# Patient Record
Sex: FEMALE | Race: WHITE | Employment: OTHER | URBAN - METROPOLITAN AREA
[De-identification: names, ages, dates, MRNs, and addresses within clinical notes are randomized per-mention and may not be internally consistent; named-entity substitution may affect disease eponyms.]

---

## 2017-02-01 PROBLEM — R91.1 PULMONARY NODULE: Status: ACTIVE | Noted: 2017-02-01

## 2017-02-01 PROBLEM — Z72.0 TOBACCO ABUSE: Chronic | Status: ACTIVE | Noted: 2017-02-01

## 2017-02-01 PROBLEM — F10.10 ALCOHOL ABUSE: Chronic | Status: ACTIVE | Noted: 2017-02-01

## 2017-02-01 PROBLEM — J45.909 ASTHMA: Chronic | Status: ACTIVE | Noted: 2017-02-01

## 2017-02-01 PROBLEM — E78.5 HYPERLIPIDEMIA: Chronic | Status: ACTIVE | Noted: 2017-02-01

## 2017-10-16 PROBLEM — F10.939 ALCOHOL WITHDRAWAL (HCC): Status: ACTIVE | Noted: 2017-10-16

## 2017-10-16 PROBLEM — J44.9 COPD (CHRONIC OBSTRUCTIVE PULMONARY DISEASE) (HCC): Chronic | Status: ACTIVE | Noted: 2017-10-16

## 2017-10-16 PROBLEM — I50.32 CHRONIC DIASTOLIC CHF (CONGESTIVE HEART FAILURE) (HCC): Chronic | Status: ACTIVE | Noted: 2017-10-16

## 2017-10-16 PROBLEM — R91.1 PULMONARY NODULE: Status: RESOLVED | Noted: 2017-02-01 | Resolved: 2017-10-16

## 2017-10-16 PROBLEM — D75.839 THROMBOCYTOSIS: Status: ACTIVE | Noted: 2017-10-16

## 2017-10-16 PROBLEM — I16.0 HYPERTENSIVE URGENCY: Status: ACTIVE | Noted: 2017-10-16

## 2017-10-16 PROBLEM — F10.20 ALCOHOLISM (HCC): Chronic | Status: ACTIVE | Noted: 2017-02-01

## 2017-10-16 PROBLEM — R29.6 FREQUENT FALLS: Status: ACTIVE | Noted: 2017-10-16

## 2017-10-16 PROBLEM — R65.10 SIRS (SYSTEMIC INFLAMMATORY RESPONSE SYNDROME) (HCC): Status: ACTIVE | Noted: 2017-10-16

## 2018-05-02 ENCOUNTER — HOSPITAL ENCOUNTER (OUTPATIENT)
Dept: SURGERY | Age: 73
Discharge: HOME | End: 2018-05-02
Attending: OPHTHALMOLOGY | Admitting: OPHTHALMOLOGY

## 2018-05-02 VITALS — DIASTOLIC BLOOD PRESSURE: 59 MMHG | HEART RATE: 81 BPM | SYSTOLIC BLOOD PRESSURE: 125 MMHG

## 2018-05-02 RX ORDER — TROPICAMIDE 10 MG/ML
1 SOLUTION/ DROPS OPHTHALMIC
Status: COMPLETED | OUTPATIENT
Start: 2018-05-02 | End: 2018-05-02

## 2018-05-02 RX ORDER — PHENYLEPHRINE HCL 2.5 %
1 DROPS OPHTHALMIC (EYE)
Status: COMPLETED | OUTPATIENT
Start: 2018-05-02 | End: 2018-05-02

## 2018-05-02 RX ORDER — PROPARACAINE HYDROCHLORIDE 5 MG/ML
1 SOLUTION/ DROPS OPHTHALMIC
Status: COMPLETED | OUTPATIENT
Start: 2018-05-02 | End: 2018-05-02

## 2018-05-02 RX ORDER — APRACLONIDINE HYDROCHLORIDE 5 MG/ML
1 SOLUTION/ DROPS OPHTHALMIC 2 TIMES DAILY PRN
Status: DISCONTINUED | OUTPATIENT
Start: 2018-05-02 | End: 2018-05-03 | Stop reason: HOSPADM

## 2018-05-02 RX ORDER — SOFT LENS RINSE,STORE SOLUTION
1 SOLUTION, NON-ORAL MISCELLANEOUS
Status: COMPLETED | OUTPATIENT
Start: 2018-05-02 | End: 2018-05-02

## 2018-05-02 RX ADMIN — Medication 1 DROP: at 12:49

## 2018-05-02 RX ADMIN — PROPARACAINE HYDROCHLORIDE 1 DROP: 5 SOLUTION/ DROPS OPHTHALMIC at 12:45

## 2018-05-02 RX ADMIN — TROPICAMIDE 1 DROP: 10 SOLUTION/ DROPS OPHTHALMIC at 12:01

## 2018-05-02 RX ADMIN — APRACLONIDINE HYDROCHLORIDE 1 DROP: 5 SOLUTION/ DROPS OPHTHALMIC at 12:01

## 2018-05-02 RX ADMIN — Medication 1 DROP: at 12:01

## 2018-05-02 RX ADMIN — APRACLONIDINE HYDROCHLORIDE 1 DROP: 5 SOLUTION/ DROPS OPHTHALMIC at 12:49

## 2018-12-28 ENCOUNTER — APPOINTMENT (OUTPATIENT)
Dept: GENERAL RADIOLOGY | Age: 73
End: 2018-12-28
Payer: MEDICARE

## 2018-12-28 ENCOUNTER — HOSPITAL ENCOUNTER (EMERGENCY)
Age: 73
Discharge: HOME | End: 2018-12-29
Attending: EMERGENCY MEDICINE
Payer: MEDICARE

## 2018-12-28 ENCOUNTER — APPOINTMENT (OUTPATIENT)
Dept: CT IMAGING | Age: 73
End: 2018-12-28
Payer: MEDICARE

## 2018-12-28 DIAGNOSIS — M25.561 ACUTE PAIN OF RIGHT KNEE: ICD-10-CM
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
## 2018-12-28 DIAGNOSIS — I67.82 ISCHEMIC CHANGES ON HEAD CT: ICD-10-CM

## 2018-12-28 DIAGNOSIS — R06.09 DYSPNEA ON EXERTION: Primary | ICD-10-CM

## 2018-12-28 LAB
A/G RATIO: 1.2 (ref 1.1–2.2)
ALBUMIN SERPL-MCNC: 3.7 G/DL (ref 3.4–5)
ALP BLD-CCNC: 63 U/L (ref 40–129)
ALT SERPL-CCNC: 15 U/L (ref 10–40)
ANION GAP SERPL CALCULATED.3IONS-SCNC: 12 MMOL/L (ref 3–16)
AST SERPL-CCNC: 26 U/L (ref 15–37)
BASOPHILS ABSOLUTE: 0.1 K/UL (ref 0–0.2)
BASOPHILS RELATIVE PERCENT: 1.1 %
BILIRUB SERPL-MCNC: 0.5 MG/DL (ref 0–1)
BUN BLDV-MCNC: 14 MG/DL (ref 7–20)
CALCIUM SERPL-MCNC: 9.5 MG/DL (ref 8.3–10.6)
CHLORIDE BLD-SCNC: 97 MMOL/L (ref 99–110)
CO2: 25 MMOL/L (ref 21–32)
CREAT SERPL-MCNC: 0.8 MG/DL (ref 0.6–1.2)
EOSINOPHILS ABSOLUTE: 0.2 K/UL (ref 0–0.6)
EOSINOPHILS RELATIVE PERCENT: 1.6 %
GFR AFRICAN AMERICAN: >60
GFR NON-AFRICAN AMERICAN: >60
GLOBULIN: 3.1 G/DL
GLUCOSE BLD-MCNC: 111 MG/DL (ref 70–99)
HCT VFR BLD CALC: 38.4 % (ref 36–48)
HEMOGLOBIN: 12.8 G/DL (ref 12–16)
LYMPHOCYTES ABSOLUTE: 2.7 K/UL (ref 1–5.1)
LYMPHOCYTES RELATIVE PERCENT: 28 %
MCH RBC QN AUTO: 31.3 PG (ref 26–34)
MCHC RBC AUTO-ENTMCNC: 33.2 G/DL (ref 31–36)
MCV RBC AUTO: 94.3 FL (ref 80–100)
MONOCYTES ABSOLUTE: 0.9 K/UL (ref 0–1.3)
MONOCYTES RELATIVE PERCENT: 8.8 %
NEUTROPHILS ABSOLUTE: 5.9 K/UL (ref 1.7–7.7)
NEUTROPHILS RELATIVE PERCENT: 60.5 %
PDW BLD-RTO: 16.8 % (ref 12.4–15.4)
PLATELET # BLD: 373 K/UL (ref 135–450)
PMV BLD AUTO: 7.8 FL (ref 5–10.5)
POTASSIUM SERPL-SCNC: 4.6 MMOL/L (ref 3.5–5.1)
PRO-BNP: 347 PG/ML (ref 0–124)
RBC # BLD: 4.08 M/UL (ref 4–5.2)
SODIUM BLD-SCNC: 134 MMOL/L (ref 136–145)
SPECIMEN STATUS: NORMAL
TOTAL PROTEIN: 6.8 G/DL (ref 6.4–8.2)
TROPONIN: <0.01 NG/ML
WBC # BLD: 9.8 K/UL (ref 4–11)

## 2018-12-28 PROCEDURE — 73560 X-RAY EXAM OF KNEE 1 OR 2: CPT

## 2018-12-28 PROCEDURE — 80053 COMPREHEN METABOLIC PANEL: CPT

## 2018-12-28 PROCEDURE — 71045 X-RAY EXAM CHEST 1 VIEW: CPT

## 2018-12-28 PROCEDURE — 70450 CT HEAD/BRAIN W/O DYE: CPT

## 2018-12-28 PROCEDURE — 93005 ELECTROCARDIOGRAM TRACING: CPT | Performed by: EMERGENCY MEDICINE

## 2018-12-28 PROCEDURE — 84484 ASSAY OF TROPONIN QUANT: CPT

## 2018-12-28 PROCEDURE — 73552 X-RAY EXAM OF FEMUR 2/>: CPT

## 2018-12-28 PROCEDURE — 73590 X-RAY EXAM OF LOWER LEG: CPT

## 2018-12-28 PROCEDURE — 73562 X-RAY EXAM OF KNEE 3: CPT

## 2018-12-28 PROCEDURE — 85025 COMPLETE CBC W/AUTO DIFF WBC: CPT

## 2018-12-28 PROCEDURE — 6370000000 HC RX 637 (ALT 250 FOR IP): Performed by: EMERGENCY MEDICINE

## 2018-12-28 PROCEDURE — 6370000000 HC RX 637 (ALT 250 FOR IP): Performed by: NURSE PRACTITIONER

## 2018-12-28 PROCEDURE — 99283 EMERGENCY DEPT VISIT LOW MDM: CPT

## 2018-12-28 PROCEDURE — 83880 ASSAY OF NATRIURETIC PEPTIDE: CPT

## 2018-12-28 PROCEDURE — 94664 DEMO&/EVAL PT USE INHALER: CPT

## 2018-12-28 PROCEDURE — 94640 AIRWAY INHALATION TREATMENT: CPT

## 2018-12-28 RX ORDER — IBUPROFEN 800 MG/1
800 TABLET ORAL ONCE
Status: COMPLETED | OUTPATIENT
Start: 2018-12-28 | End: 2018-12-28

## 2018-12-28 RX ORDER — POLYETHYLENE GLYCOL 3350, SODIUM CHLORIDE, SODIUM BICARBONATE, POTASSIUM CHLORIDE 420; 11.2; 5.72; 1.48 G/4L; G/4L; G/4L; G/4L
POWDER, FOR SOLUTION ORAL
Status: ON HOLD | COMMUNITY
Start: 2018-12-05 | End: 2021-05-23

## 2018-12-28 RX ORDER — ALBUTEROL SULFATE 90 UG/1
2 AEROSOL, METERED RESPIRATORY (INHALATION) EVERY 6 HOURS PRN
Qty: 1 INHALER | Refills: 0 | Status: ON HOLD | OUTPATIENT
Start: 2018-12-28 | End: 2021-05-23

## 2018-12-28 RX ORDER — PREDNISONE 20 MG/1
40 TABLET ORAL DAILY
Qty: 6 TABLET | Refills: 0 | Status: SHIPPED | OUTPATIENT
Start: 2018-12-28 | End: 2018-12-31

## 2018-12-28 RX ORDER — IPRATROPIUM BROMIDE AND ALBUTEROL SULFATE 2.5; .5 MG/3ML; MG/3ML
1 SOLUTION RESPIRATORY (INHALATION) ONCE
Status: COMPLETED | OUTPATIENT
Start: 2018-12-28 | End: 2018-12-28

## 2018-12-28 RX ADMIN — IBUPROFEN 800 MG: 800 TABLET ORAL at 20:03

## 2018-12-28 RX ADMIN — IPRATROPIUM BROMIDE AND ALBUTEROL SULFATE 1 AMPULE: .5; 3 SOLUTION RESPIRATORY (INHALATION) at 21:49

## 2018-12-28 ASSESSMENT — PAIN DESCRIPTION - LOCATION: LOCATION: LEG;KNEE

## 2018-12-28 ASSESSMENT — PAIN SCALES - GENERAL
PAINLEVEL_OUTOF10: 9

## 2018-12-28 ASSESSMENT — PAIN DESCRIPTION - ORIENTATION: ORIENTATION: RIGHT

## 2018-12-28 ASSESSMENT — PAIN DESCRIPTION - PAIN TYPE: TYPE: ACUTE PAIN

## 2018-12-29 VITALS
HEART RATE: 102 BPM | OXYGEN SATURATION: 92 % | BODY MASS INDEX: 28.29 KG/M2 | WEIGHT: 170 LBS | TEMPERATURE: 97.7 F | SYSTOLIC BLOOD PRESSURE: 141 MMHG | RESPIRATION RATE: 16 BRPM | DIASTOLIC BLOOD PRESSURE: 83 MMHG

## 2018-12-29 LAB
EKG ATRIAL RATE: 88 BPM
EKG DIAGNOSIS: NORMAL
EKG P AXIS: 77 DEGREES
EKG P-R INTERVAL: 176 MS
EKG Q-T INTERVAL: 390 MS
EKG QRS DURATION: 88 MS
EKG QTC CALCULATION (BAZETT): 471 MS
EKG R AXIS: 58 DEGREES
EKG T AXIS: 48 DEGREES
EKG VENTRICULAR RATE: 88 BPM

## 2018-12-29 PROCEDURE — 93010 ELECTROCARDIOGRAM REPORT: CPT | Performed by: INTERNAL MEDICINE

## 2020-02-28 ENCOUNTER — HOSPITAL ENCOUNTER (INPATIENT)
Age: 75
LOS: 3 days | Discharge: ANOTHER HOSPITAL | DRG: 280 | End: 2020-03-03
Attending: EMERGENCY MEDICINE | Admitting: INTERNAL MEDICINE
Payer: MEDICARE

## 2020-02-28 ENCOUNTER — APPOINTMENT (OUTPATIENT)
Dept: GENERAL RADIOLOGY | Age: 75
DRG: 280 | End: 2020-02-28
Payer: MEDICARE

## 2020-02-28 LAB
A/G RATIO: 1.1 (ref 1.1–2.2)
ALBUMIN SERPL-MCNC: 3.7 G/DL (ref 3.4–5)
ALP BLD-CCNC: 71 U/L (ref 40–129)
ALT SERPL-CCNC: 15 U/L (ref 10–40)
ANION GAP SERPL CALCULATED.3IONS-SCNC: 14 MMOL/L (ref 3–16)
APTT: 29.7 SEC (ref 24.2–36.2)
AST SERPL-CCNC: 19 U/L (ref 15–37)
BASE EXCESS VENOUS: 5.3 MMOL/L (ref -3–3)
BASOPHILS ABSOLUTE: 0.1 K/UL (ref 0–0.2)
BASOPHILS RELATIVE PERCENT: 0.9 %
BILIRUB SERPL-MCNC: 0.5 MG/DL (ref 0–1)
BUN BLDV-MCNC: 11 MG/DL (ref 7–20)
CALCIUM SERPL-MCNC: 9 MG/DL (ref 8.3–10.6)
CARBOXYHEMOGLOBIN: 2.3 % (ref 0–1.5)
CHLORIDE BLD-SCNC: 90 MMOL/L (ref 99–110)
CO2: 31 MMOL/L (ref 21–32)
CREAT SERPL-MCNC: 0.8 MG/DL (ref 0.6–1.2)
EOSINOPHILS ABSOLUTE: 0.1 K/UL (ref 0–0.6)
EOSINOPHILS RELATIVE PERCENT: 1.7 %
GFR AFRICAN AMERICAN: >60
GFR NON-AFRICAN AMERICAN: >60
GLOBULIN: 3.3 G/DL
GLUCOSE BLD-MCNC: 168 MG/DL (ref 70–99)
HCO3 VENOUS: 30.8 MMOL/L (ref 23–29)
HCT VFR BLD CALC: 31.4 % (ref 36–48)
HEMOGLOBIN: 10 G/DL (ref 12–16)
INR BLD: 1.08 (ref 0.86–1.14)
LYMPHOCYTES ABSOLUTE: 1.8 K/UL (ref 1–5.1)
LYMPHOCYTES RELATIVE PERCENT: 21.6 %
MAGNESIUM: 1.2 MG/DL (ref 1.8–2.4)
MCH RBC QN AUTO: 25.8 PG (ref 26–34)
MCHC RBC AUTO-ENTMCNC: 31.7 G/DL (ref 31–36)
MCV RBC AUTO: 81.2 FL (ref 80–100)
METHEMOGLOBIN VENOUS: 0.3 %
MONOCYTES ABSOLUTE: 0.9 K/UL (ref 0–1.3)
MONOCYTES RELATIVE PERCENT: 10.5 %
NEUTROPHILS ABSOLUTE: 5.5 K/UL (ref 1.7–7.7)
NEUTROPHILS RELATIVE PERCENT: 65.3 %
O2 CONTENT, VEN: 8 VOL %
O2 SAT, VEN: 57 %
O2 THERAPY: ABNORMAL
PCO2, VEN: 49.6 MMHG (ref 40–50)
PDW BLD-RTO: 17.6 % (ref 12.4–15.4)
PH VENOUS: 7.41 (ref 7.35–7.45)
PLATELET # BLD: 449 K/UL (ref 135–450)
PMV BLD AUTO: 7.3 FL (ref 5–10.5)
PO2, VEN: 30.1 MMHG (ref 25–40)
POTASSIUM REFLEX MAGNESIUM: 3.1 MMOL/L (ref 3.5–5.1)
PRO-BNP: 4283 PG/ML (ref 0–124)
PROTHROMBIN TIME: 12.5 SEC (ref 10–13.2)
RBC # BLD: 3.87 M/UL (ref 4–5.2)
SODIUM BLD-SCNC: 135 MMOL/L (ref 136–145)
TCO2 CALC VENOUS: 32 MMOL/L
TOTAL PROTEIN: 7 G/DL (ref 6.4–8.2)
TROPONIN: <0.01 NG/ML
WBC # BLD: 8.4 K/UL (ref 4–11)

## 2020-02-28 PROCEDURE — 96375 TX/PRO/DX INJ NEW DRUG ADDON: CPT

## 2020-02-28 PROCEDURE — 93005 ELECTROCARDIOGRAM TRACING: CPT | Performed by: EMERGENCY MEDICINE

## 2020-02-28 PROCEDURE — 2700000000 HC OXYGEN THERAPY PER DAY

## 2020-02-28 PROCEDURE — 84484 ASSAY OF TROPONIN QUANT: CPT

## 2020-02-28 PROCEDURE — 94761 N-INVAS EAR/PLS OXIMETRY MLT: CPT

## 2020-02-28 PROCEDURE — 85025 COMPLETE CBC W/AUTO DIFF WBC: CPT

## 2020-02-28 PROCEDURE — 71045 X-RAY EXAM CHEST 1 VIEW: CPT

## 2020-02-28 PROCEDURE — 6370000000 HC RX 637 (ALT 250 FOR IP): Performed by: EMERGENCY MEDICINE

## 2020-02-28 PROCEDURE — 85730 THROMBOPLASTIN TIME PARTIAL: CPT

## 2020-02-28 PROCEDURE — 83735 ASSAY OF MAGNESIUM: CPT

## 2020-02-28 PROCEDURE — 83880 ASSAY OF NATRIURETIC PEPTIDE: CPT

## 2020-02-28 PROCEDURE — 85610 PROTHROMBIN TIME: CPT

## 2020-02-28 PROCEDURE — 82803 BLOOD GASES ANY COMBINATION: CPT

## 2020-02-28 PROCEDURE — 80053 COMPREHEN METABOLIC PANEL: CPT

## 2020-02-28 PROCEDURE — 94640 AIRWAY INHALATION TREATMENT: CPT

## 2020-02-28 PROCEDURE — 99285 EMERGENCY DEPT VISIT HI MDM: CPT

## 2020-02-28 PROCEDURE — 6360000002 HC RX W HCPCS: Performed by: EMERGENCY MEDICINE

## 2020-02-28 PROCEDURE — 96365 THER/PROPH/DIAG IV INF INIT: CPT

## 2020-02-28 RX ORDER — IPRATROPIUM BROMIDE AND ALBUTEROL SULFATE 2.5; .5 MG/3ML; MG/3ML
1 SOLUTION RESPIRATORY (INHALATION) ONCE
Status: COMPLETED | OUTPATIENT
Start: 2020-02-28 | End: 2020-02-28

## 2020-02-28 RX ORDER — FUROSEMIDE 10 MG/ML
40 INJECTION INTRAMUSCULAR; INTRAVENOUS ONCE
Status: COMPLETED | OUTPATIENT
Start: 2020-02-28 | End: 2020-02-28

## 2020-02-28 RX ORDER — MAGNESIUM SULFATE IN WATER 40 MG/ML
2 INJECTION, SOLUTION INTRAVENOUS ONCE
Status: COMPLETED | OUTPATIENT
Start: 2020-02-28 | End: 2020-02-29

## 2020-02-28 RX ADMIN — FUROSEMIDE 40 MG: 10 INJECTION, SOLUTION INTRAMUSCULAR; INTRAVENOUS at 23:34

## 2020-02-28 RX ADMIN — MAGNESIUM SULFATE HEPTAHYDRATE 2 G: 40 INJECTION, SOLUTION INTRAVENOUS at 23:34

## 2020-02-28 RX ADMIN — IPRATROPIUM BROMIDE AND ALBUTEROL SULFATE 1 AMPULE: .5; 3 SOLUTION RESPIRATORY (INHALATION) at 22:30

## 2020-02-29 ENCOUNTER — APPOINTMENT (OUTPATIENT)
Dept: GENERAL RADIOLOGY | Age: 75
DRG: 280 | End: 2020-02-29
Payer: MEDICARE

## 2020-02-29 PROBLEM — R06.02 SHORTNESS OF BREATH: Status: ACTIVE | Noted: 2020-02-29

## 2020-02-29 LAB
ANION GAP SERPL CALCULATED.3IONS-SCNC: 11 MMOL/L (ref 3–16)
BASE EXCESS ARTERIAL: 3.1 MMOL/L (ref -3–3)
BASOPHILS ABSOLUTE: 0 K/UL (ref 0–0.2)
BASOPHILS RELATIVE PERCENT: 0.5 %
BUN BLDV-MCNC: 12 MG/DL (ref 7–20)
CALCIUM SERPL-MCNC: 8.5 MG/DL (ref 8.3–10.6)
CARBOXYHEMOGLOBIN ARTERIAL: 0.2 % (ref 0–1.5)
CHLORIDE BLD-SCNC: 88 MMOL/L (ref 99–110)
CHOLESTEROL, TOTAL: 102 MG/DL (ref 0–199)
CO2: 32 MMOL/L (ref 21–32)
CREAT SERPL-MCNC: 1 MG/DL (ref 0.6–1.2)
EKG ATRIAL RATE: 88 BPM
EKG DIAGNOSIS: NORMAL
EKG P AXIS: 75 DEGREES
EKG P-R INTERVAL: 184 MS
EKG Q-T INTERVAL: 400 MS
EKG QRS DURATION: 82 MS
EKG QTC CALCULATION (BAZETT): 484 MS
EKG R AXIS: 81 DEGREES
EKG T AXIS: 81 DEGREES
EKG VENTRICULAR RATE: 88 BPM
EOSINOPHILS ABSOLUTE: 0 K/UL (ref 0–0.6)
EOSINOPHILS RELATIVE PERCENT: 0.1 %
GFR AFRICAN AMERICAN: >60
GFR NON-AFRICAN AMERICAN: 54
GLUCOSE BLD-MCNC: 139 MG/DL (ref 70–99)
GLUCOSE BLD-MCNC: 161 MG/DL (ref 70–99)
GLUCOSE BLD-MCNC: 170 MG/DL (ref 70–99)
GLUCOSE BLD-MCNC: 179 MG/DL (ref 70–99)
GLUCOSE BLD-MCNC: 193 MG/DL (ref 70–99)
GLUCOSE BLD-MCNC: 261 MG/DL (ref 70–99)
HCO3 ARTERIAL: 27.3 MMOL/L (ref 21–29)
HCT VFR BLD CALC: 27.9 % (ref 36–48)
HDLC SERPL-MCNC: 36 MG/DL (ref 40–60)
HEMOGLOBIN, ART, EXTENDED: 10.6 G/DL (ref 12–16)
HEMOGLOBIN: 8.9 G/DL (ref 12–16)
IRON SATURATION: 5 % (ref 15–50)
IRON: 21 UG/DL (ref 37–145)
LDL CHOLESTEROL CALCULATED: 50 MG/DL
LV EF: 33 %
LVEF MODALITY: NORMAL
LYMPHOCYTES ABSOLUTE: 1.7 K/UL (ref 1–5.1)
LYMPHOCYTES RELATIVE PERCENT: 18.9 %
MAGNESIUM: 1.5 MG/DL (ref 1.8–2.4)
MCH RBC QN AUTO: 27.3 PG (ref 26–34)
MCHC RBC AUTO-ENTMCNC: 31.8 G/DL (ref 31–36)
MCV RBC AUTO: 85.9 FL (ref 80–100)
METHEMOGLOBIN ARTERIAL: 0.3 %
MONOCYTES ABSOLUTE: 0.8 K/UL (ref 0–1.3)
MONOCYTES RELATIVE PERCENT: 8.6 %
NEUTROPHILS ABSOLUTE: 6.6 K/UL (ref 1.7–7.7)
NEUTROPHILS RELATIVE PERCENT: 71.9 %
O2 CONTENT ARTERIAL: 14 ML/DL
O2 SAT, ARTERIAL: 96.3 %
O2 THERAPY: ABNORMAL
PCO2 ARTERIAL: 40.1 MMHG (ref 35–45)
PDW BLD-RTO: 18.2 % (ref 12.4–15.4)
PERFORMED ON: ABNORMAL
PH ARTERIAL: 7.45 (ref 7.35–7.45)
PLATELET # BLD: 417 K/UL (ref 135–450)
PMV BLD AUTO: 7.7 FL (ref 5–10.5)
PO2 ARTERIAL: 80 MMHG (ref 75–108)
POTASSIUM SERPL-SCNC: 3.5 MMOL/L (ref 3.5–5.1)
RBC # BLD: 3.25 M/UL (ref 4–5.2)
SODIUM BLD-SCNC: 131 MMOL/L (ref 136–145)
TCO2 ARTERIAL: 28.5 MMOL/L
TOTAL IRON BINDING CAPACITY: 423 UG/DL (ref 260–445)
TRIGL SERPL-MCNC: 78 MG/DL (ref 0–150)
TROPONIN: 0.05 NG/ML
TROPONIN: 0.07 NG/ML
VLDLC SERPL CALC-MCNC: 16 MG/DL
WBC # BLD: 9.1 K/UL (ref 4–11)

## 2020-02-29 PROCEDURE — 6360000002 HC RX W HCPCS: Performed by: INTERNAL MEDICINE

## 2020-02-29 PROCEDURE — 80061 LIPID PANEL: CPT

## 2020-02-29 PROCEDURE — 2580000003 HC RX 258: Performed by: INTERNAL MEDICINE

## 2020-02-29 PROCEDURE — 93306 TTE W/DOPPLER COMPLETE: CPT

## 2020-02-29 PROCEDURE — 85025 COMPLETE CBC W/AUTO DIFF WBC: CPT

## 2020-02-29 PROCEDURE — 6370000000 HC RX 637 (ALT 250 FOR IP): Performed by: INTERNAL MEDICINE

## 2020-02-29 PROCEDURE — 6370000000 HC RX 637 (ALT 250 FOR IP): Performed by: EMERGENCY MEDICINE

## 2020-02-29 PROCEDURE — 97162 PT EVAL MOD COMPLEX 30 MIN: CPT

## 2020-02-29 PROCEDURE — 83550 IRON BINDING TEST: CPT

## 2020-02-29 PROCEDURE — 80048 BASIC METABOLIC PNL TOTAL CA: CPT

## 2020-02-29 PROCEDURE — 2700000000 HC OXYGEN THERAPY PER DAY

## 2020-02-29 PROCEDURE — 82803 BLOOD GASES ANY COMBINATION: CPT

## 2020-02-29 PROCEDURE — 71045 X-RAY EXAM CHEST 1 VIEW: CPT

## 2020-02-29 PROCEDURE — 36415 COLL VENOUS BLD VENIPUNCTURE: CPT

## 2020-02-29 PROCEDURE — 36600 WITHDRAWAL OF ARTERIAL BLOOD: CPT

## 2020-02-29 PROCEDURE — 97116 GAIT TRAINING THERAPY: CPT

## 2020-02-29 PROCEDURE — 94640 AIRWAY INHALATION TREATMENT: CPT

## 2020-02-29 PROCEDURE — 94761 N-INVAS EAR/PLS OXIMETRY MLT: CPT

## 2020-02-29 PROCEDURE — 84484 ASSAY OF TROPONIN QUANT: CPT

## 2020-02-29 PROCEDURE — 2060000000 HC ICU INTERMEDIATE R&B

## 2020-02-29 PROCEDURE — 97530 THERAPEUTIC ACTIVITIES: CPT

## 2020-02-29 PROCEDURE — 97165 OT EVAL LOW COMPLEX 30 MIN: CPT

## 2020-02-29 PROCEDURE — 93010 ELECTROCARDIOGRAM REPORT: CPT | Performed by: INTERNAL MEDICINE

## 2020-02-29 PROCEDURE — 99223 1ST HOSP IP/OBS HIGH 75: CPT | Performed by: INTERNAL MEDICINE

## 2020-02-29 PROCEDURE — 83540 ASSAY OF IRON: CPT

## 2020-02-29 PROCEDURE — 94150 VITAL CAPACITY TEST: CPT

## 2020-02-29 PROCEDURE — 83735 ASSAY OF MAGNESIUM: CPT

## 2020-02-29 RX ORDER — IPRATROPIUM BROMIDE AND ALBUTEROL SULFATE 2.5; .5 MG/3ML; MG/3ML
1 SOLUTION RESPIRATORY (INHALATION) 3 TIMES DAILY
Status: DISCONTINUED | OUTPATIENT
Start: 2020-02-29 | End: 2020-02-29

## 2020-02-29 RX ORDER — NICOTINE POLACRILEX 4 MG
15 LOZENGE BUCCAL PRN
Status: DISCONTINUED | OUTPATIENT
Start: 2020-02-29 | End: 2020-03-03 | Stop reason: HOSPADM

## 2020-02-29 RX ORDER — BUDESONIDE AND FORMOTEROL FUMARATE DIHYDRATE 160; 4.5 UG/1; UG/1
2 AEROSOL RESPIRATORY (INHALATION) 2 TIMES DAILY
Status: DISCONTINUED | OUTPATIENT
Start: 2020-02-29 | End: 2020-03-03 | Stop reason: HOSPADM

## 2020-02-29 RX ORDER — HEPARIN SODIUM 5000 [USP'U]/ML
5000 INJECTION, SOLUTION INTRAVENOUS; SUBCUTANEOUS EVERY 8 HOURS SCHEDULED
Status: DISCONTINUED | OUTPATIENT
Start: 2020-02-29 | End: 2020-02-29

## 2020-02-29 RX ORDER — PAROXETINE HYDROCHLORIDE 20 MG/1
40 TABLET, FILM COATED ORAL EVERY MORNING
Status: DISCONTINUED | OUTPATIENT
Start: 2020-02-29 | End: 2020-03-03 | Stop reason: HOSPADM

## 2020-02-29 RX ORDER — POLYETHYLENE GLYCOL 3350 17 G/17G
17 POWDER, FOR SOLUTION ORAL DAILY PRN
Status: DISCONTINUED | OUTPATIENT
Start: 2020-02-29 | End: 2020-03-03 | Stop reason: HOSPADM

## 2020-02-29 RX ORDER — SODIUM CHLORIDE 0.9 % (FLUSH) 0.9 %
10 SYRINGE (ML) INJECTION EVERY 12 HOURS SCHEDULED
Status: DISCONTINUED | OUTPATIENT
Start: 2020-02-29 | End: 2020-03-03 | Stop reason: HOSPADM

## 2020-02-29 RX ORDER — PROMETHAZINE HYDROCHLORIDE 25 MG/1
12.5 TABLET ORAL EVERY 6 HOURS PRN
Status: DISCONTINUED | OUTPATIENT
Start: 2020-02-29 | End: 2020-03-03 | Stop reason: HOSPADM

## 2020-02-29 RX ORDER — ATORVASTATIN CALCIUM 40 MG/1
40 TABLET, FILM COATED ORAL NIGHTLY
Status: DISCONTINUED | OUTPATIENT
Start: 2020-02-29 | End: 2020-02-29

## 2020-02-29 RX ORDER — ACETAMINOPHEN 325 MG/1
650 TABLET ORAL EVERY 6 HOURS PRN
Status: DISCONTINUED | OUTPATIENT
Start: 2020-02-29 | End: 2020-03-03 | Stop reason: HOSPADM

## 2020-02-29 RX ORDER — SODIUM CHLORIDE 0.9 % (FLUSH) 0.9 %
10 SYRINGE (ML) INJECTION PRN
Status: DISCONTINUED | OUTPATIENT
Start: 2020-02-29 | End: 2020-03-03 | Stop reason: HOSPADM

## 2020-02-29 RX ORDER — FOLIC ACID 1 MG/1
1 TABLET ORAL DAILY
Status: DISCONTINUED | OUTPATIENT
Start: 2020-02-29 | End: 2020-03-03 | Stop reason: HOSPADM

## 2020-02-29 RX ORDER — NICOTINE 21 MG/24HR
1 PATCH, TRANSDERMAL 24 HOURS TRANSDERMAL DAILY
Status: DISCONTINUED | OUTPATIENT
Start: 2020-02-29 | End: 2020-03-03 | Stop reason: HOSPADM

## 2020-02-29 RX ORDER — POTASSIUM CHLORIDE 20 MEQ/1
40 TABLET, EXTENDED RELEASE ORAL PRN
Status: DISCONTINUED | OUTPATIENT
Start: 2020-02-29 | End: 2020-03-03 | Stop reason: HOSPADM

## 2020-02-29 RX ORDER — DEXTROSE MONOHYDRATE 25 G/50ML
12.5 INJECTION, SOLUTION INTRAVENOUS PRN
Status: DISCONTINUED | OUTPATIENT
Start: 2020-02-29 | End: 2020-03-03 | Stop reason: HOSPADM

## 2020-02-29 RX ORDER — MAGNESIUM SULFATE IN WATER 40 MG/ML
2 INJECTION, SOLUTION INTRAVENOUS PRN
Status: DISCONTINUED | OUTPATIENT
Start: 2020-02-29 | End: 2020-03-03 | Stop reason: HOSPADM

## 2020-02-29 RX ORDER — IPRATROPIUM BROMIDE AND ALBUTEROL SULFATE 2.5; .5 MG/3ML; MG/3ML
1 SOLUTION RESPIRATORY (INHALATION)
Status: DISCONTINUED | OUTPATIENT
Start: 2020-02-29 | End: 2020-02-29

## 2020-02-29 RX ORDER — PANTOPRAZOLE SODIUM 40 MG/1
40 TABLET, DELAYED RELEASE ORAL
Status: DISCONTINUED | OUTPATIENT
Start: 2020-02-29 | End: 2020-03-03 | Stop reason: HOSPADM

## 2020-02-29 RX ORDER — THIAMINE MONONITRATE (VIT B1) 100 MG
100 TABLET ORAL DAILY
Status: DISCONTINUED | OUTPATIENT
Start: 2020-02-29 | End: 2020-03-03 | Stop reason: HOSPADM

## 2020-02-29 RX ORDER — DEXTROSE MONOHYDRATE 50 MG/ML
100 INJECTION, SOLUTION INTRAVENOUS PRN
Status: DISCONTINUED | OUTPATIENT
Start: 2020-02-29 | End: 2020-03-03 | Stop reason: HOSPADM

## 2020-02-29 RX ORDER — IPRATROPIUM BROMIDE AND ALBUTEROL SULFATE 2.5; .5 MG/3ML; MG/3ML
1 SOLUTION RESPIRATORY (INHALATION) 2 TIMES DAILY
Status: DISCONTINUED | OUTPATIENT
Start: 2020-02-29 | End: 2020-02-29

## 2020-02-29 RX ORDER — ACETAMINOPHEN 650 MG/1
650 SUPPOSITORY RECTAL EVERY 6 HOURS PRN
Status: DISCONTINUED | OUTPATIENT
Start: 2020-02-29 | End: 2020-03-03 | Stop reason: HOSPADM

## 2020-02-29 RX ORDER — M-VIT,TX,IRON,MINS/CALC/FOLIC 27MG-0.4MG
1 TABLET ORAL DAILY
Status: DISCONTINUED | OUTPATIENT
Start: 2020-02-29 | End: 2020-03-03 | Stop reason: HOSPADM

## 2020-02-29 RX ORDER — POTASSIUM CHLORIDE 7.45 MG/ML
10 INJECTION INTRAVENOUS PRN
Status: DISCONTINUED | OUTPATIENT
Start: 2020-02-29 | End: 2020-03-03 | Stop reason: HOSPADM

## 2020-02-29 RX ORDER — ASPIRIN 81 MG/1
81 TABLET ORAL DAILY
Status: DISCONTINUED | OUTPATIENT
Start: 2020-02-29 | End: 2020-03-03 | Stop reason: HOSPADM

## 2020-02-29 RX ORDER — CLONIDINE HYDROCHLORIDE 0.2 MG/1
0.2 TABLET ORAL 2 TIMES DAILY
Status: DISCONTINUED | OUTPATIENT
Start: 2020-02-29 | End: 2020-03-03 | Stop reason: HOSPADM

## 2020-02-29 RX ORDER — IPRATROPIUM BROMIDE AND ALBUTEROL SULFATE 2.5; .5 MG/3ML; MG/3ML
1 SOLUTION RESPIRATORY (INHALATION) 4 TIMES DAILY
Status: DISCONTINUED | OUTPATIENT
Start: 2020-02-29 | End: 2020-03-03 | Stop reason: HOSPADM

## 2020-02-29 RX ORDER — ALBUTEROL SULFATE 2.5 MG/3ML
2.5 SOLUTION RESPIRATORY (INHALATION) EVERY 6 HOURS PRN
Status: DISCONTINUED | OUTPATIENT
Start: 2020-02-29 | End: 2020-02-29

## 2020-02-29 RX ORDER — ONDANSETRON 2 MG/ML
4 INJECTION INTRAMUSCULAR; INTRAVENOUS EVERY 6 HOURS PRN
Status: DISCONTINUED | OUTPATIENT
Start: 2020-02-29 | End: 2020-03-03 | Stop reason: HOSPADM

## 2020-02-29 RX ORDER — ATORVASTATIN CALCIUM 40 MG/1
80 TABLET, FILM COATED ORAL NIGHTLY
Status: DISCONTINUED | OUTPATIENT
Start: 2020-02-29 | End: 2020-03-03 | Stop reason: HOSPADM

## 2020-02-29 RX ORDER — LEVOTHYROXINE SODIUM 0.03 MG/1
25 TABLET ORAL DAILY
Status: DISCONTINUED | OUTPATIENT
Start: 2020-02-29 | End: 2020-03-03 | Stop reason: HOSPADM

## 2020-02-29 RX ORDER — CHLORDIAZEPOXIDE HYDROCHLORIDE 5 MG/1
5 CAPSULE, GELATIN COATED ORAL EVERY 6 HOURS PRN
Status: DISCONTINUED | OUTPATIENT
Start: 2020-02-29 | End: 2020-03-03 | Stop reason: HOSPADM

## 2020-02-29 RX ORDER — LISINOPRIL 20 MG/1
40 TABLET ORAL DAILY
Status: DISCONTINUED | OUTPATIENT
Start: 2020-02-29 | End: 2020-03-03 | Stop reason: HOSPADM

## 2020-02-29 RX ORDER — ALBUTEROL SULFATE 2.5 MG/3ML
2.5 SOLUTION RESPIRATORY (INHALATION) EVERY 4 HOURS PRN
Status: DISCONTINUED | OUTPATIENT
Start: 2020-02-29 | End: 2020-03-03 | Stop reason: HOSPADM

## 2020-02-29 RX ORDER — FUROSEMIDE 10 MG/ML
40 INJECTION INTRAMUSCULAR; INTRAVENOUS 2 TIMES DAILY
Status: DISCONTINUED | OUTPATIENT
Start: 2020-02-29 | End: 2020-03-03 | Stop reason: HOSPADM

## 2020-02-29 RX ADMIN — METOPROLOL TARTRATE 25 MG: 25 TABLET ORAL at 20:02

## 2020-02-29 RX ADMIN — PAROXETINE HYDROCHLORIDE 40 MG: 20 TABLET, FILM COATED ORAL at 09:36

## 2020-02-29 RX ADMIN — ATORVASTATIN CALCIUM 80 MG: 40 TABLET, FILM COATED ORAL at 20:01

## 2020-02-29 RX ADMIN — CLONIDINE HYDROCHLORIDE 0.2 MG: 0.2 TABLET ORAL at 09:36

## 2020-02-29 RX ADMIN — Medication 2 PUFF: at 20:04

## 2020-02-29 RX ADMIN — CHLORDIAZEPOXIDE HYDROCHLORIDE 5 MG: 5 CAPSULE ORAL at 01:50

## 2020-02-29 RX ADMIN — ASPIRIN 81 MG: 81 TABLET, COATED ORAL at 09:36

## 2020-02-29 RX ADMIN — CLONIDINE HYDROCHLORIDE 0.2 MG: 0.2 TABLET ORAL at 01:49

## 2020-02-29 RX ADMIN — MAGNESIUM SULFATE HEPTAHYDRATE 2 G: 40 INJECTION, SOLUTION INTRAVENOUS at 09:49

## 2020-02-29 RX ADMIN — HEPARIN SODIUM 5000 UNITS: 5000 INJECTION INTRAVENOUS; SUBCUTANEOUS at 05:00

## 2020-02-29 RX ADMIN — IPRATROPIUM BROMIDE AND ALBUTEROL SULFATE 1 AMPULE: .5; 3 SOLUTION RESPIRATORY (INHALATION) at 11:34

## 2020-02-29 RX ADMIN — ACETAMINOPHEN 650 MG: 325 TABLET ORAL at 09:48

## 2020-02-29 RX ADMIN — Medication 10 ML: at 20:03

## 2020-02-29 RX ADMIN — IPRATROPIUM BROMIDE AND ALBUTEROL SULFATE 1 AMPULE: .5; 3 SOLUTION RESPIRATORY (INHALATION) at 20:04

## 2020-02-29 RX ADMIN — IPRATROPIUM BROMIDE AND ALBUTEROL SULFATE 1 AMPULE: .5; 3 SOLUTION RESPIRATORY (INHALATION) at 15:14

## 2020-02-29 RX ADMIN — ALBUTEROL SULFATE 2.5 MG: 2.5 SOLUTION RESPIRATORY (INHALATION) at 04:00

## 2020-02-29 RX ADMIN — INSULIN LISPRO 1 UNITS: 100 INJECTION, SOLUTION INTRAVENOUS; SUBCUTANEOUS at 01:53

## 2020-02-29 RX ADMIN — INSULIN LISPRO 2 UNITS: 100 INJECTION, SOLUTION INTRAVENOUS; SUBCUTANEOUS at 12:18

## 2020-02-29 RX ADMIN — INSULIN LISPRO 2 UNITS: 100 INJECTION, SOLUTION INTRAVENOUS; SUBCUTANEOUS at 17:24

## 2020-02-29 RX ADMIN — LEVOTHYROXINE SODIUM 25 MCG: 25 TABLET ORAL at 05:00

## 2020-02-29 RX ADMIN — Medication 10 ML: at 09:38

## 2020-02-29 RX ADMIN — PANTOPRAZOLE SODIUM 40 MG: 40 TABLET, DELAYED RELEASE ORAL at 05:01

## 2020-02-29 RX ADMIN — POTASSIUM BICARBONATE 40 MEQ: 782 TABLET, EFFERVESCENT ORAL at 00:04

## 2020-02-29 RX ADMIN — FUROSEMIDE 40 MG: 10 INJECTION, SOLUTION INTRAMUSCULAR; INTRAVENOUS at 10:58

## 2020-02-29 RX ADMIN — Medication 100 MG: at 09:36

## 2020-02-29 RX ADMIN — INSULIN LISPRO 2 UNITS: 100 INJECTION, SOLUTION INTRAVENOUS; SUBCUTANEOUS at 09:49

## 2020-02-29 RX ADMIN — METOPROLOL TARTRATE 25 MG: 25 TABLET ORAL at 09:36

## 2020-02-29 RX ADMIN — FOLIC ACID 1 MG: 1 TABLET ORAL at 09:36

## 2020-02-29 RX ADMIN — Medication 10 ML: at 20:02

## 2020-02-29 RX ADMIN — Medication 10 ML: at 09:37

## 2020-02-29 RX ADMIN — MULTIPLE VITAMINS W/ MINERALS TAB 1 TABLET: TAB at 09:36

## 2020-02-29 RX ADMIN — IPRATROPIUM BROMIDE AND ALBUTEROL SULFATE 1 AMPULE: .5; 3 SOLUTION RESPIRATORY (INHALATION) at 07:33

## 2020-02-29 RX ADMIN — ENOXAPARIN SODIUM 80 MG: 80 INJECTION SUBCUTANEOUS at 09:36

## 2020-02-29 RX ADMIN — LISINOPRIL 40 MG: 20 TABLET ORAL at 09:35

## 2020-02-29 RX ADMIN — METOPROLOL TARTRATE 25 MG: 25 TABLET ORAL at 01:49

## 2020-02-29 RX ADMIN — ATORVASTATIN CALCIUM 40 MG: 40 TABLET, FILM COATED ORAL at 01:50

## 2020-02-29 RX ADMIN — ENOXAPARIN SODIUM 80 MG: 80 INJECTION SUBCUTANEOUS at 20:02

## 2020-02-29 RX ADMIN — FUROSEMIDE 40 MG: 10 INJECTION, SOLUTION INTRAMUSCULAR; INTRAVENOUS at 17:23

## 2020-02-29 RX ADMIN — CLONIDINE HYDROCHLORIDE 0.2 MG: 0.2 TABLET ORAL at 20:01

## 2020-02-29 ASSESSMENT — PAIN - FUNCTIONAL ASSESSMENT: PAIN_FUNCTIONAL_ASSESSMENT: ACTIVITIES ARE NOT PREVENTED

## 2020-02-29 ASSESSMENT — PAIN SCALES - GENERAL
PAINLEVEL_OUTOF10: 0
PAINLEVEL_OUTOF10: 0
PAINLEVEL_OUTOF10: 6
PAINLEVEL_OUTOF10: 2
PAINLEVEL_OUTOF10: 0

## 2020-02-29 ASSESSMENT — PAIN DESCRIPTION - LOCATION: LOCATION: HEAD;BACK

## 2020-02-29 ASSESSMENT — PAIN DESCRIPTION - PROGRESSION: CLINICAL_PROGRESSION: GRADUALLY WORSENING

## 2020-02-29 ASSESSMENT — PAIN DESCRIPTION - DESCRIPTORS: DESCRIPTORS: ACHING

## 2020-02-29 ASSESSMENT — PAIN DESCRIPTION - FREQUENCY: FREQUENCY: CONTINUOUS

## 2020-02-29 ASSESSMENT — PAIN DESCRIPTION - PAIN TYPE: TYPE: ACUTE PAIN;CHRONIC PAIN

## 2020-02-29 ASSESSMENT — PAIN DESCRIPTION - ORIENTATION: ORIENTATION: MID

## 2020-02-29 ASSESSMENT — PAIN DESCRIPTION - ONSET: ONSET: ON-GOING

## 2020-02-29 NOTE — PROGRESS NOTES
Pt. Resting sitting on side of bed. C/o headahce and back pain. PRN Tylenol given per [facility] order. Pt. Assessment completed- see flow sheet. Magnesium 1.5. PRN 2 g magnesium given per [facility] order. Transport here to take pt. Down to ECHO. Pt. Stable when leaving the floor.  Chucky Rodriguez RN

## 2020-02-29 NOTE — PLAN OF CARE
Problem: SAFETY  Goal: Free from accidental physical injury  2/29/2020 0336 by Conrad Otoole RN  Outcome: Ongoing  2/29/2020 0335 by Conrad Otoole RN  Outcome: Ongoing     Problem: DAILY CARE  Goal: Daily care needs are met  2/29/2020 0336 by Conrad Otoole RN  Outcome: Ongoing  2/29/2020 0335 by Conrad Otoole RN  Outcome: Ongoing     Problem: PAIN  Goal: Patient's pain/discomfort is manageable  2/29/2020 0336 by Conrad Otoole RN  Outcome: Ongoing  2/29/2020 0335 by Conrad Otoole RN  Outcome: Ongoing     Problem: SKIN INTEGRITY  Goal: Skin integrity is maintained or improved  2/29/2020 0336 by Conrad Otoole RN  Outcome: Ongoing  2/29/2020 0335 by Conrad Otoole RN  Outcome: Ongoing

## 2020-02-29 NOTE — H&P
Hospital Medicine History & Physical      PCP: Monique Potter MD    Date of Admission: 2020    Date of Service: Pt seen/examined on 2020    Chief Complaint: Shortness of breath      History Of Present Illness:    69 y.o. female who presented to the hospital with a chief complaint of shortness of breath and generalized weakness. The patient states her symptoms have been ongoing for the past week. Of note the patient drinks about a bottle of bourbon every 2 days and smokes a pack and a half of cigarettes daily. She was accompanied by her  who helped with some of her history. The patient uses inhalers at home but has been out for a while. She denies any recent illness or sick contacts. When she presented to the emergency department she was working hard to breathe and was hypoxic on room air. Work-up in emergency department was concerning for pulmonary edema. She was given a dose of IV Lasix and breathing treatment. She will be admitted for further medical evaluation. Past Medical History:        Diagnosis Date    Asthma     Back ache     Cataract     Cerebral artery occlusion with cerebral infarction ([address] 2016    Diabetes mellitus ([address]     type !! - diet controlled    Hyperlipidemia     Hypertension     Insomnia     TIA (transient ischemic attack) 2016       Past Surgical History:        Procedure Laterality Date    ABDOMEN SURGERY      c section    CATARACT REMOVAL WITH IMPLANT Left [id number]    LEFT EYE CATARACT PHACOEMULSIFICATION INTRAOCULAR LENS     SECTION      DENTAL SURGERY      EYE SURGERY  10/29/13     RIGHT EYE CATARACT PHACOEMULSIFICATION INTRAOCULAR LENS       Medications Prior to Admission:      Prior to Admission medications    Medication Sig Start Date End Date Taking?  Authorizing Provider   metFORMIN (GLUCOPHAGE) 500 MG tablet Take 500 mg by mouth 18   Historical Provider, MD   polyethylene glycol-electrolytes (TRILYTE) 420 g solution Follow Package Instructions unless otherwise directed by physician.  12/5/18   Historical Provider, MD   albuterol sulfate  (90 Base) MCG/ACT inhaler Inhale 2 puffs into the lungs every 6 hours as needed for Wheezing 12/28/18   LEONA Le - CNP   ibuprofen (ADVIL;MOTRIN) 200 MG tablet Take 1 tablet by mouth every 6 hours as needed for Pain 10/19/17   Ling Chun MD   PARoxetine (PAXIL) 40 MG tablet Take 1 tablet by mouth every morning 10/19/17   Ling Chun MD   levothyroxine (SYNTHROID) 25 MCG tablet Take 1 tablet by mouth Daily 10/20/17   Ling Chun MD   vitamin B-1 (THIAMINE) 100 MG tablet Take 1 tablet by mouth daily 10/19/17   Ling Chun MD   traZODone (DESYREL) 50 MG tablet Take 100 mg by mouth nightly    Historical Provider, MD   lisinopril (PRINIVIL;ZESTRIL) 40 MG tablet Take 1 tablet by mouth daily 3/21/17   Ling Chun MD   metoprolol tartrate (LOPRESSOR) 25 MG tablet Take 1 tablet by mouth 2 times daily 3/21/17   Ling Chun MD   cloNIDine (CATAPRES) 0.2 MG tablet Take 1 tablet by mouth 2 times daily 3/21/17   Ling Chun MD   furosemide (LASIX) 20 MG tablet Take 0.5 tablets by mouth daily 2/10/17   Niru Valdes MD   fluticasone-salmeterol (ADVAIR DISKUS) 500-50 MCG/DOSE diskus inhaler Inhale 1 puff into the lungs every 12 hours 12/25/16   Rayna Bolanos MD   atorvastatin (LIPITOR) 40 MG tablet Take 1 tablet by mouth nightly 12/25/16   Rayna Bolanos MD   fluticasone [facility]) 50 MCG/ACT nasal spray 1 spray by Nasal route daily 12/25/16   Rayna Bolanos MD   tiotropium [facility] HANDIHALER) 18 MCG inhalation capsule Inhale 1 capsule into the lungs daily 12/25/16   Rayna Bolanos MD   Multiple Vitamin (THERAPEUTIC) TABS tablet Take 1 tablet by mouth daily 11/15/15   Kaity Vasquez MD   omeprazole (PRILOSEC) 20 MG capsule Take 1 capsule by mouth daily 6/29/15   Carley Brownlee MD   FISH OIL Trachea midline. Respiratory: Clear to auscultation with dullness at the bases  Cardiovascular:  Regular rate and rhythm with normal S1/S2 without murmurs, rubs or gallops. Abdomen: Soft, non-tender, non-distended with normal bowel sounds. Musculoskeletal:  No clubbing, cyanosis or edema bilaterally. Full range of motion without deformity. Skin: Skin color, texture, turgor normal.  No rashes or lesions. Neurologic:  Neurovascularly intact without any focal sensory/motor deficits. Cranial nerves: II-XII intact, grossly non-focal.  Psychiatric:  Alert and oriented, thought content appropriate, normal insight  Capillary Refill: Brisk,< 3 seconds   Peripheral Pulses: +2 palpable, equal bilaterally       Labs:   Recent Labs     02/28/20  2220   WBC 8.4   HGB 10.0*   HCT 31.4*        Recent Labs     02/28/20  2220   *   K 3.1*   CL 90*   CO2 31   BUN 11   CREATININE 0.8   CALCIUM 9.0     Recent Labs     02/28/20  2220   AST 19   ALT 15   BILITOT 0.5   ALKPHOS 71     Recent Labs     02/28/20  2220   INR 1.08     Recent Labs     02/28/20  2220   TROPONINI <0.01         Radiology:   EKG:  I have reviewed the EKG with the following interpretation: Normal sinus rhythm with no acute ST or T wave changes. XR CHEST PORTABLE   Final Result   Mild-to-moderate interstitial pulmonary edema without sizable pleural   effusion. ASSESSMENT:  Acute respiratory failure with hypoxia  Diastolic heart failure  Diabetes mellitus  COPD/emphysema  Alcohol dependence  Hypomagnesemia  Hypokalemia  Tobacco abuse    PLAN:  Continue oxygen therapy, and med nebulizers ordered  Very difficult or sustain volume status in this patient, does not look grossly volume overloaded but BNP significantly elevated  Given 1 dose of IV Lasix in the emergency room, will continue Lasix twice daily.   Repeat a 2D echocardiogram  Replace magnesium aggressively  Nicotine patch, Librium for signs of withdrawal  PT OT      DVT Prophylaxis: Heparin  Diet: Diabetic diet  Code Status: Prior    Dispo -admit to PCU on telemetry      Thank you for the opportunity to be involved in this patient's care.       (Please note that portions of this note were completed with a voice recognition program. Efforts were made to edit the dictations but occasionally words are mis-transcribed.)

## 2020-02-29 NOTE — PROGRESS NOTES
[id number]    LEFT EYE CATARACT PHACOEMULSIFICATION INTRAOCULAR LENS     SECTION      DENTAL SURGERY      EYE SURGERY  10/29/13     RIGHT EYE CATARACT PHACOEMULSIFICATION INTRAOCULAR LENS       Level of Consciousness: Alert, Oriented, and Cooperative = 0    Level of Activity: Mostly sedentary, minimal walking = 2    Respiratory Pattern: Regular Pattern; RR 8-20 = 0    Breath Sounds: Diminshed bilaterally and/or crackles = 2    Sputum   ,  , Sputum How Obtained: Spontaneous cough  Cough: Strong, spontaneous, non-productive = 0    Vital Signs   /76   Pulse 90   Temp 96.9 °F (36.1 °C) (Oral)   Resp 19   Ht 5' 5\" (1.651 m)   Wt 170 lb (77.1 kg)   SpO2 94%   BMI 28.29 kg/m²   SPO2 (COPD values may differ): 88-89% on room air or greater than 92% on FiO2 28- 35% = 2    Peak Flow (asthma only): not applicable = 0    RSI: 7-8 = BID and Q4HPRN (every four hours as needed) for dyspnea        Plan       Goals:  Medication delivery     Patient/caregiver was educated on the proper method of use for Respiratory Care Devices:  Yes      Level of patient/caregiver understanding able to:   ? Verbalize understanding   ? Demonstrate understanding       ? Teach back        ? Needs reinforcement       ? No available caregiver               ? Other:     Response to education:  Very Good     Is patient being placed on Home Treatment Regimen? No     Does the patient have everything they need prior to discharge? NA     Comments:  Chart reviewed, patient assessed    Plan of Care: Duoneb BID, Albuterol prn, Symbicort BID    Electronically signed by Luba Casillas RCP on 2020 at 2:07 AM    Respiratory Protocol Guidelines     1. Assessment and treatment by Respiratory Therapy will be initiated for medication and therapeutic interventions upon initiation of aerosolized medication. 2. Physician will be contacted for respiratory rate (RR) greater than 35 breaths per minute.  Therapy will be held for heart rate (HR) cough.    Anti-inflammatory and Combination Medications:    1. If the patient lacks prior history of lung disease, is not using inhaled anti-inflammatory medication at home, and lacks wheezing by examination or by history for at least 24 hours, contact physician for possible discontinuation.

## 2020-02-29 NOTE — PROGRESS NOTES
Cardiology consult has been called to Dr. Rosa Croft on 2/29/20 through answering service @ 0976.  Kd Amaro [place]

## 2020-02-29 NOTE — CARE COORDINATION
Case Management Assessment  Initial Evaluation  COPD    Date/Time of Evaluation: 2/29/2020 1:14 PM  Assessment Completed by: Carisa Lambert    Patient Name: Chan Higginbotham  YOB: 1945  Diagnosis: Shortness of breath [R06.02]  Date / Time: 2/28/2020 10:10 PM  Admission status/Date: IP 02/29/2020  Chart Reviewed: Yes      Patient Interviewed: Yes   Family Interviewed:  No      Hospitalization in the last 30 days:  No    Contacts  :     Relationship to Patient:   Phone Number:    Alternate Contact:     Relationship to Patient:     Phone Number:      Current PCP: Simone  Pulmonologist/Name:     Financial Ferreira Medicare    ADLS  Support Systems: Spouse/Significant Other, Family Members  Transportation: family    Meal Preparation: self  Smoke: Heavy Smoker amount:     Housing  Home Environment: 2 story w/sp and adult son  Steps: 2 steps in  Plans to Return to Present Housing: Yes  Other Identified Issues: [address]  Currently active with [address] : No  Type of Home Care Services: None         Passport/Waiver : No  Passport/Waiver Services: NA    Durable Medical Equipment   DME Provider: NA  Equipment:     Has Home O2 in place on admit:  No  Informed of need to bring portable home O2 tank on day of discharge for nursing to connect prior to leaving:   No  Verbalized agreement/Understanding:   No    Community Service Affiliation  Dialysis:  No  Outpatient PT/OT: No    Cancer Center: No     CHF Clinic: No     Pulmonary Rehab: No    Pain Clinic: No  Community Mental Health: No    Wound Clinic: No     Other:     DISCHARGE PLAN: Chart reviewed. Met with pt at bedside and explained the role of the CM. Plans to return home. Denies any new HC or DME needs. +CM will follow for home O2. Currently on 3 liters.

## 2020-02-29 NOTE — ED PROVIDER NOTES
(90 Base) MCG/ACT inhaler Inhale 2 puffs into the lungs every 6 hours as needed for Wheezing 1 Inhaler 0    ibuprofen (ADVIL;MOTRIN) 200 MG tablet Take 1 tablet by mouth every 6 hours as needed for Pain 30 tablet 0    PARoxetine (PAXIL) 40 MG tablet Take 1 tablet by mouth every morning 30 tablet 3    levothyroxine (SYNTHROID) 25 MCG tablet Take 1 tablet by mouth Daily 30 tablet 0    vitamin B-1 (THIAMINE) 100 MG tablet Take 1 tablet by mouth daily 30 tablet 0    traZODone (DESYREL) 50 MG tablet Take 100 mg by mouth nightly      lisinopril (PRINIVIL;ZESTRIL) 40 MG tablet Take 1 tablet by mouth daily 30 tablet 0    metoprolol tartrate (LOPRESSOR) 25 MG tablet Take 1 tablet by mouth 2 times daily 60 tablet 0    cloNIDine (CATAPRES) 0.2 MG tablet Take 1 tablet by mouth 2 times daily 60 tablet 0    furosemide (LASIX) 20 MG tablet Take 0.5 tablets by mouth daily 60 tablet 0    fluticasone-salmeterol (ADVAIR DISKUS) 500-50 MCG/DOSE diskus inhaler Inhale 1 puff into the lungs every 12 hours 60 each 0    atorvastatin (LIPITOR) 40 MG tablet Take 1 tablet by mouth nightly 30 tablet 3    fluticasone (FLONASE) 50 MCG/ACT nasal spray 1 spray by Nasal route daily 1 Bottle 3    tiotropium (SPIRIVA HANDIHALER) 18 MCG inhalation capsule Inhale 1 capsule into the lungs daily 30 capsule 0    Multiple Vitamin (THERAPEUTIC) TABS tablet Take 1 tablet by mouth daily 30 tablet 3    omeprazole (PRILOSEC) 20 MG capsule Take 1 capsule by mouth daily 30 capsule 0    FISH OIL by Does not apply route.  vitamin B-12 (CYANOCOBALAMIN) 1000 MCG tablet Take 1,000 mcg by mouth daily.  Cholecalciferol (VITAMIN D-3) 5000 UNITS TABS Take  by mouth. Allergies   Allergen Reactions    Mold Extract [Trichophyton Mentagrophytes]        REVIEW OF SYSTEMS  10 systems reviewed, pertinent positives per HPI otherwise noted to be negative.     PHYSICAL EXAM  BP (!) 141/94   Pulse 88   Temp 98.1 °F (36.7 °C) (Oral)   Resp 20 Ht 5' 5\" (1.651 m)   Wt 170 lb (77.1 kg)   SpO2 94%   BMI 28.29 kg/m²   GENERAL APPEARANCE: Awake and alert. Cooperative. No acute distress. HEAD: Normocephalic. Atraumatic. EYES: PERRL. EOM's grossly intact. ENT: Mucous membranes are moist.   NECK: Supple. HEART: RRR. CHEST/LUNGS: Mild tachypnea, no conversational dyspnea, breath sounds diminished bilaterally. BACK: No midline spinal tenderness or step-off. ABDOMEN: Soft. Non-distended. Non-tender. No guarding or rebound. Normal bowel sounds. EXTREMITIES: No peripheral edema. Moves all extremities equally. All extremities neurovascularly intact. RECTAL/: Deferred  SKIN: Warm and dry. No acute rashes. NEUROLOGICAL: Alert and oriented. CN 2-12 intact, No gross facial drooping. Strength 5/5, sensation intact. Normal coordination. Gait normal.   PSYCHIATRIC: Normal mood and affect. LABS  I have reviewed all labs for this visit.    Results for orders placed or performed during the hospital encounter of 02/28/20   CBC Auto Differential   Result Value Ref Range    WBC 8.4 4.0 - 11.0 K/uL    RBC 3.87 (L) 4.00 - 5.20 M/uL    Hemoglobin 10.0 (L) 12.0 - 16.0 g/dL    Hematocrit 31.4 (L) 36.0 - 48.0 %    MCV 81.2 80.0 - 100.0 fL    MCH 25.8 (L) 26.0 - 34.0 pg    MCHC 31.7 31.0 - 36.0 g/dL    RDW 17.6 (H) 12.4 - 15.4 %    Platelets 694 082 - 019 K/uL    MPV 7.3 5.0 - 10.5 fL    Neutrophils % 65.3 %    Lymphocytes % 21.6 %    Monocytes % 10.5 %    Eosinophils % 1.7 %    Basophils % 0.9 %    Neutrophils Absolute 5.5 1.7 - 7.7 K/uL    Lymphocytes Absolute 1.8 1.0 - 5.1 K/uL    Monocytes Absolute 0.9 0.0 - 1.3 K/uL    Eosinophils Absolute 0.1 0.0 - 0.6 K/uL    Basophils Absolute 0.1 0.0 - 0.2 K/uL   Comprehensive Metabolic Panel w/ Reflex to MG   Result Value Ref Range    Sodium 135 (L) 136 - 145 mmol/L    Potassium reflex Magnesium 3.1 (L) 3.5 - 5.1 mmol/L    Chloride 90 (L) 99 - 110 mmol/L    CO2 31 21 - 32 mmol/L    Anion Gap 14 3 - 16    Glucose 168 (H) 70 - 99 mg/dL    BUN 11 7 - 20 mg/dL    CREATININE 0.8 0.6 - 1.2 mg/dL    GFR Non-African American >60 >60    GFR African American >60 >60    Calcium 9.0 8.3 - 10.6 mg/dL    Total Protein 7.0 6.4 - 8.2 g/dL    Alb 3.7 3.4 - 5.0 g/dL    Albumin/Globulin Ratio 1.1 1.1 - 2.2    Total Bilirubin 0.5 0.0 - 1.0 mg/dL    Alkaline Phosphatase 71 40 - 129 U/L    ALT 15 10 - 40 U/L    AST 19 15 - 37 U/L    Globulin 3.3 g/dL   Troponin   Result Value Ref Range    Troponin <0.01 <0.01 ng/mL   Brain Natriuretic Peptide   Result Value Ref Range    Pro-BNP 4,283 (H) 0 - 124 pg/mL   Protime-INR   Result Value Ref Range    Protime 12.5 10.0 - 13.2 sec    INR 1.08 0.86 - 1.14   APTT   Result Value Ref Range    aPTT 29.7 24.2 - 36.2 sec   Blood Gas, Venous   Result Value Ref Range    pH, Adam 7.411 7.350 - 7.450    pCO2, Adam 49.6 40.0 - 50.0 mmHg    pO2, Adam 30.1 25.0 - 40.0 mmHg    HCO3, Venous 30.8 (H) 23.0 - 29.0 mmol/L    Base Excess, Adam 5.3 (H) -3.0 - 3.0 mmol/L    O2 Sat, Adam 57 Not Established %    Carboxyhemoglobin 2.3 (H) 0.0 - 1.5 %    MetHgb, Adam 0.3 <1.5 %    TC02 (Calc), Adam 32 Not Established mmol/L    O2 Content, Adam 8 Not Established VOL %    O2 Therapy Unknown    Magnesium   Result Value Ref Range    Magnesium 1.20 (L) 1.80 - 2.40 mg/dL   EKG 12 Lead   Result Value Ref Range    Ventricular Rate 88 BPM    Atrial Rate 88 BPM    P-R Interval 184 ms    QRS Duration 82 ms    Q-T Interval 400 ms    QTc Calculation (Bazett) 484 ms    P Axis 75 degrees    R Axis 81 degrees    T Axis 81 degrees    Diagnosis       Normal sinus rhythmLow voltage QRSSeptal infarct , age undeterminedAbnormal ECGNo previous ECGs available       EKG  EKG interpreted by me. Normal sinus rhythm with low voltage QRS, QTc 484, No significant ST elevation or depression. RADIOLOGY  X-RAYS:  I have reviewed radiologic plain film image(s). ALL OTHER NON-PLAIN FILM IMAGES SUCH AS CT, ULTRASOUND AND MRI HAVE BEEN READ BY THE RADIOLOGIST.   XR CHEST

## 2020-02-29 NOTE — PROGRESS NOTES
Inpatient Occupational Therapy  Evaluation and Treatment    Unit: PCU  Date:  2/29/2020  Patient Name:    Jaclyn Aiken  Admitting diagnosis:  Shortness of breath [R06.02]  Admit Date:  2/28/2020  Precautions/Restrictions/WB Status/ Lines/ Wounds/ Oxygen: fall risk, IV, bed/chair alarm, supplemental O2 (4L), telemetry, continuous pulse ox and purewick catheter    Treatment Time:  176-419  Treatment Number: 1   Billable Treatment Time: 25 minutes   Total Treatment Time:   35   minutes    Patient Goals for Therapy:  \" to go home \"      Discharge Recommendations: Home with 24/7 supervision and home therapy  DME needs for discharge: RW       Therapy recommendations for staff:   Assist of 1 (minimal assist) with use of rolling walker (RW) and gait belt for all ambulation to/from bathroom    History of Present Illness:  69 y.o. female who presents to the ED with increasing shortness of breath over the past week. She has been out of her albuterol inhalers for a couple days she does not have a home nebulizer machine and she is not on home oxygen. She denies any chest pain she is reporting a headache and denies any dizziness or numbness or tingling or any urinary or bowel issues. PMHx: asthma, back ache, cataract, cerebral artery occlusion with cerebral infarction, DM, hyperlipidemia, HTN, insomnia, TIA     Home Health S4 Level Recommendation:  Level 2 Social  AM-PAC Score: 18    Preadmission Environment    Pt. Lives with spouse (son)   Home environment:  two story home  (Pt reports she does not access the second level)   Steps to enter first floor:   3 steps to enter and one hand rail    Steps to second floor: N/A  Bathroom:  Tub/Shower unit, Grab bars, Shower Chair  and Standard height toilet  Equipment owned:  Standard Walker and Shower Chair     Preadmission Status / PLOF:  History of falls   Yes  (Pt reports she has dizziness that causes her to fall. Pt unable to report how often this is occurring.)   Pt.  Able to

## 2020-02-29 NOTE — PROGRESS NOTES
RESPIRATORY THERAPY ASSESSMENT    Name:  [address] Record Number:  [id number]  Age: 69 y.o. Gender: female  : 1945  Today's Date:  2020  Room:  /0327-01    Assessment     Is the patient being admitted for a COPD or Asthma exacerbation? No   (If yes the patient will be seen every 4 hours for the first 24 hours and then reassessed)    Patient Admission Diagnosis      Allergies  Allergies   Allergen Reactions    Mold Extract [Trichophyton Mentagrophytes]        Minimum Predicted Vital Capacity:               Actual Vital Capacity:                    Pulmonary History:Asthma  Home Oxygen Therapy:  none  Home Respiratory Therapy:Albuterol prn, spiriva daily, advair daily  Current Respiratory Therapy:  duoneb BID and prn symbicort BID  Treatment Type: HHN  Medications: Albuterol/Ipratropium    Respiratory Severity Index(RSI)   Patients with orders for inhalation medications, oxygen, or any therapeutic treatment modality will be placed on Respiratory Protocol. They will be assessed with the first treatment and at least every 72 hours thereafter. The following severity scale will be used to determine frequency of treatment intervention. Smoking History: Pulmonary Disease or Smoking History, Greater than 15 pack year = 2    Social History  Social History     Tobacco Use    Smoking status: Current Every Day Smoker     Packs/day: 1.50     Years: 58.00     Pack years: 87.00     Types: Cigarettes    Smokeless tobacco: Never Used    Tobacco comment: not totally ready to quit today   Substance Use Topics    Alcohol use:  Yes     Alcohol/week: 0.0 standard drinks     Types: 3 - 4 Standard drinks or equivalent per week     Comment: 1-2 hot toddy every night     Drug use: No       Recent Surgical History: None = 0  Past Surgical History  Past Surgical History:   Procedure Laterality Date    ABDOMEN SURGERY      c section    CATARACT REMOVAL WITH IMPLANT Left [id number]    LEFT EYE CATARACT PHACOEMULSIFICATION INTRAOCULAR LENS     SECTION      DENTAL SURGERY      EYE SURGERY  10/29/13     RIGHT EYE CATARACT PHACOEMULSIFICATION INTRAOCULAR LENS       Level of Consciousness: Alert, Oriented, and Cooperative = 0    Level of Activity: Mostly sedentary, minimal walking = 2    Respiratory Pattern: Dyspnea with exertion;Irregular pattern;or RR less than 6 = 2    Breath Sounds: Absent bilaterally and/or with wheezes = 3    Sputum   ,  , Sputum How Obtained: Spontaneous cough  Cough: Strong, spontaneous, non-productive = 0    Vital Signs   BP (!) 149/85   Pulse 98   Temp 97.9 °F (36.6 °C) (Oral)   Resp 20   Ht 5' 5\" (1.651 m)   Wt 182 lb 3.2 oz (82.6 kg)   SpO2 98%   BMI 30.32 kg/m²   SPO2 (COPD values may differ): 88-89% on room air or greater than 92% on FiO2 28- 35% = 2    Peak Flow (asthma only): not applicable = 0    RSI: 82-64 = Q6H or QID and Q4HPRN for dyspnea        Plan       Goals: medication delivery, mobilize retained secretions, volume expansion and improve oxygenation    Patient/caregiver was educated on the proper method of use for Respiratory Care Devices:  Yes      Level of patient/caregiver understanding able to:   ? Verbalize understanding   ? Demonstrate understanding       ? Teach back        ? Needs reinforcement       ? No available caregiver               ? Other:     Response to education:  Good     Is patient being placed on Home Treatment Regimen? No     Does the patient have everything they need prior to discharge? NA     Comments: pt assessed and chart reviewed    Plan of Care: change duoneb to QID and prn    Electronically signed by Anjali Alvarado Back on 2020 at 7:37 AM    Respiratory Protocol Guidelines     1. Assessment and treatment by Respiratory Therapy will be initiated for medication and therapeutic interventions upon initiation of aerosolized medication. 2. Physician will be contacted for respiratory rate (RR) greater than 35 breaths per minute.  Therapy will be held for heart rate (HR) greater than 140 beats per minute, pending direction from physician. 3. Bronchodilators will be administered via Metered Dose Inhaler (MDI) with spacer when the following criteria are met:  a. Alert and cooperative     b. HR < 140 bpm  c. RR < 30 bpm                d. Can demonstrate a 2-3 second inspiratory hold  4. Bronchodilators will be administered via Hand Held Nebulizer [facility]) to patients when ANY of the following criteria are met  a. Incognizant or uncooperative          b. Patients treated with HHN at Home        c. Unable to demonstrate proper use of MDI with spacer     d. RR > 30 bpm   5. Bronchodilators will be delivered via Metered Dose Inhaler (MDI), HHN, Aerogen to intubated patients on mechanical ventilation. 6. Inhalation medication orders will be delivered and/or substituted as outlined below. Aerosolized Medications Ordering and Administration Guidelines:    1. All Medications will be ordered by a physician, and their frequency and/or modality will be adjusted as defined by the patients Respiratory Severity Index (RSI) score. 2. If the patient does not have documented COPD, consider discontinuing anticholinergics when RSI is less than 9.  3. If the bronchospasm worsens (increased RSI), then the bronchodilator frequency can be increased to a maximum of every 4 hours. If greater than every 4 hours is required, the physician will be contacted. 4. If the bronchospasm improves, the frequency of the bronchodilator can be decreased, based on the patient's RSI, but not less than home treatment regimen frequency. 5. Bronchodilator(s) will be discontinued if patient has a RSI less than 9 and has received no scheduled or as needed treatment for 72  Hrs. Patients Ordered on a Mucolytic Agent:    1. Must always be administered with a bronchodilator.     2. Discontinue if patient experiences worsened bronchospasm, or secretions have lessened to the point that the

## 2020-02-29 NOTE — SIGNIFICANT EVENT
Rapid Response Team  Progress Note  /0327-01      Event Date: 2/29/2020   Time Called: 5 [facility] Time: 1494 Event End Time: 0053    Room#: 327-1  Nurse Responder: Cindy James Therapist Responder: Thiago Vega  Patient RN: Magdalena Hernandez    Attending MD: Freddie Collazo, * Notified: Yes Time Notified: 4183  Asked to come in?: Nocturnist in house      Situation: (Brief reason RRT requested) called for shortness of breath, sats were 98-99%   Paged Overhead: No   Previous MD Orders: yes        Background: Admit Date: 2/28/2020    Code Status: FULL    Pertinent Medical/Surgical Hx: asthma      Assessment: BP: (!) 149/85 Pulse: 98 Resp: (!) 33 Temp: 97.9 °F (36.6 °C) SpO2: 99 %    MEWS Score: 1     Mental Status: a & o   Neuro Check: fsc   CV: regular rate   Respiratory: wheezes, tachypneic   Abdomen: no change      Does Pt meet Early Sepsis warning signs? no    Temp < 95F or > 100.4F     WBC > 12,000 or < 4000  Or > 10% bands     HR < 50 or > 110    RR < 8 or > 20    SBP < 100 or >200     Decreased Urine Output? Recommendation/Interventions:   RRT Orders: (Breathing, CP, Circulation, Neuro, Mews >4)  Breathing treatment in progress, CXR and ABG      Results:   Results for Zaida Salazar (MRN [id number]) as of 2/29/2020 07:13   Ref.  Range 2/29/2020 04:52   Hemoglobin, Art, Extended Latest Ref Range: 12.0 - 16.0 g/dL 10.6 (L)   pH, Arterial Latest Ref Range: 7.350 - 7.450  7.451 (H)   pCO2, Arterial Latest Ref Range: 35.0 - 45.0 mmHg 40.1   pO2, Arterial Latest Ref Range: 75.0 - 108.0 mmHg 80.0   HCO3, Arterial Latest Ref Range: 21.0 - 29.0 mmol/L 27.3   TCO2 (calc), Art Latest Ref Range: Not Established mmol/L 28.5   Base Excess, Arterial Latest Ref Range: -3.0 - 3.0 mmol/L 3.1 (H)   O2 Sat, Arterial Latest Ref Range: >92 % 96.3   O2 Content, Arterial Latest Ref Range: Not Established mL/dL 14   Methemoglobin, Arterial Latest Ref Range: <1.5 % 0.3   Carboxyhgb, Arterial Latest Ref Range: 0.0 - 1.5 % 0.2       Patient

## 2020-02-29 NOTE — PROGRESS NOTES
Data:  CBC:   Recent Labs     02/28/20 2220 02/29/20 0409   WBC 8.4 9.1   RBC 3.87* 3.25*   HGB 10.0* 8.9*   HCT 31.4* 27.9*   MCV 81.2 85.9   RDW 17.6* 18.2*    417     BMP:   Recent Labs     02/28/20 2220 02/29/20  0409   * 131*   K 3.1* 3.5   CL 90* 88*   CO2 31 32   BUN 11 12   CREATININE 0.8 1.0     BNP: No results for input(s): BNP in the last 72 hours. PT/INR:   Recent Labs     02/28/20 2220   PROTIME 12.5   INR 1.08     APTT:   Recent Labs     02/28/20 2220   APTT 29.7     CARDIAC ENZYMES:   Recent Labs     02/28/20 2220 02/29/20  0123 02/29/20 0409   TROPONINI <0.01 0.05* 0.07*     FASTING LIPID PANEL:  Lab Results   Component Value Date    CHOL 245 (H) 12/24/2016    HDL 92 (H) 12/24/2016    TRIG 60 12/24/2016     LIVER PROFILE:   Recent Labs     02/28/20 2220   AST 19   ALT 15   BILITOT 0.5   ALKPHOS 71         Radiology  XR CHEST PORTABLE   Final Result   No acute interval change, with persistent moderate interstitial pulmonary   edema. XR CHEST PORTABLE   Final Result   Mild-to-moderate interstitial pulmonary edema without sizable pleural   effusion. Assessment:  Active Problems:    Shortness of breath  Resolved Problems:    * No resolved hospital problems. *      Plan:  Acute respiratory failure with hypoxia  -Not on home oxygen  - likely from acute decompensated CHF. - continue supplemental oxygen wean O2 as tolerated. Currently on O2 5 L -- > 4 L  -Continue diuresis  - Continue nebulizer treatments  Very difficult to ascertain volume status in this patient, does not look grossly volume overloaded but BNP significantly elevated  Given 1 dose of IV Lasix in the emergency room, will continue Lasix twice daily.   Repeat a 2D echocardiogram    Acute diastolic heart failure  -Continue diuresis with IV Lasix, check ECHO    COPD/emphysema  -Continue nebulizer treatments    Diabetes mellitus  - SSI    Alcohol dependence  - PRN Librium, watch  for signs of withdrawal    Hypomagnesemia  Hypokalemia  -Replete    Tobacco abuse  - on Nicotine patch     Debility  - consult PT OT        DVT Prophylaxis: Heparin  Diet: Diabetic diet   Full Code           Karen Khoury MD 2/29/2020 10:01 AM

## 2020-02-29 NOTE — ED NOTES
Assumed pt care. Placed on CM, NIBP and pulse ox. Saturations reading 85-88% with good pleth/waveform. Placed on Oxygen at per NC at 5lpm.  Saturation immediately increased to 99%. Oxygen rate decreased to 3lpm.  Saturations running 96-97%.       Helena Correa RN  02/29/20 5438

## 2020-02-29 NOTE — PROGRESS NOTES
Pt. Assisted up to recliner. Patient is able to demonstrated the ability to move from a reclining position to an upright position within the recliner. Pt. denies further needs at this time. Will continue to monitor. Call light is within reach.   Gema Collins RN

## 2020-02-29 NOTE — ED NOTES
Pt and family updated on plan for admission and pending orders and room assignment at this time. All questions answered.         Aminah Mcpherson RN  02/29/20 2276

## 2020-02-29 NOTE — CONSULTS
[facility]   Cardiology Consultation   Date: 2020  Reason for Consultation: Shortness of breath  Consult Requesting Physician: Dave Teran, *     Chief Complaint   Patient presents with    Shortness of Breath     Increased shortness of breath over the last week     HPI: Harvey Sykes is a 69 y.o. female who was presented with increasing shortness of breath for the past few days. Shortness of breath is worse with activity. She denies having any chest pain. Denies having any syncope. Patient is heavy smoker and smokes 1/2 pack/day for more than 50 years. She has COPD and states that she ran out of her inhaler at home. Cardiology has been consulted for further evaluation of shortness of breath. She also found to have mild elevation of troponin. Past Medical History:   Diagnosis Date    Asthma     Back ache     Cataract     Cerebral artery occlusion with cerebral infarction ([address] 2016    Diabetes mellitus [facility]     type !! - diet controlled    Hyperlipidemia     Hypertension     Insomnia     TIA (transient ischemic attack) 2016        Past Surgical History:   Procedure Laterality Date    ABDOMEN SURGERY      c section    CATARACT REMOVAL WITH IMPLANT Left [id number]    LEFT EYE CATARACT PHACOEMULSIFICATION INTRAOCULAR LENS     SECTION      DENTAL SURGERY      EYE SURGERY  10/29/13     RIGHT EYE CATARACT PHACOEMULSIFICATION INTRAOCULAR LENS       Allergies   Allergen Reactions    Mold Extract [Trichophyton Mentagrophytes]        Social History:  Reviewed. reports that she has been smoking cigarettes. She has a 87.00 pack-year smoking history. She has never used smokeless tobacco. She reports current alcohol use. She reports that she does not use drugs. Family History:  Reviewed.  family history includes Asthma in her sister; Cancer in her father and sister; Depression in her brother; Diabetes in her father and paternal grandmother; Heart Disease in her Decreased bilateral respiratory sounds, bilateral wheezes and rhonchi  · Abdominal: Soft. Bowel sounds present. No distension, No tenderness. · Musculoskeletal: No tenderness. Trace edema    · Lymphadenopathy: Has no cervical adenopathy. · Neurological: Alert and oriented. Cranial nerve appears intact, No Gross deficit   · Skin: Skin is warm and dry. No rash noted. · Psychiatric: Has a normal behavior     Labs, diagnostic and imaging results reviewed. Reviewed. Recent Labs     02/28/20 2220 02/29/20  0409   * 131*   K 3.1* 3.5   CL 90* 88*   CO2 31 32   BUN 11 12   CREATININE 0.8 1.0     Recent Labs     02/28/20 2220 02/29/20  0409   WBC 8.4 9.1   HGB 10.0* 8.9*   HCT 31.4* 27.9*   MCV 81.2 85.9    417     Lab Results   Component Value Date    CKTOTAL 114 10/16/2017    TROPONINI 0.07 02/29/2020     No results found for: BNP  Lab Results   Component Value Date    PROTIME 12.5 02/28/2020    PROTIME 10.5 03/20/2017    PROTIME 10.6 12/23/2016    INR 1.08 02/28/2020    INR 0.93 03/20/2017    INR 0.93 12/23/2016     Lab Results   Component Value Date    CHOL 245 12/24/2016    HDL 92 12/24/2016    TRIG 60 12/24/2016       ECG: Sinus rhythm nonspecific T wave inversion  Echo: 2016   Normal left ventricle systolic function with an estimated ejection fraction   of 55%. No regional wall motion abnormalities are seen. Grade I diastolic dysfunction with normal filing pressure. Carotid artery ultrasound 12/2016  1. There is moderate plaque seen in the right internal carotid artery with    an estimated diameter reduction of <50%.    2. There is moderate plaque seen in the left internal carotid artery with an    estimated diameter reduction of <50%. This could be underestimated due to    calcific shadowing.    3. The vertebral arteries demonstrate antegrade flow bilaterally.  The left    vertebral artery waveform demonstrated an abnormal waveform suggesting the    early stages of a subclavian steal however no focal lesions or brachial    pressure gradient at this time. Chest x-ray 2/29/2020  No acute interval change, with persistent moderate interstitial pulmonary   edema.          Scheduled Meds:   atorvastatin  40 mg Oral Nightly    cloNIDine  0.2 mg Oral BID    budesonide-formoterol  2 puff Inhalation BID    levothyroxine  25 mcg Oral Daily    lisinopril  40 mg Oral Daily    metoprolol tartrate  25 mg Oral BID    pantoprazole  40 mg Oral QAM AC    PARoxetine  40 mg Oral QAM    sodium chloride flush  10 mL Intravenous 2 times per day    sodium chloride flush  10 mL Intravenous 2 times per day    thiamine  100 mg Oral Daily    folic acid  1 mg Oral Daily    therapeutic multivitamin-minerals  1 tablet Oral Daily    nicotine  1 patch Transdermal Daily    heparin (porcine)  5,000 Units Subcutaneous 3 times per day    insulin lispro  0-12 Units Subcutaneous TID WC    insulin lispro  0-6 Units Subcutaneous Nightly    furosemide  40 mg Intravenous BID    ipratropium-albuterol  1 ampule Inhalation 4x daily     Continuous Infusions:   dextrose       PRN Meds:.sodium chloride flush, sodium chloride flush, acetaminophen **OR** acetaminophen, polyethylene glycol, promethazine **OR** ondansetron, glucose, dextrose, glucagon (rDNA), dextrose, magnesium sulfate, potassium chloride **OR** potassium alternative oral replacement **OR** potassium chloride, chlordiazePOXIDE, perflutren lipid microspheres, albuterol     Patient Active Problem List    Diagnosis Date Noted    Shortness of breath 02/29/2020    S[facility]ic inflammatory response syndrome) [facility] 10/16/2017    Thrombocytosis ([facility] 10/16/2017    Chronic dCHF (grade 1 LVDD) 10/16/2017    Alcohol withdrawal ([facility] 10/16/2017    Frequent falls 10/16/2017    Hypertensive urgency 10/16/2017    COPD (chronic obstructive pulmonary disease) ([facility] 10/16/2017    Alcoholism ([facility] 02/01/2017    Tobacco abuse 02/01/2017    Asthma 02/01/2017    Hyperlipidemia 02/01/2017    Hx of CVA involving R-ICA territory     CAD (coronary artery disease)     DM2 (diabetes mellitus, type 2) ([facility] [facility]     Hyponatremia 12/23/2016    Hypertension 12/23/2016      Active Hospital Problems    Diagnosis Date Noted    Shortness of breath [R06.02] 02/29/2020       Assessment:     -Acute hypoxemic respiratory failure, shortness of breath and dyspnea on exertion   Likely related to COPD. Patient is heavy smoker and ran out of her inhaler at home presenting with shortness of breath. Treatment per primary team.      - Type II MI    Cardiac injury with mild elevation of troponin in the setting of acute hypoxemic respiratory failure   Troponin is mildly elevated. Patient denies having chest pain and is hemodynamically stable   Needs ischemic work-up when her condition is more stabilized. Likely need cardiac catheterization. Aspirin   High-dose statin therapy   Metoprolol   Echo pending    -Acute on chronic diastolic heart failure   proBNP 4283   Chest x-ray congestion   Continue with IV Lasix   Strict intake and output   Daily weight   Echo    - COPD exacerbation    - Hypokalemia: need K replacement.     - Replace magnesium     Thank you for allowing me to participate in the care of Raul Johnston       All questions and concerns were addressed to the patient/family. Alternatives to my treatment were discussed. I have discussed the above stated plan and the patient verbalized understanding and agreed with the plan. NOTE: This report was transcribed using voice recognition software. Every effort was made to ensure accuracy, however, inadvertent computerized transcription errors may be present.      Stepan Peña MD, MPH  [place] [address]   Office: [phone]

## 2020-03-01 LAB
ANION GAP SERPL CALCULATED.3IONS-SCNC: 11 MMOL/L (ref 3–16)
BUN BLDV-MCNC: 12 MG/DL (ref 7–20)
CALCIUM SERPL-MCNC: 8.6 MG/DL (ref 8.3–10.6)
CHLORIDE BLD-SCNC: 89 MMOL/L (ref 99–110)
CO2: 32 MMOL/L (ref 21–32)
CREAT SERPL-MCNC: 0.9 MG/DL (ref 0.6–1.2)
GFR AFRICAN AMERICAN: >60
GFR NON-AFRICAN AMERICAN: >60
GLUCOSE BLD-MCNC: 133 MG/DL (ref 70–99)
GLUCOSE BLD-MCNC: 139 MG/DL (ref 70–99)
GLUCOSE BLD-MCNC: 157 MG/DL (ref 70–99)
GLUCOSE BLD-MCNC: 177 MG/DL (ref 70–99)
GLUCOSE BLD-MCNC: 254 MG/DL (ref 70–99)
GLUCOSE BLD-MCNC: 78 MG/DL (ref 70–99)
GLUCOSE BLD-MCNC: 91 MG/DL (ref 70–99)
MAGNESIUM: 1.8 MG/DL (ref 1.8–2.4)
PERFORMED ON: ABNORMAL
PERFORMED ON: NORMAL
PERFORMED ON: NORMAL
POTASSIUM SERPL-SCNC: 3.4 MMOL/L (ref 3.5–5.1)
SODIUM BLD-SCNC: 132 MMOL/L (ref 136–145)

## 2020-03-01 PROCEDURE — 6360000002 HC RX W HCPCS: Performed by: INTERNAL MEDICINE

## 2020-03-01 PROCEDURE — 6370000000 HC RX 637 (ALT 250 FOR IP): Performed by: INTERNAL MEDICINE

## 2020-03-01 PROCEDURE — 97110 THERAPEUTIC EXERCISES: CPT

## 2020-03-01 PROCEDURE — 99233 SBSQ HOSP IP/OBS HIGH 50: CPT | Performed by: INTERNAL MEDICINE

## 2020-03-01 PROCEDURE — 36415 COLL VENOUS BLD VENIPUNCTURE: CPT

## 2020-03-01 PROCEDURE — 80048 BASIC METABOLIC PNL TOTAL CA: CPT

## 2020-03-01 PROCEDURE — 94761 N-INVAS EAR/PLS OXIMETRY MLT: CPT

## 2020-03-01 PROCEDURE — 2580000003 HC RX 258: Performed by: INTERNAL MEDICINE

## 2020-03-01 PROCEDURE — 97530 THERAPEUTIC ACTIVITIES: CPT

## 2020-03-01 PROCEDURE — 2700000000 HC OXYGEN THERAPY PER DAY

## 2020-03-01 PROCEDURE — 94640 AIRWAY INHALATION TREATMENT: CPT

## 2020-03-01 PROCEDURE — 83735 ASSAY OF MAGNESIUM: CPT

## 2020-03-01 PROCEDURE — 2060000000 HC ICU INTERMEDIATE R&B

## 2020-03-01 RX ORDER — POTASSIUM CHLORIDE 750 MG/1
20 TABLET, EXTENDED RELEASE ORAL DAILY
Status: DISCONTINUED | OUTPATIENT
Start: 2020-03-01 | End: 2020-03-03 | Stop reason: HOSPADM

## 2020-03-01 RX ORDER — POTASSIUM CHLORIDE 750 MG/1
40 TABLET, FILM COATED, EXTENDED RELEASE ORAL DAILY
Status: DISCONTINUED | OUTPATIENT
Start: 2020-03-01 | End: 2020-03-01

## 2020-03-01 RX ADMIN — ASPIRIN 81 MG: 81 TABLET, COATED ORAL at 08:22

## 2020-03-01 RX ADMIN — Medication 10 ML: at 08:23

## 2020-03-01 RX ADMIN — ENOXAPARIN SODIUM 80 MG: 80 INJECTION SUBCUTANEOUS at 08:21

## 2020-03-01 RX ADMIN — IPRATROPIUM BROMIDE AND ALBUTEROL SULFATE 1 AMPULE: .5; 3 SOLUTION RESPIRATORY (INHALATION) at 11:24

## 2020-03-01 RX ADMIN — FUROSEMIDE 40 MG: 10 INJECTION, SOLUTION INTRAMUSCULAR; INTRAVENOUS at 08:22

## 2020-03-01 RX ADMIN — Medication 10 ML: at 22:13

## 2020-03-01 RX ADMIN — Medication 2 PUFF: at 19:50

## 2020-03-01 RX ADMIN — INSULIN LISPRO 2 UNITS: 100 INJECTION, SOLUTION INTRAVENOUS; SUBCUTANEOUS at 08:24

## 2020-03-01 RX ADMIN — ATORVASTATIN CALCIUM 80 MG: 40 TABLET, FILM COATED ORAL at 22:13

## 2020-03-01 RX ADMIN — CLONIDINE HYDROCHLORIDE 0.2 MG: 0.2 TABLET ORAL at 08:22

## 2020-03-01 RX ADMIN — LISINOPRIL 40 MG: 20 TABLET ORAL at 08:22

## 2020-03-01 RX ADMIN — IPRATROPIUM BROMIDE AND ALBUTEROL SULFATE 1 AMPULE: .5; 3 SOLUTION RESPIRATORY (INHALATION) at 15:59

## 2020-03-01 RX ADMIN — Medication 10 ML: at 17:47

## 2020-03-01 RX ADMIN — FUROSEMIDE 40 MG: 10 INJECTION, SOLUTION INTRAMUSCULAR; INTRAVENOUS at 17:47

## 2020-03-01 RX ADMIN — LEVOTHYROXINE SODIUM 25 MCG: 25 TABLET ORAL at 05:51

## 2020-03-01 RX ADMIN — ENOXAPARIN SODIUM 80 MG: 80 INJECTION SUBCUTANEOUS at 22:14

## 2020-03-01 RX ADMIN — INSULIN LISPRO 1 UNITS: 100 INJECTION, SOLUTION INTRAVENOUS; SUBCUTANEOUS at 22:16

## 2020-03-01 RX ADMIN — POTASSIUM CHLORIDE 20 MEQ: 750 TABLET, EXTENDED RELEASE ORAL at 14:30

## 2020-03-01 RX ADMIN — MULTIPLE VITAMINS W/ MINERALS TAB 1 TABLET: TAB at 08:22

## 2020-03-01 RX ADMIN — PANTOPRAZOLE SODIUM 40 MG: 40 TABLET, DELAYED RELEASE ORAL at 05:51

## 2020-03-01 RX ADMIN — FOLIC ACID 1 MG: 1 TABLET ORAL at 08:22

## 2020-03-01 RX ADMIN — PAROXETINE HYDROCHLORIDE 40 MG: 20 TABLET, FILM COATED ORAL at 08:23

## 2020-03-01 RX ADMIN — INSULIN LISPRO 6 UNITS: 100 INJECTION, SOLUTION INTRAVENOUS; SUBCUTANEOUS at 13:29

## 2020-03-01 RX ADMIN — METOPROLOL TARTRATE 25 MG: 25 TABLET ORAL at 22:13

## 2020-03-01 RX ADMIN — METOPROLOL TARTRATE 25 MG: 25 TABLET ORAL at 08:22

## 2020-03-01 RX ADMIN — IPRATROPIUM BROMIDE AND ALBUTEROL SULFATE 1 AMPULE: .5; 3 SOLUTION RESPIRATORY (INHALATION) at 19:49

## 2020-03-01 RX ADMIN — Medication 10 ML: at 22:14

## 2020-03-01 RX ADMIN — CLONIDINE HYDROCHLORIDE 0.2 MG: 0.2 TABLET ORAL at 22:13

## 2020-03-01 RX ADMIN — Medication 10 ML: at 08:24

## 2020-03-01 RX ADMIN — Medication 100 MG: at 08:23

## 2020-03-01 NOTE — FLOWSHEET NOTE
02/29/20 1958   Vitals   Temp 98 °F (36.7 °C)   Temp Source Oral   Pulse 68   Heart Rate Source Monitor   Resp 18   /79   MAP (mmHg) 98   BP Location Left Arm   BP Upper/Lower Upper   BP Method Automatic   Patient Position High fowlers   Level of Consciousness 0   MEWS Score 1   Patient Currently in Pain No   Cardiac Rhythm NSR   Oxygen Therapy   SpO2 98 %   O2 Device Nasal cannula   O2 Flow Rate (L/min) 3 L/min   Shift assessment completed. Patient in bed awake,alert and oriented x4. Vitals WNL. 98% on 3 L. HR 68 normal sinus on monitor. Evening medications given per order. Patient transferred back to bed from chair. Denies any further needs at this time. Will continue to monitor,call light within reach. Bed alarm on.

## 2020-03-01 NOTE — PROGRESS NOTES
EF.     -Acute on chronic diastolic and systolic heart failure   New diagnosis of severe LV dysfunction and regional wall motion abnormality by echo. Continue with IV Lasix     Weight down 182 > 180    Negative 300 ml only         proBNP 4283   Chest x-ray congestion     Strict intake and output   Daily weight    Plan for cardiac cath. -Acute hypoxemic respiratory failure, shortness of breath and dyspnea on exertion   Likely related to COPD. Patient is heavy smoker and ran out of her inhaler at home presenting with shortness of breath. Treatment per primary team.      - COPD exacerbation    - Hypokalemia: Replace potassium     - Replace magnesium     Thank you for allowing me to participate in the care of Aftab Garvey       All questions and concerns were addressed to the patient/family. Alternatives to my treatment were discussed. I have discussed the above stated plan and the patient verbalized understanding and agreed with the plan. NOTE: This report was transcribed using voice recognition software. Every effort was made to ensure accuracy, however, inadvertent computerized transcription errors may be present.      Vidhi Posey MD, MPH  [place] [address]   Office: [phone]

## 2020-03-01 NOTE — PLAN OF CARE
Problem: DAILY CARE  Goal: Daily care needs are met  Outcome: Ongoing     Problem: SKIN INTEGRITY  Goal: Skin integrity is maintained or improved  Outcome: Ongoing     Problem: OXYGENATION/RESPIRATORY FUNCTION  Goal: Patient will maintain patent airway  2/29/2020 1932 by Pete Barrios RN  Outcome: Ongoing     Problem: FLUID AND ELECTROLYTE IMBALANCE  Goal: Fluid and electrolyte balance are achieved/maintained  Outcome: Ongoing     Problem: ACTIVITY INTOLERANCE/IMPAIRED MOBILITY  Goal: Mobility/activity is maintained at optimum level for patient  Outcome: Ongoing

## 2020-03-01 NOTE — PROGRESS NOTES
with PCA. Pt limited by decreased activity tolerance. Pt may benefit from continued skilled occupational therapy while in the hospital and upon d/c within her own home in order to progress to safe and more indpt functioning. GOALS  To be met in 3 Visits:  1). Bed to toilet: Modified Independent     To be met in 5 Visits:  1). Supine to Sit: Independent with HOB flat and no use of rails   2). Upper Body Bathing:  Supervision  3). Lower Body Bathing:  Supervision  4). Upper Body Dressing: Supervision  5). Lower Body Dressing: Supervision  6).  Pt to apoorva UE exs x 15 reps      Plan: cont with POC      Hortensia Lewis, HARLAN, OTR/L   [id number]       If patient discharges from this facility prior to next visit, this note will serve as the Discharge Summary

## 2020-03-01 NOTE — FLOWSHEET NOTE
03/01/20 0200   Vitals   Temp 97.7 °F (36.5 °C)   Temp Source Oral   Pulse 71   Heart Rate Source Monitor   Resp 18   /75   BP Location Left Arm   BP Upper/Lower Upper   BP Method Automatic   Patient Position Lying right side   Level of Consciousness 0   MEWS Score 1   Patient Currently in Pain No   Cardiac Rhythm NSR   Oxygen Therapy   SpO2 96 %   Pulse Oximeter Device Mode Intermittent   O2 Device Nasal cannula   O2 Flow Rate (L/min) 3 L/min   Patient resting in bed eyes closed. No distress noted. Pt. Lying right side. Purewick in place. Vitals WNL. Will continue to monitor,call light within reach.

## 2020-03-01 NOTE — PLAN OF CARE
Patient's EF (Ejection Fraction) is less than 40%    Patient's weights and intake/output reviewed:    Patient's Last Weight: Admission/ current weight 182. Intake/Output Summary (Last 24 hours) at 2/29/2020 1932  Last data filed at 2/29/2020 1821  Gross per 24 hour   Intake 778 ml   Output 700 ml   Net 78 ml         Pt is currently on 4 L O2. Pt with complaints of shortness of breath. Pt without lower extremity edema. Patient and/or Family's stated Goal of Care this Admission: reduce shortness of breath, increase activity tolerance, better understand heart failure and disease management, be more comfortable, and reduce lower extremity edema prior to discharge      Comorbidities Reviewed Yes  Patient has a past medical history of Asthma, Back ache, Cataract, Cerebral artery occlusion with cerebral infarction ([address] Diabetes mellitus ([address] Hyperlipidemia, Hypertension, Insomnia, and TIA (transient ischemic attack).          >>For CHF and Comorbidity documentation on Education Time and Topics, please see Education Tab

## 2020-03-01 NOTE — PROGRESS NOTES
Progress Note    Admit Date:  2/28/2020    71-year-old female presented with shortness of breath   Admitted for acute diastolic CHF     Echo shows new CMP, EF down to 30 to 35 % . very good diuresis on IV lasix     Subjective:  Ms. Oj Painter feels a little better today, she is less dyspneic. She is normally not on home oxygen,  required O2 up to 5 L on admit .remains on O2 4 L now  . Objective:   /72   Pulse 72   Temp 97.3 °F (36.3 °C) (Oral)   Resp 20   Ht 5' 5\" (1.651 m)   Wt 180 lb (81.6 kg)   SpO2 100%   BMI 29.95 kg/m²       Intake/Output Summary (Last 24 hours) at 3/1/2020 1523  Last data filed at 3/1/2020 1437  Gross per 24 hour   Intake 596 ml   Output 1500 ml   Net -904 ml         Physical Exam:  General:  Awake, alert, NAD  Skin:  Warm and dry  Neck:  JVD absent. Neck supple  Chest:  Diminished breath sounds,   Bilateral mild wheezing  Cardiovascular:  RRR ,S1S2 normal  Abdomen:  Soft, non tender, non distended, BS +  Extremities:  No edema. Intact peripheral pulses.  Brisk cap refill, < 2 secs  Neuro: non focal      Medications:   Scheduled Meds:   potassium chloride  20 mEq Oral Daily    cloNIDine  0.2 mg Oral BID    budesonide-formoterol  2 puff Inhalation BID    levothyroxine  25 mcg Oral Daily    lisinopril  40 mg Oral Daily    metoprolol tartrate  25 mg Oral BID    pantoprazole  40 mg Oral QAM AC    PARoxetine  40 mg Oral QAM    sodium chloride flush  10 mL Intravenous 2 times per day    sodium chloride flush  10 mL Intravenous 2 times per day    thiamine  100 mg Oral Daily    folic acid  1 mg Oral Daily    therapeutic multivitamin-minerals  1 tablet Oral Daily    nicotine  1 patch Transdermal Daily    insulin lispro  0-12 Units Subcutaneous TID WC    insulin lispro  0-6 Units Subcutaneous Nightly    furosemide  40 mg Intravenous BID    ipratropium-albuterol  1 ampule Inhalation 4x daily    aspirin  81 mg Oral Daily    enoxaparin  1 mg/kg Subcutaneous BID   

## 2020-03-01 NOTE — PROGRESS NOTES
Bedside report given to Gil Wiggins. Pt. denies further needs at this time. Call light is within reach.   Annabella Huertas RN

## 2020-03-02 LAB
ANION GAP SERPL CALCULATED.3IONS-SCNC: 12 MMOL/L (ref 3–16)
BUN BLDV-MCNC: 12 MG/DL (ref 7–20)
CALCIUM SERPL-MCNC: 8.5 MG/DL (ref 8.3–10.6)
CHLORIDE BLD-SCNC: 92 MMOL/L (ref 99–110)
CO2: 28 MMOL/L (ref 21–32)
CREAT SERPL-MCNC: 0.8 MG/DL (ref 0.6–1.2)
GFR AFRICAN AMERICAN: >60
GFR NON-AFRICAN AMERICAN: >60
GLUCOSE BLD-MCNC: 127 MG/DL (ref 70–99)
GLUCOSE BLD-MCNC: 127 MG/DL (ref 70–99)
GLUCOSE BLD-MCNC: 173 MG/DL (ref 70–99)
GLUCOSE BLD-MCNC: 214 MG/DL (ref 70–99)
GLUCOSE BLD-MCNC: 224 MG/DL (ref 70–99)
HCT VFR BLD CALC: 30 % (ref 36–48)
HEMOGLOBIN: 9.3 G/DL (ref 12–16)
MAGNESIUM: 1.8 MG/DL (ref 1.8–2.4)
MCH RBC QN AUTO: 25.2 PG (ref 26–34)
MCHC RBC AUTO-ENTMCNC: 30.9 G/DL (ref 31–36)
MCV RBC AUTO: 81.4 FL (ref 80–100)
PDW BLD-RTO: 17.8 % (ref 12.4–15.4)
PERFORMED ON: ABNORMAL
PLATELET # BLD: 439 K/UL (ref 135–450)
PMV BLD AUTO: 7.4 FL (ref 5–10.5)
POTASSIUM SERPL-SCNC: 3.8 MMOL/L (ref 3.5–5.1)
PRO-BNP: 4650 PG/ML (ref 0–124)
RBC # BLD: 3.69 M/UL (ref 4–5.2)
SODIUM BLD-SCNC: 132 MMOL/L (ref 136–145)
WBC # BLD: 8.5 K/UL (ref 4–11)

## 2020-03-02 PROCEDURE — 94761 N-INVAS EAR/PLS OXIMETRY MLT: CPT

## 2020-03-02 PROCEDURE — 6360000002 HC RX W HCPCS: Performed by: INTERNAL MEDICINE

## 2020-03-02 PROCEDURE — 80048 BASIC METABOLIC PNL TOTAL CA: CPT

## 2020-03-02 PROCEDURE — 99232 SBSQ HOSP IP/OBS MODERATE 35: CPT | Performed by: INTERNAL MEDICINE

## 2020-03-02 PROCEDURE — 6370000000 HC RX 637 (ALT 250 FOR IP): Performed by: INTERNAL MEDICINE

## 2020-03-02 PROCEDURE — 2060000000 HC ICU INTERMEDIATE R&B

## 2020-03-02 PROCEDURE — 85027 COMPLETE CBC AUTOMATED: CPT

## 2020-03-02 PROCEDURE — 83880 ASSAY OF NATRIURETIC PEPTIDE: CPT

## 2020-03-02 PROCEDURE — 36415 COLL VENOUS BLD VENIPUNCTURE: CPT

## 2020-03-02 PROCEDURE — 2580000003 HC RX 258: Performed by: INTERNAL MEDICINE

## 2020-03-02 PROCEDURE — 83735 ASSAY OF MAGNESIUM: CPT

## 2020-03-02 PROCEDURE — 94640 AIRWAY INHALATION TREATMENT: CPT

## 2020-03-02 PROCEDURE — 99233 SBSQ HOSP IP/OBS HIGH 50: CPT | Performed by: INTERNAL MEDICINE

## 2020-03-02 PROCEDURE — 2700000000 HC OXYGEN THERAPY PER DAY

## 2020-03-02 RX ADMIN — LEVOTHYROXINE SODIUM 25 MCG: 25 TABLET ORAL at 07:50

## 2020-03-02 RX ADMIN — IPRATROPIUM BROMIDE AND ALBUTEROL SULFATE 1 AMPULE: .5; 3 SOLUTION RESPIRATORY (INHALATION) at 11:10

## 2020-03-02 RX ADMIN — Medication 100 MG: at 09:29

## 2020-03-02 RX ADMIN — POTASSIUM CHLORIDE 20 MEQ: 750 TABLET, EXTENDED RELEASE ORAL at 09:29

## 2020-03-02 RX ADMIN — Medication 10 ML: at 09:30

## 2020-03-02 RX ADMIN — Medication 2 PUFF: at 07:31

## 2020-03-02 RX ADMIN — INSULIN LISPRO 2 UNITS: 100 INJECTION, SOLUTION INTRAVENOUS; SUBCUTANEOUS at 08:57

## 2020-03-02 RX ADMIN — Medication 10 ML: at 22:31

## 2020-03-02 RX ADMIN — Medication 10 ML: at 22:01

## 2020-03-02 RX ADMIN — PAROXETINE HYDROCHLORIDE 40 MG: 20 TABLET, FILM COATED ORAL at 09:29

## 2020-03-02 RX ADMIN — CLONIDINE HYDROCHLORIDE 0.2 MG: 0.2 TABLET ORAL at 09:30

## 2020-03-02 RX ADMIN — INSULIN LISPRO 4 UNITS: 100 INJECTION, SOLUTION INTRAVENOUS; SUBCUTANEOUS at 12:51

## 2020-03-02 RX ADMIN — CLONIDINE HYDROCHLORIDE 0.2 MG: 0.2 TABLET ORAL at 22:01

## 2020-03-02 RX ADMIN — FUROSEMIDE 40 MG: 10 INJECTION, SOLUTION INTRAMUSCULAR; INTRAVENOUS at 19:11

## 2020-03-02 RX ADMIN — FOLIC ACID 1 MG: 1 TABLET ORAL at 09:29

## 2020-03-02 RX ADMIN — LISINOPRIL 40 MG: 20 TABLET ORAL at 09:30

## 2020-03-02 RX ADMIN — IPRATROPIUM BROMIDE AND ALBUTEROL SULFATE 1 AMPULE: .5; 3 SOLUTION RESPIRATORY (INHALATION) at 16:25

## 2020-03-02 RX ADMIN — ATORVASTATIN CALCIUM 80 MG: 40 TABLET, FILM COATED ORAL at 22:02

## 2020-03-02 RX ADMIN — MULTIPLE VITAMINS W/ MINERALS TAB 1 TABLET: TAB at 09:29

## 2020-03-02 RX ADMIN — FUROSEMIDE 40 MG: 10 INJECTION, SOLUTION INTRAMUSCULAR; INTRAVENOUS at 09:30

## 2020-03-02 RX ADMIN — METOPROLOL TARTRATE 25 MG: 25 TABLET ORAL at 22:02

## 2020-03-02 RX ADMIN — PANTOPRAZOLE SODIUM 40 MG: 40 TABLET, DELAYED RELEASE ORAL at 07:51

## 2020-03-02 RX ADMIN — IPRATROPIUM BROMIDE AND ALBUTEROL SULFATE 1 AMPULE: .5; 3 SOLUTION RESPIRATORY (INHALATION) at 19:41

## 2020-03-02 RX ADMIN — IPRATROPIUM BROMIDE AND ALBUTEROL SULFATE 1 AMPULE: .5; 3 SOLUTION RESPIRATORY (INHALATION) at 07:31

## 2020-03-02 RX ADMIN — Medication 2 PUFF: at 19:41

## 2020-03-02 RX ADMIN — ASPIRIN 81 MG: 81 TABLET, COATED ORAL at 09:30

## 2020-03-02 RX ADMIN — INSULIN LISPRO 2 UNITS: 100 INJECTION, SOLUTION INTRAVENOUS; SUBCUTANEOUS at 22:05

## 2020-03-02 RX ADMIN — ENOXAPARIN SODIUM 80 MG: 80 INJECTION SUBCUTANEOUS at 22:01

## 2020-03-02 RX ADMIN — METOPROLOL TARTRATE 25 MG: 25 TABLET ORAL at 09:29

## 2020-03-02 RX ADMIN — ENOXAPARIN SODIUM 80 MG: 80 INJECTION SUBCUTANEOUS at 09:30

## 2020-03-02 NOTE — FLOWSHEET NOTE
03/01/20 2205   Vitals   Temp 98.5 °F (36.9 °C)   Temp Source Oral   Pulse 75   Heart Rate Source Monitor   Resp 18   /77   BP Location Left upper arm   BP Upper/Lower Upper   BP Method Automatic   Patient Position Semi fowlers   Level of Consciousness 0   MEWS Score 1   Patient Currently in Pain No   Cardiac Rhythm NSR   Oxygen Therapy   SpO2 97 %   O2 Device Nasal cannula   O2 Flow Rate (L/min) 3 L/min   Shift assessment completed. Patient in bed awake,alert and oriented. Vitals WNL. 97% on 3 L/min. HR 75 normal sinus on monitor. Evening medications given per order. Patient denies any needs at this time,will continue to monitor.

## 2020-03-02 NOTE — PROGRESS NOTES
oral replacement **OR** potassium chloride, chlordiazePOXIDE, perflutren lipid microspheres, albuterol    Lab Data:  CBC:   Recent Labs     02/28/20 2220 02/29/20  0409   WBC 8.4 9.1   HGB 10.0* 8.9*   HCT 31.4* 27.9*   MCV 81.2 85.9    417     BMP:   Recent Labs     02/29/20  0409 03/01/20  0513 03/02/20  0455   * 132* 132*   K 3.5 3.4* 3.8   CL 88* 89* 92*   CO2 32 32 28   BUN 12 12 12   CREATININE 1.0 0.9 0.8     LIVER PROFILE:   Recent Labs     02/28/20  2220   AST 19   ALT 15   BILITOT 0.5   ALKPHOS 71     PT/INR:   Recent Labs     02/28/20  2220   PROTIME 12.5   INR 1.08     APTT:   Recent Labs     02/28/20  2220   APTT 29.7     12/24/16 Summary   Normal left ventricle systolic function with an estimated EF of 55%. No regional wall motion abnormalities are seen. Grade I diastolic dysfunction with normal filing pressure. CXR 02/29/20  No acute interval change, with persistent moderate interstitial pulmonary edema. FINDINGS: Interstitial and basilar opacities similar prior exam.  Fluctuating areas of discoid atelectasis. Costophrenic sulci are obscured. No pneumothorax. Stable cardiomediastinal contours. EKG 02/28/20  Normal sinus rhythmLow voltage QRSSeptal infarct , age undeterminedAbnormal ECGConfirmed by Raleigh Boyer MD, Vivi Vazquez ([id number]) on 2/29/2020 4:08:36 PM    ECHO 02/29/20 Summary   Ejection fraction is visually estimated to be 30-35 %. There is severe hypokinesis of the anterior and apical walls consistent with   infarction within the territory of the left anterior descending artery   versus apical ballooning syndrome. There is mild-moderate left ventricular hypertrophy. Diastolic dysfunction grade and filing pressure are indeterminate. Mitral annular calcification is present. Moderate mitral regurgitation. Mild tricuspid regurgitation. IVC size is dilated (>2.1 cm) but collapses > 50% with respiration   consistent with elevated RA pressure (8 mmHg).    Previous echo done 12/2016 - EF 55%    Assessment:      1. NSTEMI:   -in setting of hypoxic respiratory failure with COPD exacerbation    -EKG shows NSR with septal infarct (similar to 12/18 study)   -William <0.01,0.05, 0.07   -new cardiomyopathy with wall motion abnormality    -Dr. Hitesh Sanchez planned for cath after medically stable and I agree   -continue lisinopril 40mg qd, metoprolol 25mg BID, baby asa 81mg qd, lipitor 80mg qd, clonidine 0.2mg BID   -D/C lovenox given 48 hours of AC completed and no c/o CP now. Note H/H decreased on 2/29    2. Cardiomyopathy:   -possibly ischemic vs. Takotsubo; EF decreased from normal EF=55% in 12/16   -ECHO 2/29/20 with EF=30-35%; anterior/apical HK; LVH; moderate MR; mild MR   -on ACE-I and BB now     3. Acute systolic CHF:   -See ECHO above   -continue IV lasix 40mg BID; UOP 1.6L overnight    -VDZ=3013 and CXR with interstitial edema   -stable renal fcn BUN/Cr=12/0.8    4. COPD exacerbation:   -per IM doc   -still wheezing and on 3L NC   -ween oxygen as tolerated    I do not believe she is ready for LHC today. Still wheezing and concern for H/H on 2/29 being down. Will recheck labs and follow 1 more day. Possible LHC next day or two depending on how she is doing clinically. Based on NSTEMI, new cardiomyopathy with wall motin abnls she will need left heart cath for definitive evaluation of coronary arteries when stable clinically. Risks, benefits, expectations, and alternative treatments were discussed. Questions appropriately answered. Cheryl Batista agrees to proceed when clinically ready and verbalized understanding.           Patient Active Problem List    Diagnosis Date Noted    Shortness of breath 02/29/2020    SIRS (systemic inflammatory response syndrome) (HCC) 10/16/2017    Thrombocytosis (HCC) 10/16/2017    Chronic dCHF (grade 1 LVDD) 10/16/2017    Alcohol withdrawal ([facility] 10/16/2017    Frequent falls 10/16/2017    Hypertensive urgency 10/16/2017    COPD (chronic obstructive pulmonary disease) ([facility] 10/16/2017    Alcoholism ([facility] 02/01/2017    Tobacco abuse 02/01/2017    Asthma 02/01/2017    Hyperlipidemia 02/01/2017    Hx of CVA involving R-ICA territory     CAD (coronary artery disease)     DM2 (diabetes mellitus, type 2) ([facility]     Hyponatremia 12/23/2016    Hypertension 12/23/2016       Daniela Silvestre MD 3/2/2020 7:18 AM

## 2020-03-02 NOTE — PLAN OF CARE
Problem: SKIN INTEGRITY  Goal: Skin integrity is maintained or improved  Outcome: Ongoing     Problem: OXYGENATION/RESPIRATORY FUNCTION  Goal: Patient will maintain patent airway  Outcome: Ongoing     Problem: HEMODYNAMIC STATUS  Goal: Patient has stable vital signs and fluid balance  Outcome: Ongoing     Problem: ACTIVITY INTOLERANCE/IMPAIRED MOBILITY  Goal: Mobility/activity is maintained at optimum level for patient  Outcome: Ongoing

## 2020-03-02 NOTE — CARE COORDINATION
INTERDISCIPLINARY PLAN OF CARE CONFERENCE    Date/Time: 3/2/2020 2:54 PM  Completed by: Nhung Mao, Case Management      Patient Name:  Hortensia Bailon  YOB: 1945  Admitting Diagnosis: Shortness of breath [R06.02]     Admit Date/Time:  2/28/2020 10:10 PM    Chart reviewed. Interdisciplinary team met to discuss patient progress and discharge plans. Disciplines included Case Management, Nursing, and Dietitian. Current Status:stable    PT/OT recommendation:home with 24/7 assist,HHPT/OT    Anticipated Discharge Date: tbd  Expected D/C Disposition:  Home  Confirmed plan with patient and/or family Yes  Discharge Plan Comments: Reviewed chart. Pt from home with spouse and son and plan return. Following for possible home O2. Pt denies hhc needs. Follow.           Home O2 in place on admit: No  Pt informed of need to bring portable home O2 tank on day of discharge for nursing to connect prior to leaving:  Not Indicated  Verbalized agreement/Understanding:  Not Indicated

## 2020-03-02 NOTE — PROGRESS NOTES
Oral Daily    enoxaparin  1 mg/kg Subcutaneous BID    atorvastatin  80 mg Oral Nightly       Continuous Infusions:   dextrose         Data:  CBC:   Recent Labs     02/28/20  2220 02/29/20  0409 03/02/20  0814   WBC 8.4 9.1 8.5   RBC 3.87* 3.25* 3.69*   HGB 10.0* 8.9* 9.3*   HCT 31.4* 27.9* 30.0*   MCV 81.2 85.9 81.4   RDW 17.6* 18.2* 17.8*    417 439     BMP:   Recent Labs     02/29/20  0409 03/01/20  0513 03/02/20  0455   * 132* 132*   K 3.5 3.4* 3.8   CL 88* 89* 92*   CO2 32 32 28   BUN 12 12 12   CREATININE 1.0 0.9 0.8     BNP: No results for input(s): BNP in the last 72 hours. PT/INR:   Recent Labs     02/28/20  2220   PROTIME 12.5   INR 1.08     APTT:   Recent Labs     02/28/20  2220   APTT 29.7     CARDIAC ENZYMES:   Recent Labs     02/28/20  2220 02/29/20  0123 02/29/20  0409   TROPONINI <0.01 0.05* 0.07*     FASTING LIPID PANEL:  Lab Results   Component Value Date    CHOL 102 02/29/2020    HDL 36 (L) 02/29/2020    TRIG 78 02/29/2020     LIVER PROFILE:   Recent Labs     02/28/20 2220   AST 19   ALT 15   BILITOT 0.5   ALKPHOS 71         Radiology  XR CHEST PORTABLE   Final Result   No acute interval change, with persistent moderate interstitial pulmonary   edema. XR CHEST PORTABLE   Final Result   Mild-to-moderate interstitial pulmonary edema without sizable pleural   effusion. Results for Julia Jones (MRN [id number])   Ref. Range 2/28/2020 22:20 2/29/2020 01:23 2/29/2020 04:09   Troponin Latest Ref Range: <0.01 ng/mL <0.01 0.05 (H) 0.07 (H)        ECHO   Summary   Ejection fraction is visually estimated to be 30-35 %. There is severe hypokinesis of the anterior and apical walls consistent with   infarction within the territory of the left anterior descending artery   versus apical ballooning syndrome. There is mild-moderate left ventricular hypertrophy. Diastolic dysfunction grade and filing pressure are indeterminate. Mitral annular calcification is present. Moderate mitral regurgitation. Mild tricuspid regurgitation. IVC size is dilated (>2.1 cm) but collapses > 50% with respiration   consistent with elevated RA pressure (8 mmHg). Previous echo done 12/2016 - EF 55%          Assessment:  Active Problems:    Shortness of breath    NSTEMI (non-ST elevated myocardial infarction) (HCC)    Cardiomyopathy (HCC)    Acute systolic CHF (congestive heart failure) ([address]  Resolved Problems:    * No resolved hospital problems. *      Plan:  Acute respiratory failure with hypoxia  - Not on home oxygen  - due to acute decompensated CHF. - continue supplemental oxygen,  wean O2 as tolerated. Currently on O2 5 L -- > 4 L-- > 3 L  - Continue diuresis  - Continue nebulizer treatments    Acute on chronic systolic and  diastolic heart failure  New cardiomyopathy   type 2 myocardial infarction   - ECHO as above . EF low with hypokinesis. - troponin elevated  . - seen by cardiology , needs cath   - Continue diuresis with IV Lasix .  Cont lovenox    COPD/emphysema  -Continue nebulizer treatments    Diabetes mellitus  - SSI    Alcohol dependence  - PRN Librium, watch  for signs of withdrawal    Hypomagnesemia  Hypokalemia  -Replete    Tobacco abuse  - on Nicotine patch     Debility  - consult PT OT        DVT Prophylaxis: LOVENOX  DIET LOW SODIUM 2 GM; Carb Control: 4 carb choices (60 gms)/meal; 2000 ml  Diet NPO, After Midnight   Full Code           Cynthia Batres MD 3/2/2020 3:13 PM

## 2020-03-03 ENCOUNTER — HOSPITAL ENCOUNTER (INPATIENT)
Dept: CARDIAC CATH/INVASIVE PROCEDURES | Age: 75
LOS: 7 days | Discharge: HOME | DRG: 286 | End: 2020-03-10
Attending: INTERNAL MEDICINE | Admitting: INTERNAL MEDICINE
Payer: MEDICARE

## 2020-03-03 ENCOUNTER — APPOINTMENT (OUTPATIENT)
Dept: MRI IMAGING | Age: 75
DRG: 286 | End: 2020-03-03
Attending: INTERNAL MEDICINE
Payer: MEDICARE

## 2020-03-03 VITALS
RESPIRATION RATE: 20 BRPM | TEMPERATURE: 98.7 F | DIASTOLIC BLOOD PRESSURE: 66 MMHG | BODY MASS INDEX: 29.06 KG/M2 | HEIGHT: 65 IN | HEART RATE: 77 BPM | SYSTOLIC BLOOD PRESSURE: 118 MMHG | OXYGEN SATURATION: 96 % | WEIGHT: 174.4 LBS

## 2020-03-03 PROBLEM — I50.21 CHF (CONGESTIVE HEART FAILURE), NYHA CLASS III, ACUTE, SYSTOLIC (HCC): Status: ACTIVE | Noted: 2020-03-03

## 2020-03-03 LAB
ANION GAP SERPL CALCULATED.3IONS-SCNC: 13 MMOL/L (ref 3–16)
BUN BLDV-MCNC: 13 MG/DL (ref 7–20)
CALCIUM SERPL-MCNC: 8.7 MG/DL (ref 8.3–10.6)
CHLORIDE BLD-SCNC: 91 MMOL/L (ref 99–110)
CO2: 29 MMOL/L (ref 21–32)
CREAT SERPL-MCNC: 1 MG/DL (ref 0.6–1.2)
GFR AFRICAN AMERICAN: >60
GFR NON-AFRICAN AMERICAN: 54
GLUCOSE BLD-MCNC: 146 MG/DL (ref 70–99)
GLUCOSE BLD-MCNC: 149 MG/DL (ref 70–99)
GLUCOSE BLD-MCNC: 151 MG/DL (ref 70–99)
GLUCOSE BLD-MCNC: 156 MG/DL (ref 70–99)
GLUCOSE BLD-MCNC: 178 MG/DL (ref 70–99)
HCT VFR BLD CALC: 29.5 % (ref 36–48)
HEMOGLOBIN: 9.3 G/DL (ref 12–16)
LEFT VENTRICULAR EJECTION FRACTION MODE: NORMAL
LV EF: 35 %
LV EF: 35 %
LVEF MODALITY: NORMAL
MAGNESIUM: 1.7 MG/DL (ref 1.8–2.4)
MCH RBC QN AUTO: 26.5 PG (ref 26–34)
MCHC RBC AUTO-ENTMCNC: 31.6 G/DL (ref 31–36)
MCV RBC AUTO: 83.6 FL (ref 80–100)
PDW BLD-RTO: 17.4 % (ref 12.4–15.4)
PERFORMED ON: ABNORMAL
PLATELET # BLD: 481 K/UL (ref 135–450)
PMV BLD AUTO: 7.8 FL (ref 5–10.5)
POTASSIUM SERPL-SCNC: 4.1 MMOL/L (ref 3.5–5.1)
RBC # BLD: 3.53 M/UL (ref 4–5.2)
SODIUM BLD-SCNC: 133 MMOL/L (ref 136–145)
WBC # BLD: 8.6 K/UL (ref 4–11)

## 2020-03-03 PROCEDURE — 2700000000 HC OXYGEN THERAPY PER DAY

## 2020-03-03 PROCEDURE — 6370000000 HC RX 637 (ALT 250 FOR IP): Performed by: INTERNAL MEDICINE

## 2020-03-03 PROCEDURE — 99153 MOD SED SAME PHYS/QHP EA: CPT

## 2020-03-03 PROCEDURE — 93458 L HRT ARTERY/VENTRICLE ANGIO: CPT | Performed by: INTERNAL MEDICINE

## 2020-03-03 PROCEDURE — 6360000002 HC RX W HCPCS: Performed by: INTERNAL MEDICINE

## 2020-03-03 PROCEDURE — 4A023N7 MEASUREMENT OF CARDIAC SAMPLING AND PRESSURE, LEFT HEART, PERCUTANEOUS APPROACH: ICD-10-PCS | Performed by: INTERNAL MEDICINE

## 2020-03-03 PROCEDURE — 99152 MOD SED SAME PHYS/QHP 5/>YRS: CPT | Performed by: INTERNAL MEDICINE

## 2020-03-03 PROCEDURE — 93458 L HRT ARTERY/VENTRICLE ANGIO: CPT

## 2020-03-03 PROCEDURE — C1769 GUIDE WIRE: HCPCS

## 2020-03-03 PROCEDURE — B2151ZZ FLUOROSCOPY OF LEFT HEART USING LOW OSMOLAR CONTRAST: ICD-10-PCS | Performed by: INTERNAL MEDICINE

## 2020-03-03 PROCEDURE — 94640 AIRWAY INHALATION TREATMENT: CPT

## 2020-03-03 PROCEDURE — 94761 N-INVAS EAR/PLS OXIMETRY MLT: CPT

## 2020-03-03 PROCEDURE — C1894 INTRO/SHEATH, NON-LASER: HCPCS

## 2020-03-03 PROCEDURE — 1200000000 HC SEMI PRIVATE

## 2020-03-03 PROCEDURE — 99233 SBSQ HOSP IP/OBS HIGH 50: CPT | Performed by: INTERNAL MEDICINE

## 2020-03-03 PROCEDURE — 2580000003 HC RX 258: Performed by: INTERNAL MEDICINE

## 2020-03-03 PROCEDURE — 36415 COLL VENOUS BLD VENIPUNCTURE: CPT

## 2020-03-03 PROCEDURE — 6360000004 HC RX CONTRAST MEDICATION

## 2020-03-03 PROCEDURE — 80048 BASIC METABOLIC PNL TOTAL CA: CPT

## 2020-03-03 PROCEDURE — C1887 CATHETER, GUIDING: HCPCS

## 2020-03-03 PROCEDURE — 99238 HOSP IP/OBS DSCHRG MGMT 30/<: CPT | Performed by: PHYSICIAN ASSISTANT

## 2020-03-03 PROCEDURE — 2709999900 HC NON-CHARGEABLE SUPPLY

## 2020-03-03 PROCEDURE — B2111ZZ FLUOROSCOPY OF MULTIPLE CORONARY ARTERIES USING LOW OSMOLAR CONTRAST: ICD-10-PCS | Performed by: INTERNAL MEDICINE

## 2020-03-03 PROCEDURE — 85027 COMPLETE CBC AUTOMATED: CPT

## 2020-03-03 PROCEDURE — 2500000003 HC RX 250 WO HCPCS

## 2020-03-03 PROCEDURE — 6360000002 HC RX W HCPCS

## 2020-03-03 PROCEDURE — 99152 MOD SED SAME PHYS/QHP 5/>YRS: CPT

## 2020-03-03 PROCEDURE — 83735 ASSAY OF MAGNESIUM: CPT

## 2020-03-03 RX ORDER — ONDANSETRON 2 MG/ML
4 INJECTION INTRAMUSCULAR; INTRAVENOUS EVERY 6 HOURS PRN
Status: CANCELLED | OUTPATIENT
Start: 2020-03-03

## 2020-03-03 RX ORDER — SODIUM CHLORIDE 9 MG/ML
INJECTION, SOLUTION INTRAVENOUS CONTINUOUS
Status: DISCONTINUED | OUTPATIENT
Start: 2020-03-03 | End: 2020-03-03

## 2020-03-03 RX ORDER — THIAMINE MONONITRATE (VIT B1) 100 MG
100 TABLET ORAL DAILY
Status: DISCONTINUED | OUTPATIENT
Start: 2020-03-03 | End: 2020-03-10 | Stop reason: HOSPADM

## 2020-03-03 RX ORDER — POTASSIUM CHLORIDE 20 MEQ/1
20 TABLET, EXTENDED RELEASE ORAL DAILY
Status: CANCELLED | OUTPATIENT
Start: 2020-03-04

## 2020-03-03 RX ORDER — MIDAZOLAM HYDROCHLORIDE 5 MG/ML
INJECTION INTRAMUSCULAR; INTRAVENOUS
Status: COMPLETED | OUTPATIENT
Start: 2020-03-03 | End: 2020-03-03

## 2020-03-03 RX ORDER — ACETAMINOPHEN 650 MG/1
650 SUPPOSITORY RECTAL EVERY 6 HOURS PRN
Status: CANCELLED | OUTPATIENT
Start: 2020-03-03

## 2020-03-03 RX ORDER — ACETAMINOPHEN 325 MG/1
650 TABLET ORAL EVERY 6 HOURS PRN
Status: CANCELLED | OUTPATIENT
Start: 2020-03-03

## 2020-03-03 RX ORDER — PREDNISONE 20 MG/1
40 TABLET ORAL DAILY
Status: DISCONTINUED | OUTPATIENT
Start: 2020-03-03 | End: 2020-03-05

## 2020-03-03 RX ORDER — PAROXETINE HYDROCHLORIDE 20 MG/1
40 TABLET, FILM COATED ORAL EVERY MORNING
Status: DISCONTINUED | OUTPATIENT
Start: 2020-03-04 | End: 2020-03-03

## 2020-03-03 RX ORDER — CLONIDINE HYDROCHLORIDE 0.1 MG/1
0.2 TABLET ORAL 2 TIMES DAILY
Status: DISCONTINUED | OUTPATIENT
Start: 2020-03-03 | End: 2020-03-03 | Stop reason: SDUPTHER

## 2020-03-03 RX ORDER — SODIUM CHLORIDE 0.9 % (FLUSH) 0.9 %
10 SYRINGE (ML) INJECTION EVERY 12 HOURS SCHEDULED
Status: CANCELLED | OUTPATIENT
Start: 2020-03-03

## 2020-03-03 RX ORDER — SODIUM CHLORIDE 0.9 % (FLUSH) 0.9 %
10 SYRINGE (ML) INJECTION PRN
Status: DISCONTINUED | OUTPATIENT
Start: 2020-03-03 | End: 2020-03-10 | Stop reason: HOSPADM

## 2020-03-03 RX ORDER — SODIUM CHLORIDE 0.9 % (FLUSH) 0.9 %
10 SYRINGE (ML) INJECTION EVERY 12 HOURS SCHEDULED
Status: DISCONTINUED | OUTPATIENT
Start: 2020-03-03 | End: 2020-03-03 | Stop reason: SDUPTHER

## 2020-03-03 RX ORDER — CLONIDINE HYDROCHLORIDE 0.1 MG/1
0.2 TABLET ORAL 2 TIMES DAILY
Status: DISCONTINUED | OUTPATIENT
Start: 2020-03-03 | End: 2020-03-10 | Stop reason: HOSPADM

## 2020-03-03 RX ORDER — FOLIC ACID 1 MG/1
1 TABLET ORAL DAILY
Status: CANCELLED | OUTPATIENT
Start: 2020-03-04

## 2020-03-03 RX ORDER — SODIUM CHLORIDE 0.9 % (FLUSH) 0.9 %
10 SYRINGE (ML) INJECTION PRN
Status: CANCELLED | OUTPATIENT
Start: 2020-03-03

## 2020-03-03 RX ORDER — ATORVASTATIN CALCIUM 80 MG/1
80 TABLET, FILM COATED ORAL NIGHTLY
Status: DISCONTINUED | OUTPATIENT
Start: 2020-03-03 | End: 2020-03-03 | Stop reason: SDUPTHER

## 2020-03-03 RX ORDER — LISINOPRIL 20 MG/1
40 TABLET ORAL DAILY
Status: CANCELLED | OUTPATIENT
Start: 2020-03-04

## 2020-03-03 RX ORDER — IPRATROPIUM BROMIDE AND ALBUTEROL SULFATE 2.5; .5 MG/3ML; MG/3ML
1 SOLUTION RESPIRATORY (INHALATION) 4 TIMES DAILY
Status: CANCELLED | OUTPATIENT
Start: 2020-03-03

## 2020-03-03 RX ORDER — SODIUM CHLORIDE 9 MG/ML
INJECTION, SOLUTION INTRAVENOUS CONTINUOUS
Status: CANCELLED | OUTPATIENT
Start: 2020-03-03

## 2020-03-03 RX ORDER — ATORVASTATIN CALCIUM 40 MG/1
40 TABLET, FILM COATED ORAL NIGHTLY
Status: DISCONTINUED | OUTPATIENT
Start: 2020-03-03 | End: 2020-03-10 | Stop reason: HOSPADM

## 2020-03-03 RX ORDER — BUDESONIDE AND FORMOTEROL FUMARATE DIHYDRATE 160; 4.5 UG/1; UG/1
2 AEROSOL RESPIRATORY (INHALATION) 2 TIMES DAILY
Status: CANCELLED | OUTPATIENT
Start: 2020-03-03

## 2020-03-03 RX ORDER — PAROXETINE HYDROCHLORIDE 20 MG/1
40 TABLET, FILM COATED ORAL EVERY MORNING
Status: DISCONTINUED | OUTPATIENT
Start: 2020-03-04 | End: 2020-03-10 | Stop reason: HOSPADM

## 2020-03-03 RX ORDER — FLUTICASONE PROPIONATE 50 MCG
1 SPRAY, SUSPENSION (ML) NASAL DAILY
Status: DISCONTINUED | OUTPATIENT
Start: 2020-03-03 | End: 2020-03-10 | Stop reason: HOSPADM

## 2020-03-03 RX ORDER — LEVOTHYROXINE SODIUM 0.03 MG/1
25 TABLET ORAL DAILY
Status: DISCONTINUED | OUTPATIENT
Start: 2020-03-03 | End: 2020-03-10 | Stop reason: HOSPADM

## 2020-03-03 RX ORDER — POTASSIUM CHLORIDE 20 MEQ/1
20 TABLET, EXTENDED RELEASE ORAL DAILY
Status: DISCONTINUED | OUTPATIENT
Start: 2020-03-04 | End: 2020-03-10 | Stop reason: HOSPADM

## 2020-03-03 RX ORDER — ACETAMINOPHEN 325 MG/1
650 TABLET ORAL EVERY 6 HOURS PRN
Status: DISCONTINUED | OUTPATIENT
Start: 2020-03-03 | End: 2020-03-10 | Stop reason: HOSPADM

## 2020-03-03 RX ORDER — NICOTINE POLACRILEX 4 MG
15 LOZENGE BUCCAL PRN
Status: CANCELLED | OUTPATIENT
Start: 2020-03-03

## 2020-03-03 RX ORDER — CHOLECALCIFEROL (VITAMIN D3) 125 MCG
1000 CAPSULE ORAL DAILY
Status: DISCONTINUED | OUTPATIENT
Start: 2020-03-03 | End: 2020-03-10 | Stop reason: HOSPADM

## 2020-03-03 RX ORDER — TRAZODONE HYDROCHLORIDE 50 MG/1
100 TABLET ORAL NIGHTLY
Status: DISCONTINUED | OUTPATIENT
Start: 2020-03-03 | End: 2020-03-03

## 2020-03-03 RX ORDER — PANTOPRAZOLE SODIUM 40 MG/1
40 TABLET, DELAYED RELEASE ORAL
Status: DISCONTINUED | OUTPATIENT
Start: 2020-03-04 | End: 2020-03-10 | Stop reason: HOSPADM

## 2020-03-03 RX ORDER — PAROXETINE HYDROCHLORIDE 20 MG/1
40 TABLET, FILM COATED ORAL EVERY MORNING
Status: CANCELLED | OUTPATIENT
Start: 2020-03-04

## 2020-03-03 RX ORDER — THIAMINE MONONITRATE (VIT B1) 100 MG
100 TABLET ORAL DAILY
Status: CANCELLED | OUTPATIENT
Start: 2020-03-04

## 2020-03-03 RX ORDER — DEXTROSE MONOHYDRATE 50 MG/ML
100 INJECTION, SOLUTION INTRAVENOUS PRN
Status: CANCELLED | OUTPATIENT
Start: 2020-03-03

## 2020-03-03 RX ORDER — MAGNESIUM SULFATE IN WATER 40 MG/ML
2 INJECTION, SOLUTION INTRAVENOUS PRN
Status: DISCONTINUED | OUTPATIENT
Start: 2020-03-03 | End: 2020-03-10 | Stop reason: HOSPADM

## 2020-03-03 RX ORDER — ALBUTEROL SULFATE 2.5 MG/3ML
2.5 SOLUTION RESPIRATORY (INHALATION) EVERY 4 HOURS PRN
Status: DISCONTINUED | OUTPATIENT
Start: 2020-03-03 | End: 2020-03-10 | Stop reason: HOSPADM

## 2020-03-03 RX ORDER — MAGNESIUM SULFATE IN WATER 40 MG/ML
2 INJECTION, SOLUTION INTRAVENOUS PRN
Status: CANCELLED | OUTPATIENT
Start: 2020-03-03

## 2020-03-03 RX ORDER — PROMETHAZINE HYDROCHLORIDE 25 MG/1
12.5 TABLET ORAL EVERY 6 HOURS PRN
Status: CANCELLED | OUTPATIENT
Start: 2020-03-03

## 2020-03-03 RX ORDER — NICOTINE POLACRILEX 4 MG
15 LOZENGE BUCCAL PRN
Status: DISCONTINUED | OUTPATIENT
Start: 2020-03-03 | End: 2020-03-10 | Stop reason: HOSPADM

## 2020-03-03 RX ORDER — TRAZODONE HYDROCHLORIDE 50 MG/1
50 TABLET ORAL NIGHTLY
Status: DISCONTINUED | OUTPATIENT
Start: 2020-03-03 | End: 2020-03-04

## 2020-03-03 RX ORDER — PANTOPRAZOLE SODIUM 40 MG/1
40 TABLET, DELAYED RELEASE ORAL
Status: CANCELLED | OUTPATIENT
Start: 2020-03-04

## 2020-03-03 RX ORDER — HYDRALAZINE HYDROCHLORIDE 20 MG/ML
10 INJECTION INTRAMUSCULAR; INTRAVENOUS EVERY 10 MIN PRN
Status: DISCONTINUED | OUTPATIENT
Start: 2020-03-03 | End: 2020-03-10 | Stop reason: HOSPADM

## 2020-03-03 RX ORDER — LEVOTHYROXINE SODIUM 0.03 MG/1
25 TABLET ORAL DAILY
Status: CANCELLED | OUTPATIENT
Start: 2020-03-04

## 2020-03-03 RX ORDER — LISINOPRIL 10 MG/1
40 TABLET ORAL DAILY
Status: DISCONTINUED | OUTPATIENT
Start: 2020-03-04 | End: 2020-03-03 | Stop reason: SDUPTHER

## 2020-03-03 RX ORDER — M-VIT,TX,IRON,MINS/CALC/FOLIC 27MG-0.4MG
1 TABLET ORAL DAILY
Status: DISCONTINUED | OUTPATIENT
Start: 2020-03-03 | End: 2020-03-10 | Stop reason: HOSPADM

## 2020-03-03 RX ORDER — ATORVASTATIN CALCIUM 40 MG/1
80 TABLET, FILM COATED ORAL NIGHTLY
Status: CANCELLED | OUTPATIENT
Start: 2020-03-03

## 2020-03-03 RX ORDER — DEXTROSE MONOHYDRATE 25 G/50ML
12.5 INJECTION, SOLUTION INTRAVENOUS PRN
Status: DISCONTINUED | OUTPATIENT
Start: 2020-03-03 | End: 2020-03-10 | Stop reason: HOSPADM

## 2020-03-03 RX ORDER — DEXTROSE MONOHYDRATE 50 MG/ML
100 INJECTION, SOLUTION INTRAVENOUS PRN
Status: DISCONTINUED | OUTPATIENT
Start: 2020-03-03 | End: 2020-03-10 | Stop reason: HOSPADM

## 2020-03-03 RX ORDER — LISINOPRIL 10 MG/1
40 TABLET ORAL DAILY
Status: DISCONTINUED | OUTPATIENT
Start: 2020-03-03 | End: 2020-03-05

## 2020-03-03 RX ORDER — LEVOTHYROXINE SODIUM 0.03 MG/1
25 TABLET ORAL DAILY
Status: DISCONTINUED | OUTPATIENT
Start: 2020-03-04 | End: 2020-03-03 | Stop reason: SDUPTHER

## 2020-03-03 RX ORDER — METOPROLOL SUCCINATE 50 MG/1
50 TABLET, EXTENDED RELEASE ORAL DAILY
Status: DISCONTINUED | OUTPATIENT
Start: 2020-03-03 | End: 2020-03-10 | Stop reason: HOSPADM

## 2020-03-03 RX ORDER — THIAMINE MONONITRATE (VIT B1) 100 MG
100 TABLET ORAL DAILY
Status: DISCONTINUED | OUTPATIENT
Start: 2020-03-04 | End: 2020-03-03

## 2020-03-03 RX ORDER — IPRATROPIUM BROMIDE AND ALBUTEROL SULFATE 2.5; .5 MG/3ML; MG/3ML
1 SOLUTION RESPIRATORY (INHALATION) 4 TIMES DAILY
Status: DISCONTINUED | OUTPATIENT
Start: 2020-03-03 | End: 2020-03-03

## 2020-03-03 RX ORDER — CHLORDIAZEPOXIDE HYDROCHLORIDE 5 MG/1
5 CAPSULE, GELATIN COATED ORAL EVERY 6 HOURS PRN
Status: CANCELLED | OUTPATIENT
Start: 2020-03-03

## 2020-03-03 RX ORDER — POTASSIUM CHLORIDE 7.45 MG/ML
10 INJECTION INTRAVENOUS PRN
Status: DISCONTINUED | OUTPATIENT
Start: 2020-03-03 | End: 2020-03-10 | Stop reason: HOSPADM

## 2020-03-03 RX ORDER — SODIUM CHLORIDE 0.9 % (FLUSH) 0.9 %
10 SYRINGE (ML) INJECTION EVERY 12 HOURS SCHEDULED
Status: DISCONTINUED | OUTPATIENT
Start: 2020-03-03 | End: 2020-03-10 | Stop reason: HOSPADM

## 2020-03-03 RX ORDER — ASPIRIN 81 MG/1
81 TABLET, CHEWABLE ORAL DAILY
Status: DISCONTINUED | OUTPATIENT
Start: 2020-03-03 | End: 2020-03-05 | Stop reason: SDUPTHER

## 2020-03-03 RX ORDER — DEXTROSE MONOHYDRATE 25 G/50ML
12.5 INJECTION, SOLUTION INTRAVENOUS PRN
Status: CANCELLED | OUTPATIENT
Start: 2020-03-03

## 2020-03-03 RX ORDER — M-VIT,TX,IRON,MINS/CALC/FOLIC 27MG-0.4MG
1 TABLET ORAL DAILY
Status: CANCELLED | OUTPATIENT
Start: 2020-03-04

## 2020-03-03 RX ORDER — FUROSEMIDE 10 MG/ML
40 INJECTION INTRAMUSCULAR; INTRAVENOUS 2 TIMES DAILY
Status: DISCONTINUED | OUTPATIENT
Start: 2020-03-03 | End: 2020-03-05

## 2020-03-03 RX ORDER — POTASSIUM CHLORIDE 7.45 MG/ML
10 INJECTION INTRAVENOUS PRN
Status: CANCELLED | OUTPATIENT
Start: 2020-03-03

## 2020-03-03 RX ORDER — ONDANSETRON 2 MG/ML
4 INJECTION INTRAMUSCULAR; INTRAVENOUS EVERY 6 HOURS PRN
Status: DISCONTINUED | OUTPATIENT
Start: 2020-03-03 | End: 2020-03-10 | Stop reason: HOSPADM

## 2020-03-03 RX ORDER — SODIUM CHLORIDE 0.9 % (FLUSH) 0.9 %
10 SYRINGE (ML) INJECTION PRN
Status: DISCONTINUED | OUTPATIENT
Start: 2020-03-03 | End: 2020-03-03 | Stop reason: SDUPTHER

## 2020-03-03 RX ORDER — CHLORDIAZEPOXIDE HYDROCHLORIDE 5 MG/1
5 CAPSULE, GELATIN COATED ORAL EVERY 6 HOURS PRN
Status: DISCONTINUED | OUTPATIENT
Start: 2020-03-03 | End: 2020-03-03

## 2020-03-03 RX ORDER — M-VIT,TX,IRON,MINS/CALC/FOLIC 27MG-0.4MG
1 TABLET ORAL DAILY
Status: DISCONTINUED | OUTPATIENT
Start: 2020-03-04 | End: 2020-03-03 | Stop reason: SDUPTHER

## 2020-03-03 RX ORDER — LORAZEPAM 2 MG/ML
1 INJECTION INTRAMUSCULAR
Status: COMPLETED | OUTPATIENT
Start: 2020-03-03 | End: 2020-03-03

## 2020-03-03 RX ORDER — ASPIRIN 81 MG/1
81 TABLET ORAL DAILY
Status: CANCELLED | OUTPATIENT
Start: 2020-03-04

## 2020-03-03 RX ORDER — POTASSIUM CHLORIDE 20 MEQ/1
40 TABLET, EXTENDED RELEASE ORAL PRN
Status: DISCONTINUED | OUTPATIENT
Start: 2020-03-03 | End: 2020-03-10 | Stop reason: HOSPADM

## 2020-03-03 RX ORDER — POLYETHYLENE GLYCOL 3350 17 G/17G
17 POWDER, FOR SOLUTION ORAL DAILY PRN
Status: CANCELLED | OUTPATIENT
Start: 2020-03-03

## 2020-03-03 RX ORDER — IPRATROPIUM BROMIDE AND ALBUTEROL SULFATE 2.5; .5 MG/3ML; MG/3ML
1 SOLUTION RESPIRATORY (INHALATION)
Status: DISCONTINUED | OUTPATIENT
Start: 2020-03-03 | End: 2020-03-10

## 2020-03-03 RX ORDER — ALBUTEROL SULFATE 90 UG/1
2 AEROSOL, METERED RESPIRATORY (INHALATION) EVERY 6 HOURS PRN
Status: DISCONTINUED | OUTPATIENT
Start: 2020-03-03 | End: 2020-03-10 | Stop reason: HOSPADM

## 2020-03-03 RX ORDER — PROMETHAZINE HYDROCHLORIDE 25 MG/1
12.5 TABLET ORAL EVERY 6 HOURS PRN
Status: DISCONTINUED | OUTPATIENT
Start: 2020-03-03 | End: 2020-03-10 | Stop reason: HOSPADM

## 2020-03-03 RX ORDER — POTASSIUM CHLORIDE 20 MEQ/1
40 TABLET, EXTENDED RELEASE ORAL PRN
Status: CANCELLED | OUTPATIENT
Start: 2020-03-03

## 2020-03-03 RX ORDER — NICOTINE 21 MG/24HR
1 PATCH, TRANSDERMAL 24 HOURS TRANSDERMAL DAILY
Status: CANCELLED | OUTPATIENT
Start: 2020-03-04

## 2020-03-03 RX ORDER — BUDESONIDE AND FORMOTEROL FUMARATE DIHYDRATE 160; 4.5 UG/1; UG/1
2 AEROSOL RESPIRATORY (INHALATION) 2 TIMES DAILY
Status: DISCONTINUED | OUTPATIENT
Start: 2020-03-03 | End: 2020-03-10 | Stop reason: HOSPADM

## 2020-03-03 RX ORDER — NICOTINE 21 MG/24HR
1 PATCH, TRANSDERMAL 24 HOURS TRANSDERMAL DAILY
Status: DISCONTINUED | OUTPATIENT
Start: 2020-03-04 | End: 2020-03-03

## 2020-03-03 RX ORDER — BUDESONIDE AND FORMOTEROL FUMARATE DIHYDRATE 160; 4.5 UG/1; UG/1
2 AEROSOL RESPIRATORY (INHALATION) 2 TIMES DAILY
Status: DISCONTINUED | OUTPATIENT
Start: 2020-03-03 | End: 2020-03-03

## 2020-03-03 RX ORDER — ACETAMINOPHEN 650 MG/1
650 SUPPOSITORY RECTAL EVERY 6 HOURS PRN
Status: DISCONTINUED | OUTPATIENT
Start: 2020-03-03 | End: 2020-03-10 | Stop reason: HOSPADM

## 2020-03-03 RX ORDER — ALBUTEROL SULFATE 2.5 MG/3ML
2.5 SOLUTION RESPIRATORY (INHALATION) EVERY 4 HOURS PRN
Status: CANCELLED | OUTPATIENT
Start: 2020-03-03

## 2020-03-03 RX ORDER — FUROSEMIDE 10 MG/ML
40 INJECTION INTRAMUSCULAR; INTRAVENOUS 2 TIMES DAILY
Status: CANCELLED | OUTPATIENT
Start: 2020-03-03

## 2020-03-03 RX ORDER — FOLIC ACID 1 MG/1
1 TABLET ORAL DAILY
Status: DISCONTINUED | OUTPATIENT
Start: 2020-03-04 | End: 2020-03-10 | Stop reason: HOSPADM

## 2020-03-03 RX ORDER — ASPIRIN 81 MG/1
81 TABLET ORAL DAILY
Status: DISCONTINUED | OUTPATIENT
Start: 2020-03-04 | End: 2020-03-10 | Stop reason: HOSPADM

## 2020-03-03 RX ORDER — CHOLECALCIFEROL (VITAMIN D3) 125 MCG
5 CAPSULE ORAL NIGHTLY PRN
Status: DISCONTINUED | OUTPATIENT
Start: 2020-03-03 | End: 2020-03-10 | Stop reason: HOSPADM

## 2020-03-03 RX ORDER — POLYETHYLENE GLYCOL 3350 17 G/17G
17 POWDER, FOR SOLUTION ORAL DAILY PRN
Status: DISCONTINUED | OUTPATIENT
Start: 2020-03-03 | End: 2020-03-10

## 2020-03-03 RX ORDER — FUROSEMIDE 20 MG/1
10 TABLET ORAL DAILY
Status: DISCONTINUED | OUTPATIENT
Start: 2020-03-03 | End: 2020-03-03

## 2020-03-03 RX ORDER — CLONIDINE HYDROCHLORIDE 0.2 MG/1
0.2 TABLET ORAL 2 TIMES DAILY
Status: CANCELLED | OUTPATIENT
Start: 2020-03-03

## 2020-03-03 RX ORDER — NICOTINE 21 MG/24HR
1 PATCH, TRANSDERMAL 24 HOURS TRANSDERMAL DAILY
Status: DISCONTINUED | OUTPATIENT
Start: 2020-03-03 | End: 2020-03-10 | Stop reason: HOSPADM

## 2020-03-03 RX ADMIN — FUROSEMIDE 40 MG: 10 INJECTION, SOLUTION INTRAMUSCULAR; INTRAVENOUS at 20:13

## 2020-03-03 RX ADMIN — ATORVASTATIN CALCIUM 40 MG: 40 TABLET, FILM COATED ORAL at 20:13

## 2020-03-03 RX ADMIN — Medication 2 PUFF: at 20:13

## 2020-03-03 RX ADMIN — TRAZODONE HYDROCHLORIDE 50 MG: 50 TABLET ORAL at 20:13

## 2020-03-03 RX ADMIN — ALBUTEROL SULFATE 2.5 MG: 2.5 SOLUTION RESPIRATORY (INHALATION) at 16:41

## 2020-03-03 RX ADMIN — PREDNISONE 40 MG: 20 TABLET ORAL at 17:39

## 2020-03-03 RX ADMIN — PAROXETINE HYDROCHLORIDE 40 MG: 20 TABLET, FILM COATED ORAL at 09:26

## 2020-03-03 RX ADMIN — Medication 10 ML: at 20:29

## 2020-03-03 RX ADMIN — MIDAZOLAM HYDROCHLORIDE 0.5 MG: 5 INJECTION INTRAMUSCULAR; INTRAVENOUS at 14:14

## 2020-03-03 RX ADMIN — IPRATROPIUM BROMIDE AND ALBUTEROL SULFATE 1 AMPULE: .5; 3 SOLUTION RESPIRATORY (INHALATION) at 20:12

## 2020-03-03 RX ADMIN — Medication 100 MG: at 17:40

## 2020-03-03 RX ADMIN — Medication 10 ML: at 09:29

## 2020-03-03 RX ADMIN — LORAZEPAM 1 MG: 2 INJECTION, SOLUTION INTRAMUSCULAR; INTRAVENOUS at 17:38

## 2020-03-03 RX ADMIN — Medication 1 TABLET: at 17:40

## 2020-03-03 RX ADMIN — ASPIRIN 81 MG: 81 TABLET, COATED ORAL at 09:25

## 2020-03-03 RX ADMIN — POTASSIUM CHLORIDE 20 MEQ: 750 TABLET, EXTENDED RELEASE ORAL at 09:25

## 2020-03-03 RX ADMIN — METOPROLOL TARTRATE 25 MG: 25 TABLET ORAL at 09:26

## 2020-03-03 RX ADMIN — LISINOPRIL 40 MG: 10 TABLET ORAL at 17:40

## 2020-03-03 RX ADMIN — IPRATROPIUM BROMIDE AND ALBUTEROL SULFATE 1 AMPULE: .5; 3 SOLUTION RESPIRATORY (INHALATION) at 07:20

## 2020-03-03 RX ADMIN — CYANOCOBALAMIN TAB 500 MCG 1000 MCG: 500 TAB at 17:50

## 2020-03-03 RX ADMIN — METOPROLOL SUCCINATE 50 MG: 50 TABLET, EXTENDED RELEASE ORAL at 17:40

## 2020-03-03 RX ADMIN — LEVOTHYROXINE SODIUM 25 MCG: 25 TABLET ORAL at 17:39

## 2020-03-03 RX ADMIN — MAGNESIUM SULFATE HEPTAHYDRATE 2 G: 40 INJECTION, SOLUTION INTRAVENOUS at 09:29

## 2020-03-03 RX ADMIN — FLUTICASONE PROPIONATE 1 SPRAY: 50 SPRAY, METERED NASAL at 17:47

## 2020-03-03 RX ADMIN — CLONIDINE HYDROCHLORIDE 0.2 MG: 0.1 TABLET ORAL at 20:13

## 2020-03-03 RX ADMIN — ASPIRIN 81 MG 81 MG: 81 TABLET ORAL at 17:40

## 2020-03-03 RX ADMIN — Medication 2 PUFF: at 07:20

## 2020-03-03 ASSESSMENT — PAIN SCALES - GENERAL
PAINLEVEL_OUTOF10: 2
PAINLEVEL_OUTOF10: 0
PAINLEVEL_OUTOF10: 2

## 2020-03-03 ASSESSMENT — PAIN DESCRIPTION - LOCATION
LOCATION: SHOULDER
LOCATION: SHOULDER

## 2020-03-03 ASSESSMENT — PAIN DESCRIPTION - PAIN TYPE
TYPE: CHRONIC PAIN
TYPE: CHRONIC PAIN

## 2020-03-03 ASSESSMENT — PAIN DESCRIPTION - ORIENTATION
ORIENTATION: RIGHT
ORIENTATION: RIGHT

## 2020-03-03 ASSESSMENT — PAIN DESCRIPTION - DESCRIPTORS
DESCRIPTORS: ACHING
DESCRIPTORS: ACHING

## 2020-03-03 ASSESSMENT — PAIN DESCRIPTION - FREQUENCY
FREQUENCY: INTERMITTENT
FREQUENCY: INTERMITTENT

## 2020-03-03 NOTE — H&P
Hospital Medicine History & Physical      PCP: Manuel Adams MD    Date of Admission: 3/3/2020    Date of Service: Pt seen/examined on 20 and Admitted to Inpatient with expected LOS greater than two midnights due to medical therapy. Chief Complaint:  Dyspnea, weakness    History Of Present Illness: The patient is a 69 Y F with a h/o smoking, alcoholism, HTN, HLD, DM2, and CVA. She presented to [facility] on  with about a week of dyspnea and weakness. She was admitted for treatment of CHF, and was found to have an EF of 30-35% on TTE (was 55% in 2016). She was transferred to [facility] for [address] [place] [address] on 3/3. LHC showed only mild, non-obstructive CAD, and it was determined that she had a nonischemic cardiomyopathy with apical ballooning. Cardiology asked hospital medicine to admit the patient for further diuresis since she was still on oxygen. The patient also had been confused and disoriented ever since the first night of admission to [facility]. She feels well. Denies dyspnea or chest pain. No complaints. She is pretty good at carrying on a normal conversation, but when you ask her specific orientation questions you realize that she has very little idea of what is going on.         Past Medical History:          Diagnosis Date    Asthma     Back ache     Cataract     Cerebral artery occlusion with cerebral infarction ([address] 2016    Diabetes mellitus ([address]     type !! - diet controlled    Hyperlipidemia     Hypertension     Insomnia     TIA (transient ischemic attack) 2016       Past Surgical History:          Procedure Laterality Date    ABDOMEN SURGERY      c section    CATARACT REMOVAL WITH IMPLANT Left [id number]    LEFT EYE CATARACT PHACOEMULSIFICATION INTRAOCULAR LENS     SECTION      DENTAL SURGERY      EYE SURGERY  10/29/13     RIGHT EYE CATARACT PHACOEMULSIFICATION INTRAOCULAR LENS       Medications Prior to Admission:      Prior to Admission medications    Medication Sig Start Date End Date Taking? Authorizing Provider   metFORMIN (GLUCOPHAGE) 500 MG tablet Take 500 mg by mouth 12/19/18   Historical Provider, MD   polyethylene glycol-electrolytes (TRILYTE) 420 g solution Follow Package Instructions unless otherwise directed by physician.  12/5/18   Historical Provider, MD   albuterol sulfate  (90 Base) MCG/ACT inhaler Inhale 2 puffs into the lungs every 6 hours as needed for Wheezing 12/28/18   Ginny Mcbride APRN - CNP   ibuprofen (ADVIL;MOTRIN) 200 MG tablet Take 1 tablet by mouth every 6 hours as needed for Pain 10/19/17   Luna Nicolas MD   PARoxetine (PAXIL) 40 MG tablet Take 1 tablet by mouth every morning 10/19/17   Luna Nicolas MD   levothyroxine (SYNTHROID) 25 MCG tablet Take 1 tablet by mouth Daily 10/20/17   Luna Nicolas MD   vitamin B-1 (THIAMINE) 100 MG tablet Take 1 tablet by mouth daily 10/19/17   Luna Nicolas MD   traZODone (DESYREL) 50 MG tablet Take 100 mg by mouth nightly    Historical Provider, MD   lisinopril (PRINIVIL;ZESTRIL) 40 MG tablet Take 1 tablet by mouth daily 3/21/17   Luna Nicolas MD   metoprolol tartrate (LOPRESSOR) 25 MG tablet Take 1 tablet by mouth 2 times daily 3/21/17   Luna Nicolas MD   cloNIDine (CATAPRES) 0.2 MG tablet Take 1 tablet by mouth 2 times daily 3/21/17   Luna Nicolas MD   furosemide (LASIX) 20 MG tablet Take 0.5 tablets by mouth daily 2/10/17   Tiffanie Seo MD   fluticasone-salmeterol (ADVAIR DISKUS) 500-50 MCG/DOSE diskus inhaler Inhale 1 puff into the lungs every 12 hours 12/25/16   Nic Wilhelm MD   atorvastatin (LIPITOR) 40 MG tablet Take 1 tablet by mouth nightly 12/25/16   Nic Wilhelm MD   fluticasone Pollock Ba) 50 MCG/ACT nasal spray 1 spray by Nasal route daily 12/25/16   Nic Wilhelm MD   tiotropium (Marilynne Going) 18 MCG inhalation capsule Inhale 1 capsule into the lungs daily 12/25/16   Nic Wilhelm MD   Multiple Vitamin effort. Bilaterally without Rhonchi. Bibasilar rales and bilateral expiratory wheezing. Cardiovascular:  Regular rate and rhythm with normal S1/S2 without murmurs, rubs or gallops. Abdomen: Soft, non-tender, non-distended with normal bowel sounds. Musculoskeletal:  No clubbing, cyanosis or edema bilaterally. Full range of motion without deformity. Skin: Skin color, texture, turgor normal.  No rashes or lesions. Neurologic:  Neurovascularly intact without any focal sensory/motor deficits. Cranial nerves: II-XII intact, grossly non-focal.  Psychiatric:  Alert, oriented to self only, minimal insight, calm  Capillary Refill: Brisk,< 3 seconds   Peripheral Pulses: +2 palpable, equal bilaterally       Labs:     Recent Labs     03/02/20  0814 03/03/20  0500   WBC 8.5 8.6   HGB 9.3* 9.3*   HCT 30.0* 29.5*    481*     Recent Labs     03/01/20  0513 03/02/20  0455 03/03/20  0500   * 132* 133*   K 3.4* 3.8 4.1   CL 89* 92* 91*   CO2 32 28 29   BUN 12 12 13   CREATININE 0.9 0.8 1.0   CALCIUM 8.6 8.5 8.7     No results for input(s): AST, ALT, BILIDIR, BILITOT, ALKPHOS in the last 72 hours. No results for input(s): INR in the last 72 hours. No results for input(s): Rishabh Grippe in the last 72 hours.     Urinalysis:      Lab Results   Component Value Date    NITRU Negative 03/20/2017    WBCUA 6-10 02/12/2017    BACTERIA Rare 02/12/2017    RBCUA 3-5 02/12/2017    BLOODU Negative 03/20/2017    SPECGRAV 1.020 03/20/2017    GLUCOSEU Negative 03/20/2017       Radiology:     CXR: I have reviewed the CXR with the following interpretation: none   EKG:  I have reviewed the EKG with the following interpretation: none    No orders to display       ASSESSMENT:    Active Hospital Problems    Diagnosis Date Noted    CHF (congestive heart failure), NYHA class III, acute, systolic (HCC) [O20.65] 38/39/5128    NSTEMI (non-ST elevated myocardial infarction) ([facility] [I21.4]     Cardiomyopathy ([facility] [I42.9]     Acute systolic CHF (congestive heart failure) (HCC) [I50.21]          PLAN:    The patient is a 69 Y F with a h/o smoking, alcoholism, HTN, HLD, DM2, and CVA. She presented to [facility] on 2/28 with about a week of dyspnea and weakness. She was admitted for treatment of CHF, and was found to have an EF of 30-35% on TTE (was 55% in 2016). She was transferred to [facility] for [facility] on 3/3. LHC showed only mild, non-obstructive CAD, and it was determined that she had a nonischemic cardiomyopathy with apical ballooning. Cardiology asked hospital medicine to admit the patient for further diuresis since she was still on oxygen. The patient also had been confused and disoriented ever since the first night of admission to [facility]. Acute on chronic systolic CHF due to nonischemic CMP  - furosemide IV. She weighed 178 lbs six months ago at a clinic visit. Diuresed from 182 to 174 lbs at [facility]. - lisinopril. Changed to metoprolol succinate. She is also on clonidine.  - she will need reassessment in 90 days for consideration of AICD  - encouraged her to stop drinking and smoking. AECOPD  - steroids, inhaled bronchodilators    Obesity  - encouraged weight loss through diet and exercise    Acute hypoxic respiratory failure  - likely due to the above issues. Treated accordingly. Doubt PE. - wean to RA as tolerated. The patient has no supplemental O2 requirement at baseline. She was 91% on RA six months ago at a clinic visit. Alcohol dependence complicated by withdrawal  - treated with chlordiazepoxide at [facility]. Will try to avoid further benzodiazepines at this point. Vitamins. Encouraged cessation. Acute metabolic encephalopathy  - likely due to the above issues. Supportive care. Nonspecific delirium related to hospitalization is probable as well. - trial of reduced trazodone dose  - liver was unremarkable on 2018 CT. F/u ammonia level. - f/u MRI brain. She has a h/o previous stroke.       HLD  - statin    Hypothyroidism  - Clinically euthyroid. Continue home dose of levothyroxine. DVT Prophylaxis: enoxaparin  Diet: DIET CARDIAC; Diet NPO Time Specified  Code Status: Full Code    PT/OT Eval Status: eval ordered    Dispo - when she is adequately diuresed and her mental state is optimized. Perhaps 3/5, ideally when she is weaned off O2. She lives at home with her . Steve Pang MD    Thank you Jatinder Elliott MD for the opportunity to be involved in this patient's care. If you have any questions or concerns please feel free to contact me at [phone].

## 2020-03-03 NOTE — PRE SEDATION
Brief Pre-Op Note/Sedation Assessment      Arias Noble  1945  Cath Pool Rm/NONE      [id number]  2:26 PM    Planned Procedure: Cardiac Catheterization Procedure    Post Procedure Plan: Return to same level of care    Consent: I have discussed with the patient and/or the patient representative the indication, alternatives, and the possible risks and/or complications of the planned procedure and the anesthesia methods. The patient and/or patient representative appear to understand and agree to proceed. Chief Complaint: Dyspnea      Indications for Cath Procedure:  Suspected CAD and Cardiomyopathy  Anginal Classification within 2 weeks:  No symptoms  NYHA Heart Failure Class within 2 weeks: Class IV - Symptoms of HF at rest, Newly Diagnosed? Yes, Heart Failure Type: Systolic  Is Cath Lab Visit Valve-related?: No  Surgical Risk: N/A  Functional Type: N/A    Anti- Anginal Meds within 2 weeks:   Yes: Aspirin    Stress or Imaging Studies Performed:  None     Vital Signs: There were no vitals taken for this visit. Allergies: Allergies   Allergen Reactions    Mold Extract [Trichophyton Mentagrophytes]        Past Medical History:  Past Medical History:   Diagnosis Date    Asthma     Back ache     Cataract     Cerebral artery occlusion with cerebral infarction ([address] 2016    Diabetes mellitus ([address]     type !! - diet controlled    Hyperlipidemia     Hypertension     Insomnia     TIA (transient ischemic attack) 2016         Surgical History:  Past Surgical History:   Procedure Laterality Date    ABDOMEN SURGERY      c section    CATARACT REMOVAL WITH IMPLANT Left [id number]    LEFT EYE CATARACT PHACOEMULSIFICATION INTRAOCULAR LENS     SECTION      DENTAL SURGERY      EYE SURGERY  10/29/13     RIGHT EYE CATARACT PHACOEMULSIFICATION INTRAOCULAR LENS         Medications:  No current facility-administered medications for this encounter.             Pre-Sedation:    Pre-Sedation

## 2020-03-03 NOTE — PROGRESS NOTES
Report given at this time, patient stable, care transferred to Yvan Sandoval, [address] [place] [address].

## 2020-03-03 NOTE — PROGRESS NOTES
Instructions on which medications to provide to patient prior to Cardiac Cath given by Taqueria Zapata RN

## 2020-03-03 NOTE — PROGRESS NOTES
replacement **OR** potassium chloride, chlordiazePOXIDE, perflutren lipid microspheres, albuterol    Lab Data:  CBC:   Recent Labs     03/02/20  0814 03/03/20  0500   WBC 8.5 8.6   HGB 9.3* 9.3*   HCT 30.0* 29.5*   MCV 81.4 83.6    481*     BMP:   Recent Labs     03/01/20  0513 03/02/20  0455 03/03/20  0500   * 132* 133*   K 3.4* 3.8 4.1   CL 89* 92* 91*   CO2 32 28 29   BUN 12 12 13   CREATININE 0.9 0.8 1.0     LIVER PROFILE:   No results for input(s): AST, ALT, LIPASE, BILIDIR, BILITOT, ALKPHOS in the last 72 hours. Invalid input(s): AMYLASE,  ALB  PT/INR:   No results for input(s): PROTIME, INR in the last 72 hours. APTT:   No results for input(s): APTT in the last 72 hours. 12/24/16 Summary   Normal left ventricle systolic function with an estimated EF of 55%. No regional wall motion abnormalities are seen. Grade I diastolic dysfunction with normal filing pressure. CXR 02/29/20  No acute interval change, with persistent moderate interstitial pulmonary edema. FINDINGS: Interstitial and basilar opacities similar prior exam.  Fluctuating areas of discoid atelectasis. Costophrenic sulci are obscured. No pneumothorax. Stable cardiomediastinal contours. EKG 02/28/20  Normal sinus rhythmLow voltage QRSSeptal infarct , age undeterminedAbnormal ECGConfirmed by Angie Mathis MD, [facility] ([id number]) on 2/29/2020 4:08:36 PM    ECHO 02/29/20 Summary   Ejection fraction is visually estimated to be 30-35 %. There is severe hypokinesis of the anterior and apical walls consistent with   infarction within the territory of the left anterior descending artery   versus apical ballooning syndrome. There is mild-moderate left ventricular hypertrophy. Diastolic dysfunction grade and filing pressure are indeterminate. Mitral annular calcification is present. Moderate mitral regurgitation. Mild tricuspid regurgitation.    IVC size is dilated (>2.1 cm) but collapses > 50% with respiration   consistent with elevated RA pressure (8 mmHg). Previous echo done 12/2016 - EF 55%    Assessment:      1. NSTEMI:   -in setting of hypoxic respiratory failure with COPD exacerbation    -EKG shows NSR with septal infarct (similar to 12/18 study)   -William <0.01,0.05, 0.07   -new cardiomyopathy with wall motion abnormality    -Dr. Kai Hernandez planned for cath after medically stable and I agree   -continue lisinopril 40mg qd, metoprolol 25mg BID, baby asa 81mg qd, lipitor 80mg qd, clonidine 0.2mg BID   -D/C'd lovenox given 48 hours of AC completed and no c/o CP now. 2. Cardiomyopathy:   -possibly ischemic vs. Takotsubo; EF decreased from normal EF=55% in 12/16   -ECHO 2/29/20 with EF=30-35%; anterior/apical HK; LVH; moderate MR; mild MR   -on ACE-I and BB now     3. Acute systolic CHF:   -See ECHO above   -continue IV lasix 40mg BID   -SIT=1430 and CXR with interstitial edema   -stable renal fcn BUN/Cr=13/1.0    4. COPD exacerbation:   -per IM doc   -still wheezing and on 3L NC   -ween oxygen as tolerated    Clinically, I believe she is ready for [facility] today. Wheezing is improved and only on 3L NC oxygen. Her H/H stable 9.3/29.5. Based on NSTEMI, new cardiomyopathy with wall motin abnls she will need left heart cath for definitive evaluation of coronary arteries when stable clinically. Risks, benefits, expectations, and alternative treatments were discussed. Questions appropriately answered. Tahira Ballard agrees to proceed when clinically ready and verbalized understanding.   Holding IV lasix, lisinopril pending [facility] today    Patient Active Problem List    Diagnosis Date Noted    NSTEMI (non-ST elevated myocardial infarction) ([address]     Cardiomyopathy ([address]     Acute systolic CHF (congestive heart failure) ([address]     Acute hypoxemic respiratory failure (HCC)     Hypomagnesemia     Hypokalemia     Shortness of breath 02/29/2020    SIRS (systemic inflammatory response syndrome) ([address] 10/16/2017    Thrombocytosis ([address] 10/16/2017   

## 2020-03-03 NOTE — PLAN OF CARE
Problem: SAFETY  Goal: Free from accidental physical injury  Outcome: Ongoing  Goal: Free from intentional harm  Outcome: Ongoing     Problem: DAILY CARE  Goal: Daily care needs are met  Outcome: Ongoing     Problem: PAIN  Goal: Patient's pain/discomfort is manageable  Outcome: Ongoing     Problem: SKIN INTEGRITY  Goal: Skin integrity is maintained or improved  Outcome: Ongoing     Problem: KNOWLEDGE DEFICIT  Goal: Patient/S.O. demonstrates understanding of disease process, treatment plan, medications, and discharge instructions.   Outcome: Ongoing     Problem: DISCHARGE BARRIERS  Goal: Patient's continuum of care needs are met  Outcome: Ongoing     Problem: OXYGENATION/RESPIRATORY FUNCTION  Goal: Patient will maintain patent airway  Outcome: Ongoing  Goal: Patient will achieve/maintain normal respiratory rate/effort  Description  Respiratory rate and effort will be within normal limits for the patient  Outcome: Ongoing     Problem: HEMODYNAMIC STATUS  Goal: Patient has stable vital signs and fluid balance  Outcome: Ongoing     Problem: FLUID AND ELECTROLYTE IMBALANCE  Goal: Fluid and electrolyte balance are achieved/maintained  Outcome: Ongoing     Problem: ACTIVITY INTOLERANCE/IMPAIRED MOBILITY  Goal: Mobility/activity is maintained at optimum level for patient  Outcome: Ongoing     Problem: Falls - Risk of:  Goal: Will remain free from falls  Description  Will remain free from falls  Outcome: Ongoing  Goal: Absence of physical injury  Description  Absence of physical injury  Outcome: Ongoing     Problem: Cardiac:  Goal: Ability to maintain an adequate cardiac output will improve  Description  Ability to maintain an adequate cardiac output will improve  Outcome: Ongoing  Goal: Hemodynamic stability will improve  Description  Hemodynamic stability will improve  Outcome: Ongoing     Problem: Fluid Volume:  Goal: Ability to achieve and maintain adequate urine output will improve  Description  Ability to achieve and

## 2020-03-03 NOTE — PROGRESS NOTES
Patient admitted to room 2150 from cath lab. Patient oriented to room, call light, bed rails, phone, lights and bathroom. Patient instructed about the schedule of the day including: vital sign frequency, lab draws, possible tests, frequency of MD and staff rounds, including RN/MD rounding together at bedside, daily weights, and I &O's. Patient instructed about prescribed diet, how to use 8MENU, and television. bed alarm in place, patient aware of placement and reason. Telemetry box  in place, patient aware of placement and reason. Bed locked, in lowest position, side rails up 2/4, call light within reach. Will continue to monitor.

## 2020-03-03 NOTE — PROCEDURES
[address]       Cardiac Catheterization Lab Report    PATIENT: Jaclyn Aiken  DATE: 3/3/2020  MRN: [id number]  CSN: [id number]  : 1945      Performing Physician: Tiffanie Aguilera MD, [place], [place], [place]  Primary Care Physician: Scarlet Reynolds MD    Procedures Performed:   1. Left heart catheterization  2. Selective left and right coronary angiogram  3. Left ventriculography     Procedure Findings:  1. Mild nonobstructive coronary artery disease. 2.  Reduced left ventricular function EF of 35% consistent with apical ballooning  3. Normal left heart hemodynamics    Indications:   Patient Active Problem List   Diagnosis    Hyponatremia    Hypertension    Hx of CVA involving R-ICA territory    CAD (coronary artery disease)    [facility]2 [facility]di[facility] mellitus, type 2) [facility]    Alcoholism ([facility]    Tobacco abuse    Asthma    Hyperlipidemia    SIRS (systemic inflammatory response syndrome) (HCC)    Thrombocytosis (HCC)    Chronic dCHF (grade 1 LVDD)    Alcohol withdrawal (HCC)    Frequent falls    Hypertensive urgency    COPD (chronic obstructive pulmonary disease) (HCC)    Shortness of breath    NSTEMI (non-ST elevated myocardial infarction) (HCC)    Cardiomyopathy (HCC)    Acute systolic CHF (congestive heart failure) (HCC)    Acute hypoxemic respiratory failure (HCC)    Hypomagnesemia    Hypokalemia    Acute pulmonary edema (HCC)    CHF (congestive heart failure), NYHA class III, acute, systolic ([facility]       Details:   Jaclyn Aiken was brought to the cardiac catheterization lab in a fasting state after informed consent was obtained. If the patient was able to provide written consent, it was obtained. The patient's vitals were monitored through out the procedure. The patient was sedated using the appropriate levels of sedation and ASA guidelines. The appropriate access site area was prepped and drapped in a sterile fashion. The area was anesthetized with 2% lidocaine.

## 2020-03-03 NOTE — POST SEDATION
Patient:  Raul Johnston   :   1945    A pre-sedation re-evaluation was performed immediately at the end of the procedure. Procedure:  Cardiac cath  Medications: Procedural sedation with minimal conscious sedation  Complications: None  Estimated Blood Loss: none  Specimens: Were not obtained        Tiffanie Medication and Procedural Reconciliation:  I agree that the documented medications and procedures performed are true. The medications were given under my order. The procedures were performed under my direct supervision.

## 2020-03-03 NOTE — DISCHARGE SUMMARY
Name:  Shekhar Peace  Room:  /9601-47  MRN:    [id number]    Discharge Summary      This discharge summary is in conjunction with a complete physical exam done on the day of discharge. Discharging Provider: Anju Cameron PA-C      Admit: 2/28/2020  Discharge:   3/3/2020     HPI taken from admission H&P:    69 y.o. female who presented to the hospital with a chief complaint of shortness of breath and generalized weakness. The patient states her symptoms have been ongoing for the past week. Of note the patient drinks about a bottle of bourbon every 2 days and smokes a pack and a half of cigarettes daily. She was accompanied by her  who helped with some of her history. The patient uses inhalers at home but has been out for a while. She denies any recent illness or sick contacts. When she presented to the emergency department she was working hard to breathe and was hypoxic on room air. Work-up in emergency department was concerning for pulmonary edema. She was given a dose of IV Lasix and breathing treatment. She will be admitted for further medical evaluation. Diagnoses this Admission and Hospital Course     Acute respiratory failure with hypoxia  - due to acute decompensated CHF, she has not on home oxygen  - continue supplemental oxygen,  wean O2 as tolerated. Currently on O2 5 L -- > 4 L-- > 3 L at time of transfer  -Treated with diuresis and nebulizer treatments diuresis    Acute on chronic systolic and diastolic heart failure  New cardiomyopathy  NSTEMI  - ECHO as above . EF low with hypokinesis.   troponin elevated   -She received full dose anticoagulation with Lovenox 1 mg/kg twice daily  - [facility]  -She was diuresed with IV Lasix 40 mg twice daily throughout admission  -Continue lisinopril, metoprolol, aspirin, Lipitor, clonidine     COPD/emphysema  -With mild exacerbation much improved at time of

## 2020-03-03 NOTE — PROGRESS NOTES
RESPIRATORY THERAPY ASSESSMENT    Name:  [address] Record Number:  [id number]  Age: 69 y.o. Gender: female  : 1945  Today's Date:  3/3/2020  Room:  /0327-01    Assessment     Is the patient being admitted for a COPD or Asthma exacerbation? No   (If yes the patient will be seen every 4 hours for the first 24 hours and then reassessed)    Patient Admission Diagnosis      Allergies  Allergies   Allergen Reactions    Mold Extract [Trichophyton Mentagrophytes]        Minimum Predicted Vital Capacity:     n/a          Actual Vital Capacity:      n/a              Pulmonary History:Asthma  Home Oxygen Therapy:  room air  Home Respiratory Therapy:Albuterol, Salmeterol/Fluticasone Propionate and Tiotropium Bromide   Current Respiratory Therapy:  Duoneb QID  Treatment Type: MDI  Medications: Albuterol/Ipratropium, Budesonide/Formoterol    Respiratory Severity Index(RSI)   Patients with orders for inhalation medications, oxygen, or any therapeutic treatment modality will be placed on Respiratory Protocol. They will be assessed with the first treatment and at least every 72 hours thereafter. The following severity scale will be used to determine frequency of treatment intervention. Smoking History: Pulmonary Disease or Smoking History, Greater than 15 pack year = 2    Social History  Social History     Tobacco Use    Smoking status: Current Every Day Smoker     Packs/day: 1.50     Years: 58.00     Pack years: 87.00     Types: Cigarettes    Smokeless tobacco: Never Used    Tobacco comment: not totally ready to quit today   Substance Use Topics    Alcohol use:  Yes     Alcohol/week: 0.0 standard drinks     Types: 3 - 4 Standard drinks or equivalent per week     Comment: 1-2 hot toddy every night     Drug use: No       Recent Surgical History: None = 0  Past Surgical History  Past Surgical History:   Procedure Laterality Date    ABDOMEN SURGERY      c section    CATARACT REMOVAL WITH IMPLANT Therapy will be held for heart rate (HR) greater than 140 beats per minute, pending direction from physician. 3. Bronchodilators will be administered via Metered Dose Inhaler (MDI) with spacer when the following criteria are met:  a. Alert and cooperative     b. HR < 140 bpm  c. RR < 30 bpm                d. Can demonstrate a 2-3 second inspiratory hold  4. Bronchodilators will be administered via Hand Held Nebulizer [facility]) to patients when ANY of the following criteria are met  a. Incognizant or uncooperative          b. Patients treated with HHN at Home        c. Unable to demonstrate proper use of MDI with spacer     d. RR > 30 bpm   5. Bronchodilators will be delivered via Metered Dose Inhaler (MDI), HHN, Aerogen to intubated patients on mechanical ventilation. 6. Inhalation medication orders will be delivered and/or substituted as outlined below. Aerosolized Medications Ordering and Administration Guidelines:    1. All Medications will be ordered by a physician, and their frequency and/or modality will be adjusted as defined by the patients Respiratory Severity Index (RSI) score. 2. If the patient does not have documented COPD, consider discontinuing anticholinergics when RSI is less than 9.  3. If the bronchospasm worsens (increased RSI), then the bronchodilator frequency can be increased to a maximum of every 4 hours. If greater than every 4 hours is required, the physician will be contacted. 4. If the bronchospasm improves, the frequency of the bronchodilator can be decreased, based on the patient's RSI, but not less than home treatment regimen frequency. 5. Bronchodilator(s) will be discontinued if patient has a RSI less than 9 and has received no scheduled or as needed treatment for 72  Hrs. Patients Ordered on a Mucolytic Agent:    1. Must always be administered with a bronchodilator.     2. Discontinue if patient experiences worsened bronchospasm, or secretions have lessened to the point that

## 2020-03-03 NOTE — H&P
H&P Update    PATIENT: Cheryl Batista  DATE: 3/3/2020  MRN: [id number]  CSN: [id number]  : 1945    I have reviewed the history and physical and examined the patient and find no relevant changes. I have reviewed with the patient and/or family the risks, benefits, and alternatives to the procedure. History of present illness:    69 y.o. patient who presents to the hospital for invasive cardiac testing. The patient underwent clinical evaluation, and it was determined that the patient needed to undergo cardiac catheterization for further diagnostic evaluation. Pre-sedation Assessment    Patient:  Cheryl Batista   :   1945  Intended Procedures may include:   1. Left heart Catheterization with possible angioplasty/stent with associated supportive testing. 2. Right heart catheterization   3. Peripheral angiogram    [facility] nurses notes reviewed and agreed. Medications reviewed  Allergies: Allergies   Allergen Reactions    Mold Extract [Trichophyton Mentagrophytes]        Primary Care Doctor: Deedee Rudolph MD    Patient Active Problem List   Diagnosis    Hyponatremia    Hypertension    Hx of CVA involving R-ICA territory    CAD (coronary artery disease)    DM2 (diabetes mellitus, type 2) [facility]    Alcoholism ([facility]    Tobacco abuse    Asthma    Hyperlipidemia    SIRS (systemic inflammatory response syndrome) ([facility]    Thrombocytosis (HCC)    Chronic dCHF (grade 1 LVDD)    Alcohol withdrawal ([facility])    Frequent falls    Hypertensive urgency    COPD (chronic obstructive pulmonary disease) (HCC)    Shortness of breath    NSTEMI (non-ST elevated myocardial infarction) (HCC)    Cardiomyopathy (HCC)    Acute systolic CHF (congestive heart failure) (HCC)    Acute hypoxemic respiratory failure (HCC)    Hypomagnesemia    Hypokalemia    Acute pulmonary edema (HCC)         Cardiac Testing: I have reviewed the findings below.   EKG:  ECHO:   STRESS TEST:  CATH:  BYPASS:  VASCULAR:    Past Medical History:   has a past medical history of Asthma, Back ache, Cataract, Cerebral artery occlusion with cerebral infarction ([address] Diabetes mellitus ([address] Hyperlipidemia, Hypertension, Insomnia, and TIA (transient ischemic attack). Surgical History:   has a past surgical history that includes  section; Dental surgery; Abdomen surgery; Cataract removal with implant (Left, [id number]); and eye surgery (10/29/13). Social History:   reports that she has been smoking cigarettes. She has a 87.00 pack-year smoking history. She has never used smokeless tobacco. She reports current alcohol use. She reports that she does not use drugs. Family History:  No evidence for sudden cardiac death or premature CAD    Home Medications:  Reviewed and are listed in nursing record. and/or listed below  No current facility-administered medications for this encounter. Allergies:  Mold extract [trichophyton mentagrophytes]     Review of Systems:   All 14 point review of symptoms completed. Pertinent positives identified in the HPI, all other review of symptoms negative as below. Physical Examination:    There were no vitals filed for this visit. Wt Readings from Last 3 Encounters:   20 174 lb 6.4 oz (79.1 kg)   18 170 lb (77.1 kg)   10/19/17 168 lb 4.8 oz (76.3 kg)     No intake/output data recorded. No intake/output data recorded.     General Appearance:  Alert x 2 confused, cooperative, no distress, appears stated age   Head:  Normocephalic, without obvious abnormality, atraumatic   Eyes:  PERRL, conjunctiva/corneas clear       Nose: Nares normal, no drainage or sinus tenderness   Throat: Lips, mucosa, and tongue normal   Neck: Supple, symmetrical, trachea midline, no adenopathy, thyroid: not enlarged, symmetric, no tenderness/mass/nodules, no carotid bruit or JVD       Lungs:   Reduced to auscultation bilaterally, respirations unlabored   Chest Wall:  No tenderness or deformity   Heart:  Regular rhythm and normal rate; S1, S2 are normal; no murmur noted; no rub or gallop   Abdomen:   Soft, non-tender, bowel sounds active all four quadrants,  no masses, no organomegaly           Extremities: Extremities normal, atraumatic, no cyanosis or edema   Pulses: 2+ and symmetric   Skin: Skin color, texture, turgor normal, no rashes or lesions   Pysch: Confused mood and affect   Neurologic: Normal gross motor and sensory exam. Some delerium. Labs  CBC:   Lab Results   Component Value Date    WBC 8.6 03/03/2020    RBC 3.53 03/03/2020    HGB 9.3 03/03/2020    HCT 29.5 03/03/2020    MCV 83.6 03/03/2020    RDW 17.4 03/03/2020     03/03/2020     CMP:    Lab Results   Component Value Date     03/03/2020    K 4.1 03/03/2020    K 3.1 02/28/2020    CL 91 03/03/2020    CO2 29 03/03/2020    BUN 13 03/03/2020    CREATININE 1.0 03/03/2020    GFRAA >60 03/03/2020    GFRAA >60 12/14/2012    AGRATIO 1.1 02/28/2020    LABGLOM 54 03/03/2020    GLUCOSE 146 03/03/2020    PROT 7.0 02/28/2020    PROT 6.4 12/14/2012    CALCIUM 8.7 03/03/2020    BILITOT 0.5 02/28/2020    ALKPHOS 71 02/28/2020    AST 19 02/28/2020    ALT 15 02/28/2020     PT/INR:  No results found for: PTINR  Lab Results   Component Value Date    CKTOTAL 114 10/16/2017    TROPONINI 0.07 (H) 02/29/2020     No components found for: CHLPL  Lab Results   Component Value Date    TRIG 78 02/29/2020    TRIG 60 12/24/2016     Lab Results   Component Value Date    HDL 36 (L) 02/29/2020    HDL 92 (H) 12/24/2016     Lab Results   Component Value Date    LDLCALC 50 02/29/2020    LDLCALC 141 (H) 12/24/2016     Lab Results   Component Value Date    LABVLDL 16 02/29/2020    LABVLDL 12 12/24/2016     Lab Results   Component Value Date    ALT 15 02/28/2020    AST 19 02/28/2020    ALKPHOS 71 02/28/2020    BILITOT 0.5 02/28/2020       Assessment:  69 y.o. patient with:  1. Pre-procedure assessment for cardiac procedure. Principal Problem:    Cardiomyopathy ([facility]  Active Problems:    NSTEMI (non-ST elevated myocardial infarction) ([facility]    Acute systolic CHF (congestive heart failure) ([facility]  Resolved Problems:    * No resolved hospital problems. *      Plan:  1. Proceed with planned procedure for further assessment. ~I had the opportunity to review the Leopoldo Julian clinical presentation and the available pertinent data. With the patient's clinical risk factors and symptoms, there is a high pretest likelihood for CAD. I have asked Leopoldo Julian to undergo cardiac angiography for further diagnostic testing. The procedure was explained in depth. All questions and alternate treatment strategies were discussed. The patient agrees to proceed. They understand all the risks associated with the procedure, including myocardial infarction, stroke, death, vascular complications, and the possible need for emergent surgery. All questions and concerns were addressed to the patient/family. Alternatives to my treatment were discussed. The note was completed using EMR. Every effort was made to ensure accuracy; however, inadvertent computerized transcription errors may be present.     Ricardo Guardado MD, [id number], [facility]  [phone] [facility]  [phone] Main central  3/3/2020  12:49 PM

## 2020-03-03 NOTE — PROGRESS NOTES
Call to patient's  Jake to updated on  time and that patient is transferring to [facility].

## 2020-03-04 ENCOUNTER — APPOINTMENT (OUTPATIENT)
Dept: CT IMAGING | Age: 75
DRG: 286 | End: 2020-03-04
Attending: INTERNAL MEDICINE
Payer: MEDICARE

## 2020-03-04 LAB
AMMONIA: 23 UMOL/L (ref 11–51)
ANION GAP SERPL CALCULATED.3IONS-SCNC: 9 MMOL/L (ref 3–16)
BUN BLDV-MCNC: 17 MG/DL (ref 7–20)
CALCIUM SERPL-MCNC: 9.4 MG/DL (ref 8.3–10.6)
CHLORIDE BLD-SCNC: 99 MMOL/L (ref 99–110)
CO2: 31 MMOL/L (ref 21–32)
CREAT SERPL-MCNC: 0.8 MG/DL (ref 0.6–1.2)
GFR AFRICAN AMERICAN: >60
GFR NON-AFRICAN AMERICAN: >60
GLUCOSE BLD-MCNC: 129 MG/DL (ref 70–99)
GLUCOSE BLD-MCNC: 129 MG/DL (ref 70–99)
GLUCOSE BLD-MCNC: 150 MG/DL (ref 70–99)
GLUCOSE BLD-MCNC: 270 MG/DL (ref 70–99)
GLUCOSE BLD-MCNC: 285 MG/DL (ref 70–99)
HCT VFR BLD CALC: 31.5 % (ref 36–48)
HEMOGLOBIN: 9.8 G/DL (ref 12–16)
MAGNESIUM: 2.2 MG/DL (ref 1.8–2.4)
MCH RBC QN AUTO: 25.2 PG (ref 26–34)
MCHC RBC AUTO-ENTMCNC: 31.2 G/DL (ref 31–36)
MCV RBC AUTO: 80.9 FL (ref 80–100)
PDW BLD-RTO: 17.6 % (ref 12.4–15.4)
PERFORMED ON: ABNORMAL
PLATELET # BLD: 571 K/UL (ref 135–450)
PMV BLD AUTO: 7.4 FL (ref 5–10.5)
POTASSIUM SERPL-SCNC: 4.3 MMOL/L (ref 3.5–5.1)
RBC # BLD: 3.9 M/UL (ref 4–5.2)
SODIUM BLD-SCNC: 139 MMOL/L (ref 136–145)
WBC # BLD: 10.2 K/UL (ref 4–11)

## 2020-03-04 PROCEDURE — 94640 AIRWAY INHALATION TREATMENT: CPT

## 2020-03-04 PROCEDURE — 97166 OT EVAL MOD COMPLEX 45 MIN: CPT

## 2020-03-04 PROCEDURE — 1200000000 HC SEMI PRIVATE

## 2020-03-04 PROCEDURE — 70450 CT HEAD/BRAIN W/O DYE: CPT

## 2020-03-04 PROCEDURE — 6360000002 HC RX W HCPCS: Performed by: INTERNAL MEDICINE

## 2020-03-04 PROCEDURE — 85027 COMPLETE CBC AUTOMATED: CPT

## 2020-03-04 PROCEDURE — 97535 SELF CARE MNGMENT TRAINING: CPT

## 2020-03-04 PROCEDURE — 6370000000 HC RX 637 (ALT 250 FOR IP): Performed by: INTERNAL MEDICINE

## 2020-03-04 PROCEDURE — 94761 N-INVAS EAR/PLS OXIMETRY MLT: CPT

## 2020-03-04 PROCEDURE — 36415 COLL VENOUS BLD VENIPUNCTURE: CPT

## 2020-03-04 PROCEDURE — 99233 SBSQ HOSP IP/OBS HIGH 50: CPT | Performed by: INTERNAL MEDICINE

## 2020-03-04 PROCEDURE — 97530 THERAPEUTIC ACTIVITIES: CPT

## 2020-03-04 PROCEDURE — 2580000003 HC RX 258: Performed by: INTERNAL MEDICINE

## 2020-03-04 PROCEDURE — 2700000000 HC OXYGEN THERAPY PER DAY

## 2020-03-04 PROCEDURE — 80048 BASIC METABOLIC PNL TOTAL CA: CPT

## 2020-03-04 PROCEDURE — 82140 ASSAY OF AMMONIA: CPT

## 2020-03-04 PROCEDURE — 83735 ASSAY OF MAGNESIUM: CPT

## 2020-03-04 RX ADMIN — POTASSIUM CHLORIDE 20 MEQ: 20 TABLET, EXTENDED RELEASE ORAL at 10:34

## 2020-03-04 RX ADMIN — ENOXAPARIN SODIUM 40 MG: 40 INJECTION SUBCUTANEOUS at 10:43

## 2020-03-04 RX ADMIN — Medication 1 TABLET: at 10:34

## 2020-03-04 RX ADMIN — IPRATROPIUM BROMIDE AND ALBUTEROL SULFATE 1 AMPULE: .5; 3 SOLUTION RESPIRATORY (INHALATION) at 12:37

## 2020-03-04 RX ADMIN — PAROXETINE HYDROCHLORIDE 40 MG: 20 TABLET, FILM COATED ORAL at 10:33

## 2020-03-04 RX ADMIN — Medication 100 MG: at 10:35

## 2020-03-04 RX ADMIN — LEVOTHYROXINE SODIUM 25 MCG: 25 TABLET ORAL at 10:43

## 2020-03-04 RX ADMIN — FUROSEMIDE 40 MG: 10 INJECTION, SOLUTION INTRAMUSCULAR; INTRAVENOUS at 18:02

## 2020-03-04 RX ADMIN — IPRATROPIUM BROMIDE AND ALBUTEROL SULFATE 1 AMPULE: .5; 3 SOLUTION RESPIRATORY (INHALATION) at 08:16

## 2020-03-04 RX ADMIN — Medication 2 PUFF: at 08:16

## 2020-03-04 RX ADMIN — CLONIDINE HYDROCHLORIDE 0.2 MG: 0.1 TABLET ORAL at 10:35

## 2020-03-04 RX ADMIN — PREDNISONE 40 MG: 20 TABLET ORAL at 10:34

## 2020-03-04 RX ADMIN — Medication 2 PUFF: at 20:43

## 2020-03-04 RX ADMIN — PANTOPRAZOLE SODIUM 40 MG: 40 TABLET, DELAYED RELEASE ORAL at 10:42

## 2020-03-04 RX ADMIN — Medication 10 ML: at 10:38

## 2020-03-04 RX ADMIN — ACETAMINOPHEN 650 MG: 325 TABLET ORAL at 23:45

## 2020-03-04 RX ADMIN — METOPROLOL SUCCINATE 50 MG: 50 TABLET, EXTENDED RELEASE ORAL at 10:34

## 2020-03-04 RX ADMIN — FOLIC ACID 1 MG: 1 TABLET ORAL at 10:34

## 2020-03-04 RX ADMIN — CLONIDINE HYDROCHLORIDE 0.2 MG: 0.1 TABLET ORAL at 21:16

## 2020-03-04 RX ADMIN — FUROSEMIDE 40 MG: 10 INJECTION, SOLUTION INTRAMUSCULAR; INTRAVENOUS at 10:33

## 2020-03-04 RX ADMIN — ASPIRIN 81 MG: 81 TABLET, COATED ORAL at 10:34

## 2020-03-04 RX ADMIN — ATORVASTATIN CALCIUM 40 MG: 40 TABLET, FILM COATED ORAL at 21:16

## 2020-03-04 RX ADMIN — CYANOCOBALAMIN TAB 500 MCG 1000 MCG: 500 TAB at 10:34

## 2020-03-04 RX ADMIN — IPRATROPIUM BROMIDE AND ALBUTEROL SULFATE 1 AMPULE: .5; 3 SOLUTION RESPIRATORY (INHALATION) at 20:43

## 2020-03-04 RX ADMIN — MELATONIN TAB 5 MG 5 MG: 5 TAB at 23:45

## 2020-03-04 RX ADMIN — IPRATROPIUM BROMIDE AND ALBUTEROL SULFATE 1 AMPULE: .5; 3 SOLUTION RESPIRATORY (INHALATION) at 16:32

## 2020-03-04 RX ADMIN — Medication 10 ML: at 21:17

## 2020-03-04 RX ADMIN — ASPIRIN 81 MG 81 MG: 81 TABLET ORAL at 10:33

## 2020-03-04 RX ADMIN — LISINOPRIL 40 MG: 10 TABLET ORAL at 10:34

## 2020-03-04 RX ADMIN — FLUTICASONE PROPIONATE 1 SPRAY: 50 SPRAY, METERED NASAL at 18:12

## 2020-03-04 ASSESSMENT — PAIN SCALES - GENERAL
PAINLEVEL_OUTOF10: 0
PAINLEVEL_OUTOF10: 3
PAINLEVEL_OUTOF10: 0
PAINLEVEL_OUTOF10: 0

## 2020-03-04 ASSESSMENT — PAIN DESCRIPTION - DESCRIPTORS: DESCRIPTORS: ACHING;DISCOMFORT

## 2020-03-04 ASSESSMENT — PAIN DESCRIPTION - PAIN TYPE: TYPE: ACUTE PAIN

## 2020-03-04 ASSESSMENT — PAIN DESCRIPTION - LOCATION: LOCATION: GENERALIZED

## 2020-03-04 NOTE — CARE COORDINATION
Assessment attempted. Pt ARIANNA for CT scan. Will re attempt. Consult received for poss. New home O2 need, and also that Pt's  prefers [facility] if SNF needed at d/c. PT/OT recs pending.

## 2020-03-04 NOTE — PROGRESS NOTES
4 Eyes Skin Assessment     The patient is being assess for  Transfer to New Unit    I agree that 2 RN's have performed a thorough Head to Toe Skin Assessment on the patient. ALL assessment sites listed below have been assessed. Areas assessed by both nurses: Susan/Heather  [x]   Head, Face, and Ears   [x]   Shoulders, Back, and Chest  [x]   Arms, Elbows, and Hands   [x]   Coccyx, Sacrum, and Ischum  [x]   Legs, Feet, and Heels        Redness - Bilateral heels and under both breasts. Does the Patient have Skin Breakdown?   No         Johnson Prevention initiated:  No   Wound Care Orders initiated:  No      [facility] nurse consulted for Pressure Injury (Stage 3,4, Unstageable, DTI, NWPT, and Complex wounds):  No      Nurse 1 eSignature: Electronically signed by Jayson Ramirez RN on 3/3/20 at 10:51 PM    **SHARE this note so that the co-signing nurse is able to place an eSignature**    Nurse 2 eSignature: Electronically signed by Adelina Doan RN on 3/3/20 at 10:56 PM

## 2020-03-04 NOTE — PLAN OF CARE
better understand heart failure and disease management, be more comfortable, and reduce lower extremity edema prior to discharge        :

## 2020-03-04 NOTE — PLAN OF CARE
Problem: Falls - Risk of:  Goal: Will remain free from falls  Description  Will remain free from falls  Outcome: Ongoing     Problem: OXYGENATION/RESPIRATORY FUNCTION  Goal: Patient will maintain patent airway  3/3/2020 2226 by Bard Татьяна RN  Outcome: Ongoing    Patient's EF (Ejection Fraction) is greater than 40% last Echo 2016. Heart Failure Medications:  Diuretics[de-identified] Furosemide    (One of the following REQUIRED for EF <40%/SYSTOLIC FAILURE but MAY be used in EF% >40%/DIASTOLIC FAILURE)        ACE[de-identified] Lisinopril        ARB[de-identified] None         ARNI[de-identified] None    (Beta Blockers)  NON- Evidenced Based Beta Blocker (for EF% >40%/DIASTOLIC FAILURE): None    Evidenced Based Beta Blocker::(REQUIRED for EF% <40%/SYSTOLIC FAILURE) Metoprolol SUCCinate- Toprol XL  . .................................................................................................................................................. Patient's weights and intake/output reviewed: Yes    Patient's Last Weight: 174   lbs obtained by standing scale. Difference of 0 lbs 0 than last documented weight. Intake/Output Summary (Last 24 hours) at 3/3/2020 2229  Last data filed at 3/3/2020 2225  Gross per 24 hour   Intake 60 ml   Output 200 ml   Net -140 ml       Comorbidities Reviewed Yes    Patient has a past medical history of Asthma, Back ache, Cataract, Cerebral artery occlusion with cerebral infarction ([address] Diabetes mellitus ([address] Hyperlipidemia, Hypertension, Insomnia, and TIA (transient ischemic attack). >>For CHF and Comorbidity documentation on Education Time and Topics, please see Education Tab    Progressive Mobility Assessment:  What is this patient's Current Level of Mobility?: Requires Bed Rest  How was this patient Mobilized today?: Unable to Mobilize                 With Whom? 0                  Level of Difficulty/Assistance: 2x Assist     Pt resting in bed at this time on 2 L O2. Pt denies shortness of breath.  Pt without

## 2020-03-04 NOTE — PROGRESS NOTES
Daily    budesonide-formoterol  2 puff Inhalation BID    nicotine  1 patch Transdermal Daily     PRN Meds: albuterol sulfate HFA, hydrALAZINE, sodium chloride flush, perflutren lipid microspheres, acetaminophen **OR** acetaminophen, magnesium sulfate, polyethylene glycol, potassium chloride **OR** potassium alternative oral replacement **OR** potassium chloride, promethazine **OR** ondansetron, dextrose, dextrose, glucagon (rDNA), glucose, albuterol, melatonin      Intake/Output Summary (Last 24 hours) at 3/4/2020 1144  Last data filed at 3/4/2020 0649  Gross per 24 hour   Intake 60 ml   Output 400 ml   Net -340 ml       Physical Exam Performed:    /61   Pulse 66   Temp 97.2 °F (36.2 °C) (Oral)   Resp 20   Wt 174 lb 6 oz (79.1 kg)   SpO2 94%   BMI 29.02 kg/m²     General appearance:  No apparent distress, appears stated age and cooperative. HEENT:  Normal cephalic, atraumatic without obvious deformity. Pupils equal, round, and reactive to light. Extra ocular muscles intact. Conjunctivae/corneas clear. Neck: Supple, with full range of motion. No jugular venous distention. Trachea midline. Respiratory:  Normal respiratory effort. Bilaterally without Rhonchi. Bibasilar rales and bilateral expiratory wheezing. Cardiovascular:  Regular rate and rhythm with normal S1/S2 without murmurs, rubs or gallops. Abdomen: Soft, non-tender, non-distended with normal bowel sounds. Musculoskeletal:  No clubbing, cyanosis or edema bilaterally. Full range of motion without deformity. Skin: Skin color, texture, turgor normal.  No rashes or lesions. Neurologic:  Neurovascularly intact without any focal sensory/motor deficits.  Cranial nerves: II-XII intact, grossly non-focal.  Psychiatric:  Alert, oriented to self only, minimal insight, calm  Capillary Refill: Brisk,< 3 seconds   Peripheral Pulses: +2 palpable, equal bilaterally       Labs:   Recent Labs     03/02/20  0814 03/03/20  0500 03/04/20  1049   WBC 8.5

## 2020-03-04 NOTE — PROGRESS NOTES
Occupational Therapy   Occupational Therapy Initial Assessment/ Treatment  Date: 3/4/2020   Patient Name: Raul Johnston  MRN: [id number]     : 1945    Date of Service: 3/4/2020    Discharge Recommendations:  (SNF vs [facility])  OT Equipment Recommendations  Other: continue to assess pending pt progress  Raul Johnston scored a 17/24 on the AM-PAC ADL Inpatient form. Current research shows that an AM-PAC score of 17 or less is typically not associated with a discharge to the patient's home setting. Based on the patients AM-PAC score and their current ADL deficits, it is recommended that the patient have 3-5 sessions per week of Occupational Therapy at d/c to increase the patients independence. Assessment   Performance deficits / Impairments: Decreased functional mobility ; Decreased endurance;Decreased ADL status; Decreased cognition;Decreased coordination;Decreased balance;Decreased strength  Assessment: Prior to admission pt was living at home with her  and son, was reportedly independent for most ADLs and some IADLs. Pt now presents s/p NSTEMI, now slightly below baseline. Pt would benefit from continued skilled OT while in acute care, AMPA score indicates non homebound discharge however pt is hopeful to dc home. Continue OT POC. Treatment Diagnosis: decreased ADLs secondary to CHF  Prognosis: Fair  Decision Making: Medium Complexity  OT Education: OT Role  Patient Education: verb understanding  REQUIRES OT FOLLOW UP: Yes  Activity Tolerance  Activity Tolerance: Patient Tolerated treatment well;Patient limited by fatigue  Activity Tolerance: Pt reported min fatigue after transfer to bedside chair  Safety Devices  Safety Devices in place: Yes  Type of devices: Left in chair;Call light within reach;Nurse notified; Chair alarm in place           Patient Diagnosis(es): There were no encounter diagnoses.      has a past medical history of Asthma, Back ache, Cataract, Cerebral artery occlusion with cerebral infarction ([address] Diabetes mellitus ([address] Hyperlipidemia, Hypertension, Insomnia, and TIA (transient ischemic attack). has a past surgical history that includes  section; Dental surgery; Abdomen surgery; Cataract removal with implant (Left, [id number]); and eye surgery (10/29/13). Treatment Diagnosis: decreased ADLs secondary to CHF      Restrictions  Position Activity Restriction  Other position/activity restrictions: up as tolerated    Subjective   General  Chart Reviewed: Yes  Patient assessed for rehabilitation services?: Yes  Additional Pertinent Hx: Per MD on 3/3: \"The patient is a 69 Y F with a h/o smoking, alcoholism, HTN, HLD, DM2, and CVA. She presented to [facility] on  with about a week of dyspnea and weakness. She was admitted for treatment of CHF, and was found to have an EF of 30-35% on TTE (was 55% in 2016). She was transferred to [facility] for [address] [place] [address] on 3/3. LHC showed only mild, non-obstructive CAD, and it was determined that she had a nonischemic cardiomyopathy with apical ballooning. Cardiology asked hospital medicine to admit the patient for further diuresis since she was still on oxygen. The patient also had been confused and disoriented ever since the first night of admission to [facility]. \" PMHx includes: Asthma, Back ache, Cataract, Cerebral artery occlusion with cerebral infarction ([facility] (2016), Diabetes mellitus ([facility] Hyperlipidemia, Hypertension, Insomnia, and TIA (transient ischemic attack) (2016). Family / Caregiver Present: No  Referring Practitioner: MD Gus  Diagnosis: CHF  Subjective  Subjective: Pt semi supine in bed upon arrival, agreeable to OT eval and treat.   Patient Currently in Pain: Denies  Vital Signs  Patient Currently in Pain: Denies  Social/Functional History  Social/Functional History  Lives With: Spouse, Son  Type of Home: House  Home Layout: Two level, Performs ADL's on one level, Able to Live on Main level with bedroom/bathroom  Home Access: Stairs to enter with rails  Entrance Stairs - Number of Steps: 3  Bathroom Shower/Tub: Tub/Shower unit  Bathroom Toilet: Standard  Bathroom Equipment: Grab bars in shower, Shower chair  Home Equipment: Standard walker  ADL Assistance: Independent  Homemaking Assistance: Independent  Ambulation Assistance: Independent(pt reporting furniture walking)  Transfer Assistance: Independent  Active : No  Additional Comments: pt reporting her  and son work during the day and she is home by herself. Pt is a questionable historian, however information is corrororated by previous admission information       Objective   Vision: Within Functional Limits  Hearing: Within functional limits    Orientation  Overall Orientation Status: Impaired  Orientation Level: Oriented to place; Disoriented to time;Disoriented to situation;Oriented to person     Balance  Sitting Balance: Supervision  Standing Balance: Minimal assistance(with HHA)  Standing Balance  Time: 1 min  Activity: stand pivot from bed to chair  Comment: with HHA  Functional Mobility  Functional - Mobility Device: No device(HHA)  Activity: Other(bed to chair)  Assist Level: Minimal assistance  Functional Mobility Comments: noted posterior lean, decreased LE coordination while stepping to chair  ADL  Feeding: Beverage management;Bringing food to mouth assist;Minimal assistance  Grooming: Setup(washing face)  UE Bathing: Verbal cueing;Minimal assistance(for completeness of task)  LE Bathing: Verbal cueing;Setup  UE Dressing: Contact guard assistance(to don gown)  LE Dressing: Minimal assistance(to pull pants up)        Bed mobility  Supine to Sit: Stand by assistance  Transfers  Sit to stand: Contact guard assistance  Stand to sit: Contact guard assistance  Transfer Comments: cueing for safe hand placement     Cognition  Overall Cognitive Status: Exceptions  Arousal/Alertness: Appropriate responses to stimuli  Following Commands:  Follows one step commands

## 2020-03-04 NOTE — PROGRESS NOTES
Pt was transferred to procedural area for MRI. [address] [place] [address] notified. Pt was given 1mg of Lorazepam prior to departure. Update: Pt was unable to complete MRI as she was not able to lay still.

## 2020-03-04 NOTE — PROGRESS NOTES
Occupational Therapy/ Physical Therapy  Pt off floor at time of OT/PT eval attempt. Will follow up later this date as schedule allows. Ya Carey.  Ra Zuniga, OTR/L #[id number]  Fredy Mccartney, PT, DPT

## 2020-03-04 NOTE — PROGRESS NOTES
[address]   Daily Cardiovascular Progress Note    Admit Date: 3/3/2020    CC: Abnormal echocardiogram    HPI: Briana St is confused this morning. She is sleeping in bed and minimally arousable.       Medications/Labs all Reviewed:  Patient Active Problem List   Diagnosis    Hyponatremia    Hypertension    Hx of CVA involving R-ICA territory    CAD (coronary artery disease)    DM2 (di[facility] mellitus, type 2) [facility]    Alcoholism ([facility]    Tobacco abuse    Asthma    Hyperlipidemia    SIRS (systemic inflammatory response syndrome) ([facility])    Thrombocytosis ([facility])    Chronic dCHF (grade 1 LVDD)    Alcohol withdrawal ([facility])    Frequent falls    Hypertensive urgency    COPD (chronic obstructive pulmonary disease) ([facility])    Shortness of breath    NSTEMI (non-ST elevated myocardial infarction) ([facility])    Cardiomyopathy ([facility])    Acute systolic CHF (congestive heart failure) ([facility])    Acute hypoxemic respiratory failure ([facility])    Hypomagnesemia    Hypokalemia    Acute pulmonary edema ([facility])    CHF (congestive heart failure), NYHA class III, acute, systolic ([facility])       Medications:   atorvastatin  40 mg Oral Nightly    lisinopril  40 mg Oral Daily    vitamin B-12  1,000 mcg Oral Daily    therapeutic multivitamin-minerals  1 tablet Oral Daily    fluticasone  1 spray Nasal Daily    cloNIDine  0.2 mg Oral BID    PARoxetine  40 mg Oral QAM    levothyroxine  25 mcg Oral Daily    vitamin B-1  100 mg Oral Daily    aspirin  81 mg Oral Daily    sodium chloride flush  10 mL Intravenous 2 times per day    folic acid  1 mg Oral Daily    furosemide  40 mg Intravenous BID    insulin lispro  0-12 Units Subcutaneous TID WC    insulin lispro  0-6 Units Subcutaneous Nightly    aspirin  81 mg Oral Daily    pantoprazole  40 mg Oral QAM AC    potassium chloride  20 mEq Oral Daily    metoprolol succinate  50 mg Oral Daily    ipratropium-albuterol  1 ampule Inhalation Q4H WA    predniSONE  40 mg Oral Daily    traZODone  50 mg Oral Nightly    enoxaparin  40 mg Subcutaneous Daily    budesonide-formoterol  2 puff Inhalation BID    nicotine  1 patch Transdermal Daily       Infusion Medications:   dextrose         Labs:  Lab Results   Component Value Date    WBC 10.2 03/04/2020    HGB 9.8 (L) 03/04/2020    HCT 31.5 (L) 03/04/2020    MCV 80.9 03/04/2020     (H) 03/04/2020     Lab Results   Component Value Date    CREATININE 1.0 03/03/2020    BUN 13 03/03/2020     (L) 03/03/2020    K 4.1 03/03/2020    CL 91 (L) 03/03/2020    CO2 29 03/03/2020     Lab Results   Component Value Date    INR 1.08 02/28/2020    PROTIME 12.5 02/28/2020        Physical Examination:    /61   Pulse 66   Temp 97.2 °F (36.2 °C) (Oral)   Resp 20   Wt 174 lb 6 oz (79.1 kg)   SpO2 94%   BMI 29.02 kg/m²    Wt Readings from Last 3 Encounters:   03/03/20 174 lb 6 oz (79.1 kg)   03/03/20 174 lb 6.4 oz (79.1 kg)   12/28/18 170 lb (77.1 kg)       Intake/Output Summary (Last 24 hours) at 3/4/2020 1108  Last data filed at 3/4/2020 9526  Gross per 24 hour   Intake 60 ml   Output 400 ml   Net -340 ml       Respiratory:  · Resp Assessment: Normal respiratory effort  · Resp Auscultation: Clear to auscultation bilaterally   Cardiovascular:  · Auscultation: regular rhythm and normal rate, normal S1S2, no murmur, rub or gallop  · Palpation:  Nl PMI  · JVP:  normal  · Extremities: No Edema  Abdomen:  · Soft, non-tender  · Normal bowel sounds  Extremities:  ·  No Cyanosis or Clubbing  Neurological/Psychiatric:  · Confused. Skin Warm and dry    Cath site is stable. There is no hematoma. Pulses normal and hand is perfusing normally.     Assessment:    Principal Problem:    Cardiomyopathy ([facility]  Active Problems:    NSTEMI (non-ST elevated myocardial infarction) ([facility]    Acute systolic CHF (congestive heart failure) ([facility])    CHF (congestive heart failure), NYHA class III, acute, systolic ([facility])  Resolved Problems:    * No resolved hospital problems. *      Plan:  1. The patient's post procedure cath site appears to be stable. 2.  Her cardiomyopathy appears to be nonischemic and likely related to apical ballooning and stress cardiomyopathy. ~We will hope to maximize her medical regimen with ACE inhibitor and cardioselective beta-blockers. 3.  Her confusion and mental health appear to be major barriers to short and long-term cardiac management. Will defer to the medical team.    All questions and concerns were addressed to the patient/family. Alternatives to my treatment were discussed. The note was completed using EMR. Every effort was made to ensure accuracy; however, inadvertent computerized transcription errors may be present.     Da Devi MD, SANAM, [facility], -[address] [place] [address]  3/4/2020 11:08 AM

## 2020-03-05 LAB
ANION GAP SERPL CALCULATED.3IONS-SCNC: 16 MMOL/L (ref 3–16)
BUN BLDV-MCNC: 28 MG/DL (ref 7–20)
CALCIUM SERPL-MCNC: 9.3 MG/DL (ref 8.3–10.6)
CHLORIDE BLD-SCNC: 89 MMOL/L (ref 99–110)
CO2: 26 MMOL/L (ref 21–32)
CREAT SERPL-MCNC: 1.4 MG/DL (ref 0.6–1.2)
GFR AFRICAN AMERICAN: 44
GFR NON-AFRICAN AMERICAN: 37
GLUCOSE BLD-MCNC: 147 MG/DL (ref 70–99)
GLUCOSE BLD-MCNC: 239 MG/DL (ref 70–99)
GLUCOSE BLD-MCNC: 275 MG/DL (ref 70–99)
GLUCOSE BLD-MCNC: 282 MG/DL (ref 70–99)
GLUCOSE BLD-MCNC: 283 MG/DL (ref 70–99)
MAGNESIUM: 1.8 MG/DL (ref 1.8–2.4)
PERFORMED ON: ABNORMAL
POTASSIUM SERPL-SCNC: 4 MMOL/L (ref 3.5–5.1)
SODIUM BLD-SCNC: 131 MMOL/L (ref 136–145)

## 2020-03-05 PROCEDURE — 94640 AIRWAY INHALATION TREATMENT: CPT

## 2020-03-05 PROCEDURE — 6360000002 HC RX W HCPCS: Performed by: INTERNAL MEDICINE

## 2020-03-05 PROCEDURE — 99232 SBSQ HOSP IP/OBS MODERATE 35: CPT | Performed by: INTERNAL MEDICINE

## 2020-03-05 PROCEDURE — 2580000003 HC RX 258: Performed by: INTERNAL MEDICINE

## 2020-03-05 PROCEDURE — 36415 COLL VENOUS BLD VENIPUNCTURE: CPT

## 2020-03-05 PROCEDURE — 97116 GAIT TRAINING THERAPY: CPT

## 2020-03-05 PROCEDURE — 83036 HEMOGLOBIN GLYCOSYLATED A1C: CPT

## 2020-03-05 PROCEDURE — 6370000000 HC RX 637 (ALT 250 FOR IP): Performed by: INTERNAL MEDICINE

## 2020-03-05 PROCEDURE — 80048 BASIC METABOLIC PNL TOTAL CA: CPT

## 2020-03-05 PROCEDURE — 1200000000 HC SEMI PRIVATE

## 2020-03-05 PROCEDURE — 97530 THERAPEUTIC ACTIVITIES: CPT

## 2020-03-05 PROCEDURE — 83735 ASSAY OF MAGNESIUM: CPT

## 2020-03-05 PROCEDURE — 97162 PT EVAL MOD COMPLEX 30 MIN: CPT

## 2020-03-05 RX ORDER — NICOTINE POLACRILEX 4 MG
15 LOZENGE BUCCAL PRN
Status: DISCONTINUED | OUTPATIENT
Start: 2020-03-05 | End: 2020-03-05 | Stop reason: SDUPTHER

## 2020-03-05 RX ORDER — INSULIN GLARGINE 100 [IU]/ML
20 INJECTION, SOLUTION SUBCUTANEOUS ONCE
Status: COMPLETED | OUTPATIENT
Start: 2020-03-05 | End: 2020-03-05

## 2020-03-05 RX ORDER — DEXTROSE MONOHYDRATE 50 MG/ML
100 INJECTION, SOLUTION INTRAVENOUS PRN
Status: DISCONTINUED | OUTPATIENT
Start: 2020-03-05 | End: 2020-03-05 | Stop reason: SDUPTHER

## 2020-03-05 RX ORDER — DEXTROSE MONOHYDRATE 25 G/50ML
12.5 INJECTION, SOLUTION INTRAVENOUS PRN
Status: DISCONTINUED | OUTPATIENT
Start: 2020-03-05 | End: 2020-03-05 | Stop reason: SDUPTHER

## 2020-03-05 RX ORDER — PREDNISONE 20 MG/1
20 TABLET ORAL DAILY
Status: DISCONTINUED | OUTPATIENT
Start: 2020-03-06 | End: 2020-03-08

## 2020-03-05 RX ORDER — 0.9 % SODIUM CHLORIDE 0.9 %
500 INTRAVENOUS SOLUTION INTRAVENOUS ONCE
Status: COMPLETED | OUTPATIENT
Start: 2020-03-05 | End: 2020-03-06

## 2020-03-05 RX ORDER — INSULIN GLARGINE 100 [IU]/ML
15 INJECTION, SOLUTION SUBCUTANEOUS NIGHTLY
Status: DISCONTINUED | OUTPATIENT
Start: 2020-03-06 | End: 2020-03-08

## 2020-03-05 RX ADMIN — Medication 10 ML: at 20:04

## 2020-03-05 RX ADMIN — IPRATROPIUM BROMIDE AND ALBUTEROL SULFATE 1 AMPULE: .5; 3 SOLUTION RESPIRATORY (INHALATION) at 08:25

## 2020-03-05 RX ADMIN — FOLIC ACID 1 MG: 1 TABLET ORAL at 09:53

## 2020-03-05 RX ADMIN — ASPIRIN 81 MG: 81 TABLET, COATED ORAL at 09:55

## 2020-03-05 RX ADMIN — POTASSIUM CHLORIDE 20 MEQ: 20 TABLET, EXTENDED RELEASE ORAL at 09:54

## 2020-03-05 RX ADMIN — PREDNISONE 40 MG: 20 TABLET ORAL at 09:53

## 2020-03-05 RX ADMIN — ENOXAPARIN SODIUM 40 MG: 40 INJECTION SUBCUTANEOUS at 09:52

## 2020-03-05 RX ADMIN — ATORVASTATIN CALCIUM 40 MG: 40 TABLET, FILM COATED ORAL at 20:03

## 2020-03-05 RX ADMIN — LISINOPRIL 40 MG: 10 TABLET ORAL at 09:53

## 2020-03-05 RX ADMIN — SODIUM CHLORIDE 500 ML: 9 INJECTION, SOLUTION INTRAVENOUS at 17:31

## 2020-03-05 RX ADMIN — CLONIDINE HYDROCHLORIDE 0.2 MG: 0.1 TABLET ORAL at 20:03

## 2020-03-05 RX ADMIN — ASPIRIN 81 MG 81 MG: 81 TABLET ORAL at 09:53

## 2020-03-05 RX ADMIN — METOPROLOL SUCCINATE 50 MG: 50 TABLET, EXTENDED RELEASE ORAL at 09:53

## 2020-03-05 RX ADMIN — INSULIN GLARGINE 20 UNITS: 100 INJECTION, SOLUTION SUBCUTANEOUS at 17:23

## 2020-03-05 RX ADMIN — IPRATROPIUM BROMIDE AND ALBUTEROL SULFATE 1 AMPULE: .5; 3 SOLUTION RESPIRATORY (INHALATION) at 15:37

## 2020-03-05 RX ADMIN — FUROSEMIDE 40 MG: 10 INJECTION, SOLUTION INTRAMUSCULAR; INTRAVENOUS at 09:52

## 2020-03-05 RX ADMIN — IPRATROPIUM BROMIDE AND ALBUTEROL SULFATE 1 AMPULE: .5; 3 SOLUTION RESPIRATORY (INHALATION) at 12:09

## 2020-03-05 RX ADMIN — Medication 2 PUFF: at 08:25

## 2020-03-05 RX ADMIN — Medication 1 TABLET: at 09:53

## 2020-03-05 RX ADMIN — Medication 100 MG: at 09:53

## 2020-03-05 RX ADMIN — LEVOTHYROXINE SODIUM 25 MCG: 25 TABLET ORAL at 09:53

## 2020-03-05 RX ADMIN — Medication 10 ML: at 09:55

## 2020-03-05 RX ADMIN — FLUTICASONE PROPIONATE 1 SPRAY: 50 SPRAY, METERED NASAL at 09:55

## 2020-03-05 RX ADMIN — PANTOPRAZOLE SODIUM 40 MG: 40 TABLET, DELAYED RELEASE ORAL at 09:53

## 2020-03-05 RX ADMIN — CLONIDINE HYDROCHLORIDE 0.2 MG: 0.1 TABLET ORAL at 10:00

## 2020-03-05 RX ADMIN — PAROXETINE HYDROCHLORIDE 40 MG: 20 TABLET, FILM COATED ORAL at 09:53

## 2020-03-05 RX ADMIN — CYANOCOBALAMIN TAB 500 MCG 1000 MCG: 500 TAB at 09:53

## 2020-03-05 RX ADMIN — IPRATROPIUM BROMIDE AND ALBUTEROL SULFATE 1 AMPULE: .5; 3 SOLUTION RESPIRATORY (INHALATION) at 22:05

## 2020-03-05 RX ADMIN — Medication 2 PUFF: at 22:05

## 2020-03-05 ASSESSMENT — PAIN SCALES - GENERAL: PAINLEVEL_OUTOF10: 0

## 2020-03-05 NOTE — PROGRESS NOTES
[facility]   Daily Cardiovascular Progress Note    Admit Date: 3/3/2020    CC: Abnormal echocardiogram    HPI: Priscilla Fleischer is wake this morning. She has no chief complaints. .      Medications/Labs all Reviewed:  Patient Active Problem List   Diagnosis    Hyponatremia    Hypertension    Hx of CVA involving R-ICA territory    CAD (coronary artery disease)    DM2 (diabetes mellitus, type 2) ([facility]    Alcoholism ([facility]    Tobacco abuse    Asthma    Hyperlipidemia    SIRS (systemic inflammatory response syndrome) ([facility])    Thrombocytosis ([facility])    Chronic dCHF (grade 1 LVDD)    Alcohol withdrawal ([facility])    Frequent falls    Hypertensive urgency    COPD (chronic obstructive pulmonary disease) ([facility])    Shortness of breath    NSTEMI (non-ST elevated myocardial infarction) ([facility])    Cardiomyopathy ([facility])    Acute systolic CHF (congestive heart failure) ([facility])    Acute hypoxemic respiratory failure ([facility])    Hypomagnesemia    Hypokalemia    Acute pulmonary edema ([facility])    CHF (congestive heart failure), NYHA class III, acute, systolic ([facility])       Medications:   atorvastatin  40 mg Oral Nightly    lisinopril  40 mg Oral Daily    vitamin B-12  1,000 mcg Oral Daily    therapeutic multivitamin-minerals  1 tablet Oral Daily    fluticasone  1 spray Nasal Daily    cloNIDine  0.2 mg Oral BID    PARoxetine  40 mg Oral QAM    levothyroxine  25 mcg Oral Daily    vitamin B-1  100 mg Oral Daily    aspirin  81 mg Oral Daily    sodium chloride flush  10 mL Intravenous 2 times per day    folic acid  1 mg Oral Daily    furosemide  40 mg Intravenous BID    aspirin  81 mg Oral Daily    pantoprazole  40 mg Oral QAM AC    potassium chloride  20 mEq Oral Daily    metoprolol succinate  50 mg Oral Daily    ipratropium-albuterol  1 ampule Inhalation Q4H WA    predniSONE  40 mg Oral Daily    enoxaparin  40 mg Subcutaneous Daily    budesonide-formoterol  2 puff Inhalation BID    nicotine  1 patch stable. 2.  Her cardiomyopathy appears to be nonischemic and likely related to apical ballooning and stress cardiomyopathy. ~We will hope to maximize her medical regimen with ACE inhibitor and cardioselective beta-blockers. 3.  Her confusion and mental health appear better and near baseline. 4.  She is clear for discharge from a cardiovascular standpoint. 5.  We will follow peripherally. All questions and concerns were addressed to the patient/family. Alternatives to my treatment were discussed. The note was completed using EMR. Every effort was made to ensure accuracy; however, inadvertent computerized transcription errors may be present.     Chaparro Montalvo MD, SANAM, [facility], -[address] [place] [address]  3/5/2020 1:16 PM

## 2020-03-05 NOTE — PLAN OF CARE
Problem: OXYGENATION/RESPIRATORY FUNCTION  Goal: Patient will maintain patent airway  Note:     Patient's EF (Ejection Fraction) is less than 40%    Heart Failure Medications:  Diuretics[de-identified] Furosemide and Spironolactone    (One of the following REQUIRED for EF <40%/SYSTOLIC FAILURE but MAY be used in EF% >40%/DIASTOLIC FAILURE)        ACE[de-identified] Lisinopril        ARB[de-identified] None         ARNI[de-identified] None    (Beta Blockers)  NON- Evidenced Based Beta Blocker (for EF% >40%/DIASTOLIC FAILURE): none  Evidenced Based Beta Blocker::(REQUIRED for EF% <40%/SYSTOLIC FAILURE) Metoprolol SUCCinate- Toprol XL  . .................................................................................................................................................. Patient's weights and intake/output reviewed: Yes    Patient's Last Weight: 171 lbs obtained by standing scale. Difference of 3 lbs less than last documented weight. Intake/Output Summary (Last 24 hours) at 3/4/2020 1958  Last data filed at 3/4/2020 1632  Gross per 24 hour   Intake 540 ml   Output 600 ml   Net -60 ml       Comorbidities Reviewed Yes    Patient has a past medical history of Asthma, Back ache, Cataract, Cerebral artery occlusion with cerebral infarction ([address] Diabetes mellitus ([address] Hyperlipidemia, Hypertension, Insomnia, and TIA (transient ischemic attack). >>For CHF and Comorbidity documentation on Education Time and Topics, please see Education Tab    Progressive Mobility Assessment:  What is this patient's Current Level of Mobility?: Ambulatory- with Assistance  How was this patient Mobilized today?: Up to Chair                 With Whom? Nurse and PT                 Level of Difficulty/Assistance: 1x Assist     Pt up in chair at this time on 2 L O2. Pt denies shortness of breath. Pt with pitting lower extremity edema.      Patient and/or Family's stated Goal of Care this Admission: reduce shortness of breath, increase activity tolerance, better understand heart

## 2020-03-05 NOTE — PROGRESS NOTES
Hospitalist Progress Note      PCP: Kimberly Mejia MD    Date of Admission: 3/3/2020    Chief Complaint:  Dyspnea, weakness     History Of Present Illness: The patient is a 69 Y F with a h/o smoking, alcoholism, HTN, HLD, DM2, and CVA. She presented to [facility] on 2/28 with about a week of dyspnea and weakness. She was admitted for treatment of CHF, and was found to have an EF of 30-35% on TTE (was 55% in 2016). She was transferred to [facility] for [address] [place] [address] on 3/3. LHC showed only mild, non-obstructive CAD, and it was determined that she had a nonischemic cardiomyopathy with apical ballooning. Cardiology asked hospital medicine to admit the patient for further diuresis since she was still on oxygen. The patient also had been confused and disoriented ever since the first night of admission to [facility]. Subjective: On RA, fully diuresed, slight TRINH. She looks and feels well. Now fully oriented, even a little offended when I ask her obvious orientation questions.         Medications:  Reviewed    Infusion Medications    dextrose       Scheduled Medications    insulin glargine  20 Units Subcutaneous Once    [START ON 3/6/2020] insulin glargine  15 Units Subcutaneous Nightly    [START ON 3/6/2020] insulin lispro  0-6 Units Subcutaneous TID WC    insulin lispro  0-3 Units Subcutaneous Nightly    sodium chloride  500 mL Intravenous Once    atorvastatin  40 mg Oral Nightly    vitamin B-12  1,000 mcg Oral Daily    therapeutic multivitamin-minerals  1 tablet Oral Daily    fluticasone  1 spray Nasal Daily    cloNIDine  0.2 mg Oral BID    PARoxetine  40 mg Oral QAM    levothyroxine  25 mcg Oral Daily    vitamin B-1  100 mg Oral Daily    sodium chloride flush  10 mL Intravenous 2 times per day    folic acid  1 mg Oral Daily    aspirin  81 mg Oral Daily    pantoprazole  40 mg Oral QAM AC    potassium chloride  20 mEq Oral Daily    metoprolol succinate  50 mg Oral Daily    ipratropium-albuterol  1 ampule

## 2020-03-05 NOTE — PROGRESS NOTES
restrictions: up as tolerated  Vision/Hearing  Vision: Within Functional Limits  Hearing: Within functional limits     Subjective  General  Chart Reviewed: Yes  Patient assessed for rehabilitation services?: Yes  Response To Previous Treatment: Not applicable  Family / Caregiver Present: No  Referring Practitioner: Carson Conde MD  Referral Date : 03/03/20  Subjective  Subjective: Pt agreeable to therapy. Pain Screening  Patient Currently in Pain: Denies  Vital Signs  Patient Currently in Pain: Denies       Orientation  Orientation  Overall Orientation Status: Within Normal Limits  Social/Functional History  Social/Functional History  Lives With: Spouse, Son  Type of Home: House  Home Layout: Two level, Performs ADL's on one level, Able to Live on Main level with bedroom/bathroom  Home Access: Stairs to enter with rails  Entrance Stairs - Number of Steps: 3; can't remember which side  Bathroom Shower/Tub: Tub/Shower unit  Bathroom Toilet: Standard  Bathroom Equipment: Grab bars in shower, Shower chair  Home Equipment: Standard walker  ADL Assistance: Independent  Homemaking Assistance: Independent  Ambulation Assistance: Independent(without AD.)  Transfer Assistance: Independent  Active : No  IADL Comments: Pt reports 1 other fall to the ground where she needed assist to get up. Pt states she loses her balance often \"but it's not that bad\"  Additional Comments: pt reporting her  and son work during the day and she is home by herself. Pt is a questionable historian, however information is corrororated by previous admission information  Cognition   Cognition  Overall Cognitive Status: Exceptions  Arousal/Alertness: Appropriate responses to stimuli  Following Commands:  Follows one step commands consistently  Attention Span: Attends with cues to redirect  Memory: Decreased recall of recent events;Decreased short term memory  Safety Judgement: Decreased awareness of need for assistance  Problem Solving: Assistance required to generate solutions  Insights: Decreased awareness of deficits  Initiation: Requires cues for some  Sequencing: Requires cues for some    Objective          AROM RLE (degrees)  RLE AROM: WFL  AROM LLE (degrees)  LLE AROM : WFL  Strength RLE  Strength RLE: WFL  Comment: Grossly 4/5  Strength LLE  Strength LLE: WFL  Comment: Grossly 4/5  Tone RLE  RLE Tone: Normotonic  Tone LLE  LLE Tone: Normotonic  Sensation  Overall Sensation Status: WNL  Bed mobility  Supine to Sit: Stand by assistance  Sit to Supine: Unable to assess(Pt up in chair at end of session.)  Scooting: Supervision(to EOB)  Transfers  Sit to Stand: Contact guard assistance  Stand to sit: Minimal Assistance(For eccentric control.)  Bed to Chair: Contact guard assistance  Comment: without AD. Ambulation  Ambulation?: Yes  More Ambulation?: No  Ambulation 1  Surface: level tile  Device: No Device  Assistance: Contact guard assistance; Moderate assistance  Quality of Gait: CGA<>Mod A with LOB. Pt demos decreased sree, narrow SWAPNIL, decreased step length, and frequent LOB. Pt requires cues for increasing step length, increasing SWAPNIL, and safety awareness. Gait Deviations: Slow Sree;Decreased step length;Decreased step height;Shuffles  Distance: 10 feet eval, 75 feet treatment  Stairs/Curb  Stairs?: No     Balance  Posture: Fair  Sitting - Static: Good  Sitting - Dynamic: Good  Standing - Static: Fair  Standing - Dynamic: Fair;-  Comments: without AD. Pt is supervision unsupported at the EOB for 15 minutes. Plan   Plan  Times per week: 3-5x  Times per day: Daily  Plan weeks: 1 week, until 3/12/20  Current Treatment Recommendations: Strengthening, Balance Training, Functional Mobility Training, Transfer Training, Endurance Training, Stair training, Gait Training, Home Exercise Program, Safety Education & Training  Safety Devices  Type of devices:  All fall risk precautions in place, Call light within reach, Chair alarm in place, Left in chair, Patient at risk for falls, Gait belt, Nurse notified    AM-PAC Score  AM-PAC Inpatient Mobility Raw Score : 17 (03/05/20 1139)  AM-PAC Inpatient T-Scale Score : 42.13 (03/05/20 1139)  Mobility Inpatient CMS 0-100% Score: 50.57 (03/05/20 1139)  Mobility Inpatient CMS G-Code Modifier : CK (03/05/20 1139)        Goals  Short term goals  Time Frame for Short term goals: 5-7 days (unless otherwise stated) by 3/12/20  Short term goal 1: Pt will perform bed mobility with independence. Short term goal 2: Pt will perform sit<>stand with supervision and eccentric control when sitting. Short term goal 3: Pt will ambulate 100 feet with SBA and no AD. Short term goal 4: Pt will perform 12-15 reps of BLE ther ex to increase strength and balance with supervision by 3/8/20. Short term goal 5: Pt will ascend/descend 3 steps with one handrail and SBA. Patient Goals   Patient goals : To go home.      Therapy Time   Individual Concurrent Group Co-treatment   Time In 1018         Time Out 1100         Minutes 42         Timed Code Treatment Minutes: 28 Minutes     Helio Rodriguez

## 2020-03-06 LAB
ANION GAP SERPL CALCULATED.3IONS-SCNC: 10 MMOL/L (ref 3–16)
BUN BLDV-MCNC: 19 MG/DL (ref 7–20)
CALCIUM SERPL-MCNC: 9.2 MG/DL (ref 8.3–10.6)
CHLORIDE BLD-SCNC: 101 MMOL/L (ref 99–110)
CO2: 27 MMOL/L (ref 21–32)
CREAT SERPL-MCNC: 0.9 MG/DL (ref 0.6–1.2)
ESTIMATED AVERAGE GLUCOSE: 159.9 MG/DL
GFR AFRICAN AMERICAN: >60
GFR NON-AFRICAN AMERICAN: >60
GLUCOSE BLD-MCNC: 114 MG/DL (ref 70–99)
GLUCOSE BLD-MCNC: 129 MG/DL (ref 70–99)
GLUCOSE BLD-MCNC: 179 MG/DL (ref 70–99)
GLUCOSE BLD-MCNC: 201 MG/DL (ref 70–99)
GLUCOSE BLD-MCNC: 292 MG/DL (ref 70–99)
HBA1C MFR BLD: 7.2 %
HCT VFR BLD CALC: 30.1 % (ref 36–48)
HEMOGLOBIN: 9.3 G/DL (ref 12–16)
MCH RBC QN AUTO: 24.7 PG (ref 26–34)
MCHC RBC AUTO-ENTMCNC: 30.9 G/DL (ref 31–36)
MCV RBC AUTO: 80 FL (ref 80–100)
PDW BLD-RTO: 17.4 % (ref 12.4–15.4)
PERFORMED ON: ABNORMAL
PLATELET # BLD: 591 K/UL (ref 135–450)
PMV BLD AUTO: 7.2 FL (ref 5–10.5)
POTASSIUM REFLEX MAGNESIUM: 4.1 MMOL/L (ref 3.5–5.1)
RBC # BLD: 3.77 M/UL (ref 4–5.2)
SODIUM BLD-SCNC: 138 MMOL/L (ref 136–145)
WBC # BLD: 12.9 K/UL (ref 4–11)

## 2020-03-06 PROCEDURE — 2580000003 HC RX 258: Performed by: INTERNAL MEDICINE

## 2020-03-06 PROCEDURE — 6370000000 HC RX 637 (ALT 250 FOR IP): Performed by: INTERNAL MEDICINE

## 2020-03-06 PROCEDURE — 94640 AIRWAY INHALATION TREATMENT: CPT

## 2020-03-06 PROCEDURE — 1200000000 HC SEMI PRIVATE

## 2020-03-06 PROCEDURE — 36415 COLL VENOUS BLD VENIPUNCTURE: CPT

## 2020-03-06 PROCEDURE — 6360000002 HC RX W HCPCS: Performed by: INTERNAL MEDICINE

## 2020-03-06 PROCEDURE — 85027 COMPLETE CBC AUTOMATED: CPT

## 2020-03-06 PROCEDURE — 80048 BASIC METABOLIC PNL TOTAL CA: CPT

## 2020-03-06 RX ORDER — ASPIRIN 81 MG/1
81 TABLET ORAL DAILY
Qty: 30 TABLET | Refills: 3 | Status: ON HOLD
Start: 2020-03-07 | End: 2021-05-23

## 2020-03-06 RX ORDER — FUROSEMIDE 20 MG/1
20 TABLET ORAL DAILY
Status: DISCONTINUED | OUTPATIENT
Start: 2020-03-06 | End: 2020-03-10 | Stop reason: HOSPADM

## 2020-03-06 RX ORDER — FUROSEMIDE 20 MG/1
20 TABLET ORAL DAILY
Qty: 60 TABLET | Refills: 3 | Status: ON HOLD
Start: 2020-03-07 | End: 2020-03-22 | Stop reason: HOSPADM

## 2020-03-06 RX ORDER — FOLIC ACID 1 MG/1
1 TABLET ORAL DAILY
Qty: 30 TABLET | Refills: 3 | Status: ON HOLD | OUTPATIENT
Start: 2020-03-07 | End: 2021-05-23

## 2020-03-06 RX ORDER — NICOTINE 21 MG/24HR
1 PATCH, TRANSDERMAL 24 HOURS TRANSDERMAL DAILY
Qty: 30 PATCH | Refills: 0 | Status: ON HOLD
Start: 2020-03-07 | End: 2021-05-23

## 2020-03-06 RX ORDER — METOPROLOL SUCCINATE 50 MG/1
50 TABLET, EXTENDED RELEASE ORAL DAILY
Qty: 30 TABLET | Refills: 3 | Status: ON HOLD | OUTPATIENT
Start: 2020-03-07 | End: 2021-05-23

## 2020-03-06 RX ORDER — INSULIN GLARGINE 100 [IU]/ML
15 INJECTION, SOLUTION SUBCUTANEOUS NIGHTLY
Qty: 30 UNITS | Refills: 0
Start: 2020-03-06 | End: 2020-03-10 | Stop reason: HOSPADM

## 2020-03-06 RX ORDER — PREDNISONE 20 MG/1
20 TABLET ORAL DAILY
Qty: 2 TABLET | Refills: 0
Start: 2020-03-07 | End: 2020-03-09

## 2020-03-06 RX ADMIN — INSULIN LISPRO 2 UNITS: 100 INJECTION, SOLUTION INTRAVENOUS; SUBCUTANEOUS at 18:47

## 2020-03-06 RX ADMIN — Medication 10 ML: at 22:18

## 2020-03-06 RX ADMIN — Medication 2 PUFF: at 08:19

## 2020-03-06 RX ADMIN — FUROSEMIDE 20 MG: 20 TABLET ORAL at 10:34

## 2020-03-06 RX ADMIN — ENOXAPARIN SODIUM 40 MG: 40 INJECTION SUBCUTANEOUS at 10:14

## 2020-03-06 RX ADMIN — CLONIDINE HYDROCHLORIDE 0.2 MG: 0.1 TABLET ORAL at 10:14

## 2020-03-06 RX ADMIN — PREDNISONE 20 MG: 20 TABLET ORAL at 10:14

## 2020-03-06 RX ADMIN — IPRATROPIUM BROMIDE AND ALBUTEROL SULFATE 1 AMPULE: .5; 3 SOLUTION RESPIRATORY (INHALATION) at 08:19

## 2020-03-06 RX ADMIN — METOPROLOL SUCCINATE 50 MG: 50 TABLET, EXTENDED RELEASE ORAL at 10:14

## 2020-03-06 RX ADMIN — Medication 2 PUFF: at 20:19

## 2020-03-06 RX ADMIN — INSULIN GLARGINE 15 UNITS: 100 INJECTION, SOLUTION SUBCUTANEOUS at 22:19

## 2020-03-06 RX ADMIN — IPRATROPIUM BROMIDE AND ALBUTEROL SULFATE 1 AMPULE: .5; 3 SOLUTION RESPIRATORY (INHALATION) at 20:11

## 2020-03-06 RX ADMIN — FOLIC ACID 1 MG: 1 TABLET ORAL at 10:14

## 2020-03-06 RX ADMIN — MELATONIN TAB 5 MG 5 MG: 5 TAB at 22:19

## 2020-03-06 RX ADMIN — FLUTICASONE PROPIONATE 1 SPRAY: 50 SPRAY, METERED NASAL at 10:16

## 2020-03-06 RX ADMIN — ATORVASTATIN CALCIUM 40 MG: 40 TABLET, FILM COATED ORAL at 22:19

## 2020-03-06 RX ADMIN — PAROXETINE HYDROCHLORIDE 40 MG: 20 TABLET, FILM COATED ORAL at 10:14

## 2020-03-06 RX ADMIN — INSULIN LISPRO 2 UNITS: 100 INJECTION, SOLUTION INTRAVENOUS; SUBCUTANEOUS at 22:19

## 2020-03-06 RX ADMIN — Medication 10 ML: at 10:15

## 2020-03-06 RX ADMIN — ASPIRIN 81 MG: 81 TABLET, COATED ORAL at 10:14

## 2020-03-06 RX ADMIN — Medication 100 MG: at 10:14

## 2020-03-06 RX ADMIN — LEVOTHYROXINE SODIUM 25 MCG: 25 TABLET ORAL at 06:06

## 2020-03-06 RX ADMIN — CLONIDINE HYDROCHLORIDE 0.2 MG: 0.1 TABLET ORAL at 22:18

## 2020-03-06 RX ADMIN — CYANOCOBALAMIN TAB 500 MCG 1000 MCG: 500 TAB at 10:14

## 2020-03-06 RX ADMIN — POTASSIUM CHLORIDE 20 MEQ: 20 TABLET, EXTENDED RELEASE ORAL at 10:14

## 2020-03-06 RX ADMIN — IPRATROPIUM BROMIDE AND ALBUTEROL SULFATE 1 AMPULE: .5; 3 SOLUTION RESPIRATORY (INHALATION) at 12:02

## 2020-03-06 RX ADMIN — Medication 1 TABLET: at 10:14

## 2020-03-06 RX ADMIN — PANTOPRAZOLE SODIUM 40 MG: 40 TABLET, DELAYED RELEASE ORAL at 06:06

## 2020-03-06 ASSESSMENT — PAIN DESCRIPTION - PROGRESSION
CLINICAL_PROGRESSION: GRADUALLY WORSENING

## 2020-03-06 ASSESSMENT — PAIN SCALES - GENERAL: PAINLEVEL_OUTOF10: 0

## 2020-03-06 NOTE — DISCHARGE SUMMARY
Hospital Medicine Discharge Summary    Patient ID: Ashley Novak      Patient's PCP: Gill Chahal MD    Admit Date: 3/3/2020     Discharge Date:   03/06/20     Admitting Physician: No admitting provider for patient encounter. Discharge Physician: Lindsay Henry MD     Discharge Diagnoses: Active Hospital Problems    Diagnosis    CHF (congestive heart failure), NYHA class III, acute, systolic (HCC) [J87.45]    NSTEMI (non-ST elevated myocardial infarction) ([facility] [I21.4]    Cardiomyopathy ([facility] [Q12.5]    Acute systolic CHF (congestive heart failure) ([facility] [I50.21]       The patient was seen and examined on day of discharge and this discharge summary is in conjunction with any daily progress note from day of discharge. Hospital Course: The patient is a 69 Y F with a h/o smoking, alcoholism, HTN, HLD, DM2, and CVA.  She presented to [facility] on 2/28 with about a week of dyspnea and weakness.  She was admitted for treatment of CHF, and was found to have an EF of 30-35% on TTE (was 55% in 2016).  She was transferred to [facility] for [facility] on 3/3. [place] [facility] showed only mild, non-obstructive CAD, and it was determined that she had a nonischemic cardiomyopathy with apical ballooning.  Cardiology asked hospital medicine to admit the patient for further diuresis since she was still on oxygen.  The patient also had been confused and disoriented ever since the first night of admission to [facility].          Acute on chronic systolic CHF due to nonischemic CMP  - furosemide IV.  She weighed 178 lbs six months ago at a clinic visit.  Diuresed from 182 to 174 lbs at [facility]. Here she developed TRINH at 170 lbs, so we skipped a day of furosemide and discharged with 20 po qd when TRINH resolved. Reassess soon as outpatient.    - lisinopril.  Changed to metoprolol succinate.  She is also on clonidine.  - she will need reassessment in 90 days for consideration of AICD  - encouraged her to stop drinking and smoking.       TRINH  - this cardiac diet      Discharge Medications:     Current Discharge Medication List           Details   aspirin 81 MG EC tablet Take 1 tablet by mouth daily  Qty: 30 tablet, Refills: 3      insulin glargine (LANTUS) 100 UNIT/ML injection vial Inject 15 Units into the skin nightly for 2 days  Qty: 30 Units, Refills: 0      metoprolol succinate (TOPROL XL) 50 MG extended release tablet Take 1 tablet by mouth daily  Qty: 30 tablet, Refills: 3      predniSONE (DELTASONE) 20 MG tablet Take 1 tablet by mouth daily for 2 days  Qty: 2 tablet, Refills: 0      folic acid (FOLVITE) 1 MG tablet Take 1 tablet by mouth daily  Qty: 30 tablet, Refills: 3      nicotine (NICODERM CQ) 21 MG/24HR Place 1 patch onto the skin daily  Qty: 30 patch, Refills: 0      nicotine polacrilex (NICORETTE) 4 MG gum Take 1 each by mouth as needed for Smoking cessation  Qty: 110 each, Refills: 0              Details   furosemide (LASIX) 20 MG tablet Take 1 tablet by mouth daily  Qty: 60 tablet, Refills: 3              Details   metFORMIN (GLUCOPHAGE) 500 MG tablet Take 500 mg by mouth      polyethylene glycol-electrolytes (TRILYTE) 420 g solution Follow Package Instructions unless otherwise directed by physician.       albuterol sulfate  (90 Base) MCG/ACT inhaler Inhale 2 puffs into the lungs every 6 hours as needed for Wheezing  Qty: 1 Inhaler, Refills: 0      ibuprofen (ADVIL;MOTRIN) 200 MG tablet Take 1 tablet by mouth every 6 hours as needed for Pain  Qty: 30 tablet, Refills: 0      PARoxetine (PAXIL) 40 MG tablet Take 1 tablet by mouth every morning  Qty: 30 tablet, Refills: 3      levothyroxine (SYNTHROID) 25 MCG tablet Take 1 tablet by mouth Daily  Qty: 30 tablet, Refills: 0      vitamin B-1 (THIAMINE) 100 MG tablet Take 1 tablet by mouth daily  Qty: 30 tablet, Refills: 0      lisinopril (PRINIVIL;ZESTRIL) 40 MG tablet Take 1 tablet by mouth daily  Qty: 30 tablet, Refills: 0      cloNIDine (CATAPRES) 0.2 MG tablet Take 1 tablet by mouth 2 times daily  Qty: 60 tablet, Refills: 0      fluticasone-salmeterol (ADVAIR DISKUS) 500-50 MCG/DOSE diskus inhaler Inhale 1 puff into the lungs every 12 hours  Qty: 60 each, Refills: 0      atorvastatin (LIPITOR) 40 MG tablet Take 1 tablet by mouth nightly  Qty: 30 tablet, Refills: 3      fluticasone (FLONASE) 50 MCG/ACT nasal spray 1 spray by Nasal route daily  Qty: 1 Bottle, Refills: 3      tiotropium (SPIRIVA HANDIHALER) 18 MCG inhalation capsule Inhale 1 capsule into the lungs daily  Qty: 30 capsule, Refills: 0      Multiple Vitamin (THERAPEUTIC) TABS tablet Take 1 tablet by mouth daily  Qty: 30 tablet, Refills: 3      omeprazole (PRILOSEC) 20 MG capsule Take 1 capsule by mouth daily  Qty: 30 capsule, Refills: 0      FISH OIL by Does not apply route. vitamin B-12 (CYANOCOBALAMIN) 1000 MCG tablet Take 1,000 mcg by mouth daily. Cholecalciferol (VITAMIN D-3) 5000 UNITS TABS Take  by mouth. Time Spent on discharge is more than 30 minutes in the examination, evaluation, counseling and review of medications and discharge plan. Signed:    Bam Nicholson MD   3/6/2020      Thank you Casimiro Rodriguez MD for the opportunity to be involved in this patient's care. If you have any questions or concerns please feel free to contact me at [phone].

## 2020-03-06 NOTE — PROGRESS NOTES
Physical Therapy    Attempted PT treatment session this PM. Pt declined, requesting to nap. Will re-attempt at later time/date as schedule allows.     Leena Villarreal, PT

## 2020-03-06 NOTE — CONSULTS
rate regular   Resp: Unlabored respiratory pattern. Lungs CTA on supplemental O2  GI: Soft, nontender, nondistended. +BS  Neuro: Alert, orientedx3, appropriate. No cranial nerve deficits appreciated. Sensation intact to light touch. Motor examination reveals generalized weakness w/o focal deficits. Skin: No lesions or rashes noted. MSK: No joint abnormalities noted. Lab Results   Component Value Date    WBC 12.9 (H) 03/06/2020    HGB 9.3 (L) 03/06/2020    HCT 30.1 (L) 03/06/2020    MCV 80.0 03/06/2020     (H) 03/06/2020     Lab Results   Component Value Date    INR 1.08 02/28/2020    INR 0.93 03/20/2017    INR 0.93 12/23/2016    PROTIME 12.5 02/28/2020    PROTIME 10.5 03/20/2017    PROTIME 10.6 12/23/2016     Lab Results   Component Value Date     03/06/2020    K 4.1 03/06/2020     03/06/2020    CO2 27 03/06/2020    BUN 19 03/06/2020    CREATININE 0.9 03/06/2020     Lab Results   Component Value Date    ALT 15 02/28/2020    AST 19 02/28/2020    ALKPHOS 71 02/28/2020    BILITOT 0.5 02/28/2020       Narrative   EXAMINATION:   CT OF THE HEAD WITHOUT CONTRAST  3/4/2020 2:15 pm       TECHNIQUE:   CT of the head was performed without the administration of intravenous   contrast. Dose modulation, iterative reconstruction, and/or weight based   adjustment of the mA/kV was utilized to reduce the radiation dose to as low   as reasonably achievable.       COMPARISON:   December 2018       HISTORY:   ORDERING SYSTEM PROVIDED HISTORY: confusion.  h/o CVA.  cannot tolerate MRI. TECHNOLOGIST PROVIDED HISTORY:   Reason for exam:->confusion.  h/o CVA.  cannot tolerate MRI. Has a \"code stroke\" or \"stroke alert\" been called? ->No   Reason for Exam: confusion.  h/o CVA.  cannot tolerate MRI   Acuity: Unknown   Type of Exam: Initial       FINDINGS:   BRAIN/VENTRICLES: Patient is asymmetrically positioned within the scanner. There is intracranial atherosclerosis. .       Prominence of the sulci is seen, compatible with atrophy       Moderate periventricular hypodensity is seen.  Scattered additional areas of   hypodensity are seen throughout the frontal and parietal white matter. Remote appearing infarcts are seen in right and left thalamus. Luwanna Sicilian remote   infarct is seen in the left wilber zaria       ORBITS: The visualized portion of the orbits demonstrate no acute abnormality.       SINUSES: Trace fluid is seen in the mastoids on the right.  Mild mucosal   thickening seen in the ethmoid air cells.  No air-fluid levels in the sinuses       SOFT TISSUES/SKULL:  No acute abnormality of the visualized skull or soft   tissues.           Impression   No hemorrhage or mass identified.       Pontine, periventricular and scattered frontal parietal white matter disease,   likely due to small-vessel ischemic change, similar to prior       Most recent echocardiogram on 2/29/2020  Summary   Ejection fraction is visually estimated to be 30-35 %. There is severe hypokinesis of the anterior and apical walls consistent with   infarction within the territory of the left anterior descending artery   versus apical ballooning syndrome. There is mild-moderate left ventricular hypertrophy. Diastolic dysfunction grade and filing pressure are indeterminate. Mitral annular calcification is present. Moderate mitral regurgitation. Mild tricuspid regurgitation. IVC size is dilated (>2.1 cm) but collapses > 50% with respiration   consistent with elevated RA pressure (8 mmHg). Previous echo done 12/2016 - EF 55%    Assessment:  1. Acute on chronic systolic CHF  2. TRINH  3. COPD exacerbation   4. DM2  5. Obesity  6. Debility 2/2 cardiac dysfunction     Recommendations:  1. Patient is a good candidate for rehab  2. Will start precert  3.  Will follow     LEONA Bullock CNP  3/6/2020, 11:48 AM    The patient was seen in conjunction with the nurse practitioner with independent history, evaluation and examination performed on the same day as her encounter. I agree with the note which has been adjusted to reflect my findings. I have had face to face contact with the patient and performed a substantive portion of the E/M visit. In summary, precert started; will follow along. Andres Smallwood MD 3/6/2020, 6:09 PM

## 2020-03-06 NOTE — DISCHARGE INSTR - COC
Continuity of Care Form    Patient Name: Briana St   :  [birth year]  MRN:  [id number]    Admit date:  3/3/2020  Discharge date:  3/6/2020    Code Status Order: Full Code   Advance Directives:     Admitting Physician:  No admitting provider for patient encounter.   PCP: Kika Brothers MD    Discharging Nurse: [facility] Unit/Room#: 0215/0215-01  Discharging Unit Phone Number: [phone]    Emergency Contact:   Extended Emergency Contact Information  Primary Emergency Contact: Marcelino Felty  Address: [address] [place] [address], [address] [place] [address] Phone: [phone]  Mobile Phone: [phone]  Relation: Spouse  Secondary Emergency Contact: Keena Hartman [address] Phone: [phone]  Relation: Unknown    Past Surgical History:  Past Surgical History:   Procedure Laterality Date    ABDOMEN SURGERY      c section    CATARACT REMOVAL WITH IMPLANT Left [id number]    LEFT EYE CATARACT PHACOEMULSIFICATION INTRAOCULAR LENS     SECTION      DENTAL SURGERY      EYE SURGERY  10/29/13     RIGHT EYE CATARACT PHACOEMULSIFICATION INTRAOCULAR LENS       Immunization History:   Immunization History   Administered Date(s) Administered    DTaP 2012    Pneumococcal Conjugate 13-valent ([id number]) 10/17/2017    Pneumococcal Conjugate 7-valent (Prevnar7) 2012    Pneumococcal Polysaccharide ([id number]) 2012    Td, unspecified formulation 2012       Active Problems:  Patient Active Problem List   Diagnosis Code    Hyponatremia E87.1    Hypertension I10    Hx of CVA involving R-ICA territory I62.36    CAD (coronary artery disease) I25.10    DM2 (diabetes mellitus, type 2) ([facility] E11.9    Alcoholism ([facility] F10.20    Tobacco abuse Z72.0    Asthma J45.909    Hyperlipidemia E78.5    SIRS (systemic inflammatory response syndrome) (HCC) R65.10    Thrombocytosis (HCC) D47.3    Chronic dCHF (grade 1 LVDD) I50.32    Alcohol withdrawal ([facility]) F10.239    Frequent falls R29.6    Hypertensive urgency I16.0    COPD (chronic obstructive pulmonary disease) ([facility]) J44.9    Shortness of breath R06.02    NSTEMI (non-ST elevated myocardial infarction) ([facility]) I21.4    Cardiomyopathy ([facility]) G93.0    Acute systolic CHF (congestive heart failure) ([facility]) I50.21    Acute hypoxemic respiratory failure ([facility]) J96.01    Hypomagnesemia E83.42    Hypokalemia E87.6    Acute pulmonary edema ([facility]) J81.0    CHF (congestive heart failure), NYHA class III, acute, systolic ([facility]) W58.41       Isolation/Infection:   Isolation          No Isolation        Patient Infection Status     None to display          Nurse Assessment:  Last Vital Signs: BP (!) 154/83   Pulse 80   Temp 98.3 °F (36.8 °C)   Resp 18   Wt 175 lb 1.6 oz (79.4 kg)   SpO2 91%   BMI 29.14 kg/m²     Last documented pain score (0-10 scale): Pain Level: 0  Last Weight:   Wt Readings from Last 1 Encounters:   03/06/20 175 lb 1.6 oz (79.4 kg)     Mental Status:  oriented and alert    IV Access:  - None    Nursing Mobility/ADLs:  Walking   Assisted  Transfer  Assisted  Bathing  Assisted  Dressing  Assisted  Toileting  Assisted  Feeding  Assisted  Med Admin  Assisted  Med Delivery   whole    Wound Care Documentation and Therapy:        Elimination:  Continence:   · Bowel: No  · Bladder: No  Urinary Catheter: None   Colostomy/Ileostomy/Ileal Conduit: No       Date of Last BM: 3/10/2020    Intake/Output Summary (Last 24 hours) at 3/6/2020 1105  Last data filed at 3/6/2020 1056  Gross per 24 hour   Intake 360 ml   Output 2050 ml   Net -1690 ml     I/O last 3 completed shifts: In: 240 [P.O.:240]  Out: [place] [Urine:1550]    Safety Concerns: At Risk for Falls    Impairments/Disabilities:      None    Nutrition Therapy:  Current Nutrition Therapy:   - Oral Diet:  Carb Control 5 carbs/meal (2000kcals/day)    Routes of Feeding: Oral  Liquids:  Thin Liquids  Daily Fluid Restriction: no  Last Modified

## 2020-03-06 NOTE — PROGRESS NOTES
Nutrition Education    Type and Reason for Visit: Consult(CHF Education)    Nutrition Assessment:  Discussed Nutrition Therapy for Heart Failure, including sodium restriction, fluid restriction, and weight monitoring. Pt was interested to learn and reported not using salt at the table. Discussed foods to avoid, including pizza and highly processed foods, nutrition label reading, and the importance of fluid balance. Pt has a scale at home; reviewed weight monitoring guidelines. Pt intakes have been %. Pt indicated it might be hard for her to comply all the time as her  and son influence her eating habits. · Verbally reviewed information with patient. · Written educational materials provided. · Contact name and number provided. · Refer to Patient Education activity for more details.     Electronically signed by Darrell Rodney RD Student on 3/6/20 at 2:29 PM    Contact Number: [address] [place] [address] Office [phone]

## 2020-03-06 NOTE — PLAN OF CARE
understand heart failure and disease management, be more comfortable, and reduce lower extremity edema prior to discharge        :

## 2020-03-06 NOTE — PROGRESS NOTES
Occupational Therapy  OT follow up attempted this date. Pt was in room with PCA upon arrival with breakfast in front of her. Pt declined follow up. Pt was encouraged by writer and PCAs in room to work with therapy. Pt declined. OT will continue to follow up as schedule allows and pt is willing.      Suzanne Iqbal, [address] [facility]

## 2020-03-07 LAB
GLUCOSE BLD-MCNC: 215 MG/DL (ref 70–99)
GLUCOSE BLD-MCNC: 218 MG/DL (ref 70–99)
GLUCOSE BLD-MCNC: 258 MG/DL (ref 70–99)
PERFORMED ON: ABNORMAL

## 2020-03-07 PROCEDURE — 6370000000 HC RX 637 (ALT 250 FOR IP): Performed by: INTERNAL MEDICINE

## 2020-03-07 PROCEDURE — 6360000002 HC RX W HCPCS: Performed by: INTERNAL MEDICINE

## 2020-03-07 PROCEDURE — 94761 N-INVAS EAR/PLS OXIMETRY MLT: CPT

## 2020-03-07 PROCEDURE — 2580000003 HC RX 258: Performed by: INTERNAL MEDICINE

## 2020-03-07 PROCEDURE — 1200000000 HC SEMI PRIVATE

## 2020-03-07 PROCEDURE — 94640 AIRWAY INHALATION TREATMENT: CPT

## 2020-03-07 RX ADMIN — CLONIDINE HYDROCHLORIDE 0.2 MG: 0.1 TABLET ORAL at 21:30

## 2020-03-07 RX ADMIN — Medication 10 ML: at 08:44

## 2020-03-07 RX ADMIN — INSULIN GLARGINE 15 UNITS: 100 INJECTION, SOLUTION SUBCUTANEOUS at 21:30

## 2020-03-07 RX ADMIN — ENOXAPARIN SODIUM 40 MG: 40 INJECTION SUBCUTANEOUS at 08:42

## 2020-03-07 RX ADMIN — FLUTICASONE PROPIONATE 1 SPRAY: 50 SPRAY, METERED NASAL at 08:44

## 2020-03-07 RX ADMIN — IPRATROPIUM BROMIDE AND ALBUTEROL SULFATE 1 AMPULE: .5; 3 SOLUTION RESPIRATORY (INHALATION) at 12:23

## 2020-03-07 RX ADMIN — CLONIDINE HYDROCHLORIDE 0.2 MG: 0.1 TABLET ORAL at 08:42

## 2020-03-07 RX ADMIN — PAROXETINE HYDROCHLORIDE 40 MG: 20 TABLET, FILM COATED ORAL at 08:42

## 2020-03-07 RX ADMIN — Medication 2 PUFF: at 08:22

## 2020-03-07 RX ADMIN — INSULIN LISPRO 3 UNITS: 100 INJECTION, SOLUTION INTRAVENOUS; SUBCUTANEOUS at 12:13

## 2020-03-07 RX ADMIN — LEVOTHYROXINE SODIUM 25 MCG: 25 TABLET ORAL at 06:59

## 2020-03-07 RX ADMIN — Medication 1 TABLET: at 08:42

## 2020-03-07 RX ADMIN — CYANOCOBALAMIN TAB 500 MCG 1000 MCG: 500 TAB at 08:42

## 2020-03-07 RX ADMIN — Medication 2 PUFF: at 19:44

## 2020-03-07 RX ADMIN — INSULIN LISPRO 2 UNITS: 100 INJECTION, SOLUTION INTRAVENOUS; SUBCUTANEOUS at 17:52

## 2020-03-07 RX ADMIN — INSULIN LISPRO 1 UNITS: 100 INJECTION, SOLUTION INTRAVENOUS; SUBCUTANEOUS at 21:31

## 2020-03-07 RX ADMIN — POTASSIUM CHLORIDE 20 MEQ: 20 TABLET, EXTENDED RELEASE ORAL at 08:42

## 2020-03-07 RX ADMIN — FOLIC ACID 1 MG: 1 TABLET ORAL at 08:42

## 2020-03-07 RX ADMIN — Medication 100 MG: at 08:42

## 2020-03-07 RX ADMIN — FUROSEMIDE 20 MG: 20 TABLET ORAL at 08:42

## 2020-03-07 RX ADMIN — ATORVASTATIN CALCIUM 40 MG: 40 TABLET, FILM COATED ORAL at 21:30

## 2020-03-07 RX ADMIN — INSULIN LISPRO 3 UNITS: 100 INJECTION, SOLUTION INTRAVENOUS; SUBCUTANEOUS at 08:44

## 2020-03-07 RX ADMIN — PANTOPRAZOLE SODIUM 40 MG: 40 TABLET, DELAYED RELEASE ORAL at 06:59

## 2020-03-07 RX ADMIN — METOPROLOL SUCCINATE 50 MG: 50 TABLET, EXTENDED RELEASE ORAL at 08:42

## 2020-03-07 RX ADMIN — Medication 10 ML: at 21:30

## 2020-03-07 RX ADMIN — PREDNISONE 20 MG: 20 TABLET ORAL at 08:42

## 2020-03-07 RX ADMIN — MELATONIN TAB 5 MG 5 MG: 5 TAB at 21:30

## 2020-03-07 RX ADMIN — IPRATROPIUM BROMIDE AND ALBUTEROL SULFATE 1 AMPULE: .5; 3 SOLUTION RESPIRATORY (INHALATION) at 19:38

## 2020-03-07 RX ADMIN — IPRATROPIUM BROMIDE AND ALBUTEROL SULFATE 1 AMPULE: .5; 3 SOLUTION RESPIRATORY (INHALATION) at 16:40

## 2020-03-07 RX ADMIN — IPRATROPIUM BROMIDE AND ALBUTEROL SULFATE 1 AMPULE: .5; 3 SOLUTION RESPIRATORY (INHALATION) at 08:24

## 2020-03-07 RX ADMIN — ASPIRIN 81 MG: 81 TABLET, COATED ORAL at 08:42

## 2020-03-07 ASSESSMENT — PAIN DESCRIPTION - PROGRESSION
CLINICAL_PROGRESSION: GRADUALLY WORSENING

## 2020-03-07 ASSESSMENT — PAIN SCALES - GENERAL
PAINLEVEL_OUTOF10: 0

## 2020-03-07 NOTE — PLAN OF CARE
Problem: OXYGENATION/RESPIRATORY FUNCTION  Goal: Patient will maintain patent airway  Outcome: Ongoing     Problem: Falls - Risk of:  Goal: Will remain free from falls  Description: Will remain free from falls  Outcome: Ongoing     Problem: Serum Glucose Level - Abnormal:  Goal: Ability to maintain appropriate glucose levels will improve  Description: Ability to maintain appropriate glucose levels will improve  Outcome: Ongoing

## 2020-03-07 NOTE — PLAN OF CARE
Patient's EF (Ejection Fraction) is less than 40%    Heart Failure Medications:   Diuretics[de-identified] Furosemide     (One of the following REQUIRED for EF <40%/SYSTOLIC FAILURE but MAY be used in EF% >40%/DIASTOLIC FAILURE)        ACE[de-identified] None        ARB[de-identified] None         ARNI[de-identified] None    (Beta Blockers)   NON- Evidenced Based Beta Blocker (for EF% >40%/DIASTOLIC FAILURE): None     Evidenced Based Beta Blocker::(REQUIRED for EF% <40%/SYSTOLIC FAILURE) metoprolol succinate  . .................................................................................................................................................. Patient's weights and intake/output reviewed: Yes    Patient's Last Weight: 173 lbs obtained by standing scale. Difference of 2 lbs less than last documented weight. Intake/Output Summary (Last 24 hours) at 3/7/2020 1515  Last data filed at 3/7/2020 1454  Gross per 24 hour   Intake 1360 ml   Output 950 ml   Net 410 ml       Comorbidities Reviewed Yes    Patient has a past medical history of Asthma, Back ache, Cataract, Cerebral artery occlusion with cerebral infarction ([address] Diabetes mellitus ([address] Hyperlipidemia, Hypertension, Insomnia, and TIA (transient ischemic attack). >>For CHF and Comorbidity documentation on Education Time and Topics, please see Education Tab    Progressive Mobility Assessment:  What is this patient's Current Level of Mobility?: Ambulatory- with Assistance  How was this patient Mobilized today?: Up to Chair and  Up to Toilet/Shower                 With Whom? Nurse                 Level of Difficulty/Assistance: 1x Assist     Pt up in chair at this time on room air. Pt with complaints of shortness of breath. Pt without lower extremity edema.      Patient and/or Family's stated Goal of Care this Admission: better understand heart failure and disease management prior to discharge        :

## 2020-03-07 NOTE — PROGRESS NOTES
budesonide-formoterol  2 puff Inhalation BID    nicotine  1 patch Transdermal Daily     PRN Meds: nicotine polacrilex, albuterol sulfate HFA, hydrALAZINE, sodium chloride flush, perflutren lipid microspheres, acetaminophen **OR** acetaminophen, magnesium sulfate, polyethylene glycol, potassium chloride **OR** potassium alternative oral replacement **OR** potassium chloride, promethazine **OR** ondansetron, dextrose, dextrose, glucagon (rDNA), glucose, albuterol, melatonin      Intake/Output Summary (Last 24 hours) at 3/7/2020 0905  Last data filed at 3/7/2020 0503  Gross per 24 hour   Intake 1120 ml   Output 1250 ml   Net -130 ml       Physical Exam Performed:    BP (!) 155/54   Pulse 66   Temp 98.6 °F (37 °C) (Oral)   Resp 16   Ht 5' 3\" (1.6 m)   Wt 173 lb 3.2 oz (78.6 kg)   SpO2 95%   BMI 30.68 kg/m²     General appearance:  No apparent distress, appears stated age and cooperative. HEENT:  Normal cephalic, atraumatic without obvious deformity. Pupils equal, round, and reactive to light. Extra ocular muscles intact. Conjunctivae/corneas clear. Neck: Supple, with full range of motion. No jugular venous distention. Trachea midline. Respiratory:  Normal respiratory effort. Bilaterally without Rhonchi. Bibasilar rales and bilateral expiratory wheezing have resolved. Cardiovascular:  Regular rate and rhythm with normal S1/S2 without murmurs, rubs or gallops. Abdomen: Soft, non-tender, non-distended with normal bowel sounds. Musculoskeletal:  No clubbing, cyanosis or edema bilaterally. Full range of motion without deformity. Skin: Skin color, texture, turgor normal.  No rashes or lesions. Neurologic:  Neurovascularly intact without any focal sensory/motor deficits.  Cranial nerves: II-XII intact, grossly non-focal.  Psychiatric:  Alert, fully oriented, has insight, calm  Capillary Refill: Brisk,< 3 seconds   Peripheral Pulses: +2 palpable, equal bilaterally       Labs:   Recent Labs     03/04/20  1049 03/06/20  0900   WBC 10.2 12.9*   HGB 9.8* 9.3*   HCT 31.5* 30.1*   * 591*     Recent Labs     03/04/20  1049 03/05/20  1051 03/06/20  0900    131* 138   K 4.3 4.0 4.1   CL 99 89* 101   CO2 31 26 27   BUN 17 28* 19   CREATININE 0.8 1.4* 0.9   CALCIUM 9.4 9.3 9.2     No results for input(s): AST, ALT, BILIDIR, BILITOT, ALKPHOS in the last 72 hours. No results for input(s): INR in the last 72 hours. No results for input(s): [place]ber[place] Opal in the last 72 hours. Urinalysis:      Lab Results   Component Value Date    NITRU Negative 03/20/2017    WBCUA 6-10 02/12/2017    BACTERIA Rare 02/12/2017    RBCUA 3-5 02/12/2017    BLOODU Negative 03/20/2017    SPECGRAV 1.020 03/20/2017    GLUCOSEU Negative 03/20/2017       Radiology:  CT HEAD WO CONTRAST   Final Result   No hemorrhage or mass identified. Pontine, periventricular and scattered frontal parietal white matter disease,   likely due to small-vessel ischemic change, similar to prior                 Assessment/Plan:    Active Hospital Problems    Diagnosis    CHF (congestive heart failure), NYHA class III, acute, systolic ([facility]) [G06.84]    NSTEMI (non-ST elevated myocardial infarction) [facility] [I21.4]    Cardi[facility] [X15.7]    Acute systolic CHF (congestive heart failure) ([facility]) [I50.21]       The patient is a 69 Y F with a h/o smoking, alcoholism, HTN, HLD, DM2, and CVA.  She presented to [facility] on 2/28 with about a week of dyspnea and weakness.  She was admitted for treatment of CHF, and was found to have an EF of 30-35% on TTE (was 55% in 2016).  She was transferred to [facility] for [facility] on 3/3. [place] showed only mild, non-obstructive CAD, and it was determined that she had a nonischemic cardiomyopathy with apical ballooning.  Cardiology asked hospital medicine to admit the patient for further diuresis since she was still on oxygen.  The patient also had been confused and disoriented ever since the first night of admission to [facility].          Acute on chronic systolic CHF due to nonischemic CMP  - furosemide IV.  She weighed 178 lbs six months ago at a clinic visit.  Diuresed from 182 to 174 lbs at [facility]. Here she developed TRINH at 170 lbs, so we skipped a day of furosemide and discharged with 20 po qd when TRINH resolved. Reassess soon as outpatient. - lisinopril.  Changed to metoprolol succinate.  She is also on clonidine.  - she will need reassessment in 90 days for consideration of AICD  - encouraged her to stop drinking and smoking.       TRINH  - this resulted at the end of diuresis.  Baseline Cr is normal.  Resolved.      AECOPD  - steroids, inhaled bronchodilators     Obesity  - encouraged weight loss through diet and exercise     Acute hypoxic respiratory failure  - likely due to the above issues.  Treated accordingly.  Doubt PE.    - weaned to RA.  She was 91% on RA six months ago at a clinic visit.     Alcohol dependence complicated by withdrawal  - treated with chlordiazepoxide at [facility].  Will try to avoid further benzodiazepines at this point.  Vitamins.  Encouraged cessation.     Acute metabolic encephalopathy  - likely due to the above issues.  Supportive care.  Nonspecific delirium related to hospitalization is probable as well. Resolved, she is back to baseline. - stopped trazodone, she always seemed to be sleeping.   - liver was unremarkable on 2018 CT.  Normal ammonia level. - She was unable to tolerate MRI brain, even with lorazepam IV which made her sleep for a long time into the following day.  She has a h/o previous stroke.  Negative head CT at least ruled out large bleed. - no CO2 retention per [facility] blood gas.    - no apparent infections, no dysuria, improved back to normal, no need for UA     DM2  - A1c 7.2.  Insulin regimen.      HLD  - statin     Hypothyroidism  - Clinically euthyroid.  Continue home dose of levothyroxine. DVT Prophylaxis: enoxaparin  Diet: DIET CARDIAC;  Code Status: Full Code.       PT/OT Eval

## 2020-03-08 LAB
ANION GAP SERPL CALCULATED.3IONS-SCNC: 12 MMOL/L (ref 3–16)
BUN BLDV-MCNC: 16 MG/DL (ref 7–20)
CALCIUM SERPL-MCNC: 9.5 MG/DL (ref 8.3–10.6)
CHLORIDE BLD-SCNC: 97 MMOL/L (ref 99–110)
CO2: 28 MMOL/L (ref 21–32)
CREAT SERPL-MCNC: 0.9 MG/DL (ref 0.6–1.2)
GFR AFRICAN AMERICAN: >60
GFR NON-AFRICAN AMERICAN: >60
GLUCOSE BLD-MCNC: 115 MG/DL (ref 70–99)
GLUCOSE BLD-MCNC: 129 MG/DL (ref 70–99)
GLUCOSE BLD-MCNC: 192 MG/DL (ref 70–99)
GLUCOSE BLD-MCNC: 213 MG/DL (ref 70–99)
GLUCOSE BLD-MCNC: 249 MG/DL (ref 70–99)
PERFORMED ON: ABNORMAL
POTASSIUM REFLEX MAGNESIUM: 4.2 MMOL/L (ref 3.5–5.1)
SODIUM BLD-SCNC: 137 MMOL/L (ref 136–145)

## 2020-03-08 PROCEDURE — 6370000000 HC RX 637 (ALT 250 FOR IP): Performed by: INTERNAL MEDICINE

## 2020-03-08 PROCEDURE — 2580000003 HC RX 258: Performed by: INTERNAL MEDICINE

## 2020-03-08 PROCEDURE — 36415 COLL VENOUS BLD VENIPUNCTURE: CPT

## 2020-03-08 PROCEDURE — 80048 BASIC METABOLIC PNL TOTAL CA: CPT

## 2020-03-08 PROCEDURE — 1200000000 HC SEMI PRIVATE

## 2020-03-08 PROCEDURE — 6360000002 HC RX W HCPCS: Performed by: INTERNAL MEDICINE

## 2020-03-08 PROCEDURE — 94640 AIRWAY INHALATION TREATMENT: CPT

## 2020-03-08 RX ORDER — INSULIN GLARGINE 100 [IU]/ML
10 INJECTION, SOLUTION SUBCUTANEOUS NIGHTLY
Status: DISCONTINUED | OUTPATIENT
Start: 2020-03-08 | End: 2020-03-10 | Stop reason: HOSPADM

## 2020-03-08 RX ADMIN — CLONIDINE HYDROCHLORIDE 0.2 MG: 0.1 TABLET ORAL at 21:52

## 2020-03-08 RX ADMIN — INSULIN LISPRO 2 UNITS: 100 INJECTION, SOLUTION INTRAVENOUS; SUBCUTANEOUS at 17:22

## 2020-03-08 RX ADMIN — PANTOPRAZOLE SODIUM 40 MG: 40 TABLET, DELAYED RELEASE ORAL at 07:03

## 2020-03-08 RX ADMIN — ASPIRIN 81 MG: 81 TABLET, COATED ORAL at 09:20

## 2020-03-08 RX ADMIN — INSULIN LISPRO 1 UNITS: 100 INJECTION, SOLUTION INTRAVENOUS; SUBCUTANEOUS at 12:57

## 2020-03-08 RX ADMIN — IPRATROPIUM BROMIDE AND ALBUTEROL SULFATE 1 AMPULE: .5; 3 SOLUTION RESPIRATORY (INHALATION) at 11:52

## 2020-03-08 RX ADMIN — INSULIN GLARGINE 10 UNITS: 100 INJECTION, SOLUTION SUBCUTANEOUS at 21:52

## 2020-03-08 RX ADMIN — Medication 10 ML: at 09:21

## 2020-03-08 RX ADMIN — Medication 1 TABLET: at 09:21

## 2020-03-08 RX ADMIN — METOPROLOL SUCCINATE 50 MG: 50 TABLET, EXTENDED RELEASE ORAL at 09:21

## 2020-03-08 RX ADMIN — Medication 2 PUFF: at 08:22

## 2020-03-08 RX ADMIN — POTASSIUM CHLORIDE 20 MEQ: 20 TABLET, EXTENDED RELEASE ORAL at 09:21

## 2020-03-08 RX ADMIN — Medication 100 MG: at 09:20

## 2020-03-08 RX ADMIN — INSULIN LISPRO 1 UNITS: 100 INJECTION, SOLUTION INTRAVENOUS; SUBCUTANEOUS at 21:53

## 2020-03-08 RX ADMIN — CLONIDINE HYDROCHLORIDE 0.2 MG: 0.1 TABLET ORAL at 09:21

## 2020-03-08 RX ADMIN — IPRATROPIUM BROMIDE AND ALBUTEROL SULFATE 1 AMPULE: .5; 3 SOLUTION RESPIRATORY (INHALATION) at 19:59

## 2020-03-08 RX ADMIN — FOLIC ACID 1 MG: 1 TABLET ORAL at 09:21

## 2020-03-08 RX ADMIN — CYANOCOBALAMIN TAB 500 MCG 1000 MCG: 500 TAB at 09:21

## 2020-03-08 RX ADMIN — PAROXETINE HYDROCHLORIDE 40 MG: 20 TABLET, FILM COATED ORAL at 09:21

## 2020-03-08 RX ADMIN — Medication 2 PUFF: at 19:59

## 2020-03-08 RX ADMIN — ATORVASTATIN CALCIUM 40 MG: 40 TABLET, FILM COATED ORAL at 21:52

## 2020-03-08 RX ADMIN — ENOXAPARIN SODIUM 40 MG: 40 INJECTION SUBCUTANEOUS at 09:20

## 2020-03-08 RX ADMIN — LEVOTHYROXINE SODIUM 25 MCG: 25 TABLET ORAL at 07:03

## 2020-03-08 RX ADMIN — IPRATROPIUM BROMIDE AND ALBUTEROL SULFATE 1 AMPULE: .5; 3 SOLUTION RESPIRATORY (INHALATION) at 08:22

## 2020-03-08 RX ADMIN — FUROSEMIDE 20 MG: 20 TABLET ORAL at 09:20

## 2020-03-08 RX ADMIN — PREDNISONE 20 MG: 20 TABLET ORAL at 09:21

## 2020-03-08 RX ADMIN — Medication 10 ML: at 21:53

## 2020-03-08 RX ADMIN — MELATONIN TAB 5 MG 5 MG: 5 TAB at 21:52

## 2020-03-08 RX ADMIN — IPRATROPIUM BROMIDE AND ALBUTEROL SULFATE 1 AMPULE: .5; 3 SOLUTION RESPIRATORY (INHALATION) at 16:37

## 2020-03-08 RX ADMIN — FLUTICASONE PROPIONATE 1 SPRAY: 50 SPRAY, METERED NASAL at 09:21

## 2020-03-08 ASSESSMENT — PAIN SCALES - GENERAL
PAINLEVEL_OUTOF10: 0

## 2020-03-08 ASSESSMENT — PAIN DESCRIPTION - PROGRESSION
CLINICAL_PROGRESSION: GRADUALLY WORSENING

## 2020-03-08 NOTE — PLAN OF CARE
Up to Toilet/Shower, and Up in Room                 With Whom? Nurse and PCA                 Level of Difficulty/Assistance: 1x Assist     Pt resting in bed at this time on room air. Pt with complaints of shortness of breath. Pt without lower extremity edema.      Patient and/or Family's stated Goal of Care this Admission: reduce shortness of breath, better understand heart failure and disease management, and be more comfortable prior to discharge        :

## 2020-03-08 NOTE — PLAN OF CARE
Problem: OXYGENATION/RESPIRATORY FUNCTION  Goal: Patient will maintain patent airway  Outcome: Ongoing     Problem: Falls - Risk of:  Goal: Will remain free from falls  Description: Will remain free from falls  Outcome: Ongoing     Problem: Serum Glucose Level - Abnormal:  Goal: Ability to maintain appropriate glucose levels will improve  Description: Ability to maintain appropriate glucose levels will improve  3/8/2020 1100 by Natalya Palacio RN  Outcome: Ongoing     Patient's EF (Ejection Fraction) is less than 40%    Heart Failure Medications:  Diuretics[de-identified] Furosemide    (One of the following REQUIRED for EF <40%/SYSTOLIC FAILURE but MAY be used in EF% >40%/DIASTOLIC FAILURE)        ACE[de-identified] None        ARB[de-identified] None         ARNI[de-identified] None    (Beta Blockers)  NON- Evidenced Based Beta Blocker (for EF% >40%/DIASTOLIC FAILURE): None    Evidenced Based Beta Blocker::(REQUIRED for EF% <40%/SYSTOLIC FAILURE) Metoprolol SUCCinate- Toprol XL  . .................................................................................................................................................. Patient's weights and intake/output reviewed: Yes    Patient's Last Weight: 173 lbs obtained by standing scale. Difference of 3.2 ounces more than last documented weight. Intake/Output Summary (Last 24 hours) at 3/8/2020 1101  Last data filed at 3/8/2020 5098  Gross per 24 hour   Intake 1680 ml   Output 1450 ml   Net 230 ml       Comorbidities Reviewed Yes    Patient has a past medical history of Asthma, Back ache, Cataract, Cerebral artery occlusion with cerebral infarction ([address] Diabetes mellitus ([facility] Hyperlipidemia, Hypertension, Insomnia, and TIA (transient ischemic attack).      >>For CHF and Comorbidity documentation on Education Time and Topics, please see Education Tab    Progressive Mobility Assessment:  What is this patient's Current Level of Mobility?: Ambulatory- with Assistance  How was this patient Mobilized today?: Edge of

## 2020-03-08 NOTE — PROGRESS NOTES
Patient up to chair with assist of 1 person. Removed purewick at this time while patient is up in chair. Has attends on at this time. Will continue to monitor.  KYLAHYIVRXMX CLARA

## 2020-03-09 LAB
ANION GAP SERPL CALCULATED.3IONS-SCNC: 11 MMOL/L (ref 3–16)
BUN BLDV-MCNC: 15 MG/DL (ref 7–20)
CALCIUM SERPL-MCNC: 9.6 MG/DL (ref 8.3–10.6)
CHLORIDE BLD-SCNC: 97 MMOL/L (ref 99–110)
CO2: 27 MMOL/L (ref 21–32)
CREAT SERPL-MCNC: 0.8 MG/DL (ref 0.6–1.2)
GFR AFRICAN AMERICAN: >60
GFR NON-AFRICAN AMERICAN: >60
GLUCOSE BLD-MCNC: 109 MG/DL (ref 70–99)
GLUCOSE BLD-MCNC: 111 MG/DL (ref 70–99)
GLUCOSE BLD-MCNC: 113 MG/DL (ref 70–99)
GLUCOSE BLD-MCNC: 200 MG/DL (ref 70–99)
GLUCOSE BLD-MCNC: 208 MG/DL (ref 70–99)
PERFORMED ON: ABNORMAL
POTASSIUM REFLEX MAGNESIUM: 4.4 MMOL/L (ref 3.5–5.1)
SODIUM BLD-SCNC: 135 MMOL/L (ref 136–145)

## 2020-03-09 PROCEDURE — 6370000000 HC RX 637 (ALT 250 FOR IP): Performed by: INTERNAL MEDICINE

## 2020-03-09 PROCEDURE — 6360000002 HC RX W HCPCS: Performed by: INTERNAL MEDICINE

## 2020-03-09 PROCEDURE — 36415 COLL VENOUS BLD VENIPUNCTURE: CPT

## 2020-03-09 PROCEDURE — 97535 SELF CARE MNGMENT TRAINING: CPT

## 2020-03-09 PROCEDURE — 94640 AIRWAY INHALATION TREATMENT: CPT

## 2020-03-09 PROCEDURE — 97530 THERAPEUTIC ACTIVITIES: CPT

## 2020-03-09 PROCEDURE — 1200000000 HC SEMI PRIVATE

## 2020-03-09 PROCEDURE — 80048 BASIC METABOLIC PNL TOTAL CA: CPT

## 2020-03-09 PROCEDURE — 2580000003 HC RX 258: Performed by: INTERNAL MEDICINE

## 2020-03-09 PROCEDURE — 97116 GAIT TRAINING THERAPY: CPT

## 2020-03-09 RX ADMIN — Medication 2 PUFF: at 08:31

## 2020-03-09 RX ADMIN — INSULIN GLARGINE 10 UNITS: 100 INJECTION, SOLUTION SUBCUTANEOUS at 21:07

## 2020-03-09 RX ADMIN — Medication 10 ML: at 08:20

## 2020-03-09 RX ADMIN — ATORVASTATIN CALCIUM 40 MG: 40 TABLET, FILM COATED ORAL at 21:02

## 2020-03-09 RX ADMIN — FOLIC ACID 1 MG: 1 TABLET ORAL at 08:17

## 2020-03-09 RX ADMIN — ASPIRIN 81 MG: 81 TABLET, COATED ORAL at 08:18

## 2020-03-09 RX ADMIN — IPRATROPIUM BROMIDE AND ALBUTEROL SULFATE 1 AMPULE: .5; 3 SOLUTION RESPIRATORY (INHALATION) at 12:10

## 2020-03-09 RX ADMIN — IPRATROPIUM BROMIDE AND ALBUTEROL SULFATE 1 AMPULE: .5; 3 SOLUTION RESPIRATORY (INHALATION) at 21:19

## 2020-03-09 RX ADMIN — ENOXAPARIN SODIUM 40 MG: 40 INJECTION SUBCUTANEOUS at 08:18

## 2020-03-09 RX ADMIN — CLONIDINE HYDROCHLORIDE 0.2 MG: 0.1 TABLET ORAL at 08:17

## 2020-03-09 RX ADMIN — CLONIDINE HYDROCHLORIDE 0.2 MG: 0.1 TABLET ORAL at 21:02

## 2020-03-09 RX ADMIN — PAROXETINE HYDROCHLORIDE 40 MG: 20 TABLET, FILM COATED ORAL at 08:17

## 2020-03-09 RX ADMIN — Medication 2 PUFF: at 21:19

## 2020-03-09 RX ADMIN — Medication 10 ML: at 21:08

## 2020-03-09 RX ADMIN — POTASSIUM CHLORIDE 20 MEQ: 20 TABLET, EXTENDED RELEASE ORAL at 08:18

## 2020-03-09 RX ADMIN — INSULIN LISPRO 1 UNITS: 100 INJECTION, SOLUTION INTRAVENOUS; SUBCUTANEOUS at 21:03

## 2020-03-09 RX ADMIN — Medication 100 MG: at 08:17

## 2020-03-09 RX ADMIN — Medication 1 TABLET: at 08:17

## 2020-03-09 RX ADMIN — CYANOCOBALAMIN TAB 500 MCG 1000 MCG: 500 TAB at 08:18

## 2020-03-09 RX ADMIN — PANTOPRAZOLE SODIUM 40 MG: 40 TABLET, DELAYED RELEASE ORAL at 06:41

## 2020-03-09 RX ADMIN — IPRATROPIUM BROMIDE AND ALBUTEROL SULFATE 1 AMPULE: .5; 3 SOLUTION RESPIRATORY (INHALATION) at 08:32

## 2020-03-09 RX ADMIN — ACETAMINOPHEN 650 MG: 325 TABLET ORAL at 21:02

## 2020-03-09 RX ADMIN — FLUTICASONE PROPIONATE 1 SPRAY: 50 SPRAY, METERED NASAL at 08:20

## 2020-03-09 RX ADMIN — INSULIN LISPRO 2 UNITS: 100 INJECTION, SOLUTION INTRAVENOUS; SUBCUTANEOUS at 12:17

## 2020-03-09 RX ADMIN — FUROSEMIDE 20 MG: 20 TABLET ORAL at 08:17

## 2020-03-09 RX ADMIN — LEVOTHYROXINE SODIUM 25 MCG: 25 TABLET ORAL at 06:41

## 2020-03-09 RX ADMIN — METOPROLOL SUCCINATE 50 MG: 50 TABLET, EXTENDED RELEASE ORAL at 08:17

## 2020-03-09 ASSESSMENT — PAIN DESCRIPTION - FREQUENCY: FREQUENCY: CONTINUOUS

## 2020-03-09 ASSESSMENT — PAIN SCALES - GENERAL
PAINLEVEL_OUTOF10: 0
PAINLEVEL_OUTOF10: 3
PAINLEVEL_OUTOF10: 0

## 2020-03-09 ASSESSMENT — PAIN SCALES - WONG BAKER
WONGBAKER_NUMERICALRESPONSE: 2
WONGBAKER_NUMERICALRESPONSE: 2

## 2020-03-09 ASSESSMENT — PAIN DESCRIPTION - LOCATION: LOCATION: ARM;NECK

## 2020-03-09 ASSESSMENT — PAIN DESCRIPTION - PROGRESSION
CLINICAL_PROGRESSION: GRADUALLY WORSENING

## 2020-03-09 ASSESSMENT — PAIN DESCRIPTION - DESCRIPTORS
DESCRIPTORS: ACHING
DESCRIPTORS: HEADACHE

## 2020-03-09 ASSESSMENT — PAIN DESCRIPTION - PAIN TYPE
TYPE: ACUTE PAIN
TYPE: ACUTE PAIN

## 2020-03-09 ASSESSMENT — PAIN DESCRIPTION - ORIENTATION: ORIENTATION: RIGHT

## 2020-03-09 NOTE — PLAN OF CARE
Patient's EF (Ejection Fraction) is less than 40%    Heart Failure Medications:  Diuretics[de-identified] Furosemide    (One of the following REQUIRED for EF <40%/SYSTOLIC FAILURE but MAY be used in EF% >40%/DIASTOLIC FAILURE)        ACE[de-identified] None        ARB[de-identified] None         ARNI[de-identified] None    (Beta Blockers)  NON- Evidenced Based Beta Blocker (for EF% >40%/DIASTOLIC FAILURE): None    Evidenced Based Beta Blocker::(REQUIRED for EF% <40%/SYSTOLIC FAILURE) Metoprolol SUCCinate- Toprol XL  . .................................................................................................................................................. Patient's weights and intake/output reviewed: Yes    Patient's Last Weight: 172 lbs obtained by standing scale. Difference of 1 lbs less than last documented weight. Intake/Output Summary (Last 24 hours) at 3/9/2020 1024  Last data filed at 3/9/2020 1023  Gross per 24 hour   Intake 1580 ml   Output 1500 ml   Net 80 ml       Comorbidities Reviewed Yes    Patient has a past medical history of Asthma, Back ache, Cataract, Cerebral artery occlusion with cerebral infarction ([address] Diabetes mellitus ([address] Hyperlipidemia, Hypertension, Insomnia, and TIA (transient ischemic attack). >>For CHF and Comorbidity documentation on Education Time and Topics, please see Education Tab    Progressive Mobility Assessment:  What is this patient's Current Level of Mobility?: Ambulatory- with Assistance  How was this patient Mobilized today?: Edge of Bed, Up to Chair,  Up to Toilet/Shower, and Up in Room                 With Whom? Nurse, PCA, PT, and OT                 Level of Difficulty/Assistance: 1x Assist     Pt up in chair at this time on room air. Pt denies shortness of breath. Pt without lower extremity edema.      Patient and/or Family's stated Goal of Care this Admission: increase activity tolerance, better understand heart failure and disease management, and be more comfortable prior to discharge        :

## 2020-03-09 NOTE — PLAN OF CARE
Problem: Serum Glucose Level - Abnormal:  Goal: Ability to maintain appropriate glucose levels will improve  Description: Ability to maintain appropriate glucose levels will improve  3/9/2020 1026 by Selam Wallace RN  Outcome: Ongoing   Pt educated on the importance of a carb control diet. Monitoring pt's blood glucose AC/HS. Sliding scale insulin given as ordered according to pt's blood glucose. Will continue to monitor.

## 2020-03-09 NOTE — CARE COORDINATION
Sw spoke with spouse who would like a referral to [facility] for a SNF option. Sw will attempt to discuss with MD. Referral being made to [facility] now. [facility] will attempt to get precert initiated and possible transferred to SNF level of care.

## 2020-03-09 NOTE — PROGRESS NOTES
Bedside report given to 9 [facility]. Call light and bedside table within reach. No needs voiced at this time. Bed in lowest position, brakes locked. Bed alarm on and in place.

## 2020-03-09 NOTE — PROGRESS NOTES
Recent Labs     03/06/20  0900 03/08/20  0859    137   K 4.1 4.2    97*   CO2 27 28   BUN 19 16   CREATININE 0.9 0.9   CALCIUM 9.2 9.5     No results for input(s): AST, ALT, BILIDIR, BILITOT, ALKPHOS in the last 72 hours. No results for input(s): INR in the last 72 hours. No results for input(s): Monda Saupe in the last 72 hours. Urinalysis:      Lab Results   Component Value Date    NITRU Negative 03/20/2017    WBCUA 6-10 02/12/2017    BACTERIA Rare 02/12/2017    RBCUA 3-5 02/12/2017    BLOODU Negative 03/20/2017    SPECGRAV 1.020 03/20/2017    GLUCOSEU Negative 03/20/2017       Radiology:  CT HEAD WO CONTRAST   Final Result   No hemorrhage or mass identified. Pontine, periventricular and scattered frontal parietal white matter disease,   likely due to small-vessel ischemic change, similar to prior                 Assessment/Plan:    Active Hospital Problems    Diagnosis    CHF (congestive heart failure), NYHA class III, acute, systolic ([facility]) [E68.13]    NSTEMI (non-ST elevated myocardial infarction) ([facility] [address] [I21.4]    Cardiomyopathy ([facility] [S29.2]    Acute systolic CHF (congestive heart failure) ([facility]) [I50.21]       The patient is a 69 Y F with a h/o smoking, alcoholism, HTN, HLD, DM2, and CVA.  She presented to [facility] on 2/28 with about a week of dyspnea and weakness.  She was admitted for treatment of CHF, and was found to have an EF of 30-35% on TTE (was 55% in 2016).  She was transferred to [facility] for [facility] on 3/3. [place] showed only mild, non-obstructive CAD, and it was determined that she had a nonischemic cardiomyopathy with apical ballooning.  Cardiology asked hospital medicine to admit the patient for further diuresis since she was still on oxygen.  The patient also had been confused and disoriented ever since the first night of admission to [facility].          Acute on chronic systolic CHF due to nonischemic CMP  - furosemide IV.  She weighed 178 lbs six months ago at a clinic visit.  Diuresed from 182 to 174 lbs at [facility]. Here she developed TRINH at 170 lbs, so we skipped a day of furosemide then continued with 20 po qd when TRINH resolved. Reassess soon as outpatient. - lisinopril.  Changed to metoprolol succinate.  She is also on clonidine.  - she will need reassessment in 90 days for consideration of AICD  - encouraged her to stop drinking and smoking.  This seems unlikely.     TRINH  - this resulted at the end of diuresis.  Baseline Cr is normal.  Resolved.      AECOPD  - steroids, inhaled bronchodilators     Obesity  - encouraged weight loss through diet and exercise     Acute hypoxic respiratory failure  - likely due to the above issues.  Treated accordingly.  Doubt PE.    - weaned to RA.  She was 91% on RA six months ago at a clinic visit.     Alcohol dependence complicated by withdrawal  - treated with chlordiazepoxide at [facility].  Doing well without further benzodiazepines at this point.  Vitamins.  Encouraged cessation.     Acute metabolic encephalopathy  - likely due to the above issues.  Supportive care.  Nonspecific delirium related to hospitalization is probable as well. Resolved, she is back to baseline. - stopped trazodone, she always seemed to be sleeping.   - liver was unremarkable on 2018 CT.  Normal ammonia level. - She was unable to tolerate MRI brain, even with lorazepam IV which made her sleep for a long time into the following day.  She has a h/o previous stroke.  Negative head CT at least ruled out large bleed. - no CO2 retention per [facility] blood gas.    - no apparent infections, no dysuria, improved back to normal, no need for UA     DM2  - A1c 7.2.  Insulin regimen.      HLD  - statin     Hypothyroidism  - Clinically euthyroid.  Continue home dose of levothyroxine. DVT Prophylaxis: enoxaparin  Diet: DIET CARDIAC;  Code Status: Full Code. PT/OT Eval Status: rec'd IP rehab    Dispo - medically ready as of 3/6.   She lives at home with her  and wants to return straight home, but would be willing to go to ARU. Patient sometimes refuses therapy, unsure if she would be candidate. Currently precerting ARU. She is not a hospice candidate, and as far as I can tell the patient and family still want aggressive care. I hear that a family friend works for  and the family requested that nursing put in a consult so that the family could get more help in the home - I am fairly indifferent toward who gives her help at home, but again, she is not appropriate for hospice. Wll look to  to help sort this out.     Agreed with above statement from 1 of my partners      Kimberly Álvarez MD

## 2020-03-09 NOTE — PROGRESS NOTES
Occupational Therapy  Facility/Department: [facility] A2 CARD TELEMETRY  Daily Treatment Note  NAME: Priscilla Fleischer  : 1945  MRN: [id number]    Date of Service: 3/9/2020    Discharge Recommendations:  Subacute/Skilled Nursing Facility       Assessment   Performance deficits / Impairments: Decreased functional mobility ; Decreased endurance;Decreased ADL status; Decreased cognition;Decreased coordination;Decreased balance;Decreased strength  Assessment: Pt required encouragement this date to participate. Pt required increased time this date. Pt SBA for supine to sit with HOB flat this date. Pt required HHA for functional mobility. Pt min A for functional mobility and had mod A for two LOB. Pt stated that at home she is unsteady as well. Pt stood at sink to perform grooming task. Pt stated that \"I don't brush teeth because they just fall out. \". Pt was seated in chair upon exit and educated on call light. Pt would benefit from further OT services upon d/c. Prognosis: Fair  Decision Making: Medium Complexity    REQUIRES OT FOLLOW UP: Yes  Activity Tolerance  Activity Tolerance: Patient Tolerated treatment well  Safety Devices  Safety Devices in place: Yes  Type of devices: Call light within reach; Left in chair;Chair alarm in place;Nurse notified;Gait belt         Patient Diagnosis(es): There were no encounter diagnoses. has a past medical history of Asthma, Back ache, Cataract, Cerebral artery occlusion with cerebral infarction ([address] Diabetes mellitus ([address] Hyperlipidemia, Hypertension, Insomnia, and TIA (transient ischemic attack). has a past surgical history that includes  section; Dental surgery; Abdomen surgery; Cataract removal with implant (Left, [id number]); and eye surgery (10/29/13).     Restrictions  Restrictions/Precautions  Restrictions/Precautions: Fall Risk  Position Activity Restriction  Other position/activity restrictions: up with assistance, telemetry     Subjective   General  Chart Akosua Leroyence, CHANEL

## 2020-03-09 NOTE — PLAN OF CARE
Problem: OXYGENATION/RESPIRATORY FUNCTION  Goal: Patient will achieve/maintain normal respiratory rate/effort  Description: Respiratory rate and effort will be within normal limits for the patient  Outcome: Ongoing     Problem: Serum Glucose Level - Abnormal:  Goal: Ability to maintain appropriate glucose levels will improve  Description: Ability to maintain appropriate glucose levels will improve  3/8/2020 2333 by Valentino Rua, RN  Outcome: Ongoing    Patient's EF (Ejection Fraction) is less than 40%    Heart Failure Medications:  Diuretics[de-identified] Furosemide    (One of the following REQUIRED for EF <40%/SYSTOLIC FAILURE but MAY be used in EF% >40%/DIASTOLIC FAILURE)        ACE[de-identified] None        ARB[de-identified] None         ARNI[de-identified] None    (Beta Blockers)  NON- Evidenced Based Beta Blocker (for EF% >40%/DIASTOLIC FAILURE): None    Evidenced Based Beta Blocker::(REQUIRED for EF% <40%/SYSTOLIC FAILURE) Metoprolol SUCCinate- Toprol XL  . .................................................................................................................................................. Patient's weights and intake/output reviewed: Yes    Patient's Last Weight: 173 lbs obtained by standing scale. Difference of 3.2oz more than last documented weight. Intake/Output Summary (Last 24 hours) at 3/8/2020 2333  Last data filed at 3/8/2020 2044  Gross per 24 hour   Intake 2000 ml   Output 1350 ml   Net 650 ml       Comorbidities Reviewed Yes    Patient has a past medical history of Asthma, Back ache, Cataract, Cerebral artery occlusion with cerebral infarction ([address] Diabetes mellitus ([facility] Hyperlipidemia, Hypertension, Insomnia, and TIA (transient ischemic attack).      >>For CHF and Comorbidity documentation on Education Time and Topics, please see Education Tab    Progressive Mobility Assessment:  What is this patient's Current Level of Mobility?: Ambulatory- with Assistance  How was this patient Mobilized today?: Up to Chair,  Up to Toilet/Shower, and Up in Room                 With Whom? Nurse and PCA                 Level of Difficulty/Assistance: 1x Assist     Pt resting in bed at this time on room air. Pt denies shortness of breath. Pt without lower extremity edema.      Patient and/or Family's stated Goal of Care this Admission: increase activity tolerance, better understand heart failure and disease management, and be more comfortable prior to discharge        :

## 2020-03-09 NOTE — PROGRESS NOTES
Vitals:    03/09/20 0730   BP: (!) 151/98   Pulse: 71   Resp: 16   Temp: 98.3 °F (36.8 °C)   SpO2: 93%     Pt given scheduled morning BP medications. Will recheck in one hour.

## 2020-03-10 VITALS
BODY MASS INDEX: 30.44 KG/M2 | HEIGHT: 63 IN | WEIGHT: 171.8 LBS | TEMPERATURE: 98.2 F | HEART RATE: 61 BPM | DIASTOLIC BLOOD PRESSURE: 72 MMHG | OXYGEN SATURATION: 98 % | RESPIRATION RATE: 18 BRPM | SYSTOLIC BLOOD PRESSURE: 108 MMHG

## 2020-03-10 LAB
GLUCOSE BLD-MCNC: 110 MG/DL (ref 70–99)
GLUCOSE BLD-MCNC: 122 MG/DL (ref 70–99)
GLUCOSE BLD-MCNC: 175 MG/DL (ref 70–99)
PERFORMED ON: ABNORMAL

## 2020-03-10 PROCEDURE — 6370000000 HC RX 637 (ALT 250 FOR IP): Performed by: INTERNAL MEDICINE

## 2020-03-10 PROCEDURE — 94640 AIRWAY INHALATION TREATMENT: CPT

## 2020-03-10 PROCEDURE — 6360000002 HC RX W HCPCS: Performed by: INTERNAL MEDICINE

## 2020-03-10 PROCEDURE — 2580000003 HC RX 258: Performed by: INTERNAL MEDICINE

## 2020-03-10 RX ORDER — INSULIN GLARGINE 100 [IU]/ML
10 INJECTION, SOLUTION SUBCUTANEOUS NIGHTLY
Qty: 1 VIAL | Refills: 3 | Status: ON HOLD | DISCHARGE
Start: 2020-03-10 | End: 2021-05-23

## 2020-03-10 RX ORDER — IPRATROPIUM BROMIDE AND ALBUTEROL SULFATE 2.5; .5 MG/3ML; MG/3ML
1 SOLUTION RESPIRATORY (INHALATION) EVERY 4 HOURS PRN
Status: DISCONTINUED | OUTPATIENT
Start: 2020-03-10 | End: 2020-03-10 | Stop reason: HOSPADM

## 2020-03-10 RX ADMIN — Medication 2 PUFF: at 08:35

## 2020-03-10 RX ADMIN — IPRATROPIUM BROMIDE AND ALBUTEROL SULFATE 1 AMPULE: .5; 3 SOLUTION RESPIRATORY (INHALATION) at 08:35

## 2020-03-10 RX ADMIN — Medication 100 MG: at 09:35

## 2020-03-10 RX ADMIN — CLONIDINE HYDROCHLORIDE 0.2 MG: 0.1 TABLET ORAL at 09:35

## 2020-03-10 RX ADMIN — POTASSIUM CHLORIDE 20 MEQ: 20 TABLET, EXTENDED RELEASE ORAL at 09:35

## 2020-03-10 RX ADMIN — ENOXAPARIN SODIUM 40 MG: 40 INJECTION SUBCUTANEOUS at 09:34

## 2020-03-10 RX ADMIN — FUROSEMIDE 20 MG: 20 TABLET ORAL at 09:35

## 2020-03-10 RX ADMIN — LEVOTHYROXINE SODIUM 25 MCG: 25 TABLET ORAL at 05:25

## 2020-03-10 RX ADMIN — Medication 10 ML: at 09:35

## 2020-03-10 RX ADMIN — PANTOPRAZOLE SODIUM 40 MG: 40 TABLET, DELAYED RELEASE ORAL at 05:25

## 2020-03-10 RX ADMIN — Medication 1 TABLET: at 09:35

## 2020-03-10 RX ADMIN — FLUTICASONE PROPIONATE 1 SPRAY: 50 SPRAY, METERED NASAL at 09:34

## 2020-03-10 RX ADMIN — ASPIRIN 81 MG: 81 TABLET, COATED ORAL at 09:35

## 2020-03-10 RX ADMIN — FOLIC ACID 1 MG: 1 TABLET ORAL at 09:35

## 2020-03-10 RX ADMIN — PAROXETINE HYDROCHLORIDE 40 MG: 20 TABLET, FILM COATED ORAL at 09:35

## 2020-03-10 RX ADMIN — MELATONIN TAB 5 MG 5 MG: 5 TAB at 03:47

## 2020-03-10 RX ADMIN — METOPROLOL SUCCINATE 50 MG: 50 TABLET, EXTENDED RELEASE ORAL at 09:35

## 2020-03-10 RX ADMIN — CYANOCOBALAMIN TAB 500 MCG 1000 MCG: 500 TAB at 09:36

## 2020-03-10 ASSESSMENT — PAIN SCALES - GENERAL
PAINLEVEL_OUTOF10: 0
PAINLEVEL_OUTOF10: 0

## 2020-03-10 NOTE — PLAN OF CARE
Problem: Pain:  Goal: Pain level will decrease  Description: Pain level will decrease  Outcome: Ongoing     Problem: Falls - Risk of:  Goal: Will remain free from falls  Description: Will remain free from falls  Outcome: Ongoing     Problem: Serum Glucose Level - Abnormal:  Goal: Ability to maintain appropriate glucose levels will improve  Description: Ability to maintain appropriate glucose levels will improve  Outcome: Ongoing     Pt educated on the importance of a carb control diet. Monitoring pt's blood glucose AC/HS. Sliding scale insulin given as ordered according to pt's blood glucose. Will continue to monitor. Problem: OXYGENATION/RESPIRATORY FUNCTION  Goal: Patient will maintain patent airway  Outcome: Ongoing       Patient's EF (Ejection Fraction) is less than 40%    Heart Failure Medications:  Diuretics[de-identified] Furosemide    (One of the following REQUIRED for EF <40%/SYSTOLIC FAILURE but MAY be used in EF% >40%/DIASTOLIC FAILURE)        ACE[de-identified] None        ARB[de-identified] None         ARNI[de-identified] None    (Beta Blockers)  NON- Evidenced Based Beta Blocker (for EF% >40%/DIASTOLIC FAILURE): None    Evidenced Based Beta Blocker::(REQUIRED for EF% <40%/SYSTOLIC FAILURE) Metoprolol SUCCinate- Toprol XL  . .................................................................................................................................................. Patient's weights and intake/output reviewed: Yes    Patient's Last Weight: 172 lbs obtained by standing scale. Difference of 1 lbs less than last documented weight. Intake/Output Summary (Last 24 hours) at 3/10/2020 0112  Last data filed at 3/9/2020 2344  Gross per 24 hour   Intake 1440 ml   Output 1050 ml   Net 390 ml       Comorbidities Reviewed Yes    Patient has a past medical history of Asthma, Back ache, Cataract, Cerebral artery occlusion with cerebral infarction ([facility] [address] Diabetes mellitus ([facility] [address] Hyperlipidemia, Hypertension, Insomnia, and TIA (transient ischemic attack). >>For CHF and Comorbidity documentation on Education Time and Topics, please see Education Tab    Progressive Mobility Assessment:  What is this patient's Current Level of Mobility?: Ambulatory- with Assistance  How was this patient Mobilized today?: Edge of Bed and  Up to Toilet/Shower                 With Whom? Nurse, PCA, and Self                 Level of Difficulty/Assistance: 1x Assist     Pt resting in bed at this time on room air. Pt denies shortness of breath. Pt without lower extremity edema.      Patient and/or Family's stated Goal of Care this Admission: increase activity tolerance, better understand heart failure and disease management, and be more comfortable prior to discharge        :

## 2020-03-10 NOTE — PROGRESS NOTES
Nutrition Assessment (Low Risk)    Type and Reason for Visit: Initial(Seeing for LOS day 7)    Nutrition Recommendations:   1. Continue cardiac diet   2. Encourage diet compliance and healthy lifestyle   3. Monitor nutrition adequacy, pertinent labs, bowel habits, wt changes, and clinical progress    Nutrition Assessment:  Patient assessed for nutritional risk. Deemed to be at low risk at this time. Will continue to monitor for changes in status. Pt nutritionally stable AEB good appetite and PO intakes. Pt report good appetite and PO intakes, % per EMR. Pt c/o small diarrhea today, first time since admission per pt. Pt reports may have difficult time following low sodium diet at home d/t family eating habits. Pt reports she does not cook d/t small rental home kitchen, eats delivered pizza frequently. Encouraged pt to look at nutrition facts prior to going out to eat for sodium content. Pt had no further nutrition related questions at this time. Will continue to monitor.      Malnutrition Assessment:  · Malnutrition Status: No malnutrition    Nutrition Risk Level   Risk Level: Low    Nutrition Diagnosis:   · Problem: No nutrition diagnosis at this time    Nutrition Intervention:  Food and/or Delivery: Continue current diet  Nutrition Education/Counseling/Coordination of Care:  Education Initiated, Continued Inpatient Monitoring      Electronically signed by Kamron Wolff MS, RD, LD on 3/10/20 at 11:23 AM EDT    Contact Number: Office: [phone]; [address]: [phone]

## 2020-03-10 NOTE — PROGRESS NOTES
Secure message sent to Elio Jewell MD, \"Pt lost IV access overnight. No IV meds scheduled. Medically ready for discharge just waiting on placement. Did you want another IV placed or is it ok without one? \"    No response from MD    RN responded, \"Day shift RN's number is [phone] if you would like a new line placed. Day shift RN is aware\"        Per MD patient stable and may go without line at this time.

## 2020-03-10 NOTE — PROGRESS NOTES
Alana Orellana MD paged \"Patient requesting an antidiarrheal. Patient has had two occurrences today. Please advise. Thanks! \"  Awaiting response. Will continue to monitor. Per MD check for CDIFF. Patient stool does not meet criteria for CDIFF sample. Discontinued PRN miralax. No Antidiarrheal at this time.

## 2020-03-10 NOTE — CARE COORDINATION
CASE MANAGEMENT DISCHARGE SUMMARY      Discharge to: Home    New Durable Medical Equipment ordered/agency: [facility]     Transportation: Private Via Family       Confirmed discharge plan with: Pt, , and son at bedside      Facility/Agency, name:  [facility] fax[facility]        RN, name: María Blanton RN

## 2020-03-10 NOTE — PROGRESS NOTES
Hospitalist Progress Note      PCP: Halle Velazquez MD    Date of Admission: 3/3/2020    Chief Complaint:  Dyspnea, weakness     History Of Present Illness: The patient is a 69 Y F with a h/o smoking, alcoholism, HTN, HLD, DM2, and CVA. She presented to [facility] on 2/28 with about a week of dyspnea and weakness. She was admitted for treatment of CHF, and was found to have an EF of 30-35% on TTE (was 55% in 2016). She was transferred to [facility] on 3/3. LHC showed only mild, non-obstructive CAD, and it was determined that she had a nonischemic cardiomyopathy with apical ballooning. Cardiology asked hospital medicine to admit the patient for further diuresis since she was still on oxygen. The patient also had been confused and disoriented ever since the first night of admission to [facility]. Subjective:        No complaints like chest pain, shortness of breath, cough, fever or confusion.        Medications:  Reviewed    Infusion Medications    dextrose       Scheduled Medications    insulin glargine  10 Units Subcutaneous Nightly    furosemide  20 mg Oral Daily    insulin lispro  0-6 Units Subcutaneous TID WC    insulin lispro  0-3 Units Subcutaneous Nightly    atorvastatin  40 mg Oral Nightly    vitamin B-12  1,000 mcg Oral Daily    therapeutic multivitamin-minerals  1 tablet Oral Daily    fluticasone  1 spray Nasal Daily    cloNIDine  0.2 mg Oral BID    PARoxetine  40 mg Oral QAM    levothyroxine  25 mcg Oral Daily    vitamin B-1  100 mg Oral Daily    sodium chloride flush  10 mL Intravenous 2 times per day    folic acid  1 mg Oral Daily    aspirin  81 mg Oral Daily    pantoprazole  40 mg Oral QAM AC    potassium chloride  20 mEq Oral Daily    metoprolol succinate  50 mg Oral Daily    ipratropium-albuterol  1 ampule Inhalation Q4H WA    enoxaparin  40 mg Subcutaneous Daily    budesonide-formoterol  2 puff Inhalation BID    nicotine  1 patch Transdermal Daily     PRN Meds: TRINH at 170 lbs, so we skipped a day of furosemide then continued with 20 po qd when TRINH resolved. Reassess soon as outpatient. - lisinopril.  Changed to metoprolol succinate.  She is also on clonidine.  - she will need reassessment in 90 days for consideration of AICD  - encouraged her to stop drinking and smoking.  This seems unlikely.     TRINH  - this resulted at the end of diuresis.  Baseline Cr is normal.  Resolved.      AECOPD  - steroids, inhaled bronchodilators     Obesity  - encouraged weight loss through diet and exercise     Acute hypoxic respiratory failure  - likely due to the above issues.  Treated accordingly.  Doubt PE.    - weaned to RA.  She was 91% on RA six months ago at a clinic visit.     Alcohol dependence complicated by withdrawal  - treated with chlordiazepoxide at [facility].  Doing well without further benzodiazepines at this point.  Vitamins.  Encouraged cessation.     Acute metabolic encephalopathy  - likely due to the above issues.  Supportive care.  Nonspecific delirium related to hospitalization is probable as well. Resolved, she is back to baseline. - stopped trazodone, she always seemed to be sleeping.   - liver was unremarkable on 2018 CT.  Normal ammonia level. - She was unable to tolerate MRI brain, even with lorazepam IV which made her sleep for a long time into the following day.  She has a h/o previous stroke.  Negative head CT at least ruled out large bleed. - no CO2 retention per [facility] blood gas.    - no apparent infections, no dysuria, improved back to normal, no need for UA     DM2  - A1c 7.2.  Insulin regimen.      HLD  - statin     Hypothyroidism  - Clinically euthyroid.  Continue home dose of levothyroxine. DVT Prophylaxis: enoxaparin  Diet: DIET CARDIAC;  Code Status: Full Code. PT/OT Eval Status: rec'd IP rehab    Dispo - medically ready as of 3/6. She lives at home with her  and wants to return straight home, but would be willing to go to [facility].   [facility] declined   Waiting for skilled nursing placement    Debi Maya MD

## 2020-03-10 NOTE — PROGRESS NOTES
Luis Eduardo Antunez MD paged \"Patient's cert was denied for [employer]GS. Patient and family would like to take her home versus waiting for appeal. Can you update med rec and send to preferred pharmacy since patient will be discharging home. Please advise. Thanks! \"  Awaiting response. Will continue to monitor. Prescriptions for Lantus, aspirin, folic acid, Metoprolol Succinate, nicotine patch, and nicorette gum were called into [facility] pharmacy @ [phone]. Patient and family aware. Patient and family notified to hold on picking up Lantus as she does not have proper education on this medication. Instructed to resume metformin as prescribed and taking prior and to follow up ASAP with PCP. Pt d/c'd home. Removed  IV and stopped bleeding. Catheter intact. Pt tolerated well. No redness noted at site. Notified [facility] and removed tele box. Reviewed d/c instructions, home meds, and  f/u information utilizing teach-back method. Patient verbalized understanding.

## 2020-03-10 NOTE — CARE COORDINATION
CM received phone call from [facility] at [facility]. She states that facility received notice that insurance is intending to deny pt for SNF level of care. P2P info listed below:    Phone [phone]    Discussed with Dr. Rambo Clark. She states that she is willing to do P2P. CM set up for today before 1600 between Dr. Rambo Clark and Dr. Marija Conner with [facility]. Will await determination and set pt up for transport in the meantime. Keyshawn Delgado RN    Addendum [phone] Per Dr. Rambo Clark, [facility] [facility] denied. Pt presented with option to appeal decision with [facility] or go home with [facility]. Pt and family opted to go home with [facility], no agency preference. Referral placed with [facility] who are able to accept and start care tomorrow 3/11/20. Primary RN updated.     Keyshawn Delgado RN

## 2020-03-20 ENCOUNTER — TELEPHONE (OUTPATIENT)
Dept: OTHER | Facility: CLINIC | Age: 75
End: 2020-03-20

## 2020-03-20 ENCOUNTER — OFFICE VISIT (OUTPATIENT)
Dept: CARDIOLOGY CLINIC | Age: 75
End: 2020-03-20
Payer: MEDICARE

## 2020-03-20 ENCOUNTER — HOSPITAL ENCOUNTER (INPATIENT)
Age: 75
LOS: 2 days | Discharge: HOME HEALTH | DRG: 683 | End: 2020-03-22
Attending: EMERGENCY MEDICINE | Admitting: INTERNAL MEDICINE
Payer: MEDICARE

## 2020-03-20 VITALS
HEART RATE: 90 BPM | WEIGHT: 174 LBS | DIASTOLIC BLOOD PRESSURE: 56 MMHG | SYSTOLIC BLOOD PRESSURE: 72 MMHG | BODY MASS INDEX: 28.99 KG/M2 | OXYGEN SATURATION: 97 % | HEIGHT: 65 IN

## 2020-03-20 PROBLEM — N17.9 AKI (ACUTE KIDNEY INJURY) (HCC): Status: ACTIVE | Noted: 2020-03-20

## 2020-03-20 PROBLEM — I50.22 CHRONIC SYSTOLIC HEART FAILURE (HCC): Status: ACTIVE | Noted: 2020-03-20

## 2020-03-20 LAB
A/G RATIO: 1.1 (ref 1.1–2.2)
ALBUMIN SERPL-MCNC: 3.6 G/DL (ref 3.4–5)
ALP BLD-CCNC: 55 U/L (ref 40–129)
ALT SERPL-CCNC: 14 U/L (ref 10–40)
ANION GAP SERPL CALCULATED.3IONS-SCNC: 13 MMOL/L (ref 3–16)
ANION GAP SERPL CALCULATED.3IONS-SCNC: 14 MMOL/L (ref 3–16)
AST SERPL-CCNC: 21 U/L (ref 15–37)
BACTERIA: ABNORMAL /HPF
BASOPHILS ABSOLUTE: 0.1 K/UL (ref 0–0.2)
BASOPHILS RELATIVE PERCENT: 1.5 %
BILIRUB SERPL-MCNC: <0.2 MG/DL (ref 0–1)
BILIRUBIN URINE: NEGATIVE
BLOOD, URINE: ABNORMAL
BUN BLDV-MCNC: 21 MG/DL (ref 7–20)
BUN BLDV-MCNC: 24 MG/DL (ref 7–20)
CALCIUM SERPL-MCNC: 10.1 MG/DL (ref 8.3–10.6)
CALCIUM SERPL-MCNC: 9.6 MG/DL (ref 8.3–10.6)
CHLORIDE BLD-SCNC: 94 MMOL/L (ref 99–110)
CHLORIDE BLD-SCNC: 99 MMOL/L (ref 99–110)
CLARITY: ABNORMAL
CO2: 23 MMOL/L (ref 21–32)
CO2: 26 MMOL/L (ref 21–32)
COLOR: YELLOW
CREAT SERPL-MCNC: 1.3 MG/DL (ref 0.6–1.2)
CREAT SERPL-MCNC: 1.6 MG/DL (ref 0.6–1.2)
EKG ATRIAL RATE: 75 BPM
EKG DIAGNOSIS: NORMAL
EKG P AXIS: 70 DEGREES
EKG P-R INTERVAL: 188 MS
EKG Q-T INTERVAL: 402 MS
EKG QRS DURATION: 88 MS
EKG QTC CALCULATION (BAZETT): 448 MS
EKG R AXIS: 37 DEGREES
EKG T AXIS: 102 DEGREES
EKG VENTRICULAR RATE: 75 BPM
EOSINOPHILS ABSOLUTE: 0.3 K/UL (ref 0–0.6)
EOSINOPHILS RELATIVE PERCENT: 3.2 %
EPITHELIAL CELLS, UA: ABNORMAL /HPF (ref 0–5)
ETHANOL: NORMAL MG/DL (ref 0–0.08)
GFR AFRICAN AMERICAN: 38
GFR AFRICAN AMERICAN: 48
GFR NON-AFRICAN AMERICAN: 32
GFR NON-AFRICAN AMERICAN: 40
GLOBULIN: 3.3 G/DL
GLUCOSE BLD-MCNC: 123 MG/DL (ref 70–99)
GLUCOSE BLD-MCNC: 136 MG/DL (ref 70–99)
GLUCOSE BLD-MCNC: 159 MG/DL (ref 70–99)
GLUCOSE URINE: NEGATIVE MG/DL
HCT VFR BLD CALC: 34.8 % (ref 36–48)
HEMOGLOBIN: 10.9 G/DL (ref 12–16)
KETONES, URINE: NEGATIVE MG/DL
LEUKOCYTE ESTERASE, URINE: ABNORMAL
LYMPHOCYTES ABSOLUTE: 2.9 K/UL (ref 1–5.1)
LYMPHOCYTES RELATIVE PERCENT: 31.3 %
MCH RBC QN AUTO: 25.3 PG (ref 26–34)
MCHC RBC AUTO-ENTMCNC: 31.2 G/DL (ref 31–36)
MCV RBC AUTO: 80.9 FL (ref 80–100)
MICROSCOPIC EXAMINATION: YES
MONOCYTES ABSOLUTE: 0.7 K/UL (ref 0–1.3)
MONOCYTES RELATIVE PERCENT: 7.9 %
NEUTROPHILS ABSOLUTE: 5.2 K/UL (ref 1.7–7.7)
NEUTROPHILS RELATIVE PERCENT: 56.1 %
NITRITE, URINE: NEGATIVE
PDW BLD-RTO: 17.6 % (ref 12.4–15.4)
PERFORMED ON: ABNORMAL
PH UA: 6 (ref 5–8)
PLATELET # BLD: 389 K/UL (ref 135–450)
PMV BLD AUTO: 8.2 FL (ref 5–10.5)
POTASSIUM REFLEX MAGNESIUM: 4.1 MMOL/L (ref 3.5–5.1)
POTASSIUM SERPL-SCNC: 4.3 MMOL/L (ref 3.5–5.1)
PROTEIN UA: NEGATIVE MG/DL
RBC # BLD: 4.31 M/UL (ref 4–5.2)
RBC UA: ABNORMAL /HPF (ref 0–4)
SODIUM BLD-SCNC: 134 MMOL/L (ref 136–145)
SODIUM BLD-SCNC: 135 MMOL/L (ref 136–145)
SPECIFIC GRAVITY UA: 1.01 (ref 1–1.03)
TOTAL PROTEIN: 6.9 G/DL (ref 6.4–8.2)
TROPONIN: <0.01 NG/ML
URINE REFLEX TO CULTURE: YES
URINE TYPE: ABNORMAL
UROBILINOGEN, URINE: 0.2 E.U./DL
WBC # BLD: 9.2 K/UL (ref 4–11)
WBC UA: >100 /HPF (ref 0–5)

## 2020-03-20 PROCEDURE — 87186 SC STD MICRODIL/AGAR DIL: CPT

## 2020-03-20 PROCEDURE — 99223 1ST HOSP IP/OBS HIGH 75: CPT | Performed by: PHYSICIAN ASSISTANT

## 2020-03-20 PROCEDURE — G0480 DRUG TEST DEF 1-7 CLASSES: HCPCS

## 2020-03-20 PROCEDURE — 87077 CULTURE AEROBIC IDENTIFY: CPT

## 2020-03-20 PROCEDURE — 36415 COLL VENOUS BLD VENIPUNCTURE: CPT

## 2020-03-20 PROCEDURE — 84484 ASSAY OF TROPONIN QUANT: CPT

## 2020-03-20 PROCEDURE — 81001 URINALYSIS AUTO W/SCOPE: CPT

## 2020-03-20 PROCEDURE — 99285 EMERGENCY DEPT VISIT HI MDM: CPT

## 2020-03-20 PROCEDURE — 80053 COMPREHEN METABOLIC PANEL: CPT

## 2020-03-20 PROCEDURE — 87086 URINE CULTURE/COLONY COUNT: CPT

## 2020-03-20 PROCEDURE — 99214 OFFICE O/P EST MOD 30 MIN: CPT | Performed by: NURSE PRACTITIONER

## 2020-03-20 PROCEDURE — 6370000000 HC RX 637 (ALT 250 FOR IP): Performed by: PHYSICIAN ASSISTANT

## 2020-03-20 PROCEDURE — 93010 ELECTROCARDIOGRAM REPORT: CPT | Performed by: INTERNAL MEDICINE

## 2020-03-20 PROCEDURE — 2060000000 HC ICU INTERMEDIATE R&B

## 2020-03-20 PROCEDURE — G0378 HOSPITAL OBSERVATION PER HR: HCPCS

## 2020-03-20 PROCEDURE — 2580000003 HC RX 258: Performed by: PHYSICIAN ASSISTANT

## 2020-03-20 PROCEDURE — 85025 COMPLETE CBC W/AUTO DIFF WBC: CPT

## 2020-03-20 PROCEDURE — 2580000003 HC RX 258: Performed by: EMERGENCY MEDICINE

## 2020-03-20 PROCEDURE — 94761 N-INVAS EAR/PLS OXIMETRY MLT: CPT

## 2020-03-20 PROCEDURE — 83036 HEMOGLOBIN GLYCOSYLATED A1C: CPT

## 2020-03-20 PROCEDURE — 93005 ELECTROCARDIOGRAM TRACING: CPT | Performed by: EMERGENCY MEDICINE

## 2020-03-20 RX ORDER — LORAZEPAM 2 MG/1
2 TABLET ORAL
Status: DISCONTINUED | OUTPATIENT
Start: 2020-03-20 | End: 2020-03-20

## 2020-03-20 RX ORDER — SODIUM CHLORIDE 9 MG/ML
INJECTION, SOLUTION INTRAVENOUS CONTINUOUS
Status: DISCONTINUED | OUTPATIENT
Start: 2020-03-20 | End: 2020-03-21

## 2020-03-20 RX ORDER — SODIUM CHLORIDE 0.9 % (FLUSH) 0.9 %
10 SYRINGE (ML) INJECTION EVERY 12 HOURS SCHEDULED
Status: DISCONTINUED | OUTPATIENT
Start: 2020-03-20 | End: 2020-03-22 | Stop reason: HOSPADM

## 2020-03-20 RX ORDER — CHOLECALCIFEROL (VITAMIN D3) 125 MCG
1000 CAPSULE ORAL DAILY
Status: DISCONTINUED | OUTPATIENT
Start: 2020-03-20 | End: 2020-03-22 | Stop reason: HOSPADM

## 2020-03-20 RX ORDER — PAROXETINE HYDROCHLORIDE 20 MG/1
40 TABLET, FILM COATED ORAL EVERY MORNING
Status: DISCONTINUED | OUTPATIENT
Start: 2020-03-21 | End: 2020-03-22 | Stop reason: HOSPADM

## 2020-03-20 RX ORDER — SODIUM CHLORIDE 0.9 % (FLUSH) 0.9 %
10 SYRINGE (ML) INJECTION PRN
Status: DISCONTINUED | OUTPATIENT
Start: 2020-03-20 | End: 2020-03-22 | Stop reason: HOSPADM

## 2020-03-20 RX ORDER — DEXTROSE MONOHYDRATE 25 G/50ML
12.5 INJECTION, SOLUTION INTRAVENOUS PRN
Status: DISCONTINUED | OUTPATIENT
Start: 2020-03-20 | End: 2020-03-22 | Stop reason: HOSPADM

## 2020-03-20 RX ORDER — LORAZEPAM 2 MG/ML
2 INJECTION INTRAMUSCULAR
Status: DISCONTINUED | OUTPATIENT
Start: 2020-03-20 | End: 2020-03-20

## 2020-03-20 RX ORDER — PANTOPRAZOLE SODIUM 40 MG/1
40 TABLET, DELAYED RELEASE ORAL
Status: DISCONTINUED | OUTPATIENT
Start: 2020-03-21 | End: 2020-03-22 | Stop reason: HOSPADM

## 2020-03-20 RX ORDER — LEVOTHYROXINE SODIUM 0.03 MG/1
25 TABLET ORAL DAILY
Status: DISCONTINUED | OUTPATIENT
Start: 2020-03-20 | End: 2020-03-22 | Stop reason: HOSPADM

## 2020-03-20 RX ORDER — ACETAMINOPHEN 325 MG/1
650 TABLET ORAL EVERY 6 HOURS PRN
Status: DISCONTINUED | OUTPATIENT
Start: 2020-03-20 | End: 2020-03-22 | Stop reason: HOSPADM

## 2020-03-20 RX ORDER — ATORVASTATIN CALCIUM 40 MG/1
40 TABLET, FILM COATED ORAL NIGHTLY
Status: DISCONTINUED | OUTPATIENT
Start: 2020-03-20 | End: 2020-03-22 | Stop reason: HOSPADM

## 2020-03-20 RX ORDER — DEXTROSE MONOHYDRATE 50 MG/ML
100 INJECTION, SOLUTION INTRAVENOUS PRN
Status: DISCONTINUED | OUTPATIENT
Start: 2020-03-20 | End: 2020-03-22 | Stop reason: HOSPADM

## 2020-03-20 RX ORDER — ACETAMINOPHEN 650 MG/1
650 SUPPOSITORY RECTAL EVERY 6 HOURS PRN
Status: DISCONTINUED | OUTPATIENT
Start: 2020-03-20 | End: 2020-03-22 | Stop reason: HOSPADM

## 2020-03-20 RX ORDER — PROMETHAZINE HYDROCHLORIDE 25 MG/1
12.5 TABLET ORAL EVERY 6 HOURS PRN
Status: DISCONTINUED | OUTPATIENT
Start: 2020-03-20 | End: 2020-03-22 | Stop reason: HOSPADM

## 2020-03-20 RX ORDER — ASPIRIN 81 MG/1
81 TABLET ORAL DAILY
Status: DISCONTINUED | OUTPATIENT
Start: 2020-03-20 | End: 2020-03-22 | Stop reason: HOSPADM

## 2020-03-20 RX ORDER — FOLIC ACID 1 MG/1
1 TABLET ORAL DAILY
Status: DISCONTINUED | OUTPATIENT
Start: 2020-03-20 | End: 2020-03-22 | Stop reason: HOSPADM

## 2020-03-20 RX ORDER — ONDANSETRON 2 MG/ML
4 INJECTION INTRAMUSCULAR; INTRAVENOUS EVERY 6 HOURS PRN
Status: DISCONTINUED | OUTPATIENT
Start: 2020-03-20 | End: 2020-03-22 | Stop reason: HOSPADM

## 2020-03-20 RX ORDER — LORAZEPAM 2 MG/ML
3 INJECTION INTRAMUSCULAR
Status: DISCONTINUED | OUTPATIENT
Start: 2020-03-20 | End: 2020-03-20

## 2020-03-20 RX ORDER — LORAZEPAM 1 MG/1
1 TABLET ORAL
Status: DISCONTINUED | OUTPATIENT
Start: 2020-03-20 | End: 2020-03-20

## 2020-03-20 RX ORDER — NICOTINE POLACRILEX 4 MG
15 LOZENGE BUCCAL PRN
Status: DISCONTINUED | OUTPATIENT
Start: 2020-03-20 | End: 2020-03-22 | Stop reason: HOSPADM

## 2020-03-20 RX ORDER — THIAMINE MONONITRATE (VIT B1) 100 MG
100 TABLET ORAL DAILY
Status: DISCONTINUED | OUTPATIENT
Start: 2020-03-20 | End: 2020-03-22 | Stop reason: HOSPADM

## 2020-03-20 RX ORDER — 0.9 % SODIUM CHLORIDE 0.9 %
1000 INTRAVENOUS SOLUTION INTRAVENOUS ONCE
Status: COMPLETED | OUTPATIENT
Start: 2020-03-20 | End: 2020-03-20

## 2020-03-20 RX ORDER — LORAZEPAM 2 MG/ML
4 INJECTION INTRAMUSCULAR
Status: DISCONTINUED | OUTPATIENT
Start: 2020-03-20 | End: 2020-03-20

## 2020-03-20 RX ORDER — LORAZEPAM 2 MG/ML
1 INJECTION INTRAMUSCULAR
Status: DISCONTINUED | OUTPATIENT
Start: 2020-03-20 | End: 2020-03-20

## 2020-03-20 RX ORDER — LORAZEPAM 2 MG/1
4 TABLET ORAL
Status: DISCONTINUED | OUTPATIENT
Start: 2020-03-20 | End: 2020-03-20

## 2020-03-20 RX ORDER — INSULIN GLARGINE 100 [IU]/ML
10 INJECTION, SOLUTION SUBCUTANEOUS NIGHTLY
Status: DISCONTINUED | OUTPATIENT
Start: 2020-03-20 | End: 2020-03-22 | Stop reason: HOSPADM

## 2020-03-20 RX ORDER — FLUTICASONE PROPIONATE 50 MCG
1 SPRAY, SUSPENSION (ML) NASAL DAILY
Status: DISCONTINUED | OUTPATIENT
Start: 2020-03-20 | End: 2020-03-22 | Stop reason: HOSPADM

## 2020-03-20 RX ORDER — ALBUTEROL SULFATE 90 UG/1
2 AEROSOL, METERED RESPIRATORY (INHALATION) EVERY 6 HOURS PRN
Status: DISCONTINUED | OUTPATIENT
Start: 2020-03-20 | End: 2020-03-22 | Stop reason: HOSPADM

## 2020-03-20 RX ADMIN — ATORVASTATIN CALCIUM 40 MG: 40 TABLET, FILM COATED ORAL at 20:26

## 2020-03-20 RX ADMIN — SODIUM CHLORIDE 1000 ML: 9 INJECTION, SOLUTION INTRAVENOUS at 15:05

## 2020-03-20 RX ADMIN — SODIUM CHLORIDE: 9 INJECTION, SOLUTION INTRAVENOUS at 19:26

## 2020-03-20 RX ADMIN — INSULIN GLARGINE 10 UNITS: 100 INJECTION, SOLUTION SUBCUTANEOUS at 20:27

## 2020-03-20 ASSESSMENT — PAIN SCALES - GENERAL: PAINLEVEL_OUTOF10: 0

## 2020-03-20 NOTE — PROGRESS NOTES
Patient is not able to demonstrate the ability to move from a reclining position to an upright position within the recliner due to weakness.

## 2020-03-20 NOTE — PROGRESS NOTES
[facility]   Cardiology Note              Date:  March 20, 2020  Patientname: Jaclyn Aiken  YOB: 1945    Chief Complaint   Patient presents with    Follow-Up from DAVIDA NG     03/03/2020 [facility]      Primary Care physician: Scarlet Reynolds MD    HISTORY OF PRESENT ILLNESS: Jaclyn Aiken is a 69 y.o. female who presented to the hospital with complaints of shortness of breath and generalized weakness. She was diagnosed with NSTEMI in the setting of hypoxic respiratory failure, new cardiomyopathy with wall motion abnormality possible ischemic vs Takotsubo, acute systolic heart failure. COPD exacerbation was treated and on 3/3 she was medically stable to undergo coronary angiogram.   Angiogram showed mild non-obstructive CAD, refused LVEF of 35% consistent with apical ballooning. Hospital admission was complicated with patients history of alcohol abuse, confusion, unable to do MRI of head due to patiebnt not able to tolerate it. CT was negative. She developed TRINH while in hospital and Lasix was held and resumed at discharge. Today she presents with  for hospital follow up as mentioned above. This is the first time seeing this patient. Blood pressure noted to be low in office. I personally took blood pressure 72/56 HR 90 pulse ox 97%. Pt stated that she feels a little dry and some dizziness, otherwise asymptomatic.  at side and stated she has been doing well and working with PT at home. They have not been monitoring blood pressure at home- he stated he thinks home care has been monitoring. Pt overall appears stable even though hypotensive. Will send to ER for evaluation.        Past Medical History:   has a past medical history of Acute systolic CHF (congestive heart failure) ([address] Asthma, Back ache, Cataract, Cerebral artery occlusion with cerebral infarction ([address] Diabetes mellitus ([address] Hyperlipidemia, Hypertension, Insomnia, and TIA (transient ischemic Andrey Flores was brought to the cardiac catheterization lab in a fasting state after informed consent was obtained. If the patient was able to provide written consent, it was obtained. The patient's vitals were monitored through out the procedure. The patient was sedated using the appropriate levels of sedation and ASA guidelines. The appropriate access site area was prepped and drapped in a sterile fashion. The area was anesthetized with 2% lidocaine. Using the modified Seldinger technique, an arterial sheath was introduced into the arterial access site using a single anterior wall puncture. The sheath was flushed and prepped in usual fashion. Catheters used during the procedure included 5 [place] TIG 4.0 catheter. The catheters were advanced and removed over a .035\" wire, into the appropriate positions. Multiple angiographic views were obtained. An LV gram was obtained. Findings:     1. Left main coronary artery was normal. It gave off the left anterior descending artery and left circumflex. 2. Left anterior descending artery has mild atherosclerotic disease. It was moderate in size. It gave off septal perforators and a moderate sized diagonal branch. The LAD covered the entire apex of the left ventricle. 3. Left circumflex has mild atherosclerotic disease. It was moderate in size. There was a moderate sized obtuse marginal branch. 4. Right coronary artery has mild atherosclerotic disease. . It was moderate in size and was the dominant artery. 5. Left ventriculogram showed reduced left ventricular function with ejection fraction of 35%. There is apical and distal anterior wall hypokinesis. CONCLUSIONS:     1.  Non-ischemic cardiomyopathy with findings consistent with apical ballooning syndrome. ASSESSMENT/RECOMMENDATIONS:    1.   Medical management      Cardiology Labs Reviewed:     Lab Data:  Most recent lab results below reviewed in office    CBC: Lab Results   Component Value Date    WBC 12.9 03/06/2020    WBC 10.2 03/04/2020    WBC 8.6 03/03/2020    RBC 3.77 03/06/2020    RBC 3.90 03/04/2020    RBC 3.53 03/03/2020    HGB 9.3 03/06/2020    HGB 9.8 03/04/2020    HGB 9.3 03/03/2020    HCT 30.1 03/06/2020    HCT 31.5 03/04/2020    HCT 29.5 03/03/2020    MCV 80.0 03/06/2020    MCV 80.9 03/04/2020    MCV 83.6 03/03/2020    RDW 17.4 03/06/2020    RDW 17.6 03/04/2020    RDW 17.4 03/03/2020     03/06/2020     03/04/2020     03/03/2020     BMP:  Lab Results   Component Value Date     03/09/2020     03/08/2020     03/06/2020    K 4.4 03/09/2020    K 4.2 03/08/2020    K 4.1 03/06/2020    CL 97 03/09/2020    CL 97 03/08/2020     03/06/2020    CO2 27 03/09/2020    CO2 28 03/08/2020    CO2 27 03/06/2020    PHOS 3.1 10/18/2017    PHOS 3.4 10/17/2017    PHOS 3.1 02/09/2017    BUN 15 03/09/2020    BUN 16 03/08/2020    BUN 19 03/06/2020    CREATININE 0.8 03/09/2020    CREATININE 0.9 03/08/2020    CREATININE 0.9 03/06/2020     BNP:   Lab Results   Component Value Date    PROBNP 4,650 03/02/2020    PROBNP 4,283 02/28/2020    PROBNP 347 12/28/2018     FASTING LIPID PANEL:  Lab Results   Component Value Date    HDL 36 02/29/2020    LDLCALC 50 02/29/2020    TRIG 78 02/29/2020     LIVER PROFILE:No results for input(s): AST, ALT, ALB in the last 72 hours. Reviewed all labs and imaging today    Assessment:   1. NSTEMI- in setting of hypoxic respiratory failure with COPD exacerbation  2. Cardiomyopathy: LVEF 35% consistent with apical ballooning   3. SCHF: appears compensated  4. HTN: hypotensive today in office  5. HLD: on statin   6. Hypothyroidism   7. ETOH abuse  8. Tobacco abuse: ongoing- pt not ready to quit. Plan:   1. Discussed with Dr. Nancy Morse and send to ER for evaluation do to patient being hypotensive. Pt left in stable condition with  at side via wheelchair to transfer her to ER.  Phoned ER and informed them that I was sending patient for evaluation due to hypotension. Will follow up with patient after ER evaluation- informed the  I will call him for follow up. Given medical decision making and at risk for acute decompensation 30 minutes spent with patient.     LEONA Rosas-CNP  [facility]  [phone]

## 2020-03-20 NOTE — ED PROVIDER NOTES
([facility]) 18 MCG inhalation capsule Inhale 1 capsule into the lungs daily (Patient not taking: Reported on 3/20/2020) 30 capsule 0     Allergies   Allergen Reactions    Mold Extract [Trichophyton Mentagrophytes]        REVIEW OF SYSTEMS  10 systems reviewed, pertinent positives and negatives per HPI, otherwise noted to be negative. PHYSICAL EXAM  ED Triage Vitals [03/20/20 1124]   Enc Vitals Group      /71      Pulse 80      Resp 14      Temp 97.7 °F (36.5 °C)      Temp Source Oral      SpO2 96 %      Weight 171 lb (77.6 kg)      Height 5' 5\" (1.651 m)      Head Circumference       Peak Flow       Pain Score       Pain Loc       Pain Edu? Excl. in [address]? General appearance: Awake and alert. Cooperative. No acute distress. HENT: Normocephalic. Atraumatic. Mucous membranes are tacky. Neck: Supple. Eyes: PERRL. EOMI. Heart/Chest: RRR. No murmurs. Lungs: Respirations unlabored. CTAB. Good air exchange. Speaking comfortably in full sentences. Abdomen: Soft. Non-tender. Non-distended. No rebound or guarding. Musculoskeletal: No extremity edema. No deformity. No tenderness in the extremities. All extremities neurovascularly intact. Skin: Warm and dry. No acute rashes. Neurological: Alert and oriented. CN II-XII intact. Strength 5/5 bilateral upper and lower extremities. Sensation intact to light touch. Gait normal.  Psychiatric: Mood/affect: normal      LABS  I have reviewed all labs for this visit.    Results for orders placed or performed during the hospital encounter of 03/20/20   CBC Auto Differential   Result Value Ref Range    WBC 9.2 4.0 - 11.0 K/uL    RBC 4.31 4.00 - 5.20 M/uL    Hemoglobin 10.9 (L) 12.0 - 16.0 g/dL    Hematocrit 34.8 (L) 36.0 - 48.0 %    MCV 80.9 80.0 - 100.0 fL    MCH 25.3 (L) 26.0 - 34.0 pg    MCHC 31.2 31.0 - 36.0 g/dL    RDW 17.6 (H) 12.4 - 15.4 %    Platelets 467 989 - 205 K/uL    MPV 8.2 5.0 - 10.5 fL    Neutrophils % 56.1 %    Lymphocytes % 31.3 are diffuse T wave inversions of precordial leads  Impression: NSR with anteroseptal infarct seen on or before 2/28/2020, diffuse T wave inversions precordial leads       ED COURSE/MDM  Patient seen and evaluated. Old records reviewed. Labs and imaging reviewed and results discussed with patient/family to extent possible. This is a 69 F who presented from Cardiology clinic with concern for hypotension. On arrival, patient normotensive, mild tachypnea, otherwise reassuring vitals. Appears mildly hypovolemic. EKG notable for diffuse T wave inversions precordial leads, new versus prior. No chest pain. Trop WNL. Renal panel reveals TRINH Cr 1.6 (0.8) . Mild hyponatremia and hypochloremia. Favor pre-renal. Mild anemia Hgb 10.9 (9.3), clinically insignificant. Given HFrEF and new T wave inversions, will admit to hospital medicine for further evaluation and treatment. 1L IV NS administered. During the patient's ED course, the patient was given:  Medications   0.9 % sodium chloride bolus (1,000 mLs Intravenous New Bag 3/20/20 1505)        All questions were answered and the patient/family expressed understanding and agreement with the plan. PROCEDURES  None    CRITICAL CARE  N/A    CLINICAL IMPRESSION  1. TRINH (acute kidney injury) ([facility] [address]        DISPOSITION   admit    Condition: stable    Thanh Mandel MD    Note: This chart was created using voice recognition dictation software. Efforts were made by me to ensure accuracy, however some errors may be present due to limitations of this technology and occasionally words are not transcribed correctly.         Thanh Mandel MD  03/20/20 9260

## 2020-03-20 NOTE — LETTER
asymptomatic.  at side and stated she has been doing well and working with PT at home. They have not been monitoring blood pressure at home- he stated he thinks home care has been monitoring. Pt overall appears stable even though hypotensive. Will send to ER for evaluation. Past Medical History:   has a past medical history of Acute systolic CHF (congestive heart failure) ([address] Asthma, Back ache, Cataract, Cerebral artery occlusion with cerebral infarction ([facility] [address] Diabetes mellitus ([facility] [address] Hyperlipidemia, Hypertension, Insomnia, and TIA (transient ischemic attack). Past Surgical History:   has a past surgical history that includes  section; Dental surgery; Abdomen surgery; Cataract removal with implant (Left, [id number]); and eye surgery (10/29/13). Home Medications:    Prior to Admission medications    Medication Sig Start Date End Date Taking?  Authorizing Provider   insulin glargine (LANTUS) 100 UNIT/ML injection vial Inject 10 Units into the skin nightly 3/10/20  Yes Brian Scott MD   aspirin 81 MG EC tablet Take 1 tablet by mouth daily 3/7/20  Yes Jett Louise MD   metoprolol succinate (TOPROL XL) 50 MG extended release tablet Take 1 tablet by mouth daily 3/7/20  Yes Jett Louise MD   furosemide (LASIX) 20 MG tablet Take 1 tablet by mouth daily 3/7/20  Yes Jett Louise MD   folic acid (FOLVITE) 1 MG tablet Take 1 tablet by mouth daily 3/7/20  Yes Jett Louise MD   nicotine (NICODERM CQ) 21 MG/24HR Place 1 patch onto the skin daily 3/7/20  Yes Jett Louise MD   metFORMIN (GLUCOPHAGE) 500 MG tablet Take 500 mg by mouth 18  Yes Historical Provider, MD   albuterol sulfate  (90 Base) MCG/ACT inhaler Inhale 2 puffs into the lungs every 6 hours as needed for Wheezing 18  Yes LEONA Dominguez - CNP   PARoxetine (PAXIL) 40 MG tablet Take 1 tablet by mouth every morning 10/19/17  Yes Edelmira Somers MD levothyroxine (SYNTHROID) 25 MCG tablet Take 1 tablet by mouth Daily 10/20/17  Yes Buckley Bence, MD   vitamin B-1 (THIAMINE) 100 MG tablet Take 1 tablet by mouth daily 10/19/17  Yes Buckley Bence, MD   lisinopril (PRINIVIL;ZESTRIL) 40 MG tablet Take 1 tablet by mouth daily 3/21/17  Yes Buckley Bence, MD   cloNIDine (CATAPRES) 0.2 MG tablet Take 1 tablet by mouth 2 times daily 3/21/17  Yes Buckley Bence, MD   atorvastatin (LIPITOR) 40 MG tablet Take 1 tablet by mouth nightly 12/25/16  Yes Courtney Peterson MD   fluticasone [facility]) 50 MCG/ACT nasal spray 1 spray by Nasal route daily 12/25/16  Yes Courtney Peterson MD   Multiple Vitamin (THERAPEUTIC) TABS tablet Take 1 tablet by mouth daily 11/15/15  Yes Artur Jung MD   omeprazole (PRILOSEC) 20 MG capsule Take 1 capsule by mouth daily 6/29/15  Yes Libby Whalen MD   FISH OIL by Does not apply route. Yes Historical Provider, MD   vitamin B-12 (CYANOCOBALAMIN) 1000 MCG tablet Take 1,000 mcg by mouth daily. Yes Historical Provider, MD   Cholecalciferol (VITAMIN D-3) 5000 UNITS TABS Take  by mouth. Yes Historical Provider, MD   polyethylene glycol-electrolytes (TRILYTE) 420 g solution Follow Package Instructions unless otherwise directed by physician.  12/5/18   Historical Provider, MD   ibuprofen (ADVIL;MOTRIN) 200 MG tablet Take 1 tablet by mouth every 6 hours as needed for Pain  Patient not taking: Reported on 3/20/2020 10/19/17   Buckley Bence, MD   fluticasone-salmeterol (ADVAIR DISKUS) 500-50 MCG/DOSE diskus inhaler Inhale 1 puff into the lungs every 12 hours  Patient not taking: Reported on 3/20/2020 12/25/16   Courtney Peterson MD   tiotropium (Angela Lise) 18 MCG inhalation capsule Inhale 1 capsule into the lungs daily  Patient not taking: Reported on 3/20/2020 12/25/16   Courtney Peterson MD       Allergies:  Mold extract [trichophyton mentagrophytes] 3. Left circumflex has mild atherosclerotic disease. It was moderate in size. There was a moderate sized obtuse marginal branch. 4. Right coronary artery has mild atherosclerotic disease. . It was moderate in size and was the dominant artery. 5. Left ventriculogram showed reduced left ventricular function with ejection fraction of 35%. There is apical and distal anterior wall hypokinesis. CONCLUSIONS:     1.  Non-ischemic cardiomyopathy with findings consistent with apical ballooning syndrome. ASSESSMENT/RECOMMENDATIONS:    1.   Medical management      Cardiology Labs Reviewed:     Lab Data:  Most recent lab results below reviewed in office    CBC:   Lab Results   Component Value Date    WBC 12.9 03/06/2020    WBC 10.2 03/04/2020    WBC 8.6 03/03/2020    RBC 3.77 03/06/2020    RBC 3.90 03/04/2020    RBC 3.53 03/03/2020    HGB 9.3 03/06/2020    HGB 9.8 03/04/2020    HGB 9.3 03/03/2020    HCT 30.1 03/06/2020    HCT 31.5 03/04/2020    HCT 29.5 03/03/2020    MCV 80.0 03/06/2020    MCV 80.9 03/04/2020    MCV 83.6 03/03/2020    RDW 17.4 03/06/2020    RDW 17.6 03/04/2020    RDW 17.4 03/03/2020     03/06/2020     03/04/2020     03/03/2020     BMP:  Lab Results   Component Value Date     03/09/2020     03/08/2020     03/06/2020    K 4.4 03/09/2020    K 4.2 03/08/2020    K 4.1 03/06/2020    CL 97 03/09/2020    CL 97 03/08/2020     03/06/2020    CO2 27 03/09/2020    CO2 28 03/08/2020    CO2 27 03/06/2020    PHOS 3.1 10/18/2017    PHOS 3.4 10/17/2017    PHOS 3.1 02/09/2017    BUN 15 03/09/2020    BUN 16 03/08/2020    BUN 19 03/06/2020    CREATININE 0.8 03/09/2020    CREATININE 0.9 03/08/2020    CREATININE 0.9 03/06/2020     BNP:   Lab Results   Component Value Date    PROBNP 4,650 03/02/2020    PROBNP 4,283 02/28/2020    PROBNP 347 12/28/2018     FASTING LIPID PANEL:  Lab Results   Component Value Date    HDL 36 02/29/2020    LDLCALC 50 02/29/2020 TRIG 78 02/29/2020     LIVER PROFILE:No results for input(s): AST, ALT, ALB in the last 72 hours. Reviewed all labs and imaging today    Assessment:   1. NSTEMI- in setting of hypoxic respiratory failure with COPD exacerbation  2. Cardiomyopathy: LVEF 35% consistent with apical ballooning   3. SCHF: appears compensated  4. HTN: hypotensive today in office  5. HLD: on statin   6. Hypothyroidism   7. ETOH abuse  8. Tobacco abuse: ongoing- pt not ready to quit. Plan:   1. Discussed with Dr. Yanelis Shrestha and send to ER for evaluation do to patient being hypotensive. Pt left in stable condition with  at side via wheelchair to transfer her to ER. Phoned ER and informed them that I was sending patient for evaluation due to hypotension. Will follow up with patient after ER evaluation- informed the  I will call him for follow up. Given medical decision making and at risk for acute decompensation 30 minutes spent with patient.     LEONA Crowe-CNP  [facility]  [phone]       Sincerely,      LEONA Crowe - CNP

## 2020-03-20 NOTE — TELEPHONE ENCOUNTER
Writer contacted  ED provider Jessica Kinney   to inform of 30 day readmission risk. Writer's attempt to contact ED provider was unsuccessful. No Decision on disposition at this time. Will continue to monitor the chart.

## 2020-03-20 NOTE — PROGRESS NOTES
Patient admitted from ER for hypotension/acute kidney injury. Patient was seen in cardiologist office with a low systolic in the 31'W when she was recommended to be seen in the ER. Patients BP has since increased to 117/80, vitals in flowsheet. Patient denies SOB, s/s. Tele monitor 21. NSR. Bilateral diminished lung sounds. 4 eyes completed. #20 in R AC. Started on fluids. A&0X4. Patients' bilateral LE's cool to touch. Generalized non pitting edema. Cardiac diet. CIWA protocol, score 0.

## 2020-03-20 NOTE — H&P
Hospital Medicine History & Physical      PCP: Casimiro Rodriguez MD    Date of Admission: 3/20/2020    Date of Service: Pt seen/examined on 3/20/2020     Chief Complaint:    Chief Complaint   Patient presents with    Hypotension     BP 70/52 at PCP office. Pt recently admitted with TRINH     History Of Present Illness: The patient is a 69 y.o. female with nonischemic CMP, mild CAD, former alcohol abuse, COPD, continued tobacco abuse, DM2 who was sent to ER from cardio office for hypotension. BPs in the cardio clinic were in the 01U systolic. Patient denied symptoms in clinic but did tell cardio NP she was feeling \"dry. \"  In the ER pressures were better in the 135 systolic range. She was found to have TRINH and admitted for further care    She was admitted at [facility] -3/6/2020 for new diagnosis of CHF. She has an EF of 30%. She had a cath that showed mild non obstructive CAD. She was diuresed during admit at [facility] after cath, she had an TRINH during admission and her diuresis was held for one day, she was discharged on lasix 20 mg daily. She has been compliant with home meds, denies fever, chills, n/v/d, urinary changes    Today her Crt is 1.6 (it was 0.8 on 3/20/2020).   She was admitted for further care    Past Medical History:        Diagnosis Date    Acute systolic CHF (congestive heart failure) (HCC)     Asthma     Back ache     Cataract     Cerebral artery occlusion with cerebral infarction ([address] 2016    Diabetes mellitus ([address]     type !! - diet controlled    Hyperlipidemia     Hypertension     Insomnia     TIA (transient ischemic attack) 2016       Past Surgical History:        Procedure Laterality Date    ABDOMEN SURGERY      c section    CATARACT REMOVAL WITH IMPLANT Left [id number]    LEFT EYE CATARACT PHACOEMULSIFICATION INTRAOCULAR LENS     SECTION      DENTAL SURGERY      EYE SURGERY  10/29/13     RIGHT EYE CATARACT PHACOEMULSIFICATION INTRAOCULAR LENS Medications Prior to Admission:    Prior to Admission medications    Medication Sig Start Date End Date Taking? Authorizing Provider   insulin glargine (LANTUS) 100 UNIT/ML injection vial Inject 10 Units into the skin nightly 3/10/20  Yes Sabrina Ballard MD   aspirin 81 MG EC tablet Take 1 tablet by mouth daily 3/7/20  Yes Nikhil Tiwari MD   metoprolol succinate (TOPROL XL) 50 MG extended release tablet Take 1 tablet by mouth daily 3/7/20  Yes Nikhil Tiwari MD   furosemide (LASIX) 20 MG tablet Take 1 tablet by mouth daily 3/7/20  Yes Nikhil Tiwari MD   folic acid (FOLVITE) 1 MG tablet Take 1 tablet by mouth daily 3/7/20  Yes Nikhil Tiwari MD   metFORMIN (GLUCOPHAGE) 500 MG tablet Take 500 mg by mouth 12/19/18  Yes Historical Provider, MD   polyethylene glycol-electrolytes (TRILYTE) 420 g solution Follow Package Instructions unless otherwise directed by physician.  12/5/18  Yes Historical Provider, MD   albuterol sulfate  (90 Base) MCG/ACT inhaler Inhale 2 puffs into the lungs every 6 hours as needed for Wheezing 12/28/18  Yes Austin Turner, APRN - CNP   PARoxetine (PAXIL) 40 MG tablet Take 1 tablet by mouth every morning 10/19/17  Yes Mela Fuchs MD   levothyroxine (SYNTHROID) 25 MCG tablet Take 1 tablet by mouth Daily 10/20/17  Yes Mela Fuchs MD   vitamin B-1 (THIAMINE) 100 MG tablet Take 1 tablet by mouth daily 10/19/17  Yes Mela Fuchs MD   lisinopril (PRINIVIL;ZESTRIL) 40 MG tablet Take 1 tablet by mouth daily 3/21/17  Yes Mela Fuchs MD   cloNIDine (CATAPRES) 0.2 MG tablet Take 1 tablet by mouth 2 times daily 3/21/17  Yes Mela Fuchs MD   atorvastatin (LIPITOR) 40 MG tablet Take 1 tablet by mouth nightly 12/25/16  Yes Speedy Borrero MD   fluticasone [facility]) 50 MCG/ACT nasal spray 1 spray by Nasal route daily 12/25/16  Yes Speedy Borrero MD   Multiple Vitamin (THERAPEUTIC) TABS tablet Take 1 tablet by mouth daily 11/15/15  Yes Cardiovascular: Negative for chest pain   Gastrointestinal: Negative for vomiting, diarrhea   Genitourinary: Negative for hematuria   Musculoskeletal: Negative for arthralgias   Skin: Negative for rash   Neurological: Negative for syncope   Hematological: Negative for adenopathy   Psychiatric/Behavorial: Negative for anxiety    PHYSICAL EXAM:    /75   Pulse 80   Temp 97.7 °F (36.5 °C) (Oral)   Resp 21   Ht 5' 5\" (1.651 m)   Wt 171 lb (77.6 kg)   SpO2 98%   BMI 28.46 kg/m²     Gen: Elderly female, pale skin. No distress. Alert. Eyes: PERRL. No sclera icterus. No conjunctival injection. ENT: No discharge. Pharynx clear. Neck: No JVD. Trachea midline. Resp: No accessory muscle use. No crackles. No wheezes. No rhonchi. CV: Regular rate. Regular rhythm. No murmur. No rub. No edema. Capillary Refill: Brisk,< 3 seconds   Peripheral Pulses: +2 palpable, equal bilaterally   GI: Non-tender. Non-distended. No masses. No organomegaly. Normal bowel sounds. No hernia. Skin: Warm and dry. No nodule on exposed extremities. No rash on exposed extremities. M/S: No cyanosis. No joint deformity. No clubbing. Neuro: Awake. Grossly nonfocal    Psych: She is a vague historian, but oriented to person, day of the week, month, year, president, and place. No anxiety or agitation.      CBC:   Recent Labs     03/20/20  1145   WBC 9.2   HGB 10.9*   HCT 34.8*   MCV 80.9        BMP:   Recent Labs     03/20/20  1145   *   K 4.1   CL 94*   CO2 26   BUN 24*   CREATININE 1.6*     LIVER PROFILE:   Recent Labs     03/20/20  1145   AST 21   ALT 14   BILITOT <0.2   ALKPHOS 55         CARDIAC ENZYMES  Recent Labs     03/20/20  1145   TROPONINI <0.01       U/A:    Lab Results   Component Value Date    NITRITE neg 12/13/2012    COLORU Yellow 03/20/2017    WBCUA 6-10 02/12/2017    RBCUA 3-5 02/12/2017    MUCUS 3+ 02/12/2017    BACTERIA Rare 02/12/2017    CLARITYU Clear 03/20/2017    SPECGRAV 1.020 03/20/2017 LEUKOCYTESUR Negative 03/20/2017    BLOODU Negative 03/20/2017    GLUCOSEU Negative 03/20/2017       ABG    Lab Results   Component Value Date    EYD1OOS 27.3 02/29/2020    BEART 3.1 02/29/2020    E7EEUZUG 96.3 02/29/2020    PHART 7.451 02/29/2020    VNL4IRM 40.1 02/29/2020    PO2ART 80.0 02/29/2020    TBP8ZQU 28.5 02/29/2020       CULTURES  None     EKG:  I have reviewed the EKG with the following interpretation:   NSR with anterolateral T wave changes consistent with ischemia     RADIOLOGY  No orders to display       Pertinent previous results reviewed   Angiogram 3/3/2020  Procedures Performed:   1. Left heart catheterization  2. Selective left and right coronary angiogram  3. Left ventriculography   Procedure Findings:  1. Mild nonobstructive coronary artery disease. 2.  Reduced left ventricular function EF of 35% consistent with apical ballooning  3. Normal left heart hemodynamics    Echo 2.29.2020  Ejection fraction is visually estimated to be 30-35 %. There is severe hypokinesis of the anterior and apical walls consistent with   infarction within the territory of the left anterior descending artery   versus apical ballooning syndrome. There is mild-moderate left ventricular hypertrophy. Diastolic dysfunction grade and filing pressure are indeterminate. Mitral annular calcification is present. Moderate mitral regurgitation. Mild tricuspid regurgitation. IVC size is dilated (>2.1 cm) but collapses > 50% with respiration   consistent with elevated RA pressure (8 mmHg).    Previous echo done 12/2016 - EF 55%      ASSESSMENT/PLAN:    #TRINH  - Crt 1.6 on admission-> 1.3 (It was 0.8 on 3/9/2020)  - suspect pre renal related to hypotension, ACEi use, diuresis  - cont gentle IV NS and monitor BMPa  - check UA - pending     #Chronic systolic CHF  #Nonischemic CMP  - hold lasix, lisinopril, and toprol XL  - monitor fluid status with IV NS    #Abnormal EKG  - with anterolateral T wave changes, new from

## 2020-03-21 LAB
ANION GAP SERPL CALCULATED.3IONS-SCNC: 16 MMOL/L (ref 3–16)
BASOPHILS ABSOLUTE: 0.1 K/UL (ref 0–0.2)
BASOPHILS RELATIVE PERCENT: 0.6 %
BUN BLDV-MCNC: 17 MG/DL (ref 7–20)
CALCIUM SERPL-MCNC: 9.2 MG/DL (ref 8.3–10.6)
CHLORIDE BLD-SCNC: 101 MMOL/L (ref 99–110)
CO2: 17 MMOL/L (ref 21–32)
CREAT SERPL-MCNC: 1.1 MG/DL (ref 0.6–1.2)
EKG ATRIAL RATE: 98 BPM
EKG DIAGNOSIS: NORMAL
EKG P AXIS: 80 DEGREES
EKG P-R INTERVAL: 180 MS
EKG Q-T INTERVAL: 342 MS
EKG QRS DURATION: 86 MS
EKG QTC CALCULATION (BAZETT): 436 MS
EKG R AXIS: 54 DEGREES
EKG T AXIS: 83 DEGREES
EKG VENTRICULAR RATE: 98 BPM
EOSINOPHILS ABSOLUTE: 0.2 K/UL (ref 0–0.6)
EOSINOPHILS RELATIVE PERCENT: 2.4 %
ESTIMATED AVERAGE GLUCOSE: 162.8 MG/DL
GFR AFRICAN AMERICAN: 59
GFR NON-AFRICAN AMERICAN: 49
GLUCOSE BLD-MCNC: 113 MG/DL (ref 70–99)
GLUCOSE BLD-MCNC: 115 MG/DL (ref 70–99)
GLUCOSE BLD-MCNC: 117 MG/DL (ref 70–99)
GLUCOSE BLD-MCNC: 119 MG/DL (ref 70–99)
GLUCOSE BLD-MCNC: 154 MG/DL (ref 70–99)
GLUCOSE BLD-MCNC: 181 MG/DL (ref 70–99)
HBA1C MFR BLD: 7.3 %
HCT VFR BLD CALC: 31.5 % (ref 36–48)
HEMOGLOBIN: 10.2 G/DL (ref 12–16)
LYMPHOCYTES ABSOLUTE: 2.1 K/UL (ref 1–5.1)
LYMPHOCYTES RELATIVE PERCENT: 22.8 %
MCH RBC QN AUTO: 29.3 PG (ref 26–34)
MCHC RBC AUTO-ENTMCNC: 32.3 G/DL (ref 31–36)
MCV RBC AUTO: 90.9 FL (ref 80–100)
MONOCYTES ABSOLUTE: 0.6 K/UL (ref 0–1.3)
MONOCYTES RELATIVE PERCENT: 6.9 %
NEUTROPHILS ABSOLUTE: 6.2 K/UL (ref 1.7–7.7)
NEUTROPHILS RELATIVE PERCENT: 67.3 %
PDW BLD-RTO: 18.4 % (ref 12.4–15.4)
PERFORMED ON: ABNORMAL
PLATELET # BLD: 294 K/UL (ref 135–450)
PMV BLD AUTO: 8.6 FL (ref 5–10.5)
POTASSIUM REFLEX MAGNESIUM: 4.8 MMOL/L (ref 3.5–5.1)
RBC # BLD: 3.47 M/UL (ref 4–5.2)
SODIUM BLD-SCNC: 134 MMOL/L (ref 136–145)
WBC # BLD: 9.3 K/UL (ref 4–11)

## 2020-03-21 PROCEDURE — 93010 ELECTROCARDIOGRAM REPORT: CPT | Performed by: INTERNAL MEDICINE

## 2020-03-21 PROCEDURE — 94640 AIRWAY INHALATION TREATMENT: CPT

## 2020-03-21 PROCEDURE — 93308 TTE F-UP OR LMTD: CPT

## 2020-03-21 PROCEDURE — 6370000000 HC RX 637 (ALT 250 FOR IP): Performed by: PHYSICIAN ASSISTANT

## 2020-03-21 PROCEDURE — 6370000000 HC RX 637 (ALT 250 FOR IP): Performed by: INTERNAL MEDICINE

## 2020-03-21 PROCEDURE — 99223 1ST HOSP IP/OBS HIGH 75: CPT | Performed by: INTERNAL MEDICINE

## 2020-03-21 PROCEDURE — G0378 HOSPITAL OBSERVATION PER HR: HCPCS

## 2020-03-21 PROCEDURE — 93005 ELECTROCARDIOGRAM TRACING: CPT | Performed by: PHYSICIAN ASSISTANT

## 2020-03-21 PROCEDURE — 80048 BASIC METABOLIC PNL TOTAL CA: CPT

## 2020-03-21 PROCEDURE — 85025 COMPLETE CBC W/AUTO DIFF WBC: CPT

## 2020-03-21 PROCEDURE — 36415 COLL VENOUS BLD VENIPUNCTURE: CPT

## 2020-03-21 PROCEDURE — 96372 THER/PROPH/DIAG INJ SC/IM: CPT

## 2020-03-21 PROCEDURE — 6360000002 HC RX W HCPCS: Performed by: PHYSICIAN ASSISTANT

## 2020-03-21 PROCEDURE — 2580000003 HC RX 258: Performed by: PHYSICIAN ASSISTANT

## 2020-03-21 PROCEDURE — 2060000000 HC ICU INTERMEDIATE R&B

## 2020-03-21 PROCEDURE — 6360000004 HC RX CONTRAST MEDICATION: Performed by: INTERNAL MEDICINE

## 2020-03-21 RX ORDER — LISINOPRIL 5 MG/1
5 TABLET ORAL DAILY
Status: DISCONTINUED | OUTPATIENT
Start: 2020-03-21 | End: 2020-03-22

## 2020-03-21 RX ORDER — METOPROLOL SUCCINATE 25 MG/1
25 TABLET, EXTENDED RELEASE ORAL DAILY
Status: DISCONTINUED | OUTPATIENT
Start: 2020-03-21 | End: 2020-03-22

## 2020-03-21 RX ORDER — CIPROFLOXACIN 500 MG/1
500 TABLET, FILM COATED ORAL EVERY 12 HOURS SCHEDULED
Status: DISCONTINUED | OUTPATIENT
Start: 2020-03-21 | End: 2020-03-22 | Stop reason: HOSPADM

## 2020-03-21 RX ADMIN — ASPIRIN 81 MG: 81 TABLET, COATED ORAL at 08:27

## 2020-03-21 RX ADMIN — ONDANSETRON HYDROCHLORIDE 4 MG: 2 INJECTION, SOLUTION INTRAMUSCULAR; INTRAVENOUS at 21:54

## 2020-03-21 RX ADMIN — ENOXAPARIN SODIUM 40 MG: 40 INJECTION SUBCUTANEOUS at 08:27

## 2020-03-21 RX ADMIN — CIPROFLOXACIN 500 MG: 500 TABLET, FILM COATED ORAL at 10:57

## 2020-03-21 RX ADMIN — ACETAMINOPHEN 650 MG: 325 TABLET ORAL at 21:54

## 2020-03-21 RX ADMIN — INSULIN GLARGINE 10 UNITS: 100 INJECTION, SOLUTION SUBCUTANEOUS at 21:08

## 2020-03-21 RX ADMIN — Medication 10 ML: at 21:07

## 2020-03-21 RX ADMIN — CIPROFLOXACIN 500 MG: 500 TABLET, FILM COATED ORAL at 21:07

## 2020-03-21 RX ADMIN — ATORVASTATIN CALCIUM 40 MG: 40 TABLET, FILM COATED ORAL at 21:07

## 2020-03-21 RX ADMIN — ACETAMINOPHEN 650 MG: 325 TABLET ORAL at 08:27

## 2020-03-21 RX ADMIN — LISINOPRIL 5 MG: 5 TABLET ORAL at 10:57

## 2020-03-21 RX ADMIN — PANTOPRAZOLE SODIUM 40 MG: 40 TABLET, DELAYED RELEASE ORAL at 08:35

## 2020-03-21 RX ADMIN — METOPROLOL SUCCINATE 25 MG: 25 TABLET, EXTENDED RELEASE ORAL at 10:59

## 2020-03-21 RX ADMIN — PAROXETINE HYDROCHLORIDE 40 MG: 20 TABLET, FILM COATED ORAL at 08:27

## 2020-03-21 RX ADMIN — PERFLUTREN 1.65 MG: 6.52 INJECTION, SUSPENSION INTRAVENOUS at 11:28

## 2020-03-21 RX ADMIN — Medication 100 MG: at 08:27

## 2020-03-21 RX ADMIN — INSULIN LISPRO 1 UNITS: 100 INJECTION, SOLUTION INTRAVENOUS; SUBCUTANEOUS at 13:06

## 2020-03-21 RX ADMIN — INSULIN LISPRO 1 UNITS: 100 INJECTION, SOLUTION INTRAVENOUS; SUBCUTANEOUS at 21:08

## 2020-03-21 RX ADMIN — CYANOCOBALAMIN TAB 500 MCG 1000 MCG: 500 TAB at 08:26

## 2020-03-21 RX ADMIN — LEVOTHYROXINE SODIUM 25 MCG: 25 TABLET ORAL at 08:35

## 2020-03-21 RX ADMIN — TIOTROPIUM BROMIDE INHALATION SPRAY 2 PUFF: 3.12 SPRAY, METERED RESPIRATORY (INHALATION) at 08:04

## 2020-03-21 RX ADMIN — FOLIC ACID 1 MG: 1 TABLET ORAL at 08:27

## 2020-03-21 RX ADMIN — Medication 10 ML: at 08:27

## 2020-03-21 ASSESSMENT — PAIN DESCRIPTION - PAIN TYPE: TYPE: ACUTE PAIN

## 2020-03-21 ASSESSMENT — PAIN DESCRIPTION - FREQUENCY: FREQUENCY: INTERMITTENT

## 2020-03-21 ASSESSMENT — PAIN - FUNCTIONAL ASSESSMENT: PAIN_FUNCTIONAL_ASSESSMENT: ACTIVITIES ARE NOT PREVENTED

## 2020-03-21 ASSESSMENT — PAIN DESCRIPTION - ONSET: ONSET: ON-GOING

## 2020-03-21 ASSESSMENT — PAIN DESCRIPTION - PROGRESSION: CLINICAL_PROGRESSION: GRADUALLY WORSENING

## 2020-03-21 ASSESSMENT — PAIN DESCRIPTION - ORIENTATION: ORIENTATION: MID

## 2020-03-21 ASSESSMENT — PAIN SCALES - GENERAL
PAINLEVEL_OUTOF10: 7
PAINLEVEL_OUTOF10: 1
PAINLEVEL_OUTOF10: 6

## 2020-03-21 ASSESSMENT — PAIN DESCRIPTION - DESCRIPTORS: DESCRIPTORS: HEADACHE

## 2020-03-21 ASSESSMENT — PAIN DESCRIPTION - LOCATION: LOCATION: HEAD

## 2020-03-21 NOTE — PROGRESS NOTES
RESPIRATORY THERAPY ASSESSMENT    Name:  [address] Record Number:  [id number]  Age: 69 y.o. Gender: female  : 1945  Today's Date:  3/20/2020  Room:  /0317-01    Assessment     Is the patient being admitted for a COPD or Asthma exacerbation? No   (If yes the patient will be seen every 4 hours for the first 24 hours and then reassessed)    Patient Admission Diagnosis      Allergies  Allergies   Allergen Reactions    Mold Extract [Trichophyton Mentagrophytes]        Minimum Predicted Vital Capacity:               Actual Vital Capacity:                    Pulmonary History:COPD, Asthma and CHF/Pulmonary Edema  Home Oxygen Therapy:  room air  Home Respiratory Therapy:Albuterol, Salmeterol/Fluticasone Propionate and Tiotropium Bromide   Current Respiratory Therapy:  Albuterol 2puffs Q6PRN,  Spiriva Daily          Respiratory Severity Index(RSI)   Patients with orders for inhalation medications, oxygen, or any therapeutic treatment modality will be placed on Respiratory Protocol. They will be assessed with the first treatment and at least every 72 hours thereafter. The following severity scale will be used to determine frequency of treatment intervention. Smoking History: Pulmonary Disease or Smoking History, Greater than 15 pack year = 2    Social History  Social History     Tobacco Use    Smoking status: Current Every Day Smoker     Packs/day: 2.00     Years: 58.00     Pack years: 116.00     Types: Cigarettes    Smokeless tobacco: Never Used    Tobacco comment: not totally ready to quit today   Substance Use Topics    Alcohol use:  Yes     Alcohol/week: 0.0 standard drinks     Types: 3 - 4 Standard drinks or equivalent per week     Comment: 1-2 hot toddy every night     Drug use: No       Recent Surgical History: None = 0  Past Surgical History  Past Surgical History:   Procedure Laterality Date    ABDOMEN SURGERY      c section    CATARACT REMOVAL WITH IMPLANT Left [id number]    LEFT EYE CATARACT PHACOEMULSIFICATION INTRAOCULAR LENS     SECTION      DENTAL SURGERY      EYE SURGERY  10/29/13     RIGHT EYE CATARACT PHACOEMULSIFICATION INTRAOCULAR LENS       Level of Consciousness: Alert, Oriented, and Cooperative = 0    Level of Activity: Walking unassisted = 0    Respiratory Pattern: Regular Pattern; RR 8-20 = 0    Breath Sounds: Diminshed bilaterally and/or crackles = 2    Sputum   ,  , Sputum How Obtained: Spontaneous cough  Cough: Strong, spontaneous, non-productive = 0    Vital Signs   /83   Pulse 83   Temp 97 °F (36.1 °C) (Oral)   Resp 16   Ht 5' 5\" (1.651 m)   Wt 174 lb (78.9 kg)   SpO2 97%   BMI 28.96 kg/m²   SPO2 (COPD values may differ): Greater than or equal to 92% on room air = 0    Peak Flow (asthma only): not applicable = 0    RSI: 0-4 = See once and convert to home regimen or discontinue        Plan       Goals: medication delivery    Patient/caregiver was educated on the proper method of use for Respiratory Care Devices:        Level of patient/caregiver understanding able to:   ? Verbalize understanding   ? Demonstrate understanding       ? Teach back        ? Needs reinforcement       ? No available caregiver               ? Other:     Response to education:       Is patient being placed on Home Treatment Regimen? Yes     Does the patient have everything they need prior to discharge? NA     Comments: Chart reviewed and patient assessed. Plan of Care: Spiriva Daily,  Albuterol 2PUFFS Q6PRN. Electronically signed by Silvino Aleman RCP on 3/20/2020 at 8:22 PM    Respiratory Protocol Guidelines     1. Assessment and treatment by Respiratory Therapy will be initiated for medication and therapeutic interventions upon initiation of aerosolized medication. 2. Physician will be contacted for respiratory rate (RR) greater than 35 breaths per minute.  Therapy will be held for heart rate (HR) greater than 140 beats per minute, pending direction from physician. 3. Bronchodilators will be administered via Metered Dose Inhaler (MDI) with spacer when the following criteria are met:  a. Alert and cooperative     b. HR < 140 bpm  c. RR < 30 bpm                d. Can demonstrate a 2-3 second inspiratory hold  4. Bronchodilators will be administered via Hand Held Nebulizer MARLENI [facility]) to patients when ANY of the following criteria are met  a. Incognizant or uncooperative          b. Patients treated with HHN at Home        c. Unable to demonstrate proper use of MDI with spacer     d. RR > 30 bpm   5. Bronchodilators will be delivered via Metered Dose Inhaler (MDI), HHN, Aerogen to intubated patients on mechanical ventilation. 6. Inhalation medication orders will be delivered and/or substituted as outlined below. Aerosolized Medications Ordering and Administration Guidelines:    1. All Medications will be ordered by a physician, and their frequency and/or modality will be adjusted as defined by the patients Respiratory Severity Index (RSI) score. 2. If the patient does not have documented COPD, consider discontinuing anticholinergics when RSI is less than 9.  3. If the bronchospasm worsens (increased RSI), then the bronchodilator frequency can be increased to a maximum of every 4 hours. If greater than every 4 hours is required, the physician will be contacted. 4. If the bronchospasm improves, the frequency of the bronchodilator can be decreased, based on the patient's RSI, but not less than home treatment regimen frequency. 5. Bronchodilator(s) will be discontinued if patient has a RSI less than 9 and has received no scheduled or as needed treatment for 72  Hrs. Patients Ordered on a Mucolytic Agent:    1. Must always be administered with a bronchodilator. 2. Discontinue if patient experiences worsened bronchospasm, or secretions have lessened to the point that the patient is able to clear them with a cough.     Anti-inflammatory and Combination Medications:    1. If the patient lacks prior history of lung disease, is not using inhaled anti-inflammatory medication at home, and lacks wheezing by examination or by history for at least 24 hours, contact physician for possible discontinuation.

## 2020-03-21 NOTE — PROGRESS NOTES
Hospitalist Progress Note      PCP: Annabella Mejia MD    Date of Admission: 3/20/2020    Chief Complaint: sent from cardiology office with low BP. Subjective:     BP much better today. Stop IVF. Denies CP or dyspnea. Medications:  Reviewed    Infusion Medications    dextrose       Scheduled Medications    metoprolol succinate  25 mg Oral Daily    lisinopril  5 mg Oral Daily    ciprofloxacin  500 mg Oral 2 times per day    aspirin  81 mg Oral Daily    atorvastatin  40 mg Oral Nightly    fluticasone  1 spray Nasal Daily    folic acid  1 mg Oral Daily    insulin glargine  10 Units Subcutaneous Nightly    levothyroxine  25 mcg Oral Daily    pantoprazole  40 mg Oral QAM AC    PARoxetine  40 mg Oral QAM    tiotropium  2 puff Inhalation Daily    vitamin B-1  100 mg Oral Daily    vitamin B-12  1,000 mcg Oral Daily    sodium chloride flush  10 mL Intravenous 2 times per day    enoxaparin  40 mg Subcutaneous Daily    insulin lispro  0-6 Units Subcutaneous TID WC    insulin lispro  0-3 Units Subcutaneous Nightly    sodium chloride flush  10 mL Intravenous 2 times per day     PRN Meds: albuterol sulfate HFA, sodium chloride flush, acetaminophen **OR** acetaminophen, promethazine **OR** ondansetron, glucose, dextrose, glucagon (rDNA), dextrose, sodium chloride flush      Intake/Output Summary (Last 24 hours) at 3/21/2020 1529  Last data filed at 3/21/2020 1320  Gross per 24 hour   Intake 2316.75 ml   Output 50 ml   Net 2266.75 ml       Physical Exam Performed:    BP (!) 151/84   Pulse 75   Temp 97 °F (36.1 °C) (Oral)   Resp 18   Ht 5' 5\" (1.651 m)   Wt 173 lb 3.2 oz (78.6 kg)   SpO2 98%   BMI 28.82 kg/m²     General appearance: No apparent distress, appears stated age and cooperative. HEENT: Pupils equal, round, and reactive to light. Conjunctivae/corneas clear. Neck: Supple, with full range of motion. No jugular venous distention. Trachea midline.   Respiratory:  Normal respiratory effort. Clear to auscultation, bilaterally without Rales/Wheezes/Rhonchi. Cardiovascular: Regular rate and rhythm with normal S1/S2 without murmurs, rubs or gallops. Abdomen: Soft, non-tender, non-distended with normal bowel sounds. Musculoskeletal: No clubbing, cyanosis or edema bilaterally. Full range of motion without deformity. Skin: Skin color, texture, turgor normal.  No rashes or lesions. Neurologic:  Neurovascularly intact without any focal sensory/motor deficits. Cranial nerves: II-XII intact, grossly non-focal.  Psychiatric: Alert and oriented, thought content appropriate, normal insight  Capillary Refill: Brisk,< 3 seconds   Peripheral Pulses: +2 palpable, equal bilaterally       Labs:   Recent Labs     03/20/20  1145 03/21/20  0531   WBC 9.2 9.3   HGB 10.9* 10.2*   HCT 34.8* 31.5*    294     Recent Labs     03/20/20  1145 03/20/20  1728 03/21/20  0531   * 135* 134*   K 4.1 4.3 4.8   CL 94* 99 101   CO2 26 23 17*   BUN 24* 21* 17   CREATININE 1.6* 1.3* 1.1   CALCIUM 10.1 9.6 9.2     Recent Labs     03/20/20  1145   AST 21   ALT 14   BILITOT <0.2   ALKPHOS 55     No results for input(s): INR in the last 72 hours.   Recent Labs     03/20/20  1145 03/20/20  1728 03/20/20  2219   TROPONINI <0.01 <0.01 <0.01       Urinalysis:      Lab Results   Component Value Date    NITRU Negative 03/20/2020    WBCUA >100 03/20/2020    BACTERIA 2+ 03/20/2020    RBCUA 3-4 03/20/2020    BLOODU MODERATE 03/20/2020    SPECGRAV 1.010 03/20/2020    GLUCOSEU Negative 03/20/2020       Radiology:  No orders to display           Assessment/Plan:    Active Hospital Problems    Diagnosis    Abnormal EKG [R94.31]    Hypotension [I95.9]    TRINH (acute kidney injury) ([facility] [N17.9]    Chronic systolic heart failure (HCC) [I50.22]    Nonischemic cardiomyopathy (HCC) [I42.8]    Alcoholism ([facility] [F10.20]    DM2 (diabetes mellitus, type 2) ([facility] [E11.9]    CAD (coronary artery disease) [I25.10] #TRINH  - Crt 1.6 on admission-  - baseline 0.8  - suspect pre renal related to hypotension, ACEi use, diuresis  - down to 1.1   - hold diuretics  - stop IVF        Chronic systolic CHF  Nonischemic CMP  Hx of Takotsubo  - hold lasix,   - resumed toprol and lisinopril  - repeat ECHO improved EF.        Abnormal EKG  - with anterolateral T wave changes, new from prior  - she denies any CP, trop neg x 2  - recent cath with only mild nonobstructive CAD  - cardio consult - discussed with Dr Heriberto Kunz       Mild nonobstructive CAD  - cont ASA and statin       HTN  - lisinopril and toprol resumed.        History of Alcohol abuse   - states she is now only drinking 1 beer per day  - ethanol level neg on admit  - no evidence of w/d  - cont oral vitamins        DM2  - cont home regimen     Hypothyroidism   - cont home synthroid     COPD   - no AE, cont home inhalers     Tobacco dependence  - nicotine patch ordered          DVT Prophylaxis: lovenox  Diet: DIET LOW SODIUM 2 GM; Carb Control: 4 carb choices (60 gms)/meal  Code Status: Full Code      Dispo - home tomorrow am if remains stable.      Lam Liu MD

## 2020-03-21 NOTE — CONSULTS
[address] [place] [address]  [phone]        Reason for Consultation/Chief Complaint: No complaints now; consulted for abnormal EKG    History of Present Illness:  Jaclyn Aiken is a 69 y.o. patient who presented to [facility] 3/20/20 after concern for low BP at cardiac NP OV with Valentino Colemans. She has PMH of non-ischemic CM likely Takotsubo +/- ETOH etiology dx late 9/76, systolic CHF, COPD, HTN, HLD, DM, hx ARF, metabolic encephalopathy, TIA, and ETOH abuse. Most recent ECHO 2/29/20 with EF=30-35%; anterior/apical HK; LVH; moderate MR; mild MR (EF=55% in 12/16). Most recent [facility] 3/3/20 showed mild non-obstructive CAD; EF=35% with apical ballooning; normal left heart hemodynamics. Note recent admit late 2/20 for COPD exacerbation. Found elevated William enzymes and initially thought to have had NSTEMI. Found to have abnormal ECHO with new depressed LVEF. After medical stabilization of COPD and mild RADHA (holding lasix) she was transferred to [facility] for [facility] finding only mild CAD. Sent home on CHF med regimen plus low dose lasix. Now presented to cardiac NP for OV 3/20/20 and found to have hypotension BP 72/56mmHg. Sent to ER and admitted. Note toprol XL 50mg, lasix 20mg, lisinopril 40mg held and IVF started. Admit EKG showed NSR; low voltage; T inversion anterolateral leads c/w possible ischemia; septal infarct. Second EKG with less prominent T inversion and new PVC's (T wave change new compared to 2/28/20 EKG). She is vague historian and c/o occasional SOB. Admits to drinking heavily prior but only 1 beer daily now. No idea about meds and gives tangential and confusing answers to questions at times. Note 3 negative William enzymes; BUN/Cr=24/1.6; K+ 4.3. Patient with no complaints of chest pain, palpitations, dizziness, edema, or orthopnea/PND. I have been asked to provide consultation regarding further management and testing.       Past Medical History:   has a past medical history of Acute systolic CHF (congestive physician. 12/5/18  Yes Historical Provider, MD   albuterol sulfate  (90 Base) MCG/ACT inhaler Inhale 2 puffs into the lungs every 6 hours as needed for Wheezing 12/28/18  Yes LEONA Vega CNP   PARoxetine (PAXIL) 40 MG tablet Take 1 tablet by mouth every morning 10/19/17  Yes Kayla Jurado MD   levothyroxine (SYNTHROID) 25 MCG tablet Take 1 tablet by mouth Daily 10/20/17  Yes Kayla Jurado MD   vitamin B-1 (THIAMINE) 100 MG tablet Take 1 tablet by mouth daily 10/19/17  Yes Kayla Jurado MD   lisinopril (PRINIVIL;ZESTRIL) 40 MG tablet Take 1 tablet by mouth daily 3/21/17  Yes Kayla Jurado MD   cloNIDine (CATAPRES) 0.2 MG tablet Take 1 tablet by mouth 2 times daily 3/21/17  Yes Kayla Jurado MD   atorvastatin (LIPITOR) 40 MG tablet Take 1 tablet by mouth nightly 12/25/16  Yes Litzy Templeton MD   fluticasone [facility] [place]) 50 MCG/ACT nasal spray 1 spray by Nasal route daily 12/25/16  Yes Litzy Templeton MD   Multiple Vitamin (THERAPEUTIC) TABS tablet Take 1 tablet by mouth daily 11/15/15  Yes Hilda Hankins MD   omeprazole (PRILOSEC) 20 MG capsule Take 1 capsule by mouth daily 6/29/15  Yes Nick Serrano MD   FISH OIL by Does not apply route. Yes Historical Provider, MD   vitamin B-12 (CYANOCOBALAMIN) 1000 MCG tablet Take 1,000 mcg by mouth daily. Yes Historical Provider, MD   Cholecalciferol (VITAMIN D-3) 5000 UNITS TABS Take  by mouth.      Yes Historical Provider, MD   nicotine (NICODERM CQ) 21 MG/24HR Place 1 patch onto the skin daily 3/7/20   Elena Gay MD   ibuprofen (ADVIL;MOTRIN) 200 MG tablet Take 1 tablet by mouth every 6 hours as needed for Pain  Patient not taking: Reported on 3/20/2020 10/19/17   Kayla Jurado MD   fluticasone-salmeterol (ADVAIR DISKUS) 500-50 MCG/DOSE diskus inhaler Inhale 1 puff into the lungs every 12 hours  Patient not taking: Reported on 3/20/2020 12/25/16   Litzy Templeton MD   tiotropium [facility]

## 2020-03-21 NOTE — PROGRESS NOTES
Pt lying in bed quietly. Respirations easy and unlabored. Denies needs at this time. Call light within reach.

## 2020-03-21 NOTE — CARE COORDINATION
Case Management Assessment  Initial Evaluation    Date/Time of Evaluation: 3/21/2020 2:27 PM  Assessment Completed by: Tanya Aldana    Patient Name: Chan Higginbotham  YOB: 1945  Diagnosis: TRINH (acute kidney injury) [facility]) [N17.9]  Date / Time: 3/20/2020 11:15 AM  Admission status/Date:inpt  Chart Reviewed: Yes      Patient Interviewed: Yes   Family Interviewed:  No      Hospitalization in the last 30 days:  Yes    Contacts  :     Relationship to Patient:   Phone Number:    Alternate Contact:     Relationship to Patient:     Phone Number:    Met with:    Current PCP  Eric Kawasaki, MD      Financial  Commercial  Precert required for SNF : Y, N        3 night stay required - Y, N    ADLS  Support Systems:    Transportation: family    Meal Preparation: self    Housing  Home Environment: 2 story home with spouse and son  Steps: two  Plans to Return to Present Housing: Yes  Other Identified Issues: no    Home Care Information  Currently active with [address] : [facility] [place] [facility] [place]      Passport/Waiver : No  :                      Phone Number:    Passport/Waiver Services: no          Durable Medical Equipment   DME Provider: na  Equipment: Walker___Cane___RTS___ BSC___Shower Chair___  02__ at ____Liter(s)---Uses________HHN___ CPAP___ BiPap___ Hospital Bed___W/C____Other________      Has Home O2 in place on admit:  No  Informed of need to bring portable home O2 tank on day of discharge for nursing to connect prior to leaving:   Not Indicated  Verbalized agreement/Understanding:   Not Indicated    Community Service Affiliation  Dialysis:  No    · Name:  · Location  · Dialysis Schedule:  · Phone:   · Fax:     Outpatient PT/OT: No    Cancer Center: No     CHF Clinic: No     Pulmonary Rehab: No  Pain Clinic: No  Community Mental Health: No    Wound Clinic: No     Other: no    The Plan for Transition of Care is related to the following treatment goals: home with [facility]        DISCHARGE PLAN: Reviewed Chart. Met with the pt. Role of dcp explained. CM consulted r/t pt has hx of alcoholism and pt states spouse has a bad temper. Pt states she feels safe at home. Pt from home with spouse and son and plan return with [address] for SN,PT/OT. Following. Explained Case Management role/services.

## 2020-03-22 VITALS
HEART RATE: 73 BPM | DIASTOLIC BLOOD PRESSURE: 79 MMHG | TEMPERATURE: 97 F | HEIGHT: 65 IN | WEIGHT: 172.4 LBS | RESPIRATION RATE: 16 BRPM | BODY MASS INDEX: 28.72 KG/M2 | OXYGEN SATURATION: 92 % | SYSTOLIC BLOOD PRESSURE: 126 MMHG

## 2020-03-22 LAB
ANION GAP SERPL CALCULATED.3IONS-SCNC: 13 MMOL/L (ref 3–16)
BUN BLDV-MCNC: 9 MG/DL (ref 7–20)
CALCIUM SERPL-MCNC: 9 MG/DL (ref 8.3–10.6)
CHLORIDE BLD-SCNC: 104 MMOL/L (ref 99–110)
CO2: 22 MMOL/L (ref 21–32)
CREAT SERPL-MCNC: 1.1 MG/DL (ref 0.6–1.2)
GFR AFRICAN AMERICAN: 59
GFR NON-AFRICAN AMERICAN: 49
GLUCOSE BLD-MCNC: 119 MG/DL (ref 70–99)
GLUCOSE BLD-MCNC: 127 MG/DL (ref 70–99)
GLUCOSE BLD-MCNC: 157 MG/DL (ref 70–99)
GLUCOSE BLD-MCNC: 158 MG/DL (ref 70–99)
PERFORMED ON: ABNORMAL
POTASSIUM REFLEX MAGNESIUM: 4.1 MMOL/L (ref 3.5–5.1)
SODIUM BLD-SCNC: 139 MMOL/L (ref 136–145)

## 2020-03-22 PROCEDURE — 2500000003 HC RX 250 WO HCPCS: Performed by: INTERNAL MEDICINE

## 2020-03-22 PROCEDURE — 94761 N-INVAS EAR/PLS OXIMETRY MLT: CPT

## 2020-03-22 PROCEDURE — G0378 HOSPITAL OBSERVATION PER HR: HCPCS

## 2020-03-22 PROCEDURE — 94640 AIRWAY INHALATION TREATMENT: CPT

## 2020-03-22 PROCEDURE — 2580000003 HC RX 258: Performed by: PHYSICIAN ASSISTANT

## 2020-03-22 PROCEDURE — 80048 BASIC METABOLIC PNL TOTAL CA: CPT

## 2020-03-22 PROCEDURE — 36415 COLL VENOUS BLD VENIPUNCTURE: CPT

## 2020-03-22 PROCEDURE — 6370000000 HC RX 637 (ALT 250 FOR IP): Performed by: PHYSICIAN ASSISTANT

## 2020-03-22 PROCEDURE — 96374 THER/PROPH/DIAG INJ IV PUSH: CPT

## 2020-03-22 PROCEDURE — 6370000000 HC RX 637 (ALT 250 FOR IP): Performed by: INTERNAL MEDICINE

## 2020-03-22 PROCEDURE — 99232 SBSQ HOSP IP/OBS MODERATE 35: CPT | Performed by: INTERNAL MEDICINE

## 2020-03-22 RX ORDER — LISINOPRIL 20 MG/1
20 TABLET ORAL ONCE
Status: COMPLETED | OUTPATIENT
Start: 2020-03-22 | End: 2020-03-22

## 2020-03-22 RX ORDER — LISINOPRIL 20 MG/1
20 TABLET ORAL DAILY
Status: DISCONTINUED | OUTPATIENT
Start: 2020-03-23 | End: 2020-03-22 | Stop reason: HOSPADM

## 2020-03-22 RX ORDER — LABETALOL HYDROCHLORIDE 5 MG/ML
10 INJECTION, SOLUTION INTRAVENOUS EVERY 6 HOURS PRN
Status: DISCONTINUED | OUTPATIENT
Start: 2020-03-22 | End: 2020-03-22 | Stop reason: HOSPADM

## 2020-03-22 RX ORDER — METOPROLOL SUCCINATE 50 MG/1
50 TABLET, EXTENDED RELEASE ORAL DAILY
Status: DISCONTINUED | OUTPATIENT
Start: 2020-03-23 | End: 2020-03-22 | Stop reason: HOSPADM

## 2020-03-22 RX ORDER — CLONIDINE HYDROCHLORIDE 0.2 MG/1
0.2 TABLET ORAL 3 TIMES DAILY PRN
Qty: 60 TABLET | Refills: 1 | Status: ON HOLD | OUTPATIENT
Start: 2020-03-22 | End: 2021-01-19 | Stop reason: HOSPADM

## 2020-03-22 RX ORDER — CIPROFLOXACIN 500 MG/1
500 TABLET, FILM COATED ORAL EVERY 12 HOURS SCHEDULED
Qty: 10 TABLET | Refills: 0 | Status: SHIPPED | OUTPATIENT
Start: 2020-03-22 | End: 2020-03-27

## 2020-03-22 RX ORDER — BLOOD PRESSURE TEST KIT
1 KIT MISCELLANEOUS DAILY
Qty: 1 KIT | Refills: 0 | Status: ON HOLD | OUTPATIENT
Start: 2020-03-22 | End: 2021-05-23

## 2020-03-22 RX ORDER — LISINOPRIL 20 MG/1
20 TABLET ORAL DAILY
Qty: 30 TABLET | Refills: 1 | Status: ON HOLD | OUTPATIENT
Start: 2020-03-22 | End: 2021-05-23

## 2020-03-22 RX ORDER — METOPROLOL SUCCINATE 25 MG/1
25 TABLET, EXTENDED RELEASE ORAL ONCE
Status: COMPLETED | OUTPATIENT
Start: 2020-03-22 | End: 2020-03-22

## 2020-03-22 RX ADMIN — LABETALOL HYDROCHLORIDE 10 MG: 5 INJECTION INTRAVENOUS at 04:19

## 2020-03-22 RX ADMIN — FLUTICASONE PROPIONATE 1 SPRAY: 50 SPRAY, METERED NASAL at 09:12

## 2020-03-22 RX ADMIN — ASPIRIN 81 MG: 81 TABLET, COATED ORAL at 09:11

## 2020-03-22 RX ADMIN — METOPROLOL SUCCINATE 25 MG: 25 TABLET, EXTENDED RELEASE ORAL at 12:15

## 2020-03-22 RX ADMIN — LEVOTHYROXINE SODIUM 25 MCG: 25 TABLET ORAL at 06:20

## 2020-03-22 RX ADMIN — PAROXETINE HYDROCHLORIDE 40 MG: 20 TABLET, FILM COATED ORAL at 09:11

## 2020-03-22 RX ADMIN — FOLIC ACID 1 MG: 1 TABLET ORAL at 09:11

## 2020-03-22 RX ADMIN — TIOTROPIUM BROMIDE INHALATION SPRAY 2 PUFF: 3.12 SPRAY, METERED RESPIRATORY (INHALATION) at 07:41

## 2020-03-22 RX ADMIN — INSULIN LISPRO 1 UNITS: 100 INJECTION, SOLUTION INTRAVENOUS; SUBCUTANEOUS at 09:13

## 2020-03-22 RX ADMIN — Medication 10 ML: at 09:12

## 2020-03-22 RX ADMIN — METOPROLOL SUCCINATE 25 MG: 25 TABLET, EXTENDED RELEASE ORAL at 09:11

## 2020-03-22 RX ADMIN — CYANOCOBALAMIN TAB 500 MCG 1000 MCG: 500 TAB at 09:11

## 2020-03-22 RX ADMIN — LISINOPRIL 20 MG: 20 TABLET ORAL at 12:15

## 2020-03-22 RX ADMIN — CIPROFLOXACIN 500 MG: 500 TABLET, FILM COATED ORAL at 09:11

## 2020-03-22 RX ADMIN — PANTOPRAZOLE SODIUM 40 MG: 40 TABLET, DELAYED RELEASE ORAL at 06:20

## 2020-03-22 RX ADMIN — Medication 100 MG: at 09:12

## 2020-03-22 RX ADMIN — LISINOPRIL 5 MG: 5 TABLET ORAL at 09:11

## 2020-03-22 NOTE — PROGRESS NOTES
Report given at this time, patient stable, care transferred to [facility].

## 2020-03-22 NOTE — PROGRESS NOTES
and filing pressure are indeterminate.   Mitral annular calcification is present.   Moderate mitral regurgitation.   Mild tricuspid regurgitation.  IVC size is dilated (>2.1 cm) but collapses > 50% with respiration   consistent with elevated RA pressure (8 mmHg).  Previous echo done 12/2016 - EF 55%     3/3/20 [facility] Procedure Findings:  1.  Mild nonobstructive coronary artery disease. 2.  Reduced left ventricular function EF of 35% consistent with apical ballooning  3. Normal left heart hemodynamics    3/21/20 Limited ECHO Summary   Limited study for EF. Technically difficult examination due to body habitus. Definity® used for   myocardial border enhancement. Normal LV systolic function appears normal with a visually estimated EF of 55%. No obvious segmental wall motion abnormalities. There is mild concentric left ventricular hypertrophy. Indeterminate diastolic function. Inadequate tricuspid valve regurgitation to estimate systolic pulmonary artery pressure.     Assessment:  Shelli Olivarez is a 69 y.o. patient who presented to [facility] 3/20/20 after concern for low BP at cardiac NP OV with Dalia Chopra. She has PMH of non-ischemic CM likely Takotsubo +/- ETOH etiology dx late 9/18, systolic CHF, COPD, HTN, HLD, DM, hx ARF, metabolic encephalopathy, TIA, and ETOH abuse. Most recent ECHO 2/29/20 with EF=30-35%; anterior/apical HK; LVH; moderate MR; mild MR (EF=55% in 12/16). Most recent [facility] 3/3/20 showed mild non-obstructive CAD; EF=35% with apical ballooning; normal left heart hemodynamics. Note recent admit late 2/20 for COPD exacerbation. Found elevated William enzymes and initially thought to have had NSTEMI. Found to have abnormal ECHO with new depressed LVEF. After medical stabilization of COPD and mild RADHA (holding lasix) she was transferred to [facility] for [facility] finding only mild CAD. Sent home on CHF med regimen plus low dose lasix.                Now presented to cardiac NP for OV 3/20/20 and found to have

## 2020-03-22 NOTE — DISCHARGE SUMMARY
Hospital Medicine Discharge Summary    Patient ID: Jaclyn Aiken      Patient's PCP: Scarlet Reynolds MD    Admit Date: 3/20/2020     Discharge Date: 3/22/2020      Admitting Physician: Iziaah Ulrich MD     Discharge Physician: Monika Olivas MD     Discharge Diagnoses: Active Hospital Problems    Diagnosis    Abnormal EKG [R94.31]    Hypotension [I95.9]    TRINH (acute kidney injury) ([facility] [N17.9]    Chronic systolic heart failure (HCC) [I50.22]    Nonischemic cardiomyopathy (HCC) [I42.8]    Alcoholism ([facility] [F10.20]    DM2 (diabetes mellitus, type 2) ([facility] [E11.9]    CAD (coronary artery disease) [I25.10]       The patient was seen and examined on day of discharge and this discharge summary is in conjunction with any daily progress note from day of discharge. Hospital Course: 80yo woman with Hx of takotsubo cardiomyopathy, alcohol abuse, presented from cardiology office with hypotension.        TRINH  - Crt 1.6 on admission-  - baseline 0.8  - suspect pre renal related to hypotension, ACEi use, diuresis  - down to 1.1   - hold diuretics  - stopped IVF           Chronic systolic CHF - Nonischemic CMP  Hx of Takotsubo  - resumed toprol 50 and lisinopril dose decreased to 20  - repeat ECHO improved EF - this is reassuring.            Abnormal EKG  - with anterolateral T wave changes, new from prior  - she denies any CP, trop neg x 2  - recent cath with only mild nonobstructive CAD  - cardio consult - discussed with Dr Mery Keita   - ECHO actually shows improved EF       Mild nonobstructive CAD  - cont ASA and statin  - torpol XL (50) and lower dose lisinopril (20) resumed.         HTN  - lisinopril and toprol resumed.         History of Alcohol abuse   - states she is now only drinking 1 beer per day  - ethanol level neg on admit  - no evidence of w/d  - cont oral vitamins   - counseled.        DM2  - cont home regimen       Hypothyroidism   - cont home synthroid       COPD   - no AE, cont home HOSPITALIST  IP CONSULT TO CARDIOLOGY  IP CONSULT TO SOCIAL WORK  IP CONSULT TO SOCIAL WORK  IP CONSULT TO HOME CARE NEEDS    Disposition: home    Condition at Discharge: Stable    Discharge Instructions/Follow-up:  PCP 1 week. Code Status:  Prior     Activity: activity as tolerated    Diet: regular diet      Discharge Medications:     Discharge Medication List as of 3/22/2020 12:05 PM           Details   ciprofloxacin (CIPRO) 500 MG tablet Take 1 tablet by mouth every 12 hours for 5 days, Disp-10 tablet, R-0Print      Blood Pressure KIT DAILY Starting Sun 3/22/2020, Disp-1 kit, R-0, Print              Details   lisinopril (PRINIVIL;ZESTRIL) 20 MG tablet Take 1 tablet by mouth daily, Disp-30 tablet, R-1Print      cloNIDine (CATAPRES) 0.2 MG tablet Take 1 tablet by mouth 3 times daily as needed (Systolic LP>353), [id number] tablet, R-1Print              Details   insulin glargine (LANTUS) 100 UNIT/ML injection vial Inject 10 Units into the skin nightly, Disp-1 vial, R-3DC to SNF      aspirin 81 MG EC tablet Take 1 tablet by mouth daily, Disp-30 tablet, R-3NO PRINT      metoprolol succinate (TOPROL XL) 50 MG extended release tablet Take 1 tablet by mouth daily, Disp-30 tablet, [id number]      folic acid (FOLVITE) 1 MG tablet Take 1 tablet by mouth daily, Disp-30 tablet, R-3Normal      nicotine (NICODERM CQ) 21 MG/24HR Place 1 patch onto the skin daily, Disp-30 patch, R-0NO PRINT      metFORMIN (GLUCOPHAGE) 500 MG tablet Take 500 mg by mouthHistorical Med      polyethylene glycol-electrolytes (TRILYTE) 420 g solution Follow Package Instructions unless otherwise directed by physician. Historical Med      albuterol sulfate  (90 Base) MCG/ACT inhaler Inhale 2 puffs into the lungs every 6 hours as needed for Wheezing, Disp-1 Inhaler, R-0Print      PARoxetine (PAXIL) 40 MG tablet Take 1 tablet by mouth every morning, Disp-30 tablet, R-3NO PRINT      levothyroxine (SYNTHROID) 25 MCG tablet Take 1 tablet by mouth Daily, Disp-30 tablet, R-0NO PRINT      vitamin B-1 (THIAMINE) 100 MG tablet Take 1 tablet by mouth daily, Disp-30 tablet, R-0NO PRINT      fluticasone-salmeterol (ADVAIR DISKUS) 500-50 MCG/DOSE diskus inhaler Inhale 1 puff into the lungs every 12 hours, Disp-60 each, R-0      atorvastatin (LIPITOR) 40 MG tablet Take 1 tablet by mouth nightly, Disp-30 tablet, R-3      fluticasone (FLONASE) 50 MCG/ACT nasal spray 1 spray by Nasal route daily, Disp-1 Bottle, R-3      tiotropium (SPIRIVA HANDIHALER) 18 MCG inhalation capsule Inhale 1 capsule into the lungs daily, Disp-30 capsule, R-0      Multiple Vitamin (THERAPEUTIC) TABS tablet Take 1 tablet by mouth daily, Disp-30 tablet, R-3      omeprazole (PRILOSEC) 20 MG capsule Take 1 capsule by mouth daily, Disp-30 capsule, R-0      FISH OIL by Does not apply route. vitamin B-12 (CYANOCOBALAMIN) 1000 MCG tablet Take 1,000 mcg by mouth daily. Cholecalciferol (VITAMIN D-3) 5000 UNITS TABS Take  by mouth. Time Spent on discharge is more than 30 minutes in the examination, evaluation, counseling and review of medications and discharge plan. Signed:    Hakan Summers MD   3/22/2020      Thank you Casimiro Rodriguez MD for the opportunity to be involved in this patient's care. If you have any questions or concerns please feel free to contact me at [phone].

## 2020-03-22 NOTE — DISCHARGE INSTR - COC
Continuity of Care Form    Patient Name: Tahira Ballard   :  [id number]  MRN:  [id number]    Admit date:  3/20/2020  Discharge date:  3/22    Code Status Order: Full Code   Advance Directives:   Advance Care Flowsheet Documentation     Date/Time Healthcare Directive Type of Healthcare Directive Copy in [address] Agent's Name Healthcare Agent's Phone Number    20 [phone]  No, patient does not have an advance directive for healthcare treatment -- -- -- -- --          Admitting Physician:  Karen Khoury MD  PCP: Kali George MD    Discharging Nurse: [facility] Unit/Room#: /6180-45  Discharging Unit Phone Number: [phone]    Emergency Contact:   Extended Emergency Contact Information  [facility]  Address: [address] [place] [address], [address] [place] [address] Phone: [phone]  Mobile Phone: [phone]  Relation: Spouse  Secondary Emergency Contact: Kit Castro [address] Phone: [phone]  Relation: Unknown    Past Surgical History:  Past Surgical History:   Procedure Laterality Date    ABDOMEN SURGERY      c section    CATARACT REMOVAL WITH IMPLANT Left [id number]    LEFT EYE CATARACT PHACOEMULSIFICATION INTRAOCULAR LENS     SECTION     C/Farida Denis 1106  10/29/13     RIGHT EYE CATARACT PHACOEMULSIFICATION INTRAOCULAR LENS       Immunization History:   Immunization History   Administered Date(s) Administered    DTaP 2012    Pneumococcal Conjugate 13-valent ([id number]) 10/17/2017    Pneumococcal Conjugate 7-valent (Prevnar7) 2012    Pneumococcal Polysaccharide (Gabrombaq11) 2012    Td, unspecified formulation 2012       Active Problems:  Patient Active Problem List   Diagnosis Code    Hyponatremia E87.1    Hypertension I10    Hx of CVA involving R-ICA territory I63.231    CAD (coronary artery disease) I25.10    DM2 No (diabetes mellitus, type 2) ([facility]) E11.9    Alcoholism ([facility] [address] F10.20    Tobacco abuse Z72.0    Asthma J45.909    Hyperlipidemia E78.5    SIRS (systemic inflammatory response syndrome) ([facility]) R65.10    Thrombocytosis ([facility]) D47.3    Chronic dCHF (grade 1 LVDD) I50.32    Alcohol withdrawal ([facility]) F10.239    Frequent falls R29.6    Hypertensive urgency I16.0    COPD (chronic obstructive pulmonary disease) ([facility]) J44.9    Shortness of breath R06.02    NSTEMI (non-ST elevated myocardial infarction) ([facility]) I21.4    Nonischemic cardiomyopathy ([facility]) I42.8    Acute hypoxemic respiratory failure ([facility]) J96.01    Hypomagnesemia E83.42    Hypokalemia E87.6    Acute pulmonary edema ([facility]) J81.0    CHF (congestive heart failure), NYHA class III, acute, systolic ([facility]) L20.05    Chronic systolic heart failure ([facility]) I50.22    TRINH (acute kidney injury) ([facility]) N17.9    Abnormal EKG R94.31    Hypotension I95.9       Isolation/Infection:   Isolation          Contact        Patient Infection Status     None to display          Nurse Assessment:  Last Vital Signs: BP (!) 150/63   Pulse 73   Temp 97 °F (36.1 °C) (Oral)   Resp 16   Ht 5' 5\" (1.651 m)   Wt 172 lb 6.4 oz (78.2 kg)   SpO2 92%   BMI 28.69 kg/m²     Last documented pain score (0-10 scale): Pain Level: 1  Last Weight:   Wt Readings from Last 1 Encounters:   03/22/20 172 lb 6.4 oz (78.2 kg)     Mental Status:  alert    IV Access:  - None    Nursing Mobility/ADLs:  Walking   Assisted  Transfer  Assisted  Bathing  Assisted  Dressing  Assisted  Toileting  Assisted  Feeding  Independent  Med Admin  Independent  Med Delivery   whole    Wound Care Documentation and Therapy:        Elimination:  Continence:   · Bowel:  Yes  · Bladder: No  Urinary Catheter: None   Colostomy/Ileostomy/Ileal Conduit: No       Date of Last BM: 3/21    Intake/Output Summary (Last 24 hours) at 3/22/2020 0926  Last data filed at 3/22/2020 0839  Gross per 24 hour   Intake 892 ml   Output 1400 ml   Net -508 ml     I/O last 3 completed shifts: In: 1088 [P.O.:840; I.V.:248]  Out: 1400 [Urine:1400]    Safety Concerns: At Risk for Falls    Impairments/Disabilities:      None    Nutrition Therapy:  Current Nutrition Therapy:   - Oral Diet:  Carb Control 4 carbs/meal (1800kcals/day)    Routes of Feeding: Oral  Liquids: Thin Liquids  Daily Fluid Restriction: no  Last Modified Barium Swallow with Video (Video Swallowing Test): not done    Treatments at the Time of Hospital Discharge:   Respiratory Treatments:   Oxygen Therapy:  is not on home oxygen therapy. Ventilator:    - No ventilator support    Rehab Therapies: Physical Therapy and Occupational Therapy  Weight Bearing Status/Restrictions: No weight bearing restirctions  Other Medical Equipment (for information only, NOT a DME order):  walker  Other Treatments:     Patient's personal belongings (please select all that are sent with patient):      RN SIGNATURE:  Electronically signed by Jyoti Mcclain RN on 3/22/20 at 12:03 PM EDT    CASE MANAGEMENT/SOCIAL WORK SECTION    Inpatient Status Date: 03/20/20  Readmission Risk Assessment Score:  Readmission Risk              Risk of Unplanned Readmission:        23           Discharging to Facility/ Agency   Name: Isabell Farley [facility] mcmullen SN,PT/OT    / signature: Electronically signed by Vilma Foreman RN on 3/22/20 at 9:26 AM EDT    PHYSICIAN SECTION    Prognosis: {Prognosis:2786408031}    Condition at Discharge: [address] Patient Condition:162574810}    Rehab Potential (if transferring to Rehab): {Prognosis:5072162041}    Recommended Labs or Other Treatments After Discharge: ***    Physician Certification: I certify the above information and transfer of Terrance Santana  is necessary for the continuing treatment of the diagnosis listed and that she requires {Admit to Appropriate Level of Care:78512} for {GREATER/LESS:623678787} 30 days.      Update Admission H&P: {CHP DME Changes in [facility]:133935889}    PHYSICIAN SIGNATURE:  Electronically signed by VO /Mine Sanchez, PAOLO on 3/22/20 at 9:26 AM EDT

## 2020-03-22 NOTE — PROGRESS NOTES
Patient awake and quiet, sitting up in bed. No s/s of distress noted. Patient with c/o nausea, IV nausea medicine will be given once new IV access obtained, current IV access leaking & removed. Call light within reach. Patient denies additional needs at this time. Shift assessment completed. Will continue to monitor.

## 2020-03-23 ENCOUNTER — TELEPHONE (OUTPATIENT)
Dept: CARDIOLOGY | Age: 75
End: 2020-03-23

## 2020-03-23 LAB
ORGANISM: ABNORMAL
URINE CULTURE, ROUTINE: ABNORMAL

## 2020-03-23 NOTE — CARE COORDINATION
Received TC from [facility], she is requesting if pt was inpt or observation during her hospitalization to determine how to proceed with determining if pt would be a new SOC. Pt was inpt, per Mine's note she spoke with Christian Santana and [facility] and AVS was faxed to them at discharge. No further needs.

## 2020-08-23 ENCOUNTER — APPOINTMENT (OUTPATIENT)
Dept: GENERAL RADIOLOGY | Age: 75
End: 2020-08-23
Payer: MEDICARE

## 2020-08-23 ENCOUNTER — APPOINTMENT (OUTPATIENT)
Dept: CT IMAGING | Age: 75
End: 2020-08-23
Payer: MEDICARE

## 2020-08-23 ENCOUNTER — HOSPITAL ENCOUNTER (OUTPATIENT)
Age: 75
Setting detail: OBSERVATION
Discharge: HOME | End: 2020-08-24
Attending: EMERGENCY MEDICINE | Admitting: INTERNAL MEDICINE
Payer: MEDICARE

## 2020-08-23 PROBLEM — R00.0 TACHYCARDIA: Status: ACTIVE | Noted: 2020-08-23

## 2020-08-23 LAB
A/G RATIO: 1.4 (ref 1.1–2.2)
ALBUMIN SERPL-MCNC: 4.1 G/DL (ref 3.4–5)
ALP BLD-CCNC: 60 U/L (ref 40–129)
ALT SERPL-CCNC: 26 U/L (ref 10–40)
AMPHETAMINE SCREEN, URINE: NORMAL
ANION GAP SERPL CALCULATED.3IONS-SCNC: 14 MMOL/L (ref 3–16)
AST SERPL-CCNC: 28 U/L (ref 15–37)
BARBITURATE SCREEN URINE: NORMAL
BASOPHILS ABSOLUTE: 0.1 K/UL (ref 0–0.2)
BASOPHILS RELATIVE PERCENT: 1.1 %
BENZODIAZEPINE SCREEN, URINE: NORMAL
BILIRUB SERPL-MCNC: 0.3 MG/DL (ref 0–1)
BILIRUBIN URINE: NEGATIVE
BLOOD, URINE: ABNORMAL
BUN BLDV-MCNC: 9 MG/DL (ref 7–20)
CALCIUM SERPL-MCNC: 9.7 MG/DL (ref 8.3–10.6)
CANNABINOID SCREEN URINE: NORMAL
CHLORIDE BLD-SCNC: 99 MMOL/L (ref 99–110)
CLARITY: CLEAR
CO2: 25 MMOL/L (ref 21–32)
COCAINE METABOLITE SCREEN URINE: NORMAL
COLOR: ABNORMAL
CREAT SERPL-MCNC: 0.8 MG/DL (ref 0.6–1.2)
EKG ATRIAL RATE: 126 BPM
EKG DIAGNOSIS: NORMAL
EKG P AXIS: 78 DEGREES
EKG P-R INTERVAL: 182 MS
EKG Q-T INTERVAL: 360 MS
EKG QRS DURATION: 66 MS
EKG QTC CALCULATION (BAZETT): 521 MS
EKG R AXIS: 59 DEGREES
EKG T AXIS: 87 DEGREES
EKG VENTRICULAR RATE: 126 BPM
EOSINOPHILS ABSOLUTE: 0.1 K/UL (ref 0–0.6)
EOSINOPHILS RELATIVE PERCENT: 1.4 %
EPITHELIAL CELLS, UA: NORMAL /HPF (ref 0–5)
ETHANOL: 110 MG/DL (ref 0–0.08)
GFR AFRICAN AMERICAN: >60
GFR NON-AFRICAN AMERICAN: >60
GLOBULIN: 2.9 G/DL
GLUCOSE BLD-MCNC: 142 MG/DL (ref 70–99)
GLUCOSE BLD-MCNC: 156 MG/DL (ref 70–99)
GLUCOSE BLD-MCNC: 168 MG/DL (ref 70–99)
GLUCOSE BLD-MCNC: 192 MG/DL (ref 70–99)
GLUCOSE URINE: NEGATIVE MG/DL
HCT VFR BLD CALC: 35.9 % (ref 36–48)
HEMOGLOBIN: 11 G/DL (ref 12–16)
INR BLD: 0.96 (ref 0.86–1.14)
KETONES, URINE: NEGATIVE MG/DL
LEUKOCYTE ESTERASE, URINE: NEGATIVE
LIPASE: 31 U/L (ref 13–60)
LYMPHOCYTES ABSOLUTE: 2.5 K/UL (ref 1–5.1)
LYMPHOCYTES RELATIVE PERCENT: 28.7 %
Lab: NORMAL
MCH RBC QN AUTO: 23.8 PG (ref 26–34)
MCHC RBC AUTO-ENTMCNC: 30.8 G/DL (ref 31–36)
MCV RBC AUTO: 77.4 FL (ref 80–100)
METHADONE SCREEN, URINE: NORMAL
MICROSCOPIC EXAMINATION: YES
MONOCYTES ABSOLUTE: 0.8 K/UL (ref 0–1.3)
MONOCYTES RELATIVE PERCENT: 8.7 %
NEUTROPHILS ABSOLUTE: 5.3 K/UL (ref 1.7–7.7)
NEUTROPHILS RELATIVE PERCENT: 60.1 %
NITRITE, URINE: NEGATIVE
OPIATE SCREEN URINE: NORMAL
OXYCODONE URINE: NORMAL
PDW BLD-RTO: 18.7 % (ref 12.4–15.4)
PERFORMED ON: ABNORMAL
PH UA: 6
PH UA: 6 (ref 5–8)
PHENCYCLIDINE SCREEN URINE: NORMAL
PLATELET # BLD: 447 K/UL (ref 135–450)
PMV BLD AUTO: 7.4 FL (ref 5–10.5)
POTASSIUM REFLEX MAGNESIUM: 3.7 MMOL/L (ref 3.5–5.1)
PROPOXYPHENE SCREEN: NORMAL
PROTEIN UA: NEGATIVE MG/DL
PROTHROMBIN TIME: 11.1 SEC (ref 10–13.2)
RBC # BLD: 4.64 M/UL (ref 4–5.2)
RBC UA: NORMAL /HPF (ref 0–4)
SODIUM BLD-SCNC: 138 MMOL/L (ref 136–145)
SPECIFIC GRAVITY UA: <=1.005 (ref 1–1.03)
TOTAL PROTEIN: 7 G/DL (ref 6.4–8.2)
URINE TYPE: ABNORMAL
UROBILINOGEN, URINE: 0.2 E.U./DL
WBC # BLD: 8.8 K/UL (ref 4–11)
WBC UA: NORMAL /HPF (ref 0–5)

## 2020-08-23 PROCEDURE — 96365 THER/PROPH/DIAG IV INF INIT: CPT

## 2020-08-23 PROCEDURE — 85610 PROTHROMBIN TIME: CPT

## 2020-08-23 PROCEDURE — 83690 ASSAY OF LIPASE: CPT

## 2020-08-23 PROCEDURE — 6370000000 HC RX 637 (ALT 250 FOR IP): Performed by: INTERNAL MEDICINE

## 2020-08-23 PROCEDURE — 71045 X-RAY EXAM CHEST 1 VIEW: CPT

## 2020-08-23 PROCEDURE — 2580000003 HC RX 258: Performed by: INTERNAL MEDICINE

## 2020-08-23 PROCEDURE — 80307 DRUG TEST PRSMV CHEM ANLYZR: CPT

## 2020-08-23 PROCEDURE — G0378 HOSPITAL OBSERVATION PER HR: HCPCS

## 2020-08-23 PROCEDURE — 2580000003 HC RX 258: Performed by: EMERGENCY MEDICINE

## 2020-08-23 PROCEDURE — 2500000003 HC RX 250 WO HCPCS: Performed by: EMERGENCY MEDICINE

## 2020-08-23 PROCEDURE — 12002 RPR S/N/AX/GEN/TRNK2.6-7.5CM: CPT

## 2020-08-23 PROCEDURE — 70450 CT HEAD/BRAIN W/O DYE: CPT

## 2020-08-23 PROCEDURE — 93005 ELECTROCARDIOGRAM TRACING: CPT | Performed by: EMERGENCY MEDICINE

## 2020-08-23 PROCEDURE — 85025 COMPLETE CBC W/AUTO DIFF WBC: CPT

## 2020-08-23 PROCEDURE — 99285 EMERGENCY DEPT VISIT HI MDM: CPT

## 2020-08-23 PROCEDURE — 96361 HYDRATE IV INFUSION ADD-ON: CPT

## 2020-08-23 PROCEDURE — 72125 CT NECK SPINE W/O DYE: CPT

## 2020-08-23 PROCEDURE — 6360000002 HC RX W HCPCS: Performed by: EMERGENCY MEDICINE

## 2020-08-23 PROCEDURE — 93010 ELECTROCARDIOGRAM REPORT: CPT | Performed by: INTERNAL MEDICINE

## 2020-08-23 PROCEDURE — G0480 DRUG TEST DEF 1-7 CLASSES: HCPCS

## 2020-08-23 PROCEDURE — 80053 COMPREHEN METABOLIC PANEL: CPT

## 2020-08-23 PROCEDURE — 81001 URINALYSIS AUTO W/SCOPE: CPT

## 2020-08-23 RX ORDER — LEVOTHYROXINE SODIUM 0.03 MG/1
25 TABLET ORAL DAILY
Status: DISCONTINUED | OUTPATIENT
Start: 2020-08-23 | End: 2020-08-24 | Stop reason: HOSPADM

## 2020-08-23 RX ORDER — ASPIRIN 81 MG/1
81 TABLET ORAL DAILY
Status: DISCONTINUED | OUTPATIENT
Start: 2020-08-23 | End: 2020-08-24 | Stop reason: HOSPADM

## 2020-08-23 RX ORDER — INSULIN GLARGINE 100 [IU]/ML
10 INJECTION, SOLUTION SUBCUTANEOUS NIGHTLY
Status: DISCONTINUED | OUTPATIENT
Start: 2020-08-23 | End: 2020-08-24 | Stop reason: HOSPADM

## 2020-08-23 RX ORDER — FLUTICASONE PROPIONATE 50 MCG
1 SPRAY, SUSPENSION (ML) NASAL DAILY
Status: DISCONTINUED | OUTPATIENT
Start: 2020-08-23 | End: 2020-08-24 | Stop reason: HOSPADM

## 2020-08-23 RX ORDER — SODIUM CHLORIDE 9 MG/ML
INJECTION, SOLUTION INTRAVENOUS CONTINUOUS
Status: DISCONTINUED | OUTPATIENT
Start: 2020-08-23 | End: 2020-08-23

## 2020-08-23 RX ORDER — SODIUM CHLORIDE 0.9 % (FLUSH) 0.9 %
10 SYRINGE (ML) INJECTION PRN
Status: DISCONTINUED | OUTPATIENT
Start: 2020-08-23 | End: 2020-08-24 | Stop reason: HOSPADM

## 2020-08-23 RX ORDER — LISINOPRIL 20 MG/1
20 TABLET ORAL DAILY
Status: DISCONTINUED | OUTPATIENT
Start: 2020-08-23 | End: 2020-08-24 | Stop reason: HOSPADM

## 2020-08-23 RX ORDER — ZOLPIDEM TARTRATE 5 MG/1
5 TABLET ORAL NIGHTLY PRN
Status: DISCONTINUED | OUTPATIENT
Start: 2020-08-24 | End: 2020-08-24

## 2020-08-23 RX ORDER — PANTOPRAZOLE SODIUM 40 MG/1
40 TABLET, DELAYED RELEASE ORAL
Status: DISCONTINUED | OUTPATIENT
Start: 2020-08-24 | End: 2020-08-24 | Stop reason: HOSPADM

## 2020-08-23 RX ORDER — SODIUM CHLORIDE 9 MG/ML
INJECTION, SOLUTION INTRAVENOUS CONTINUOUS
Status: DISCONTINUED | OUTPATIENT
Start: 2020-08-23 | End: 2020-08-24

## 2020-08-23 RX ORDER — ONDANSETRON 2 MG/ML
4 INJECTION INTRAMUSCULAR; INTRAVENOUS EVERY 6 HOURS PRN
Status: DISCONTINUED | OUTPATIENT
Start: 2020-08-23 | End: 2020-08-24 | Stop reason: HOSPADM

## 2020-08-23 RX ORDER — THIAMINE MONONITRATE (VIT B1) 100 MG
100 TABLET ORAL DAILY
Status: DISCONTINUED | OUTPATIENT
Start: 2020-08-23 | End: 2020-08-24 | Stop reason: HOSPADM

## 2020-08-23 RX ORDER — PAROXETINE HYDROCHLORIDE 20 MG/1
40 TABLET, FILM COATED ORAL EVERY MORNING
Status: DISCONTINUED | OUTPATIENT
Start: 2020-08-23 | End: 2020-08-24 | Stop reason: HOSPADM

## 2020-08-23 RX ORDER — FOLIC ACID 1 MG/1
1 TABLET ORAL DAILY
Status: DISCONTINUED | OUTPATIENT
Start: 2020-08-23 | End: 2020-08-24 | Stop reason: HOSPADM

## 2020-08-23 RX ORDER — ACETAMINOPHEN 325 MG/1
650 TABLET ORAL EVERY 6 HOURS PRN
Status: DISCONTINUED | OUTPATIENT
Start: 2020-08-23 | End: 2020-08-24 | Stop reason: HOSPADM

## 2020-08-23 RX ORDER — NICOTINE 21 MG/24HR
1 PATCH, TRANSDERMAL 24 HOURS TRANSDERMAL DAILY
Status: DISCONTINUED | OUTPATIENT
Start: 2020-08-24 | End: 2020-08-24

## 2020-08-23 RX ORDER — PROMETHAZINE HYDROCHLORIDE 25 MG/1
12.5 TABLET ORAL EVERY 6 HOURS PRN
Status: DISCONTINUED | OUTPATIENT
Start: 2020-08-23 | End: 2020-08-24 | Stop reason: HOSPADM

## 2020-08-23 RX ORDER — METOPROLOL SUCCINATE 50 MG/1
50 TABLET, EXTENDED RELEASE ORAL DAILY
Status: DISCONTINUED | OUTPATIENT
Start: 2020-08-23 | End: 2020-08-24 | Stop reason: HOSPADM

## 2020-08-23 RX ORDER — SODIUM CHLORIDE 0.9 % (FLUSH) 0.9 %
10 SYRINGE (ML) INJECTION EVERY 12 HOURS SCHEDULED
Status: DISCONTINUED | OUTPATIENT
Start: 2020-08-23 | End: 2020-08-24 | Stop reason: HOSPADM

## 2020-08-23 RX ORDER — ATORVASTATIN CALCIUM 40 MG/1
40 TABLET, FILM COATED ORAL NIGHTLY
Status: DISCONTINUED | OUTPATIENT
Start: 2020-08-23 | End: 2020-08-24 | Stop reason: HOSPADM

## 2020-08-23 RX ORDER — 0.9 % SODIUM CHLORIDE 0.9 %
1000 INTRAVENOUS SOLUTION INTRAVENOUS ONCE
Status: COMPLETED | OUTPATIENT
Start: 2020-08-23 | End: 2020-08-23

## 2020-08-23 RX ORDER — CLONIDINE HYDROCHLORIDE 0.2 MG/1
0.2 TABLET ORAL 3 TIMES DAILY PRN
Status: DISCONTINUED | OUTPATIENT
Start: 2020-08-23 | End: 2020-08-24 | Stop reason: HOSPADM

## 2020-08-23 RX ORDER — ACETAMINOPHEN 650 MG/1
650 SUPPOSITORY RECTAL EVERY 6 HOURS PRN
Status: DISCONTINUED | OUTPATIENT
Start: 2020-08-23 | End: 2020-08-24 | Stop reason: HOSPADM

## 2020-08-23 RX ORDER — POLYETHYLENE GLYCOL 3350 17 G/17G
17 POWDER, FOR SOLUTION ORAL DAILY PRN
Status: DISCONTINUED | OUTPATIENT
Start: 2020-08-23 | End: 2020-08-24 | Stop reason: HOSPADM

## 2020-08-23 RX ORDER — CHOLECALCIFEROL (VITAMIN D3) 125 MCG
1000 CAPSULE ORAL DAILY
Status: DISCONTINUED | OUTPATIENT
Start: 2020-08-23 | End: 2020-08-24 | Stop reason: HOSPADM

## 2020-08-23 RX ADMIN — PAROXETINE HYDROCHLORIDE 40 MG: 20 TABLET, FILM COATED ORAL at 14:43

## 2020-08-23 RX ADMIN — METOPROLOL SUCCINATE 50 MG: 50 TABLET, EXTENDED RELEASE ORAL at 14:43

## 2020-08-23 RX ADMIN — FOLIC ACID: 5 INJECTION, SOLUTION INTRAMUSCULAR; INTRAVENOUS; SUBCUTANEOUS at 07:45

## 2020-08-23 RX ADMIN — SODIUM CHLORIDE: 9 INJECTION, SOLUTION INTRAVENOUS at 09:46

## 2020-08-23 RX ADMIN — LISINOPRIL 20 MG: 20 TABLET ORAL at 14:43

## 2020-08-23 RX ADMIN — Medication 10 ML: at 20:55

## 2020-08-23 RX ADMIN — ATORVASTATIN CALCIUM 40 MG: 40 TABLET, FILM COATED ORAL at 20:55

## 2020-08-23 RX ADMIN — ACETAMINOPHEN 650 MG: 325 TABLET ORAL at 20:56

## 2020-08-23 RX ADMIN — SODIUM CHLORIDE 1000 ML: 9 INJECTION, SOLUTION INTRAVENOUS at 08:28

## 2020-08-23 RX ADMIN — SODIUM CHLORIDE: 9 INJECTION, SOLUTION INTRAVENOUS at 14:48

## 2020-08-23 ASSESSMENT — ENCOUNTER SYMPTOMS
COUGH: 0
BACK PAIN: 0
VOMITING: 0
ABDOMINAL PAIN: 0
SORE THROAT: 0
DIARRHEA: 0
NAUSEA: 0
EYE DISCHARGE: 0

## 2020-08-23 ASSESSMENT — PAIN DESCRIPTION - DESCRIPTORS: DESCRIPTORS: ACHING

## 2020-08-23 ASSESSMENT — PAIN DESCRIPTION - PAIN TYPE
TYPE: ACUTE PAIN
TYPE: ACUTE PAIN

## 2020-08-23 ASSESSMENT — PAIN DESCRIPTION - ONSET: ONSET: ON-GOING

## 2020-08-23 ASSESSMENT — PAIN DESCRIPTION - LOCATION
LOCATION: HEAD
LOCATION: HEAD

## 2020-08-23 ASSESSMENT — PAIN SCALES - WONG BAKER: WONGBAKER_NUMERICALRESPONSE: 2

## 2020-08-23 ASSESSMENT — PAIN SCALES - GENERAL
PAINLEVEL_OUTOF10: 4
PAINLEVEL_OUTOF10: 0

## 2020-08-23 ASSESSMENT — PAIN DESCRIPTION - FREQUENCY: FREQUENCY: CONTINUOUS

## 2020-08-23 ASSESSMENT — PAIN DESCRIPTION - ORIENTATION: ORIENTATION: POSTERIOR
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
## 2020-08-23 NOTE — PROGRESS NOTES
Patient admitted with infiltrated peripheral IV. 5 total attempts made by PCU RN's. Notified clinical of need for line for continuous IVF.

## 2020-08-23 NOTE — PROGRESS NOTES
Night shift clinical updated on continued need for peripheral IV due to 5 unsuccessful attempts of PCU RN's.

## 2020-08-23 NOTE — PROGRESS NOTES
Patient admitted to room 324 from ER. Patient oriented to room, call light, bed rails, phone, lights and bathroom. Patient instructed about the schedule of the day including: vital sign frequency, lab draws, possible tests, frequency of MD and staff rounds, daily weights, I &O's and prescribed diet. bed alarm in place, patient aware of placement and reason. Telemetry box in place, patient aware of placement and reason. Bed locked, in lowest position, side rails up 2/4, call light within reach. Recliner Assessment  Patient is not able to demonstrated the ability to move from a reclining position to an upright position within the recliner. Patient is confused, demented and /or unable to follow instruction. 4 Eyes Skin Assessment     The patient is being assess for   Admission    I agree that 2 RN's have performed a thorough Head to Toe Skin Assessment on the patient. ALL assessment sites listed below have been assessed. Areas assessed by both nurses:   [x]   Head, Face, and Ears   [x]   Shoulders, Back, and Chest, Abdomen  [x]   Arms, Elbows, and Hands   [x]   Coccyx, Sacrum, and Ischium  [x]   Legs, Feet, and Heels        Laceration to base of skull (7 staples) placed in ED. Scattered bruising. Left eye black/blue and yellow. **SHARE this note so that the co-signing nurse is able to place an eSignature**    Co-signer eSignature: Electronically signed by Jhoana Juarez RN on 8/23/20 at 3:55 PM EDT    Does the Patient have Skin Breakdown?   No          Johnson Prevention initiated:  NA   Wound Care Orders initiated:  NA      [facility] nurse consulted for Pressure Injury (Stage 3,4, Unstageable, DTI, NWPT, Complex wounds)and New or Established Ostomies:  NA      Primary Nurse eSignature: Electronically signed by Raghu Carr RN on 8/23/20 at 1:43 PM EDT

## 2020-08-23 NOTE — ED PROVIDER NOTES
headaches. Hematological: Does not bruise/bleed easily. Psychiatric/Behavioral: Negative for confusion. Positives and Pertinent negatives as per HPI. Except as noted above in the ROS, all other systems were reviewed and negative. PAST MEDICAL HISTORY     Past Medical History:   Diagnosis Date    Acute systolic CHF (congestive heart failure) (HCC)     Asthma     Back ache     Cataract     Cerebral artery occlusion with cerebral infarction ([facility] [address] 2016    Diabetes mellitus ([facility] [address]     type !! - diet controlled    Hyperlipidemia     Hypertension     Insomnia     TIA (transient ischemic attack) 2016         SURGICAL HISTORY     Past Surgical History:   Procedure Laterality Date    ABDOMEN SURGERY      c section    CATARACT REMOVAL WITH IMPLANT Left [id number]    LEFT EYE CATARACT PHACOEMULSIFICATION INTRAOCULAR LENS     SECTION      DENTAL SURGERY      EYE SURGERY  10/29/13     RIGHT EYE CATARACT PHACOEMULSIFICATION INTRAOCULAR LENS         CURRENTMEDICATIONS       Previous Medications    ALBUTEROL SULFATE  (90 BASE) MCG/ACT INHALER    Inhale 2 puffs into the lungs every 6 hours as needed for Wheezing    ASPIRIN 81 MG EC TABLET    Take 1 tablet by mouth daily    ATORVASTATIN (LIPITOR) 40 MG TABLET    Take 1 tablet by mouth nightly    BLOOD PRESSURE KIT    1 each by Does not apply route daily    CHOLECALCIFEROL (VITAMIN D-3) 5000 UNITS TABS    Take  by mouth. CLONIDINE (CATAPRES) 0.2 MG TABLET    Take 1 tablet by mouth 3 times daily as needed (Systolic WR>591)    FISH OIL    by Does not apply route.       FLUTICASONE (FLONASE) 50 MCG/ACT NASAL SPRAY    1 spray by Nasal route daily    FLUTICASONE-SALMETEROL (ADVAIR DISKUS) 500-50 MCG/DOSE DISKUS INHALER    Inhale 1 puff into the lungs every 12 hours    FOLIC ACID (FOLVITE) 1 MG TABLET    Take 1 tablet by mouth daily    INSULIN GLARGINE (LANTUS) 100 UNIT/ML INJECTION VIAL    Inject 10 Units into the skin nightly LEVOTHYROXINE (SYNTHROID) 25 MCG TABLET    Take 1 tablet by mouth Daily    LISINOPRIL (PRINIVIL;ZESTRIL) 20 MG TABLET    Take 1 tablet by mouth daily    METFORMIN (GLUCOPHAGE) 500 MG TABLET    Take 500 mg by mouth    METOPROLOL SUCCINATE (TOPROL XL) 50 MG EXTENDED RELEASE TABLET    Take 1 tablet by mouth daily    MULTIPLE VITAMIN (THERAPEUTIC) TABS TABLET    Take 1 tablet by mouth daily    NICOTINE (NICODERM CQ) 21 MG/24HR    Place 1 patch onto the skin daily    OMEPRAZOLE (PRILOSEC) 20 MG CAPSULE    Take 1 capsule by mouth daily    PAROXETINE (PAXIL) 40 MG TABLET    Take 1 tablet by mouth every morning    POLYETHYLENE GLYCOL-ELECTROLYTES (TRILYTE) 420 G SOLUTION    Follow Package Instructions unless otherwise directed by physician. TIOTROPIUM (SPIRIVA HANDIHALER) 18 MCG INHALATION CAPSULE    Inhale 1 capsule into the lungs daily    VITAMIN B-1 (THIAMINE) 100 MG TABLET    Take 1 tablet by mouth daily    VITAMIN B-12 (CYANOCOBALAMIN) 1000 MCG TABLET    Take 1,000 mcg by mouth daily.            ALLERGIES     Mold extract [trichophyton mentagrophytes]    FAMILYHISTORY       Family History   Problem Relation Age of Onset    High Blood Pressure Mother    [de-identified] / [place] Mother     Cancer Father     Diabetes Father     Heart Disease Father     Kidney Disease Father     Asthma Sister     Cancer Sister         Breast Cancer    Depression Brother     Substance Abuse Paternal Aunt     Stroke Maternal Grandmother     Diabetes Paternal Grandmother           SOCIAL HISTORY       Social History     Socioeconomic History    Marital status:      Spouse name: Edi Nascimento Number of children: Not on file    Years of education: Not on file    Highest education level: Not on file   Occupational History    Not on file   Social Needs    Financial resource strain: Not on file    Food insecurity     Worry: Not on file     Inability: Not on file    Transportation needs     Medical: Not on file DEPARTMENT COURSE and DIFFERENTIAL DIAGNOSIS/MDM:   Vitals: There were no vitals filed for this visit. Patient was given the following medications:  Medications   sodium chloride 0.9 % 0,584 mL with folic acid 1 mg, adult multi-vitamin with vitamin k 10 mL, thiamine 100 mg (has no administration in time range)     07:00a.m. I have signed out [facility] Emergency Department care to Dr. Dixie Connor. We discussed the history, physical exam, completed/pending test results (if obtained) and current treatment plan. Please refer to his/her chart for the patients further details, remaining Emergency Department course, final disposition and diagnosis.       (Please note that portions of this note were completed with a voice recognition program.  Efforts were made to edit the dictations but occasionally words are mis-transcribed.)    Salvador Man MD(electronically signed)              Salvador Man MD  08/24/20 6767

## 2020-08-23 NOTE — ED NOTES
RN ambulated pt in room. Pt's O2 saturation maintained at 97-98% RA. Pt's HR increased from 110s to 137-140. RN notified Dr. Joey Spence. No additional orders at this time.       Steve Zamarripa RN  08/23/20 0973
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
## 2020-08-23 NOTE — ED NOTES
Orders placed at [phone] from Hospitalist, no call back.       Modesto Colmenares [facility]  08/23/20 1122
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
## 2020-08-24 VITALS
DIASTOLIC BLOOD PRESSURE: 93 MMHG | OXYGEN SATURATION: 93 % | HEART RATE: 89 BPM | HEIGHT: 65 IN | WEIGHT: 163.4 LBS | TEMPERATURE: 95.2 F | BODY MASS INDEX: 27.22 KG/M2 | RESPIRATION RATE: 22 BRPM | SYSTOLIC BLOOD PRESSURE: 156 MMHG

## 2020-08-24 PROBLEM — F10.10 ETOH ABUSE: Status: ACTIVE | Noted: 2017-02-01

## 2020-08-24 PROBLEM — J44.1 COPD EXACERBATION (HCC): Status: ACTIVE | Noted: 2017-10-16

## 2020-08-24 PROCEDURE — 94761 N-INVAS EAR/PLS OXIMETRY MLT: CPT

## 2020-08-24 PROCEDURE — 6370000000 HC RX 637 (ALT 250 FOR IP): Performed by: INTERNAL MEDICINE

## 2020-08-24 PROCEDURE — 6360000002 HC RX W HCPCS: Performed by: INTERNAL MEDICINE

## 2020-08-24 PROCEDURE — G0378 HOSPITAL OBSERVATION PER HR: HCPCS

## 2020-08-24 PROCEDURE — 96372 THER/PROPH/DIAG INJ SC/IM: CPT

## 2020-08-24 PROCEDURE — 94640 AIRWAY INHALATION TREATMENT: CPT

## 2020-08-24 PROCEDURE — 2580000003 HC RX 258: Performed by: INTERNAL MEDICINE

## 2020-08-24 PROCEDURE — 99217 PR OBSERVATION CARE DISCHARGE MANAGEMENT: CPT | Performed by: INTERNAL MEDICINE

## 2020-08-24 RX ORDER — NICOTINE 21 MG/24HR
1 PATCH, TRANSDERMAL 24 HOURS TRANSDERMAL DAILY
Status: DISCONTINUED | OUTPATIENT
Start: 2020-08-24 | End: 2020-08-24 | Stop reason: HOSPADM

## 2020-08-24 RX ORDER — ZOLPIDEM TARTRATE 5 MG/1
5 TABLET ORAL NIGHTLY PRN
Status: DISCONTINUED | OUTPATIENT
Start: 2020-08-24 | End: 2020-08-24 | Stop reason: HOSPADM

## 2020-08-24 RX ORDER — PREDNISONE 20 MG/1
20 TABLET ORAL DAILY
Qty: 5 TABLET | Refills: 0 | Status: SHIPPED | OUTPATIENT
Start: 2020-08-24 | End: 2020-08-29

## 2020-08-24 RX ORDER — IPRATROPIUM BROMIDE AND ALBUTEROL SULFATE 2.5; .5 MG/3ML; MG/3ML
1 SOLUTION RESPIRATORY (INHALATION) EVERY 6 HOURS PRN
Qty: 360 ML | Refills: 0 | Status: ON HOLD | OUTPATIENT
Start: 2020-08-24 | End: 2021-05-23

## 2020-08-24 RX ADMIN — LISINOPRIL 20 MG: 20 TABLET ORAL at 08:34

## 2020-08-24 RX ADMIN — ASPIRIN 81 MG: 81 TABLET, COATED ORAL at 08:34

## 2020-08-24 RX ADMIN — FOLIC ACID 1 MG: 1 TABLET ORAL at 08:34

## 2020-08-24 RX ADMIN — ZOLPIDEM TARTRATE 5 MG: 5 TABLET ORAL at 01:29

## 2020-08-24 RX ADMIN — LEVOTHYROXINE SODIUM 25 MCG: 25 TABLET ORAL at 06:27

## 2020-08-24 RX ADMIN — CYANOCOBALAMIN TAB 500 MCG 1000 MCG: 500 TAB at 08:34

## 2020-08-24 RX ADMIN — SODIUM CHLORIDE: 9 INJECTION, SOLUTION INTRAVENOUS at 01:42

## 2020-08-24 RX ADMIN — Medication 10 ML: at 08:34

## 2020-08-24 RX ADMIN — Medication 100 MG: at 08:34

## 2020-08-24 RX ADMIN — CLONIDINE HYDROCHLORIDE 0.2 MG: 0.2 TABLET ORAL at 01:42

## 2020-08-24 RX ADMIN — ENOXAPARIN SODIUM 40 MG: 100 INJECTION SUBCUTANEOUS at 08:34

## 2020-08-24 RX ADMIN — PAROXETINE HYDROCHLORIDE 40 MG: 20 TABLET, FILM COATED ORAL at 08:34

## 2020-08-24 RX ADMIN — PANTOPRAZOLE SODIUM 40 MG: 40 TABLET, DELAYED RELEASE ORAL at 06:27

## 2020-08-24 RX ADMIN — METFORMIN HYDROCHLORIDE 500 MG: 500 TABLET, FILM COATED ORAL at 08:34

## 2020-08-24 RX ADMIN — METOPROLOL SUCCINATE 50 MG: 50 TABLET, EXTENDED RELEASE ORAL at 08:34

## 2020-08-24 RX ADMIN — TIOTROPIUM BROMIDE INHALATION SPRAY 2 PUFF: 3.12 SPRAY, METERED RESPIRATORY (INHALATION) at 07:23

## 2020-08-24 ASSESSMENT — PAIN SCALES - GENERAL: PAINLEVEL_OUTOF10: 0
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
## 2020-08-24 NOTE — FLOWSHEET NOTE
08/23/20 2100   Vital Signs   Temp 97.7 °F (36.5 °C)   Temp Source Oral   Pulse 102   Heart Rate Source Monitor   Resp 20   BP (!) 179/98   BP Location Left upper arm   MAP (mmHg) 125   Level of Consciousness 0   MEWS Score 2   Oxygen Therapy   SpO2 96 %   O2 Device None (Room air)   tylenol given for pain, patient states she would like nicotein patch and ambien, throaty wheeze and cough noted, lung sounds diminished but clear posteriorly. Refuses lantus, metformin to restart in am.  Snack given, IV started, call light in reach. Bed alarm on.

## 2020-08-24 NOTE — PROGRESS NOTES
Patient restless in bed, ambien given prior, patient has rested little off and on. Denies needs, will continue to monitor, tele sitter in place, will re-evaluate BP.

## 2020-08-24 NOTE — PROGRESS NOTES
DC instructions have been reviewed with patient. Patient requesting to go in hospital gown as her clothes are dirty. IV removed with no complications. Patient chose to wear only a gown and socks with no pants. She does have an incontinence brief on. Transport is taking patient down to her ride at this time.
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
## 2020-09-21 ENCOUNTER — HOSPITAL ENCOUNTER (EMERGENCY)
Age: 75
Discharge: HOME | End: 2020-09-21
Attending: STUDENT IN AN ORGANIZED HEALTH CARE EDUCATION/TRAINING PROGRAM
Payer: MEDICARE

## 2020-09-21 VITALS
HEIGHT: 65 IN | SYSTOLIC BLOOD PRESSURE: 210 MMHG | HEART RATE: 95 BPM | OXYGEN SATURATION: 96 % | WEIGHT: 159 LBS | BODY MASS INDEX: 26.49 KG/M2 | DIASTOLIC BLOOD PRESSURE: 100 MMHG | TEMPERATURE: 98.4 F | RESPIRATION RATE: 16 BRPM

## 2020-09-21 PROCEDURE — 6370000000 HC RX 637 (ALT 250 FOR IP): Performed by: STUDENT IN AN ORGANIZED HEALTH CARE EDUCATION/TRAINING PROGRAM

## 2020-09-21 PROCEDURE — 99283 EMERGENCY DEPT VISIT LOW MDM: CPT

## 2020-09-21 RX ORDER — OXYCODONE HYDROCHLORIDE AND ACETAMINOPHEN 5; 325 MG/1; MG/1
1 TABLET ORAL ONCE
Status: COMPLETED | OUTPATIENT
Start: 2020-09-21 | End: 2020-09-21

## 2020-09-21 RX ORDER — OXYCODONE HYDROCHLORIDE 5 MG/1
5 TABLET ORAL EVERY 6 HOURS PRN
Qty: 6 TABLET | Refills: 0 | Status: SHIPPED | OUTPATIENT
Start: 2020-09-21 | End: 2020-09-23

## 2020-09-21 RX ADMIN — OXYCODONE AND ACETAMINOPHEN 1 TABLET: 5; 325 TABLET ORAL at 20:57

## 2020-09-21 ASSESSMENT — PAIN SCALES - GENERAL
PAINLEVEL_OUTOF10: 7
PAINLEVEL_OUTOF10: 7

## 2020-09-21 NOTE — ED PROVIDER NOTES
Primary Care Physician: Harshad Kim MD   Attending Physician: No att. providers found     History   Chief Complaint   Patient presents with    Leg Pain     bilateral leg pain for a few weeks without injury. ANA Sims is a 77 y.o. female history of congestive heart failure, stroke, diabetes, hypertension, hyperlipidemia, presenting this evening accompanied by  with complaint of leg pain mostly on the left thigh which is been going on now for some time. Actually came to the emergency room because she wanted to get pain medicine. She denies any trauma to the leg and no rash or redness. She states that her pain is worse when she walks. She states that this is been going on for some time. She does have some difficulties walking. Denies any fevers, chills, nausea, vomiting, headaches, chest pain or shortness of breath.      Past Medical History:   Diagnosis Date    Acute systolic CHF (congestive heart failure) (HCC)     Asthma     Back ache     Cataract     Cerebral artery occlusion with cerebral infarction ([address] 2016    Diabetes mellitus ([address]     type !! - diet controlled    Hyperlipidemia     Hypertension     Insomnia     TIA (transient ischemic attack) 2016        Past Surgical History:   Procedure Laterality Date    ABDOMEN SURGERY      c section    CATARACT REMOVAL WITH IMPLANT Left [id number]    LEFT EYE CATARACT PHACOEMULSIFICATION INTRAOCULAR LENS     SECTION      DENTAL SURGERY      EYE SURGERY  10/29/13     RIGHT EYE CATARACT PHACOEMULSIFICATION INTRAOCULAR LENS        Family History   Problem Relation Age of Onset    High Blood Pressure Mother     Miscarriages / [place] Mother     Cancer Father     Diabetes Father     Heart Disease Father     Kidney Disease Father     Asthma Sister     Cancer Sister         Breast Cancer    Depression Brother     Substance Abuse Paternal Aunt     Stroke Maternal Grandmother     Diabetes Paternal Grandmother         Social History     Socioeconomic History    Marital status:      Spouse name: carolyne    Number of children: None    Years of education: None    Highest education level: None   Occupational History    None   Social Needs    Financial resource strain: None    Food insecurity     Worry: None     Inability: None    Transportation needs     Medical: None     Non-medical: None   Tobacco Use    Smoking status: Current Every Day Smoker     Packs/day: 2.00     Years: 58.00     Pack years: 116.00     Types: Cigarettes    Smokeless tobacco: Never Used    Tobacco comment: not totally ready to quit today   Substance and Sexual Activity    Alcohol use: Yes     Alcohol/week: 0.0 standard drinks     Types: 3 - 4 Standard drinks or equivalent per week     Comment: 1-2 hot toddy every night     Drug use: No    Sexual activity: Yes     Partners: Male   Lifestyle    Physical activity     Days per week: None     Minutes per session: None    Stress: None   Relationships    Social connections     Talks on phone: None     Gets together: None     Attends Bahai service: None     Active member of club or organization: None     Attends meetings of clubs or organizations: None     Relationship status: None    Intimate partner violence     Fear of current or ex partner: None     Emotionally abused: None     Physically abused: None     Forced sexual activity: None   Other Topics Concern    None   Social History Narrative    None        Review of Systems   10 total systems reviewed and found to be negative unless otherwise noted in HPI     Physical Exam   BP (!) 210/100   Pulse 95   Temp 98.4 °F (36.9 °C) (Oral)   Resp 16   Ht 5' 5\" (1.651 m)   Wt 159 lb (72.1 kg)   SpO2 96%   BMI 26.46 kg/m²      CONSTITUTIONAL: Well appearing, in no acute distress   HEAD: atraumatic, normocephalic   EYES: PERRL, No injection, discharge or scleral icterus. ENT: Moist mucous membranes.     NECK: Normal ROM, NO LAD   CARDIOVASCULAR: Regular rate and rhythm. No murmurs or gallop. PULMONARY/CHEST: Airway patent. No retractions. Breath sounds clear with good air entry bilaterally. ABDOMEN: Soft, Non-distended and non-tender, without guarding or rebound. SKIN: Acyanotic, warm, dry   MUSCULOSKELETAL: No swelling, or deformity but tenderness to palpation on the left thigh  NEUROLOGICAL: Awake and oriented x 3. Pulses intact. Grossly nonfocal   Nursing note and vitals reviewed. ED Course & Medical Decision Making   Medications   oxyCODONE-acetaminophen (PERCOCET) 5-325 MG per tablet 1 tablet (1 tablet Oral Given 9/21/20 2057)      Labs Reviewed - No data to display   VL Extremity Venous Left    (Results Pending)      Ct Head Wo Contrast Result Date: 8/23/2020  EXAMINATION: CT OF THE CERVICAL SPINE WITHOUT CONTRAST; CT OF THE HEAD WITHOUT CONTRAST 8/23/2020 5:51 am TECHNIQUE: CT of the cervical spine was performed without the administration of intravenous contrast. Multiplanar reformatted images are provided for review. Dose modulation, iterative reconstruction, and/or weight based adjustment of the mA/kV was utilized to reduce the radiation dose to as low as reasonably achievable.; CT of the head was performed without the administration of intravenous contrast. Dose modulation, iterative reconstruction, and/or weight based adjustment of the mA/kV was utilized to reduce the radiation dose to as low as reasonably achievable. COMPARISON: CT head 03/04/2020 HISTORY: ORDERING SYSTEM PROVIDED HISTORY: falls, +Etoh TECHNOLOGIST PROVIDED HISTORY: Reason for exam:->falls, +Etoh Reason for Exam: FALL Acuity: Acute Type of Exam: Initial; ORDERING SYSTEM PROVIDED HISTORY: falls, +Etoh TECHNOLOGIST PROVIDED HISTORY: Reason for exam:->falls, +Etoh Has a \"code stroke\" or \"stroke alert\" been called? ->No Reason for Exam: FALLS Acuity: Acute Type of Exam: Initial FINDINGS: CT HEAD: BRAIN/VENTRICLES: There is prominence of the Dose modulation, iterative reconstruction, and/or weight based adjustment of the mA/kV was utilized to reduce the radiation dose to as low as reasonably achievable. COMPARISON: CT head 03/04/2020 HISTORY: ORDERING SYSTEM PROVIDED HISTORY: falls, +Etoh TECHNOLOGIST PROVIDED HISTORY: Reason for exam:->falls, +Etoh Reason for Exam: FALL Acuity: Acute Type of Exam: Initial; ORDERING SYSTEM PROVIDED HISTORY: falls, +Etoh TECHNOLOGIST PROVIDED HISTORY: Reason for exam:->falls, +Etoh Has a \"code stroke\" or \"stroke alert\" been called? ->No Reason for Exam: FALLS Acuity: Acute Type of Exam: Initial FINDINGS: CT HEAD: BRAIN/VENTRICLES: There is prominence of the ventricles and cortical sulci consistent with cortical volume loss. No midline shift. Basal cisterns are normally outlined. There is periventricular and subcortical white matter discrete and confluent low attenuation, nonspecific, likely representing age-related chronic small vessel ischemic disease. There is no evidence for acute infarct. No acute intra or extra-axial hemorrhage. ORBITS: The visualized portion of the orbits demonstrate no acute abnormality. SINUSES: The visualized paranasal sinuses and mastoid air cells demonstrate no acute abnormality. SOFT TISSUES/SKULL:  No acute abnormality of the visualized skull or soft tissues. CT CERVICAL SPINE: BONES/ALIGNMENT: Straightening of the cervical spine likely due to positioning and/or muscle spasm. No acute fracture or subluxation. DEGENERATIVE CHANGES: Severe disc space narrowing of the entire cervical spine sparing C2-C3 level. There is a 4 mm grade grade 1 C7-T1 anterolisthesis. Probably due to chronic degenerative changes. Multilevel facet arthropathy. SOFT TISSUES: There is no prevertebral soft tissue swelling. No acute intracranial abnormality. Cerebral atrophy. No acute fracture of the cervical spine. C7-T1 4 mm anterolisthesis probably chronic.   Flexion/extension views may be obtained if there is concern for instability. Moderate to severe degenerative changes sparing the C2-C3 level. Xr Chest Portable Result Date: 8/23/2020  EXAMINATION: ONE XRAY VIEW OF THE CHEST 8/23/2020 5:51 am COMPARISON: Chest radiograph performed February 29, 2020 HISTORY: ORDERING SYSTEM PROVIDED HISTORY: frequent falls. TECHNOLOGIST PROVIDED HISTORY: Reason for exam:->frequent falls. Reason for Exam: fall, chest pain Acuity: Acute Type of Exam: Initial Mechanism of Injury: fall, unknown LOC FINDINGS: The lungs are clear without focal consolidation. No pleural effusion or pneumothorax. The cardiac silhouette is within normal limits. Atherosclerotic changes of the thoracic aorta. No acute osseous abnormality. No acute cardiopulmonary process. PROCEDURES:   Procedures    ASSESSMENT AND PLAN:  Adam Faith is a 77 y.o. female presenting this evening accompanied by  with complaint of leg pain and here for pain medicine. On exam patient chronically ill, smells of smoke with bedbugs in the room. On exam she is tender to palpation on the left thigh which is musculoskeletal but hemodynamic stable. Initially blood pressures were elevated but while in the room on a cardiac monitor her blood pressures were in the 150s/90s. Patient was able to ambulate but limited which is chronic to her and her . Given no abnormal findings I do not think the patient need further evaluation or admission. Her pain was most likely musculoskeletal.  However, I wanted to rule out DVT and I did order an outpatient Doppler of the left lower extremity. Noting that she needed anticoagulation because I believe that her pain was most likely musculoskeletal however DVT had to be ruled out. Patient was given pain medicine and recommended to follow-up with her primary care doctor. She was discharged home in stable condition. ClINICAL IMPRESSION:  1.  Left leg pain          PATIENT REFERRED TO:  Marlan Heimlich, MD  [address] [place] [address] [address] [place] [id number]  [phone]    Schedule an appointment as soon as possible for a visit in 2 days        DISCHARGE MEDICATIONS:  Discharge Medication List as of 9/21/2020  8:40 PM      START taking these medications    Details   oxyCODONE (ROXICODONE) 5 MG immediate release tablet Take 1 tablet by mouth every 6 hours as needed for Pain for up to 2 days. Intended supply: 3 days. Take lowest dose possible to manage pain, Disp-6 tablet,R-0Print           DISCONTINUED MEDICATIONS:  Discharge Medication List as of 9/21/2020  8:40 PM        DISPOSITION Decision To Discharge 09/21/2020 08:36:33 PM  -We have instructed the patient, (Madeline Hooker) to return to the ED or call her PCP if her pain/symptoms worsen. -Findings and recommendations explained to patient, and . They  expressed understanding and agreed with the plan. Lela Barajas MD (electronically signed)  9/24/2020  _________________________________________________________________________________________  _________________________________________________________________________________________  This record is transcribed utilizing voice recognition technology. There are inherent limitations in this technology. In addition, there may be limitations in editing of this report. If there are any discrepancies, please contact me directly.         Lela Barajas MD  09/24/20 [id number]

## 2020-09-22 ENCOUNTER — TELEPHONE (OUTPATIENT)
Dept: OTHER | Facility: CLINIC | Age: 75
End: 2020-09-22

## 2020-09-25 ENCOUNTER — APPOINTMENT (OUTPATIENT)
Dept: VASCULAR LAB | Age: 75
End: 2020-09-25
Payer: MEDICARE

## 2020-09-25 ENCOUNTER — HOSPITAL ENCOUNTER (EMERGENCY)
Age: 75
Discharge: HOME | End: 2020-09-25
Attending: EMERGENCY MEDICINE
Payer: MEDICARE

## 2020-09-25 ENCOUNTER — APPOINTMENT (OUTPATIENT)
Dept: CT IMAGING | Age: 75
End: 2020-09-25
Payer: MEDICARE

## 2020-09-25 ENCOUNTER — APPOINTMENT (OUTPATIENT)
Dept: GENERAL RADIOLOGY | Age: 75
End: 2020-09-25
Payer: MEDICARE

## 2020-09-25 VITALS
TEMPERATURE: 97.9 F | RESPIRATION RATE: 16 BRPM | SYSTOLIC BLOOD PRESSURE: 161 MMHG | DIASTOLIC BLOOD PRESSURE: 89 MMHG | HEART RATE: 98 BPM | OXYGEN SATURATION: 94 %

## 2020-09-25 LAB
A/G RATIO: 1.3 (ref 1.1–2.2)
ALBUMIN SERPL-MCNC: 3.7 G/DL (ref 3.4–5)
ALP BLD-CCNC: 84 U/L (ref 40–129)
ALT SERPL-CCNC: 16 U/L (ref 10–40)
ANION GAP SERPL CALCULATED.3IONS-SCNC: 10 MMOL/L (ref 3–16)
AST SERPL-CCNC: 23 U/L (ref 15–37)
BACTERIA: ABNORMAL /HPF
BASOPHILS ABSOLUTE: 0.1 K/UL (ref 0–0.2)
BASOPHILS RELATIVE PERCENT: 0.9 %
BILIRUB SERPL-MCNC: 0.6 MG/DL (ref 0–1)
BILIRUBIN URINE: NEGATIVE
BLOOD, URINE: ABNORMAL
BUN BLDV-MCNC: 11 MG/DL (ref 7–20)
CALCIUM SERPL-MCNC: 9.2 MG/DL (ref 8.3–10.6)
CHLORIDE BLD-SCNC: 95 MMOL/L (ref 99–110)
CLARITY: CLEAR
CO2: 27 MMOL/L (ref 21–32)
COLOR: ABNORMAL
CREAT SERPL-MCNC: 0.7 MG/DL (ref 0.6–1.2)
EKG ATRIAL RATE: 93 BPM
EKG DIAGNOSIS: NORMAL
EKG P AXIS: 71 DEGREES
EKG P-R INTERVAL: 172 MS
EKG Q-T INTERVAL: 338 MS
EKG QRS DURATION: 76 MS
EKG QTC CALCULATION (BAZETT): 420 MS
EKG R AXIS: 32 DEGREES
EKG T AXIS: 52 DEGREES
EKG VENTRICULAR RATE: 93 BPM
EOSINOPHILS ABSOLUTE: 0 K/UL (ref 0–0.6)
EOSINOPHILS RELATIVE PERCENT: 0.5 %
EPITHELIAL CELLS, UA: ABNORMAL /HPF (ref 0–5)
ETHANOL: 17 MG/DL (ref 0–0.08)
GFR AFRICAN AMERICAN: >60
GFR NON-AFRICAN AMERICAN: >60
GLOBULIN: 2.9 G/DL
GLUCOSE BLD-MCNC: 172 MG/DL (ref 70–99)
GLUCOSE URINE: NEGATIVE MG/DL
HCT VFR BLD CALC: 30.6 % (ref 36–48)
HEMOGLOBIN: 9.5 G/DL (ref 12–16)
KETONES, URINE: 15 MG/DL
LACTIC ACID: 0.7 MMOL/L (ref 0.4–2)
LEUKOCYTE ESTERASE, URINE: ABNORMAL
LYMPHOCYTES ABSOLUTE: 1.3 K/UL (ref 1–5.1)
LYMPHOCYTES RELATIVE PERCENT: 14.4 %
MCH RBC QN AUTO: 23.6 PG (ref 26–34)
MCHC RBC AUTO-ENTMCNC: 31.1 G/DL (ref 31–36)
MCV RBC AUTO: 76 FL (ref 80–100)
MICROSCOPIC EXAMINATION: YES
MONOCYTES ABSOLUTE: 0.9 K/UL (ref 0–1.3)
MONOCYTES RELATIVE PERCENT: 9.7 %
NEUTROPHILS ABSOLUTE: 6.7 K/UL (ref 1.7–7.7)
NEUTROPHILS RELATIVE PERCENT: 74.5 %
NITRITE, URINE: NEGATIVE
PDW BLD-RTO: 18.7 % (ref 12.4–15.4)
PH UA: 5.5 (ref 5–8)
PLATELET # BLD: 496 K/UL (ref 135–450)
PMV BLD AUTO: 7 FL (ref 5–10.5)
POTASSIUM REFLEX MAGNESIUM: 3.8 MMOL/L (ref 3.5–5.1)
PROTEIN UA: NEGATIVE MG/DL
RBC # BLD: 4.03 M/UL (ref 4–5.2)
RBC UA: ABNORMAL /HPF (ref 0–4)
SODIUM BLD-SCNC: 132 MMOL/L (ref 136–145)
SPECIFIC GRAVITY UA: <=1.005 (ref 1–1.03)
TOTAL PROTEIN: 6.6 G/DL (ref 6.4–8.2)
TROPONIN: <0.01 NG/ML
URINE REFLEX TO CULTURE: YES
URINE TYPE: ABNORMAL
UROBILINOGEN, URINE: 0.2 E.U./DL
WBC # BLD: 9 K/UL (ref 4–11)
WBC UA: >100 /HPF (ref 0–5)

## 2020-09-25 PROCEDURE — 6360000002 HC RX W HCPCS: Performed by: EMERGENCY MEDICINE

## 2020-09-25 PROCEDURE — 80053 COMPREHEN METABOLIC PANEL: CPT

## 2020-09-25 PROCEDURE — 87077 CULTURE AEROBIC IDENTIFY: CPT

## 2020-09-25 PROCEDURE — 71045 X-RAY EXAM CHEST 1 VIEW: CPT

## 2020-09-25 PROCEDURE — 81001 URINALYSIS AUTO W/SCOPE: CPT

## 2020-09-25 PROCEDURE — 87086 URINE CULTURE/COLONY COUNT: CPT

## 2020-09-25 PROCEDURE — 99284 EMERGENCY DEPT VISIT MOD MDM: CPT

## 2020-09-25 PROCEDURE — 84484 ASSAY OF TROPONIN QUANT: CPT

## 2020-09-25 PROCEDURE — 72125 CT NECK SPINE W/O DYE: CPT

## 2020-09-25 PROCEDURE — G0480 DRUG TEST DEF 1-7 CLASSES: HCPCS

## 2020-09-25 PROCEDURE — 70450 CT HEAD/BRAIN W/O DYE: CPT

## 2020-09-25 PROCEDURE — 83605 ASSAY OF LACTIC ACID: CPT

## 2020-09-25 PROCEDURE — 2580000003 HC RX 258: Performed by: EMERGENCY MEDICINE

## 2020-09-25 PROCEDURE — 93005 ELECTROCARDIOGRAM TRACING: CPT | Performed by: EMERGENCY MEDICINE

## 2020-09-25 PROCEDURE — 87040 BLOOD CULTURE FOR BACTERIA: CPT

## 2020-09-25 PROCEDURE — 87186 SC STD MICRODIL/AGAR DIL: CPT

## 2020-09-25 PROCEDURE — 96365 THER/PROPH/DIAG IV INF INIT: CPT

## 2020-09-25 PROCEDURE — 93971 EXTREMITY STUDY: CPT

## 2020-09-25 PROCEDURE — 85025 COMPLETE CBC W/AUTO DIFF WBC: CPT

## 2020-09-25 PROCEDURE — 93010 ELECTROCARDIOGRAM REPORT: CPT | Performed by: INTERNAL MEDICINE

## 2020-09-25 RX ORDER — CEFUROXIME AXETIL 250 MG/1
250 TABLET ORAL 2 TIMES DAILY
Qty: 14 TABLET | Refills: 0 | Status: SHIPPED | OUTPATIENT
Start: 2020-09-25 | End: 2020-10-02

## 2020-09-25 RX ORDER — IBUPROFEN 600 MG/1
600 TABLET ORAL ONCE
Status: DISCONTINUED | OUTPATIENT
Start: 2020-09-25 | End: 2020-09-25 | Stop reason: HOSPADM

## 2020-09-25 RX ADMIN — CEFTRIAXONE SODIUM 1 G: 1 INJECTION, POWDER, FOR SOLUTION INTRAMUSCULAR; INTRAVENOUS at 09:15

## 2020-09-25 ASSESSMENT — PAIN DESCRIPTION - PAIN TYPE: TYPE: ACUTE PAIN

## 2020-09-25 ASSESSMENT — PAIN SCALES - GENERAL: PAINLEVEL_OUTOF10: 10

## 2020-09-25 ASSESSMENT — PAIN DESCRIPTION - ORIENTATION: ORIENTATION: POSTERIOR

## 2020-09-25 ASSESSMENT — PAIN DESCRIPTION - LOCATION: LOCATION: HEAD

## 2020-09-25 NOTE — ED PROVIDER NOTES
Emergency Physician Note  [address] [facility] ED  [address] [facility] [place]. [address]  Dept: [phone]  Loc: [phone]    Chief Complaint  Fall (patient states she was getting off of the toilet and fell backwards. Patient states the sink was loose and caused her to fall. Patient denies any LOC.  at bedside states ETOH on board)       History of Present Illness  Madeline Hooker is a 77 y.o. female  has a past medical history of Acute systolic CHF (congestive heart failure) ([address] Asthma, Back ache, Cataract, Cerebral artery occlusion with cerebral infarction ([address] Diabetes mellitus ([address] Hyperlipidemia, Hypertension, Insomnia, and TIA (transient ischemic attack). who presents to the ED for head trauma. Patient had a mechanical fall at home. Denies any loss of consciousness. Patient does admit to drinking 1 glass of alcohol,  at the bedside indicates that she drinks more than that regularly. Patient is only complaining of pain in the back of her head where her laceration is. Denies fever, chills, malaise, chest pain, shortness of breath, cough, abdominal pain, nausea, vomiting, diarrhea, headache, sore throat, dysuria, back pain, rash. No palliative/provocative factors. Unless otherwise stated in this report or unable to obtain because of the patient's clinical or mental status as evidenced by the medical record, this patient's positive and negative responses for review of systems, constitutional, psych, eyes, ENT, cardiovascular, respiratory, gastrointestinal, neurological, genitourinary, musculoskeletal, integument systems and systems related to the presenting problem are either stated in the preceding paragraph or were not pertinent or were negative for the symptoms and/or complaints related to the medical problem.     I have reviewed the following from the nursing documentation:      Prior to Admission medications    Medication Sig Start Date End Date Taking? Authorizing Provider   fluticasone-salmeterol (ADVAIR DISKUS) 500-50 MCG/DOSE diskus inhaler Inhale 1 puff into the lungs every 12 hours 8/24/20   LEONA Lenz CNP   tiotropium (SPIRIVA RESPIMAT) 2.5 MCG/ACT AERS inhaler Inhale 2 puffs into the lungs daily 8/25/20   LEONA Lenz CNP   ipratropium-albuterol (DUONEB) 0.5-2.5 (3) MG/3ML SOLN nebulizer solution Inhale 3 mLs into the lungs every 6 hours as needed for Shortness of Breath 8/24/20   LEONA Lenz CNP   lisinopril (PRINIVIL;ZESTRIL) 20 MG tablet Take 1 tablet by mouth daily 3/22/20   Brett Tiwari MD   Blood Pressure KIT 1 each by Does not apply route daily 3/22/20   Brett Tiwari MD   cloNIDine (CATAPRES) 0.2 MG tablet Take 1 tablet by mouth 3 times daily as needed (Systolic WF>742) 8/08/95   Brett Tiwari MD   insulin glargine (LANTUS) 100 UNIT/ML injection vial Inject 10 Units into the skin nightly 3/10/20   Carroll aMgallon MD   aspirin 81 MG EC tablet Take 1 tablet by mouth daily 3/7/20   Charles Andrade MD   metoprolol succinate (TOPROL XL) 50 MG extended release tablet Take 1 tablet by mouth daily 3/7/20   Charles Andrade MD   folic acid (FOLVITE) 1 MG tablet Take 1 tablet by mouth daily 3/7/20   Charles Andrade MD   nicotine (NICODERM CQ) 21 MG/24HR Place 1 patch onto the skin daily 3/7/20   Charles Andrade MD   metFORMIN (GLUCOPHAGE) 500 MG tablet Take 500 mg by mouth 12/19/18   Historical Provider, MD   polyethylene glycol-electrolytes (TRILYTE) 420 g solution Follow Package Instructions unless otherwise directed by physician.  12/5/18   Historical Provider, MD   albuterol sulfate  (90 Base) MCG/ACT inhaler Inhale 2 puffs into the lungs every 6 hours as needed for Wheezing 12/28/18   LEONA Abrams CNP   PARoxetine (PAXIL) 40 MG tablet Take 1 tablet by mouth every morning 10/19/17   Roxann Mcknight MD   levothyroxine (SYNTHROID) 25 MCG tablet Take 1 tablet by mouth Daily 10/20/17   Oneyda Faith MD   vitamin B-1 (THIAMINE) 100 MG tablet Take 1 tablet by mouth daily 10/19/17   Oneyda Faith MD   atorvastatin (LIPITOR) 40 MG tablet Take 1 tablet by mouth nightly 16   Matthew Rodriguez MD   fluticasone [facility]) 50 MCG/ACT nasal spray 1 spray by Nasal route daily 16   Matthew Rodriguez MD   Multiple Vitamin (THERAPEUTIC) TABS tablet Take 1 tablet by mouth daily 11/15/15   Shravan Nielson MD   omeprazole (PRILOSEC) 20 MG capsule Take 1 capsule by mouth daily 6/29/15   Shoaib Liu MD   FISH OIL by Does not apply route. Historical Provider, MD   vitamin B-12 (CYANOCOBALAMIN) 1000 MCG tablet Take 1,000 mcg by mouth daily. Historical Provider, MD   Cholecalciferol (VITAMIN D-3) 5000 UNITS TABS Take  by mouth.       Historical Provider, MD       Allergies as of 2020 - Review Complete 2020   Allergen Reaction Noted    Mold extract [trichophyton mentagrophytes]  2011       Past Medical History:   Diagnosis Date    Acute systolic CHF (congestive heart failure) (HCC)     Asthma     Back ache     Cataract     Cerebral artery occlusion with cerebral infarction ([address] 2016    Diabetes mellitus ([facility]     type !! - diet controlled    Hyperlipidemia     Hypertension     Insomnia     TIA (transient ischemic attack) 2016        Surgical History:   Past Surgical History:   Procedure Laterality Date    ABDOMEN SURGERY      c section    CATARACT REMOVAL WITH IMPLANT Left [id number]    LEFT EYE CATARACT PHACOEMULSIFICATION INTRAOCULAR LENS     SECTION      DENTAL SURGERY      EYE SURGERY  10/29/13     RIGHT EYE CATARACT PHACOEMULSIFICATION INTRAOCULAR LENS        Family History:    Family History   Problem Relation Age of Onset    High Blood Pressure Mother     Miscarriages / Stillbirths Mother     Cancer Father     Diabetes Father     Heart Disease Father     Kidney Disease Father  Asthma Sister     Cancer Sister         Breast Cancer    Depression Brother     Substance Abuse Paternal Aunt     Stroke Maternal Grandmother     Diabetes Paternal Grandmother        Social History     Socioeconomic History    Marital status:      Spouse name: Andres Islas Number of children: Not on file    Years of education: Not on file    Highest education level: Not on file   Occupational History    Not on file   Social Needs    Financial resource strain: Not on file    Food insecurity     Worry: Not on file     Inability: Not on file   [employer] needs     Medical: Not on file     Non-medical: Not on file   Tobacco Use    Smoking status: Current Every Day Smoker     Packs/day: 2.00     Years: 58.00     Pack years: 116.00     Types: Cigarettes    Smokeless tobacco: Never Used    Tobacco comment: not totally ready to quit today   Substance and Sexual Activity    Alcohol use: Yes     Alcohol/week: 0.0 standard drinks     Types: 3 - 4 Standard drinks or equivalent per week     Comment: 1-2 hot toddy every night     Drug use: No    Sexual activity: Yes     Partners: Male   Lifestyle    Physical activity     Days per week: Not on file     Minutes per session: Not on file    Stress: Not on file   Relationships    Social connections     Talks on phone: Not on file     Gets together: Not on file     Attends Christianity service: Not on file     Active member of club or organization: Not on file     Attends meetings of clubs or organizations: Not on file     Relationship status: Not on file    Intimate partner violence     Fear of current or ex partner: Not on file     Emotionally abused: Not on file     Physically abused: Not on file     Forced sexual activity: Not on file   Other Topics Concern    Not on file   Social History Narrative    Not on file       Nursing notes reviewed.     ED Triage Vitals [09/25/20 7484]   Enc Vitals Group      BP (!) 159/91      Pulse 88      Resp 18 Temp 100.5 °F (38.1 °C)      Temp Source Oral      SpO2 92 %      Weight       Height       Head Circumference       Peak Flow       Pain Score       Pain Loc       Pain Edu? Excl. in [address]? GENERAL:    Awake, alert. Well developed, well nourished with no apparent distress. Nontoxic-appearing, non-ill-appearing. HENT:    Normocephalic, Atraumatic, no lacerations. No ENT exam due to PPE. Oropharynx clear, no tonsilar inflammation, no tonsilar exudates,   no airway obstruction, moist mucous membranes. Uvula was midline and has symmetric rise. EYES:    Conjunctiva normal,   Pupils equal round and reactive to light,   Extraocular movements normal.  NECK:    Trachea is midline. No stridor. No lymphadenopathy of the anterior cervical chain   no lymphadenopathy of the posterior cervical chain. No meningeal signs, Supple without JVD. No C-spine midline tenderness. CHEST:    Regular rate and regular rhythm, no murmurs/rubs/gallops,   normal S1/S2, chest wall non-tender. LUNGS:    No respiratory distress. No abdominal retractions, no sternal retractions. Clear to auscultation bilaterally, no wheezing, no rhochi, no rales  Speaking comfortably in full sentences  ABDOMEN:    Soft, non-tender, non-distended. No guarding. No rebound. No costovertebral angle tenderness to palpation. Normal BS, no organomegaly, no abdominal masses  EXTREMITIES:   Moves extremities x4 with purpose. Normal range of motion, no edema,   no tenderness, no deformity,   distal pulses present and equal bilaterally. BACK:    No midline tenderness in the cervical, thoracic, and lumbar spine. No deformities, no step-off. Palpation did not elicit any paraspinous tenderness. SKIN:    Warm, dry and intact. NEUROLOGIC:  Normal mental status. Moving all extremities to command. Alert and oriented x4   without focal motor deficit or gross sensory deficit. Normal speech.     Strength 5/5 in bilateral upper and lower extremities. Sensation is intact in the upper and lower extremities. Cranial Nerves 2-12 are intact. PSYCHIATRIC:  Not anxious,   normal mood and affect,   thoughts are linear and organized,   without delusions/hallucinations,   responds appropriately to questions  Denies SI/HI      LABS and DIAGNOSTIC RESULTS    RADIOLOGY  X-RAYS:  I have reviewed radiologic plain film image(s). ALL OTHER NON-PLAIN FILM IMAGES SUCH AS CT, ULTRASOUND AND MRI HAVE BEEN READ BY THE RADIOLOGIST. CT Cervical Spine WO Contrast   Final Result   No acute intracranial abnormality. Atrophy and white matter changes consistent with chronic small vessel   ischemic disease, similar in appearance. Severe multilevel degenerative changes in the cervical spine, similar in   appearance to prior. No acute osseous abnormality. CT Head WO Contrast   Final Result   No acute intracranial abnormality. Atrophy and white matter changes consistent with chronic small vessel   ischemic disease, similar in appearance. Severe multilevel degenerative changes in the cervical spine, similar in   appearance to prior. No acute osseous abnormality. PROCEDURE:  LACERATION REPAIR  Nikolas Quarles or their surrogate had an opportunity to ask questions, and the risks, benefits, and alternatives were discussed. The wound was prepped and draped to maintain a sterile field. A local anesthetic was used to completely anesthetize the wound. It was copiously irrigated. It was explored to its depth in a bloodless field with no sign of tendon, nerve, or vascular injury. No foreign bodies were identified. It was closed with 5 staples. There were no complications during the procedure.         MEDICAL DECISION MAKING    Procedures/interventions/images ordered for this visit  Orders Placed This Encounter   Procedures    CT Head WO Contrast    CT Cervical Spine WO Contrast    Please irrigate wound Medications ordered for this visit  No orders of the defined types were placed in this encounter. ED course notes for this visit       I wore goggles, surgical mask, N95 mask and gloves when I evaluated the patient. I evaluated the patient in room 04/04    This is a pleasant patient who presents status-post a head injury and history/exam consistent with a concussion and a head laceration. Based on history this was not a syncopal episode. Vital signs are stable. CT of the head was offered to rule out an intracranial emergency after the risks and benefits of the study were discussed with the patient or family. From the [facility] CT Head Injury/Trauma Rule:  Major Criteria:  GCS < 15 at 2 hours post-injury  Suspected open or depressed skull fracture  Any sign of basilar skull fracture? (Hemotympanum, Racoon Eyes, Morelands Sign, CSF trinh-/rhinorrhea)  ? 2 episodes of vomiting  Age ? 66    Minor Criteria:  Retrograde Amnesia to the Event ? 30 minutes  \"Dangerous\" Mechanism? (Pedestrian struck by motor vehicle, Occupant ejected from motor vehicle, or Fall from > 3 feet or > 5 stairs. Given present of above criteria, patient does require CT Head. They made the informed decision to have the CT based on my explanation of the above [facility] CT Head Injury/Trauma Rule and has no new focal neurological deficits. A thorough head to toe exam was performed. Vital signs are stable and they are neurovascular status intact. Pain management was addressed. C-spine has been cleared based on Nexus criteria. CT head was negative. On re-evaluation, the patient appears well, and does not exhibit any gross abnormal neurological findings.   They feel improved from the time of their arrival.  Based on serial exams here, there is no significant evidence of intracranial injury such as subdural hematoma, subarachnoid hemorrhage, epidural hematoma, depressed skull fracture, basilar skull fracture, or other Solutions        Colleen Cespedes MD  09/25/20 5945

## 2020-09-25 NOTE — ED NOTES
Pt refused Covid test. Dr Sania Parker notified and advises to cancel order.      Jodi Coe RN  09/25/20 1862

## 2020-09-25 NOTE — ED PROVIDER NOTES
[place] of Care Note:    I assumed care of this patient at 06:00 from Dr. Justine Hagan, please see her note for more detail. Briefly, this pt is a 77 y.o. F who presented to the ER after suffering a fall. The patient does have EtOH on board. The fall was reported to be mechanical in nature with the patient stating that she was trying to support herself with a sink which was loose causing her to slip and fall. She denies any chest pain shortness of breath lightheadedness preceding the fall. Patient did have a scalp laceration to the back of her head which was repaired with 5 staples. Imaging of the head and C-spine does not show any acute bleed or fracture however the patient was noted to be febrile at triage. The patient is therefore signed out for further evaluation of fever. I have spoken to the patient and her  performing an physical exam.  The patient reports mild nonproductive cough for 1 day but denies any dysuria, abdominal pain, nausea vomiting diarrhea or other infectious symptoms. She does report that she has had a left lower extremity pain and had an outpatient Doppler ultrasound ordered however has not yet scheduled this. She is requesting to have this performed while she was in the emergency department. She reports her left leg pain is actually slightly improved from previous visit. The patient is nontoxic-appearing, hemodynamically stable, and in no acute distress. Regular work of breathing. Mild prolonged expiratory phase bilaterally but no wheezing heard on exam.  No crackles. Patient's staples in the occipital scalp are in place and she is hemostatic. There is no raccoon sign, noel sign, or signs of basilar skull fracture. She was all 4 extremity spontaneously. Her abdomen is soft and nontender to palpation. She is good intact distal pulses.   Plan is to order a laboratory evaluation and a chest x-ray given her fever, treat her pain and fever with ibuprofen, get a left lower extremity Doppler ultrasound, and then reevaluate the patient. Ultrasound is negative for DVT. Urinalysis is concerning for acute cystitis without hematuria however patient has normal white blood cell count, normal lactic acid, and is not clinically septic. She is given IV ceftriaxone and ambulated by the nurse with slow shuffling gait. She reports that she will follow-up with her primary care doctor today for previously scheduled appointment. Patient does make reference to possibly canceling his appointment which I have strongly advised her against.  Standard wound care instructions are given for her scalp laceration including having staples removed in 7 to 10 days. Importance of close outpatient follow-up as discussed. Strong ER return precautions are given to the patient, her , and her son for any fever, confusion, abdominal pain, nausea or vomiting. The patient expresses understanding and agreement with this plan and is discharged home      FINAL IMPRESSION      1. Injury of head, initial encounter    2. Laceration of scalp, initial encounter    3.  Alcohol abuse             Rachel Rivera MD  09/25/20 3866

## 2020-09-25 NOTE — ED NOTES
Pt ambulated in hallway with 1 assist. Pt states has a cane to use at home. Pt tolerated fairly well, slightly unsteady. Provider notified.      Elva Skelton RN  09/25/20 0235

## 2020-09-26 ENCOUNTER — CARE COORDINATION (OUTPATIENT)
Dept: CARE COORDINATION | Age: 75
End: 2020-09-26

## 2020-09-27 LAB
ORGANISM: ABNORMAL
URINE CULTURE, ROUTINE: ABNORMAL

## 2020-09-28 ENCOUNTER — CARE COORDINATION (OUTPATIENT)
Dept: CARE COORDINATION | Age: 75
End: 2020-09-28

## 2020-09-29 LAB
BLOOD CULTURE, ROUTINE: NORMAL
CULTURE, BLOOD 2: NORMAL

## 2020-10-22 ENCOUNTER — APPOINTMENT (OUTPATIENT)
Dept: GENERAL RADIOLOGY | Age: 75
End: 2020-10-22
Payer: MEDICARE

## 2020-10-22 ENCOUNTER — HOSPITAL ENCOUNTER (EMERGENCY)
Age: 75
Discharge: HOME | End: 2020-10-22
Payer: MEDICARE

## 2020-10-22 VITALS
WEIGHT: 155 LBS | DIASTOLIC BLOOD PRESSURE: 91 MMHG | OXYGEN SATURATION: 95 % | RESPIRATION RATE: 18 BRPM | HEIGHT: 65 IN | SYSTOLIC BLOOD PRESSURE: 165 MMHG | BODY MASS INDEX: 25.83 KG/M2 | TEMPERATURE: 97.2 F | HEART RATE: 80 BPM

## 2020-10-22 PROCEDURE — 99283 EMERGENCY DEPT VISIT LOW MDM: CPT

## 2020-10-22 PROCEDURE — 73562 X-RAY EXAM OF KNEE 3: CPT

## 2020-10-22 RX ORDER — NAPROXEN 500 MG/1
500 TABLET ORAL 2 TIMES DAILY
Qty: 10 TABLET | Refills: 0 | Status: ON HOLD | OUTPATIENT
Start: 2020-10-22 | End: 2021-01-19 | Stop reason: HOSPADM

## 2020-10-22 ASSESSMENT — ENCOUNTER SYMPTOMS
SORE THROAT: 0
COLOR CHANGE: 0
ABDOMINAL PAIN: 0
SHORTNESS OF BREATH: 0
RHINORRHEA: 0

## 2020-10-22 ASSESSMENT — PAIN SCALES - GENERAL: PAINLEVEL_OUTOF10: 10

## 2020-10-22 ASSESSMENT — PAIN DESCRIPTION - PAIN TYPE: TYPE: ACUTE PAIN

## 2020-10-22 ASSESSMENT — PAIN DESCRIPTION - LOCATION: LOCATION: KNEE

## 2020-10-23 NOTE — ED PROVIDER NOTES
Evaluated by [id number] [facility] Provider          [facility] [address] [facility] ED  EMERGENCY DEPARTMENT ENCOUNTER        Pt Name: Millicent Avendano  MRN: [id number]  Marcello [birth year]  Dateof evaluation: 10/22/2020  Provider: Anthony Sheehan APRN - CNP  PCP: Soheila Hunter MD  ED Attending: No att. providers found    [address] [place] [address]       Chief Complaint   Patient presents with    Leg Pain     c/o bilateral leg/knee pain. Patient states the left is bothering her more than the right. HISTORY OF PRESENTILLNESS   (Location/Symptom, Timing/Onset, Context/Setting, Quality, Duration, Modifying Factors, Severity)  Note limiting factors. Millicent Avendano is a 77 y.o. female for bilat knee pain. Onset was \"awhile\". Context includes pt states she has had bilat knee for awhile. She reports her left knee is worse than her right. Pt states she has been seen for same symptoms and has been given medications but has not had any improvement to her pain. Patient was seen by an orthopedist already as well. Alleviating factors include nothing. Aggravating factors include nothing. Pain is 10/10. Tylenol has been used for pain today. Nursing Notes were all reviewed and agreed with or any disagreements were addressed  in the HPI. REVIEW OF SYSTEMS    (2-9 systems for level 4, 10 or more for level 5)     Review of Systems   Constitutional: Negative for fever. HENT: Negative for congestion, rhinorrhea and sore throat. Respiratory: Negative for shortness of breath. Cardiovascular: Negative for chest pain. Gastrointestinal: Negative for abdominal pain. Genitourinary: Negative for decreased urine volume and difficulty urinating. Musculoskeletal: Negative for arthralgias and myalgias. Bilateral knee pain left greater than right   Skin: Negative for color change and rash. Neurological: Negative for dizziness and light-headedness. Psychiatric/Behavioral: Negative for agitation.    All other systems reviewed and are negative. Positives and Pertinent negatives as per HPI. Except as noted above in the ROS, all other systems were reviewed and negative.        PAST MEDICAL HISTORY     Past Medical History:   Diagnosis Date    Acute systolic CHF (congestive heart failure) (HCC)     Asthma     Back ache     Cataract     Cerebral artery occlusion with cerebral infarction ([facility] [address] 2016    Diabetes mellitus ([facility] [address]     type !! - diet controlled    Hyperlipidemia     Hypertension     Insomnia     TIA (transient ischemic attack) 2016         SURGICAL HISTORY       Past Surgical History:   Procedure Laterality Date    ABDOMEN SURGERY      c section    CATARACT REMOVAL WITH IMPLANT Left 227312    LEFT EYE CATARACT PHACOEMULSIFICATION INTRAOCULAR LENS     SECTION      DENTAL SURGERY      EYE SURGERY  10/29/13     RIGHT EYE CATARACT PHACOEMULSIFICATION INTRAOCULAR LENS         CURRENT MEDICATIONS       Discharge Medication List as of 10/22/2020 10:27 PM      CONTINUE these medications which have NOT CHANGED    Details   fluticasone-salmeterol (ADVAIR DISKUS) 500-50 MCG/DOSE diskus inhaler Inhale 1 puff into the lungs every 12 hours, Disp-60 each,R-0Normal      tiotropium (SPIRIVA RESPIMAT) 2.5 MCG/ACT AERS inhaler Inhale 2 puffs into the lungs daily, Disp-1 Inhaler,R-3Normal      ipratropium-albuterol (DUONEB) 0.5-2.5 (3) MG/3ML SOLN nebulizer solution Inhale 3 mLs into the lungs every 6 hours as needed for Shortness of Breath, Disp-360 mL,R-0Normal      lisinopril (PRINIVIL;ZESTRIL) 20 MG tablet Take 1 tablet by mouth daily, Disp-30 tablet, R-1Print      Blood Pressure KIT DAILY Starting Sun 3/22/2020, Disp-1 kit, R-0, Print      cloNIDine (CATAPRES) 0.2 MG tablet Take 1 tablet by mouth 3 times daily as needed (Systolic FF>989), NZHN-36 tablet, R-1Print      insulin glargine (LANTUS) 100 UNIT/ML injection vial Inject 10 Units into the skin nightly, Disp-1 vial, R-3DC to SNF      aspirin 81 MG EC tablet Take 1 tablet by mouth daily, Disp-30 tablet, R-3NO PRINT      metoprolol succinate (TOPROL XL) 50 MG extended release tablet Take 1 tablet by mouth daily, Disp-30 tablet, P-[id number]      folic acid (FOLVITE) 1 MG tablet Take 1 tablet by mouth daily, Disp-30 tablet, R-3Normal      nicotine (NICODERM CQ) 21 MG/24HR Place 1 patch onto the skin daily, Disp-30 patch, R-0NO PRINT      metFORMIN (GLUCOPHAGE) 500 MG tablet Take 500 mg by mouthHistorical Med      polyethylene glycol-electrolytes (TRILYTE) 420 g solution Follow Package Instructions unless otherwise directed by physician. Historical Med      albuterol sulfate  (90 Base) MCG/ACT inhaler Inhale 2 puffs into the lungs every 6 hours as needed for Wheezing, Disp-1 Inhaler, R-0Print      PARoxetine (PAXIL) 40 MG tablet Take 1 tablet by mouth every morning, Disp-30 tablet, R-3NO PRINT      levothyroxine (SYNTHROID) 25 MCG tablet Take 1 tablet by mouth Daily, Disp-30 tablet, R-0NO PRINT      vitamin B-1 (THIAMINE) 100 MG tablet Take 1 tablet by mouth daily, Disp-30 tablet, R-0NO PRINT      atorvastatin (LIPITOR) 40 MG tablet Take 1 tablet by mouth nightly, Disp-30 tablet, R-3      fluticasone (FLONASE) 50 MCG/ACT nasal spray 1 spray by Nasal route daily, Disp-1 Bottle, R-3      Multiple Vitamin (THERAPEUTIC) TABS tablet Take 1 tablet by mouth daily, Disp-30 tablet, R-3      omeprazole (PRILOSEC) 20 MG capsule Take 1 capsule by mouth daily, Disp-30 capsule, R-0      FISH OIL by Does not apply route. vitamin B-12 (CYANOCOBALAMIN) 1000 MCG tablet Take 1,000 mcg by mouth daily. Cholecalciferol (VITAMIN D-3) 5000 UNITS TABS Take  by mouth.                  ALLERGIES     Mold extract [trichophyton mentagrophytes]    FAMILY HISTORY       Family History   Problem Relation Age of Onset    High Blood Pressure Mother     Anika Friend / [place] Mother     Cancer Father     Diabetes Father     Heart Disease Father     Kidney Disease Father  Asthma Sister     Cancer Sister         Breast Cancer    Depression Brother     Substance Abuse Paternal Aunt     Stroke Maternal Grandmother     Diabetes Paternal Grandmother           SOCIAL HISTORY       Social History     Socioeconomic History    Marital status:      Spouse name: carolyne    Number of children: None    Years of education: None    Highest education level: None   Occupational History    None   Social Needs    Financial resource strain: None    Food insecurity     Worry: None     Inability: None    Transportation needs     Medical: None     Non-medical: None   Tobacco Use    Smoking status: Current Every Day Smoker     Packs/day: 2.00     Years: 58.00     Pack years: 116.00     Types: Cigarettes    Smokeless tobacco: Never Used    Tobacco comment: not totally ready to quit today   Substance and Sexual Activity    Alcohol use:  Yes     Alcohol/week: 0.0 standard drinks     Types: 3 - 4 Standard drinks or equivalent per week     Comment: 1-2 hot toddy every night     Drug use: No    Sexual activity: Yes     Partners: Male   Lifestyle    Physical activity     Days per week: None     Minutes per session: None    Stress: None   Relationships    Social connections     Talks on phone: None     Gets together: None     Attends [employer] service: None     Active member of club or organization: None     Attends meetings of clubs or organizations: None     Relationship status: None    Intimate partner violence     Fear of current or ex partner: None     Emotionally abused: None     Physically abused: None     Forced sexual activity: None   Other Topics Concern    None   Social History Narrative    None       SCREENINGS             PHYSICAL EXAM  (up to 7 for level 4, 8 or more for level 5)     ED Triage Vitals [10/22/20 1918]   BP Temp Temp Source Pulse Resp SpO2 Height Weight   (!) 165/91 97.2 °F (36.2 °C) Temporal 80 18 95 % 5' 5\" (1.651 m) 155 lb (70.3 kg)       Physical Tricompartmental degenerative disease, greatest within the medial   compartment, where it is moderate to severe in degree. XR KNEE RIGHT (3 VIEWS)   Final Result   Degenerative change of the right knee, greatest at the medial compartment. No gross fracture. No results found. PROCEDURES   Unless otherwise noted below, none     Procedures     CRITICAL CARE TIME   N/A    CONSULTS:  None      EMERGENCYDEPARTMENT COURSE and DIFFERENTIAL DIAGNOSIS/MDM:   Vitals:    Vitals:    10/22/20 1918   BP: (!) 165/91   Pulse: 80   Resp: 18   Temp: 97.2 °F (36.2 °C)   TempSrc: Temporal   SpO2: 95%   Weight: 155 lb (70.3 kg)   Height: 5' 5\" (1.651 m)       Patient was given the following medications:  Medications - No data to display    Patient was seen and evaluated by myself. Patient here for complaints of bilateral knee pain. Patient states her left knee is worse than her right. Patient reports that she lives at home with her . Patient states that she has been seen multiple times for her knees however has not had relief of her symptoms. Patient states that she does walk with a cane at home. She reports that she has a walker but it does not work. Patient does admit to drinking alcohol. X-rays have been reviewed and interpreted. Patient does have concerns for degenerative changes to her knees. She was able to ambulate in the ED without difficulty. Patient will be discharged home with a short course of nonsteroidal anti-inflammatories. She was given orthopedic and physical therapies referrals. She was encouraged to follow-up. She was also encouraged to follow-up with her primary care doctor in the next few days. Patient was ultimately discharged home with all questions answered. Family was at the bedside and states that she has been drinking more than she typically does. The patient tolerated their visit well. I have evaluated this patient.  My supervising physician was available for consultation. The patient and / or the family were informed of the results of any tests, a time was given to answer questions, a plan was proposed and they agreed with plan. FINAL IMPRESSION      1. Osteoarthritis of both knees, unspecified osteoarthritis type    2.  History of alcohol abuse          DISPOSITION/PLAN   DISPOSITION Decision To Discharge 10/22/2020 10:20:38 PM      PATIENT [facility]FERR[facility]:  [facility]  [address] [facility]  [phone]    If symptoms worsen    Kavon Iniguez MD  [address] [address]  [phone]    Schedule an appointment as soon as possible for a visit   If symptoms worsen    [address]  [phone]    If symptoms worsen      DISCHARGE MEDICATIONS:  Discharge Medication List as of 10/22/2020 10:27 PM      START taking these medications    Details   naproxen (NAPROSYN) 500 MG tablet Take 1 tablet by mouth 2 times daily for 5 days, Disp-10 tablet,R-0Print             DISCONTINUED MEDICATIONS:  Discharge Medication List as of 10/22/2020 10:27 PM                 (Please note that portions of this note were completed with a voice recognition program.  Efforts were made to edit the dictations but occasionally words are mis-transcribed.)    LEONA Orozco CNP (electronically signed)         LEONA Orozco CNP  10/22/20 8073

## 2020-10-27 ENCOUNTER — OFFICE VISIT (OUTPATIENT)
Dept: ORTHOPEDIC SURGERY | Age: 75
End: 2020-10-27
Payer: MEDICARE

## 2020-10-27 VITALS — WEIGHT: 155 LBS | BODY MASS INDEX: 25.83 KG/M2 | HEIGHT: 65 IN

## 2020-10-27 PROCEDURE — 99203 OFFICE O/P NEW LOW 30 MIN: CPT | Performed by: ORTHOPAEDIC SURGERY

## 2020-10-27 PROCEDURE — 20610 DRAIN/INJ JOINT/BURSA W/O US: CPT | Performed by: ORTHOPAEDIC SURGERY

## 2020-10-27 RX ORDER — TRIAMCINOLONE ACETONIDE 40 MG/ML
40 INJECTION, SUSPENSION INTRA-ARTICULAR; INTRAMUSCULAR ONCE
Status: COMPLETED | OUTPATIENT
Start: 2020-10-27 | End: 2020-10-27

## 2020-10-27 RX ORDER — LIDOCAINE HYDROCHLORIDE 10 MG/ML
5 INJECTION, SOLUTION INFILTRATION; PERINEURAL ONCE
Status: COMPLETED | OUTPATIENT
Start: 2020-10-27 | End: 2020-10-27

## 2020-10-27 RX ADMIN — LIDOCAINE HYDROCHLORIDE 5 ML: 10 INJECTION, SOLUTION INFILTRATION; PERINEURAL at 15:07

## 2020-10-27 RX ADMIN — TRIAMCINOLONE ACETONIDE 40 MG: 40 INJECTION, SUSPENSION INTRA-ARTICULAR; INTRAMUSCULAR at 15:08

## 2020-10-27 NOTE — PROGRESS NOTES
Chief Complaint   Patient presents with    New Patient     Bilateral knees: OA, seen in ER for OA, the left is worst than the right knee, seen Dr. Hillary Garcia at [facility] for OA  10/9/2020 order PT and hasnt gone, he wanted he to follow back up in 1 month, has pain also shooting up into lateral aspect of thigh into hip on the left side        HISTORY OF [address] is a 77 y.o. female. Presents for evaluation of her right and left knees. I am seeing her as orthopedic consultation at the request emergency permit from [facility] APRN-CNP. Her left knee has been pretty significant in terms of pain recently. Right knee has been bothering her as well. She has had a lot of dizziness lately and had a few falls, but is having significant knee pain. She has been in the emergency department several times recently with dizziness and knee pain. She is also had a little bit of lateral sided left hip pain. She has been given medication such as naproxen. She states that the dizziness she cannot really describe very well. She feels like sometimes uses off balance. She does admit to drinking alcohol on a regular basis. She denies any recent medication changes. No recent injections in her knees. They do admit to having active bedbug infestation at home.       PAST MEDICAL/SURGICAL HISTORY     Past Medical History:   Diagnosis Date    Acute systolic CHF (congestive heart failure) (HCC)     Asthma     Back ache     Cataract     Cerebral artery occlusion with cerebral infarction ([address] 2016    Diabetes mellitus ([address]     type !! - diet controlled    Hyperlipidemia     Hypertension     Insomnia     TIA (transient ischemic attack) 2016       Past Surgical History:   Procedure Laterality Date    ABDOMEN SURGERY      c section    CATARACT REMOVAL WITH IMPLANT Left 270963    LEFT EYE CATARACT PHACOEMULSIFICATION INTRAOCULAR LENS     SECTION      DENTAL SURGERY      EYE SURGERY 10/29/13     RIGHT EYE CATARACT PHACOEMULSIFICATION INTRAOCULAR LENS       Social History     Socioeconomic History    Marital status:      Spouse name: Fani Vidal Number of children: Not on file    Years of education: Not on file    Highest education level: Not on file   Occupational History    Not on file   Social Needs    Financial resource strain: Not on file    Food insecurity     Worry: Not on file     Inability: Not on file    Transportation needs     Medical: Not on file     Non-medical: Not on file   Tobacco Use    Smoking status: Current Every Day Smoker     Packs/day: 2.00     Years: 58.00     Pack years: 116.00     Types: Cigarettes    Smokeless tobacco: Never Used    Tobacco comment: not totally ready to quit today   Substance and Sexual Activity    Alcohol use:  Yes     Alcohol/week: 0.0 standard drinks     Types: 3 - 4 Standard drinks or equivalent per week     Comment: 1-2 hot toddy every night     Drug use: No    Sexual activity: Yes     Partners: Male   Lifestyle    Physical activity     Days per week: Not on file     Minutes per session: Not on file    Stress: Not on file   Relationships    Social connections     Talks on phone: Not on file     Gets together: Not on file     Attends Judaism service: Not on file     Active member of club or organization: Not on file     Attends meetings of clubs or organizations: Not on file     Relationship status: Not on file    Intimate partner violence     Fear of current or ex partner: Not on file     Emotionally abused: Not on file     Physically abused: Not on file     Forced sexual activity: Not on file   Other Topics Concern    Not on file   Social History Narrative    Not on file       Family History   Problem Relation Age of Onset    High Blood Pressure Mother    Dillonneftaliranjit Castellon / Jose Jyoti Mother     Cancer Father     Diabetes Father     Heart Disease Father     Kidney Disease Father     Asthma Sister     Cancer Sister         Breast Cancer    Depression Brother     Substance Abuse Paternal Aunt     Stroke Maternal Grandmother     Diabetes Paternal Grandmother           REVIEW OF SYSTEMS  Pertinent items are noted in HPI  Review of systems reviewed from Patient History Form dated on 10/27/2020 and available in the patient's chart under the Media tab. PHYSICAL EXAM    Vitals:    10/27/20 1439   Weight: 155 lb (70.3 kg)   Height: 5' 5\" (1.651 m)       General Exam:   Constitutional: Patient is adequately groomed with no evidence of malnutrition  Mental Status: The patient is oriented to time, place and person. The patient's mood and affect are appropriate. Lymphatic: The lymphatic examination bilaterally reveals all areas to be without enlargement or induration. Neurological: The patient has good coordination. There is no weakness or sensory deficit. Antalgic gait:  Yes, wheelchair    Bilateral lower extremities are neurovascularly intact with symmetric light touch sensation and distal pulses. Right Knee Exam:  No skin lesions, erythema, or warmth. Effusion: 0+/4  Range Of Motion:  0-130    Hyperextension Pain:    positive  Hyperflexion Pain:     positive  Tracy:      positive  Medial Joint Line Tenderness:   positive  Lateral Joint Line Tenderness: negative    Anterior Drawer:   negative  Posterior Drawer:   negative  Lachman:   not done  Pivot Shift:  not done  Valgus Stress:   negative  Varus Stress:   Negative    Left knee Exam:  No skin lesions, erythema, or warmth.   Effusion: 0+/4  Range Of Motion:  0-130    Hyperextension Pain:    positive  Hyperflexion Pain:     positive  Tracy:      positive  Medial Joint Line Tenderness:   positive  Lateral Joint Line Tenderness: negative    Anterior Drawer:   negative  Posterior Drawer:   negative  Lachman:   not done  Pivot Shift:  not done  Valgus Stress:   negative  Varus Stress:   negative      REVIEW OF IMAGING  PA 45 degree weightbearing and sunrise 2 views right knee dated 10/27/2020 demonstrate no acute fracture. No dislocation. Moderate degenerative change with medial joint space narrowing and osteophyte formation. PA 45 degree weightbearing and sunrise 2 views left knee dated 10/27/2020 demonstrate no acute fracture. No dislocation. Moderate degenerative change with medial joint space narrowing and osteophyte formation. ASSESSMENT  71-year-old female with right and left knee osteoarthritis, left worse than right, dizziness        Procedures    MN ARTHROCENTESIS ASPIR&/INJ MAJOR JT/BURSA W/O US         PLAN  -Diagnosis and treatment options discussed in detail today  -We discussed that in regards to her dizziness I would recommend follow-up with her primary care provider Dr. Javy Davila as we discussed this could be related to a number of things including alcohol consumption but also cardiac issues, pulmonary issues, or other medical issues.  -For her knee we will begin with a corticosteroid injection left knee today as is most significant in terms of her pain  -She may continue with ice, active modification, over-the-counter medications, and she has been giving home physical therapy exercises in the past which she may continue  -We will see her back in 6 weeks to 3 months as needed and to advise them to ensure they get the bedbug issue taking care of at home  -If there are issues in interim he will contact the office    Left Knee corticosteroid injection    After informed consent was provided including a discussion of risks such as infection, alternative treatments, and benefits verbal consent was obtained. The patient was seated on the exam table with the Left knee(s) flexed to 90 degrees. The anterolateral aspect of the knee adjacent to the joint line was prepped with Betadine and alcohol. A 22-gauge needle was inserted into the  Left knee and a mixture of 5 mL of 1% lidocaine and 2 ml of Kenalog was injected.  The needle was withdrawn and the puncture site was cleaned with alcohol and sealed with a Band-Aid. The patient tolerated the procedure well. Andres Batista MD  [address] [facility]          Voice Recognition Dictation disclaimer: Please note that portions of this chart were generated using Dragon dictation software. Although every effort was made to ensure the accuracy of this automated transcription, some errors in transcription may have occurred.

## 2020-11-30 ENCOUNTER — APPOINTMENT (OUTPATIENT)
Dept: CT IMAGING | Age: 75
End: 2020-11-30
Payer: MEDICARE

## 2020-11-30 ENCOUNTER — HOSPITAL ENCOUNTER (EMERGENCY)
Age: 75
Discharge: HOME | End: 2020-11-30
Attending: STUDENT IN AN ORGANIZED HEALTH CARE EDUCATION/TRAINING PROGRAM
Payer: MEDICARE

## 2020-11-30 VITALS
SYSTOLIC BLOOD PRESSURE: 137 MMHG | TEMPERATURE: 97.6 F | RESPIRATION RATE: 18 BRPM | OXYGEN SATURATION: 94 % | WEIGHT: 155 LBS | BODY MASS INDEX: 25.83 KG/M2 | DIASTOLIC BLOOD PRESSURE: 100 MMHG | HEART RATE: 97 BPM | HEIGHT: 65 IN

## 2020-11-30 LAB
A/G RATIO: 1.3 (ref 1.1–2.2)
ALBUMIN SERPL-MCNC: 3.9 G/DL (ref 3.4–5)
ALP BLD-CCNC: 70 U/L (ref 40–129)
ALT SERPL-CCNC: 15 U/L (ref 10–40)
ANION GAP SERPL CALCULATED.3IONS-SCNC: 11 MMOL/L (ref 3–16)
AST SERPL-CCNC: 22 U/L (ref 15–37)
BASOPHILS ABSOLUTE: 0.1 K/UL (ref 0–0.2)
BASOPHILS RELATIVE PERCENT: 1 %
BILIRUB SERPL-MCNC: <0.2 MG/DL (ref 0–1)
BUN BLDV-MCNC: 9 MG/DL (ref 7–20)
CALCIUM SERPL-MCNC: 9.7 MG/DL (ref 8.3–10.6)
CHLORIDE BLD-SCNC: 101 MMOL/L (ref 99–110)
CO2: 26 MMOL/L (ref 21–32)
CREAT SERPL-MCNC: 0.8 MG/DL (ref 0.6–1.2)
EKG ATRIAL RATE: 86 BPM
EKG DIAGNOSIS: NORMAL
EKG P AXIS: 79 DEGREES
EKG P-R INTERVAL: 182 MS
EKG Q-T INTERVAL: 384 MS
EKG QRS DURATION: 78 MS
EKG QTC CALCULATION (BAZETT): 459 MS
EKG R AXIS: 33 DEGREES
EKG T AXIS: 65 DEGREES
EKG VENTRICULAR RATE: 86 BPM
EOSINOPHILS ABSOLUTE: 0.2 K/UL (ref 0–0.6)
EOSINOPHILS RELATIVE PERCENT: 2.6 %
ETHANOL: 127 MG/DL (ref 0–0.08)
GFR AFRICAN AMERICAN: >60
GFR NON-AFRICAN AMERICAN: >60
GLOBULIN: 2.9 G/DL
GLUCOSE BLD-MCNC: 152 MG/DL (ref 70–99)
HCT VFR BLD CALC: 30.1 % (ref 36–48)
HEMOGLOBIN: 9.3 G/DL (ref 12–16)
LYMPHOCYTES ABSOLUTE: 2.2 K/UL (ref 1–5.1)
LYMPHOCYTES RELATIVE PERCENT: 35 %
MCH RBC QN AUTO: 23.2 PG (ref 26–34)
MCHC RBC AUTO-ENTMCNC: 31 G/DL (ref 31–36)
MCV RBC AUTO: 74.9 FL (ref 80–100)
MONOCYTES ABSOLUTE: 0.4 K/UL (ref 0–1.3)
MONOCYTES RELATIVE PERCENT: 6.5 %
NEUTROPHILS ABSOLUTE: 3.4 K/UL (ref 1.7–7.7)
NEUTROPHILS RELATIVE PERCENT: 54.9 %
PDW BLD-RTO: 20.6 % (ref 12.4–15.4)
PLATELET # BLD: 509 K/UL (ref 135–450)
PMV BLD AUTO: 6.8 FL (ref 5–10.5)
POTASSIUM REFLEX MAGNESIUM: 4.2 MMOL/L (ref 3.5–5.1)
RBC # BLD: 4.02 M/UL (ref 4–5.2)
SODIUM BLD-SCNC: 138 MMOL/L (ref 136–145)
TOTAL PROTEIN: 6.8 G/DL (ref 6.4–8.2)
TROPONIN: <0.01 NG/ML
WBC # BLD: 6.3 K/UL (ref 4–11)

## 2020-11-30 PROCEDURE — 70486 CT MAXILLOFACIAL W/O DYE: CPT

## 2020-11-30 PROCEDURE — 99283 EMERGENCY DEPT VISIT LOW MDM: CPT

## 2020-11-30 PROCEDURE — G0480 DRUG TEST DEF 1-7 CLASSES: HCPCS

## 2020-11-30 PROCEDURE — 70450 CT HEAD/BRAIN W/O DYE: CPT

## 2020-11-30 PROCEDURE — 72125 CT NECK SPINE W/O DYE: CPT

## 2020-11-30 PROCEDURE — 85025 COMPLETE CBC W/AUTO DIFF WBC: CPT

## 2020-11-30 PROCEDURE — 84484 ASSAY OF TROPONIN QUANT: CPT

## 2020-11-30 PROCEDURE — 93005 ELECTROCARDIOGRAM TRACING: CPT | Performed by: STUDENT IN AN ORGANIZED HEALTH CARE EDUCATION/TRAINING PROGRAM

## 2020-11-30 PROCEDURE — 80053 COMPREHEN METABOLIC PANEL: CPT

## 2020-11-30 PROCEDURE — 93010 ELECTROCARDIOGRAM REPORT: CPT | Performed by: INTERNAL MEDICINE

## 2020-11-30 ASSESSMENT — PAIN SCALES - GENERAL: PAINLEVEL_OUTOF10: 10

## 2020-11-30 NOTE — ED NOTES
Patient not found in room.   Left before discharge order/discharge instruction given     Mere Bautista RN  11/30/20 8123

## 2020-11-30 NOTE — ED PROVIDER NOTES
Primary Care Physician: Rod Pope MD   Attending Physician: No att. providers found     History   Chief Complaint   Patient presents with    Fall     pt c/o fall, pt states she hit face on floor, denies LOC, pt  states pt has been drinking tonight as well, denies antiocagulation        HPI   Brigid Simmons is a 77 y.o. female history of diabetes, hypertension, hyperlipidemia, TIA, congestive heart failure senting this morning accompanied by  complaining of left eye pain after a fall. Was drinking at home when she fell hitting her face. Denies any chest pain, headaches, change in vision blurred vision nausea vomiting. Patient does not take any anticoagulation. Did not pass out and no LOC. She has been drinking with incident happened.     Past Medical History:   Diagnosis Date    Acute systolic CHF (congestive heart failure) (HCC)     Asthma     Back ache     Cataract     Cerebral artery occlusion with cerebral infarction ([address] 2016    Diabetes mellitus ([address]     type !! - diet controlled    Hyperlipidemia     Hypertension     Insomnia     TIA (transient ischemic attack) 2016        Past Surgical History:   Procedure Laterality Date    ABDOMEN SURGERY      c section    CATARACT REMOVAL WITH IMPLANT Left [id number]    LEFT EYE CATARACT PHACOEMULSIFICATION INTRAOCULAR LENS     SECTION      DENTAL SURGERY      EYE SURGERY  10/29/13     RIGHT EYE CATARACT PHACOEMULSIFICATION INTRAOCULAR LENS        Family History   Problem Relation Age of Onset    High Blood Pressure Mother     Miscarriages / [place] Mother     Cancer Father     Diabetes Father     Heart Disease Father     Kidney Disease Father     Asthma Sister     Cancer Sister         Breast Cancer    Depression Brother     Substance Abuse Paternal Aunt     Stroke Maternal Grandmother     Diabetes Paternal Grandmother         Social History     Socioeconomic History    Marital status:  Spouse name: Jori Salazar Number of children: None    Years of education: None    Highest education level: None   Occupational History    None   Social Needs    Financial resource strain: None    Food insecurity     Worry: None     Inability: None    Transportation needs     Medical: None     Non-medical: None   Tobacco Use    Smoking status: Current Every Day Smoker     Packs/day: 2.00     Years: 58.00     Pack years: 116.00     Types: Cigarettes    Smokeless tobacco: Never Used    Tobacco comment: not totally ready to quit today   Substance and Sexual Activity    Alcohol use: Yes     Alcohol/week: 0.0 standard drinks     Types: 3 - 4 Standard drinks or equivalent per week     Comment: 1-2 hot toddy every night     Drug use: No    Sexual activity: Yes     Partners: Male   Lifestyle    Physical activity     Days per week: None     Minutes per session: None    Stress: None   Relationships    Social connections     Talks on phone: None     Gets together: None     Attends Christianity service: None     Active member of club or organization: None     Attends meetings of clubs or organizations: None     Relationship status: None    Intimate partner violence     Fear of current or ex partner: None     Emotionally abused: None     Physically abused: None     Forced sexual activity: None   Other Topics Concern    None   Social History Narrative    None        Review of Systems   10 total systems reviewed and found to be negative unless otherwise noted in HPI     Physical Exam   BP (!) 137/100   Pulse 97   Temp 97.6 °F (36.4 °C) (Oral)   Resp 18   Ht 5' 5\" (1.651 m)   Wt 155 lb (70.3 kg)   SpO2 94%   BMI 25.79 kg/m²      CONSTITUTIONAL: Well appearing, in no acute distress and appears to be intoxicated  HEAD: , normocephalic with hematoma above the right eye  EYES: PERRL, No injection, discharge or scleral icterus. ENT: Moist mucous membranes.     NECK: Normal ROM, NO LAD   CARDIOVASCULAR: Regular rate and rhythm. No murmurs or gallop. PULMONARY/CHEST: Airway patent. No retractions. Breath sounds clear with good air entry bilaterally. ABDOMEN: Soft, Non-distended and non-tender, without guarding or rebound. SKIN: Acyanotic, warm, dry   MUSCULOSKELETAL: No swelling, tenderness or deformity   NEUROLOGICAL: Awake and oriented x 3. Pulses intact. Grossly nonfocal   Nursing note and vitals reviewed. ED Course & Medical Decision Making   Medications - No data to display   Labs Reviewed   CBC WITH AUTO DIFFERENTIAL - Abnormal; Notable for the following components:       Result Value    Hemoglobin 9.3 (*)     Hematocrit 30.1 (*)     MCV 74.9 (*)     MCH 23.2 (*)     RDW 20.6 (*)     Platelets 972 (*)     All other components within normal limits    Narrative:     Performed at:  [facility],  [employer] [facility]   Phone [phone]   COMPREHENSIVE METABOLIC PANEL W/ REFLEX TO MG FOR LOW K - Abnormal; Notable for the following components:    Glucose 152 (*)     All other components within normal limits    Narrative:     Performed at:  [facility],  [employer], [facility]   Phone [phone]   ETHANOL    Narrative:     Performed at:  [place] [address],  [employer], [facility]   Phone [phone]   TROPONIN    Narrative:     Performed at:  [place] [address],  [employer], [facility]   Phone [phone]      CT Head WO Contrast   Final Result   Left frontal scalp contusion extending to the left supraorbital soft tissues. No underlying displaced calvarial fracture or acute intracranial abnormality. Age-related cerebral volume loss and chronic white matter microvascular   ischemic disease. CT Cervical Spine WO Contrast   Final Result   No acute cervical spine abnormality.       Unchanged cervical spondylosis with grade 1 subluxations, canal and foraminal   narrowing. CT FACIAL BONES WO CONTRAST   Final Result   Left frontal scalp contusion extending to the left supraorbital soft tissues. No underlying acute globe or orbital abnormality. Age-indeterminate left orbital floor/blowout fracture with fat herniation and   tenting of the inferior rectus. This is favored to be chronic given lack of   appreciable inflammatory stranding or sinus fluid level. This is new,   however since 05/31/2017. Ct Head Wo Contrast    Result Date: 11/30/2020  EXAMINATION: CT OF THE HEAD WITHOUT CONTRAST  11/30/2020 3:59 am TECHNIQUE: CT of the head was performed without the administration of intravenous contrast. Dose modulation, iterative reconstruction, and/or weight based adjustment of the mA/kV was utilized to reduce the radiation dose to as low as reasonably achievable. COMPARISON: CT head 09/25/2020 HISTORY: ORDERING SYSTEM PROVIDED HISTORY: fall TECHNOLOGIST PROVIDED HISTORY: Reason for exam:->fall Has a \"code stroke\" or \"stroke alert\" been called? ->No Reason for Exam: head inj Acuity: Acute Type of Exam: Initial Mechanism of Injury: fall, pt seems confused, denies LOC FINDINGS: BRAIN/VENTRICLES: There is no acute intracranial hemorrhage, mass effect or midline shift. No abnormal extra-axial fluid collection. The gray-white differentiation is maintained without evidence of an acute infarct. There is no evidence of hydrocephalus. Stable moderate diffuse cerebral volume loss with proportionate prominence of the ventricles and sulci. Mild patchy low attenuation in the subcortical, periventricular and deep white matter likely reflects sequela of chronic microvascular ischemia. ORBITS: The visualized portion of the orbits demonstrate no acute abnormality. Supraorbital soft tissue swelling/contusion relating to the left frontal scalp contusion.  SINUSES: The visualized paranasal sinuses and mastoid air cells demonstrate no acute abnormality. SOFT TISSUES/SKULL:  Left frontal scalp contusion. No underlying displaced calvarial fracture. Left frontal scalp contusion extending to the left supraorbital soft tissues. No underlying displaced calvarial fracture or acute intracranial abnormality. Age-related cerebral volume loss and chronic white matter microvascular ischemic disease. Ct Facial Bones Wo Contrast    Result Date: 11/30/2020  EXAMINATION: CT OF THE FACE WITHOUT CONTRAST  11/30/2020 3:59 am TECHNIQUE: CT of the face was performed without the administration of intravenous contrast. Multiplanar reformatted images are provided for review. Dose modulation, iterative reconstruction, and/or weight based adjustment of the mA/kV was utilized to reduce the radiation dose to as low as reasonably achievable. COMPARISON: CT face 05/31/2017 HISTORY: ORDERING SYSTEM PROVIDED HISTORY: fall TECHNOLOGIST PROVIDED HISTORY: Reason for exam:->fall Reason for Exam: face inj Acuity: Acute Type of Exam: Initial Mechanism of Injury: fall, large bump over left eye, no LOC FINDINGS: FACIAL BONES:  The maxilla, pterygoid plates and zygomatic arches are intact. The mandible is intact. The mandibular condyles are normally situated. The nasal bones and maxillary nasal processes are intact. ORBITS:  Globes are intact. Chronic appearing left orbital floor fracture with fat herniation and mild tenting of the inferior rectus. No postseptal inflammatory changes. Unremarkable right orbit. SINUSES/MASTOIDS:  The paranasal sinuses and mastoid air cells are well aerated. No acute fracture is seen. SOFT TISSUES:  Left frontal scalp contusion extending to the left supraorbital soft tissues. Left frontal scalp contusion extending to the left supraorbital soft tissues. No underlying acute globe or orbital abnormality. Age-indeterminate left orbital floor/blowout fracture with fat herniation and tenting of the inferior rectus.   This is favored to be chronic given lack of appreciable inflammatory stranding or sinus fluid level. This is new, however since 05/31/2017. Ct Cervical Spine Wo Contrast    Result Date: 11/30/2020  EXAMINATION: CT OF THE CERVICAL SPINE WITHOUT CONTRAST 11/30/2020 3:59 am TECHNIQUE: CT of the cervical spine was performed without the administration of intravenous contrast. Multiplanar reformatted images are provided for review. Dose modulation, iterative reconstruction, and/or weight based adjustment of the mA/kV was utilized to reduce the radiation dose to as low as reasonably achievable. COMPARISON: Cervical spine CT 09/25/2020 HISTORY: ORDERING SYSTEM PROVIDED HISTORY: fall TECHNOLOGIST PROVIDED HISTORY: Reason for exam:->fall Reason for Exam: neck pain Acuity: Acute Type of Exam: Initial Mechanism of Injury: fall, neck pain FINDINGS: BONES/ALIGNMENT: Vertebral body heights are maintained. Unchanged grade 1 subluxations along the cervical spine. No new subluxation. Craniocervical junction and atlantodental articulation are intact and in anatomic alignment. DEGENERATIVE CHANGES: Advanced multilevel degenerative disc disease with facet arthrosis. Canal stenosis is moderate to severe at C3-C4 listhesis level, not substantially changed. Multilevel foraminal narrowing. SOFT TISSUES: There is no prevertebral soft tissue swelling. No acute cervical spine abnormality. Unchanged cervical spondylosis with grade 1 subluxations, canal and foraminal narrowing. EKG INTERPRETATION:  EKG by my preliminary interpretation shows sinus rhythm with rate of 86, normal axis, normal intervals, with no ST changes indicative of ischemia at this time. PROCEDURES:   Procedures    ASSESSMENT AND PLAN:  Gaurang Joya is a 77 y.o. female senting after a fall from drinking. On exam she has a hematoma just above the right eyebrow however her ocular movements were all within normal limits with no signs of entrapment.   Imaging studies CT head, neck and face was obtained and showed no bleed but age indeterminant blowout fracture on the left. Given no signs of entrapment I did not think that she needed to be admitted. However I consulted with plastic surgery at [facility] who recommended patient could be discharged home and follow-up with them as outpatient. We will also check some labs to make sure she did not have a syncope event. Results were unremarkable including EKG. patient was discharged home with a referral to follow-up with plastics for further evaluation and treatment. ClINICAL IMPRESSION:  1. Blow-out fracture, sequela ([address]    2. Acute alcoholic intoxication with complication [facility])          PATIENT REFERRED TO:  Guero Pope MD  [address] [facility]  [address] [facility]  [address] [facility] [address]  [phone]    Schedule an appointment as soon as possible for a visit in 2 days        DISCHARGE MEDICATIONS:  Discharge Medication List as of 11/30/2020  8:04 AM        DISCONTINUED MEDICATIONS:  Discharge Medication List as of 11/30/2020  8:04 AM        DISPOSITION Decision To Discharge 11/30/2020 07:01:23 AM  -We have instructed the patient, (Анна Becker) to return to the ED or call her PCP if her pain/symptoms worsen. -Findings and recommendations explained to patient. She expressed understanding and agreed with the plan. Tayler Rivera MD (electronically signed)  12/1/2020  _________________________________________________________________________________________  _________________________________________________________________________________________  This record is transcribed utilizing voice recognition technology. There are inherent limitations in this technology. In addition, there may be limitations in editing of this report. If there are any discrepancies, please contact me directly.         Tayler Rivera MD  12/01/20 0349

## 2021-01-11 ENCOUNTER — HOSPITAL ENCOUNTER (EMERGENCY)
Age: 76
Discharge: HOME | End: 2021-01-11
Attending: EMERGENCY MEDICINE
Payer: MEDICARE

## 2021-01-11 ENCOUNTER — APPOINTMENT (OUTPATIENT)
Dept: GENERAL RADIOLOGY | Age: 76
End: 2021-01-11
Payer: MEDICARE

## 2021-01-11 VITALS
SYSTOLIC BLOOD PRESSURE: 153 MMHG | OXYGEN SATURATION: 99 % | TEMPERATURE: 97.5 F | DIASTOLIC BLOOD PRESSURE: 92 MMHG | HEIGHT: 65 IN | RESPIRATION RATE: 22 BRPM | HEART RATE: 96 BPM | WEIGHT: 150 LBS | BODY MASS INDEX: 24.99 KG/M2

## 2021-01-11 DIAGNOSIS — E83.42 HYPOMAGNESEMIA: ICD-10-CM

## 2021-01-11 DIAGNOSIS — R10.13 EPIGASTRIC PAIN: ICD-10-CM

## 2021-01-11 DIAGNOSIS — R11.0 NAUSEA: Primary | ICD-10-CM

## 2021-01-11 DIAGNOSIS — E87.6 HYPOKALEMIA: ICD-10-CM

## 2021-01-11 LAB
A/G RATIO: 1 (ref 1.1–2.2)
ALBUMIN SERPL-MCNC: 3.6 G/DL (ref 3.4–5)
ALP BLD-CCNC: 86 U/L (ref 40–129)
ALT SERPL-CCNC: 10 U/L (ref 10–40)
ANION GAP SERPL CALCULATED.3IONS-SCNC: 12 MMOL/L (ref 3–16)
AST SERPL-CCNC: 14 U/L (ref 15–37)
BASOPHILS ABSOLUTE: 0.2 K/UL (ref 0–0.2)
BASOPHILS RELATIVE PERCENT: 1.5 %
BILIRUB SERPL-MCNC: 0.3 MG/DL (ref 0–1)
BILIRUBIN URINE: NEGATIVE
BLOOD, URINE: NEGATIVE
BUN BLDV-MCNC: 8 MG/DL (ref 7–20)
CALCIUM SERPL-MCNC: 9.9 MG/DL (ref 8.3–10.6)
CHLORIDE BLD-SCNC: 99 MMOL/L (ref 99–110)
CLARITY: CLEAR
CO2: 27 MMOL/L (ref 21–32)
COLOR: YELLOW
CREAT SERPL-MCNC: 0.7 MG/DL (ref 0.6–1.2)
EKG ATRIAL RATE: 108 BPM
EKG DIAGNOSIS: NORMAL
EKG P AXIS: 75 DEGREES
EKG P-R INTERVAL: 164 MS
EKG Q-T INTERVAL: 360 MS
EKG QRS DURATION: 70 MS
EKG QTC CALCULATION (BAZETT): 482 MS
EKG R AXIS: 3 DEGREES
EKG T AXIS: 80 DEGREES
EKG VENTRICULAR RATE: 108 BPM
EOSINOPHILS ABSOLUTE: 0.1 K/UL (ref 0–0.6)
EOSINOPHILS RELATIVE PERCENT: 0.8 %
GFR AFRICAN AMERICAN: >60
GFR NON-AFRICAN AMERICAN: >60
GLOBULIN: 3.6 G/DL
GLUCOSE BLD-MCNC: 141 MG/DL (ref 70–99)
GLUCOSE URINE: NEGATIVE MG/DL
HCT VFR BLD CALC: 32.7 % (ref 36–48)
HEMOGLOBIN: 10 G/DL (ref 12–16)
KETONES, URINE: NEGATIVE MG/DL
LACTIC ACID, SEPSIS: 1.6 MMOL/L (ref 0.4–1.9)
LEUKOCYTE ESTERASE, URINE: NEGATIVE
LIPASE: 20 U/L (ref 13–60)
LYMPHOCYTES ABSOLUTE: 2.4 K/UL (ref 1–5.1)
LYMPHOCYTES RELATIVE PERCENT: 22 %
MAGNESIUM: 1.6 MG/DL (ref 1.8–2.4)
MCH RBC QN AUTO: 22.7 PG (ref 26–34)
MCHC RBC AUTO-ENTMCNC: 30.5 G/DL (ref 31–36)
MCV RBC AUTO: 74.6 FL (ref 80–100)
MICROSCOPIC EXAMINATION: NORMAL
MONOCYTES ABSOLUTE: 1 K/UL (ref 0–1.3)
MONOCYTES RELATIVE PERCENT: 9.6 %
NEUTROPHILS ABSOLUTE: 7.1 K/UL (ref 1.7–7.7)
NEUTROPHILS RELATIVE PERCENT: 66.1 %
NITRITE, URINE: NEGATIVE
PDW BLD-RTO: 19.7 % (ref 12.4–15.4)
PH UA: 6 (ref 5–8)
PLATELET # BLD: 576 K/UL (ref 135–450)
PMV BLD AUTO: 7.6 FL (ref 5–10.5)
POTASSIUM REFLEX MAGNESIUM: 3.2 MMOL/L (ref 3.5–5.1)
PROTEIN UA: NEGATIVE MG/DL
RBC # BLD: 4.38 M/UL (ref 4–5.2)
SARS-COV-2, NAAT: NOT DETECTED
SODIUM BLD-SCNC: 138 MMOL/L (ref 136–145)
SPECIFIC GRAVITY UA: <=1.005 (ref 1–1.03)
TOTAL PROTEIN: 7.2 G/DL (ref 6.4–8.2)
TROPONIN: <0.01 NG/ML
URINE REFLEX TO CULTURE: NORMAL
URINE TYPE: NORMAL
UROBILINOGEN, URINE: 0.2 E.U./DL
WBC # BLD: 10.7 K/UL (ref 4–11)

## 2021-01-11 PROCEDURE — 96361 HYDRATE IV INFUSION ADD-ON: CPT

## 2021-01-11 PROCEDURE — 2580000003 HC RX 258: Performed by: EMERGENCY MEDICINE

## 2021-01-11 PROCEDURE — 93010 ELECTROCARDIOGRAM REPORT: CPT | Performed by: INTERNAL MEDICINE

## 2021-01-11 PROCEDURE — 83605 ASSAY OF LACTIC ACID: CPT

## 2021-01-11 PROCEDURE — 80053 COMPREHEN METABOLIC PANEL: CPT

## 2021-01-11 PROCEDURE — U0002 COVID-19 LAB TEST NON-CDC: HCPCS

## 2021-01-11 PROCEDURE — 6370000000 HC RX 637 (ALT 250 FOR IP): Performed by: EMERGENCY MEDICINE

## 2021-01-11 PROCEDURE — 83690 ASSAY OF LIPASE: CPT

## 2021-01-11 PROCEDURE — 81003 URINALYSIS AUTO W/O SCOPE: CPT

## 2021-01-11 PROCEDURE — 71045 X-RAY EXAM CHEST 1 VIEW: CPT

## 2021-01-11 PROCEDURE — 85025 COMPLETE CBC W/AUTO DIFF WBC: CPT

## 2021-01-11 PROCEDURE — 83735 ASSAY OF MAGNESIUM: CPT

## 2021-01-11 PROCEDURE — 99284 EMERGENCY DEPT VISIT MOD MDM: CPT

## 2021-01-11 PROCEDURE — 96374 THER/PROPH/DIAG INJ IV PUSH: CPT

## 2021-01-11 PROCEDURE — 93005 ELECTROCARDIOGRAM TRACING: CPT | Performed by: EMERGENCY MEDICINE

## 2021-01-11 PROCEDURE — 84484 ASSAY OF TROPONIN QUANT: CPT

## 2021-01-11 PROCEDURE — 6360000002 HC RX W HCPCS: Performed by: EMERGENCY MEDICINE

## 2021-01-11 PROCEDURE — 96360 HYDRATION IV INFUSION INIT: CPT

## 2021-01-11 RX ORDER — ONDANSETRON 4 MG/1
4 TABLET, ORALLY DISINTEGRATING ORAL 3 TIMES DAILY PRN
Qty: 21 TABLET | Refills: 0 | Status: ON HOLD | OUTPATIENT
Start: 2021-01-11 | End: 2021-05-23

## 2021-01-11 RX ORDER — POTASSIUM CHLORIDE 20 MEQ/1
40 TABLET, EXTENDED RELEASE ORAL ONCE
Status: COMPLETED | OUTPATIENT
Start: 2021-01-11 | End: 2021-01-11

## 2021-01-11 RX ORDER — POTASSIUM CHLORIDE 750 MG/1
10 TABLET, EXTENDED RELEASE ORAL 2 TIMES DAILY
Qty: 14 TABLET | Refills: 0 | Status: ON HOLD | OUTPATIENT
Start: 2021-01-11 | End: 2021-01-19

## 2021-01-11 RX ORDER — ONDANSETRON 2 MG/ML
4 INJECTION INTRAMUSCULAR; INTRAVENOUS ONCE
Status: COMPLETED | OUTPATIENT
Start: 2021-01-11 | End: 2021-01-11

## 2021-01-11 RX ORDER — 0.9 % SODIUM CHLORIDE 0.9 %
1000 INTRAVENOUS SOLUTION INTRAVENOUS ONCE
Status: COMPLETED | OUTPATIENT
Start: 2021-01-11 | End: 2021-01-11

## 2021-01-11 RX ADMIN — POTASSIUM CHLORIDE 40 MEQ: 1500 TABLET, EXTENDED RELEASE ORAL at 10:26

## 2021-01-11 RX ADMIN — ONDANSETRON HYDROCHLORIDE 4 MG: 2 INJECTION, SOLUTION INTRAMUSCULAR; INTRAVENOUS at 07:57

## 2021-01-11 RX ADMIN — MAGNESIUM GLUCONATE 500 MG ORAL TABLET 400 MG: 500 TABLET ORAL at 10:26

## 2021-01-11 RX ADMIN — SODIUM CHLORIDE 1000 ML: 9 INJECTION, SOLUTION INTRAVENOUS at 07:57

## 2021-01-11 NOTE — ED PROVIDER NOTES
[address] ED  EMERGENCY DEPARTMENT ENCOUNTER      Pt Name: Marce Kruger  MRN: [id number]  [place] 1945  Date of evaluation: 1/11/2021  Provider: Stephenie Logan MD    [address] [place] [address]       Chief Complaint   Patient presents with    Nausea     since last night denies vomiting         HISTORY OF PRESENT ILLNESS   (Location/Symptom, Timing/Onset, Context/Setting, Quality, Duration, Modifying Factors, Severity)  Note limiting factors. Marce Kruger is a 77 y.o. female with past medical history of hypertension, hyperlipidemia, diabetes, coronary artery disease, prior stroke and CHF here today with nausea. Patient states that last night she has become nauseous. She has not vomited but feels like she is going to. She states of this time poor she is also developed a mildly nonproductive cough. Denies chest pain. She notes some mild epigastric discomfort that is aching. She is had no diarrhea. Denies dysuria but does note urinary frequency which she states is fairly common for her. She is had no fevers or chills. No known sick contacts. No obvious alleviating or aggravating factors. [facility]    Nursing Notes were reviewed. REVIEW OF SYSTEMS    (2-9 systems for level 4, 10 or more for level 5)     Review of Systems    Please see HPI for pertinent positive and negative review of system findings. A full 10 system ROS was performed and otherwise negative.         PAST MEDICAL HISTORY     Past Medical History:   Diagnosis Date    Acute systolic CHF (congestive heart failure) (HCC)     Asthma     Back ache     Cataract     Cerebral artery occlusion with cerebral infarction ([address] 12/2016    Diabetes mellitus ([address]     type !! - diet controlled    Hyperlipidemia     Hypertension     Insomnia     TIA (transient ischemic attack) 12/23/2016         SURGICAL HISTORY       Past Surgical History:   Procedure Laterality Date    ABDOMEN SURGERY      c section    CATARACT REMOVAL WITH IMPLANT Left [id number]    LEFT EYE CATARACT PHACOEMULSIFICATION INTRAOCULAR LENS     SECTION      DENTAL SURGERY      EYE SURGERY  10/29/13     RIGHT EYE CATARACT PHACOEMULSIFICATION INTRAOCULAR LENS         CURRENT MEDICATIONS       Previous Medications    ALBUTEROL SULFATE  (90 BASE) MCG/ACT INHALER    Inhale 2 puffs into the lungs every 6 hours as needed for Wheezing    ASPIRIN 81 MG EC TABLET    Take 1 tablet by mouth daily    ATORVASTATIN (LIPITOR) 40 MG TABLET    Take 1 tablet by mouth nightly    BLOOD PRESSURE KIT    1 each by Does not apply route daily    CHOLECALCIFEROL (VITAMIN D-3) 5000 UNITS TABS    Take  by mouth. CLONIDINE (CATAPRES) 0.2 MG TABLET    Take 1 tablet by mouth 3 times daily as needed (Systolic EV>047)    FISH OIL    by Does not apply route.       FLUTICASONE (FLONASE) 50 MCG/ACT NASAL SPRAY    1 spray by Nasal route daily    FLUTICASONE-SALMETEROL (ADVAIR DISKUS) 500-50 MCG/DOSE DISKUS INHALER    Inhale 1 puff into the lungs every 12 hours    FOLIC ACID (FOLVITE) 1 MG TABLET    Take 1 tablet by mouth daily    INSULIN GLARGINE (LANTUS) 100 UNIT/ML INJECTION VIAL    Inject 10 Units into the skin nightly    IPRATROPIUM-ALBUTEROL (DUONEB) 0.5-2.5 (3) MG/3ML SOLN NEBULIZER SOLUTION    Inhale 3 mLs into the lungs every 6 hours as needed for Shortness of Breath    LEVOTHYROXINE (SYNTHROID) 25 MCG TABLET    Take 1 tablet by mouth Daily    LISINOPRIL (PRINIVIL;ZESTRIL) 20 MG TABLET    Take 1 tablet by mouth daily    METFORMIN (GLUCOPHAGE) 500 MG TABLET    Take 500 mg by mouth    METOPROLOL SUCCINATE (TOPROL XL) 50 MG EXTENDED RELEASE TABLET    Take 1 tablet by mouth daily    MULTIPLE VITAMIN (THERAPEUTIC) TABS TABLET    Take 1 tablet by mouth daily    NAPROXEN (NAPROSYN) 500 MG TABLET    Take 1 tablet by mouth 2 times daily for 5 days    NICOTINE (NICODERM CQ) 21 MG/24HR    Place 1 patch onto the skin daily    OMEPRAZOLE (PRILOSEC) 20 MG CAPSULE    Take 1 capsule by mouth daily PAROXETINE (PAXIL) 40 MG TABLET    Take 1 tablet by mouth every morning    POLYETHYLENE GLYCOL-ELECTROLYTES (TRILYTE) 420 G SOLUTION    Follow Package Instructions unless otherwise directed by physician. TIOTROPIUM (SPIRIVA RESPIMAT) 2.5 MCG/ACT AERS INHALER    Inhale 2 puffs into the lungs daily    VITAMIN B-1 (THIAMINE) 100 MG TABLET    Take 1 tablet by mouth daily    VITAMIN B-12 (CYANOCOBALAMIN) 1000 MCG TABLET    Take 1,000 mcg by mouth daily. ALLERGIES     Mold extract [trichophyton mentagrophytes]    FAMILY HISTORY       Family History   Problem Relation Age of Onset    High Blood Pressure Mother    [de-identified] / Sameul Nipple Mother     Cancer Father     Diabetes Father     Heart Disease Father     Kidney Disease Father     Asthma Sister     Cancer Sister         Breast Cancer    Depression Brother     Substance Abuse Paternal Aunt     Stroke Maternal Grandmother     Diabetes Paternal Grandmother           SOCIAL HISTORY       Social History     Socioeconomic History    Marital status:      Spouse name: Adam Hall Number of children: None    Years of education: None    Highest education level: None   Occupational History    None   Social Needs    Financial resource strain: None    Food insecurity     Worry: None     Inability: None    Transportation needs     Medical: None     Non-medical: None   Tobacco Use    Smoking status: Current Every Day Smoker     Packs/day: 2.00     Years: 58.00     Pack years: 116.00     Types: Cigarettes    Smokeless tobacco: Never Used    Tobacco comment: not totally ready to quit today   Substance and Sexual Activity    Alcohol use:  Yes     Alcohol/week: 0.0 standard drinks     Types: 3 - 4 Standard drinks or equivalent per week     Comment: 1-2 hot toddy every night     Drug use: No    Sexual activity: Yes     Partners: Male   Lifestyle    Physical activity     Days per week: None     Minutes per session: None    Stress: None Relationships    Social connections     Talks on phone: None     Gets together: None     Attends Worship service: None     Active member of club or organization: None     Attends meetings of clubs or organizations: None     Relationship status: None    Intimate partner violence     Fear of current or ex partner: None     Emotionally abused: None     Physically abused: None     Forced sexual activity: None   Other Topics Concern    None   Social History Narrative    None       SCREENINGS    Brenda Coma Scale  Eye Opening: Spontaneous  Best Verbal Response: Oriented  Best Motor Response: Obeys commands  Brenda Coma Scale Score: 15          PHYSICAL EXAM    (up to 7 for level 4, 8 or more for level 5)     ED Triage Vitals [01/11/21 0741]   BP Temp Temp Source Pulse Resp SpO2 Height Weight   (!) 151/82 97.5 °F (36.4 °C) Oral 113 22 98 % 5' 5\" (1.651 m) 150 lb (68 kg)       Physical Exam    General appearance:  Cooperative. No acute distress. Skin:  Warm. Dry. Eye:  Extraocular movements intact. Ears, nose, mouth and throat:  Oral mucosa moist,  Neck:  Trachea midline. Heart: Slightly tachycardic but regular  Perfusion:  intact  Respiratory:  Lungs clear to auscultation bilaterally. Respirations nonlabored. Abdominal:   Non distended. Mild tenderness to palpation in the epigastrium with an overall soft abdomen and no rebound or guarding  Neurological:  Alert and oriented x 3.   Moves all extremities spontaneously  Musculoskeletal:   Normal ROM, no deformities          Psychiatric:  Normal mood      DIAGNOSTIC RESULTS       Labs Reviewed   CBC WITH AUTO DIFFERENTIAL - Abnormal; Notable for the following components:       Result Value    Hemoglobin 10.0 (*)     Hematocrit 32.7 (*)     MCV 74.6 (*)     MCH 22.7 (*)     MCHC 30.5 (*)     RDW 19.7 (*)     Platelets 995 (*)     All other components within normal limits    Narrative:     Performed at:  [facility]    [place] , [facility]   Phone [phone]   COMPREHENSIVE METABOLIC PANEL W/ REFLEX TO MG FOR LOW K - Abnormal; Notable for the following components:    Potassium reflex Magnesium 3.2 (*)     Glucose 141 (*)     Albumin/Globulin Ratio 1.0 (*)     AST 14 (*)     All other components within normal limits    Narrative:     Performed at:  [facility],  [place], [place] [facility]   Phone [phone]   MAGNESIUM - Abnormal; Notable for the following components:    Magnesium 1.60 (*)     All other components within normal limits    Narrative:     Performed at:  [facility] [address],  [place], [place] [facility]   Phone [phone]   LIPASE    Narrative:     Performed at:  [facility] [address],  [place], [facility]   Phone [phone]   TROPONIN    Narrative:     Performed at:  [place] [address],  [place], [facility]   Phone [phone]   URINE RT REFLEX TO CULTURE    Narrative:     Performed at:  [facility] [address],  [place], [place] [employer] [employer]   Phone [phone]   LACTATE, SEPSIS    Narrative:     Performed at:  [facility] [address],  [place], [place] [facility]   Phone [phone]    Narrative:     Performed at:  [facility] [address],  [place], [place] [employer] [employer]   Phone [phone]   LACTATE, SEPSIS       Interpretation per the Radiologist below, if obtained/available at the time of this note:    XR CHEST PORTABLE   Final Result   No acute abnormality. All other labs/imaging were within normal range or not returned as of this dictation.     EMERGENCY DEPARTMENT COURSE and DIFFERENTIAL DIAGNOSIS/MDM:   Vitals:    Vitals:    01/11/21 0741   BP: (!) 151/82   Pulse: 113   Resp: 22 Temp: 97.5 °F (36.4 °C)   TempSrc: Oral   SpO2: 98%   Weight: 150 lb (68 kg)   Height: 5' 5\" (1.651 m)       EKG: Sinus tachycardia occasional PVCs rate of 108 bpm.  Low voltage QRS. Septal Q waves. No ST elevation. Compared to prior EKG from 11/30/2020 no change. Patient presents emergency department today complaining of some nausea without vomiting. Initially denied abdominal pain was found to have some tenderness on exam.  Laboratory studies performed showed mild hypokalemia and mild hypomagnesemia. She was given oral replacements. She was given IV fluids and nausea medication on reevaluation states her symptoms have completely resolved. Her abdomen is soft. She has no further pain. She has been drinking and is requesting to be discharged home. At this time given her symptomatic resolution her feeling much improved have an unremarkable work-up otherwise I do feel that she can be discharged home however she was given strict return precautions. Will be given Zofran and potassium replacement for discharge    MDM    CONSULTS     None    Critical Care:   None    REASSESSMENT          PROCEDURE     Unless otherwise noted below, none     Procedures      FINAL IMPRESSION      1. Nausea    2. Epigastric pain    3. Hypokalemia    4. Hypomagnesemia            DISPOSITION/PLAN   DISPOSITION Decision To Discharge 01/11/2021 10:32:32 AM        PATIENT REFERRED TO:  Jin Mesa MD  [address] [address]  [phone]    Schedule an appointment as soon as possible for a visit         DISCHARGE MEDICATIONS:  New Prescriptions    ONDANSETRON (ZOFRAN-ODT) 4 MG DISINTEGRATING TABLET    Take 1 tablet by mouth 3 times daily as needed for Nausea or Vomiting    POTASSIUM CHLORIDE (KLOR-CON M) 10 MEQ EXTENDED RELEASE TABLET    Take 1 tablet by mouth 2 times daily for 7 days     Controlled Substances Monitoring:     No flowsheet data found.     (Please note that portions of this note were completed with a voice recognition program.  Efforts were made to edit the dictations but occasionally words are mis-transcribed.)    Byron Schwartz MD (electronically signed)  Attending Emergency Physician           Saunders Dance, MD  01/11/21 0959

## 2021-01-16 ENCOUNTER — APPOINTMENT (OUTPATIENT)
Dept: GENERAL RADIOLOGY | Age: 76
DRG: 292 | End: 2021-01-16
Payer: MEDICARE

## 2021-01-16 ENCOUNTER — APPOINTMENT (OUTPATIENT)
Dept: CT IMAGING | Age: 76
DRG: 292 | End: 2021-01-16
Payer: MEDICARE

## 2021-01-16 ENCOUNTER — HOSPITAL ENCOUNTER (INPATIENT)
Age: 76
LOS: 3 days | Discharge: HOME | DRG: 292 | End: 2021-01-19
Attending: STUDENT IN AN ORGANIZED HEALTH CARE EDUCATION/TRAINING PROGRAM | Admitting: INTERNAL MEDICINE
Payer: MEDICARE

## 2021-01-16 DIAGNOSIS — I26.99 PULMONARY EMBOLISM WITHOUT ACUTE COR PULMONALE, UNSPECIFIED CHRONICITY, UNSPECIFIED PULMONARY EMBOLISM TYPE (HCC): Primary | ICD-10-CM

## 2021-01-16 LAB
A/G RATIO: 1.2 (ref 1.1–2.2)
ALBUMIN SERPL-MCNC: 3.5 G/DL (ref 3.4–5)
ALP BLD-CCNC: 88 U/L (ref 40–129)
ALT SERPL-CCNC: 8 U/L (ref 10–40)
ANION GAP SERPL CALCULATED.3IONS-SCNC: 9 MMOL/L (ref 3–16)
AST SERPL-CCNC: 13 U/L (ref 15–37)
BASOPHILS ABSOLUTE: 0 K/UL (ref 0–0.2)
BASOPHILS RELATIVE PERCENT: 0.6 %
BILIRUB SERPL-MCNC: <0.2 MG/DL (ref 0–1)
BUN BLDV-MCNC: 10 MG/DL (ref 7–20)
CALCIUM SERPL-MCNC: 10.1 MG/DL (ref 8.3–10.6)
CHLORIDE BLD-SCNC: 98 MMOL/L (ref 99–110)
CO2: 31 MMOL/L (ref 21–32)
CREAT SERPL-MCNC: 1 MG/DL (ref 0.6–1.2)
D DIMER: 339 NG/ML DDU (ref 0–229)
EKG ATRIAL RATE: 89 BPM
EKG DIAGNOSIS: NORMAL
EKG P AXIS: 70 DEGREES
EKG P-R INTERVAL: 184 MS
EKG Q-T INTERVAL: 380 MS
EKG QRS DURATION: 82 MS
EKG QTC CALCULATION (BAZETT): 462 MS
EKG R AXIS: 9 DEGREES
EKG T AXIS: 58 DEGREES
EKG VENTRICULAR RATE: 89 BPM
EOSINOPHILS ABSOLUTE: 0 K/UL (ref 0–0.6)
EOSINOPHILS RELATIVE PERCENT: 0.5 %
GFR AFRICAN AMERICAN: >60
GFR NON-AFRICAN AMERICAN: 54
GLOBULIN: 3 G/DL
GLUCOSE BLD-MCNC: 135 MG/DL (ref 70–99)
HCT VFR BLD CALC: 30.5 % (ref 36–48)
HEMOGLOBIN: 9.4 G/DL (ref 12–16)
LYMPHOCYTES ABSOLUTE: 1.7 K/UL (ref 1–5.1)
LYMPHOCYTES RELATIVE PERCENT: 22.7 %
MCH RBC QN AUTO: 22.9 PG (ref 26–34)
MCHC RBC AUTO-ENTMCNC: 30.7 G/DL (ref 31–36)
MCV RBC AUTO: 74.4 FL (ref 80–100)
MONOCYTES ABSOLUTE: 0.6 K/UL (ref 0–1.3)
MONOCYTES RELATIVE PERCENT: 8 %
NEUTROPHILS ABSOLUTE: 5 K/UL (ref 1.7–7.7)
NEUTROPHILS RELATIVE PERCENT: 68.2 %
PDW BLD-RTO: 19.3 % (ref 12.4–15.4)
PLATELET # BLD: 656 K/UL (ref 135–450)
PMV BLD AUTO: 7 FL (ref 5–10.5)
POTASSIUM REFLEX MAGNESIUM: 4.2 MMOL/L (ref 3.5–5.1)
PRO-BNP: 405 PG/ML (ref 0–449)
RBC # BLD: 4.1 M/UL (ref 4–5.2)
SARS-COV-2, NAAT: NOT DETECTED
SODIUM BLD-SCNC: 138 MMOL/L (ref 136–145)
TOTAL PROTEIN: 6.5 G/DL (ref 6.4–8.2)
TROPONIN: <0.01 NG/ML
TSH REFLEX: 1.08 UIU/ML (ref 0.27–4.2)
WBC # BLD: 7.3 K/UL (ref 4–11)

## 2021-01-16 PROCEDURE — 85379 FIBRIN DEGRADATION QUANT: CPT

## 2021-01-16 PROCEDURE — U0002 COVID-19 LAB TEST NON-CDC: HCPCS

## 2021-01-16 PROCEDURE — 70450 CT HEAD/BRAIN W/O DYE: CPT

## 2021-01-16 PROCEDURE — 84443 ASSAY THYROID STIM HORMONE: CPT

## 2021-01-16 PROCEDURE — 2060000000 HC ICU INTERMEDIATE R&B

## 2021-01-16 PROCEDURE — 71045 X-RAY EXAM CHEST 1 VIEW: CPT

## 2021-01-16 PROCEDURE — 6370000000 HC RX 637 (ALT 250 FOR IP): Performed by: STUDENT IN AN ORGANIZED HEALTH CARE EDUCATION/TRAINING PROGRAM

## 2021-01-16 PROCEDURE — 85025 COMPLETE CBC W/AUTO DIFF WBC: CPT

## 2021-01-16 PROCEDURE — 84484 ASSAY OF TROPONIN QUANT: CPT

## 2021-01-16 PROCEDURE — 93010 ELECTROCARDIOGRAM REPORT: CPT | Performed by: INTERNAL MEDICINE

## 2021-01-16 PROCEDURE — 80053 COMPREHEN METABOLIC PANEL: CPT

## 2021-01-16 PROCEDURE — 93005 ELECTROCARDIOGRAM TRACING: CPT | Performed by: STUDENT IN AN ORGANIZED HEALTH CARE EDUCATION/TRAINING PROGRAM

## 2021-01-16 PROCEDURE — 71260 CT THORAX DX C+: CPT

## 2021-01-16 PROCEDURE — 83880 ASSAY OF NATRIURETIC PEPTIDE: CPT

## 2021-01-16 PROCEDURE — 99283 EMERGENCY DEPT VISIT LOW MDM: CPT

## 2021-01-16 PROCEDURE — 6360000004 HC RX CONTRAST MEDICATION: Performed by: STUDENT IN AN ORGANIZED HEALTH CARE EDUCATION/TRAINING PROGRAM

## 2021-01-16 RX ADMIN — IOPAMIDOL 85 ML: 755 INJECTION, SOLUTION INTRAVENOUS at 18:45

## 2021-01-16 RX ADMIN — APIXABAN 10 MG: 5 TABLET, FILM COATED ORAL at 21:08

## 2021-01-16 ASSESSMENT — ENCOUNTER SYMPTOMS
SORE THROAT: 0
COUGH: 0
PHOTOPHOBIA: 0
RHINORRHEA: 0
STRIDOR: 0
VOMITING: 0
BACK PAIN: 0
NAUSEA: 1
SHORTNESS OF BREATH: 0
ABDOMINAL PAIN: 0

## 2021-01-16 ASSESSMENT — PAIN SCALES - GENERAL: PAINLEVEL_OUTOF10: 10

## 2021-01-16 ASSESSMENT — PAIN DESCRIPTION - DESCRIPTORS: DESCRIPTORS: ACHING

## 2021-01-16 NOTE — ED PROVIDER NOTES
[address] ED  EMERGENCY DEPARTMENT ENCOUNTER      Pt Name: Joanie Alba  MRN: [id number]  [place] 1945  Date of evaluation: 1/16/2021  Provider: Angie Mcclure, [address] [place] [address]       Chief Complaint   Patient presents with    Foot Swelling    Nausea         HISTORY OF PRESENT ILLNESS   (Location/Symptom, Timing/Onset, Context/Setting, Quality, Duration, Modifying Factors, Severity)  Note limiting factors. Joanie Alba is a 77 y.o. female with past medical history of CHF hypertension hyperlipidemia diabetes who presents to the emergency department complaining of nausea, foot swelling. Of note patient was seen evaluated on 1/11/2021 for complaint of nausea without emesis. Patient was found to be in sinus tachycardia at a rate of 108 that encounter, lab work from that encounter was reviewed, Covid rapid was negative, mild hypokalemia, negative urinalysis. Patient presents today complaining of lower extremity swelling. Patient states that she has a history of this but is never been \"this bad \". Patient states this started overnight, is bilateral lower extremities. She denies chest pain palpitations shortness of breath new cough fevers chills abdominal pain. Patient complaining of persistent nausea which occurred several days prior to her initial visit on 1/11. Patient also reports difficulty ambulating. Patient is somewhat a poor historian in relation to timing of events and symptoms. She states that she feels off balance at times. Does ambulate with assistance of a walker. Patient's difficulty ambulating may be secondary to her new swelling according to her. Patient denies headache or vision change. Does complain of persistent nausea without emesis. She is taking antiemetics at home which have been helping. Denies weight loss or weight gain. Nursing Notes were reviewed.     PAST MEDICAL HISTORY     Past Medical History:   Diagnosis Date    [facility]     Asthma     Back ache     Cataract     Cerebral artery occlusion with cerebral infarction ([facility] 2016    Diabetes mellitus ([facility]     type !! - diet controlled    Hyperlipidemia     Hypertension     Insomnia     TIA (transient ischemic attack) 2016         SURGICAL HISTORY       Past Surgical History:   Procedure Laterality Date    ABDOMEN SURGERY      c section    CATARACT REMOVAL WITH IMPLANT Left [id number]    LEFT EYE CATARACT PHACOEMULSIFICATION INTRAOCULAR LENS     SECTION      DENTAL SURGERY      EYE SURGERY  10/29/13     RIGHT EYE CATARACT PHACOEMULSIFICATION INTRAOCULAR LENS         CURRENT MEDICATIONS       Previous Medications    ALBUTEROL SULFATE  (90 BASE) MCG/ACT INHALER    Inhale 2 puffs into the lungs every 6 hours as needed for Wheezing    ASPIRIN 81 MG EC TABLET    Take 1 tablet by mouth daily    ATORVASTATIN (LIPITOR) 40 MG TABLET    Take 1 tablet by mouth nightly    BLOOD PRESSURE KIT    1 each by Does not apply route daily    CHOLECALCIFEROL (VITAMIN D-3) 5000 UNITS TABS    Take  by mouth. CLONIDINE (CATAPRES) 0.2 MG TABLET    Take 1 tablet by mouth 3 times daily as needed (Systolic TD>801)    FISH OIL    by Does not apply route.       FLUTICASONE (FLONASE) 50 MCG/ACT NASAL SPRAY    1 spray by Nasal route daily    FLUTICASONE-SALMETEROL (ADVAIR DISKUS) 500-50 MCG/DOSE DISKUS INHALER    Inhale 1 puff into the lungs every 12 hours    FOLIC ACID (FOLVITE) 1 MG TABLET    Take 1 tablet by mouth daily    INSULIN GLARGINE (LANTUS) 100 UNIT/ML INJECTION VIAL    Inject 10 Units into the skin nightly    IPRATROPIUM-ALBUTEROL (DUONEB) 0.5-2.5 (3) MG/3ML SOLN NEBULIZER SOLUTION    Inhale 3 mLs into the lungs every 6 hours as needed for Shortness of Breath    LEVOTHYROXINE (SYNTHROID) 25 MCG TABLET    Take 1 tablet by mouth Daily    LISINOPRIL (PRINIVIL;ZESTRIL) 20 MG TABLET    Take 1 tablet by mouth daily    METFORMIN (GLUCOPHAGE) 500 MG TABLET    Take 500 mg by mouth    METOPROLOL SUCCINATE (TOPROL XL) 50 MG EXTENDED RELEASE TABLET    Take 1 tablet by mouth daily    MULTIPLE VITAMIN (THERAPEUTIC) TABS TABLET    Take 1 tablet by mouth daily    NAPROXEN (NAPROSYN) 500 MG TABLET    Take 1 tablet by mouth 2 times daily for 5 days    NICOTINE (NICODERM CQ) 21 MG/24HR    Place 1 patch onto the skin daily    OMEPRAZOLE (PRILOSEC) 20 MG CAPSULE    Take 1 capsule by mouth daily    ONDANSETRON (ZOFRAN-ODT) 4 MG DISINTEGRATING TABLET    Take 1 tablet by mouth 3 times daily as needed for Nausea or Vomiting    PAROXETINE (PAXIL) 40 MG TABLET    Take 1 tablet by mouth every morning    POLYETHYLENE GLYCOL-ELECTROLYTES (TRILYTE) 420 G SOLUTION    Follow Package Instructions unless otherwise directed by physician. POTASSIUM CHLORIDE (KLOR-CON M) 10 MEQ EXTENDED RELEASE TABLET    Take 1 tablet by mouth 2 times daily for 7 days    TIOTROPIUM (SPIRIVA RESPIMAT) 2.5 MCG/ACT AERS INHALER    Inhale 2 puffs into the lungs daily    VITAMIN B-1 (THIAMINE) 100 MG TABLET    Take 1 tablet by mouth daily    VITAMIN B-12 (CYANOCOBALAMIN) 1000 MCG TABLET    Take 1,000 mcg by mouth daily.          ALLERGIES     Mold extract [trichophyton mentagrophytes]    FAMILY HISTORY       Family History   Problem Relation Age of Onset    High Blood Pressure Mother     Miscarriages / [place] Mother     Cancer Father     Diabetes Father     Heart Disease Father     Kidney Disease Father     Asthma Sister     Cancer Sister         Breast Cancer    Depression Brother     Substance Abuse Paternal Aunt     Stroke Maternal Grandmother     Diabetes Paternal Grandmother           SOCIAL HISTORY       Social History     Socioeconomic History    Marital status:      Spouse name: Marta José Miguel Number of children: None    Years of education: None    Highest education level: None   Occupational History    None   Social Needs    Financial resource strain: None    Food insecurity     Worry: None     Inability: None    Transportation needs     Medical: None     Non-medical: None   Tobacco Use    Smoking status: Current Every Day Smoker     Packs/day: 2.00     Years: 58.00     Pack years: 116.00     Types: Cigarettes    Smokeless tobacco: Never Used    Tobacco comment: not totally ready to quit today   Substance and Sexual Activity    Alcohol use: Yes     Alcohol/week: 0.0 standard drinks     Types: 3 - 4 Standard drinks or equivalent per week     Comment: 1-2 hot toddy every night     Drug use: No    Sexual activity: Yes     Partners: Male   Lifestyle    Physical activity     Days per week: None     Minutes per session: None    Stress: None   Relationships    Social connections     Talks on phone: None     Gets together: None     Attends Bahai service: None     Active member of club or organization: None     Attends meetings of clubs or organizations: None     Relationship status: None    Intimate partner violence     Fear of current or ex partner: None     Emotionally abused: None     Physically abused: None     Forced sexual activity: None   Other Topics Concern    None   Social History Narrative    None       SCREENINGS                            REVIEW OF SYSTEMS    (2-9 systems for level 4, 10 or more for level 5)   Review of Systems   Constitutional: Negative for chills and fever. HENT: Negative for congestion, rhinorrhea and sore throat. Eyes: Negative for photophobia and visual disturbance. Respiratory: Negative for cough, shortness of breath and stridor. Cardiovascular: Positive for leg swelling. Negative for chest pain and palpitations. Gastrointestinal: Positive for nausea. Negative for abdominal pain and vomiting. Genitourinary: Negative for decreased urine volume. Musculoskeletal: Negative for back pain, neck pain and neck stiffness. Skin: Negative for rash. Allergic/Immunologic: Negative for environmental allergies.    Neurological:        Feeling off balance   Hematological: Negative for adenopathy. Psychiatric/Behavioral: Negative for confusion. PHYSICAL EXAM    (up to 7 for level 4, 8 or more for level 5)     ED Triage Vitals [21 1354]   BP Temp Temp Source Pulse Resp SpO2 Height Weight   (!) 145/73 97 °F (36.1 °C) Oral 104 18 96 % -- --       Physical Exam  Constitutional:       General: She is not in acute distress. Appearance: She is not diaphoretic. HENT:      Head: Normocephalic and atraumatic. Eyes:      Pupils: Pupils are equal, round, and reactive to light. Neck:      Musculoskeletal: Normal range of motion and neck supple. Trachea: No tracheal deviation. Cardiovascular:      Rate and Rhythm: Normal rate and regular rhythm. Pulmonary:      Effort: Pulmonary effort is normal. No respiratory distress. Abdominal:      General: There is no distension. Palpations: Abdomen is soft. Musculoskeletal: Normal range of motion. Right lower leg: Edema present. Left lower leg: Edema present. Skin:     General: Skin is warm. Neurological:      General: No focal deficit present. Mental Status: She is oriented to person, place, and time. Comments: INITIAL NIH STROKE SCALE    Time Performed:  0007 PM     1a. Level of consciousness:  0 - alert; keenly responsive  1b. Level of consciousness questions:  0 - answers both questions correctly  1c. Level of consciousness questions:  0 - performs both tasks correctly  2. Best Gaze:  0 - normal  3. Visual:  0 - no visual loss  4. Facial Palsy:  0 - normal symmetric movement  5a. Motor left arm:  0 - no drift, limb holds 90 (or 45) degrees for full 10 seconds  5b. Motor right arm:  0 - no drift, limb holds 90 (or 45) degrees for full 10 seconds  6a. Motor left le - no drift; leg holds 30 degree position for full 5 seconds  6b. Motor right le - no drift; leg holds 30 degree position for full 5 seconds  7. Limb Ataxia:  0 - absent  8. Sensory:  0 - normal; no sensory loss  9. Best Language:  0 - no aphasia, normal  10. Dysarthria:  0 - normal  11. Extinction and Inattention:  0 - no abnormality    TOTAL:  0    Normal finger-nose testing, heel shin testing    There is no nystagmus, no abnormal test of skew         DIAGNOSTIC RESULTS     EKG: All EKG's are interpreted by the Emergency Department Physician who either signs or Co-signs this chart in the absence of a cardiologist.      The Ekg interpreted by me shows  normal sinus rhythm and With occasional PVC with a rate of 89  Axis is   Normal  QTc is  normal  Intervals and Durations are unremarkable. ST Segments: no acute change    No significant change from prior EKG dated September 25, 2020        RADIOLOGY:   Non-plain film images such as CT, Ultrasound and MRI are read by the radiologist. Plain radiographic images are visualized and preliminarily interpreted by the emergency physician. Interpretation per the Radiologist below, if available at the time of this note:    CT CHEST PULMONARY EMBOLISM W CONTRAST   Final Result   1. Artifact versus subacute pulmonary embolism in the segmental to   subsegmental right lower lobe. 2. Other findings as described. CT HEAD WO CONTRAST   Final Result   No acute intracranial abnormality. No significant change from prior study         XR CHEST PORTABLE   Final Result   No acute cardiopulmonary abnormality.                LABS:  Labs Reviewed   CBC WITH AUTO DIFFERENTIAL - Abnormal; Notable for the following components:       Result Value    Hemoglobin 9.4 (*)     Hematocrit 30.5 (*)     MCV 74.4 (*)     MCH 22.9 (*)     MCHC 30.7 (*)     RDW 19.3 (*)     Platelets 331 (*)     All other components within normal limits    Narrative:     Performed at:  [facility],  [place], [facility]   Phone [phone]   COMPREHENSIVE METABOLIC PANEL W/ REFLEX TO MG FOR LOW K - Abnormal; Notable for the following components:    Chloride 98 (*)     Glucose 135 (*)     GFR Non- 54 (*)     ALT 8 (*)     AST 13 (*)     All other components within normal limits    Narrative:     Performed at:  [facility],  [place], [place]   Phone [phone]   D-DIMER, QUANTITATIVE - Abnormal; Notable for the following components:    D-Dimer, Quant 339 (*)     All other components within normal limits    Narrative:     Performed at:  [facility],  [place], [place]   Phone [phone]   TROPONIN    Narrative:     Performed at:  [facility],  [place], [place]   Phone [phone]   BRAIN NATRIURETIC PEPTIDE    Narrative:     Performed at:  [facility],  [place], [place]   Phone [phone]   TSH WITH REFLEX    Narrative:     Performed at:  [facility],  [place], [place]   Phone [phone]       All other labs were within normal range or not returned as of this dictation. EMERGENCY DEPARTMENT COURSE and DIFFERENTIAL DIAGNOSIS/MDM:   Feng Vega is a 77 y.o. female who presents to the emergency department with the complaint of 2 specific complaints. One is nausea this has been ongoing for roughly 1 week. Has been previously evaluated for this abnormal laboratory work-up at that time. On my evaluation patient appears to be somewhat of a poor historian however she does note that she intermittently having episodes where she feels like she has trouble walking where she states that she feels \"off balance \". On neuro exam in room pupils are equal reactive EOMI, nose nystagmus no abnormal test of skew normal and symmetric strength and sensation upper lower extremities normal finger-nose testing NIH is zero.  Lower suspicion for acute stroke, due to length of symptoms patient will be outside of treatment window. Due to persistent nausea and feeling off balance will screen with CT head. Will repeat basic lab work including cardiac studies. On exam patient has slightly increased right over left edema. Will check a D-dimer she was slightly tachycardic on arrival however denying chest pain or shortness of breath complaints. Patient had episodes of desats to the upper 80s, history of COPD and CHF, current smoker, not on oxygen at baseline, patient was placed on 2 L with improvement of sats. There is an area on patient CT PE study concerning for possible subacute PE. At times patient has been tachycardic and hypoxic here will start patient on Eliquis, no signs of RV elation on CT imaging. Due to risk factors and need for oxygen here patient to be admitted to hospital.  Patient does not seem to have a good understanding of current medical condition. Otherwise patient's blood work reviewed no signs of of leukocytosis, troponin less than 0.01 minimally elevated BNP at 405. CRITICAL CARE TIME   Total Critical Care time was 0 minutes, excluding separately reportable procedures. There was a high probability of clinically significant/life threatening deterioration in the patient's condition which required my urgent intervention. Clinical concern   Intervention     CONSULTS:  IP CONSULT TO HOSPITALIST    PROCEDURES:  Unless otherwise noted below, none     Procedures        FINAL IMPRESSION      1. Pulmonary embolism without acute cor pulmonale, unspecified chronicity, unspecified pulmonary embolism type [facility])          DISPOSITION/PLAN   DISPOSITION Decision To Admit 01/16/2021 08:03:26 PM      PATIENT REFERRED TO:  No follow-up provider specified. DISCHARGE MEDICATIONS:  New Prescriptions    No medications on file     Controlled Substances Monitoring:     No flowsheet data found.     (Please note that portions of this note were completed with a voice recognition program.  Efforts were made to edit the dictations but occasionally words are mis-transcribed.)    Edie Parra DO (electronically signed)  Attending Emergency Physician            Edie Parra DO  01/16/21 2006

## 2021-01-17 ENCOUNTER — APPOINTMENT (OUTPATIENT)
Dept: NUCLEAR MEDICINE | Age: 76
DRG: 292 | End: 2021-01-17
Payer: MEDICARE

## 2021-01-17 LAB
ANION GAP SERPL CALCULATED.3IONS-SCNC: 10 MMOL/L (ref 3–16)
BASOPHILS ABSOLUTE: 0.2 K/UL (ref 0–0.2)
BASOPHILS RELATIVE PERCENT: 3.1 %
BUN BLDV-MCNC: 11 MG/DL (ref 7–20)
CALCIUM SERPL-MCNC: 9.6 MG/DL (ref 8.3–10.6)
CHLORIDE BLD-SCNC: 100 MMOL/L (ref 99–110)
CO2: 28 MMOL/L (ref 21–32)
CREAT SERPL-MCNC: 0.8 MG/DL (ref 0.6–1.2)
EOSINOPHILS ABSOLUTE: 0.1 K/UL (ref 0–0.6)
EOSINOPHILS RELATIVE PERCENT: 1.5 %
ETHANOL: NORMAL MG/DL (ref 0–0.08)
FERRITIN: 13.7 NG/ML (ref 15–150)
FOLATE: >20 NG/ML (ref 4.78–24.2)
GFR AFRICAN AMERICAN: >60
GFR NON-AFRICAN AMERICAN: >60
GLUCOSE BLD-MCNC: 120 MG/DL (ref 70–99)
GLUCOSE BLD-MCNC: 135 MG/DL (ref 70–99)
GLUCOSE BLD-MCNC: 143 MG/DL (ref 70–99)
GLUCOSE BLD-MCNC: 153 MG/DL (ref 70–99)
GLUCOSE BLD-MCNC: 165 MG/DL (ref 70–99)
HCT VFR BLD CALC: 29.1 % (ref 36–48)
HEMOGLOBIN: 8.8 G/DL (ref 12–16)
IRON SATURATION: 6 % (ref 15–50)
IRON: 22 UG/DL (ref 37–145)
LYMPHOCYTES ABSOLUTE: 1.7 K/UL (ref 1–5.1)
LYMPHOCYTES RELATIVE PERCENT: 24.2 %
MCH RBC QN AUTO: 22.5 PG (ref 26–34)
MCHC RBC AUTO-ENTMCNC: 30.3 G/DL (ref 31–36)
MCV RBC AUTO: 74.3 FL (ref 80–100)
MONOCYTES ABSOLUTE: 0.8 K/UL (ref 0–1.3)
MONOCYTES RELATIVE PERCENT: 10.9 %
NEUTROPHILS ABSOLUTE: 4.2 K/UL (ref 1.7–7.7)
NEUTROPHILS RELATIVE PERCENT: 60.3 %
PDW BLD-RTO: 19.4 % (ref 12.4–15.4)
PERFORMED ON: ABNORMAL
PLATELET # BLD: 575 K/UL (ref 135–450)
PMV BLD AUTO: 7.2 FL (ref 5–10.5)
POTASSIUM REFLEX MAGNESIUM: 4 MMOL/L (ref 3.5–5.1)
RBC # BLD: 3.92 M/UL (ref 4–5.2)
SODIUM BLD-SCNC: 138 MMOL/L (ref 136–145)
TOTAL IRON BINDING CAPACITY: 355 UG/DL (ref 260–445)
VITAMIN B-12: 960 PG/ML (ref 211–911)
WBC # BLD: 7 K/UL (ref 4–11)

## 2021-01-17 PROCEDURE — 82746 ASSAY OF FOLIC ACID SERUM: CPT

## 2021-01-17 PROCEDURE — 6370000000 HC RX 637 (ALT 250 FOR IP): Performed by: INTERNAL MEDICINE

## 2021-01-17 PROCEDURE — 2580000003 HC RX 258: Performed by: HOSPITALIST

## 2021-01-17 PROCEDURE — 6370000000 HC RX 637 (ALT 250 FOR IP): Performed by: HOSPITALIST

## 2021-01-17 PROCEDURE — 94761 N-INVAS EAR/PLS OXIMETRY MLT: CPT

## 2021-01-17 PROCEDURE — 3430000000 HC RX DIAGNOSTIC RADIOPHARMACEUTICAL: Performed by: INTERNAL MEDICINE

## 2021-01-17 PROCEDURE — 80048 BASIC METABOLIC PNL TOTAL CA: CPT

## 2021-01-17 PROCEDURE — 78580 LUNG PERFUSION IMAGING: CPT

## 2021-01-17 PROCEDURE — A9540 TC99M MAA: HCPCS | Performed by: INTERNAL MEDICINE

## 2021-01-17 PROCEDURE — 6360000002 HC RX W HCPCS: Performed by: HOSPITALIST

## 2021-01-17 PROCEDURE — 83036 HEMOGLOBIN GLYCOSYLATED A1C: CPT

## 2021-01-17 PROCEDURE — G0480 DRUG TEST DEF 1-7 CLASSES: HCPCS

## 2021-01-17 PROCEDURE — 83550 IRON BINDING TEST: CPT

## 2021-01-17 PROCEDURE — 83540 ASSAY OF IRON: CPT

## 2021-01-17 PROCEDURE — 85025 COMPLETE CBC W/AUTO DIFF WBC: CPT

## 2021-01-17 PROCEDURE — 99223 1ST HOSP IP/OBS HIGH 75: CPT | Performed by: INTERNAL MEDICINE

## 2021-01-17 PROCEDURE — 2700000000 HC OXYGEN THERAPY PER DAY

## 2021-01-17 PROCEDURE — 82607 VITAMIN B-12: CPT

## 2021-01-17 PROCEDURE — 6360000002 HC RX W HCPCS: Performed by: INTERNAL MEDICINE

## 2021-01-17 PROCEDURE — 2060000000 HC ICU INTERMEDIATE R&B

## 2021-01-17 PROCEDURE — 94640 AIRWAY INHALATION TREATMENT: CPT

## 2021-01-17 PROCEDURE — 82728 ASSAY OF FERRITIN: CPT

## 2021-01-17 RX ORDER — ATORVASTATIN CALCIUM 40 MG/1
40 TABLET, FILM COATED ORAL NIGHTLY
Status: DISCONTINUED | OUTPATIENT
Start: 2021-01-17 | End: 2021-01-19 | Stop reason: HOSPADM

## 2021-01-17 RX ORDER — SODIUM CHLORIDE 0.9 % (FLUSH) 0.9 %
10 SYRINGE (ML) INJECTION PRN
Status: DISCONTINUED | OUTPATIENT
Start: 2021-01-17 | End: 2021-01-19 | Stop reason: HOSPADM

## 2021-01-17 RX ORDER — ACETAMINOPHEN 325 MG/1
650 TABLET ORAL EVERY 6 HOURS PRN
Status: DISCONTINUED | OUTPATIENT
Start: 2021-01-17 | End: 2021-01-19 | Stop reason: HOSPADM

## 2021-01-17 RX ORDER — DEXTROSE MONOHYDRATE 50 MG/ML
100 INJECTION, SOLUTION INTRAVENOUS PRN
Status: DISCONTINUED | OUTPATIENT
Start: 2021-01-17 | End: 2021-01-19 | Stop reason: HOSPADM

## 2021-01-17 RX ORDER — DEXTROSE MONOHYDRATE 25 G/50ML
12.5 INJECTION, SOLUTION INTRAVENOUS PRN
Status: DISCONTINUED | OUTPATIENT
Start: 2021-01-17 | End: 2021-01-19 | Stop reason: HOSPADM

## 2021-01-17 RX ORDER — INSULIN GLARGINE 100 [IU]/ML
10 INJECTION, SOLUTION SUBCUTANEOUS NIGHTLY
Status: DISCONTINUED | OUTPATIENT
Start: 2021-01-17 | End: 2021-01-19 | Stop reason: HOSPADM

## 2021-01-17 RX ORDER — CLONIDINE HYDROCHLORIDE 0.1 MG/1
0.2 TABLET ORAL 3 TIMES DAILY PRN
Status: DISCONTINUED | OUTPATIENT
Start: 2021-01-17 | End: 2021-01-19 | Stop reason: HOSPADM

## 2021-01-17 RX ORDER — ONDANSETRON 4 MG/1
4 TABLET, ORALLY DISINTEGRATING ORAL 3 TIMES DAILY PRN
Status: DISCONTINUED | OUTPATIENT
Start: 2021-01-17 | End: 2021-01-19 | Stop reason: HOSPADM

## 2021-01-17 RX ORDER — CHOLECALCIFEROL (VITAMIN D3) 125 MCG
1000 CAPSULE ORAL DAILY
Status: DISCONTINUED | OUTPATIENT
Start: 2021-01-17 | End: 2021-01-17

## 2021-01-17 RX ORDER — PROMETHAZINE HYDROCHLORIDE 25 MG/1
12.5 TABLET ORAL EVERY 6 HOURS PRN
Status: DISCONTINUED | OUTPATIENT
Start: 2021-01-17 | End: 2021-01-19 | Stop reason: HOSPADM

## 2021-01-17 RX ORDER — M-VIT,TX,IRON,MINS/CALC/FOLIC 27MG-0.4MG
1 TABLET ORAL DAILY
Status: DISCONTINUED | OUTPATIENT
Start: 2021-01-17 | End: 2021-01-19 | Stop reason: HOSPADM

## 2021-01-17 RX ORDER — ACETAMINOPHEN 650 MG/1
650 SUPPOSITORY RECTAL EVERY 6 HOURS PRN
Status: DISCONTINUED | OUTPATIENT
Start: 2021-01-17 | End: 2021-01-19 | Stop reason: HOSPADM

## 2021-01-17 RX ORDER — POLYETHYLENE GLYCOL 3350 17 G/17G
17 POWDER, FOR SOLUTION ORAL DAILY PRN
Status: DISCONTINUED | OUTPATIENT
Start: 2021-01-17 | End: 2021-01-19 | Stop reason: HOSPADM

## 2021-01-17 RX ORDER — IPRATROPIUM BROMIDE AND ALBUTEROL SULFATE 2.5; .5 MG/3ML; MG/3ML
1 SOLUTION RESPIRATORY (INHALATION) EVERY 6 HOURS PRN
Status: DISCONTINUED | OUTPATIENT
Start: 2021-01-17 | End: 2021-01-19 | Stop reason: HOSPADM

## 2021-01-17 RX ORDER — PAROXETINE HYDROCHLORIDE 20 MG/1
40 TABLET, FILM COATED ORAL EVERY MORNING
Status: DISCONTINUED | OUTPATIENT
Start: 2021-01-17 | End: 2021-01-17

## 2021-01-17 RX ORDER — CHLORDIAZEPOXIDE HYDROCHLORIDE 5 MG/1
5 CAPSULE, GELATIN COATED ORAL 3 TIMES DAILY
Status: DISCONTINUED | OUTPATIENT
Start: 2021-01-17 | End: 2021-01-19 | Stop reason: HOSPADM

## 2021-01-17 RX ORDER — NICOTINE POLACRILEX 4 MG
15 LOZENGE BUCCAL PRN
Status: DISCONTINUED | OUTPATIENT
Start: 2021-01-17 | End: 2021-01-19 | Stop reason: HOSPADM

## 2021-01-17 RX ORDER — FOLIC ACID 1 MG/1
1 TABLET ORAL DAILY
Status: DISCONTINUED | OUTPATIENT
Start: 2021-01-17 | End: 2021-01-17

## 2021-01-17 RX ORDER — PANTOPRAZOLE SODIUM 40 MG/1
40 TABLET, DELAYED RELEASE ORAL
Status: DISCONTINUED | OUTPATIENT
Start: 2021-01-17 | End: 2021-01-19 | Stop reason: HOSPADM

## 2021-01-17 RX ORDER — LISINOPRIL 20 MG/1
20 TABLET ORAL DAILY
Status: DISCONTINUED | OUTPATIENT
Start: 2021-01-17 | End: 2021-01-19 | Stop reason: HOSPADM

## 2021-01-17 RX ORDER — METOPROLOL SUCCINATE 50 MG/1
50 TABLET, EXTENDED RELEASE ORAL DAILY
Status: DISCONTINUED | OUTPATIENT
Start: 2021-01-17 | End: 2021-01-19 | Stop reason: HOSPADM

## 2021-01-17 RX ORDER — ASPIRIN 81 MG/1
81 TABLET ORAL DAILY
Status: DISCONTINUED | OUTPATIENT
Start: 2021-01-17 | End: 2021-01-19 | Stop reason: HOSPADM

## 2021-01-17 RX ORDER — FLUTICASONE PROPIONATE 50 MCG
1 SPRAY, SUSPENSION (ML) NASAL DAILY
Status: DISCONTINUED | OUTPATIENT
Start: 2021-01-17 | End: 2021-01-19 | Stop reason: HOSPADM

## 2021-01-17 RX ORDER — LEVOTHYROXINE SODIUM 0.03 MG/1
25 TABLET ORAL DAILY
Status: DISCONTINUED | OUTPATIENT
Start: 2021-01-17 | End: 2021-01-19 | Stop reason: HOSPADM

## 2021-01-17 RX ORDER — PAROXETINE HYDROCHLORIDE 20 MG/1
20 TABLET, FILM COATED ORAL EVERY MORNING
Status: DISCONTINUED | OUTPATIENT
Start: 2021-01-18 | End: 2021-01-19 | Stop reason: HOSPADM

## 2021-01-17 RX ORDER — SODIUM CHLORIDE 0.9 % (FLUSH) 0.9 %
10 SYRINGE (ML) INJECTION EVERY 12 HOURS SCHEDULED
Status: DISCONTINUED | OUTPATIENT
Start: 2021-01-17 | End: 2021-01-19 | Stop reason: HOSPADM

## 2021-01-17 RX ORDER — VITAMIN B COMPLEX
5000 TABLET ORAL DAILY
Status: DISCONTINUED | OUTPATIENT
Start: 2021-01-17 | End: 2021-01-19 | Stop reason: HOSPADM

## 2021-01-17 RX ORDER — ALBUTEROL SULFATE 90 UG/1
2 AEROSOL, METERED RESPIRATORY (INHALATION) EVERY 6 HOURS PRN
Status: DISCONTINUED | OUTPATIENT
Start: 2021-01-17 | End: 2021-01-19 | Stop reason: HOSPADM

## 2021-01-17 RX ORDER — BUDESONIDE AND FORMOTEROL FUMARATE DIHYDRATE 160; 4.5 UG/1; UG/1
2 AEROSOL RESPIRATORY (INHALATION) 2 TIMES DAILY
Status: DISCONTINUED | OUTPATIENT
Start: 2021-01-17 | End: 2021-01-19 | Stop reason: HOSPADM

## 2021-01-17 RX ORDER — ONDANSETRON 2 MG/ML
4 INJECTION INTRAMUSCULAR; INTRAVENOUS EVERY 6 HOURS PRN
Status: DISCONTINUED | OUTPATIENT
Start: 2021-01-17 | End: 2021-01-19 | Stop reason: HOSPADM

## 2021-01-17 RX ORDER — LANOLIN ALCOHOL/MO/W.PET/CERES
100 CREAM (GRAM) TOPICAL DAILY
Status: DISCONTINUED | OUTPATIENT
Start: 2021-01-17 | End: 2021-01-17

## 2021-01-17 RX ORDER — NICOTINE 21 MG/24HR
1 PATCH, TRANSDERMAL 24 HOURS TRANSDERMAL DAILY
Status: DISCONTINUED | OUTPATIENT
Start: 2021-01-17 | End: 2021-01-19 | Stop reason: HOSPADM

## 2021-01-17 RX ADMIN — Medication 5000 UNITS: at 08:31

## 2021-01-17 RX ADMIN — Medication 2 PUFF: at 11:43

## 2021-01-17 RX ADMIN — METOPROLOL SUCCINATE 50 MG: 50 TABLET, EXTENDED RELEASE ORAL at 08:31

## 2021-01-17 RX ADMIN — Medication 10 ML: at 21:14

## 2021-01-17 RX ADMIN — INSULIN LISPRO 1 UNITS: 100 INJECTION, SOLUTION INTRAVENOUS; SUBCUTANEOUS at 08:38

## 2021-01-17 RX ADMIN — ONDANSETRON HYDROCHLORIDE 4 MG: 2 INJECTION, SOLUTION INTRAMUSCULAR; INTRAVENOUS at 12:25

## 2021-01-17 RX ADMIN — Medication 10 ML: at 12:25

## 2021-01-17 RX ADMIN — INSULIN LISPRO 1 UNITS: 100 INJECTION, SOLUTION INTRAVENOUS; SUBCUTANEOUS at 17:06

## 2021-01-17 RX ADMIN — CLONIDINE HYDROCHLORIDE 0.2 MG: 0.1 TABLET ORAL at 20:22

## 2021-01-17 RX ADMIN — APIXABAN 10 MG: 5 TABLET, FILM COATED ORAL at 08:31

## 2021-01-17 RX ADMIN — FOLIC ACID 1 MG: 1 TABLET ORAL at 08:31

## 2021-01-17 RX ADMIN — PAROXETINE HYDROCHLORIDE 40 MG: 20 TABLET, FILM COATED ORAL at 08:31

## 2021-01-17 RX ADMIN — ASPIRIN 81 MG: 81 TABLET, COATED ORAL at 08:31

## 2021-01-17 RX ADMIN — Medication 100 MG: at 08:31

## 2021-01-17 RX ADMIN — LEVOTHYROXINE SODIUM 25 MCG: 25 TABLET ORAL at 06:15

## 2021-01-17 RX ADMIN — Medication 6 MILLICURIE: at 14:45

## 2021-01-17 RX ADMIN — PANTOPRAZOLE SODIUM 40 MG: 40 TABLET, DELAYED RELEASE ORAL at 06:15

## 2021-01-17 RX ADMIN — ATORVASTATIN CALCIUM 40 MG: 40 TABLET, FILM COATED ORAL at 20:22

## 2021-01-17 RX ADMIN — MULTIPLE VITAMINS W/ MINERALS TAB 1 TABLET: TAB at 08:31

## 2021-01-17 RX ADMIN — TIOTROPIUM BROMIDE INHALATION SPRAY 2 PUFF: 3.12 SPRAY, METERED RESPIRATORY (INHALATION) at 11:43

## 2021-01-17 RX ADMIN — CLONIDINE HYDROCHLORIDE 0.2 MG: 0.1 TABLET ORAL at 13:51

## 2021-01-17 RX ADMIN — Medication 10 ML: at 09:50

## 2021-01-17 RX ADMIN — LISINOPRIL 20 MG: 20 TABLET ORAL at 08:31

## 2021-01-17 RX ADMIN — CYANOCOBALAMIN TAB 500 MCG 1000 MCG: 500 TAB at 09:51

## 2021-01-17 RX ADMIN — CHLORDIAZEPOXIDE HYDROCHLORIDE 5 MG: 5 CAPSULE ORAL at 13:46

## 2021-01-17 RX ADMIN — CHLORDIAZEPOXIDE HYDROCHLORIDE 5 MG: 5 CAPSULE ORAL at 20:22

## 2021-01-17 RX ADMIN — ENOXAPARIN SODIUM 70 MG: 80 INJECTION SUBCUTANEOUS at 17:05

## 2021-01-17 RX ADMIN — Medication 2 PUFF: at 19:18

## 2021-01-17 RX ADMIN — POLYETHYLENE GLYCOL (3350) 17 G: 17 POWDER, FOR SOLUTION ORAL at 17:34

## 2021-01-17 NOTE — PROGRESS NOTES
Updated  on plan of care for patient,  given room telephone number and will call at  to speak to patient.  Cj Soto RN

## 2021-01-17 NOTE — H&P
Admission H & P    Mik Hooker;  MRN: [id number] ;   Admit Date: 2021  2:57 PM   Current Date and Time: 2021 9:43 AM    PCP  --  Isabela Gerber MD         Chief Complaint   Patient presents with    Foot Swelling    Nausea         HPI:  Patient is a 77 y.o.  female  With known h.o  Copd, HTN, chronic systolic CHF, DM, HTN, hyperlipdemia, alcohol abuse  presents with increasing lower ext discomfort and edema     Pt is a very poor historian and appears slightly confused  She appears to be alert , oriented to place and person but does not elaborate her symptoms properly     As per ER she was recently seen  Last week for nausea and was treated symptomatically. Again comes back yesterday for nausea and marino foot swelling. She is unable to tell me how long she has foot swelling but has marino knee arthritis and recently had some steroid injections in knee. Denies any pain and her swelling disappeared today since she is sitting up and elevated her legs. No chest pain or sob or cough. Reports smoking hx and admits to drinking daily . Workup showed possible PE on CTA chest and was admitted for eval  Started on eliquis.  Remains on 2 L oxygen which is now weaned off      Allergies   Allergen Reactions    Mold Extract [Trichophyton Mentagrophytes]        Past Medical History:   Diagnosis Date    Acute systolic CHF (congestive heart failure) (HCC)     Asthma     Back ache     Cataract     Cerebral artery occlusion with cerebral infarction ([address] 2016    Diabetes mellitus ([address]     type !! - diet controlled    Hyperlipidemia     Hypertension     Insomnia     TIA (transient ischemic attack) 2016      Past Surgical History:   Procedure Laterality Date    ABDOMEN SURGERY      c section    CATARACT REMOVAL WITH IMPLANT Left [id number]    LEFT EYE CATARACT PHACOEMULSIFICATION INTRAOCULAR LENS     SECTION      DENTAL SURGERY      EYE SURGERY  10/29/13     RIGHT EYE CATARACT PHACOEMULSIFICATION INTRAOCULAR LENS      Not in a hospital admission. Allergies   Allergen Reactions    Mold Extract [Trichophyton Mentagrophytes]       Social History     Tobacco Use    Smoking status: Current Every Day Smoker     Packs/day: 2.00     Years: 58.00     Pack years: 116.00     Types: Cigarettes    Smokeless tobacco: Never Used    Tobacco comment: not totally ready to quit today   Substance Use Topics    Alcohol use: Yes     Alcohol/week: 0.0 standard drinks     Types: 3 - 4 Standard drinks or equivalent per week     Comment: 1-2 hot toddy every night       Family History   Problem Relation Age of Onset    High Blood Pressure Mother    [de-identified] / [place] Mother     Cancer Father     Diabetes Father     Heart Disease Father     Kidney Disease Father     Asthma Sister     Cancer Sister         Breast Cancer    Depression Brother     Substance Abuse Paternal Aunt     Stroke Maternal Grandmother     Diabetes Paternal Grandmother           Review of systems    Very poor historian and see above    Objective:     VS: Temp: 97 °F (36.1 °C)  Pulse: 104  BP: (!) 145/73  SpO2: 96 %  O2 Flow Rate (L/min): 2 L/min        Physical Exam:  General:  Elderly female, slightly confused, oriented to place and person only   Awake,  Appears to be not in any distress  Mucous Membranes:  Pink , anicteric  Neck: No JVD, no carotid bruit, no thyromegaly  Chest:  Clear to auscultation bilaterally, diminished in bases   Cardiovascular:  RRR S1S2 heard, no murmurs or gallops  Abdomen:  Soft, undistended, non tender, no organomegaly, BS present  Extremities: trace edema to both LE . No calf tenderness .  Distal pulses well felt  Neurological : no focal deficits        LABS:  CBC:  Lab Results   Component Value Date    WBC 7.0 01/17/2021    HGB 8.8 01/17/2021    HCT 29.1 01/17/2021    MCV 74.3 01/17/2021     01/17/2021    NEUTOPHILPCT 60.3 01/17/2021    LYMPHOPCT 24.2 01/17/2021    MONOPCT 10.9 01/17/2021    BASOPCT 3.1 01/17/2021    NEUTROABS 4.2 01/17/2021    LYMPHSABS 1.7 01/17/2021    MONOSABS 0.8 01/17/2021    EOSABS 0.1 01/17/2021    BASOSABS 0.2 01/17/2021     BMP:   Lab Results   Component Value Date     01/17/2021    K 4.0 01/17/2021    CO2 28 01/17/2021    BUN 11 01/17/2021    CREATININE 0.8 01/17/2021    CALCIUM 9.6 01/17/2021     Coagulation:   Lab Results   Component Value Date    INR 0.96 08/23/2020    APTT 29.7 02/28/2020     Cardiac markers:   Lab Results   Component Value Date    CKTOTAL 114 10/16/2017    TROPONINI <0.01 01/16/2021     ABGs: No results found for: POCPH, POCPCO2, POCPO2, POCHCO3, POCTCO2, POCFIO2    Lab Results   Component Value Date    ALT 8 (L) 01/16/2021    AST 13 (L) 01/16/2021    ALKPHOS 88 01/16/2021    BILITOT <0.2 01/16/2021       Lab Results   Component Value Date    INR 0.96 08/23/2020    INR 1.08 02/28/2020    INR 0.93 03/20/2017    PROTIME 11.1 08/23/2020    PROTIME 12.5 02/28/2020    PROTIME 10.5 03/20/2017         EKG: sinus rhytm with PAC     Radiology:  chest X-ray  No acute abn    Ct head - no acute abn    Ct chest    1. Artifact versus subacute pulmonary embolism in the segmental to   subsegmental right lower lobe. 2. Other findings as described. ASSESMENT & PLAN:     1. Possible PE  - imaging not clear if she had PE, however high suspicion for DVT   - check V.q scan . Venous dopplers of LE  - change eliquis 10 mg bid that was started in ER to lovenox  - I have very low suspicion of PE   - hemodynamically stable since arrival. Monitor h.h       2. Chronic iron def anemia - check iron levels. Iron def a year ago noted. Start on venofer if needed    3. HTN - stable on current meds    4. CHF , chronic systolic - appears to well compensated. Resume toprol, ACEi    5. DM- 2- resume home lantus, ssi , diabetic diet    6. Copd - stable with no exacerbation - on home inhalers    7.  Hx of alcohol abuse - add rally pack, monitor with withdrawal symptoms    8.  Hypothyroid - on synthroid - resume           Diet: low salt   GI/DVT PX: /lovenox  CODE STATUS: full code    Check v/q scan and plan to dc lovenox   Dc planning in am  Obtain PT eval appears very deconditioned     Praveen Reeves MD 1/17/2021 9:43 AM

## 2021-01-17 NOTE — PROGRESS NOTES
Shift assessment completed. Pt is alert and oriented to questions but is forgetful. Vital signs are WNL. Respirations are even & easy. No complaints voiced. Pt denies needs at this time. Holding patient in the ED. SR up x 2 and bed in low position. Call light is within reach. Will monitor.

## 2021-01-17 NOTE — PROGRESS NOTES
Patient admitted to room *303 from ED. Patient oriented to room, call light, bed rails, phone, lights and bathroom. Patient instructed about the schedule of the day including: vital sign frequency, lab draws, possible tests, frequency of MD and staff rounds, daily weights, I &O's and prescribed diet. bed alarm in place, patient aware of placement and reason. Telemetry box in place, patient aware of placement and reason. Bed locked, in lowest position, side rails up 2/4, call light within reach. Recliner Assessment  Patient is able to demonstrate the ability to move from a reclining position to an upright position within the recliner. 4 Eyes Skin Assessment     The patient is being assess for   Shift Handoff    I agree that 2 RN's have performed a thorough Head to Toe Skin Assessment on the patient. ALL assessment sites listed below have been assessed. Areas assessed by both nurses:   [x]   Head, Face, and Ears   [x]   Shoulders, Back, and Chest, Abdomen  [x]   Arms, Elbows, and Hands   [x]   Coccyx, Sacrum, and Ischium  [x]   Legs, Feet, and Heels      Scattered bruising and scabs to extremities, redness and swelling to right foot. **SHARE this note so that the co-signing nurse is able to place an eSignature**    Co-signer eSignature: Electronically signed by Bertin Ponce RN on 1/17/21 at 4:18 PM EST    Does the Patient have Skin Breakdown?   No          Johnson Prevention initiated:  No   Wound Care Orders initiated:  No      [facility] nurse consulted for Pressure Injury (Stage 3,4, Unstageable, DTI, NWPT, Complex wounds)and New or Established Ostomies:  No      Primary Nurse eSignature: Electronically signed by Manoj Santacruz RN on 1/17/21 at 1:56 PM EST

## 2021-01-17 NOTE — PROGRESS NOTES
Telesitter monitoring sytem put in place to aid in safety, patient redirected with no evidence of learning. Delia Hwang RN

## 2021-01-17 NOTE — PROGRESS NOTES
Bedside report and transfer of care given to [facility], [facility]. Pt awake in bed and denies needs.

## 2021-01-17 NOTE — PROGRESS NOTES
RESPIRATORY THERAPY ASSESSMENT    Name:  [address] Record Number:  [id number]  Age: 77 y.o. Gender: female  : 1945  Today's Date:  2021  Room:      Assessment     Is the patient being admitted for a COPD or Asthma exacerbation? No   (If yes the patient will be seen every 4 hours for the first 24 hours and then reassessed)    Patient Admission Diagnosis      Allergies  Allergies   Allergen Reactions    Mold Extract [Trichophyton Mentagrophytes]        Minimum Predicted Vital Capacity:               Actual Vital Capacity:                    Pulmonary History:COPD and Pulmonary Embolism  Home Oxygen Therapy:  room air  Home Respiratory Therapy:None   Current Respiratory Therapy:  Albuterol PRN, Spiriva BID and Symbicort 160 BID  Treatment Type: MDI  Medications: Budesonide/Formoterol    Respiratory Severity Index(RSI)   Patients with orders for inhalation medications, oxygen, or any therapeutic treatment modality will be placed on Respiratory Protocol. They will be assessed with the first treatment and at least every 72 hours thereafter. The following severity scale will be used to determine frequency of treatment intervention. Smoking History: Pulmonary Disease or Smoking History, Greater than 15 pack year = 2    Social History  Social History     Tobacco Use    Smoking status: Current Every Day Smoker     Packs/day: 2.00     Years: 58.00     Pack years: 116.00     Types: Cigarettes    Smokeless tobacco: Never Used    Tobacco comment: not totally ready to quit today   Substance Use Topics    Alcohol use:  Yes     Alcohol/week: 0.0 standard drinks     Types: 3 - 4 Standard drinks or equivalent per week     Comment: 1-2 hot toddy every night     Drug use: No       Recent Surgical History: None = 0  Past Surgical History  Past Surgical History:   Procedure Laterality Date    ABDOMEN SURGERY      c section    CATARACT REMOVAL WITH IMPLANT Left [id number]    LEFT EYE CATARACT PHACOEMULSIFICATION INTRAOCULAR LENS     SECTION      DENTAL SURGERY      EYE SURGERY  10/29/13     RIGHT EYE CATARACT PHACOEMULSIFICATION INTRAOCULAR LENS       Level of Consciousness: Alert, Oriented, and Cooperative = 0    Level of Activity: Walking unassisted = 0    Respiratory Pattern: Dyspnea with exertion;Irregular pattern;or RR less than 6 = 2    Breath Sounds: Diminshed bilaterally and/or crackles = 2    Sputum   ,  ,    Cough: Strong, spontaneous, non-productive = 0    Vital Signs   BP (!) 158/79   Pulse 97   Temp 97.9 °F (36.6 °C) (Oral)   Resp 19   SpO2 96%   SPO2 (COPD values may differ): Greater than or equal to 92% on room air = 0    Peak Flow (asthma only): not applicable = 0    RSI: 5-6 = Q4hr PRN (every four hours as needed) for dyspnea        Plan       Goals: medication delivery, mobilize retained secretions, volume expansion and improve oxygenation    Patient/caregiver was educated on the proper method of use for Respiratory Care Devices:  Yes      Level of patient/caregiver understanding able to:   ? Verbalize understanding   ? Demonstrate understanding       ? Teach back        ? Needs reinforcement       ? No available caregiver               ? Other:     Response to education:  Excellent     Is patient being placed on Home Treatment Regimen? NA     Does the patient have everything they need prior to discharge? Yes     Comments: Patient interviewed and assessed. Chart reviewed. Plan of Care: Albuterol and Duoneb Q4 PRN    Electronically signed by Solitario Khan RCP on 2021 at 11:55 AM    Respiratory Protocol Guidelines     1. Assessment and treatment by Respiratory Therapy will be initiated for medication and therapeutic interventions upon initiation of aerosolized medication. 2. Physician will be contacted for respiratory rate (RR) greater than 35 breaths per minute.  Therapy will be held for heart rate (HR) greater than 140 beats per minute, pending direction from physician. 3. Bronchodilators will be administered via Metered Dose Inhaler (MDI) with spacer when the following criteria are met:  a. Alert and cooperative     b. HR < 140 bpm  c. RR < 30 bpm                d. Can demonstrate a 2-3 second inspiratory hold  4. Bronchodilators will be administered via Hand Held Nebulizer [facility]) to patients when ANY of the following criteria are met  a. Incognizant or uncooperative          b. Patients treated with HHN at Home        c. Unable to demonstrate proper use of MDI with spacer     d. RR > 30 bpm   5. Bronchodilators will be delivered via Metered Dose Inhaler (MDI), HHN, Aerogen to intubated patients on mechanical ventilation. 6. Inhalation medication orders will be delivered and/or substituted as outlined below. Aerosolized Medications Ordering and Administration Guidelines:    1. All Medications will be ordered by a physician, and their frequency and/or modality will be adjusted as defined by the patients Respiratory Severity Index (RSI) score. 2. If the patient does not have documented COPD, consider discontinuing anticholinergics when RSI is less than 9.  3. If the bronchospasm worsens (increased RSI), then the bronchodilator frequency can be increased to a maximum of every 4 hours. If greater than every 4 hours is required, the physician will be contacted. 4. If the bronchospasm improves, the frequency of the bronchodilator can be decreased, based on the patient's RSI, but not less than home treatment regimen frequency. 5. Bronchodilator(s) will be discontinued if patient has a RSI less than 9 and has received no scheduled or as needed treatment for 72  Hrs. Patients Ordered on a Mucolytic Agent:    1. Must always be administered with a bronchodilator. 2. Discontinue if patient experiences worsened bronchospasm, or secretions have lessened to the point that the patient is able to clear them with a cough.     Anti-inflammatory and Combination Medications:    1. If the patient lacks prior history of lung disease, is not using inhaled anti-inflammatory medication at home, and lacks wheezing by examination or by history for at least 24 hours, contact physician for possible discontinuation.

## 2021-01-17 NOTE — PROGRESS NOTES
Vascular study is unable to be completed here today. If necessary, we can transfer to [facility] for the study. Spoke with Amrik So, she states that it can wait until tomorrow 1/18/21.

## 2021-01-17 NOTE — PROGRESS NOTES
Bed alarm in place, patient redirected to use call system for needs before exiting bed, patient has poor memory recall and is impulsively getting out of bed repeatedly. This RN has redirected patient numerous times and educated on safety and importance of compliance with call system.  Zack Sage RN

## 2021-01-18 ENCOUNTER — APPOINTMENT (OUTPATIENT)
Dept: VASCULAR LAB | Age: 76
DRG: 292 | End: 2021-01-18
Payer: MEDICARE

## 2021-01-18 LAB
ESTIMATED AVERAGE GLUCOSE: 151.3 MG/DL
GLUCOSE BLD-MCNC: 131 MG/DL (ref 70–99)
GLUCOSE BLD-MCNC: 135 MG/DL (ref 70–99)
GLUCOSE BLD-MCNC: 175 MG/DL (ref 70–99)
GLUCOSE BLD-MCNC: 229 MG/DL (ref 70–99)
HBA1C MFR BLD: 6.9 %
PERFORMED ON: ABNORMAL

## 2021-01-18 PROCEDURE — 6360000002 HC RX W HCPCS: Performed by: INTERNAL MEDICINE

## 2021-01-18 PROCEDURE — 93970 EXTREMITY STUDY: CPT

## 2021-01-18 PROCEDURE — 2580000003 HC RX 258: Performed by: HOSPITALIST

## 2021-01-18 PROCEDURE — 2060000000 HC ICU INTERMEDIATE R&B

## 2021-01-18 PROCEDURE — 6370000000 HC RX 637 (ALT 250 FOR IP): Performed by: HOSPITALIST

## 2021-01-18 PROCEDURE — 2500000003 HC RX 250 WO HCPCS: Performed by: INTERNAL MEDICINE

## 2021-01-18 PROCEDURE — 2580000003 HC RX 258: Performed by: INTERNAL MEDICINE

## 2021-01-18 PROCEDURE — 2700000000 HC OXYGEN THERAPY PER DAY

## 2021-01-18 PROCEDURE — 94640 AIRWAY INHALATION TREATMENT: CPT

## 2021-01-18 PROCEDURE — 6370000000 HC RX 637 (ALT 250 FOR IP): Performed by: INTERNAL MEDICINE

## 2021-01-18 PROCEDURE — 94761 N-INVAS EAR/PLS OXIMETRY MLT: CPT

## 2021-01-18 PROCEDURE — 99233 SBSQ HOSP IP/OBS HIGH 50: CPT | Performed by: INTERNAL MEDICINE

## 2021-01-18 RX ADMIN — LISINOPRIL 20 MG: 20 TABLET ORAL at 09:20

## 2021-01-18 RX ADMIN — ATORVASTATIN CALCIUM 40 MG: 40 TABLET, FILM COATED ORAL at 20:02

## 2021-01-18 RX ADMIN — LEVOTHYROXINE SODIUM 25 MCG: 25 TABLET ORAL at 06:04

## 2021-01-18 RX ADMIN — FLUTICASONE PROPIONATE 1 SPRAY: 50 SPRAY, METERED NASAL at 09:25

## 2021-01-18 RX ADMIN — PANTOPRAZOLE SODIUM 40 MG: 40 TABLET, DELAYED RELEASE ORAL at 06:04

## 2021-01-18 RX ADMIN — PAROXETINE HYDROCHLORIDE 20 MG: 20 TABLET, FILM COATED ORAL at 09:20

## 2021-01-18 RX ADMIN — Medication 2 PUFF: at 06:56

## 2021-01-18 RX ADMIN — Medication 10 ML: at 09:20

## 2021-01-18 RX ADMIN — Medication 2 PUFF: at 19:00

## 2021-01-18 RX ADMIN — MULTIPLE VITAMINS W/ MINERALS TAB 1 TABLET: TAB at 09:20

## 2021-01-18 RX ADMIN — INSULIN GLARGINE 10 UNITS: 100 INJECTION, SOLUTION SUBCUTANEOUS at 23:05

## 2021-01-18 RX ADMIN — CHLORDIAZEPOXIDE HYDROCHLORIDE 5 MG: 5 CAPSULE ORAL at 09:20

## 2021-01-18 RX ADMIN — CHLORDIAZEPOXIDE HYDROCHLORIDE 5 MG: 5 CAPSULE ORAL at 20:02

## 2021-01-18 RX ADMIN — INSULIN LISPRO 1 UNITS: 100 INJECTION, SOLUTION INTRAVENOUS; SUBCUTANEOUS at 16:44

## 2021-01-18 RX ADMIN — TIOTROPIUM BROMIDE INHALATION SPRAY 2 PUFF: 3.12 SPRAY, METERED RESPIRATORY (INHALATION) at 06:56

## 2021-01-18 RX ADMIN — CHLORDIAZEPOXIDE HYDROCHLORIDE 5 MG: 5 CAPSULE ORAL at 14:37

## 2021-01-18 RX ADMIN — Medication 5000 UNITS: at 09:19

## 2021-01-18 RX ADMIN — ASPIRIN 81 MG: 81 TABLET, COATED ORAL at 09:20

## 2021-01-18 RX ADMIN — ENOXAPARIN SODIUM 70 MG: 80 INJECTION SUBCUTANEOUS at 09:19

## 2021-01-18 RX ADMIN — Medication 10 ML: at 20:06

## 2021-01-18 RX ADMIN — FOLIC ACID: 5 INJECTION, SOLUTION INTRAMUSCULAR; INTRAVENOUS; SUBCUTANEOUS at 12:34

## 2021-01-18 RX ADMIN — METOPROLOL SUCCINATE 50 MG: 50 TABLET, EXTENDED RELEASE ORAL at 09:20

## 2021-01-18 NOTE — FLOWSHEET NOTE
01/18/21 1430   Vital Signs   Temp 97 °F (36.1 °C)   Temp Source Oral   Pulse 70   Heart Rate Source Monitor   Resp 18   /76   BP Location Left upper arm   Patient Position Semi fowlers   Level of Consciousness Alert (0)   MEWS Score 1   Patient Currently in Pain Denies   Oxygen Therapy   SpO2 98 %   O2 Device Nasal cannula   O2 Flow Rate (L/min) 1 L/min   Weaned to 1 L oxygen at this time. Pt tolerating well.     Alex Mccain RN

## 2021-01-18 NOTE — PROGRESS NOTES
Spoke with Dr. Darylene Mody. Goal to wean pt off oxygen. Continue scheduled librium. Possible D/C tomorrow.     Musa Nava RN

## 2021-01-18 NOTE — FLOWSHEET NOTE
01/18/21 0845   Vital Signs   Temp 98 °F (36.7 °C)   Temp Source Oral   Pulse 74   Heart Rate Source Monitor   Resp 17   BP (!) 151/85   BP Location Left upper arm   Patient Position Sitting   Level of Consciousness Alert (0)   MEWS Score 1   Patient Currently in Pain Denies   Oxygen Therapy   SpO2 100 %   O2 Device Nasal cannula   O2 Flow Rate (L/min) 2 L/min   Pt returned from Vasc lab at this time. Vitals are as shown above. Pt assessment complete; see flow sheets. Meds given per MAR. Pt currently resting in bed with the call light within reach. Tele sitter remains for safety. Pt stable at this time.     Ricardo Mendez RN

## 2021-01-18 NOTE — PROGRESS NOTES
This RN spoke to spouse and updated him on the patient's careplan. Addressed all family and patient concerns at this time.     Yrn Barros RN

## 2021-01-18 NOTE — PROGRESS NOTES
Progress Note    Admit Date:  1/16/2021    Subjective:  Ms. Harinder June is in no distress now. currently requiring oxygen 1 L    Objective:   /76   Pulse 70   Temp 97 °F (36.1 °C) (Oral)   Resp 18   Ht 5' 5\" (1.651 m)   Wt 150 lb 12.8 oz (68.4 kg)   SpO2 (S) 98%   BMI 25.09 kg/m²       Intake/Output Summary (Last 24 hours) at 1/18/2021 1617  Last data filed at 1/18/2021 1332  Gross per 24 hour   Intake 480 ml   Output 200 ml   Net 280 ml         Physical Exam:Physical Exam:  General:  Elderly female, slightly confused, oriented to place and person only   Awake,  Appears to be not in any distress  Mucous Membranes:  Pink , anicteric  Neck: No JVD, no carotid bruit, no thyromegaly  Chest:  Clear to auscultation bilaterally, diminished in bases   Cardiovascular:  RRR S1S2 heard, no murmurs or gallops  Abdomen:  Soft, undistended, non tender, no organomegaly, BS present  Extremities: trace edema to both LE . No calf tenderness .  Distal pulses well felt   no edema  Neurological : no focal deficits         Medications:   Scheduled Meds:   [START ON 1/19/2021] enoxaparin  40 mg Subcutaneous Daily    aspirin  81 mg Oral Daily    atorvastatin  40 mg Oral Nightly    Vitamin D  5,000 Units Oral Daily    fluticasone  1 spray Nasal Daily    budesonide-formoterol  2 puff Inhalation BID    insulin glargine  10 Units Subcutaneous Nightly    levothyroxine  25 mcg Oral Daily    lisinopril  20 mg Oral Daily    metoprolol succinate  50 mg Oral Daily    therapeutic multivitamin-minerals  1 tablet Oral Daily    nicotine  1 patch Transdermal Daily    pantoprazole  40 mg Oral QAM AC    tiotropium  2 puff Inhalation Daily    sodium chloride flush  10 mL Intravenous 2 times per day    insulin lispro  0-6 Units Subcutaneous TID WC    insulin lispro  0-3 Units Subcutaneous Nightly    folic acid, thiamine, multi-vitamin with vitamin K infusion   Intravenous Daily    PARoxetine  20 mg Oral QAM    chlordiazePOXIDE  5 mg Oral TID       Continuous Infusions:   dextrose         Data:  CBC:   Recent Labs     01/16/21  1526 01/17/21  0703   WBC 7.3 7.0   RBC 4.10 3.92*   HGB 9.4* 8.8*   HCT 30.5* 29.1*   MCV 74.4* 74.3*   RDW 19.3* 19.4*   * 575*     BMP:   Recent Labs     01/16/21  1526 01/17/21  0703    138   K 4.2 4.0   CL 98* 100   CO2 31 28   BUN 10 11   CREATININE 1.0 0.8     BNP: No results for input(s): BNP in the last 72 hours. PT/INR: No results for input(s): PROTIME, INR in the last 72 hours. APTT: No results for input(s): APTT in the last 72 hours. CARDIAC ENZYMES:   Recent Labs     01/16/21  1526   TROPONINI <0.01     FASTING LIPID PANEL:  Lab Results   Component Value Date    CHOL 102 02/29/2020    HDL 36 (L) 02/29/2020    TRIG 78 02/29/2020     LIVER PROFILE:   Recent Labs     01/16/21  1526   AST 13*   ALT 8*   BILITOT <0.2   ALKPHOS 88       Radiology   LE doppler  No DVT    VL Extremity Venous Bilateral   Final Result      NM LUNG SCAN PERFUSION ONLY   Final Result   Low probability for pulmonary embolus. CT CHEST PULMONARY EMBOLISM W CONTRAST   Final Result   1. Artifact versus subacute pulmonary embolism in the segmental to   subsegmental right lower lobe. 2. Other findings as described. CT HEAD WO CONTRAST   Final Result   No acute intracranial abnormality. No significant change from prior study         XR CHEST PORTABLE   Final Result   No acute cardiopulmonary abnormality. Results for Antonio Uribe (MRN [id number]) as of 1/19/2021 05:35   Ref. Range 1/17/2021 07:03   Ferritin Latest Ref Range: 15.0 - 150.0 ng/mL 13.7 (L)   Iron Latest Ref Range: 37 - 145 ug/dL 22 (L)   Iron Saturation Latest Ref Range: 15 - 50 % 6 (L)   TIBC Latest Ref Range: 260 - 445 ug/dL 355   Folate Latest Ref Range: 4.78 - 24.20 ng/mL >20.00   Vitamin B-12 Latest Ref Range: 211 - 911 pg/mL 960 (H)           ECHO    march 2020  Summary   Limited study for EF.    Technically difficult PM

## 2021-01-18 NOTE — CARE COORDINATION
Case Management Assessment  Initial Evaluation      Patient Name: Niesha Griffin  YOB: 1945  Diagnosis: Acute pulmonary embolism, unspecified pulmonary embolism type, unspecified whether acute cor pulmonale present [facility]) [I26.99]  Date / Time: 1/16/2021  2:57 PM    Admission status/Date:inpt  Chart Reviewed: Yes      Patient Interviewed: No   Family Interviewed:  Yes - spouse      Hospitalization in the last 30 days:  No      Health Care Decision Maker :     (CM - must 1st enter selection under Navigator - emergency contact- Health Care Decision Maker Relationship and pick relationship)   Who do you trust or have selected to make healthcare decisions for you      Met with: pt's spouse via TC  Interview conducted  (bedside/phone):    Current PCP:   Shay Trammell MD      Financial  Commercial  Precert required for SNF : Y, N          3 night stay required - Y, N    ADLS  Support Systems/Care Needs:    Transportation: family    Meal Preparation: family    Housing  Living Arrangements: home with spouse  Steps: none  Intent for return to present living arrangements: Yes  Identified Issues: no    Home Care Information  Active with [address] : No Agency:(Services)     Passport/Waiver : No  :                      Phone Number:    Passport/Waiver Services: no          Durable Medical Equiptment   DME Provider: na  Equipment:   Walker___Cane_x__RTS___ BSC___Shower Chair___Hospital Bed___W/C____Other________  02 at ____Liter(s)---wears(frequency)_______ HHN ___ CPAP___ BiPap___   N/A____      Home O2 Use :  No    If No for home O2---if presently on O2 during hospitalization:  yes  if yes CM to follow for potential DC O2 need  Informed of need for care provider to bring portable home O2 tank on day of discharge for nursing to connect prior to leaving:   Not Indicated  Verbalized agreement/Understanding:   Not Indicated    Community Service Affiliation  Dialysis:  No · Agency:  · Location:  · Dialysis Schedule:  · Phone:   · Fax: Other Community Services: (ex:PT/OT,Mental Health,Wound Clinic, Cardio/Pul [address])    DISCHARGE PLAN: Explained Case Management role/services. Reviewed chart. Pt from home with spouse and plan return per spouse. Following for possible hhc for SN,PT/OT. Pt's spouse requests [facility] if needed.

## 2021-01-19 ENCOUNTER — TELEPHONE (OUTPATIENT)
Dept: SURGERY | Age: 76
End: 2021-01-19

## 2021-01-19 VITALS
SYSTOLIC BLOOD PRESSURE: 169 MMHG | HEIGHT: 65 IN | BODY MASS INDEX: 24.21 KG/M2 | WEIGHT: 145.31 LBS | HEART RATE: 67 BPM | RESPIRATION RATE: 19 BRPM | TEMPERATURE: 97.9 F | DIASTOLIC BLOOD PRESSURE: 89 MMHG | OXYGEN SATURATION: 94 %

## 2021-01-19 LAB
GLUCOSE BLD-MCNC: 100 MG/DL (ref 70–99)
GLUCOSE BLD-MCNC: 140 MG/DL (ref 70–99)
GLUCOSE BLD-MCNC: 171 MG/DL (ref 70–99)
PERFORMED ON: ABNORMAL
PRO-BNP: 1423 PG/ML (ref 0–449)

## 2021-01-19 PROCEDURE — 2580000003 HC RX 258: Performed by: HOSPITALIST

## 2021-01-19 PROCEDURE — 2580000003 HC RX 258: Performed by: INTERNAL MEDICINE

## 2021-01-19 PROCEDURE — 2500000003 HC RX 250 WO HCPCS: Performed by: INTERNAL MEDICINE

## 2021-01-19 PROCEDURE — 6370000000 HC RX 637 (ALT 250 FOR IP): Performed by: HOSPITALIST

## 2021-01-19 PROCEDURE — 6370000000 HC RX 637 (ALT 250 FOR IP): Performed by: INTERNAL MEDICINE

## 2021-01-19 PROCEDURE — 94640 AIRWAY INHALATION TREATMENT: CPT

## 2021-01-19 PROCEDURE — 83880 ASSAY OF NATRIURETIC PEPTIDE: CPT

## 2021-01-19 PROCEDURE — 36415 COLL VENOUS BLD VENIPUNCTURE: CPT

## 2021-01-19 PROCEDURE — 6360000002 HC RX W HCPCS: Performed by: INTERNAL MEDICINE

## 2021-01-19 PROCEDURE — 99239 HOSP IP/OBS DSCHRG MGMT >30: CPT | Performed by: INTERNAL MEDICINE

## 2021-01-19 RX ORDER — FERROUS SULFATE 325(65) MG
325 TABLET ORAL 2 TIMES DAILY WITH MEALS
Qty: 60 TABLET | Refills: 3 | Status: SHIPPED | OUTPATIENT
Start: 2021-01-19

## 2021-01-19 RX ORDER — POTASSIUM CHLORIDE 750 MG/1
10 TABLET, EXTENDED RELEASE ORAL DAILY
Qty: 30 TABLET | Refills: 1 | Status: ON HOLD | OUTPATIENT
Start: 2021-01-19 | End: 2021-05-23

## 2021-01-19 RX ORDER — FUROSEMIDE 20 MG/1
20 TABLET ORAL DAILY
Qty: 30 TABLET | Refills: 3 | Status: ON HOLD | OUTPATIENT
Start: 2021-01-20 | End: 2022-10-11 | Stop reason: HOSPADM

## 2021-01-19 RX ORDER — FERROUS SULFATE 325(65) MG
325 TABLET ORAL 2 TIMES DAILY WITH MEALS
Status: DISCONTINUED | OUTPATIENT
Start: 2021-01-19 | End: 2021-01-19 | Stop reason: HOSPADM

## 2021-01-19 RX ORDER — FUROSEMIDE 20 MG/1
20 TABLET ORAL DAILY
Status: DISCONTINUED | OUTPATIENT
Start: 2021-01-19 | End: 2021-01-19 | Stop reason: HOSPADM

## 2021-01-19 RX ADMIN — ASPIRIN 81 MG: 81 TABLET, COATED ORAL at 09:47

## 2021-01-19 RX ADMIN — FUROSEMIDE 20 MG: 20 TABLET ORAL at 09:46

## 2021-01-19 RX ADMIN — PAROXETINE HYDROCHLORIDE 20 MG: 20 TABLET, FILM COATED ORAL at 09:46

## 2021-01-19 RX ADMIN — LEVOTHYROXINE SODIUM 25 MCG: 25 TABLET ORAL at 06:06

## 2021-01-19 RX ADMIN — Medication 10 ML: at 09:47

## 2021-01-19 RX ADMIN — FOLIC ACID: 5 INJECTION, SOLUTION INTRAMUSCULAR; INTRAVENOUS; SUBCUTANEOUS at 09:47

## 2021-01-19 RX ADMIN — Medication 5000 UNITS: at 09:44

## 2021-01-19 RX ADMIN — CHLORDIAZEPOXIDE HYDROCHLORIDE 5 MG: 5 CAPSULE ORAL at 15:08

## 2021-01-19 RX ADMIN — Medication 2 PUFF: at 06:59

## 2021-01-19 RX ADMIN — METOPROLOL SUCCINATE 50 MG: 50 TABLET, EXTENDED RELEASE ORAL at 09:46

## 2021-01-19 RX ADMIN — TIOTROPIUM BROMIDE INHALATION SPRAY 2 PUFF: 3.12 SPRAY, METERED RESPIRATORY (INHALATION) at 06:59

## 2021-01-19 RX ADMIN — PANTOPRAZOLE SODIUM 40 MG: 40 TABLET, DELAYED RELEASE ORAL at 06:06

## 2021-01-19 RX ADMIN — ACETAMINOPHEN 650 MG: 325 TABLET ORAL at 06:06

## 2021-01-19 RX ADMIN — ENOXAPARIN SODIUM 40 MG: 40 INJECTION SUBCUTANEOUS at 09:46

## 2021-01-19 RX ADMIN — MULTIPLE VITAMINS W/ MINERALS TAB 1 TABLET: TAB at 09:46

## 2021-01-19 RX ADMIN — CHLORDIAZEPOXIDE HYDROCHLORIDE 5 MG: 5 CAPSULE ORAL at 09:45

## 2021-01-19 RX ADMIN — FERROUS SULFATE TAB 325 MG (65 MG ELEMENTAL FE) 325 MG: 325 (65 FE) TAB at 09:43

## 2021-01-19 RX ADMIN — LISINOPRIL 20 MG: 20 TABLET ORAL at 09:45

## 2021-01-19 RX ADMIN — IRON SUCROSE 100 MG: 20 INJECTION, SOLUTION INTRAVENOUS at 06:37

## 2021-01-19 ASSESSMENT — PAIN SCALES - GENERAL: PAINLEVEL_OUTOF10: 2

## 2021-01-19 NOTE — PROGRESS NOTES
Pt assessment complete; see flow sheets. Meds given per MAR. Pt currently resting in bed with the call light within reach. Pt denies any other care needs at this time. Pt stable at this time.     Reji Anaya RN

## 2021-01-19 NOTE — PROGRESS NOTES
Patient educated on discharge instructions as well as new medications use, dosage, administration and possible side effects. Patient verified knowledge. IV removed without difficulty and dry dressing in place. Telemetry monitor removed and returned to [facility]. Pt left facility in stable condition to Home with all of their personal belongings.      Farzana Mcfarlane RN

## 2021-01-19 NOTE — CARE COORDINATION
[facility]  Received referral regarding HC from Hany Larose. Sent request to [facility] for [facility] coverage. [facility] is able to service pt for [facility]. needs. Spoke with pt regarding HC services. Pt is agreeable to [facility] for SN, PT/OT. Verified demographics. Pt is not eligible for HCV Program or Zoom Visits as pt does not have a smartphone or tablet. Pt unsure if spouse does. Telephone call to spouse to discuss. LM. Sent referral to [facility] for [facility].     Electronically signed by Leeroy Kussmaul, RN on 1/19/2021 at 11:11 AM

## 2021-01-19 NOTE — DISCHARGE INSTR - COC
disease) I25.10    DM2 (diabetes mellitus, type 2) ([facility]) E11.9    ETOH abuse F10.10    Tobacco abuse Z72.0    Asthma J45.909    Hyperlipidemia E78.5    SIRS (systemic inflammatory response syndrome) ([facility]) R65.10    Thrombocytosis ([facility]) D47.3    Chronic dCHF (grade 1 LVDD) I50.32    Alcohol withdrawal ([facility]) F10.239    Frequent falls R29.6    Hypertensive urgency I16.0    COPD exacerbation ([facility]) J44.1    Shortness of breath R06.02    NSTEMI (non-ST elevated myocardial infarction) ([facility]) I21.4    Nonischemic cardiomyopathy ([facility]) I42.8    Acute hypoxemic respiratory failure ([facility]) J96.01    Hypomagnesemia E83.42    Hypokalemia E87.6    Acute pulmonary edema ([facility]) J81.0    CHF (congestive heart failure), NYHA class III, acute, systolic ([facility]) H83.57    Chronic systolic CHF (congestive heart failure) ([facility]) I50.22    TRINH (acute kidney injury) ([facility]) N17.9    Abnormal EKG R94.31    Hypotension I95.9    Tachycardia R00.0    Scalp laceration S01. 01XA    Chronic obstructive pulmonary disease ([facility]) J44.9    Chronic diastolic congestive heart failure ([facility]) I50.32    Iron deficiency anemia D50.9    Pulmonary embolism without acute cor pulmonale ([facility]) I26.99       Isolation/Infection:   Isolation            No Isolation          Patient Infection Status       Infection Onset Added Last Indicated Last Indicated By Review Planned Expiration Resolved Resolved By    None active    Resolved    COVID-19 Rule Out 21 COVID-19 (Ordered)   21 Rule-Out Test Resulted    COVID-19 Rule Out 21 COVID-19 (Ordered)   21 Rule-Out Test Resulted    COVID-19 Rule Out 20 COVID-19 (Ordered)   10/09/20             Nurse Assessment:  Last Vital Signs: BP (!) 143/74   Pulse 74   Temp 97.5 °F (36.4 °C) (Oral)   Resp 18   Ht 5' 5\" (1.651 m)   Wt 145 lb 5 oz (65.9 kg)   SpO2 95%   BMI 24.18 kg/m²     Last documented pain score (0-10 scale): Pain Level: 0  Last Weight:   Wt Readings from Last 1 Encounters:   01/19/21 145 lb 5 oz (65.9 kg)     Mental Status:  alert    IV Access:  - None    Nursing Mobility/ADLs:  Walking   Assisted  Transfer  Assisted  Bathing  Assisted  Dressing  Assisted  Toileting  Assisted  Feeding  Independent  Med Admin  Assisted  Med Delivery   whole    Wound Care Documentation and Therapy:        Elimination:  Continence:   · Bowel: Yes  · Bladder: Yes  Urinary Catheter: None   Colostomy/Ileostomy/Ileal Conduit: No       Date of Last BM: 1/18/2021      Intake/Output Summary (Last 24 hours) at 1/19/2021 1051  Last data filed at 1/19/2021 0848  Gross per 24 hour   Intake 600 ml   Output 1800 ml   Net -1200 ml     I/O last 3 completed shifts: In: 480 [P.O.:480]  Out: 1500 [Urine:1500]    Safety Concerns: At Risk for Falls    Impairments/Disabilities:      None    Nutrition Therapy:  Current Nutrition Therapy:   - Oral Diet:  Carb Control 4 carbs/meal (1800kcals/day)    Routes of Feeding: Oral  Liquids: Thin Liquids  Daily Fluid Restriction: no  Last Modified Barium Swallow with Video (Video Swallowing Test): not done    Treatments at the Time of Hospital Discharge:   Respiratory Treatments:   Oxygen Therapy:  is not on home oxygen therapy.   Ventilator:    - No ventilator support    Rehab Therapies:   Weight Bearing Status/Restrictions: No weight bearing restirctions  Other Medical Equipment (for information only, NOT a DME order):  walker  Other Treatments:     Patient's personal belongings (please select all that are sent with patient):  None, Glasses    RN SIGNATURE:  Electronically signed by Karlene Ghotra RN on 1/19/21 at 2:54 PM EST    CASE MANAGEMENT/SOCIAL WORK SECTION    Inpatient Status Date: 01/16/21    Readmission Risk Assessment Score:  Readmission Risk              Risk of Unplanned Readmission:        36           Discharging to Facility/ Agency   · [facility]  · [phone]    HOME HEALTH CARE: LEVEL 1 STANDARD    Home health agency to establish plan of care for patient over 60 day period   Nursing  Initial home SN evaluation visit to occur within 24-48 hours for:  medication management  VS and clinical assessment  S&S chronic disease exacerbation education + when to contact MD / NP  care coordination  Medication Reconciliation during 1st SN visit       PT/OT   Evaluate with goal of regaining prior level of functioning   OT to evaluate if patient has [address] needs for personal care      PCP Visit scheduled within 7 days of hospital discharge   HCV Program and Zoom Telehealth    / signature: Electronically signed by Izaiah Garcia, RN on 1/19/21 at 10:52 AM EST    PHYSICIAN SECTION    Prognosis: Good    Condition at Discharge: Stable    Rehab Potential (if transferring to Rehab): Good    Recommended Labs or Other Treatments After Discharge: SN, PT/OT  HCV Program and [address]    Physician Certification: I certify the above information and transfer of Danii Jackson  is necessary for the continuing treatment of the diagnosis listed and that she requires Home Care for less 30 days.      Update Admission H&P: No change in H&P    PHYSICIAN SIGNATURE:  Electronically signed by TAMIKO Rhodes/Mine Sanchez, RN on 1/19/21 at 10:51 AM EST   Lon Jiang MD  1/19/2021

## 2021-01-19 NOTE — TELEPHONE ENCOUNTER
Referral in work que, left message    We have tried to call before and never got a response just following up to see if this pt needs seen.

## 2021-01-19 NOTE — FLOWSHEET NOTE
01/19/21 1130   Oxygen Therapy   SpO2 93 %   O2 Device None (Room air)   O2 Flow Rate (L/min) 0 L/min   weaned off oxygen at this time. Pt tolerating well.     Hank Alston RN

## 2021-01-19 NOTE — PROGRESS NOTES
Spoke with Alex Burnette and updated him on the care plan for pt. Goal for patient to be picked up at 1630. Addressed all pt and family concerns at this time.     Filiberto Jain RN

## 2021-01-19 NOTE — DISCHARGE SUMMARY
Name:  Charlanne Meigs  Room:  /5815-22  MRN:    [id number]    Discharge Summary      This discharge summary is in conjunction with a complete physical exam done on the day of discharge. Discharging Physician: Lenin Woody MD    Admit: 1/16/2021  Discharge:  1/19/2021    HPI taken from admission H&P:      Patient is a 77 y.o.  female  With known h.o  Copd, HTN, chronic systolic CHF, DM, HTN, hyperlipdemia, alcohol abuse  presents with increasing lower ext discomfort and edema      Pt is a very poor historian and appears slightly confused  She appears to be alert , oriented to place and person but does not elaborate her symptoms properly      As per ER she was recently seen  Last week for nausea and was treated symptomatically. Again comes back yesterday for nausea and marino foot swelling. She is unable to tell me how long she has foot swelling but has marino knee arthritis and recently had some steroid injections in knee. Denies any pain and her swelling disappeared today since she is sitting up and elevated her legs. No chest pain or sob or cough. Reports smoking hx and admits to drinking daily .      Workup showed possible PE on CTA chest and was admitted for eval  Started on eliquis. Remains on 2 L oxygen which is now weaned off    Diagnoses this Admission and Hospital Course     Dyspnea /leg edema   - secondary to CHF and anemia   - PE was ruled out. CT chest reviewed . VQ scan low probability,  lower extremity Doppler negative. - Likely there was a component  of anxiety,  she has underlying COPD, and H/O CHF  - required 1 L which was weaned to RA at d/c  - hemodynamically stable since arrival. Monitored h.h   - low clinical suspicion for PE, Stopped full dose anticoagulation   - leg edema improved, sent with lasix at d/c      Symptoms related to anemia and CHF .   Improved on DC     CHF , acute on  chronic  Diastolic   - Resumed toprol, ACEi   - added low dose lasix   - checked BNP - 405 > 1423  - ECHO reviewed  - discharged with 20 mg PO lasix daily. - patient advised on low salt diet . Avoid alcohol intake     Chronic iron def anemia   -  Iron def a year ago noted. checked iron levels-  Still deficient   - Started on IV venofer and PO Iron  - discharged with ferrous sulfate   pt informed that she will need outpt GI work up for anemia    Copd  - stable with no exacerbation  - on home inhalers  - weaned O2 - on RA at d/c     Anxiety  - Related to alcohol withdrawal, monitored  - improved     Chronic alcohol abuse  - On Librium for withdrawal symptoms   - added rally pk  - much improved   patient advised to quit drinking alcohol     Tobacco abuse  - On nicotine patch     HTN   - stable on current meds     Hyperlipidemia   - on statins      DM- 2  - resumed home lantus, ssi , diabetic diet      Hypothyroid   - on synthroid - resumed     Procedures (Please Review Full Report for Details)  None    Consults    None    Physical Exam at Discharge:    BP (!) 143/74   Pulse 74   Temp 97.5 °F (36.4 °C) (Oral)   Resp 18   Ht 5' 5\" (1.651 m)   Wt 145 lb 5 oz (65.9 kg)   SpO2 (S) 93%   BMI 24.18 kg/m²     General:  Elderly female,  oriented to place and person only   Awake,  Appears to be not in any distress  Mucous Membranes:  Pink , anicteric  Neck: No JVD, no carotid bruit, no thyromegaly  Chest:  Clear to auscultation bilaterally, diminished in bases   Cardiovascular:  RRR S1S2 heard, no murmurs or gallops  Abdomen:  Soft, undistended, non tender, no organomegaly, BS present  Extremities: trace edema to both LE .  No calf tenderness . Distal pulses well felt   no edema  Neurological : no focal deficits    CBC:   Recent Labs     01/16/21  1526 01/17/21  0703   WBC 7.3 7.0   HGB 9.4* 8.8*   HCT 30.5* 29.1*   MCV 74.4* 74.3*   * 575*     BMP:   Recent Labs     01/16/21  1526 01/17/21  0703    138   K 4.2 4.0   CL 98* 100   CO2 31 28   BUN 10 11   CREATININE 1.0 0.8     LIVER PROFILE:   Recent Labs 01/16/21  1526   AST 13*   ALT 8*   BILITOT <0.2   ALKPHOS 88     CULTURES  None    RADIOLOGY    VL Extremity Venous Bilateral 1/18/2021   Final Result      There is no evidence of deep or superficial venous thrombosis involving the    lower extremities bilaterally.    Baker's cyst at the right medial joint space measuring 4.1 X 1.1 cm. NM LUNG SCAN PERFUSION ONLY 1/17/2021   Final Result   Low probability for pulmonary embolus. CT CHEST PULMONARY EMBOLISM W CONTRAST 1/16/2021   Final Result   1. Artifact versus subacute pulmonary embolism in the segmental to   subsegmental right lower lobe. 2. Other findings as described. CT HEAD WO CONTRAST 1/16/2021   Final Result   No acute intracranial abnormality. No significant change from prior study         XR CHEST PORTABLE 1/16/2021   Final Result   No acute cardiopulmonary abnormality.            Discharge Medications     Medication List      START taking these medications    ferrous sulfate 325 (65 Fe) MG tablet  Commonly known as: IRON 325  Take 1 tablet by mouth 2 times daily (with meals)     furosemide 20 MG tablet  Commonly known as: LASIX  Take 1 tablet by mouth daily  Start taking on: January 20, 2021        CHANGE how you take these medications    potassium chloride 10 MEQ extended release tablet  Commonly known as: KLOR-CON M  Take 1 tablet by mouth daily  What changed: when to take this        CONTINUE taking these medications    albuterol sulfate  (90 Base) MCG/ACT inhaler  Inhale 2 puffs into the lungs every 6 hours as needed for Wheezing     aspirin 81 MG EC tablet  Take 1 tablet by mouth daily     atorvastatin 40 MG tablet  Commonly known as: LIPITOR  Take 1 tablet by mouth nightly     Blood Pressure Kit  1 each by Does not apply route daily     FISH OIL     fluticasone 50 MCG/ACT nasal spray  Commonly known as: FLONASE  1 spray by Nasal route daily     fluticasone-salmeterol 500-50 MCG/DOSE diskus inhaler  Commonly known as: Advair Diskus  Inhale 1 puff into the lungs every 12 hours     folic acid 1 MG tablet  Commonly known as: FOLVITE  Take 1 tablet by mouth daily     insulin glargine 100 UNIT/ML injection vial  Commonly known as: LANTUS  Inject 10 Units into the skin nightly     ipratropium-albuterol 0.5-2.5 (3) MG/3ML Soln nebulizer solution  Commonly known as: DUONEB  Inhale 3 mLs into the lungs every 6 hours as needed for Shortness of Breath     levothyroxine 25 MCG tablet  Commonly known as: SYNTHROID  Take 1 tablet by mouth Daily     lisinopril 20 MG tablet  Commonly known as: PRINIVIL;ZESTRIL  Take 1 tablet by mouth daily     metFORMIN 500 MG tablet  Commonly known as: GLUCOPHAGE     metoprolol succinate 50 MG extended release tablet  Commonly known as: TOPROL XL  Take 1 tablet by mouth daily     nicotine 21 MG/24HR  Commonly known as: NICODERM CQ  Place 1 patch onto the skin daily     omeprazole 20 MG delayed release capsule  Commonly known as: PriLOSEC  Take 1 capsule by mouth daily     ondansetron 4 MG disintegrating tablet  Commonly known as: ZOFRAN-ODT  Take 1 tablet by mouth 3 times daily as needed for Nausea or Vomiting     PARoxetine 40 MG tablet  Commonly known as: PAXIL  Take 1 tablet by mouth every morning     Therapeutic Tabs tablet  Take 1 tablet by mouth daily     tiotropium 2.5 MCG/ACT Aers inhaler  Commonly known as: SPIRIVA RESPIMAT  Inhale 2 puffs into the lungs daily     TriLyte 420 g solution  Generic drug: polyethylene glycol-electrolytes     vitamin B-1 100 MG tablet  Commonly known as: THIAMINE  Take 1 tablet by mouth daily     vitamin B-12 1000 MCG tablet  Commonly known as: CYANOCOBALAMIN     vitamin D-3 125 MCG (5000 UT) Tabs  Commonly known as: cholecalciferol        STOP taking these medications    cloNIDine 0.2 MG tablet  Commonly known as: CATAPRES     naproxen 500 MG tablet  Commonly known as: Naprosyn           Where to Get Your Medications      You can get these medications from any pharmacy Bring a paper prescription for each of these medications  · ferrous sulfate 325 (65 Fe) MG tablet  · furosemide 20 MG tablet  · potassium chloride 10 MEQ extended release tablet           Discharged in stable condition to home. D/C home with C. Total time 40 minutes. > 50%  dominated by counseling and coordination of care. Follow Up: Follow up with PCP in 1 week.     Kasey Adhikari MD  1/19/21

## 2021-01-19 NOTE — PROGRESS NOTES
Bedside report and transfer of care given to [address]. Pt currently sleeping in bed with the call light within reach. Pt stable on transfer of care.     Donte Mittal RN

## 2021-01-19 NOTE — PROGRESS NOTES
Physician Progress Note      Paul Mitchell  CSN #:                  [id number]  :                       1945  ADMIT DATE:       2021 2:57 PM  [address] [place] DATE:  RESPONDING  PROVIDER #:        Elena Bruno MD          QUERY TEXT:    Pt admitted with leg edema, nausea, and HONG. Pt noted to have negative doppler   and ruled out for PE. Patient with anxiety and alcohol withdrawal.  If   possible, please document in progress notes and discharge summary the   relationship if any between admission symptoms and alcohol withdrawal.    The medical record reflects the following:  Risk Factors: age, chf, copd, htn, DM  Clinical Indicators: ruled out for PE and negative doppler study. Anxiety   -Related to alcohol withdrawal, monitor  Treatment: On Librium for withdrawal symptoms - add deepak delgado    Thank you for your assistance,  Amanda Joya RN,BSN,CCDS,CRCR  Options provided:  -- Dyspnea, leg edema, and nausea related to anxiety with alcohol dependence   with withdrawal  -- Dyspnea, leg edema, and nausea related to, please specify, please specify   cause of dyspnea, leg edema,  and nausea. -- Other - I will add my own diagnosis  -- Disagree - Not applicable / Not valid  -- Disagree - Clinically unable to determine / Unknown  -- Refer to Clinical Documentation Reviewer    PROVIDER RESPONSE TEXT:    Work up in progress. My Dc summary will say  Symptoms related to anemia and CHF . Inmproved .     Query created by: Jake Quintanilla on 2021 10:48 AM      Electronically signed by:  Elena Bruno MD 2021 11:14 AM

## 2021-01-20 NOTE — PROGRESS NOTES
Educated spouse on discharge instructions for patient. Explained to him that the doctor wants patient to stop drinking alcohol. This RN also explained to spouse about the need for patient to follow up with their primary care provider and schedule a routine colonoscopy to determine the source of anemia. Spouse verbalized understanding.     Marito Liang RN

## 2021-01-20 NOTE — PROGRESS NOTES
Educated patient on the need to stop drinking per Dr. Sara Ornelas conversation with her. This RN also educated patient on a low sodium diet to help with her congestive heart failure. Pt not happy about the lifestyle changes and states she will consider them.     Teofilo Lynn RN

## 2021-03-02 ENCOUNTER — HOSPITAL ENCOUNTER (EMERGENCY)
Age: 76
Discharge: HOME | End: 2021-03-02
Payer: MEDICARE

## 2021-03-02 ENCOUNTER — APPOINTMENT (OUTPATIENT)
Dept: CT IMAGING | Age: 76
End: 2021-03-02
Payer: MEDICARE

## 2021-03-02 VITALS
TEMPERATURE: 97.9 F | HEART RATE: 88 BPM | DIASTOLIC BLOOD PRESSURE: 98 MMHG | SYSTOLIC BLOOD PRESSURE: 163 MMHG | WEIGHT: 150 LBS | RESPIRATION RATE: 20 BRPM | OXYGEN SATURATION: 96 % | BODY MASS INDEX: 24.96 KG/M2

## 2021-03-02 DIAGNOSIS — R11.0 NAUSEA: Primary | ICD-10-CM

## 2021-03-02 DIAGNOSIS — R51.9 ACUTE NONINTRACTABLE HEADACHE, UNSPECIFIED HEADACHE TYPE: ICD-10-CM

## 2021-03-02 LAB
A/G RATIO: 1.4 (ref 1.1–2.2)
ALBUMIN SERPL-MCNC: 4.1 G/DL (ref 3.4–5)
ALP BLD-CCNC: 84 U/L (ref 40–129)
ALT SERPL-CCNC: 12 U/L (ref 10–40)
ANION GAP SERPL CALCULATED.3IONS-SCNC: 9 MMOL/L (ref 3–16)
AST SERPL-CCNC: 19 U/L (ref 15–37)
BACTERIA: ABNORMAL /HPF
BASOPHILS ABSOLUTE: 0.1 K/UL (ref 0–0.2)
BASOPHILS RELATIVE PERCENT: 0.9 %
BILIRUB SERPL-MCNC: 0.5 MG/DL (ref 0–1)
BILIRUBIN URINE: NEGATIVE
BLOOD, URINE: NEGATIVE
BUN BLDV-MCNC: 8 MG/DL (ref 7–20)
CALCIUM SERPL-MCNC: 9.5 MG/DL (ref 8.3–10.6)
CHLORIDE BLD-SCNC: 99 MMOL/L (ref 99–110)
CLARITY: CLEAR
CO2: 29 MMOL/L (ref 21–32)
COLOR: YELLOW
CREAT SERPL-MCNC: 0.8 MG/DL (ref 0.6–1.2)
EOSINOPHILS ABSOLUTE: 0.1 K/UL (ref 0–0.6)
EOSINOPHILS RELATIVE PERCENT: 2.1 %
EPITHELIAL CELLS, UA: ABNORMAL /HPF (ref 0–5)
GFR AFRICAN AMERICAN: >60
GFR NON-AFRICAN AMERICAN: >60
GLOBULIN: 2.9 G/DL
GLUCOSE BLD-MCNC: 113 MG/DL (ref 70–99)
GLUCOSE BLD-MCNC: 119 MG/DL
GLUCOSE BLD-MCNC: 119 MG/DL (ref 70–99)
GLUCOSE URINE: NEGATIVE MG/DL
HCT VFR BLD CALC: 33.5 % (ref 36–48)
HEMOGLOBIN: 10.6 G/DL (ref 12–16)
KETONES, URINE: NEGATIVE MG/DL
LEUKOCYTE ESTERASE, URINE: ABNORMAL
LIPASE: 16 U/L (ref 13–60)
LYMPHOCYTES ABSOLUTE: 1.6 K/UL (ref 1–5.1)
LYMPHOCYTES RELATIVE PERCENT: 24.9 %
MCH RBC QN AUTO: 24.6 PG (ref 26–34)
MCHC RBC AUTO-ENTMCNC: 31.6 G/DL (ref 31–36)
MCV RBC AUTO: 78 FL (ref 80–100)
MICROSCOPIC EXAMINATION: YES
MONOCYTES ABSOLUTE: 0.5 K/UL (ref 0–1.3)
MONOCYTES RELATIVE PERCENT: 8 %
NEUTROPHILS ABSOLUTE: 4.2 K/UL (ref 1.7–7.7)
NEUTROPHILS RELATIVE PERCENT: 64.1 %
NITRITE, URINE: NEGATIVE
PDW BLD-RTO: 22.9 % (ref 12.4–15.4)
PERFORMED ON: ABNORMAL
PH UA: 8 (ref 5–8)
PLATELET # BLD: 443 K/UL (ref 135–450)
PMV BLD AUTO: 6.7 FL (ref 5–10.5)
POTASSIUM SERPL-SCNC: 4.1 MMOL/L (ref 3.5–5.1)
PROTEIN UA: NEGATIVE MG/DL
RBC # BLD: 4.3 M/UL (ref 4–5.2)
RBC UA: ABNORMAL /HPF (ref 0–4)
SODIUM BLD-SCNC: 137 MMOL/L (ref 136–145)
SPECIFIC GRAVITY UA: 1.01 (ref 1–1.03)
TOTAL PROTEIN: 7 G/DL (ref 6.4–8.2)
URINE TYPE: ABNORMAL
UROBILINOGEN, URINE: 0.2 E.U./DL
WBC # BLD: 6.6 K/UL (ref 4–11)
WBC UA: ABNORMAL /HPF (ref 0–5)

## 2021-03-02 PROCEDURE — 2580000003 HC RX 258: Performed by: NURSE PRACTITIONER

## 2021-03-02 PROCEDURE — 74177 CT ABD & PELVIS W/CONTRAST: CPT

## 2021-03-02 PROCEDURE — 96375 TX/PRO/DX INJ NEW DRUG ADDON: CPT

## 2021-03-02 PROCEDURE — 96374 THER/PROPH/DIAG INJ IV PUSH: CPT

## 2021-03-02 PROCEDURE — 81001 URINALYSIS AUTO W/SCOPE: CPT

## 2021-03-02 PROCEDURE — 85025 COMPLETE CBC W/AUTO DIFF WBC: CPT

## 2021-03-02 PROCEDURE — 6360000002 HC RX W HCPCS: Performed by: NURSE PRACTITIONER

## 2021-03-02 PROCEDURE — 6360000004 HC RX CONTRAST MEDICATION: Performed by: NURSE PRACTITIONER

## 2021-03-02 PROCEDURE — 99284 EMERGENCY DEPT VISIT MOD MDM: CPT

## 2021-03-02 PROCEDURE — 80053 COMPREHEN METABOLIC PANEL: CPT

## 2021-03-02 PROCEDURE — 83690 ASSAY OF LIPASE: CPT

## 2021-03-02 RX ORDER — 0.9 % SODIUM CHLORIDE 0.9 %
1000 INTRAVENOUS SOLUTION INTRAVENOUS ONCE
Status: COMPLETED | OUTPATIENT
Start: 2021-03-02 | End: 2021-03-02

## 2021-03-02 RX ORDER — KETOROLAC TROMETHAMINE 30 MG/ML
15 INJECTION, SOLUTION INTRAMUSCULAR; INTRAVENOUS ONCE
Status: COMPLETED | OUTPATIENT
Start: 2021-03-02 | End: 2021-03-02

## 2021-03-02 RX ORDER — METOCLOPRAMIDE HYDROCHLORIDE 5 MG/ML
10 INJECTION INTRAMUSCULAR; INTRAVENOUS ONCE
Status: COMPLETED | OUTPATIENT
Start: 2021-03-02 | End: 2021-03-02

## 2021-03-02 RX ORDER — ONDANSETRON 4 MG/1
4 TABLET, ORALLY DISINTEGRATING ORAL EVERY 8 HOURS PRN
Qty: 20 TABLET | Refills: 0 | Status: SHIPPED | OUTPATIENT
Start: 2021-03-02

## 2021-03-02 RX ORDER — ONDANSETRON 2 MG/ML
4 INJECTION INTRAMUSCULAR; INTRAVENOUS ONCE
Status: COMPLETED | OUTPATIENT
Start: 2021-03-02 | End: 2021-03-02

## 2021-03-02 RX ORDER — DIPHENHYDRAMINE HYDROCHLORIDE 50 MG/ML
12.5 INJECTION INTRAMUSCULAR; INTRAVENOUS ONCE
Status: COMPLETED | OUTPATIENT
Start: 2021-03-02 | End: 2021-03-02

## 2021-03-02 RX ADMIN — SODIUM CHLORIDE 1000 ML: 9 INJECTION, SOLUTION INTRAVENOUS at 16:10

## 2021-03-02 RX ADMIN — IOPAMIDOL 75 ML: 755 INJECTION, SOLUTION INTRAVENOUS at 16:53

## 2021-03-02 RX ADMIN — ONDANSETRON 4 MG: 2 INJECTION INTRAMUSCULAR; INTRAVENOUS at 16:10

## 2021-03-02 RX ADMIN — METOCLOPRAMIDE 10 MG: 5 INJECTION, SOLUTION INTRAMUSCULAR; INTRAVENOUS at 16:10

## 2021-03-02 RX ADMIN — KETOROLAC TROMETHAMINE 15 MG: 30 INJECTION, SOLUTION INTRAMUSCULAR at 16:11

## 2021-03-02 RX ADMIN — DIPHENHYDRAMINE HYDROCHLORIDE 12.5 MG: 50 INJECTION, SOLUTION INTRAMUSCULAR; INTRAVENOUS at 16:10

## 2021-03-02 NOTE — ED NOTES
Pt incontinent of urine. Perineal care by pt and pull up changed.        Ildefonso Mi, RN  03/02/21 [id number]

## 2021-03-02 NOTE — ED NOTES
Tonya BOONE will follow up with pt's spouse picking her up from the ER.       Chepe Dye RN  03/02/21 [id number]

## 2021-03-02 NOTE — ED NOTES

## 2021-03-02 NOTE — ED PROVIDER NOTES
[address] [facility] Emergency Department    CHIEF COMPLAINT  Nausea (states headache and nausea, denies abd pain , states is diabetic and has not checked BG recently ) and Headache      HISTORY OF PRESENT ILLNESS  Kriss Garcia is a 77 y.o. female who presents to the ED complaining of ongoing nausea. Patient has a history of chronic nausea and was recently seen and evaluated by her primary care physician who wanted to trial stopping her Metformin and her iron and referred her to a gastroenterologist who plans to perform an EGD in the next week or 2. Patient also complaining of a headache she describes as a dull, aching, pressure she has tried ibuprofen and Tylenol. Patient reports she experiences headaches frequently and this is similar to headache she has had in the past.  Patient does have history of alcohol abuse she drinks bourbon occasionally and 6 beers a day. Patient denies abdominal pain, emesis, diarrhea, constipation, urinary complaints, flank pain, fever, chills, body aches, chest pain, shortness of breath, cough. Patient denies black or bloody stool. No other complaints, modifying factors or associated symptoms. Nursing notes reviewed.    Past Medical History:   Diagnosis Date    Acute systolic CHF (congestive heart failure) (HCC)     Asthma     Back ache     Cataract     Cerebral artery occlusion with cerebral infarction ([address] 2016    Diabetes mellitus ([address]     type !! - diet controlled    Hyperlipidemia     Hypertension     Insomnia     TIA (transient ischemic attack) 2016     Past Surgical History:   Procedure Laterality Date    ABDOMEN SURGERY      c section    CATARACT REMOVAL WITH IMPLANT Left [id number]    LEFT EYE CATARACT PHACOEMULSIFICATION INTRAOCULAR LENS     SECTION      DENTAL SURGERY      EYE SURGERY  10/29/13     RIGHT EYE CATARACT PHACOEMULSIFICATION INTRAOCULAR LENS     Family History   Problem Relation Age of Onset    High Blood Pressure Mother    Nida Anderson / Jacky Bolanos Mother     Cancer Father     Diabetes Father     Heart Disease Father     Kidney Disease Father     Asthma Sister     Cancer Sister         Breast Cancer    Depression Brother     Substance Abuse Paternal Aunt     Stroke Maternal Grandmother     Diabetes Paternal Grandmother      Social History     Socioeconomic History    Marital status:      Spouse name: Cruz Huitron Number of children: Not on file    Years of education: Not on file    Highest education level: Not on file   Occupational History    Not on file   Social Needs    Financial resource strain: Not on file    Food insecurity     Worry: Not on file     Inability: Not on file   [employer] needs     Medical: Not on file     Non-medical: Not on file   Tobacco Use    Smoking status: Current Every Day Smoker     Packs/day: 2.00     Years: 58.00     Pack years: 116.00     Types: Cigarettes    Smokeless tobacco: Never Used    Tobacco comment: not totally ready to quit today   Substance and Sexual Activity    Alcohol use:  Yes     Alcohol/week: 0.0 standard drinks     Types: 3 - 4 Standard drinks or equivalent per week     Comment: 1-2 hot toddy every night     Drug use: No    Sexual activity: Yes     Partners: Male   Lifestyle    Physical activity     Days per week: Not on file     Minutes per session: Not on file    Stress: Not on file   Relationships    Social connections     Talks on phone: Not on file     Gets together: Not on file     Attends Yazidi service: Not on file     Active member of club or organization: Not on file     Attends meetings of clubs or organizations: Not on file     Relationship status: Not on file    Intimate partner violence     Fear of current or ex partner: Not on file     Emotionally abused: Not on file     Physically abused: Not on file     Forced sexual activity: Not on file   Other Topics Concern    Not on file   Social History Narrative    Not on file     No current facility-administered medications for this encounter. Current Outpatient Medications   Medication Sig Dispense Refill    ondansetron (ZOFRAN ODT) 4 MG disintegrating tablet Take 1 tablet by mouth every 8 hours as needed for Nausea 20 tablet 0    furosemide (LASIX) 20 MG tablet Take 1 tablet by mouth daily 30 tablet 3    ferrous sulfate (IRON 325) 325 (65 Fe) MG tablet Take 1 tablet by mouth 2 times daily (with meals) 60 tablet 3    potassium chloride (KLOR-CON M) 10 MEQ extended release tablet Take 1 tablet by mouth daily 30 tablet 1    ondansetron (ZOFRAN-ODT) 4 MG disintegrating tablet Take 1 tablet by mouth 3 times daily as needed for Nausea or Vomiting 21 tablet 0    fluticasone-salmeterol (ADVAIR DISKUS) 500-50 MCG/DOSE diskus inhaler Inhale 1 puff into the lungs every 12 hours 60 each 0    tiotropium (SPIRIVA RESPIMAT) 2.5 MCG/ACT AERS inhaler Inhale 2 puffs into the lungs daily 1 Inhaler 3    ipratropium-albuterol (DUONEB) 0.5-2.5 (3) MG/3ML SOLN nebulizer solution Inhale 3 mLs into the lungs every 6 hours as needed for Shortness of Breath 360 mL 0    lisinopril (PRINIVIL;ZESTRIL) 20 MG tablet Take 1 tablet by mouth daily 30 tablet 1    Blood Pressure KIT 1 each by Does not apply route daily 1 kit 0    insulin glargine (LANTUS) 100 UNIT/ML injection vial Inject 10 Units into the skin nightly 1 vial 3    aspirin 81 MG EC tablet Take 1 tablet by mouth daily 30 tablet 3    metoprolol succinate (TOPROL XL) 50 MG extended release tablet Take 1 tablet by mouth daily 30 tablet 3    folic acid (FOLVITE) 1 MG tablet Take 1 tablet by mouth daily 30 tablet 3    nicotine (NICODERM CQ) 21 MG/24HR Place 1 patch onto the skin daily 30 patch 0    metFORMIN (GLUCOPHAGE) 500 MG tablet Take 500 mg by mouth      polyethylene glycol-electrolytes (TRILYTE) 420 g solution Follow Package Instructions unless otherwise directed by physician.       albuterol sulfate  (90 Base) MCG/ACT inhaler Inhale 2 puffs into the lungs every 6 hours as needed for Wheezing 1 Inhaler 0    PARoxetine (PAXIL) 40 MG tablet Take 1 tablet by mouth every morning 30 tablet 3    levothyroxine (SYNTHROID) 25 MCG tablet Take 1 tablet by mouth Daily 30 tablet 0    vitamin B-1 (THIAMINE) 100 MG tablet Take 1 tablet by mouth daily 30 tablet 0    atorvastatin (LIPITOR) 40 MG tablet Take 1 tablet by mouth nightly 30 tablet 3    fluticasone (FLONASE) 50 MCG/ACT nasal spray 1 spray by Nasal route daily 1 Bottle 3    Multiple Vitamin (THERAPEUTIC) TABS tablet Take 1 tablet by mouth daily 30 tablet 3    omeprazole (PRILOSEC) 20 MG capsule Take 1 capsule by mouth daily 30 capsule 0    FISH OIL by Does not apply route.  vitamin B-12 (CYANOCOBALAMIN) 1000 MCG tablet Take 1,000 mcg by mouth daily.  Cholecalciferol (VITAMIN D-3) 5000 UNITS TABS Take  by mouth. Allergies   Allergen Reactions    Mold Extract [Trichophyton Mentagrophytes]        REVIEW OF SYSTEMS  10 systems reviewed, pertinent positives per HPI otherwise noted to be negative    PHYSICAL EXAM  BP (!) 163/98   Pulse 88   Temp 97.9 °F (36.6 °C) (Oral)   Resp 20   Wt 150 lb (68 kg)   SpO2 96%   BMI 24.96 kg/m²   GENERAL APPEARANCE: Awake and alert. Cooperative. No acute distress. Vital signs are stable. Well appearing and non toxic. HEAD: Normocephalic. Atraumatic. EYES: PERRL. EOM's grossly intact. ENT: Mucous membranes are moist.   NECK: Supple. Normal ROM. HEART: RRR. Distal pulses are equal and intact. Cap refill less than 2 seconds. LUNGS: Respirations unlabored. CTAB. Good air exchange. Speaking comfortably in full sentences. No wheezing, rhonchi, rales, stridor. ABDOMEN: Soft. Non-distended. Non-tender. No guarding or rebound. No rigidity. Bowel sounds are present. Negative haywood's. Negative McBurney's point. Negative CVA tenderness. EXTREMITIES: No peripheral edema. Moves all extremities equally. All extremities neurovascularly intact. SKIN: Warm and dry. No acute rashes. NEUROLOGICAL: Alert and oriented. No gross facial drooping. Strength 5/5, sensation intact. PSYCHIATRIC: Normal mood and affect. SCREENINGS       RADIOLOGY  Ct Abdomen Pelvis W Iv Contrast Additional Contrast? None    Result Date: 3/2/2021  EXAMINATION: CT OF THE ABDOMEN AND PELVIS WITH CONTRAST 3/2/2021 4:42 pm TECHNIQUE: CT of the abdomen and pelvis was performed with the administration of intravenous contrast. Multiplanar reformatted images are provided for review. Dose modulation, iterative reconstruction, and/or weight based adjustment of the mA/kV was utilized to reduce the radiation dose to as low as reasonably achievable. COMPARISON: None. HISTORY: ORDERING SYSTEM PROVIDED HISTORY: chronic nausea history of gastritis TECHNOLOGIST PROVIDED HISTORY: Reason for exam:->chronic nausea history of gastritis Additional Contrast?->None Decision Support Exception->Emergency Medical Condition (MA) Reason for Exam: Nauseous on and off for about a month; hx of gastritis Acuity: Acute Type of Exam: Initial Relevant Medical/Surgical History: hx of , hx of abdominal surgery FINDINGS: Lower Chest: Bandlike opacity noted anteriorly within the right middle lobe and lingula. Findings compatible with atelectasis or scarring. No free air noted within the abdomen or pelvis. Organs: The liver, gallbladder, spleen, pancreas, kidneys and adrenal glands demonstrate no acute abnormality. GI/Bowel: Stomach is decompressed and grossly unremarkable in appearance. No obvious inflammatory change of the adjacent fat noted. Small bowel demonstrates no acute abnormality. There is a small fat-containing umbilical hernia. There is a moderate-to-heavy stool burden. Appendix is visualized and appears normal. Pelvis: The _____  and ovaries are grossly unremarkable on limited imaging.  There is mild wall thickening of the bladder and mild perivesicular stranding noted. Findings are accentuated by incomplete distention. Peritoneum/Retroperitoneum: Aorta is normal in caliber. Atherosclerotic changes are noted. No suspicious retroperitoneal adenopathy noted. Bones/Soft Tissues: No destructive bone lesion noted. Multilevel degenerative changes are noted greatest at L2-3 and L3-4. Grade 1 anterolisthesis of L4 on L5 noted likely degenerative in nature. Stomach is decompressed with no obvious inflammatory change noted. Evaluation of gastritis is indeterminate. The gallbladder is grossly unremarkable. However, if patient has right upper quadrant pain, ultrasound would be more sensitive. Mild stranding adjacent to the bladder which demonstrates mild wall thickening. Findings may be artifactual, however correlation with urinalysis recommended to evaluate for potential cystitis. ED COURSE/MDM  Patient seen and evaluated. Old records reviewed. Diagnostic testing reviewed and results discussed. I have independently evaluated this patient based upon my scope of practice. Supervising physician was in the department for consultation as needed. Ruiz Olivarez presented to the ED today with above noted complaints. CBC and CMP are unremarkable chronic stable anemia hemoglobin and hematocrit stable at 10.6 and 33.5. No leukocytosis, bandemia, acute kidney injury, electrolyte abnormality, transaminitis. No signs of pancreatitis. Urinalysis negative for infection or hematuria. CT of the abdomen and pelvis is unremarkable they note that the stomach is decompressed with no obvious inflammatory change. Gallbladder grossly unremarkable.   They do note if patient has right upper quadrant pain ultrasound would be more sensitive patient is not tender on exam.  Mild stranding adjacent to the bladder which demonstrates mild wall thickening findings may be artifactual however correlation with urinalysis recommended to evaluate for potential cystitis urinalysis is negative for infection I believe this is artifactual.    Patient received Toradol, Reglan, Benadryl, Zofran, sodium chloride bolus for pain, with good relief. No active emesis in the emergency department. Given chronic nature of nausea patient was reassured and instructed to follow-up with her gastroenterologist for further management patient is agreeable with this plan of care. While in ED patient received   Medications   ondansetron (ZOFRAN) injection 4 mg (4 mg Intravenous Given 3/2/21 1610)   0.9 % sodium chloride bolus (0 mLs Intravenous Stopped 3/2/21 1710)   ketorolac (TORADOL) injection 15 mg (15 mg Intravenous Given 3/2/21 1611)   metoclopramide (REGLAN) injection 10 mg (10 mg Intravenous Given 3/2/21 1610)   diphenhydrAMINE (BENADRYL) injection 12.5 mg (12.5 mg Intravenous Given 3/2/21 1610)   iopamidol (ISOVUE-370) 76 % injection 75 mL (75 mLs Intravenous Given 3/2/21 1653)             At this point I do not feel the patient requires further work up and it is reasonable to discharge the patient. A discussion was had with the patient and/or their surrogate regarding diagnosis, diagnostic testing results, treatment/ plan of care, and follow up. There was shared decision-making between myself as well as the patient and/or their surrogate and we are all in agreement with discharge home. There was an opportunity for questions and all questions were answered to the best of my ability and to the satisfaction of the patient and/or patient family. Patient will follow up with GI for further evaluation/treatment. The patient was given strict return precautions as we discussed symptoms that would necessitate return to the ED. Patient will return to ED for new/worsening symptoms. The patient verbalized their understanding and agreement with the above plan. Please refer to AVS for further details regarding discharge instructions.       Results for orders placed or performed during the hospital encounter of 03/02/21   CBC Auto Differential   Result Value Ref Range    WBC 6.6 4.0 - 11.0 K/uL    RBC 4.30 4.00 - 5.20 M/uL    Hemoglobin 10.6 (L) 12.0 - 16.0 g/dL    Hematocrit 33.5 (L) 36.0 - 48.0 %    MCV 78.0 (L) 80.0 - 100.0 fL    MCH 24.6 (L) 26.0 - 34.0 pg    MCHC 31.6 31.0 - 36.0 g/dL    RDW 22.9 (H) 12.4 - 15.4 %    Platelets 826 692 - 401 K/uL    MPV 6.7 5.0 - 10.5 fL    Neutrophils % 64.1 %    Lymphocytes % 24.9 %    Monocytes % 8.0 %    Eosinophils % 2.1 %    Basophils % 0.9 %    Neutrophils Absolute 4.2 1.7 - 7.7 K/uL    Lymphocytes Absolute 1.6 1.0 - 5.1 K/uL    Monocytes Absolute 0.5 0.0 - 1.3 K/uL    Eosinophils Absolute 0.1 0.0 - 0.6 K/uL    Basophils Absolute 0.1 0.0 - 0.2 K/uL   Comprehensive Metabolic Panel   Result Value Ref Range    Sodium 137 136 - 145 mmol/L    Potassium 4.1 3.5 - 5.1 mmol/L    Chloride 99 99 - 110 mmol/L    CO2 29 21 - 32 mmol/L    Anion Gap 9 3 - 16    Glucose 113 (H) 70 - 99 mg/dL    BUN 8 7 - 20 mg/dL    CREATININE 0.8 0.6 - 1.2 mg/dL    GFR Non-African American >60 >60    GFR African American >60 >60    Calcium 9.5 8.3 - 10.6 mg/dL    Total Protein 7.0 6.4 - 8.2 g/dL    Albumin 4.1 3.4 - 5.0 g/dL    Albumin/Globulin Ratio 1.4 1.1 - 2.2    Total Bilirubin 0.5 0.0 - 1.0 mg/dL    Alkaline Phosphatase 84 40 - 129 U/L    ALT 12 10 - 40 U/L    AST 19 15 - 37 U/L    Globulin 2.9 g/dL   Lipase   Result Value Ref Range    Lipase 16.0 13.0 - 60.0 U/L   Urinalysis   Result Value Ref Range    Color, UA Yellow Straw/Yellow    Clarity, UA Clear Clear    Glucose, Ur Negative Negative mg/dL    Bilirubin Urine Negative Negative    Ketones, Urine Negative Negative mg/dL    Specific Gravity, UA 1.015 1.005 - 1.030    Blood, Urine Negative Negative    pH, UA 8.0 5.0 - 8.0    Protein, UA Negative Negative mg/dL    Urobilinogen, Urine 0.2 <2.0 E.U./dL    Nitrite, Urine Negative Negative    Leukocyte Esterase, Urine TRACE (A) Negative    Microscopic Examination YES     Urine Type NotGiven Microscopic Urinalysis   Result Value Ref Range    WBC, UA 3-5 0 - 5 /HPF    RBC, UA 0-2 0 - 4 /HPF    Epithelial Cells, UA 0-1 0 - 5 /HPF    Bacteria, UA Rare (A) None Seen /HPF   POCT Glucose   Result Value Ref Range    Glucose 119 mg/dL   POCT Glucose   Result Value Ref Range    POC Glucose 119 (H) 70 - 99 mg/dl    Performed on ACCU-CHEK        I estimate there is LOW risk for ACUTE APPENDICITIS, BOWEL OBSTRUCTION, CHOLECYSTITIS, DIVERTICULITIS, INCARCERATED HERNIA, PANCREATITIS, PELVIC INFLAMMATORY DISEASE, PERFORATED BOWEL or ULCER, PREGNANCY, or TUBO-OVARIAN ABSCESS, thus I consider the discharge disposition reasonable. Also, there is no evidence or peritonitis, sepsis, or toxicity. Niesha Griffin and I have discussed the diagnosis and risks, and we agree with discharging home to follow-up with their primary doctor. We also discussed returning to the Emergency Department immediately if new or worsening symptoms occur. We have discussed the symptoms which are most concerning (e.g., bloody stool, fever, changing or worsening pain, vomiting) that necessitate immediate return. Final Impression    1. Nausea    2. Acute nonintractable headache, unspecified headache type        Blood pressure (!) 163/98, pulse 88, temperature 97.9 °F (36.6 °C), temperature source Oral, resp. rate 20, weight 150 lb (68 kg), SpO2 96 %.mdm    Patient was sent home with a prescription for below medication/s. I did [place] patient on appropriate use of these medication. New Prescriptions    ONDANSETRON (ZOFRAN ODT) 4 MG DISINTEGRATING TABLET    Take 1 tablet by mouth every 8 hours as needed for Nausea           FOLLOW UP  [facility]  ED  [address]          DISPOSITION  Patient was discharged to home in good condition.      Comment: Please note this report has been produced using speech recognition software and may contain errors related to that system including errors in grammar, punctuation, and spelling, as well as words and phrases that may be inappropriate. If there are any questions or concerns please feel free to contact the dictating provider for clarification.            LEONA Pereyra - WING  03/02/21 [id number]

## 2021-03-03 ENCOUNTER — CARE COORDINATION (OUTPATIENT)
Dept: CARE COORDINATION | Age: 76
End: 2021-03-03

## 2021-03-05 ENCOUNTER — CARE COORDINATION (OUTPATIENT)
Dept: CARE COORDINATION | Age: 76
End: 2021-03-05

## 2021-03-05 NOTE — CARE COORDINATION
Patient contacted regarding recent discharge and COVID-19 risk. Discussed COVID-19 related testing which was not done at this time. Test results were not done. Patient informed of results, if available? N/A     Care Transition Nurse/ Ambulatory Care Manager contacted the patient by telephone to perform post discharge assessment. Verified name and  with patient as identifiers. Patient has following risk factors of: SIRS, SOB, NSTEMI, HTN, CAD,Congestive heart failure, COPD, diabetes, chronic kidney disease and PE. CTN/ACM reviewed discharge instructions, medical action plan and red flags related to discharge diagnosis. Reviewed and educated them on any new and changed medications related to discharge diagnosis. Advised obtaining a 90-day supply of all daily and as-needed medications. Patient seen in the ED for Nausea and headache,  says symptoms have not changed and patient is scheduled for an Esophagogastroduodenoscopy and possible biopsy on 3/12/2021. Patient's  says he is hopeful that they can find out what's going on. Patient will be tested for COVID prior to procedure. Education provided regarding infection prevention, and signs and symptoms of COVID-19 and when to seek medical attention with patient who verbalized understanding. Discussed exposure protocols and quarantine from [address] you at higher risk for severe illness  and given an opportunity for questions and concerns. The patient agrees to contact the COVID-19 hotline [phone] or PCP office for questions related to their healthcare. CTN/ACM provided contact information for future reference. From CDC: Are you at higher risk for severe illness?  Wash your hands often.  Avoid close contact (6 feet, which is about two arm lengths) with people who are sick.  Put distance between yourself and other people if COVID-19 is spreading in your community.    Clean and disinfect frequently touched surfaces.  Avoid all cruise travel and non-essential air travel.  Call your healthcare professional if you have concerns about COVID-19 and your underlying condition or if you are sick. For more information on steps you can take to protect yourself, see CDC's How to [id number] [place] [address] for follow-up call in 7-14 days based on severity of symptoms and risk factors.

## 2021-03-16 ENCOUNTER — CARE COORDINATION (OUTPATIENT)
Dept: CARE COORDINATION | Age: 76
End: 2021-03-16

## 2021-04-01 ENCOUNTER — APPOINTMENT (OUTPATIENT)
Dept: CT IMAGING | Age: 76
End: 2021-04-01
Payer: MEDICARE

## 2021-04-01 ENCOUNTER — HOSPITAL ENCOUNTER (EMERGENCY)
Age: 76
Discharge: HOME | End: 2021-04-01
Attending: EMERGENCY MEDICINE
Payer: MEDICARE

## 2021-04-01 VITALS
DIASTOLIC BLOOD PRESSURE: 88 MMHG | WEIGHT: 150 LBS | SYSTOLIC BLOOD PRESSURE: 142 MMHG | TEMPERATURE: 98 F | HEART RATE: 85 BPM | OXYGEN SATURATION: 98 % | BODY MASS INDEX: 24.99 KG/M2 | RESPIRATION RATE: 16 BRPM | HEIGHT: 65 IN

## 2021-04-01 DIAGNOSIS — G47.00 INSOMNIA, UNSPECIFIED TYPE: Primary | ICD-10-CM

## 2021-04-01 LAB
A/G RATIO: 1.3 (ref 1.1–2.2)
ACETAMINOPHEN LEVEL: <5 UG/ML (ref 10–30)
ALBUMIN SERPL-MCNC: 3.9 G/DL (ref 3.4–5)
ALP BLD-CCNC: 72 U/L (ref 40–129)
ALT SERPL-CCNC: 10 U/L (ref 10–40)
AMPHETAMINE SCREEN, URINE: NORMAL
ANION GAP SERPL CALCULATED.3IONS-SCNC: 9 MMOL/L (ref 3–16)
AST SERPL-CCNC: 15 U/L (ref 15–37)
BACTERIA: ABNORMAL /HPF
BARBITURATE SCREEN URINE: NORMAL
BASOPHILS ABSOLUTE: 0.1 K/UL (ref 0–0.2)
BASOPHILS RELATIVE PERCENT: 0.9 %
BENZODIAZEPINE SCREEN, URINE: NORMAL
BILIRUB SERPL-MCNC: <0.2 MG/DL (ref 0–1)
BILIRUBIN URINE: NEGATIVE
BLOOD, URINE: NEGATIVE
BUN BLDV-MCNC: 23 MG/DL (ref 7–20)
CALCIUM SERPL-MCNC: 10.4 MG/DL (ref 8.3–10.6)
CANNABINOID SCREEN URINE: NORMAL
CHLORIDE BLD-SCNC: 101 MMOL/L (ref 99–110)
CLARITY: CLEAR
CO2: 26 MMOL/L (ref 21–32)
COCAINE METABOLITE SCREEN URINE: NORMAL
COLOR: YELLOW
CREAT SERPL-MCNC: 1.3 MG/DL (ref 0.6–1.2)
EOSINOPHILS ABSOLUTE: 0.1 K/UL (ref 0–0.6)
EOSINOPHILS RELATIVE PERCENT: 1.2 %
EPITHELIAL CELLS, UA: ABNORMAL /HPF (ref 0–5)
ETHANOL: NORMAL MG/DL (ref 0–0.08)
GFR AFRICAN AMERICAN: 48
GFR NON-AFRICAN AMERICAN: 40
GLOBULIN: 3.1 G/DL
GLUCOSE BLD-MCNC: 159 MG/DL (ref 70–99)
GLUCOSE URINE: NEGATIVE MG/DL
HCT VFR BLD CALC: 33.1 % (ref 36–48)
HEMOGLOBIN: 10.6 G/DL (ref 12–16)
HYALINE CASTS: ABNORMAL /LPF (ref 0–2)
KETONES, URINE: ABNORMAL MG/DL
LEUKOCYTE ESTERASE, URINE: ABNORMAL
LYMPHOCYTES ABSOLUTE: 2.2 K/UL (ref 1–5.1)
LYMPHOCYTES RELATIVE PERCENT: 25.4 %
Lab: NORMAL
MCH RBC QN AUTO: 25.3 PG (ref 26–34)
MCHC RBC AUTO-ENTMCNC: 32 G/DL (ref 31–36)
MCV RBC AUTO: 78.8 FL (ref 80–100)
METHADONE SCREEN, URINE: NORMAL
MICROSCOPIC EXAMINATION: YES
MONOCYTES ABSOLUTE: 0.8 K/UL (ref 0–1.3)
MONOCYTES RELATIVE PERCENT: 9 %
NEUTROPHILS ABSOLUTE: 5.5 K/UL (ref 1.7–7.7)
NEUTROPHILS RELATIVE PERCENT: 63.5 %
NITRITE, URINE: NEGATIVE
OPIATE SCREEN URINE: NORMAL
OXYCODONE URINE: NORMAL
PDW BLD-RTO: 20.7 % (ref 12.4–15.4)
PH UA: 5.5
PH UA: 5.5 (ref 5–8)
PHENCYCLIDINE SCREEN URINE: NORMAL
PLATELET # BLD: 392 K/UL (ref 135–450)
PMV BLD AUTO: 7 FL (ref 5–10.5)
POTASSIUM REFLEX MAGNESIUM: 4.5 MMOL/L (ref 3.5–5.1)
PROPOXYPHENE SCREEN: NORMAL
PROTEIN UA: NEGATIVE MG/DL
RBC # BLD: 4.2 M/UL (ref 4–5.2)
RBC UA: ABNORMAL /HPF (ref 0–4)
SALICYLATE, SERUM: <0.3 MG/DL (ref 15–30)
SODIUM BLD-SCNC: 136 MMOL/L (ref 136–145)
SPECIFIC GRAVITY UA: 1.02 (ref 1–1.03)
TOTAL PROTEIN: 7 G/DL (ref 6.4–8.2)
URINE REFLEX TO CULTURE: ABNORMAL
URINE TYPE: ABNORMAL
UROBILINOGEN, URINE: 0.2 E.U./DL
WBC # BLD: 8.7 K/UL (ref 4–11)
WBC UA: ABNORMAL /HPF (ref 0–5)

## 2021-04-01 PROCEDURE — 70450 CT HEAD/BRAIN W/O DYE: CPT

## 2021-04-01 PROCEDURE — 6370000000 HC RX 637 (ALT 250 FOR IP): Performed by: EMERGENCY MEDICINE

## 2021-04-01 PROCEDURE — 80307 DRUG TEST PRSMV CHEM ANLYZR: CPT

## 2021-04-01 PROCEDURE — 99284 EMERGENCY DEPT VISIT MOD MDM: CPT

## 2021-04-01 PROCEDURE — 80053 COMPREHEN METABOLIC PANEL: CPT

## 2021-04-01 PROCEDURE — 80143 DRUG ASSAY ACETAMINOPHEN: CPT

## 2021-04-01 PROCEDURE — 81001 URINALYSIS AUTO W/SCOPE: CPT

## 2021-04-01 PROCEDURE — 80179 DRUG ASSAY SALICYLATE: CPT

## 2021-04-01 PROCEDURE — 2580000003 HC RX 258: Performed by: EMERGENCY MEDICINE

## 2021-04-01 PROCEDURE — 85025 COMPLETE CBC W/AUTO DIFF WBC: CPT

## 2021-04-01 PROCEDURE — 82077 ASSAY SPEC XCP UR&BREATH IA: CPT

## 2021-04-01 RX ORDER — 0.9 % SODIUM CHLORIDE 0.9 %
500 INTRAVENOUS SOLUTION INTRAVENOUS ONCE
Status: COMPLETED | OUTPATIENT
Start: 2021-04-01 | End: 2021-04-01

## 2021-04-01 RX ORDER — NITROFURANTOIN 25; 75 MG/1; MG/1
100 CAPSULE ORAL 2 TIMES DAILY
Qty: 20 CAPSULE | Refills: 0 | Status: SHIPPED | OUTPATIENT
Start: 2021-04-01 | End: 2021-04-11

## 2021-04-01 RX ORDER — NITROFURANTOIN 25; 75 MG/1; MG/1
100 CAPSULE ORAL ONCE
Status: COMPLETED | OUTPATIENT
Start: 2021-04-01 | End: 2021-04-01

## 2021-04-01 RX ADMIN — SODIUM CHLORIDE 500 ML: 9 INJECTION, SOLUTION INTRAVENOUS at 02:00

## 2021-04-01 RX ADMIN — NITROFURANTOIN (MONOHYDRATE/MACROCRYSTALS) 100 MG: 75; 25 CAPSULE ORAL at 03:40
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
## 2021-04-02 ENCOUNTER — CARE COORDINATION (OUTPATIENT)
Dept: CARE COORDINATION | Age: 76
End: 2021-04-02

## 2021-04-02 ASSESSMENT — ENCOUNTER SYMPTOMS
CHEST TIGHTNESS: 0
COUGH: 0
STRIDOR: 0
NAUSEA: 0
DIARRHEA: 0
VOMITING: 0
SHORTNESS OF BREATH: 0
ABDOMINAL PAIN: 0
WHEEZING: 0

## 2021-04-05 ENCOUNTER — CARE COORDINATION (OUTPATIENT)
Dept: CARE COORDINATION | Age: 76
End: 2021-04-05

## 2021-04-05 NOTE — CARE COORDINATION
LM:  Called patient to follow up with care. Left message and call back number.   Second Attempt Encounter Resolved

## 2021-05-22 ENCOUNTER — APPOINTMENT (OUTPATIENT)
Dept: CT IMAGING | Age: 76
DRG: 884 | End: 2021-05-22
Payer: MEDICARE

## 2021-05-22 ENCOUNTER — HOSPITAL ENCOUNTER (INPATIENT)
Age: 76
LOS: 3 days | Discharge: HOME | DRG: 884 | End: 2021-05-26
Attending: STUDENT IN AN ORGANIZED HEALTH CARE EDUCATION/TRAINING PROGRAM | Admitting: PSYCHIATRY & NEUROLOGY
Payer: MEDICARE

## 2021-05-22 DIAGNOSIS — F32.A DEPRESSION, UNSPECIFIED DEPRESSION TYPE: Primary | ICD-10-CM

## 2021-05-22 DIAGNOSIS — R45.851 SUICIDAL IDEATION: ICD-10-CM

## 2021-05-22 LAB
A/G RATIO: 1.4 (ref 1.1–2.2)
ACETAMINOPHEN LEVEL: <5 UG/ML (ref 10–30)
ALBUMIN SERPL-MCNC: 4 G/DL (ref 3.4–5)
ALP BLD-CCNC: 62 U/L (ref 40–129)
ALT SERPL-CCNC: 14 U/L (ref 10–40)
AMPHETAMINE SCREEN, URINE: NORMAL
ANION GAP SERPL CALCULATED.3IONS-SCNC: 10 MMOL/L (ref 3–16)
AST SERPL-CCNC: 19 U/L (ref 15–37)
BARBITURATE SCREEN URINE: NORMAL
BASOPHILS ABSOLUTE: 0.1 K/UL (ref 0–0.2)
BASOPHILS RELATIVE PERCENT: 1.1 %
BENZODIAZEPINE SCREEN, URINE: NORMAL
BILIRUB SERPL-MCNC: 0.4 MG/DL (ref 0–1)
BILIRUBIN URINE: NEGATIVE
BLOOD, URINE: NEGATIVE
BUN BLDV-MCNC: 19 MG/DL (ref 7–20)
CALCIUM SERPL-MCNC: 10.1 MG/DL (ref 8.3–10.6)
CANNABINOID SCREEN URINE: NORMAL
CHLORIDE BLD-SCNC: 102 MMOL/L (ref 99–110)
CLARITY: CLEAR
CO2: 28 MMOL/L (ref 21–32)
COCAINE METABOLITE SCREEN URINE: NORMAL
COLOR: YELLOW
CREAT SERPL-MCNC: 1.2 MG/DL (ref 0.6–1.2)
EOSINOPHILS ABSOLUTE: 0.1 K/UL (ref 0–0.6)
EOSINOPHILS RELATIVE PERCENT: 1.1 %
ETHANOL: NORMAL MG/DL (ref 0–0.08)
GFR AFRICAN AMERICAN: 53
GFR NON-AFRICAN AMERICAN: 44
GLOBULIN: 2.9 G/DL
GLUCOSE BLD-MCNC: 94 MG/DL (ref 70–99)
GLUCOSE URINE: NEGATIVE MG/DL
HCT VFR BLD CALC: 33 % (ref 36–48)
HEMOGLOBIN: 10.8 G/DL (ref 12–16)
INFLUENZA A: NOT DETECTED
INFLUENZA B: NOT DETECTED
KETONES, URINE: NEGATIVE MG/DL
LEUKOCYTE ESTERASE, URINE: NEGATIVE
LYMPHOCYTES ABSOLUTE: 3.4 K/UL (ref 1–5.1)
LYMPHOCYTES RELATIVE PERCENT: 42.6 %
Lab: NORMAL
MCH RBC QN AUTO: 26.5 PG (ref 26–34)
MCHC RBC AUTO-ENTMCNC: 32.8 G/DL (ref 31–36)
MCV RBC AUTO: 80.8 FL (ref 80–100)
METHADONE SCREEN, URINE: NORMAL
MICROSCOPIC EXAMINATION: ABNORMAL
MONOCYTES ABSOLUTE: 0.7 K/UL (ref 0–1.3)
MONOCYTES RELATIVE PERCENT: 9.5 %
NEUTROPHILS ABSOLUTE: 3.6 K/UL (ref 1.7–7.7)
NEUTROPHILS RELATIVE PERCENT: 45.7 %
NITRITE, URINE: NEGATIVE
OPIATE SCREEN URINE: NORMAL
OXYCODONE URINE: NORMAL
PDW BLD-RTO: 18.8 % (ref 12.4–15.4)
PH UA: 8.5
PH UA: 8.5 (ref 5–8)
PHENCYCLIDINE SCREEN URINE: NORMAL
PLATELET # BLD: 440 K/UL (ref 135–450)
PMV BLD AUTO: 7.3 FL (ref 5–10.5)
POTASSIUM SERPL-SCNC: 4.5 MMOL/L (ref 3.5–5.1)
PROPOXYPHENE SCREEN: NORMAL
PROTEIN UA: NEGATIVE MG/DL
RBC # BLD: 4.08 M/UL (ref 4–5.2)
SALICYLATE, SERUM: <0.3 MG/DL (ref 15–30)
SARS-COV-2 RNA, RT PCR: NOT DETECTED
SODIUM BLD-SCNC: 140 MMOL/L (ref 136–145)
SPECIFIC GRAVITY UA: 1.01 (ref 1–1.03)
TOTAL PROTEIN: 6.9 G/DL (ref 6.4–8.2)
URINE REFLEX TO CULTURE: ABNORMAL
URINE TYPE: ABNORMAL
UROBILINOGEN, URINE: 0.2 E.U./DL
WBC # BLD: 7.9 K/UL (ref 4–11)

## 2021-05-22 PROCEDURE — 80053 COMPREHEN METABOLIC PANEL: CPT

## 2021-05-22 PROCEDURE — 82077 ASSAY SPEC XCP UR&BREATH IA: CPT

## 2021-05-22 PROCEDURE — 81003 URINALYSIS AUTO W/O SCOPE: CPT

## 2021-05-22 PROCEDURE — 99285 EMERGENCY DEPT VISIT HI MDM: CPT

## 2021-05-22 PROCEDURE — 85025 COMPLETE CBC W/AUTO DIFF WBC: CPT

## 2021-05-22 PROCEDURE — 70450 CT HEAD/BRAIN W/O DYE: CPT

## 2021-05-22 PROCEDURE — 80307 DRUG TEST PRSMV CHEM ANLYZR: CPT

## 2021-05-22 PROCEDURE — 87636 SARSCOV2 & INF A&B AMP PRB: CPT

## 2021-05-22 PROCEDURE — 80143 DRUG ASSAY ACETAMINOPHEN: CPT

## 2021-05-22 PROCEDURE — 80179 DRUG ASSAY SALICYLATE: CPT

## 2021-05-23 PROBLEM — F32.A DEPRESSION: Status: ACTIVE | Noted: 2021-05-23

## 2021-05-23 LAB
EKG ATRIAL RATE: 78 BPM
EKG DIAGNOSIS: NORMAL
EKG P AXIS: 60 DEGREES
EKG P-R INTERVAL: 184 MS
EKG Q-T INTERVAL: 386 MS
EKG QRS DURATION: 82 MS
EKG QTC CALCULATION (BAZETT): 440 MS
EKG R AXIS: 39 DEGREES
EKG T AXIS: 63 DEGREES
EKG VENTRICULAR RATE: 78 BPM

## 2021-05-23 PROCEDURE — 6370000000 HC RX 637 (ALT 250 FOR IP): Performed by: PSYCHIATRY & NEUROLOGY

## 2021-05-23 PROCEDURE — 99223 1ST HOSP IP/OBS HIGH 75: CPT | Performed by: PSYCHIATRY & NEUROLOGY

## 2021-05-23 PROCEDURE — 93010 ELECTROCARDIOGRAM REPORT: CPT | Performed by: INTERNAL MEDICINE

## 2021-05-23 PROCEDURE — 93005 ELECTROCARDIOGRAM TRACING: CPT | Performed by: PSYCHIATRY & NEUROLOGY

## 2021-05-23 PROCEDURE — 99222 1ST HOSP IP/OBS MODERATE 55: CPT | Performed by: PHYSICIAN ASSISTANT

## 2021-05-23 PROCEDURE — 1240000000 HC EMOTIONAL WELLNESS R&B

## 2021-05-23 RX ORDER — CITALOPRAM 20 MG/1
10 TABLET ORAL DAILY
Status: DISCONTINUED | OUTPATIENT
Start: 2021-05-23 | End: 2021-05-26 | Stop reason: HOSPADM

## 2021-05-23 RX ORDER — OXYBUTYNIN CHLORIDE 10 MG/1
10 TABLET, EXTENDED RELEASE ORAL DAILY
COMMUNITY

## 2021-05-23 RX ORDER — LEVOTHYROXINE SODIUM 0.03 MG/1
25 TABLET ORAL DAILY
Status: DISCONTINUED | OUTPATIENT
Start: 2021-05-23 | End: 2021-05-26 | Stop reason: HOSPADM

## 2021-05-23 RX ORDER — TRAZODONE HYDROCHLORIDE 50 MG/1
50 TABLET ORAL NIGHTLY
Status: ON HOLD | COMMUNITY
End: 2022-10-11 | Stop reason: HOSPADM

## 2021-05-23 RX ORDER — MAGNESIUM HYDROXIDE/ALUMINUM HYDROXICE/SIMETHICONE 120; 1200; 1200 MG/30ML; MG/30ML; MG/30ML
30 SUSPENSION ORAL EVERY 6 HOURS PRN
Status: DISCONTINUED | OUTPATIENT
Start: 2021-05-23 | End: 2021-05-26 | Stop reason: HOSPADM

## 2021-05-23 RX ORDER — MELOXICAM 7.5 MG/1
7.5 TABLET ORAL DAILY
Status: ON HOLD | COMMUNITY
End: 2022-10-11 | Stop reason: HOSPADM

## 2021-05-23 RX ORDER — BENZTROPINE MESYLATE 1 MG/ML
1 INJECTION INTRAMUSCULAR; INTRAVENOUS 2 TIMES DAILY PRN
Status: DISCONTINUED | OUTPATIENT
Start: 2021-05-23 | End: 2021-05-26 | Stop reason: HOSPADM

## 2021-05-23 RX ORDER — ONDANSETRON 4 MG/1
4 TABLET, ORALLY DISINTEGRATING ORAL EVERY 8 HOURS PRN
Status: DISCONTINUED | OUTPATIENT
Start: 2021-05-23 | End: 2021-05-26 | Stop reason: HOSPADM

## 2021-05-23 RX ORDER — FERROUS SULFATE 325(65) MG
325 TABLET ORAL 2 TIMES DAILY WITH MEALS
Status: DISCONTINUED | OUTPATIENT
Start: 2021-05-23 | End: 2021-05-26 | Stop reason: HOSPADM

## 2021-05-23 RX ORDER — ACETAMINOPHEN 325 MG/1
650 TABLET ORAL EVERY 4 HOURS PRN
Status: DISCONTINUED | OUTPATIENT
Start: 2021-05-23 | End: 2021-05-25

## 2021-05-23 RX ORDER — OXYBUTYNIN CHLORIDE 5 MG/1
10 TABLET, EXTENDED RELEASE ORAL DAILY
Status: DISCONTINUED | OUTPATIENT
Start: 2021-05-23 | End: 2021-05-26 | Stop reason: HOSPADM

## 2021-05-23 RX ORDER — FUROSEMIDE 20 MG/1
20 TABLET ORAL DAILY
Status: DISCONTINUED | OUTPATIENT
Start: 2021-05-23 | End: 2021-05-26 | Stop reason: HOSPADM

## 2021-05-23 RX ORDER — HALOPERIDOL 5 MG/ML
2 INJECTION INTRAMUSCULAR EVERY 6 HOURS PRN
Status: DISCONTINUED | OUTPATIENT
Start: 2021-05-23 | End: 2021-05-26 | Stop reason: HOSPADM

## 2021-05-23 RX ORDER — TRAZODONE HYDROCHLORIDE 50 MG/1
25 TABLET ORAL NIGHTLY PRN
Status: DISCONTINUED | OUTPATIENT
Start: 2021-05-23 | End: 2021-05-26 | Stop reason: HOSPADM

## 2021-05-23 RX ORDER — NICOTINE 21 MG/24HR
1 PATCH, TRANSDERMAL 24 HOURS TRANSDERMAL DAILY
Status: DISCONTINUED | OUTPATIENT
Start: 2021-05-23 | End: 2021-05-26 | Stop reason: HOSPADM

## 2021-05-23 RX ORDER — MELOXICAM 15 MG/1
7.5 TABLET ORAL DAILY
Status: DISCONTINUED | OUTPATIENT
Start: 2021-05-23 | End: 2021-05-26 | Stop reason: HOSPADM

## 2021-05-23 RX ORDER — TRAZODONE HYDROCHLORIDE 50 MG/1
50 TABLET ORAL NIGHTLY
Status: DISCONTINUED | OUTPATIENT
Start: 2021-05-23 | End: 2021-05-26 | Stop reason: HOSPADM

## 2021-05-23 RX ORDER — TRAZODONE HYDROCHLORIDE 50 MG/1
25 TABLET ORAL NIGHTLY PRN
Status: DISCONTINUED | OUTPATIENT
Start: 2021-05-23 | End: 2021-05-23

## 2021-05-23 RX ORDER — HALOPERIDOL 1 MG/1
2 TABLET ORAL EVERY 6 HOURS PRN
Status: DISCONTINUED | OUTPATIENT
Start: 2021-05-23 | End: 2021-05-26 | Stop reason: HOSPADM

## 2021-05-23 RX ADMIN — MELOXICAM 7.5 MG: 15 TABLET ORAL at 15:57

## 2021-05-23 RX ADMIN — CITALOPRAM HYDROBROMIDE 10 MG: 20 TABLET ORAL at 20:29

## 2021-05-23 RX ADMIN — TRAZODONE HYDROCHLORIDE 25 MG: 50 TABLET ORAL at 02:00

## 2021-05-23 RX ADMIN — OXYBUTYNIN CHLORIDE 10 MG: 5 TABLET, EXTENDED RELEASE ORAL at 15:58

## 2021-05-23 RX ADMIN — FUROSEMIDE 20 MG: 20 TABLET ORAL at 15:58

## 2021-05-23 RX ADMIN — NICOTINE POLACRILEX 4 MG: 4 GUM, CHEWING ORAL at 15:58

## 2021-05-23 RX ADMIN — TRAZODONE HYDROCHLORIDE 50 MG: 50 TABLET ORAL at 20:29

## 2021-05-23 RX ADMIN — LEVOTHYROXINE SODIUM 25 MCG: 25 TABLET ORAL at 15:57

## 2021-05-23 RX ADMIN — FERROUS SULFATE TAB 325 MG (65 MG ELEMENTAL FE) 325 MG: 325 (65 FE) TAB at 15:57

## 2021-05-23 RX ADMIN — ACETAMINOPHEN 650 MG: 325 TABLET ORAL at 04:36

## 2021-05-23 ASSESSMENT — PAIN SCALES - GENERAL
PAINLEVEL_OUTOF10: 3
PAINLEVEL_OUTOF10: 0

## 2021-05-23 ASSESSMENT — SLEEP AND FATIGUE QUESTIONNAIRES
RESTFUL SLEEP: NO
DO YOU USE A SLEEP AID: YES
SLEEP PATTERN: INSOMNIA
DIFFICULTY ARISING: NO
DIFFICULTY STAYING ASLEEP: YES
DIFFICULTY STAYING ASLEEP: YES
DIFFICULTY FALLING ASLEEP: YES
AVERAGE NUMBER OF SLEEP HOURS: 5

## 2021-05-23 ASSESSMENT — PAIN DESCRIPTION - PAIN TYPE: TYPE: CHRONIC PAIN

## 2021-05-23 ASSESSMENT — ENCOUNTER SYMPTOMS
SHORTNESS OF BREATH: 0
VOMITING: 0
COUGH: 0
ABDOMINAL PAIN: 0

## 2021-05-23 ASSESSMENT — PAIN DESCRIPTION - ORIENTATION: ORIENTATION: LEFT

## 2021-05-23 NOTE — BH NOTE
Senior Purposeful Rounding    Position:Repositions Self    Physical Environment:Room free from clutter, Clear path to bathroom , Adequate lighting, Bed alarm functioning, No safety hazards noted and Stay with me protocol    Pain Rating/ Nonverbal Pain Behaviors:denies;     Pain interventions Attempted: none    Patient Toileted:Continent

## 2021-05-23 NOTE — BH NOTE
refuse):            ( )  Recognizing danger situations (included triggers and roadblocks)                    ( )  Coping skills (new ways to manage stress, exercise, relaxation techniques, changing routine, distraction)                                                           ( )  Basic information about quitting (benefits of quitting, techniques in how to quit, available resources  ( ) Referral for counseling faxed to [facility]                                           ( ) Patient refused counseling  ( ) Patient has not smoked in the last 30 days    Metabolic Screening:    Lab Results   Component Value Date    LABA1C 6.9 01/17/2021       Lab Results   Component Value Date    CHOL 102 02/29/2020    CHOL 245 (H) 12/24/2016     Lab Results   Component Value Date    TRIG 78 02/29/2020    TRIG 60 12/24/2016     Lab Results   Component Value Date    HDL 36 (L) 02/29/2020    HDL 92 (H) 12/24/2016     No components found for: LDLCAL  Lab Results   Component Value Date    LABVLDL 16 02/29/2020    LABVLDL 12 12/24/2016         Body mass index is 21.67 kg/m². BP Readings from Last 2 Encounters:   05/23/21 (!) 150/78   04/01/21 (!) 142/88           Pt admitted with followings belongings:  Dentures: None  Vision - Corrective Lenses: None  Hearing Aid: None  Jewelry: None  Body Piercings Removed: N/A  Were All Patient Medications Collected?: Not Applicable     Pt A+Ox3, disoriented to situation. She is brightened and has some confused speech. Pt denies HI/AVH and no RTIS noted. Concerning SI, pt states that, \"Sometimes I wish I could just go to sleep and not wake up\". She did contract for safety. She has been having trouble sleeping and has a history of insomnia. Trazodone PRN given and she takes her pills whole. She is unsteady and uses a walker. She is also incontinent at times.   Pt is currently resting in bed with the bed alarm on.  Mara White RN

## 2021-05-23 NOTE — ED NOTES
Presenting Problem: Suicidal Ideation/Forgetfulness     Appearance/Hygiene:  ill-appearing, hospital attire, seated in bed, fair grooming and fair hygiene   Motor Behavior: WNL   Attitude: cooperative  Affect: anxiety and depressed  Speech: pressured  Mood: depressed   Thought Processes: [place]  Perceptions: Absent   Thought content: wants to sleep and not feel so depressed   Suicidal ideation:  general plan to harm self - wants to take a pill and never wake up  Homicidal ideation:  none  Orientation: A&Ox3  Memory: impaired remote memory  Concentration: Fair    Insight/ judgement: impaired judgment and impaired insight      Psychosocial and contextual factors: Patient lives at home with her  - she states that she knows she has become more forgetful and has been feeling more down - she states that she wishes she could take a pill and and never wake up - per patients  Druze Ruben, patient has had an increase in depression and making statements - he states that he doesn't want her in a nursing home, he wants her home with him but is extremely worried that she will do something while he is at work. They both state that she isn't sleeping.     C-SSRS Summary (including current and past suicidal ideation, plan, intent, and attempts) : Current suicidal - wants to take a pill and never wake up    Psychiatric History: No    Patient reported diagnosis: Depression    Outpatient services/ Provider: None    Previous Inpatient Admissions( including location and dates if known): None    Self-injurious/ Self-harm behavior: Denies    History of violence: Denies    Current Substance use: Denies    Trauma identified: Denies    Access to Firearms: Denies    ASSESSMENT FOR IMMINENT FUTURE DANGER:      RISK FACTORS:    [x]  Age <25 or >49   []  Male gender   [x]  Depressed mood   [x]  Active suicidal ideation   []  Suicide plan   []  Suicide attempt   []  Access to lethal means   []  Prior suicide attempt   []  Active substance abuse   []  Highly impulsive behaviors   [x]  Not attending to self-care/ADLs    []  Recent significant loss   []  Chronic pain or medical illness   []  Social isolation   []  History of violence   []  Active psychosis   [x]  Cognitive impairment    [x]  No outpatient services in place   []  Medication noncompliance   []  No collateral information to support safety      PROTECTIVE FACTORS:  [] Age >25 and <55  [x] Female gender   [] Denies depression  [] Denies suicidal ideation  [] Does not have lethal plan   [x] Does not have access to guns or weapons  [x] Patient is verbally lou for safety  [x] No prior suicide attempts  [x] No active substance abuse  [x] Patient has social or family support  [] No active psychosis or cognitive dysfunction  [] Physically healthy  [] Has outpatient services in place  [] Compliant with recommended medications  [] Collateral information from  supports patient safety   [] Patient is future oriented with plans to   []        Clinical Summary:    Patient presents to [facility] ED voluntarily after  became worried due to patient increase in depression. Patient was clinically sober at the time of the evaluation. Patient was evaluated and offered supportive counseling.                  Kerrie Vicente RN  05/23/21 0044

## 2021-05-23 NOTE — GROUP NOTE
Group Therapy Note    Date: 5/23/2021    Group Start Time: 10:00 AM  Group End Time: 11:00 AM  Group Topic: Recreational    [facility]        Group Therapy Note    Attendees: 2       Patient's Goal:  Pt will participate in sari Andres Roque where they will socialize and reminisce. Notes: Pt was very pleasant during group. Pt stayed on task throughout group and was encouraging to other group members to do the same. Status After Intervention:  Improved    Participation Level:  Active Listener and Interactive    Participation Quality: Appropriate, Attentive, Sharing and Supportive      Speech:  normal      Thought Process/Content: Logical      Affective Functioning: Congruent      Mood: elevated      Level of consciousness:  Alert      Response to Learning: Able to verbalize current knowledge/experience and Progressing to goal      Endings: None Reported    Modes of Intervention: Socialization and Movement      Discipline Responsible: /Counselor      Signature:  CLAUDE Zabala

## 2021-05-23 NOTE — ED NOTES
Level of Care Disposition: Admit  Patient was seen by ED provider and [facility] AN AFFILIATE OF [facility] staff. The case presented to psychiatric provider on-call Dr. Josh Reyes. Based on the ED evaluation and information presented to the provider by Adore Kirby RN it is the recommendation of the on call psychiatric provider that inpatient hospitalization is the least restrictive environment for the patient at this time. The patient will be admitted to the inpatient unit. Admitting provider did not order suicide precautions based on patient currently lou for safety.     RATIONALE FOR ADMISSION:   Patient at imminent risk of danger to self as demonstrated by wanting to take a pill to sleep and never wake up

## 2021-05-23 NOTE — H&P
Hospital Medicine History & Physical      PCP: Gonzales Palm MD    Date of Admission: 2021    Date of Service: Pt seen/examined on 2021     Chief Complaint:    Chief Complaint   Patient presents with    Psychiatric Evaluation      brought wife in for pscy eval, pt is tearful and not making any sense,  states he is worried about pt dearly especially when he has to go to work, pt has been very forgetful lately and pt admits to having SI's at times          History Of Present Illness: The patient is a 77 y.o. female with history of CHF with recovered EF, nonobstructive CAD, DM, CBA  who presented to [facility] ER for psychiatric eval.  Patient was seen and evaluated in the ED by the ED medical provider, patient was medically cleared for admission to [facility] at [facility]. This note serves as an admission medical H&P. Patient denies any medical complaints - she is intermittently tearful during exam, hard to complete history     Past Medical History:        Diagnosis Date    Acute systolic CHF (congestive heart failure) (HCC)     Asthma     Back ache     Cataract     Cerebral artery occlusion with cerebral infarction ([address] 2016    Diabetes mellitus ([facility] [address]     type !! - diet controlled    Hyperlipidemia     Hypertension     Insomnia     TIA (transient ischemic attack) 2016       Past Surgical History:        Procedure Laterality Date    ABDOMEN SURGERY      c section    CATARACT REMOVAL WITH IMPLANT Left [id number]    LEFT EYE CATARACT PHACOEMULSIFICATION INTRAOCULAR LENS     SECTION      DENTAL SURGERY      EYE SURGERY  10/29/13     RIGHT EYE CATARACT PHACOEMULSIFICATION INTRAOCULAR LENS       Medications Prior to Admission:    Prior to Admission medications    Medication Sig Start Date End Date Taking?  Authorizing Provider   meloxicam (MOBIC) 7.5 MG tablet Take 7.5 mg by mouth daily   Yes Historical Provider, MD   oxybutynin (DITROPAN-XL) 10 MG extended release tablet Take 10 mg by mouth daily   Yes Historical Provider, MD   traZODone (DESYREL) 50 MG tablet Take 50 mg by mouth nightly   Yes Historical Provider, MD   ondansetron (ZOFRAN ODT) 4 MG disintegrating tablet Take 1 tablet by mouth every 8 hours as needed for Nausea 3/2/21  Yes LEONA Lagunas - CNP   furosemide (LASIX) 20 MG tablet Take 1 tablet by mouth daily 1/20/21  Yes Edwin Nieves MD   ferrous sulfate (IRON 325) 325 (65 Fe) MG tablet Take 1 tablet by mouth 2 times daily (with meals) 1/19/21  Yes Edwin Nieves MD   metFORMIN (GLUCOPHAGE) 500 MG tablet Take 500 mg by mouth 12/19/18  Yes Historical Provider, MD   levothyroxine (SYNTHROID) 25 MCG tablet Take 1 tablet by mouth Daily 10/20/17  Yes Rocio Castanon MD       Allergies:  Mold extract [trichophyton mentagrophytes]    Social History:  The patient currently lives at [facility] with      TOBACCO:   reports that she has been smoking cigarettes. She has a 116.00 pack-year smoking history. She has never used smokeless tobacco.  ETOH:   reports previous alcohol use.       Family History:   Positive as follows:        Problem Relation Age of Onset    High Blood Pressure Mother    [de-identified] / [place] Mother     Cancer Father     Diabetes Father     Heart Disease Father     Kidney Disease Father     Asthma Sister     Cancer Sister         Breast Cancer    Depression Brother     Substance Abuse Paternal Aunt     Stroke Maternal Grandmother     Diabetes Paternal Grandmother        REVIEW OF SYSTEMS:       Constitutional: Negative for fever   HENT: Negative for sore throat   Eyes: Negative for redness   Respiratory: Negative  for dyspnea, cough   Cardiovascular: Negative for chest pain   Gastrointestinal: Negative for vomiting, diarrhea   Genitourinary: Negative for hematuria   Musculoskeletal: Negative for arthralgias   Skin: Negative for rash   Neurological: Negative for syncope    Hematological: Negative for easy bruising/bleeding Psychiatric/Behavorial: Per psychiatry team evaluation     PHYSICAL EXAM:    /74   Pulse 80   Temp 97.5 °F (36.4 °C) (Temporal)   Resp 17   Ht 5' 5\" (1.651 m)   Wt 130 lb 3.2 oz (59.1 kg)   SpO2 94%   BMI 21.67 kg/m²     Gen: No distress. Alert. Eyes: PERRL. No sclera icterus. No conjunctival injection. ENT: No discharge. Pharynx clear. Poor dentition   Neck: No JVD. No Carotid Bruit. Trachea midline. Resp: No accessory muscle use. No crackles. No wheezes. No rhonchi. CV: Regular rate. Regular rhythm. No murmur. No rub. No edema. GI: Non-tender. Non-distended. Normal bowel sounds. Skin: Warm and dry. No nodule on exposed extremities. No rash on exposed extremities. M/S: No cyanosis. No joint deformity. No clubbing. Neuro: Awake. No focal neurologic deficit on exam.  Cranial nerves II through XII intact. Patient is able to ambulate without difficulty. Psych: Per psychiatry team evaluation     CBC:   Recent Labs     05/22/21 1943   WBC 7.9   HGB 10.8*   HCT 33.0*   MCV 80.8        BMP:   Recent Labs     05/22/21 1943      K 4.5      CO2 28   BUN 19   CREATININE 1.2     LIVER PROFILE:   Recent Labs     05/22/21 1943   AST 19   ALT 14   BILITOT 0.4   ALKPHOS 62     PT/INR: No results for input(s): PROTIME, INR in the last 72 hours. APTT: No results for input(s): APTT in the last 72 hours.   UA:  Recent Labs     05/22/21 2035   COLORU Yellow   PHUR 8.5  8.5*   CLARITYU Clear   SPECGRAV 1.015   LEUKOCYTESUR Negative   UROBILINOGEN 0.2   BILIRUBINUR Negative   BLOODU Negative   GLUCOSEU Negative          U/A:    Lab Results   Component Value Date    NITRITE neg 12/13/2012    COLORU Yellow 05/22/2021    WBCUA 6-9 04/01/2021    RBCUA 3-4 04/01/2021    MUCUS 3+ 02/12/2017    BACTERIA 1+ 04/01/2021    CLARITYU Clear 05/22/2021    SPECGRAV 1.015 05/22/2021    LEUKOCYTESUR Negative 05/22/2021    BLOODU Negative 05/22/2021    GLUCOSEU Negative 05/22/2021

## 2021-05-23 NOTE — H&P
Psychiatry Initial Evaluation    Admission Date:    5/22/2021    Chief complaint / Reason for Admission:  Mood issues and suicidal thoughts    HPI:   Patient is a 55-year-old female with no formal past psychiatric history was brought to the hospital by her  yesterday reporting concerns about her mood and potentially being suicidal.    According to assessment performed in the [facility]:  Patient lives at home with her  - she states that she knows she has become more forgetful and has been feeling more down - she states that she wishes she could take a pill and and never wake up - per patients  Calvin Asher, patient has had an increase in depression and making statements - he states that he doesn't want her in a nursing home, he wants her home with him but is extremely worried that she will do something while he is at work. They both state that she isn't sleeping. On interview this afternoon patient was alert and oriented to self, location, month, year, but not date, nor situation. She confabulated having presented to the emergency room for physical complaint yesterday and seemed unaware that she was on a psychiatric unit. She was notably tangential with loose associations and exhibited of rambling, borderline pressured speech that required frequent interruption and redirection. When asked about her mood and having expressed thoughts of wishing she were dead in the emergency room patient did exhibit sudden tearfulness but sudden to the degree that suggested mood lability rather than actual depression. Patients affect was actually quite labile and she could suddenly begin weeping then smile or laugh seconds later. She was not able to provide any linear description of what has led to feeling more depressed and referenced a number of issues, from the death of a horse she may have owned, two issues with cats being removed from her home, to a friend's diagnosis with ALS.     Duration: Subacute  Severity: Severe  Context: as above  Associated symptoms: as above    Past Psychiatric History:    No formal past psychiatric history. The previous suicide attempts. Past Medical History:  Past Medical History:   Diagnosis Date    Acute systolic CHF (congestive heart failure) (HCC)     Asthma     Back ache     Cataract     Cerebral artery occlusion with cerebral infarction ([facility] [address] 12/2016    Diabetes mellitus ([facility] [address]     type !! - diet controlled    Hyperlipidemia     Hypertension     Insomnia     TIA (transient ischemic attack) 12/23/2016       Home Medications:  Prior to Admission medications    Medication Sig Start Date End Date Taking? Authorizing Provider   meloxicam (MOBIC) 7.5 MG tablet Take 7.5 mg by mouth daily   Yes Historical Provider, MD   oxybutynin (DITROPAN-XL) 10 MG extended release tablet Take 10 mg by mouth daily   Yes Historical Provider, MD   traZODone (DESYREL) 50 MG tablet Take 50 mg by mouth nightly   Yes Historical Provider, MD   ondansetron (ZOFRAN ODT) 4 MG disintegrating tablet Take 1 tablet by mouth every 8 hours as needed for Nausea 3/2/21  Yes LEONA Pink - CNP   furosemide (LASIX) 20 MG tablet Take 1 tablet by mouth daily 1/20/21  Yes Santiago Noe MD   ferrous sulfate (IRON 325) 325 (65 Fe) MG tablet Take 1 tablet by mouth 2 times daily (with meals) 1/19/21  Yes Santiago Noe MD   metFORMIN (GLUCOPHAGE) 500 MG tablet Take 500 mg by mouth 12/19/18  Yes Historical Provider, MD   levothyroxine (SYNTHROID) 25 MCG tablet Take 1 tablet by mouth Daily 10/20/17  Yes Michael Khanna MD       Chemical Dependency History:   Tobacco: smokes 1 to 2 packs a day.   Alcohol:denies  Illicit: denies    Family Hx:    Family History   Problem Relation Age of Onset    High Blood Pressure Mother    [de-identified] / [place] Mother     Cancer Father     Diabetes Father     Heart Disease Father     Kidney Disease Father     Asthma Sister     Cancer Sister         Breast Cancer    Depression Brother     Substance Abuse Paternal Aunt     Stroke Maternal Grandmother     Diabetes Paternal Grandmother      Social Hx:   Retired. . Lives with . Has adult children. Current Medications Ordered:   ferrous sulfate  325 mg Oral BID WC    furosemide  20 mg Oral Daily    levothyroxine  25 mcg Oral Daily    meloxicam  7.5 mg Oral Daily    [START ON 5/24/2021] metFORMIN  500 mg Oral Daily with breakfast    oxybutynin  10 mg Oral Daily    traZODone  50 mg Oral Nightly    nicotine  1 patch Transdermal Daily    citalopram  10 mg Oral Daily      PRN Meds: acetaminophen, magnesium hydroxide, aluminum & magnesium hydroxide-simethicone, haloperidol lactate **OR** haloperidol, benztropine mesylate, traZODone, ondansetron, nicotine polacrilex     ROS:    Psychiatric: as per HPI, otherwise negative. All other systems were reviewed and negative except as previously documented in HPI.     PE:    BP (!) 170/80   Pulse 76   Temp 97.1 °F (36.2 °C) (Temporal)   Resp 16   Ht 5' 5\" (1.651 m)   Wt 130 lb 3.2 oz (59.1 kg)   SpO2 95%   BMI 21.67 kg/m²       Motor / Gait: no abnormalities noted, nml tone, no involuntary movements, gait unsteady    Mental Status Examination:    Appearance: WF, appears stated age, wearing hospital gown, dishevled grooming and hygiene  Behavior/Attitude toward examiner:  Cooperative, fair eye contact  Speech:  rambling  Mood:  Fluctuating  Affect:  Labile  Thought processes:  tangential with loose associations  Thought Content: passive SI, denies HI, no delusions voiced  Perceptions: denies AVH, not RTIS  Attention: poor  Abstraction: concrete  Cognition: Average BRY, Alert and oriented to person, place, month, year, but not situation or date, recall impaired  Insight: Minimal insight   Judgment: Limited judgment      LAB:   Admission on 05/22/2021   Component Date Value Ref Range Status    WBC 05/22/2021 7.9  4.0 - 11.0 K/uL Final    RBC 05/22/2021 4.08 4.00 - 5.20 M/uL Final    Hemoglobin 05/22/2021 10.8* 12.0 - 16.0 g/dL Final    Hematocrit 05/22/2021 33.0* 36.0 - 48.0 % Final    MCV 05/22/2021 80.8  80.0 - 100.0 fL Final    MCH 05/22/2021 26.5  26.0 - 34.0 pg Final    MCHC 05/22/2021 32.8  31.0 - 36.0 g/dL Final    RDW 05/22/2021 18.8* 12.4 - 15.4 % Final    Platelets 77/11/0950 440  135 - 450 K/uL Final    MPV 05/22/2021 7.3  5.0 - 10.5 fL Final    Neutrophils % 05/22/2021 45.7  % Final    Lymphocytes % 05/22/2021 42.6  % Final    Monocytes % 05/22/2021 9.5  % Final    Eosinophils % 05/22/2021 1.1  % Final    Basophils % 05/22/2021 1.1  % Final    Neutrophils Absolute 05/22/2021 3.6  1.7 - 7.7 K/uL Final    Lymphocytes Absolute 05/22/2021 3.4  1.0 - 5.1 K/uL Final    Monocytes Absolute 05/22/2021 0.7  0.0 - 1.3 K/uL Final    Eosinophils Absolute 05/22/2021 0.1  0.0 - 0.6 K/uL Final    Basophils Absolute 05/22/2021 0.1  0.0 - 0.2 K/uL Final    Sodium 05/22/2021 140  136 - 145 mmol/L Final    Potassium 05/22/2021 4.5  3.5 - 5.1 mmol/L Final    Chloride 05/22/2021 102  99 - 110 mmol/L Final    CO2 05/22/2021 28  21 - 32 mmol/L Final    Anion Gap 05/22/2021 10  3 - 16 Final    Glucose 05/22/2021 94  70 - 99 mg/dL Final    BUN 05/22/2021 19  7 - 20 mg/dL Final    CREATININE 05/22/2021 1.2  0.6 - 1.2 mg/dL Final    GFR Non- 05/22/2021 44* >60 Final    Comment: >60 mL/min/1.73m2 EGFR, calc. for ages 25 and older using the  MDRD formula (not corrected for weight), is valid for stable  renal function.  GFR  05/22/2021 53* >60 Final    Comment: Chronic Kidney Disease: less than 60 ml/min/1.73 sq.m. Kidney Failure: less than 15 ml/min/1.73 sq.m. Results valid for patients 18 years and older.       Calcium 05/22/2021 10.1  8.3 - 10.6 mg/dL Final    Total Protein 05/22/2021 6.9  6.4 - 8.2 g/dL Final    Albumin 05/22/2021 4.0  3.4 - 5.0 g/dL Final    Albumin/Globulin Ratio 05/22/2021 1.4  1.1 - 2.2 Final    Total Bilirubin 05/22/2021 0.4  0.0 - 1.0 mg/dL Final    Alkaline Phosphatase 05/22/2021 62  40 - 129 U/L Final    ALT 05/22/2021 14  10 - 40 U/L Final    AST 05/22/2021 19  15 - 37 U/L Final    Globulin 05/22/2021 2.9  g/dL Final    Ethanol Lvl 05/22/2021 None Detected  mg/dL Final    Comment:    None Detected  Conversion factor:  100 mg/dl = .100 g/dl  For Medical Purposes Only      Salicylate, Serum 68/45/1560 <0.3* 15.0 - 30.0 mg/dL Final    Comment: Therapeutic Range: 15.0-30.0 mg/dL  Toxic: >30.0 mg/dL      Acetaminophen Level 05/22/2021 <5* 10 - 30 ug/mL Final    Comment: Therapeutic Range: 10.0-30.0 ug/mL  Toxic: >=150 ug/mL      Color, UA 05/22/2021 Yellow  Straw/Yellow Final    Clarity, UA 05/22/2021 Clear  Clear Final    Glucose, Ur 05/22/2021 Negative  Negative mg/dL Final    Bilirubin Urine 05/22/2021 Negative  Negative Final    Ketones, Urine 05/22/2021 Negative  Negative mg/dL Final    Specific Gravity, UA 05/22/2021 1.015  1.005 - 1.030 Final    Blood, Urine 05/22/2021 Negative  Negative Final    pH, UA 05/22/2021 8.5* 5.0 - 8.0 Final    Protein, UA 05/22/2021 Negative  Negative mg/dL Final    Urobilinogen, Urine 05/22/2021 0.2  <2.0 E.U./dL Final    Nitrite, Urine 05/22/2021 Negative  Negative Final    Leukocyte Esterase, Urine 05/22/2021 Negative  Negative Final    Microscopic Examination 05/22/2021 Not Indicated   Final    Urine Type 05/22/2021 see below   Final    Urine received in a container without preservatives.  NotGiven    Urine Reflex to Culture 05/22/2021 Not Indicated   Final    Amphetamine Screen, Urine 05/22/2021 Neg  Negative <1000ng/mL Final    Barbiturate Screen, Ur 05/22/2021 Neg  Negative <200 ng/mL Final    Benzodiazepine Screen, Urine 05/22/2021 Neg  Negative <200 ng/mL Final    Cannabinoid Scrn, Ur 05/22/2021 Neg  Negative <50 ng/mL Final    Cocaine Metabolite Screen, Urine 05/22/2021 Neg  Negative <300 ng/mL Final    Opiate Scrn, Ur 05/22/2021 Neg  Negative <300 ng/mL Final    Comment: \"Therapeutic levels of pain medication, especially oxycontin and synthetic  opioids, may not be detected by this Methodology. Pain management screen  panel  Drug panel-PM-Hi Res Ur, Interp (PAIN) should be considered for drug  monitoring \".  PCP Screen, Urine 05/22/2021 Neg  Negative <25 ng/mL Final    Methadone Screen, Urine 05/22/2021 Neg  Negative <300 ng/mL Final    Propoxyphene Scrn, Ur 05/22/2021 Neg  Negative <300 ng/mL Final    Oxycodone Urine 05/22/2021 Neg  Negative <100 ng/ml Final    pH, UA 05/22/2021 8.5   Final    Comment: Urine pH less than 5.0 or greater than 8.0 may indicate sample adulteration. Another sample should be collected if clinically  indicated.  Drug Screen Comment: 05/22/2021 see below   Final    Comment: This method is a screening test to detect only these drug  classes as part of a medical workup. Confirmatory testing  by another method should be ordered if clinically indicated.  SARS-CoV-2 RNA, RT PCR 05/22/2021 NOT DETECTED  NOT DETECTED Final    Comment: Not Detected results do not preclude SARS-CoV-2 infection and  should not be used as the sole basis for patient management  decisions. Results must be combined with clinical observations,  patient history, and epidemiological information. Testing was performed using KATIE CAROLYNE SARS-CoV-2 and Influenza A/B  nucleic acid assay. This test is a multiplex Real-Time Reverse  Transcriptase Polymerase Chain Reaction (RT-PCR)-based in vitro  diagnostic test intended for the qualitative detection of nucleic  acids from SARS-CoV-2, influenza A, and influenza B in nasopharyngeal  and nasal swab specimens for use under the FDAs Emergency Use  Authorization (EUA) only.     Patient Fact Sheet:  FindDrives.pl  Provider Fact Sheet: FindDrives.pl  EUA: FindDrives.pl  IFU: http://sylvia.org/    Methodology:  RT-PCR      INFLUENZA A 05/22/2021 NOT DETECTED  NOT DETECTED Final    INFLUENZA B 05/22/2021 NOT DETECTED  NOT DETECTED Final    Ventricular Rate 05/23/2021 78  BPM Final    Atrial Rate 05/23/2021 78  BPM Final    P-R Interval 05/23/2021 184  ms Final    QRS Duration 05/23/2021 82  ms Final    Q-T Interval 05/23/2021 386  ms Final    QTc Calculation (Bazett) 05/23/2021 440  ms Final    P Axis 05/23/2021 60  degrees Final    R Axis 05/23/2021 39  degrees Final    T Axis 05/23/2021 63  degrees Final    Diagnosis 05/23/2021 Sinus rhythm with occasional Premature ventricular complexesSeptal infarct (cited on or before 30-NOV-2020)Abnormal ECGWhen compared with ECG of 16-JAN-2021 16:01,No significant change was foundConfirmed by Ivone West MD, Gopal Asher ([id number]) on 5/23/2021 2:07:57 PM   Final           Assessment and Plan:    Diagnoses:   Primary Psychiatric (DSM V) Diagnosis: Unspecified mood disorder  Secondary Psychiatric (DSM V) Diagnoses: probable major neurocognitive disorder  Chemical Dependency Diagnoses: tobacco use disorder, severe    All conditions detailed above are being treated while patient is hospitalized. Tx plan: Generally: prevent self injury/aggression, stabilize mood/anxiety/psychotic/behavioral disturbance, establish/maintain aftercare, increase coping mechanisms, improve medication compliance. All conditions present on admission are being treated while pt is hospitalized. Primary Psychiatric Issues:  1. Mood disorder; Neurocognitive disorder:  Symptoms and objective presentation more concerning for behavioral disturbance secondary to dementia particularly with the vascular burden visible on head imaging.  -Initiate citalopram 10 mg daily. Chemical Dependency Issues:  NRT    Medical Problems:  Internal medicine has been consulted. Appreciate recs.     Code Status: full    Disposition:    -Housing: with family  -Current outpatient follow-up: PCP    Criteria for Discharge:  Not psychotic, not homicidal, not suicidal, behavioral disturbance under control, sleeping well, mood improved/stable, eating well, aftercare arranged. Spent > 70 minutes face to face with patient of which >50% was spent counseling and providing education regarding diagnosis, treatment options, and prognosis. Behavioral Services  Medicare Certification Upon Admission    I certify that this patient's inpatient psychiatric hospital admission is medically necessary for:   X (1) Treatment which could reasonably be expected to improve this patient's condition,      X (2) Or for diagnostic study;     AND    X (2) The inpatient psychiatric services are provided while the individual is under the care of a physician and are included in the individualized plan of care.     Estimated length of stay/service 3 to 5 days    Plan for post-hospital care PCP, neuro referral.    Electronically signed by Jeff Rush MD on 5/23/2021 at 7:54 PM

## 2021-05-23 NOTE — BH NOTE
Pt labile this morning. Had a tearful episode, She said, \"Why are there so many angels? \"  Then quickly jumped to telling writer about how her friend was diagnosed with [address] Writer comforted her and episode only lasted about 5 minutes. She is calm now and listening to music.

## 2021-05-23 NOTE — BH NOTE
4 Eyes Skin Assessment     The patient is being assess for  Admission    I agree that 2 RN's have performed a thorough Head to Toe Skin Assessment on the patient. ALL assessment sites listed below have been assessed. Areas assessed by both nurses:   [x]   Head, Face, and Ears   [x]   Shoulders, Back, and Chest  [x]   Arms, Elbows, and Hands   [x]   Coccyx, Sacrum, and Ischum  [x]   Legs, Feet, and Heels        Does the Patient have Skin Breakdown?   No         Johnson Prevention initiated:  No   Wound Care Orders initiated:  No      [facility] nurse consulted for Pressure Injury (Stage 3,4, Unstageable, DTI, NWPT, and Complex wounds):  No      Nurse 1 eSignature: Electronically signed by Jay Wright RN on 5/23/21 at 2:17 AM EDT    **SHARE this note so that the co-signing nurse is able to place an eSignature**    Nurse 2 eSignature: Electronically signed by Isa Garcia RN on 5/23/21 at 5:11 AM EDT

## 2021-05-23 NOTE — ED PROVIDER NOTES
[address] ED  EMERGENCY DEPARTMENT ENCOUNTER        Pt Name: Lory Patel  MRN: [id number]  [place] 1945  Date of evaluation: 5/22/2021  Provider: Dagmar Kinney PA-C  PCP: Chema Zafar MD    Shared Visit or Autonomous Visit:  I have seen and evaluated this patient with my supervising physician Krystian Morrell MD.    CHIEF COMPLAINT       Chief Complaint   Patient presents with    Psychiatric Evaluation      brought wife in for pscy eval, pt is tearful and not making any sense,  states he is worried about pt dearly especially when he has to go to work, pt has been very forgetful lately and pt admits to having SI's at times        HISTORY OF PRESENT ILLNESS   (Location/Symptom, Timing/Onset, Context/Setting, Quality, Duration, Modifying Factors, Severity)  Note limiting factors. Lory Patel is a 77 y.o. female presenting to the emergency department for psychiatric evaluation. Here with . Patient states has been depressed about losing her friends and losing pets and has had suicidal thoughts. Patient states if there was a pill that she could take to make it all go away she would take it.  states she is not safe to be at home by herself while he is working concerned she will hurt herself.  states she has had some confusion/forgetfulness the past 3 weeks. Was seen by primary care doctor suspected dementia due to previous alcoholism, she no longer drinks alcohol. No recent falls. Denies headache. No numbness or focal weakness of extremities. States she gets pains in her legs  states that is been going on for a long time. No cough or fevers. No chest pain or shortness of breath. No abdominal pain. No vomiting. No dysuria states she has urinary frequency. The history is provided by the patient and the spouse.    Mental Health Problem  Presenting symptoms: depression and suicidal thoughts    Progression: Worsening  Chronicity:  New  Context: not alcohol use and not drug abuse    Associated symptoms: no abdominal pain and no chest pain          Nursing Notes were reviewed    REVIEW OF SYSTEMS    (2-9 systems for level 4, 10 or more for level 5)     Review of Systems   Constitutional: Negative for fever. Respiratory: Negative for cough and shortness of breath. Cardiovascular: Negative for chest pain. Gastrointestinal: Negative for abdominal pain and vomiting. Psychiatric/Behavioral: Positive for confusion and suicidal ideas. Depression   All other systems reviewed and are negative. Positives and Pertinent negatives as per HPI. PAST MEDICAL HISTORY     Past Medical History:   Diagnosis Date    Acute systolic CHF (congestive heart failure) (HCC)     Asthma     Back ache     Cataract     Cerebral artery occlusion with cerebral infarction ([facility] 2016    Diabetes mellitus ([facility] [facility]     type !! - diet controlled    Hyperlipidemia     Hypertension     Insomnia     TIA (transient ischemic attack) 2016         SURGICAL HISTORY     Past Surgical History:   Procedure Laterality Date    ABDOMEN SURGERY      c section    CATARACT REMOVAL WITH IMPLANT Left [id number]    LEFT EYE CATARACT PHACOEMULSIFICATION INTRAOCULAR LENS     SECTION      DENTAL SURGERY      EYE SURGERY  10/29/13     RIGHT EYE CATARACT PHACOEMULSIFICATION INTRAOCULAR LENS         CURRENTMEDICATIONS       Previous Medications    ALBUTEROL SULFATE  (90 BASE) MCG/ACT INHALER    Inhale 2 puffs into the lungs every 6 hours as needed for Wheezing    ASPIRIN 81 MG EC TABLET    Take 1 tablet by mouth daily    ATORVASTATIN (LIPITOR) 40 MG TABLET    Take 1 tablet by mouth nightly    BLOOD PRESSURE KIT    1 each by Does not apply route daily    CHOLECALCIFEROL (VITAMIN D-3) 5000 UNITS TABS    Take  by mouth.       FERROUS SULFATE (IRON 325) 325 (65 FE) MG TABLET    Take 1 tablet by mouth 2 times daily (with meals) FISH OIL    by Does not apply route. FLUTICASONE (FLONASE) 50 MCG/ACT NASAL SPRAY    1 spray by Nasal route daily    FLUTICASONE-SALMETEROL (ADVAIR DISKUS) 500-50 MCG/DOSE DISKUS INHALER    Inhale 1 puff into the lungs every 12 hours    FOLIC ACID (FOLVITE) 1 MG TABLET    Take 1 tablet by mouth daily    FUROSEMIDE (LASIX) 20 MG TABLET    Take 1 tablet by mouth daily    INSULIN GLARGINE (LANTUS) 100 UNIT/ML INJECTION VIAL    Inject 10 Units into the skin nightly    IPRATROPIUM-ALBUTEROL (DUONEB) 0.5-2.5 (3) MG/3ML SOLN NEBULIZER SOLUTION    Inhale 3 mLs into the lungs every 6 hours as needed for Shortness of Breath    LEVOTHYROXINE (SYNTHROID) 25 MCG TABLET    Take 1 tablet by mouth Daily    LISINOPRIL (PRINIVIL;ZESTRIL) 20 MG TABLET    Take 1 tablet by mouth daily    METFORMIN (GLUCOPHAGE) 500 MG TABLET    Take 500 mg by mouth    METOPROLOL SUCCINATE (TOPROL XL) 50 MG EXTENDED RELEASE TABLET    Take 1 tablet by mouth daily    MULTIPLE VITAMIN (THERAPEUTIC) TABS TABLET    Take 1 tablet by mouth daily    NICOTINE (NICODERM CQ) 21 MG/24HR    Place 1 patch onto the skin daily    OMEPRAZOLE (PRILOSEC) 20 MG CAPSULE    Take 1 capsule by mouth daily    ONDANSETRON (ZOFRAN ODT) 4 MG DISINTEGRATING TABLET    Take 1 tablet by mouth every 8 hours as needed for Nausea    ONDANSETRON (ZOFRAN-ODT) 4 MG DISINTEGRATING TABLET    Take 1 tablet by mouth 3 times daily as needed for Nausea or Vomiting    PAROXETINE (PAXIL) 40 MG TABLET    Take 1 tablet by mouth every morning    POLYETHYLENE GLYCOL-ELECTROLYTES (TRILYTE) 420 G SOLUTION    Follow Package Instructions unless otherwise directed by physician.     POTASSIUM CHLORIDE (KLOR-CON M) 10 MEQ EXTENDED RELEASE TABLET    Take 1 tablet by mouth daily    TIOTROPIUM (SPIRIVA RESPIMAT) 2.5 MCG/ACT AERS INHALER    Inhale 2 puffs into the lungs daily    VITAMIN B-1 (THIAMINE) 100 MG TABLET    Take 1 tablet by mouth daily    VITAMIN B-12 (CYANOCOBALAMIN) 1000 MCG TABLET    Take 1,000 Normal range of motion and neck supple. Skin:     General: Skin is warm and dry. Findings: No rash. Neurological:      Mental Status: She is alert. GCS: GCS eye subscore is 4. GCS verbal subscore is 5. GCS motor subscore is 6. Cranial Nerves: No cranial nerve deficit. Sensory: Sensation is intact. No sensory deficit. Motor: Motor function is intact. No weakness or abnormal muscle tone. Coordination: Coordination normal. Finger-Nose-Finger Test normal.      Comments: Awake. Alert. Oriented to person and place. States the year is 65. Intact sensation symmetric. Equal strength symmetric. Psychiatric:         Mood and Affect: Mood is depressed. Speech: Speech normal.         Behavior: Behavior is cooperative. Thought Content: Thought content includes suicidal ideation.       Comments: tearful         DIAGNOSTIC RESULTS   LABS:    Labs Reviewed   CBC WITH AUTO DIFFERENTIAL - Abnormal; Notable for the following components:       Result Value    Hemoglobin 10.8 (*)     Hematocrit 33.0 (*)     RDW 18.8 (*)     All other components within normal limits    Narrative:     Performed at:  [facility],  [place], [facility]   Phone [phone]   COMPREHENSIVE METABOLIC PANEL - Abnormal; Notable for the following components:    GFR Non- 44 (*)     GFR  53 (*)     All other components within normal limits    Narrative:     Performed at:  [facility]) - [facility],  [place], [facility]   Phone [phone]   SALICYLATE LEVEL - Abnormal; Notable for the following components:    Salicylate, Serum <1.2 (*)     All other components within normal limits    Narrative:     Performed at:  [facility]) - [facility],  [place], [facility]   Phone [phone]   ACETAMINOPHEN LEVEL - Abnormal; Notable for the following components: Acetaminophen Level <5 (*)     All other components within normal limits    Narrative:     Performed at:  [facility] [address],  [place], [place]   Phone [phone]   URINE RT REFLEX TO CULTURE - Abnormal; Notable for the following components:    pH, UA 8.5 (*)     All other components within normal limits    Narrative:     Performed at:  [facility] [address],  [place], [place]   Phone [phone] & INFLUENZA COMBO    Narrative:     Performed at:  [facility] [address],  [place], [place]   Phone [phone]   ETHANOL    Narrative:     Performed at:  [place] [address],  [place], [place]   Phone [phone]   URINE DRUG SCREEN    Narrative:     Performed at:  [facility] [address],  [place], [place]   Phone [phone]     Results for orders placed or performed during the hospital encounter of 05/22/21   COVID-19 & Influenza Combo    Specimen: Nasopharyngeal Swab   Result Value Ref Range    SARS-CoV-2 RNA, RT PCR NOT DETECTED NOT DETECTED    INFLUENZA A NOT DETECTED NOT DETECTED    INFLUENZA B NOT DETECTED NOT DETECTED   CBC auto differential   Result Value Ref Range    WBC 7.9 4.0 - 11.0 K/uL    RBC 4.08 4.00 - 5.20 M/uL    Hemoglobin 10.8 (L) 12.0 - 16.0 g/dL    Hematocrit 33.0 (L) 36.0 - 48.0 %    MCV 80.8 80.0 - 100.0 fL    MCH 26.5 26.0 - 34.0 pg    MCHC 32.8 31.0 - 36.0 g/dL    RDW 18.8 (H) 12.4 - 15.4 %    Platelets 121 499 - 895 K/uL    MPV 7.3 5.0 - 10.5 fL    Neutrophils % 45.7 %    Lymphocytes % 42.6 %    Monocytes % 9.5 %    Eosinophils % 1.1 %    Basophils % 1.1 %    Neutrophils Absolute 3.6 1.7 - 7.7 K/uL    Lymphocytes Absolute 3.4 1.0 - 5.1 K/uL    Monocytes Absolute 0.7 0.0 - 1.3 K/uL    Eosinophils Absolute 0.1 0.0 - 0.6 K/uL    Basophils Absolute 0.1 0.0 - 0.2 K/uL   Comprehensive metabolic panel   Result Value Ref Range    Sodium 140 136 - 145 mmol/L    Potassium 4.5 3.5 - 5.1 mmol/L    Chloride 102 99 - 110 mmol/L    CO2 28 21 - 32 mmol/L    Anion Gap 10 3 - 16    Glucose 94 70 - 99 mg/dL    BUN 19 7 - 20 mg/dL    CREATININE 1.2 0.6 - 1.2 mg/dL    GFR Non- 44 (A) >60    GFR  53 (A) >60    Calcium 10.1 8.3 - 10.6 mg/dL    Total Protein 6.9 6.4 - 8.2 g/dL    Albumin 4.0 3.4 - 5.0 g/dL    Albumin/Globulin Ratio 1.4 1.1 - 2.2    Total Bilirubin 0.4 0.0 - 1.0 mg/dL    Alkaline Phosphatase 62 40 - 129 U/L    ALT 14 10 - 40 U/L    AST 19 15 - 37 U/L    Globulin 2.9 g/dL   Ethanol   Result Value Ref Range    Ethanol Lvl None Detected mg/dL   Salicylate   Result Value Ref Range    Salicylate, Serum <3.8 (L) 15.0 - 30.0 mg/dL   Acetaminophen level   Result Value Ref Range    Acetaminophen Level <5 (L) 10 - 30 ug/mL   Urine, reflex to culture    Specimen: Urine, clean catch   Result Value Ref Range    Color, UA Yellow Straw/Yellow    Clarity, UA Clear Clear    Glucose, Ur Negative Negative mg/dL    Bilirubin Urine Negative Negative    Ketones, Urine Negative Negative mg/dL    Specific Gravity, UA 1.015 1.005 - 1.030    Blood, Urine Negative Negative    pH, UA 8.5 (A) 5.0 - 8.0    Protein, UA Negative Negative mg/dL    Urobilinogen, Urine 0.2 <2.0 E.U./dL    Nitrite, Urine Negative Negative    Leukocyte Esterase, Urine Negative Negative    Microscopic Examination Not Indicated     Urine Type see below     Urine Reflex to Culture Not Indicated    Drug screen multi urine   Result Value Ref Range    Amphetamine Screen, Urine Neg Negative <1000ng/mL    Barbiturate Screen, Ur Neg Negative <200 ng/mL    Benzodiazepine Screen, Urine Neg Negative <200 ng/mL    Cannabinoid Scrn, Ur Neg Negative <50 ng/mL    Cocaine Metabolite Screen, Urine Neg Negative <300 ng/mL    Opiate Scrn, Ur Neg Negative <300 ng/mL    PCP Screen, Urine Neg Negative <25 ng/mL    Methadone Screen, Urine Neg Negative <300 ng/mL    Propoxyphene Scrn, Ur Neg Negative <300 ng/mL    Oxycodone Urine Neg Negative <100 ng/ml    pH, UA 8.5     Drug Screen Comment: see below        All other labs were within normal range or not returned as of this dictation. EKG: All EKG's are interpreted by the Emergency Department Physician in the absence of a cardiologist.  Please see their note for interpretation of EKG. RADIOLOGY:   Non-plain film images such as CT, Ultrasound and MRI are read by the radiologist. Plain radiographic images are visualized andpreliminarily interpreted by the  ED Provider with the below findings:        Interpretation perthe Radiologist below, if available at the time of this note:    CT HEAD WO CONTRAST   Final Result   Motion artifact slightly limiting the exam.  Within the limitations of the   study, no obvious acute intracranial abnormality can be seen. Mild atrophy and moderate chronic microischemic disease in the deep white   matter which is unchanged. CT HEAD WO CONTRAST    Result Date: 5/22/2021  EXAMINATION: CT OF THE HEAD WITHOUT CONTRAST  5/22/2021 9:17 pm TECHNIQUE: CT of the head was performed without the administration of intravenous contrast. Dose modulation, iterative reconstruction, and/or weight based adjustment of the mA/kV was utilized to reduce the radiation dose to as low as reasonably achievable. COMPARISON: 04/01/2021 HISTORY: ORDERING SYSTEM PROVIDED HISTORY: confusion TECHNOLOGIST PROVIDED HISTORY: Has a \"code stroke\" or \"stroke alert\" been called? ->No Reason for exam:->confusion Decision Support Exception - unselect if not a suspected or confirmed emergency medical condition->Emergency Medical Condition (MA) Reason for Exam: confusion, psych eval Acuity: Acute Type of Exam: Initial FINDINGS: BRAIN/VENTRICLES: There is mild motion artifact throughout the exam.  The ventricles are mildly enlarged with diffuse mild prominence of Depression, unspecified depression type    2. Suicidal ideation          DISPOSITION/PLAN   DISPOSITION Admitted    PATIENT REFERREDTO:  No follow-up provider specified.     DISCHARGE MEDICATIONS:  New Prescriptions    No medications on file       DISCONTINUED MEDICATIONS:  Discontinued Medications    No medications on file              (Please note that portions ofthis note were completed with a voice recognition program.  Efforts were made to edit the dictations but occasionally words are mis-transcribed.)    Ave Thompson PA-C (electronically signed)            Tio Todd PA-C  05/23/21 0050

## 2021-05-23 NOTE — BH NOTE
Pt arrived to unit @ 0119 via wheelchair with RN and security. Belongings were secured and inventoried. Pt oriented to room.

## 2021-05-24 LAB
CHOLESTEROL, TOTAL: 168 MG/DL (ref 0–199)
HDLC SERPL-MCNC: 56 MG/DL (ref 40–60)
LDL CHOLESTEROL CALCULATED: 91 MG/DL
TRIGL SERPL-MCNC: 106 MG/DL (ref 0–150)
VLDLC SERPL CALC-MCNC: 21 MG/DL

## 2021-05-24 PROCEDURE — 6370000000 HC RX 637 (ALT 250 FOR IP): Performed by: PSYCHIATRY & NEUROLOGY

## 2021-05-24 PROCEDURE — 99233 SBSQ HOSP IP/OBS HIGH 50: CPT | Performed by: PSYCHIATRY & NEUROLOGY

## 2021-05-24 PROCEDURE — 80061 LIPID PANEL: CPT

## 2021-05-24 PROCEDURE — 36415 COLL VENOUS BLD VENIPUNCTURE: CPT

## 2021-05-24 PROCEDURE — 83036 HEMOGLOBIN GLYCOSYLATED A1C: CPT

## 2021-05-24 PROCEDURE — 1240000000 HC EMOTIONAL WELLNESS R&B

## 2021-05-24 RX ORDER — DONEPEZIL HYDROCHLORIDE 5 MG/1
5 TABLET, FILM COATED ORAL
Status: DISCONTINUED | OUTPATIENT
Start: 2021-05-25 | End: 2021-05-26 | Stop reason: HOSPADM

## 2021-05-24 RX ADMIN — FUROSEMIDE 20 MG: 20 TABLET ORAL at 09:54

## 2021-05-24 RX ADMIN — METFORMIN HYDROCHLORIDE 500 MG: 500 TABLET ORAL at 09:54

## 2021-05-24 RX ADMIN — OXYBUTYNIN CHLORIDE 10 MG: 5 TABLET, EXTENDED RELEASE ORAL at 09:54

## 2021-05-24 RX ADMIN — TRAZODONE HYDROCHLORIDE 50 MG: 50 TABLET ORAL at 20:47

## 2021-05-24 RX ADMIN — LEVOTHYROXINE SODIUM 25 MCG: 25 TABLET ORAL at 05:47

## 2021-05-24 RX ADMIN — HALOPERIDOL 2 MG: 1 TABLET ORAL at 05:47

## 2021-05-24 RX ADMIN — CITALOPRAM HYDROBROMIDE 10 MG: 20 TABLET ORAL at 09:54

## 2021-05-24 RX ADMIN — FERROUS SULFATE TAB 325 MG (65 MG ELEMENTAL FE) 325 MG: 325 (65 FE) TAB at 18:37

## 2021-05-24 RX ADMIN — FERROUS SULFATE TAB 325 MG (65 MG ELEMENTAL FE) 325 MG: 325 (65 FE) TAB at 09:54

## 2021-05-24 RX ADMIN — ACETAMINOPHEN 650 MG: 325 TABLET ORAL at 20:47

## 2021-05-24 RX ADMIN — MELOXICAM 7.5 MG: 15 TABLET ORAL at 09:54

## 2021-05-24 ASSESSMENT — PAIN DESCRIPTION - LOCATION: LOCATION: LEG

## 2021-05-24 ASSESSMENT — PAIN DESCRIPTION - DESCRIPTORS: DESCRIPTORS: SHARP

## 2021-05-24 ASSESSMENT — PAIN DESCRIPTION - ORIENTATION: ORIENTATION: LEFT

## 2021-05-24 ASSESSMENT — PAIN SCALES - GENERAL: PAINLEVEL_OUTOF10: 3

## 2021-05-24 ASSESSMENT — PAIN DESCRIPTION - PAIN TYPE: TYPE: CHRONIC PAIN

## 2021-05-24 NOTE — PLAN OF CARE
Problem: Falls - Risk of:  Goal: Will remain free from falls  Description: Will remain free from falls  Outcome: Ongoing  Goal: Absence of physical injury  Description: Absence of physical injury  Outcome: Ongoing     Problem: Altered Mood, Deterioration in Function:  Goal: Ability to perform activities of daily living will improve  Description: Ability to perform activities of daily living will improve  Outcome: Ongoing  Goal: Able to verbalize reality based thinking  Description: Able to verbalize reality based thinking  Outcome: Ongoing    Pt is alert and oriented to person, place, and time. Continues to be disoriented to situation and is intermittently confused. Somewhat calmer and not as verbal but remains tangential w/ pressured speech at times. Cooperative, med compliant whole. Up w/ walker and standby assist. Steady w/ walker but needs reminders to keep it with her. Pt was incontinent of urine this morning. Pull up and gown changed w/ minimal assist. Pt cooperative w/ care. Visible on the unit, social w/ peers. Denies SI/HI/AVH, no RTIS noted. Pt is currently sitting in a angela chair in the dayroom participating in group. Chair alarm on. No falls or injuries have occurred at this time. Will continue to monitor.

## 2021-05-24 NOTE — ED PROVIDER NOTES
behavioral health unit or discharge home. I feel that the patient is medically stable for disposition by the behavioral health team at this time. Patient admitted to geriatric psychiatry. CLINICAL IMPRESSION  1. Depression, unspecified depression type    2. Suicidal ideation        Blood pressure (!) 151/92, pulse 97, temperature 97.3 °F (36.3 °C), temperature source Temporal, resp. rate 16, height 5' 5\" (1.651 m), weight 130 lb 3.2 oz (59.1 kg), SpO2 99 %. DISPOSITION  Maxine King was admitted in stable condition. All diagnostic, treatment, and disposition decisions were made by myself in conjunction with the advanced practice provider. For all further details of the patient's emergency department visit, please see the advanced practice provider's documentation. Comment: Please note this report has been produced using speech recognition software and may contain errors related to that system including errors in grammar, punctuation, and spelling, as well as words and phrases that may be inappropriate. If there are any questions or concerns please feel free to contact the dictating provider for clarification.         Denisse Green MD  05/24/21 7695

## 2021-05-24 NOTE — PROGRESS NOTES
Department of Psychiatry  Attending Progress Note    Admission Date:    5/22/2021    Chief complaint / Reason for Admission:  Depression and SI    Patient's chart was reviewed, case was discussed with nursing/OT/RT staff, and collaborated with  about the treatment plan. SUBJECTIVE:   Over last 24 hours:  Behavioral outbursts: No   Non-aggressive behavioral disturbance: No  Medication compliant: Yes  Need for seclusion/restraints: No  Sleeping adequately:  Yes  Appetite adequate: Yes  Attending groups: Yes    Patient exhibited no unsafe behaviors on the unit. She continues to exhibit remarkable emotional lability that is more in line with emotional incontinence, almost pseudobulbar affect. On assessmentthis morning patient exhibits objective cognitive impairment insofar as she only had a vague recollection of having met with me and could not place when we had met despite having just met yesterday. She was also disoriented to location and situation. In again attempting to explore underlying contributors to mood issues separate from what seems to be a clear evolving dementia, patient again brought up issues with the various animals she cared for dying or being removed from her custody over the past several years. The veracity of the stories is not entirely clear and it seems as though many of these losses occurred years ago, but patient talks about them as though they are recent developments. Progressing overall:  Too early to tell  Suicidal ideation: Did not voice today  Homicidal ideation: Denies  Medication side effects: no    ROS: Patient has new complaints: no    Current Medications Ordered:   [START ON 5/25/2021] donepezil  5 mg Oral Daily with breakfast    ferrous sulfate  325 mg Oral BID WC    furosemide  20 mg Oral Daily    levothyroxine  25 mcg Oral Daily    meloxicam  7.5 mg Oral Daily    metFORMIN  500 mg Oral Daily with breakfast    oxybutynin  10 mg Oral Daily    traZODone 50 mg Oral Nightly    nicotine  1 patch Transdermal Daily    citalopram  10 mg Oral Daily      PRN Meds: acetaminophen, magnesium hydroxide, aluminum & magnesium hydroxide-simethicone, haloperidol lactate **OR** haloperidol, benztropine mesylate, traZODone, ondansetron, nicotine polacrilex     Objective:     PE:    BP (!) 151/92   Pulse 97   Temp 97.3 °F (36.3 °C) (Temporal)   Resp 16   Ht 5' 5\" (1.651 m)   Wt 130 lb 3.2 oz (59.1 kg)   SpO2 99%   BMI 21.67 kg/m²       Motor / Gait: no abnormalities noted, nml tone, no involuntary movements, gait unsteady     Mental Status Examination:    Appearance: WF, appears stated age, wearing hospital gown, dishevled grooming and hygiene  Behavior/Attitude toward examiner:  Cooperative, fair eye contact  Speech:  rambling, nml volume  Mood:  Fluctuating  Affect:  Labile, incontinent  Thought processes:  tangential with loose associations  Thought Content: did not voice SI today, denies HI, no delusions voiced, +confabulation  Perceptions: denies AVH, not RTIS  Attention: poor  Abstraction: concrete  Cognition: Average BRY, Alert and oriented to person, month, year, but not location, date, or situation, recall impaired  Insight: Minimal insight   Judgment: Limited judgment      LAB: Reviewed labs from last 24 hours      Diagnoses:   Primary Psychiatric (DSM V) Diagnosis: Unspecified mood disorder  Secondary Psychiatric (DSM V) Diagnoses: probable major neurocognitive disorder  Chemical Dependency Diagnoses: tobacco use disorder, severe     All conditions detailed above are being treated while patient is hospitalized.      Tx plan: Generally: prevent self injury/aggression, stabilize mood/anxiety/psychotic/behavioral disturbance, establish/maintain aftercare, increase coping mechanisms, improve medication compliance.  All conditions present on admission are being treated while pt is hospitalized.      Primary Psychiatric Issues:  1.   Mood disorder; Neurocognitive

## 2021-05-24 NOTE — BH NOTE
[facility]  Treatment Team Note  Review Date & Time: 5/24/2021  1025    Patient was not in treatment team      Status EXAM:   Status and Exam  Normal: No  Facial Expression: Sad, Worried  Affect: Inappropriate  Level of Consciousness: Alert  Mood:Normal: No  Mood: Anxious, Labile, Helpless, Sad, Depressed  Motor Activity:Normal: No  Motor Activity: Decreased  Interview Behavior: Cooperative, Impulsive  Preception: [place] to Person, Janice Soila to Time, [place] to Place, [place] to Situation  Attention:Normal: No  Attention: Unable to Concentrate  Thought Processes: Tangential  Thought Content:Normal: Yes  Thought Content: Poverty of Content  Hallucinations: None  Delusions: No  Memory:Normal: No  Memory: Poor Remote  Insight and Judgment: No  Insight and Judgment: Poor Judgment, Poor Insight  Present Suicidal Ideation: No  Present Homicidal Ideation: No      Suicide Risk CSSR-S:  1) Within the past month, have you wished you were dead or wished you could go to sleep and not wake up? : Yes  2) Have you actually had any thoughts of killing yourself? : Yes  3) Have you been thinking about how you might kill yourself? : Yes  5) Have you started to work out or worked out the details of how to kill yourself?  Do you intend to carry out this plan? : No  6) Have you ever done anything, started to do anything, or prepared to do anything to end your life?: No      PLAN/TREATMENT RECOMMENDATIONS UPDATE: Evaluate and treat          Anais Spence RN

## 2021-05-24 NOTE — PRE-CERTIFICATION NOTE
[id number] [id number] - patient approved for 3 days - LD 5/25 - RD 5/25      [address] Medicare at number listed on pt's insurance ID card ([phone]). Went through the phone tree prompts and reach a recording stating the office was closed and to call back on the next business day.

## 2021-05-25 LAB
ESTIMATED AVERAGE GLUCOSE: 145.6 MG/DL
HBA1C MFR BLD: 6.7 %

## 2021-05-25 PROCEDURE — 1240000000 HC EMOTIONAL WELLNESS R&B

## 2021-05-25 PROCEDURE — 6370000000 HC RX 637 (ALT 250 FOR IP): Performed by: PSYCHIATRY & NEUROLOGY

## 2021-05-25 PROCEDURE — 99232 SBSQ HOSP IP/OBS MODERATE 35: CPT | Performed by: PHYSICIAN ASSISTANT

## 2021-05-25 PROCEDURE — 99233 SBSQ HOSP IP/OBS HIGH 50: CPT | Performed by: PSYCHIATRY & NEUROLOGY

## 2021-05-25 RX ORDER — ACETAMINOPHEN 500 MG
1000 TABLET ORAL 3 TIMES DAILY
Status: DISCONTINUED | OUTPATIENT
Start: 2021-05-25 | End: 2021-05-26 | Stop reason: HOSPADM

## 2021-05-25 RX ADMIN — FUROSEMIDE 20 MG: 20 TABLET ORAL at 10:14

## 2021-05-25 RX ADMIN — OXYBUTYNIN CHLORIDE 10 MG: 5 TABLET, EXTENDED RELEASE ORAL at 10:14

## 2021-05-25 RX ADMIN — FERROUS SULFATE TAB 325 MG (65 MG ELEMENTAL FE) 325 MG: 325 (65 FE) TAB at 10:14

## 2021-05-25 RX ADMIN — DONEPEZIL HYDROCHLORIDE 5 MG: 5 TABLET, FILM COATED ORAL at 10:14

## 2021-05-25 RX ADMIN — CITALOPRAM HYDROBROMIDE 10 MG: 20 TABLET ORAL at 10:14

## 2021-05-25 RX ADMIN — LEVOTHYROXINE SODIUM 25 MCG: 25 TABLET ORAL at 06:31

## 2021-05-25 RX ADMIN — TRAZODONE HYDROCHLORIDE 25 MG: 50 TABLET ORAL at 23:05

## 2021-05-25 RX ADMIN — TRAZODONE HYDROCHLORIDE 50 MG: 50 TABLET ORAL at 20:01

## 2021-05-25 RX ADMIN — MELOXICAM 7.5 MG: 15 TABLET ORAL at 10:14

## 2021-05-25 RX ADMIN — METFORMIN HYDROCHLORIDE 500 MG: 500 TABLET ORAL at 10:14

## 2021-05-25 RX ADMIN — FERROUS SULFATE TAB 325 MG (65 MG ELEMENTAL FE) 325 MG: 325 (65 FE) TAB at 17:01

## 2021-05-25 RX ADMIN — ACETAMINOPHEN 1000 MG: 500 TABLET ORAL at 15:19

## 2021-05-25 RX ADMIN — ACETAMINOPHEN 1000 MG: 500 TABLET ORAL at 20:01

## 2021-05-25 ASSESSMENT — PAIN DESCRIPTION - FREQUENCY: FREQUENCY: INTERMITTENT

## 2021-05-25 ASSESSMENT — PAIN SCALES - GENERAL
PAINLEVEL_OUTOF10: 0
PAINLEVEL_OUTOF10: 3
PAINLEVEL_OUTOF10: 3

## 2021-05-25 ASSESSMENT — PAIN DESCRIPTION - LOCATION
LOCATION: LEG
LOCATION: LEG

## 2021-05-25 NOTE — BH NOTE
[facility] called and spoke to the pt's  about potential discharge tomorrow. SW made him aware that he made a referral to [facility] and that they will reach out to him when they have an opening for pt.           CELINA Delatorre

## 2021-05-25 NOTE — BH NOTE
SW attempted to call  the pt's son listed in the chart; however call  was sent directly to .         GENARO DelatorreS

## 2021-05-25 NOTE — PROGRESS NOTES
Progress Note    Admit Date:  5/22/2021    Subjective:  Ms. Isabell Bennett reports she is having a mild headache and an ache in her L leg which she gets at home frequently. Objective:   Vitals:    05/24/21 1929   BP: (!) 162/82   Pulse: 86   Resp: 16   Temp: 97.3 °F (36.3 °C)   SpO2: 96%        No intake or output data in the 24 hours ending 05/25/21 2154    Physical Exam:  Gen: No distress. Alert. Eyes:  No conjunctival injection. ENT: No discharge. Pharynx clear. Neck: Trachea midline. Resp: No accessory muscle use. No crackles. No wheezes. No rhonchi. CV: Regular rate. Regular rhythm. No murmur. No rub. No edema. Capillary Refill: Brisk,< 3 seconds   Peripheral Pulses: +2 palpable, equal bilaterally   GI: Non-tender. Non-distended. Normal bowel sounds. Skin: Warm and dry. M/S: No reproducible TTP to the LLE, no edema   Neuro: Awake.  Grossly nonfocal    Psych: Per psychiatry team     Scheduled Meds:   donepezil  5 mg Oral Daily with breakfast    ferrous sulfate  325 mg Oral BID WC    furosemide  20 mg Oral Daily    levothyroxine  25 mcg Oral Daily    meloxicam  7.5 mg Oral Daily    metFORMIN  500 mg Oral Daily with breakfast    oxybutynin  10 mg Oral Daily    traZODone  50 mg Oral Nightly    nicotine  1 patch Transdermal Daily    citalopram  10 mg Oral Daily       Continuous Infusions:      PRN Meds:  acetaminophen, magnesium hydroxide, aluminum & magnesium hydroxide-simethicone, haloperidol lactate **OR** haloperidol, benztropine mesylate, traZODone, ondansetron, nicotine polacrilex      Data:  CBC:   Recent Labs     05/22/21 1943   WBC 7.9   HGB 10.8*   HCT 33.0*   MCV 80.8        BMP:   Recent Labs     05/22/21 1943      K 4.5      CO2 28   BUN 19   CREATININE 1.2     LIVER PROFILE:   Recent Labs     05/22/21 1943   AST 19   ALT 14   BILITOT 0.4   ALKPHOS 62     CULTURES  COVID-19: not detected   Influenza: not detected      RADIOLOGY  CT HEAD WO CONTRAST   Final Result   Motion artifact slightly limiting the exam.  Within the limitations of the   study, no obvious acute intracranial abnormality can be seen. Mild atrophy and moderate chronic microischemic disease in the deep white   matter which is unchanged. Assessment/Plan:  Depression   - per psychiatry team     Chronic systolic CHF with recovered EF  - stable.  Cont lasix 20 mg daily      DM2  - cont metformin  - last a1c was 6.9 in 2021      CAD non obstructive   - no statin or ASA on med list   - no chest pain reported      Hypothyroid  - cont synthroid      Iron def  - cont PO iron      history of alcohol abuse  - ethanol level neg on admit     Elevated BP   - No hx of HTN   - Not on home medications   - Monitor      Diet: DIET GENERAL;  Code Status: Full Code    SUHAIL Briceño 8:21 AM 5/25/2021

## 2021-05-25 NOTE — PROGRESS NOTES
Department of Psychiatry  Attending Progress Note    Admission Date:    5/22/2021    Chief complaint / Reason for Admission:  Depression and SI    Patient's chart was reviewed, case was discussed with nursing/OT/RT staff, and collaborated with  about the treatment plan. SUBJECTIVE:   Over last 24 hours:  Behavioral outbursts: No   Non-aggressive behavioral disturbance: No  Medication compliant: Yes  Need for seclusion/restraints: No  Sleeping adequately:  Yes  Appetite adequate: Yes  Attending groups: Yes    Patient again exhibited no unsafe behaviors on the unit. She did manifest mood lability, but this was less intense yesterday than on admission. Patient made no suicidal statements yesterday, but did make a comment about wishing she could \"go into a deep sleep and forget everything. \"    When asked about this today pt did not recall having made the statement. She was pleasant today and bright, but not particularly labile. Today she did not suddenly begin crying for the first time since I started working with her. She communicated excitement about returning home \"soon,\" though she was not able to articulate anything specific she was looking for.     Progressing overall: Improving  Suicidal ideation: Denies  Homicidal ideation: Denies  Medication side effects: no    ROS: Patient has new complaints: no    Current Medications Ordered:   acetaminophen  1,000 mg Oral TID    donepezil  5 mg Oral Daily with breakfast    ferrous sulfate  325 mg Oral BID WC    furosemide  20 mg Oral Daily    levothyroxine  25 mcg Oral Daily    meloxicam  7.5 mg Oral Daily    metFORMIN  500 mg Oral Daily with breakfast    oxybutynin  10 mg Oral Daily    traZODone  50 mg Oral Nightly    nicotine  1 patch Transdermal Daily    citalopram  10 mg Oral Daily      PRN Meds: magnesium hydroxide, aluminum & magnesium hydroxide-simethicone, haloperidol lactate **OR** haloperidol, benztropine mesylate, traZODone, ondansetron, nicotine polacrilex     Objective:     PE:    BP (!) 147/96   Pulse 125   Temp 97.1 °F (36.2 °C) (Temporal)   Resp 16   Ht 5' 5\" (1.651 m)   Wt 130 lb 3.2 oz (59.1 kg)   SpO2 98%   BMI 21.67 kg/m²       Motor / Gait: no abnormalities noted, nml tone, no involuntary movements, gait unsteady     Mental Status Examination:    Appearance: WF, appears stated age, wearing hospital gown, dishevled grooming and hygiene  Behavior/Attitude toward examiner:  Cooperative, fair eye contact  Speech:  rambling, nml volume  Mood: \"Better\"  Affect:  Mildly elevated, less labile  Thought processes:  tangential with loose associations  Thought Content: Denies SI today, denies HI, no delusions voiced, +confabulation  Perceptions: denies AVH, not RTIS  Attention: poor  Abstraction: concrete  Cognition: Average BRY, Alert and oriented to person, month, year, but not location, date, or situation, recall impaired  Insight: Minimal insight   Judgment: Limited judgment      LAB: Reviewed labs from last 24 hours      Diagnoses:   Primary Psychiatric (DSM V) Diagnosis: Unspecified dementia with behavioral disturbance  Secondary Psychiatric (DSM V) Diagnoses: None  Chemical Dependency Diagnoses: tobacco use disorder, severe     All conditions detailed above are being treated while patient is hospitalized.      Tx plan: Generally: prevent self injury/aggression, stabilize mood/anxiety/psychotic/behavioral disturbance, establish/maintain aftercare, increase coping mechanisms, improve medication compliance.  All conditions present on admission are being treated while pt is hospitalized.      Primary Psychiatric Issues:  1. Mood disorder; Neurocognitive disorder:  Symptoms and objective presentation more concerning for behavioral disturbance secondary to dementia particularly with the vascular burden visible on head imaging.  -Continue citalopram 10 mg daily.  -Started donepezil 5 mg daily with breakfast on 5/24. Continue.  -discuss assessment with  specifically regarding cognitive concerns in the reality that mood symptoms likely are a sequela of underlying dementia and thus will have limited response to pharmacotherapy. Safety planning and increased structure will be more crucial.     Chemical Dependency Issues:  NRT     Medical Problems:  Internal medicine has been consulted. Appreciate recs.     Code Status: full     Disposition:    -Housing: with family  -Current outpatient follow-up: PCP     Criteria for Discharge:  Not psychotic, not homicidal, not suicidal, behavioral disturbance under control, sleeping well, mood improved/stable, eating well, aftercare arranged. Total face to face time with patient was 35 minutes and more than 50 % of that time was spent counseling the patient on their symptoms, treatment and expected goals.     Eveline Hernandez MD  Staff Psychiatrist

## 2021-05-25 NOTE — PLAN OF CARE
Problem: Altered Mood, Deterioration in Function:  Goal: Ability to perform activities of daily living will improve  Description: Ability to perform activities of daily living will improve  Outcome: Ongoing  Goal: Able to verbalize reality based thinking  Description: Able to verbalize reality based thinking  Outcome: Ongoing  Goal: Maintenance of adequate nutrition will improve  Description: Maintenance of adequate nutrition will improve  Outcome: Ongoing  Goal: Ability to tolerate increased activity will improve  Description: Ability to tolerate increased activity will improve  Outcome: Ongoing    Pt is alert and oriented to person, place, and time. Continues to be disoriented to situation and is intermittently confused. Calmer and more pleasant today. Cooperative. Med compliant whole. Steady gait w/ walker. Incontinent of urine throughout the shift. Cooperative w/ care. Reports feeling much better today due to sleeping well last night. Denies all, no RTIS noted. Good appetite. Will continue to monitor.

## 2021-05-25 NOTE — GROUP NOTE
Group Therapy Note    Date: 5/25/2021    Group Start Time: 1000  Group End Time: 1055  Group Topic: [address] [facility]        Group Therapy Note    Attendees: 4    Patient's Goal: to work with peers to finish the lyrics to popular songs to improve teamwork skills, increase socialization, improve mood, and actively engage cognitive skills and memory recall. Notes:  Marsha Pan actively engaged in group throughout. Pt was observed smiling, laughing, and socializing appropriately. Marsha Pan engaged in processing discussion and verbalized partial learning. Status After Intervention:  Improved    Participation Level:  Active Listener and Interactive    Participation Quality: Appropriate, Attentive, Sharing and Supportive      Speech:  loud      Thought Process/Content: Linear      Affective Functioning: Exaggerated      Mood: euthymic      Level of consciousness:  Alert and Attentive      Response to Learning: Able to verbalize current knowledge/experience, Able to verbalize/acknowledge new learning and Progressing to goal      Endings: None Reported    Modes of Intervention: Education, Support, Socialization, Problem-solving, Activity and Media      Discipline Responsible: Psychoeducational Specialist      Signature:  LESLIE Rogers

## 2021-05-25 NOTE — GROUP NOTE
Group Therapy Note    Date: 5/25/2021    Group Start Time: 1315  Group End Time: 1400  Group Topic: Healthy Living/Wellness    [address] [facility]        Group Therapy Note    Attendees: 5         Patient's Goal:  Patients were invited to participate in the group game, Who Am I?, taking turns to guess the name of a famous person, movie, or TV show from the 1960s and 70s. Patients were provided a card with an image and facts about the famous entity and were encouraged to reminisce and share stories and memories from that time period. Notes:  Caleb Padilla was active in game play, was observed smiling and laughing with peers while making guesses, and reported an improved mood at the end of session. Status After Intervention:  Improved    Participation Level:  Active Listener and Interactive    Participation Quality: Appropriate, Attentive, Sharing and Supportive      Speech:  normal      Thought Process/Content: Linear      Affective Functioning: Congruent      Mood: euthymic      Level of consciousness:  Alert, Oriented x4 and Attentive      Response to Learning: Able to verbalize current knowledge/experience, Able to verbalize/acknowledge new learning and Able to retain information      Endings: None Reported    Modes of Intervention: Education, Support, Socialization, Exploration, Clarifying, Problem-solving, Activity and Media      Discipline Responsible: Psychoeducational Specialist      Signature:  Annette Chisholm

## 2021-05-25 NOTE — PLAN OF CARE
Pt is alert and oriented X4. She is pleasant and cooperative with care. She continues to be labile at times. She states she is still depressed, but denies wishing to be dead at this time. She states she just wants to go into a deep sleep and just forget everything. She continues to ambulate on the unit with a steady gait. She needs reminded at times to use her walker. She denies SI/HI/AVH. No RTIS noted.

## 2021-05-26 VITALS
HEART RATE: 89 BPM | RESPIRATION RATE: 18 BRPM | OXYGEN SATURATION: 96 % | SYSTOLIC BLOOD PRESSURE: 158 MMHG | DIASTOLIC BLOOD PRESSURE: 96 MMHG | BODY MASS INDEX: 21.69 KG/M2 | WEIGHT: 130.2 LBS | TEMPERATURE: 97.7 F | HEIGHT: 65 IN

## 2021-05-26 PROBLEM — F01.518 VASCULAR DEMENTIA WITH BEHAVIOR DISTURBANCE: Status: ACTIVE | Noted: 2021-05-26

## 2021-05-26 PROBLEM — F32.A DEPRESSION: Status: RESOLVED | Noted: 2021-05-23 | Resolved: 2021-05-26

## 2021-05-26 PROCEDURE — 97530 THERAPEUTIC ACTIVITIES: CPT

## 2021-05-26 PROCEDURE — 97535 SELF CARE MNGMENT TRAINING: CPT

## 2021-05-26 PROCEDURE — 6370000000 HC RX 637 (ALT 250 FOR IP): Performed by: PSYCHIATRY & NEUROLOGY

## 2021-05-26 PROCEDURE — 5130000000 HC BRIDGE APPOINTMENT

## 2021-05-26 PROCEDURE — 99239 HOSP IP/OBS DSCHRG MGMT >30: CPT | Performed by: PSYCHIATRY & NEUROLOGY

## 2021-05-26 PROCEDURE — 97166 OT EVAL MOD COMPLEX 45 MIN: CPT

## 2021-05-26 RX ORDER — DONEPEZIL HYDROCHLORIDE 5 MG/1
5 TABLET, FILM COATED ORAL
Qty: 30 TABLET | Refills: 0 | Status: SHIPPED | OUTPATIENT
Start: 2021-05-27

## 2021-05-26 RX ORDER — CITALOPRAM 10 MG/1
10 TABLET ORAL DAILY
Qty: 30 TABLET | Refills: 0 | Status: ON HOLD | OUTPATIENT
Start: 2021-05-27 | End: 2021-06-11 | Stop reason: HOSPADM

## 2021-05-26 RX ORDER — ACETAMINOPHEN 500 MG
1000 TABLET ORAL 3 TIMES DAILY
Qty: 120 TABLET | Refills: 0 | Status: ON HOLD | COMMUNITY
Start: 2021-05-26 | End: 2022-10-11 | Stop reason: HOSPADM

## 2021-05-26 RX ORDER — NICOTINE 21 MG/24HR
1 PATCH, TRANSDERMAL 24 HOURS TRANSDERMAL DAILY
Qty: 30 PATCH | Refills: 0 | Status: ON HOLD | OUTPATIENT
Start: 2021-05-26 | End: 2022-10-11 | Stop reason: HOSPADM

## 2021-05-26 RX ADMIN — CITALOPRAM HYDROBROMIDE 10 MG: 20 TABLET ORAL at 09:50

## 2021-05-26 RX ADMIN — FERROUS SULFATE TAB 325 MG (65 MG ELEMENTAL FE) 325 MG: 325 (65 FE) TAB at 09:50

## 2021-05-26 RX ADMIN — ACETAMINOPHEN 1000 MG: 500 TABLET ORAL at 09:49

## 2021-05-26 RX ADMIN — MELOXICAM 7.5 MG: 15 TABLET ORAL at 09:49

## 2021-05-26 RX ADMIN — FUROSEMIDE 20 MG: 20 TABLET ORAL at 09:49

## 2021-05-26 RX ADMIN — LEVOTHYROXINE SODIUM 25 MCG: 25 TABLET ORAL at 06:18

## 2021-05-26 RX ADMIN — OXYBUTYNIN CHLORIDE 10 MG: 5 TABLET, EXTENDED RELEASE ORAL at 09:49

## 2021-05-26 RX ADMIN — METFORMIN HYDROCHLORIDE 500 MG: 500 TABLET ORAL at 09:50

## 2021-05-26 RX ADMIN — DONEPEZIL HYDROCHLORIDE 5 MG: 5 TABLET, FILM COATED ORAL at 09:50

## 2021-05-26 ASSESSMENT — PAIN DESCRIPTION - LOCATION: LOCATION: LEG

## 2021-05-26 ASSESSMENT — PAIN DESCRIPTION - PAIN TYPE: TYPE: CHRONIC PAIN

## 2021-05-26 ASSESSMENT — PAIN SCALES - GENERAL: PAINLEVEL_OUTOF10: 0

## 2021-05-26 NOTE — GROUP NOTE
Group Therapy Note    Date: 5/26/2021    Group Start Time: 1100  Group End Time: 1529  Group Topic: [address] [facility]        Group Therapy Note    Attendees: 5    Patient's Goal: to work with peers to [employer] the lyrics to popular songs to improve teamwork skills, increase socialization, improve mood, and actively engage cognitive skills and memory recall. Notes:  Thanh Membreno was excused from portion of group due to meeting with OT. While in attendance, pt appeared drowsy, often closing her eyes and resting while attempting to solve the cricket unscramble.     Status After Intervention:  Unchanged    Participation Level: Minimal    Participation Quality: Appropriate      Speech:  normal      Thought Process/Content: Linear      Affective Functioning: Congruent      Mood: tired      Level of consciousness:  Drowsy      Response to Learning: Progressing to goal      Endings: None Reported    Modes of Intervention: Education, Support, Socialization, Problem-solving, Activity and Media      Discipline Responsible: Psychoeducational Specialist      Signature:  LESLIE Reynoso

## 2021-05-26 NOTE — BH NOTE
Jammie Parks is AOx3, with minimal reasoning for admission. She endorses chronic pain, scheduled meds given. Sleep was poor, she reports it as short but good. She denies SIHI and AVH, no RTIS noted. Verbalizes that she is expecting to go home today. Compliant with medication administration and care.

## 2021-05-26 NOTE — PROGRESS NOTES
Inpatient Occupational Therapy  Evaluation and Treatment    Unit:   [facility]  Date:  5/26/2021  Patient Name:    Lory Patel  Admitting diagnosis:  Depression, unspecified depression type [F32.9]  Admit Date:  5/22/2021  Precautions/Restrictions/WB Status/ Lines/ Wounds/ Oxygen:  Fall Risk  History of Present Illness:  Per Dr. Riki Christianson' H&P 5/23/21, Patient is a 81-year-old female with no formal past psychiatric history was brought to the hospital by her  yesterday reporting concerns about her mood and potentially being suicidal.     According to assessment performed in the [facility]:  Patient lives at home with her  - she states that she knows she has become more forgetful and has been feeling more down - she states that she wishes she could take a pill and and never wake up - per patients  Jeovany Brown, patient has had an increase in depression and making statements - he states that he doesn't want her in a nursing home, he wants her home with him but is extremely worried that she will do something while he is at work. They both state that she isn't sleeping.     On interview this afternoon patient was alert and oriented to self, location, month, year, but not date, nor situation. She confabulated having presented to the emergency room for physical complaint yesterday and seemed unaware that she was on a psychiatric unit. She was notably tangential with loose associations and exhibited of rambling, borderline pressured speech that required frequent interruption and redirection. When asked about her mood and having expressed thoughts of wishing she were dead in the emergency room patient did exhibit sudden tearfulness but sudden to the degree that suggested mood lability rather than actual depression. Patients affect was actually quite labile and she could suddenly begin weeping then smile or laugh seconds later.  She was not able to provide any linear description of what has led to feeling more depressed limitation. Upper Extremity Strength:    WFL, pt able to perform all bed mobility, transfers, and gait without strength limitation. Upper Extremity Sensation:    No apparent deficits. Upper Extremity Proprioception:    No apparent deficits. Skin Integrity:  No issues noted     Coordination and Tone:  No problem noted    Balance  Static Sitting:  Normal  Dynamic Sitting: Good   Static Standing:  Good   Dynamic Standing: Fair +    Bed mobility:    Supine to sit:   Not Tested  Sit to supine:   Not Tested  Scooting to head of bed: Not Tested   Scooting in sitting:  Independent  Rolling:   Not Tested  Bridging:   Not Tested    Transfers:    Sit to stand:   CGA  Stand to sit:   Min A  Bed/Chair to/from toilet: CGA with RW-min A to sit on toilet    Self Care: Toileting: CGA to pull pants up/down  Grooming: Supervision to wash hands at sink, brush hair  Dressing: Not Tested    Exercise / Activities Initiated:   Pt. Educated on role of OT. Pt. Participated in: ADL retraining    Assessment of Deficits:   Pt demonstrated decreased safety awareness, decreased balance, decreased transfer skills, and decreased ADL/IADL status, decreased coping skills, decreased cognition, limited education    Pt. Limited during evaluation by . . . Decreased balance    At end of evaluation, pt. In group. Goal(s) : To be met in 3 Visits:  1). Pt will verbalize 3 coping skills  2). Pt will participate in [facility] REHABILITATION assessment. 3). Pt. To verbalize understanding of sleep hygiene education/handouts. To be met in 5 Visits:  1). Bed to Chair/toilet modified IND  2). Upper Body Dressing IND  3). Lower Body Dressing  supervision  4). Pt. To complete interest check list.     Rehabilitation Potential:  Good for goals listed above. Strengths for achieving goals include:  PLOF and Med compliance  Barriers to achieving goals include: Decreased coping skills, Decreased cognition and Limited education    Plan:   To be seen 2-5x/week while in

## 2021-05-26 NOTE — BH NOTE
Senior Purposeful Rounding    Position:Repositions Self    Physical Environment:Room free from clutter, Clear path to bathroom , Adequate lighting, Bed alarm functioning, No safety hazards noted and Stay with me protocol    Pain Rating/ Nonverbal Pain Behaviors: 3/10 leg pain     Pain interventions Attempted: scheduled Tylenol    Patient Toileted:No- Void

## 2021-05-26 NOTE — SUICIDE SAFETY PLAN
SAFETY PLAN    A suicide Safety Plan is a document that supports someone when they are having thoughts of suicide. Warning Signs that indicate a suicidal crisis may be developing: What (situations, thoughts, feelings, body sensations, behaviors, etc.) do you experience that lets you know you are beginning to think about suicide? 1. I cry  2. I think about my animals  3. I think about my friends    Internal Coping Strategies:  What things can I do (relaxation techniques, hobbies, physical activities, etc.) to take my mind off my problems without contacting another person? 1. medication  2. Give dogs treats  3. Give cats a bath    People and social settings that provide distraction: Who can I call or where can I go to distract me? 1. Name: Carine Ayon  Phone: in cell phone  2. Name: Harman Blackmna Phone: In cell phone  3. Place: outside           4. Place: garden    People whom I can ask for help: Who can I call when I need help - for example, friends, family, clergy, someone else? 1. Name:              Phone: n/a  2. Name: Harman Blackman  Phone: in cell phone  3. Name: Carine Ayon  Phone: in cell phone    Professionals or [address] [place] [address] agencies I can contact during a crisis: Who can I call for help - for example, my doctor, my psychiatrist, my psychologist, a mental health provider, a suicide hotline? 1. Clinician Name: Dr. Zhou Hathaway  Phone: [phone]      Clinician Pager or Emergency Contact #: n/a    3. Suicide Prevention Lifeline: 0-091-134-TALK (2068)    4. [address] [place] [address] Emergency Services -  for example, [facility] suicide hotline, [address] [place] [address] Avenue: [facility]      Emergency Services Address: [address] [facility]      Emergency Services Phone: [phone]    Making the environment safe: How can I make my environment (house/apartment/living space) safer? For example, can I remove guns, medications, and other items? 1. Put medications up  2.  Get rid of stuff I don't

## 2021-05-26 NOTE — PLAN OF CARE
Senior Purposeful Rounding    Position:Left Side and Repositions Self    Physical Environment:Room free from clutter, Clear path to bathroom , Adequate lighting and Bed alarm functioning    Pain Rating/ Nonverbal Pain Behaviors:sleeping; n/a    Pain interventions Attempted: n/a    Patient Toileted:No- Void

## 2021-05-26 NOTE — DISCHARGE SUMMARY
tablet  · donepezil 5 MG tablet  · nicotine 21 MG/24HR     You can get these medications from any pharmacy    You don't need a prescription for these medications  · acetaminophen 500 MG tablet         Multiple Neuroleptics? No    Reason for admission: Patient is an 77 y.o. female with no formal past psychiatric history who was admitted to the the older adult behavioral unit on 5/22/2021 for mood issues and passive SI. Patient met with and was treated by an interdisciplinary treatment team that included social work, occupational therapy, recreational therapy, nursing, and psychiatry. Patient was admitted on a involuntary basis and subsequently discharged at the conclusion of her hold. Hospital Course:    Patient was brought to the ED by her  for a medical complaint, but while being assessed she and  reported psychiatric concerns. Specifically they expressed concerns about patient being depressed and voicing intermittent passive thoughts of death. On initial assessment patient did exhibit objective mood issues, but these were not consistent with depression. Rather patient demonstrated rather profound emotional lability almost to the degree of PBA. She was also objectively impaired in her cognition, as evidenced by not being fully oriented, being amnestic for details regarding why and how she came to be in the hospital, and most notably and tangential and loose thought process. Head CT revealed a combination of global atrophy and periventricular white matter disease which, combined with above, indicated an underlying dementia. With respect to SI, patient did initially articulate that she sometimes wants to go to sleep and not wake up, but when asked why she offered several stressors that were quite remote, and her relating of these events was disjointed and inconsistent from one day to the next.     Pt was started on citalopram less for \"depression\" so much as for emotional incontinence secondary to underlying dementia. She tolerated this without adverse reaction and demonstrated an improvement in the degree of her emotional lability. Most notably, frequent bursts of weeping improved significantly. Pt also improved with respect to talk of passive wish for death. The above was discussed with her  and we recommended increased structure and supervision for the patient, referring family to [employer] [facility] [employer] programming. Pt is already established with outpatient neurology and follow-up was made with her provider. PE: (reviewed) and labs (see medical H&PE)  VITALS:  BP (!) 158/96   Pulse 89   Temp 97.7 °F (36.5 °C) (Temporal)   Resp 18   Ht 5' 5\" (1.651 m)   Wt 130 lb 3.2 oz (59.1 kg)   SpO2 96%   BMI 21.67 kg/m²     Motor / Gait: no abnormalities noted, nml tone, no involuntary movements, gait unsteady     Mental Status Examination:    Appearance: WF, appears stated age, wearing hospital gown, dishevled grooming and hygiene  Behavior/Attitude toward examiner:  Cooperative, fair eye contact  Speech:  rambling, nml volume  Mood: \"Better\"  Affect:  Mildly elevated, much less labile  Thought processes:  tangential with loose associations  Thought Content: Denies SI, denies HI, no delusions voiced, +confabulation  Perceptions: denies AVH, not RTIS  Attention: poor  Abstraction: concrete  Cognition: Average BRY, Alert and oriented to person, month, year, but not location, date, or situation, recall impaired  Insight: Minimal insight   Judgment: Limited judgment      Risk factor analysis:    Patient does have several risk factors for future dangerousness from a psychiatric standpoint including: Age, dementia with behavioral disturbanc3  Protective factors include:  Involved and supportive family, improvement in behavioral disturbance, appropriate outpatient follow-up, resolution of SI  Stratification: Moderate, less than imminent    Condition on DC  Mood and affect are stable and pt is not suicidal, homicidal, or psychotic.     Follow Up:  See Discharge Instructions     Spent over 40 minutes with patient and staff on Amelia Huerta MD  Staff Psychiatrist

## 2021-05-26 NOTE — FLOWSHEET NOTE
Senior Purposeful Rounding    Position:Back and Repositions Self    Physical Environment:Room free from clutter, Clear path to bathroom , Adequate lighting and Bed alarm functioning    Pain Rating/ Nonverbal Pain Behaviors:0; Pt sleeping at this time    Pain interventions Attempted: N/A    Patient Toileted:No- Void
Senior Purposeful Rounding    Position:Sitting    Physical Environment:Adequate lighting, No safety hazards noted and Chair alarm in place and functioning    Pain Rating/ Nonverbal Pain Behaviors: denies;  None noted    Pain interventions Attempted: N/A    Patient Toileted:No- Void
Senior Purposeful Rounding    Position:Sitting    Physical Environment:Room free from clutter, Clear path to bathroom , Adequate lighting and No safety hazards noted    Pain Rating/ Nonverbal Pain Behaviors:denies; none noted    Pain interventions Attempted: N/A    Patient Toileted:Incontinent
Senior Purposeful Rounding     Position:Repositions Self     Physical Environment:Room free from clutter, Clear path to bathroom , Adequate lighting, Bed alarm functioning, No safety hazards noted and Stay with me protocol     Pain Rating/ Nonverbal Pain Behaviors: 3/10 leg pain      Pain interventions Attempted: scheduled Tylenol     Patient Toileted:No- Void
(Comment)  (retired)    service   (denies)   /VA involvement denies   Leisure/Activity   Past interests riding horses   Present interests planting flowers in spring and summer   Current daily activity putting dishes in / washing dishes by hand   Social with friends/family Yes   Cultural and Spiritual   Spiritual concerns No   Cultural concerns No   Collateral Contacts   Contacts   (per chart review, pt is not connected with any services)   SW completed psychosocial, AT/OT , CSSR-S risk assessment with pt. Pt was pleasant and tangential during assessment. Pt reports that she lives with her  and son. She denies abuse x4. She reports having a lot of leisure interests she enjoys. She reports not being able to sleep as a stressor. She admits to having thoughts about wanting to take a sleeping pill and never waking up again. She states that the reason she hasn't done this yet is because her doctor won't give the medication to her due to her age. She was tearful when saying this.

## 2021-05-26 NOTE — BH NOTE
SW called and left VM for pt's  making him aware that the pt is ready for discharge today. ONEAL requested a call back to set up discharge time.          Nandini, PHILLIP-S

## 2021-05-26 NOTE — BH NOTE
SW received a call from the pt's , Ranae Skiff. Ranae Skiff stated that he can pick pt up at 1:00 today. Nestor inquired about setting home PT/OT/Home [id number] stated he has already arranged that with their PCP and does not needs SW's assistance at this time.          Nandini, NAILAW-S

## 2021-05-26 NOTE — BH NOTE
Pt is A+Ox3, exaggerated affect, lots of complaints about the unit, cooperative and medication compliant. Pt denies SI/HI/AVH and no RTIS observed. Pt denies any other needs at this time.

## 2021-05-26 NOTE — BH NOTE
[facility]  Discharge Note    Pt discharged with followings belongings:   Dentures: None  Vision - Corrective Lenses: Glasses (In locker)  Hearing Aid: None  Jewelry: None  Body Piercings Removed: N/A  Clothing: Shirt, Pants, Footwear  Were All Patient Medications Collected?: Not Applicable  Other Valuables: Cell phone, Other (Comment) (Phone . Both in safe)   Valuables sent home with pt. Valuables retrieved from safe, Security envelope number:  n/a and returned to patient. Patient education on aftercare instructions: yes  Information faxed to PCP by nursing Patient verbalize understanding of AVS:  yes.     Status EXAM upon discharge:  Status and Exam  Normal: No  Facial Expression: Worried  Affect: Unstable  Level of Consciousness: Alert  Mood:Normal: No  Mood: Anxious, Labile  Motor Activity:Normal: No  Motor Activity: Decreased  Interview Behavior: Cooperative, Impulsive  Preception: [place] to Person, [place] to Time, [place] to Place  Attention:Normal: No  Attention: Distractible  Thought Processes: Circumstantial  Thought Content:Normal: No  Thought Content: Preoccupations  Hallucinations: None (no RTIS noted)  Delusions: No  Memory:Normal: No  Memory: Poor Recent, Poor Remote  Insight and Judgment: No  Insight and Judgment: Poor Judgment, Poor Insight  Present Suicidal Ideation: No (denies)  Present Homicidal Ideation: No (denies)      Metabolic Screening:    Lab Results   Component Value Date    LABA1C 6.7 05/24/2021       Lab Results   Component Value Date    CHOL 168 05/24/2021    CHOL 102 02/29/2020    CHOL 245 (H) 12/24/2016     Lab Results   Component Value Date    TRIG 106 05/24/2021    TRIG 78 02/29/2020    TRIG 60 12/24/2016     Lab Results   Component Value Date    HDL 56 05/24/2021    HDL 36 (L) 02/29/2020    HDL 92 (H) 12/24/2016     No components found for: [facility]UATION [facility] TREAT[facility] [place]  Lab Results   Component Value Date    LABVLDL 21 05/24/2021    LABVLDL 16 02/29/2020    LABVLDL 12 12/24/2016 Bridge Appointment completed: Reviewed Discharge Instructions with patient. Patient verbalizes understanding and agreement with the discharge plan using the teachback method.      Referral for Outpatient Tobacco Cessation Counseling, upon discharge (bess X if applicable and completed):    ( )  Hospital staff assisted patient to call Quit Line or faxed referral                                   during hospitalization                  ( )  Recognizing danger situations (included triggers and roadblocks), if not completed on admission                    ( )  Coping skills (new ways to manage stress, exercise, relaxation techniques, changing routine, distraction), if not completed on admission                                                           ( )  Basic information about quitting (benefits of quitting, techniques in how to quit, available resources, if not completed on admission  ( ) [facility]   ( ) Patient refused referral  ( ) Patient refused counseling  (x) Patient refused smoking cessation medication upon discharge    Vaccinations (bess X if applicable and completed):  ( ) Patient states already received influenza vaccine elsewhere  ( ) Patient received influenza vaccine during this hospitalization  (x) Patient refused influenza vaccine at this time    Vickie Mitchell RN

## 2021-05-30 ENCOUNTER — HOSPITAL ENCOUNTER (EMERGENCY)
Age: 76
Discharge: HOME | End: 2021-05-30
Payer: MEDICARE

## 2021-05-30 VITALS
OXYGEN SATURATION: 100 % | TEMPERATURE: 98 F | BODY MASS INDEX: 21.66 KG/M2 | WEIGHT: 130 LBS | HEART RATE: 68 BPM | RESPIRATION RATE: 16 BRPM | SYSTOLIC BLOOD PRESSURE: 167 MMHG | DIASTOLIC BLOOD PRESSURE: 86 MMHG | HEIGHT: 65 IN

## 2021-05-30 DIAGNOSIS — F03.91 DEMENTIA WITH BEHAVIORAL DISTURBANCE, UNSPECIFIED DEMENTIA TYPE: Primary | ICD-10-CM

## 2021-05-30 LAB
A/G RATIO: 1.3 (ref 1.1–2.2)
ALBUMIN SERPL-MCNC: 4.1 G/DL (ref 3.4–5)
ALP BLD-CCNC: 60 U/L (ref 40–129)
ALT SERPL-CCNC: 15 U/L (ref 10–40)
ANION GAP SERPL CALCULATED.3IONS-SCNC: 10 MMOL/L (ref 3–16)
AST SERPL-CCNC: 21 U/L (ref 15–37)
BASOPHILS ABSOLUTE: 0.1 K/UL (ref 0–0.2)
BASOPHILS RELATIVE PERCENT: 1.4 %
BILIRUB SERPL-MCNC: 0.5 MG/DL (ref 0–1)
BUN BLDV-MCNC: 18 MG/DL (ref 7–20)
CALCIUM SERPL-MCNC: 10.3 MG/DL (ref 8.3–10.6)
CHLORIDE BLD-SCNC: 98 MMOL/L (ref 99–110)
CO2: 27 MMOL/L (ref 21–32)
CREAT SERPL-MCNC: 1 MG/DL (ref 0.6–1.2)
EOSINOPHILS ABSOLUTE: 0.1 K/UL (ref 0–0.6)
EOSINOPHILS RELATIVE PERCENT: 0.6 %
ETHANOL: NORMAL MG/DL (ref 0–0.08)
GFR AFRICAN AMERICAN: >60
GFR NON-AFRICAN AMERICAN: 54
GLOBULIN: 3.2 G/DL
GLUCOSE BLD-MCNC: 111 MG/DL (ref 70–99)
HCT VFR BLD CALC: 33.5 % (ref 36–48)
HEMOGLOBIN: 10.9 G/DL (ref 12–16)
LYMPHOCYTES ABSOLUTE: 2.5 K/UL (ref 1–5.1)
LYMPHOCYTES RELATIVE PERCENT: 27.9 %
MCH RBC QN AUTO: 26.4 PG (ref 26–34)
MCHC RBC AUTO-ENTMCNC: 32.7 G/DL (ref 31–36)
MCV RBC AUTO: 80.8 FL (ref 80–100)
MONOCYTES ABSOLUTE: 0.9 K/UL (ref 0–1.3)
MONOCYTES RELATIVE PERCENT: 9.4 %
NEUTROPHILS ABSOLUTE: 5.5 K/UL (ref 1.7–7.7)
NEUTROPHILS RELATIVE PERCENT: 60.7 %
PDW BLD-RTO: 19.2 % (ref 12.4–15.4)
PLATELET # BLD: 367 K/UL (ref 135–450)
PMV BLD AUTO: 7.5 FL (ref 5–10.5)
POTASSIUM REFLEX MAGNESIUM: 4 MMOL/L (ref 3.5–5.1)
RBC # BLD: 4.14 M/UL (ref 4–5.2)
SODIUM BLD-SCNC: 135 MMOL/L (ref 136–145)
TOTAL PROTEIN: 7.3 G/DL (ref 6.4–8.2)
WBC # BLD: 9.1 K/UL (ref 4–11)

## 2021-05-30 PROCEDURE — 85025 COMPLETE CBC W/AUTO DIFF WBC: CPT

## 2021-05-30 PROCEDURE — 80053 COMPREHEN METABOLIC PANEL: CPT

## 2021-05-30 PROCEDURE — 99283 EMERGENCY DEPT VISIT LOW MDM: CPT

## 2021-05-30 PROCEDURE — 82077 ASSAY SPEC XCP UR&BREATH IA: CPT

## 2021-05-30 PROCEDURE — 99284 EMERGENCY DEPT VISIT MOD MDM: CPT

## 2021-05-30 ASSESSMENT — PAIN DESCRIPTION - PAIN TYPE: TYPE: ACUTE PAIN

## 2021-05-30 ASSESSMENT — PAIN DESCRIPTION - DESCRIPTORS: DESCRIPTORS: HEADACHE

## 2021-05-30 ASSESSMENT — ENCOUNTER SYMPTOMS
ABDOMINAL PAIN: 0
EYES NEGATIVE: 1
BACK PAIN: 0
SHORTNESS OF BREATH: 0
COLOR CHANGE: 0
COUGH: 0

## 2021-05-30 ASSESSMENT — PAIN DESCRIPTION - FREQUENCY: FREQUENCY: CONTINUOUS

## 2021-05-30 ASSESSMENT — PAIN DESCRIPTION - LOCATION: LOCATION: HEAD

## 2021-05-30 ASSESSMENT — PAIN SCALES - GENERAL: PAINLEVEL_OUTOF10: 3

## 2021-05-30 ASSESSMENT — PAIN DESCRIPTION - ORIENTATION: ORIENTATION: ANTERIOR

## 2021-05-30 NOTE — ED NOTES
--Patient provided with discharge instructions. --Instructions, and follow-up appointments reviewed with patient/family. No further questions or needs at this time. --Vital signs and patient stable upon discharge. --Patient ambulatory to [facility]. --Patient requesting to leave AMA  --Being driven home by .         Taqueria Foreman, PAOLO  05/30/21 [id number]

## 2021-05-30 NOTE — ED NOTES
Assisted patient to the restroom. Patient ambulated with steady gait with standby assist. Candice Nahum placed in toilet however urine did not go into collection toilet but rather behind into water.       Endy Wiley RN  05/30/21 4680

## 2021-05-30 NOTE — ED PROVIDER NOTES
negative. Except as noted above in the ROS, all other systems were reviewed and negative.          PAST MEDICAL HISTORY:     Past Medical History:   Diagnosis Date    Acute systolic CHF (congestive heart failure) (HCC)     Asthma     Back ache     Cataract     Cerebral artery occlusion with cerebral infarction ([facility] 2016    Diabetes mellitus ([facility]     type !! - diet controlled    Hyperlipidemia     Hypertension     Insomnia     TIA (transient ischemic attack) 2016         SURGICAL HISTORY:      Past Surgical History:   Procedure Laterality Date    ABDOMEN SURGERY      c section    CATARACT REMOVAL WITH IMPLANT Left [id number]    LEFT EYE CATARACT PHACOEMULSIFICATION INTRAOCULAR LENS     SECTION      DENTAL SURGERY      EYE SURGERY  10/29/13     RIGHT EYE CATARACT PHACOEMULSIFICATION INTRAOCULAR LENS         CURRENT MEDICATIONS:       Previous Medications    ACETAMINOPHEN (TYLENOL) 500 MG TABLET    Take 2 tablets by mouth 3 times daily    CITALOPRAM (CELEXA) 10 MG TABLET    Take 1 tablet by mouth daily    DONEPEZIL (ARICEPT) 5 MG TABLET    Take 1 tablet by mouth daily (with breakfast)    FERROUS SULFATE (IRON 325) 325 (65 FE) MG TABLET    Take 1 tablet by mouth 2 times daily (with meals)    FUROSEMIDE (LASIX) 20 MG TABLET    Take 1 tablet by mouth daily    LEVOTHYROXINE (SYNTHROID) 25 MCG TABLET    Take 1 tablet by mouth Daily    MELOXICAM (MOBIC) 7.5 MG TABLET    Take 7.5 mg by mouth daily    METFORMIN (GLUCOPHAGE) 500 MG TABLET    Take 500 mg by mouth    NICOTINE (NICODERM CQ) 21 MG/24HR    Place 1 patch onto the skin daily    ONDANSETRON (ZOFRAN ODT) 4 MG DISINTEGRATING TABLET    Take 1 tablet by mouth every 8 hours as needed for Nausea    OXYBUTYNIN (DITROPAN-XL) 10 MG EXTENDED RELEASE TABLET    Take 10 mg by mouth daily    TRAZODONE (DESYREL) 50 MG TABLET    Take 50 mg by mouth nightly         ALLERGIES:    Mold extract [trichophyton mentagrophytes]    FAMILY HISTORY:       Family History   Problem Relation Age of Onset    High Blood Pressure Mother    [place] Mother     Cancer Father     Diabetes Father     Heart Disease Father     Kidney Disease Father     Asthma Sister     Cancer Sister         Breast Cancer    Depression Brother     Substance Abuse Paternal Aunt     Stroke Maternal Grandmother     Diabetes Paternal Grandmother           SOCIAL HISTORY:       Social History     Socioeconomic History    Marital status:      Spouse name: Maren Reeves Number of children: Not on file    Years of education: Not on file    Highest education level: Not on file   Occupational History    Not on file   Tobacco Use    Smoking status: Current Every Day Smoker     Packs/day: 2.00     Years: 58.00     Pack years: 116.00     Types: Cigarettes    Smokeless tobacco: Never Used    Tobacco comment: not totally ready to quit today   Vaping Use    Vaping Use: Never used   Substance and Sexual Activity    Alcohol use: Not Currently     Alcohol/week: 0.0 standard drinks     Types: 3 - 4 Standard drinks or equivalent per week    Drug use: No    Sexual activity: Yes     Partners: Male   Other Topics Concern    Not on file   Social History Narrative    Not on file     Social Determinants of Health     Financial Resource Strain:     Difficulty of Paying Living Expenses:    Food Insecurity:     Worried About Running Out of Food in the Last Year:     Ran Out of Food in the Last Year:    Transportation Needs:     Lack of Transportation (Medical):      Lack of Transportation (Non-Medical):    Physical Activity:     Days of Exercise per Week:     Minutes of Exercise per Session:    Stress:     Feeling of Stress :    Social Connections:     Frequency of Communication with Friends and Family:     Frequency of Social Gatherings with Friends and Family:     Attends Nondenominational Services:     Active Member of Clubs or Organizations:     Attends Club or Organization Meetings:     Marital Status:    Intimate Partner Violence:     Fear of Current or Ex-Partner:     Emotionally Abused:     Physically Abused:     Sexually Abused:        SCREENINGS:    [place] Coma Scale  Eye Opening: Spontaneous  Best Verbal Response: Oriented  Best Motor Response: Obeys commands  [place] Coma Scale Score: 15        PHYSICAL EXAM:       ED Triage Vitals [05/30/21 1643]   BP Temp Temp Source Pulse Resp SpO2 Height Weight   (!) 147/86 98 °F (36.7 °C) Oral 74 18 100 % 5' 5\" (1.651 m) 130 lb (59 kg)       Physical Exam    CONSTITUTIONAL: Awake and alert. Cooperative. Well-developed. Well-nourished. Non-toxic. No acute distress. HENT: Normocephalic. Atraumatic. External ears normal, without discharge. No nasal discharge. Oropharynx clear. Mucous membranes moist.  EYES: Conjunctiva non-injected. No scleral icterus. PERRL. EOM's grossly intact. NECK: Supple. Normal ROM. CARDIOVASCULAR: RRR. No Murmer. Intact distal pulses. PULMONARY/CHEST WALL: Effort normal. No tachypnea. Lungs clear to ausculation. ABDOMEN: Normal BS. Soft. Nondistended. No tenderness to palpate. No guarding. /ANORECTAL: Not assessed  MUSKULOSKELETAL: Normal ROM. No acute deformities. No edema. No tenderness to palpate. SKIN: Warm and dry. No rash. NEUROLOGICAL: Alert and oriented x 3. GCS 15. CN II-XII grossly intact. Strength is 5/5 in all extremities and sensation is intact. Normal gait. PSYCHIATRIC: Normal affect.  No SI.        DIAGNOSTICRESULTS:     LABS:    Results for orders placed or performed during the hospital encounter of 05/30/21   CBC Auto Differential   Result Value Ref Range    WBC 9.1 4.0 - 11.0 K/uL    RBC 4.14 4.00 - 5.20 M/uL    Hemoglobin 10.9 (L) 12.0 - 16.0 g/dL    Hematocrit 33.5 (L) 36.0 - 48.0 %    MCV 80.8 80.0 - 100.0 fL    MCH 26.4 26.0 - 34.0 pg    MCHC 32.7 31.0 - 36.0 g/dL    RDW 19.2 (H) 12.4 - 15.4 %    Platelets 753 782 - 305 K/uL    MPV 7.5 5.0 - 10.5 fL    Neutrophils % 60.7 % Lymphocytes % 27.9 %    Monocytes % 9.4 %    Eosinophils % 0.6 %    Basophils % 1.4 %    Neutrophils Absolute 5.5 1.7 - 7.7 K/uL    Lymphocytes Absolute 2.5 1.0 - 5.1 K/uL    Monocytes Absolute 0.9 0.0 - 1.3 K/uL    Eosinophils Absolute 0.1 0.0 - 0.6 K/uL    Basophils Absolute 0.1 0.0 - 0.2 K/uL   Comprehensive Metabolic Panel w/ Reflex to MG   Result Value Ref Range    Sodium 135 (L) 136 - 145 mmol/L    Potassium reflex Magnesium 4.0 3.5 - 5.1 mmol/L    Chloride 98 (L) 99 - 110 mmol/L    CO2 27 21 - 32 mmol/L    Anion Gap 10 3 - 16    Glucose 111 (H) 70 - 99 mg/dL    BUN 18 7 - 20 mg/dL    CREATININE 1.0 0.6 - 1.2 mg/dL    GFR Non-African American 54 (A) >60    GFR African American >60 >60    Calcium 10.3 8.3 - 10.6 mg/dL    Total Protein 7.3 6.4 - 8.2 g/dL    Albumin 4.1 3.4 - 5.0 g/dL    Albumin/Globulin Ratio 1.3 1.1 - 2.2    Total Bilirubin 0.5 0.0 - 1.0 mg/dL    Alkaline Phosphatase 60 40 - 129 U/L    ALT 15 10 - 40 U/L    AST 21 15 - 37 U/L    Globulin 3.2 g/dL   Ethanol   Result Value Ref Range    Ethanol Lvl None Detected mg/dL         RADIOLOGY:  All x-ray studies areviewed/reviewed by me. Formal interpretations per the radiologist are as follows:      CT HEAD WO CONTRAST    Result Date: 5/22/2021  EXAMINATION: CT OF THE HEAD WITHOUT CONTRAST  5/22/2021 9:17 pm TECHNIQUE: CT of the head was performed without the administration of intravenous contrast. Dose modulation, iterative reconstruction, and/or weight based adjustment of the mA/kV was utilized to reduce the radiation dose to as low as reasonably achievable. COMPARISON: 04/01/2021 HISTORY: ORDERING SYSTEM PROVIDED HISTORY: confusion TECHNOLOGIST PROVIDED HISTORY: Has a \"code stroke\" or \"stroke alert\" been called? ->No Reason for exam:->confusion Decision Support Exception - unselect if not a suspected or confirmed emergency medical condition->Emergency Medical Condition (MA) Reason for Exam: confusion, psych eval Acuity: Acute Type of Exam: Initial FINDINGS: BRAIN/VENTRICLES: There is mild motion artifact throughout the exam.  The ventricles are mildly enlarged with diffuse mild prominence of the cortical sulci which is unchanged. There is moderate periventricular low density bilaterally which is unchanged. No intracranial hemorrhage or edema is seen. There is no extra-axial fluid collection or mass. ORBITS: The visualized portion of the orbits demonstrate no acute abnormality. SINUSES: The visualized paranasal sinuses and mastoid air cells demonstrate no acute abnormality. SOFT TISSUES/SKULL:  No acute abnormality of the visualized skull or soft tissues. Motion artifact slightly limiting the exam.  Within the limitations of the study, no obvious acute intracranial abnormality can be seen. Mild atrophy and moderate chronic microischemic disease in the deep white matter which is unchanged. PROCEDURES:   N/A    CRITICAL CARE TIME:       None    CONSULTS:  None      EMERGENCY DEPARTMENT COURSE and DIFFERENTIAL DIAGNOSIS/MDM:   Vitals:    Vitals:    05/30/21 1643 05/30/21 1815   BP: (!) 147/86 (!) 167/86   Pulse: 74 68   Resp: 18 16   Temp: 98 °F (36.7 °C)    TempSrc: Oral    SpO2: 100% 100%   Weight: 130 lb (59 kg)    Height: 5' 5\" (1.651 m)        Patient was given the following medications:  None    I have evaluated this patient in the ED. Old records were reviewed. Patient is here with no complaints or concerns herself. Her  however is concerned about her mental stability and concerned she might hurt herself. Patient just had a hospitalization at [facility] and was discharged 4 days ago. Patient has a benign physical exam and normal vital signs. Screening CBC, CMP are unremarkable and alcohol is not detected. While urine was pending the patient's  grew impatient and stated he wanted to take the patient home. He does not want to be in the ED. He does not feel she needs emergent evaluation.  He has no plans to take her immediately to another facility but rather wants to take her home tonight and see how she does. He will reassess her in the morning. Patient is given return anytime as needed. At this point I do not see an indication to necessarily hold the patient. She is not having any suicidal ideations and it is well-documented that she has dementia with some behavioral disturbance at times. Patient discharged in stable condition    FINAL IMPRESSION:      1.  Dementia with behavioral disturbance, unspecified dementia type [facility])          DISPOSITION/PLAN:   DISPOSITION Decision To Discharge      PATIENT REFERRED TO:  Geovanna Rowland MD  [address] [place] [address]  [phone]            DISCHARGE MEDICATIONS:  New Prescriptions    No medications on file                  (Please note thatportions of this note were completed with a voice recognition program.  Efforts were made to edit the dictations, but occasionally words are mis-transcribed.)    Ara Chang PA-C (electronicallysigned)              Spencer Ahumada, [place]  05/30/21 7324

## 2021-06-02 ENCOUNTER — APPOINTMENT (OUTPATIENT)
Dept: CT IMAGING | Age: 76
DRG: 884 | End: 2021-06-02
Payer: MEDICARE

## 2021-06-02 ENCOUNTER — APPOINTMENT (OUTPATIENT)
Dept: GENERAL RADIOLOGY | Age: 76
DRG: 884 | End: 2021-06-02
Payer: MEDICARE

## 2021-06-02 ENCOUNTER — HOSPITAL ENCOUNTER (EMERGENCY)
Age: 76
Discharge: HOME | DRG: 884 | End: 2021-06-02
Attending: EMERGENCY MEDICINE
Payer: MEDICARE

## 2021-06-02 VITALS
SYSTOLIC BLOOD PRESSURE: 126 MMHG | HEIGHT: 65 IN | TEMPERATURE: 98.1 F | DIASTOLIC BLOOD PRESSURE: 70 MMHG | RESPIRATION RATE: 16 BRPM | WEIGHT: 135 LBS | HEART RATE: 75 BPM | BODY MASS INDEX: 22.49 KG/M2 | OXYGEN SATURATION: 100 %

## 2021-06-02 DIAGNOSIS — F03.91 DEMENTIA WITH BEHAVIORAL DISTURBANCE, UNSPECIFIED DEMENTIA TYPE: Primary | ICD-10-CM

## 2021-06-02 LAB
A/G RATIO: 1.3 (ref 1.1–2.2)
ALBUMIN SERPL-MCNC: 3.9 G/DL (ref 3.4–5)
ALP BLD-CCNC: 61 U/L (ref 40–129)
ALT SERPL-CCNC: 12 U/L (ref 10–40)
AMPHETAMINE SCREEN, URINE: NORMAL
ANION GAP SERPL CALCULATED.3IONS-SCNC: 8 MMOL/L (ref 3–16)
AST SERPL-CCNC: 15 U/L (ref 15–37)
BACTERIA: ABNORMAL /HPF
BARBITURATE SCREEN URINE: NORMAL
BASE EXCESS VENOUS: 0.1 MMOL/L (ref -3–3)
BASOPHILS ABSOLUTE: 0.1 K/UL (ref 0–0.2)
BASOPHILS RELATIVE PERCENT: 1.3 %
BENZODIAZEPINE SCREEN, URINE: NORMAL
BILIRUB SERPL-MCNC: 0.3 MG/DL (ref 0–1)
BILIRUBIN URINE: NEGATIVE
BLOOD, URINE: NEGATIVE
BUN BLDV-MCNC: 22 MG/DL (ref 7–20)
CALCIUM SERPL-MCNC: 9.4 MG/DL (ref 8.3–10.6)
CANNABINOID SCREEN URINE: NORMAL
CARBOXYHEMOGLOBIN: 4.8 % (ref 0–1.5)
CHLORIDE BLD-SCNC: 104 MMOL/L (ref 99–110)
CLARITY: ABNORMAL
CO2: 24 MMOL/L (ref 21–32)
COCAINE METABOLITE SCREEN URINE: NORMAL
COLOR: YELLOW
CREAT SERPL-MCNC: 1.1 MG/DL (ref 0.6–1.2)
EOSINOPHILS ABSOLUTE: 0.1 K/UL (ref 0–0.6)
EOSINOPHILS RELATIVE PERCENT: 1 %
EPITHELIAL CELLS, UA: ABNORMAL /HPF (ref 0–5)
ETHANOL: NORMAL MG/DL (ref 0–0.08)
GFR AFRICAN AMERICAN: 59
GFR NON-AFRICAN AMERICAN: 48
GLOBULIN: 2.9 G/DL
GLUCOSE BLD-MCNC: 87 MG/DL (ref 70–99)
GLUCOSE URINE: NEGATIVE MG/DL
HCO3 VENOUS: 24.5 MMOL/L (ref 23–29)
HCT VFR BLD CALC: 32.8 % (ref 36–48)
HEMOGLOBIN: 10.7 G/DL (ref 12–16)
KETONES, URINE: ABNORMAL MG/DL
LEUKOCYTE ESTERASE, URINE: ABNORMAL
LYMPHOCYTES ABSOLUTE: 3.1 K/UL (ref 1–5.1)
LYMPHOCYTES RELATIVE PERCENT: 36.7 %
Lab: NORMAL
MCH RBC QN AUTO: 26.8 PG (ref 26–34)
MCHC RBC AUTO-ENTMCNC: 32.6 G/DL (ref 31–36)
MCV RBC AUTO: 82.3 FL (ref 80–100)
METHADONE SCREEN, URINE: NORMAL
METHEMOGLOBIN VENOUS: 0.3 %
MICROSCOPIC EXAMINATION: YES
MONOCYTES ABSOLUTE: 0.8 K/UL (ref 0–1.3)
MONOCYTES RELATIVE PERCENT: 9.1 %
NEUTROPHILS ABSOLUTE: 4.4 K/UL (ref 1.7–7.7)
NEUTROPHILS RELATIVE PERCENT: 51.9 %
NITRITE, URINE: NEGATIVE
O2 CONTENT, VEN: 8 VOL %
O2 SAT, VEN: 44 %
O2 THERAPY: ABNORMAL
OPIATE SCREEN URINE: NORMAL
OXYCODONE URINE: NORMAL
PCO2, VEN: 39 MMHG (ref 40–50)
PDW BLD-RTO: 19.8 % (ref 12.4–15.4)
PH UA: 6
PH UA: 6 (ref 5–8)
PH VENOUS: 7.42 (ref 7.35–7.45)
PHENCYCLIDINE SCREEN URINE: NORMAL
PLATELET # BLD: 374 K/UL (ref 135–450)
PMV BLD AUTO: 7.1 FL (ref 5–10.5)
PO2, VEN: 24.1 MMHG (ref 25–40)
POTASSIUM REFLEX MAGNESIUM: 4.5 MMOL/L (ref 3.5–5.1)
PROPOXYPHENE SCREEN: NORMAL
PROTEIN UA: NEGATIVE MG/DL
RBC # BLD: 3.99 M/UL (ref 4–5.2)
RBC UA: ABNORMAL /HPF (ref 0–4)
SODIUM BLD-SCNC: 136 MMOL/L (ref 136–145)
SPECIFIC GRAVITY UA: 1.02 (ref 1–1.03)
TCO2 CALC VENOUS: 26 MMOL/L
TOTAL PROTEIN: 6.8 G/DL (ref 6.4–8.2)
TROPONIN: <0.01 NG/ML
URINE REFLEX TO CULTURE: ABNORMAL
URINE TYPE: ABNORMAL
UROBILINOGEN, URINE: 0.2 E.U./DL
WBC # BLD: 8.4 K/UL (ref 4–11)
WBC UA: ABNORMAL /HPF (ref 0–5)

## 2021-06-02 PROCEDURE — 6370000000 HC RX 637 (ALT 250 FOR IP): Performed by: PHYSICIAN ASSISTANT

## 2021-06-02 PROCEDURE — 85025 COMPLETE CBC W/AUTO DIFF WBC: CPT

## 2021-06-02 PROCEDURE — 80053 COMPREHEN METABOLIC PANEL: CPT

## 2021-06-02 PROCEDURE — 81001 URINALYSIS AUTO W/SCOPE: CPT

## 2021-06-02 PROCEDURE — 82077 ASSAY SPEC XCP UR&BREATH IA: CPT

## 2021-06-02 PROCEDURE — 93005 ELECTROCARDIOGRAM TRACING: CPT | Performed by: PHYSICIAN ASSISTANT

## 2021-06-02 PROCEDURE — 70450 CT HEAD/BRAIN W/O DYE: CPT

## 2021-06-02 PROCEDURE — 87086 URINE CULTURE/COLONY COUNT: CPT

## 2021-06-02 PROCEDURE — 73630 X-RAY EXAM OF FOOT: CPT

## 2021-06-02 PROCEDURE — 84484 ASSAY OF TROPONIN QUANT: CPT

## 2021-06-02 PROCEDURE — 71045 X-RAY EXAM CHEST 1 VIEW: CPT

## 2021-06-02 PROCEDURE — 80307 DRUG TEST PRSMV CHEM ANLYZR: CPT

## 2021-06-02 PROCEDURE — 82803 BLOOD GASES ANY COMBINATION: CPT

## 2021-06-02 PROCEDURE — 73560 X-RAY EXAM OF KNEE 1 OR 2: CPT

## 2021-06-02 RX ORDER — IBUPROFEN 400 MG/1
400 TABLET ORAL ONCE
Status: COMPLETED | OUTPATIENT
Start: 2021-06-02 | End: 2021-06-02

## 2021-06-02 RX ORDER — ACETAMINOPHEN 500 MG
500 TABLET ORAL ONCE
Status: COMPLETED | OUTPATIENT
Start: 2021-06-02 | End: 2021-06-02

## 2021-06-02 RX ADMIN — ACETAMINOPHEN 500 MG: 500 TABLET ORAL at 18:35

## 2021-06-02 RX ADMIN — IBUPROFEN 400 MG: 400 TABLET, FILM COATED ORAL at 21:03

## 2021-06-02 ASSESSMENT — PAIN SCALES - GENERAL
PAINLEVEL_OUTOF10: 7
PAINLEVEL_OUTOF10: 9
PAINLEVEL_OUTOF10: 9

## 2021-06-02 ASSESSMENT — PAIN DESCRIPTION - LOCATION: LOCATION: KNEE

## 2021-06-02 ASSESSMENT — PAIN DESCRIPTION - DESCRIPTORS: DESCRIPTORS: HEADACHE

## 2021-06-02 ASSESSMENT — PAIN DESCRIPTION - ORIENTATION: ORIENTATION: LEFT

## 2021-06-02 NOTE — ED NOTES
Bed: 15  Expected date: 6/2/21  Expected time: 5:18 PM  Means of arrival:   Comments[facility]  [facility]  [facility]44 4617

## 2021-06-02 NOTE — ED PROVIDER NOTES
[address] ED  EMERGENCY DEPARTMENT ENCOUNTER        Pt Name: Edie Gastelum  MRN: [id number]  [place] [birth year]  Date of evaluation: 6/2/2021  Provider: Mitesh Pino PA-C  PCP: Aguilar Eng MD    Shared Visit or Autonomous Visit:  I have seen and evaluated this patient with my supervising physician Sofie Young MD.    CHIEF COMPLAINT       Chief Complaint   Patient presents with    Altered Mental Status     found sitting outside in rain by police, lives at home with . has been diagnosed with dementia. reports more confused than normal. patient able to identify person, place and time now. says she has been having trouble sleeping-only sleeping 2-3 hours at a time and she decided to leave home and go get some help for that. patient states she asked \"a kind man down the street to get her a  to assist her\"       HISTORY OF PRESENT ILLNESS   (Location/Symptom, Timing/Onset, Context/Setting, Quality, Duration, Modifying Factors, Severity)  Note limiting factors. Edie Gastelum is a 77 y.o. female brought in by EMS for altered mental status. She was found sitting outside in the rain by police. Lives at home with her . Has been diagnosed with dementia. Patient states she went outside looking for help because she has not been able to sleep only sleeping 2 to 3 hours per night. Patient awake and alert oriented to person place and time. States she is always confused, denies any change. Complains of headache, left knee pain left foot pain no known falls or injury. Denies chest pain or shortness of breath. No fever. States she has had urinary frequency no dysuria. No abdominal pain. No vomiting. Recent behavioral admission 5/22/2021 for depression and suicidal ideation. Patient denies being suicidal here. Denies current alcohol use. The history is provided by the patient, the EMS personnel and the spouse.    Altered Mental Status  Presenting symptoms: behavior changes and confusion    Context: dementia    Context: not alcohol use    Associated symptoms: headaches    Associated symptoms: no abdominal pain, no fever, no vomiting and no weakness        Nursing Notes were reviewed    REVIEW OF SYSTEMS    (2-9 systems for level 4, 10 or more for level 5)     Review of Systems   Constitutional: Negative for fever. Eyes: Negative for visual disturbance. Respiratory: Negative for shortness of breath. Cardiovascular: Negative for chest pain. Gastrointestinal: Negative for abdominal pain and vomiting. Genitourinary: Positive for frequency. Negative for difficulty urinating and dysuria. Musculoskeletal: Negative for back pain and neck pain. Left knee pain. Left foot pain. Skin: Negative for wound. Neurological: Positive for headaches. Negative for dizziness, syncope, weakness and numbness. Psychiatric/Behavioral: Positive for confusion. Negative for suicidal ideas. All other systems reviewed and are negative. Positives and Pertinent negatives as per HPI.        PAST MEDICAL HISTORY     Past Medical History:   Diagnosis Date    Acute systolic CHF (congestive heart failure) (HCC)     Asthma     Back ache     Cataract     Cerebral artery occlusion with cerebral infarction ([facility] [address] [facility] 2016    Diabetes mellitus ([facility] [address] [facility]     type !! - diet controlled    Hyperlipidemia     Hypertension     Insomnia     TIA (transient ischemic attack) 2016         SURGICAL HISTORY     Past Surgical History:   Procedure Laterality Date    ABDOMEN SURGERY      c section    CATARACT REMOVAL WITH IMPLANT Left [id number]    LEFT EYE CATARACT PHACOEMULSIFICATION INTRAOCULAR LENS     SECTION      DENTAL SURGERY      EYE SURGERY  10/29/13     RIGHT EYE CATARACT PHACOEMULSIFICATION INTRAOCULAR LENS         CURRENTMEDICATIONS       Discharge Medication List as of 2021  9:01 PM      CONTINUE these medications which have NOT CHANGED    Details acetaminophen (TYLENOL) 500 MG tablet Take 2 tablets by mouth 3 times daily, Disp-120 tablet, R-0OTC      citalopram (CELEXA) 10 MG tablet Take 1 tablet by mouth daily, Disp-30 tablet, R-0Normal      donepezil (ARICEPT) 5 MG tablet Take 1 tablet by mouth daily (with breakfast), Disp-30 tablet, R-0Normal      nicotine (NICODERM CQ) 21 MG/24HR Place 1 patch onto the skin daily, Disp-30 patch, R-0Normal      meloxicam (MOBIC) 7.5 MG tablet Take 7.5 mg by mouth dailyHistorical Med      oxybutynin (DITROPAN-XL) 10 MG extended release tablet Take 10 mg by mouth dailyHistorical Med      traZODone (DESYREL) 50 MG tablet Take 50 mg by mouth nightlyHistorical Med      ondansetron (ZOFRAN ODT) 4 MG disintegrating tablet Take 1 tablet by mouth every 8 hours as needed for Nausea, Disp-20 tablet, R-0Normal      furosemide (LASIX) 20 MG tablet Take 1 tablet by mouth daily, Disp-30 tablet, R-3Print      ferrous sulfate (IRON 325) 325 (65 Fe) MG tablet Take 1 tablet by mouth 2 times daily (with meals), Disp-60 tablet, R-3Print      metFORMIN (GLUCOPHAGE) 500 MG tablet Take 500 mg by mouthHistorical Med      levothyroxine (SYNTHROID) 25 MCG tablet Take 1 tablet by mouth Daily, Disp-30 tablet, R-0NO PRINT               ALLERGIES     Mold extract [trichophyton mentagrophytes]    FAMILYHISTORY       Family History   Problem Relation Age of Onset    High Blood Pressure Mother     Miscarriages / Maik Eisenmenger Mother     Cancer Father     Diabetes Father     Heart Disease Father     Kidney Disease Father     Asthma Sister     Cancer Sister         Breast Cancer    Depression Brother     Substance Abuse Paternal Aunt     Stroke Maternal Grandmother     Diabetes Paternal Grandmother           SOCIAL HISTORY       Social History     Socioeconomic History    Marital status:      Spouse name: Geoff Lino Number of children: Not on file    Years of education: Not on file    Highest education level: Not on file Occupational History    Not on file   Tobacco Use    Smoking status: Current Every Day Smoker     Packs/day: 2.00     Years: 58.00     Pack years: 116.00     Types: Cigarettes    Smokeless tobacco: Never Used    Tobacco comment: not totally ready to quit today   Vaping Use    Vaping Use: Never used   Substance and Sexual Activity    Alcohol use: Not Currently     Alcohol/week: 0.0 standard drinks     Types: 3 - 4 Standard drinks or equivalent per week    Drug use: No    Sexual activity: Yes     Partners: Male   Other Topics Concern    Not on file   Social History Narrative    Not on file     Social Determinants of Health     Financial Resource Strain:     Difficulty of Paying Living Expenses:    Food Insecurity:     Worried About Running Out of Food in the Last Year:     Ran Out of Food in the Last Year:    Transportation Needs:     Lack of Transportation (Medical):  Lack of Transportation (Non-Medical):    Physical Activity:     Days of Exercise per Week:     Minutes of Exercise per Session:    Stress:     Feeling of Stress :    Social Connections:     Frequency of Communication with Friends and Family:     Frequency of Social Gatherings with Friends and Family:     Attends Christian Services:     Active Member of Clubs or Organizations:     Attends Club or Organization Meetings:     Marital Status:    Intimate Partner Violence:     Fear of Current or Ex-Partner:     Emotionally Abused:     Physically Abused:     Sexually Abused:        SCREENINGS    Brenda Coma Scale  Eye Opening: Spontaneous  Best Verbal Response: Oriented  Best Motor Response: Obeys commands  Brenda Coma Scale Score: 15        PHYSICAL EXAM    (up to 7 for level 4, 8 or more for level 5)     ED Triage Vitals [06/02/21 1750]   BP Temp Temp Source Pulse Resp SpO2 Height Weight   128/77 98.1 °F (36.7 °C) Oral 78 16 100 % 5' 5\" (1.651 m) 135 lb (61.2 kg)       Physical Exam  Vitals and nursing note reviewed. Constitutional:       Appearance: She is well-developed. She is not toxic-appearing. Comments: Sitting up in the bed awake and alert. Pleasant. Rambling speech. Oriented x3. HENT:      Head: Normocephalic and atraumatic. Right Ear: Tympanic membrane and ear canal normal. No hemotympanum. Left Ear: Tympanic membrane and ear canal normal. No hemotympanum. Mouth/Throat:      Mouth: Mucous membranes are moist.      Pharynx: Oropharynx is clear. No pharyngeal swelling, oropharyngeal exudate or posterior oropharyngeal erythema. Eyes:      Conjunctiva/sclera: Conjunctivae normal.      Pupils: Pupils are equal, round, and reactive to light. Neck:      Vascular: No JVD. Cardiovascular:      Rate and Rhythm: Normal rate and regular rhythm. Pulses: Normal pulses. Radial pulses are 2+ on the right side and 2+ on the left side. Posterior tibial pulses are 2+ on the right side and 2+ on the left side. Pulmonary:      Effort: Pulmonary effort is normal. No respiratory distress. Breath sounds: Normal breath sounds. No stridor. No wheezing, rhonchi or rales. Abdominal:      General: Bowel sounds are normal. There is no distension. Palpations: Abdomen is soft. Abdomen is not rigid. There is no mass. Tenderness: There is no abdominal tenderness. There is no guarding or rebound. Musculoskeletal:         General: Normal range of motion. Cervical back: Normal range of motion and neck supple. No tenderness or bony tenderness. Thoracic back: No tenderness or bony tenderness. Lumbar back: No tenderness or bony tenderness. Comments: Tenderness to palpation left knee small abrasion to the anterior knee. No obvious swelling. No deformity. Neurovascularly intact. Tenderness to palpation dorsal aspect of the left second third and fourth toes with small abrasion. No deformity. Neurovascularly intact. Skin:     General: Skin is warm and dry. Findings: No rash. Neurological:      Mental Status: She is alert and oriented to person, place, and time. She is confused. GCS: GCS eye subscore is 4. GCS verbal subscore is 5. GCS motor subscore is 6. Cranial Nerves: No cranial nerve deficit. Sensory: Sensation is intact. No sensory deficit. Motor: Motor function is intact. No abnormal muscle tone or pronator drift. Coordination: Coordination normal. Finger-Nose-Finger Test and Heel to [facility] Test normal.      Comments: Cranial facial musculature and sensation are intact. Pt has intact finger to nose. Intact sensation symmetric. Equal strength 5 out of 5 symmetric upper and lower extremities. Patient holds each extremity out extended for 10 seconds without drift. Touches heel to opposite shin and runs down each side without difficulty. NIHSS 0. Psychiatric:         Behavior: Behavior is cooperative. Thought Content: Thought content does not include homicidal or suicidal ideation.          DIAGNOSTIC RESULTS   LABS:    Labs Reviewed   CBC WITH AUTO DIFFERENTIAL - Abnormal; Notable for the following components:       Result Value    RBC 3.99 (*)     Hemoglobin 10.7 (*)     Hematocrit 32.8 (*)     RDW 19.8 (*)     All other components within normal limits    Narrative:     Performed at:  [facility],  [place], [facility]   Phone [phone]   COMPREHENSIVE METABOLIC PANEL W/ REFLEX TO MG FOR LOW K - Abnormal; Notable for the following components:    BUN 22 (*)     GFR Non- 48 (*)     GFR  59 (*)     All other components within normal limits    Narrative:     Performed at:  [facility]) - [facility],  [place], [place] [email or web address]   Phone [phone]   URINE RT REFLEX TO CULTURE - Abnormal; Notable for the following components:    Clarity, UA SL CLOUDY (*)     Ketones, Urine TRACE (*)     Leukocyte Esterase, Urine TRACE (*)     All other components within normal limits    Narrative:     Performed at:  [facility][1] [address],  [place], [place]   Phone [phone]   BLOOD GAS, VENOUS - Abnormal; Notable for the following components:    pCO2, Adam 39.0 (*)     pO2, Adam 24.1 (*)     Carboxyhemoglobin 4.8 (*)     All other components within normal limits    Narrative:     Performed at:  [facility]] [address],  [place], [place]   Phone [phone]   MICROSCOPIC URINALYSIS - Abnormal; Notable for the following components:    WBC, UA 6-9 (*)     Epithelial Cells, UA 11-20 (*)     Bacteria, UA 1+ (*)     All other components within normal limits    Narrative:     Performed at:  [place] [address],  [place] [employer]   Phone [phone]   CULTURE, URINE   TROPONIN    Narrative:     Performed at:  [place] [address],  [employer] [place] [employer]   Phone [phone]   ETHANOL    Narrative:     Performed at:  [place] [address],  [employer]   Phone [phone]   URINE DRUG SCREEN    Narrative:     Performed at:  [place] [address],  [employer]   Phone [phone]     Results for orders placed or performed during the hospital encounter of 06/02/21   CBC Auto Differential   Result Value Ref Range    WBC 8.4 4.0 - 11.0 K/uL    RBC 3.99 (L) 4.00 - 5.20 M/uL    Hemoglobin 10.7 (L) 12.0 - 16.0 g/dL    Hematocrit 32.8 (L) 36.0 - 48.0 %    MCV 82.3 80.0 - 100.0 fL    MCH 26.8 26.0 - 34.0 pg    MCHC 32.6 31.0 - 36.0 g/dL    RDW 19.8 (H) 12.4 - 15.4 %    Platelets 820 757 - 793 K/uL    MPV 7.1 5.0 - 10.5 fL    Neutrophils % 51.9 %    Lymphocytes % 36.7 %    Monocytes % 9.1 %    Eosinophils % 1.0 %    Basophils % 1.3 %    Neutrophils Absolute 4.4 1.7 - 7.7 K/uL Lymphocytes Absolute 3.1 1.0 - 5.1 K/uL    Monocytes Absolute 0.8 0.0 - 1.3 K/uL    Eosinophils Absolute 0.1 0.0 - 0.6 K/uL    Basophils Absolute 0.1 0.0 - 0.2 K/uL   Comprehensive Metabolic Panel w/ Reflex to MG   Result Value Ref Range    Sodium 136 136 - 145 mmol/L    Potassium reflex Magnesium 4.5 3.5 - 5.1 mmol/L    Chloride 104 99 - 110 mmol/L    CO2 24 21 - 32 mmol/L    Anion Gap 8 3 - 16    Glucose 87 70 - 99 mg/dL    BUN 22 (H) 7 - 20 mg/dL    CREATININE 1.1 0.6 - 1.2 mg/dL    GFR Non- 48 (A) >60    GFR  59 (A) >60    Calcium 9.4 8.3 - 10.6 mg/dL    Total Protein 6.8 6.4 - 8.2 g/dL    Albumin 3.9 3.4 - 5.0 g/dL    Albumin/Globulin Ratio 1.3 1.1 - 2.2    Total Bilirubin 0.3 0.0 - 1.0 mg/dL    Alkaline Phosphatase 61 40 - 129 U/L    ALT 12 10 - 40 U/L    AST 15 15 - 37 U/L    Globulin 2.9 g/dL   Troponin   Result Value Ref Range    Troponin <0.01 <0.01 ng/mL   Urinalysis Reflex to Culture    Specimen: Urine, clean catch   Result Value Ref Range    Color, UA Yellow Straw/Yellow    Clarity, UA SL CLOUDY (A) Clear    Glucose, Ur Negative Negative mg/dL    Bilirubin Urine Negative Negative    Ketones, Urine TRACE (A) Negative mg/dL    Specific Gravity, UA 1.020 1.005 - 1.030    Blood, Urine Negative Negative    pH, UA 6.0 5.0 - 8.0    Protein, UA Negative Negative mg/dL    Urobilinogen, Urine 0.2 <2.0 E.U./dL    Nitrite, Urine Negative Negative    Leukocyte Esterase, Urine TRACE (A) Negative    Microscopic Examination YES     Urine Type NotGiven     Urine Reflex to Culture Not Indicated    Ethanol   Result Value Ref Range    Ethanol Lvl None Detected mg/dL   Blood gas, venous   Result Value Ref Range    pH, Adam 7.416 7.350 - 7.450    pCO2, Adam 39.0 (L) 40.0 - 50.0 mmHg    pO2, Adam 24.1 (L) 25 - 40 mmHg    HCO3, Venous 24.5 23.0 - 29.0 mmol/L    Base Excess, Adam 0.1 -3.0 - 3.0 mmol/L    O2 Sat, Adam 44 Not Established %    Carboxyhemoglobin 4.8 (H) 0.0 - 1.5 %    MetHgb, Adam 0.3 <1.5 %    TC02 (Calc), Adam 26 Not Established mmol/L    O2 Content, Adam 8 Not Established VOL %    O2 Therapy Unknown    Urine Drug Screen   Result Value Ref Range    Amphetamine Screen, Urine Neg Negative <1000ng/mL    Barbiturate Screen, Ur Neg Negative <200 ng/mL    Benzodiazepine Screen, Urine Neg Negative <200 ng/mL    Cannabinoid Scrn, Ur Neg Negative <50 ng/mL    Cocaine Metabolite Screen, Urine Neg Negative <300 ng/mL    Opiate Scrn, Ur Neg Negative <300 ng/mL    PCP Screen, Urine Neg Negative <25 ng/mL    Methadone Screen, Urine Neg Negative <300 ng/mL    Propoxyphene Scrn, Ur Neg Negative <300 ng/mL    Oxycodone Urine Neg Negative <100 ng/ml    pH, UA 6.0     Drug Screen Comment: see below    Microscopic Urinalysis   Result Value Ref Range    WBC, UA 6-9 (A) 0 - 5 /HPF    RBC, UA 0-2 0 - 4 /HPF    Epithelial Cells, UA 11-20 (A) 0 - 5 /HPF    Bacteria, UA 1+ (A) None Seen /HPF         All other labs were within normal range or not returned as of this dictation. EKG: All EKG's are interpreted by the Emergency Department Physician in the absence of a cardiologist.  Please see their note for interpretation of EKG. RADIOLOGY:   Non-plain film images such as CT, Ultrasound and MRI are read by the radiologist. Plain radiographic images are visualized andpreliminarily interpreted by the  ED Provider with the below findings:        Interpretation [facility] Radiologist below, if available at the time of this note:    CT HEAD WO CONTRAST   Final Result   Mild central and cortical cerebral atrophy. Mild chronic deep white matter ischemic changes      No acute intracranial abnormalities are noted. 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          XR CHEST PORTABLE   Final Result   No acute cardiopulmonary disease         XR FOOT LEFT (MIN 3 VIEWS)   Final Result   Mild osteoarthritic changes throughout the digits with no acute bony   abnormality. PROCEDURES   Unless otherwise noted below, none     Procedures    CRITICAL CARE TIME   N/A    CONSULTS:  None      EMERGENCY DEPARTMENT COURSE and DIFFERENTIAL DIAGNOSIS/MDM:   Vitals:    Vitals:    06/02/21 1750 06/02/21 2116   BP: 128/77 126/70   Pulse: 78 75   Resp: 16 16   Temp: 98.1 °F (36.7 °C) 98.1 °F (36.7 °C)   TempSrc: Oral Oral   SpO2: 100%    Weight: 135 lb (61.2 kg)    Height: 5' 5\" (1.651 m)        Patient was given thefollowing medications:  Medications   acetaminophen (TYLENOL) tablet 500 mg (500 mg Oral Given 6/2/21 1835)   ibuprofen (ADVIL;MOTRIN) tablet 400 mg (400 mg Oral Given 6/2/21 2103)        is here states he had gotten home he had a backache he went to lay down and she left the house when he was asleep states that is the first time she has ever done that. She walked down the street and sat down on a bench at the end of the street. Patient states she flagged down a car asked them to get the police or someone to help her when the police came EMS transported her here for evaluation of altered mental status.  states police came and knocked on his door and told him what happened.  states feels this was an overreaction. States she has been acting fine. He denies any worsening confusion. She has dementia.  comfortable taking her back home. Feels she will be safe at home. She has not made any suicidal statements to him. She denies being suicidal. Patient feels comfortable going home would like to go home. No significant abnormalities found on work-up here today. She will be discharged home with  advised close follow-up with her doctor and returning for any worsening or changes. They understand and agree.       I estimate there is LOW risk for SUBARACHNOID HEMORRHAGE, MENINGITIS, INTRACRANIAL HEMORRHAGE, SUBDURAL OR EPIDURAL HEMATOMA, OR STROKE, thus I consider the discharge disposition reasonable. FINAL IMPRESSION      1.  Dementia with behavioral disturbance, unspecified dementia type [facility])          DISPOSITION/PLAN   DISPOSITION Decision to Discharge    PATIENT REFERREDTO:  Kole Lange MD  [address] [place] [address] [place]   [phone]    Schedule an appointment as soon as possible for a visit       [facility] ED  [address] [facility]  [phone]    If symptoms worsen      DISCHARGE MEDICATIONS:  Discharge Medication List as of 6/2/2021  9:01 PM          DISCONTINUED MEDICATIONS:  Discharge Medication List as of 6/2/2021  9:01 PM                 (Please note that portions ofthis note were completed with a voice recognition program.  Efforts were made to edit the dictations but occasionally words are mis-transcribed.)    Sandra Graham PA-C (electronically signed)            Becky Hooker PA-C  06/03/21 0421

## 2021-06-02 NOTE — ED PROVIDER NOTES
I independently examined and evaluated Rut Novoa. In brief, patient found walking around outside in rain.  states he had come home from work and laid down. Patient with dementia. Focused exam revealed patient awake and alert. No acute distress. Moving all extremities. The Ekg interpreted by me shows  normal sinus rhythm with a rate of 76  Axis is   Normal  QTc is  normal  Intervals and Durations are unremarkable. ST Segments: nonspecific changes  No significant change from prior EKG dated 5/23/21    Imaging:  XR KNEE LEFT (1-2 VIEWS)    Result Date: 6/2/2021  EXAMINATION: 2 XRAY VIEWS OF THE LEFT KNEE 6/2/2021 6:46 pm COMPARISON: None. HISTORY: ORDERING SYSTEM PROVIDED HISTORY: pain TECHNOLOGIST PROVIDED HISTORY: Reason for exam:->pain Reason for Exam: left knee pain Acuity: Unknown Type of Exam: Unknown Mechanism of Injury: left knee pain, possilbe inj, pt found down FINDINGS: There is moderate joint space narrowing medially in the knee with minimal osteophytes medially. No fracture or dislocation is seen. The patella is intact with no significant joint effusion. Moderate osteoarthritic changes medially in the knee with no acute bony abnormality. XR FOOT LEFT (MIN 3 VIEWS)    Result Date: 6/2/2021  EXAMINATION: THREE XRAY VIEWS OF THE LEFT FOOT 6/2/2021 6:46 pm COMPARISON: None. HISTORY: ORDERING SYSTEM PROVIDED HISTORY: pain TECHNOLOGIST PROVIDED HISTORY: Reason for exam:->pain Reason for Exam: left foot pain Acuity: Unknown Type of Exam: Unknown Mechanism of Injury: found down outside possilbe left foot in, FINDINGS: There is mild joint space narrowing throughout the digits. No fracture or dislocation is seen. The tarsal bones are intact. No foreign bodies are seen in the soft tissues. Mild osteoarthritic changes throughout the digits with no acute bony abnormality.      CT HEAD WO CONTRAST    Result Date: 6/2/2021  EXAMINATION: CT OF THE HEAD WITHOUT CONTRAST \"code stroke\" or \"stroke alert\" been called? ->No Reason for exam:->confusion Decision Support Exception - unselect if not a suspected or confirmed emergency medical condition->Emergency Medical Condition (MA) Reason for Exam: confusion, psych eval Acuity: Acute Type of Exam: Initial FINDINGS: BRAIN/VENTRICLES: There is mild motion artifact throughout the exam.  The ventricles are mildly enlarged with diffuse mild prominence of the cortical sulci which is unchanged. There is moderate periventricular low density bilaterally which is unchanged. No intracranial hemorrhage or edema is seen. There is no extra-axial fluid collection or mass. ORBITS: The visualized portion of the orbits demonstrate no acute abnormality. SINUSES: The visualized paranasal sinuses and mastoid air cells demonstrate no acute abnormality. SOFT TISSUES/SKULL:  No acute abnormality of the visualized skull or soft tissues. Motion artifact slightly limiting the exam.  Within the limitations of the study, no obvious acute intracranial abnormality can be seen. Mild atrophy and moderate chronic microischemic disease in the deep white matter which is unchanged. XR CHEST PORTABLE    Result Date: 6/2/2021  EXAMINATION: ONE XRAY VIEW OF THE CHEST 6/2/2021 3:45 pm COMPARISON: 01/16/2021 HISTORY: ORDERING SYSTEM PROVIDED HISTORY: ams TECHNOLOGIST PROVIDED HISTORY: Reason for exam:->ams Reason for Exam: pt found down, ams, Acuity: Unknown Mechanism of Injury: found down, AMS, possible chest inj FINDINGS: Stable patchy areas of scarring in the left mid and lower lung zones. The cardiac size is normal. No acute infiltrates or pleural effusions are seen. Pulmonary vascularity appears normal. There is mild ectasia of the thoracic aorta. There are degenerative changes in the spine and bilateral shoulders. No acute bony abnormalities.   Parenchymal and wale calcified granulomas     No acute cardiopulmonary disease       ED course: patient presenting after being found out in the rain. Workup unremarkable, including no e/o significant injury.  here and states currently at baseline and wishes to take patient home. Will d/c home at this time. All diagnostic, treatment, and disposition decisions were made by myself in conjunction with the advanced practice provider. For all further details of the patient's emergency department visit, please see the advanced practice provider's documentation. Comment: Please note this report has been produced using speech recognition software and may contain errors related to that system including errors in grammar, punctuation, and spelling, as well as words and phrases that may be inappropriate. If there are any questions or concerns please feel free to contact the dictating provider for clarification.         Geovanna Salgado MD  06/02/21 1433

## 2021-06-03 LAB — URINE CULTURE, ROUTINE: NORMAL

## 2021-06-03 ASSESSMENT — ENCOUNTER SYMPTOMS
SHORTNESS OF BREATH: 0
VOMITING: 0
BACK PAIN: 0
ABDOMINAL PAIN: 0

## 2021-06-03 NOTE — ED NOTES
..Patient discharged to home with  , alert, oriented, skin warm, dry and pink. Denies needs and or questions.  Will follow up as directed, patient encouraged to return for worsening or new symptoms or other concerns       Celestina Torres RN  06/02/21 9703

## 2021-06-04 LAB — EKG DIAGNOSIS: NORMAL

## 2021-06-04 PROCEDURE — 93010 ELECTROCARDIOGRAM REPORT: CPT | Performed by: INTERNAL MEDICINE

## 2021-06-05 ENCOUNTER — HOSPITAL ENCOUNTER (INPATIENT)
Age: 76
LOS: 6 days | Discharge: HOME | DRG: 884 | End: 2021-06-11
Attending: EMERGENCY MEDICINE | Admitting: PSYCHIATRY & NEUROLOGY
Payer: MEDICARE

## 2021-06-05 DIAGNOSIS — F03.91 DEMENTIA WITH BEHAVIORAL DISTURBANCE, UNSPECIFIED DEMENTIA TYPE: Primary | ICD-10-CM

## 2021-06-05 PROBLEM — F39 MOOD DISORDER (HCC): Status: ACTIVE | Noted: 2021-06-05

## 2021-06-05 LAB
A/G RATIO: 1.3 (ref 1.1–2.2)
ALBUMIN SERPL-MCNC: 4.1 G/DL (ref 3.4–5)
ALP BLD-CCNC: 73 U/L (ref 40–129)
ALT SERPL-CCNC: 12 U/L (ref 10–40)
AMPHETAMINE SCREEN, URINE: NORMAL
ANION GAP SERPL CALCULATED.3IONS-SCNC: 8 MMOL/L (ref 3–16)
AST SERPL-CCNC: 19 U/L (ref 15–37)
BARBITURATE SCREEN URINE: NORMAL
BASOPHILS ABSOLUTE: 0.1 K/UL (ref 0–0.2)
BASOPHILS RELATIVE PERCENT: 1.3 %
BENZODIAZEPINE SCREEN, URINE: NORMAL
BILIRUB SERPL-MCNC: 0.4 MG/DL (ref 0–1)
BILIRUBIN URINE: NEGATIVE
BLOOD, URINE: NEGATIVE
BUN BLDV-MCNC: 22 MG/DL (ref 7–20)
CALCIUM SERPL-MCNC: 9.7 MG/DL (ref 8.3–10.6)
CANNABINOID SCREEN URINE: NORMAL
CHLORIDE BLD-SCNC: 104 MMOL/L (ref 99–110)
CLARITY: CLEAR
CO2: 27 MMOL/L (ref 21–32)
COCAINE METABOLITE SCREEN URINE: NORMAL
COLOR: YELLOW
CREAT SERPL-MCNC: 1.1 MG/DL (ref 0.6–1.2)
EKG ATRIAL RATE: 87 BPM
EKG DIAGNOSIS: NORMAL
EKG P AXIS: 78 DEGREES
EKG P-R INTERVAL: 192 MS
EKG Q-T INTERVAL: 370 MS
EKG QRS DURATION: 78 MS
EKG QTC CALCULATION (BAZETT): 445 MS
EKG R AXIS: 25 DEGREES
EKG T AXIS: 55 DEGREES
EKG VENTRICULAR RATE: 87 BPM
EOSINOPHILS ABSOLUTE: 0.1 K/UL (ref 0–0.6)
EOSINOPHILS RELATIVE PERCENT: 1.8 %
ETHANOL: 13 MG/DL (ref 0–0.08)
GFR AFRICAN AMERICAN: 59
GFR NON-AFRICAN AMERICAN: 48
GLOBULIN: 3.2 G/DL
GLUCOSE BLD-MCNC: 114 MG/DL (ref 70–99)
GLUCOSE URINE: NEGATIVE MG/DL
HCT VFR BLD CALC: 35.4 % (ref 36–48)
HEMOGLOBIN: 11.6 G/DL (ref 12–16)
INFLUENZA A: NOT DETECTED
INFLUENZA B: NOT DETECTED
KETONES, URINE: NEGATIVE MG/DL
LEUKOCYTE ESTERASE, URINE: NEGATIVE
LYMPHOCYTES ABSOLUTE: 2.4 K/UL (ref 1–5.1)
LYMPHOCYTES RELATIVE PERCENT: 33.1 %
Lab: NORMAL
MCH RBC QN AUTO: 27.1 PG (ref 26–34)
MCHC RBC AUTO-ENTMCNC: 32.6 G/DL (ref 31–36)
MCV RBC AUTO: 82.9 FL (ref 80–100)
METHADONE SCREEN, URINE: NORMAL
MICROSCOPIC EXAMINATION: NORMAL
MONOCYTES ABSOLUTE: 0.5 K/UL (ref 0–1.3)
MONOCYTES RELATIVE PERCENT: 6.5 %
NEUTROPHILS ABSOLUTE: 4.2 K/UL (ref 1.7–7.7)
NEUTROPHILS RELATIVE PERCENT: 57.3 %
NITRITE, URINE: NEGATIVE
OPIATE SCREEN URINE: NORMAL
OXYCODONE URINE: NORMAL
PDW BLD-RTO: 20.5 % (ref 12.4–15.4)
PH UA: 7
PH UA: 7 (ref 5–8)
PHENCYCLIDINE SCREEN URINE: NORMAL
PLATELET # BLD: 392 K/UL (ref 135–450)
PMV BLD AUTO: 7.6 FL (ref 5–10.5)
POTASSIUM REFLEX MAGNESIUM: 4.5 MMOL/L (ref 3.5–5.1)
PROPOXYPHENE SCREEN: NORMAL
PROTEIN UA: NEGATIVE MG/DL
RBC # BLD: 4.27 M/UL (ref 4–5.2)
SARS-COV-2 RNA, RT PCR: NOT DETECTED
SODIUM BLD-SCNC: 139 MMOL/L (ref 136–145)
SPECIFIC GRAVITY UA: 1.01 (ref 1–1.03)
TOTAL PROTEIN: 7.3 G/DL (ref 6.4–8.2)
URINE REFLEX TO CULTURE: NORMAL
URINE TYPE: NORMAL
UROBILINOGEN, URINE: 0.2 E.U./DL
WBC # BLD: 7.4 K/UL (ref 4–11)

## 2021-06-05 PROCEDURE — 93005 ELECTROCARDIOGRAM TRACING: CPT | Performed by: EMERGENCY MEDICINE

## 2021-06-05 PROCEDURE — 99284 EMERGENCY DEPT VISIT MOD MDM: CPT

## 2021-06-05 PROCEDURE — 80061 LIPID PANEL: CPT

## 2021-06-05 PROCEDURE — 1240000000 HC EMOTIONAL WELLNESS R&B

## 2021-06-05 PROCEDURE — 85025 COMPLETE CBC W/AUTO DIFF WBC: CPT

## 2021-06-05 PROCEDURE — 81003 URINALYSIS AUTO W/O SCOPE: CPT

## 2021-06-05 PROCEDURE — 87636 SARSCOV2 & INF A&B AMP PRB: CPT

## 2021-06-05 PROCEDURE — 80307 DRUG TEST PRSMV CHEM ANLYZR: CPT

## 2021-06-05 PROCEDURE — 82077 ASSAY SPEC XCP UR&BREATH IA: CPT

## 2021-06-05 PROCEDURE — 36415 COLL VENOUS BLD VENIPUNCTURE: CPT

## 2021-06-05 PROCEDURE — 93010 ELECTROCARDIOGRAM REPORT: CPT | Performed by: INTERNAL MEDICINE

## 2021-06-05 PROCEDURE — 83036 HEMOGLOBIN GLYCOSYLATED A1C: CPT

## 2021-06-05 PROCEDURE — 80053 COMPREHEN METABOLIC PANEL: CPT

## 2021-06-05 RX ORDER — ONDANSETRON 4 MG/1
4 TABLET, ORALLY DISINTEGRATING ORAL EVERY 8 HOURS PRN
Status: DISCONTINUED | OUTPATIENT
Start: 2021-06-05 | End: 2021-06-11 | Stop reason: HOSPADM

## 2021-06-05 RX ORDER — LEVOTHYROXINE SODIUM 0.03 MG/1
25 TABLET ORAL DAILY
Status: DISCONTINUED | OUTPATIENT
Start: 2021-06-06 | End: 2021-06-11 | Stop reason: HOSPADM

## 2021-06-05 RX ORDER — TRAZODONE HYDROCHLORIDE 50 MG/1
50 TABLET ORAL NIGHTLY
Status: DISCONTINUED | OUTPATIENT
Start: 2021-06-06 | End: 2021-06-11 | Stop reason: HOSPADM

## 2021-06-05 RX ORDER — HALOPERIDOL 5 MG/ML
2 INJECTION INTRAMUSCULAR EVERY 6 HOURS PRN
Status: DISCONTINUED | OUTPATIENT
Start: 2021-06-05 | End: 2021-06-11 | Stop reason: HOSPADM

## 2021-06-05 RX ORDER — OXYBUTYNIN CHLORIDE 5 MG/1
10 TABLET, EXTENDED RELEASE ORAL DAILY
Status: DISCONTINUED | OUTPATIENT
Start: 2021-06-06 | End: 2021-06-11 | Stop reason: HOSPADM

## 2021-06-05 RX ORDER — ACETAMINOPHEN 500 MG
1000 TABLET ORAL 3 TIMES DAILY
Status: DISCONTINUED | OUTPATIENT
Start: 2021-06-06 | End: 2021-06-11 | Stop reason: HOSPADM

## 2021-06-05 RX ORDER — ACETAMINOPHEN 325 MG/1
650 TABLET ORAL EVERY 4 HOURS PRN
Status: DISCONTINUED | OUTPATIENT
Start: 2021-06-05 | End: 2021-06-07

## 2021-06-05 RX ORDER — DONEPEZIL HYDROCHLORIDE 5 MG/1
5 TABLET, FILM COATED ORAL
Status: DISCONTINUED | OUTPATIENT
Start: 2021-06-06 | End: 2021-06-11 | Stop reason: HOSPADM

## 2021-06-05 RX ORDER — HALOPERIDOL 1 MG/1
2 TABLET ORAL EVERY 6 HOURS PRN
Status: DISCONTINUED | OUTPATIENT
Start: 2021-06-05 | End: 2021-06-11 | Stop reason: HOSPADM

## 2021-06-05 RX ORDER — MELOXICAM 15 MG/1
7.5 TABLET ORAL DAILY
Status: DISCONTINUED | OUTPATIENT
Start: 2021-06-06 | End: 2021-06-11 | Stop reason: HOSPADM

## 2021-06-05 RX ORDER — MAGNESIUM HYDROXIDE/ALUMINUM HYDROXICE/SIMETHICONE 120; 1200; 1200 MG/30ML; MG/30ML; MG/30ML
30 SUSPENSION ORAL EVERY 6 HOURS PRN
Status: DISCONTINUED | OUTPATIENT
Start: 2021-06-05 | End: 2021-06-11 | Stop reason: HOSPADM

## 2021-06-05 RX ORDER — BENZTROPINE MESYLATE 1 MG/ML
1 INJECTION INTRAMUSCULAR; INTRAVENOUS 2 TIMES DAILY PRN
Status: DISCONTINUED | OUTPATIENT
Start: 2021-06-05 | End: 2021-06-11 | Stop reason: HOSPADM

## 2021-06-05 RX ORDER — FERROUS SULFATE 325(65) MG
325 TABLET ORAL 2 TIMES DAILY WITH MEALS
Status: DISCONTINUED | OUTPATIENT
Start: 2021-06-06 | End: 2021-06-11 | Stop reason: HOSPADM

## 2021-06-05 RX ORDER — FUROSEMIDE 20 MG/1
20 TABLET ORAL DAILY
Status: DISCONTINUED | OUTPATIENT
Start: 2021-06-06 | End: 2021-06-11 | Stop reason: HOSPADM

## 2021-06-05 RX ORDER — NICOTINE 21 MG/24HR
1 PATCH, TRANSDERMAL 24 HOURS TRANSDERMAL DAILY
Status: DISCONTINUED | OUTPATIENT
Start: 2021-06-06 | End: 2021-06-11 | Stop reason: HOSPADM

## 2021-06-05 RX ORDER — CITALOPRAM 20 MG/1
10 TABLET ORAL DAILY
Status: DISCONTINUED | OUTPATIENT
Start: 2021-06-06 | End: 2021-06-07

## 2021-06-05 ASSESSMENT — PAIN SCALES - GENERAL: PAINLEVEL_OUTOF10: 0

## 2021-06-05 ASSESSMENT — ENCOUNTER SYMPTOMS
VOMITING: 0
SHORTNESS OF BREATH: 0
ABDOMINAL PAIN: 0
COUGH: 0

## 2021-06-05 NOTE — ED PROVIDER NOTES
[address] ED  EMERGENCY DEPARTMENT ENCOUNTER        Pt Name: Gilberto Hall  MRN: [id number]  [place] 1945  Date of evaluation: 6/5/2021  Provider: Carl Rm PA-C  PCP: Maida Ashton MD    Shared Visit or Autonomous Visit:  I have seen and evaluated this patient with my supervising physician Dr Adolfo Loza   Patient presents with   [facility] Evaluation     ongoing problems with dementia having trouble caring for pt at home. Pt continues to wander off - danger to self. HISTORY OF PRESENT ILLNESS   (Location/Symptom, Timing/Onset, Context/Setting, Quality, Duration, Modifying Factors, Severity)  Note limiting factors. Gilberto Hall is a 77 y.o. female here with . Patient has dementia. Chronic confusion. Alert and oriented x3. She has been wandering off he is concerned for her safety danger to herself. This morning she was outside in the yard with her clothes off. A few days ago she left the house and was sitting down the street in the rain flagging down cars. History of suicidal ideation when asked if she was suicidal patient states sometimes she is. She has been throwing things at home hitting the atsudillo.  states this was set off last night when they went out to a restaurant with her son and grandson and she tried to order an alcoholic drink it was not served it and became upset. History of alcoholism. No physical complaints. The history is provided by the patient and the spouse. Nursing Notes were reviewed    REVIEW OF SYSTEMS    (2-9 systems for level 4, 10 or more for level 5)     Review of Systems   Constitutional: Negative for fever. Respiratory: Negative for cough and shortness of breath. Cardiovascular: Negative for chest pain. Gastrointestinal: Negative for abdominal pain and vomiting.    Psychiatric/Behavioral: Positive for agitation, behavioral problems, confusion and suicidal ideas. All other systems reviewed and are negative. Positives and Pertinent negatives as per HPI.        PAST MEDICAL HISTORY     Past Medical History:   Diagnosis Date    Acute systolic CHF (congestive heart failure) (HCC)     Asthma     Back ache     Cataract     Cerebral artery occlusion with cerebral infarction [facility] 2016    Diabetes mellitus ([facility]     type !! - diet controlled    Hyperlipidemia     Hypertension     Insomnia     TIA (transient ischemic attack) 2016         SURGICAL HISTORY     Past Surgical History:   Procedure Laterality Date    ABDOMEN SURGERY      c section    CATARACT REMOVAL WITH IMPLANT Left [id number]    LEFT EYE CATARACT PHACOEMULSIFICATION INTRAOCULAR LENS     SECTION      DENTAL SURGERY      EYE SURGERY  10/29/13     RIGHT EYE CATARACT PHACOEMULSIFICATION INTRAOCULAR LENS         CURRENTMEDICATIONS       Previous Medications    ACETAMINOPHEN (TYLENOL) 500 MG TABLET    Take 2 tablets by mouth 3 times daily    CITALOPRAM (CELEXA) 10 MG TABLET    Take 1 tablet by mouth daily    DONEPEZIL (ARICEPT) 5 MG TABLET    Take 1 tablet by mouth daily (with breakfast)    FERROUS SULFATE (IRON 325) 325 (65 FE) MG TABLET    Take 1 tablet by mouth 2 times daily (with meals)    FUROSEMIDE (LASIX) 20 MG TABLET    Take 1 tablet by mouth daily    LEVOTHYROXINE (SYNTHROID) 25 MCG TABLET    Take 1 tablet by mouth Daily    MELOXICAM (MOBIC) 7.5 MG TABLET    Take 7.5 mg by mouth daily    METFORMIN (GLUCOPHAGE) 500 MG TABLET    Take 500 mg by mouth    NICOTINE (NICODERM CQ) 21 MG/24HR    Place 1 patch onto the skin daily    ONDANSETRON (ZOFRAN ODT) 4 MG DISINTEGRATING TABLET    Take 1 tablet by mouth every 8 hours as needed for Nausea    OXYBUTYNIN (DITROPAN-XL) 10 MG EXTENDED RELEASE TABLET    Take 10 mg by mouth daily    TRAZODONE (DESYREL) 50 MG TABLET    Take 50 mg by mouth nightly         ALLERGIES     Mold extract [trichophyton mentagrophytes]    FAMILYHISTORY Family History   Problem Relation Age of Onset    High Blood Pressure Mother    [de-identified] / [place] Mother     Cancer Father     Diabetes Father     Heart Disease Father     Kidney Disease Father     Asthma Sister     Cancer Sister         Breast Cancer    Depression Brother     Substance Abuse Paternal Aunt     Stroke Maternal Grandmother     Diabetes Paternal Grandmother           SOCIAL HISTORY       Social History     Socioeconomic History    Marital status:      Spouse name: Jerrica Boudreaux Number of children: None    Years of education: None    Highest education level: None   Occupational History    None   Tobacco Use    Smoking status: Current Every Day Smoker     Packs/day: 2.00     Years: 58.00     Pack years: 116.00     Types: Cigarettes    Smokeless tobacco: Never Used    Tobacco comment: not totally ready to quit today   Vaping Use    Vaping Use: Never used   Substance and Sexual Activity    Alcohol use: Not Currently     Alcohol/week: 0.0 standard drinks     Types: 3 - 4 Standard drinks or equivalent per week    Drug use: No    Sexual activity: Yes     Partners: Male   Other Topics Concern    None   Social History Narrative    None     Social Determinants of Health     Financial Resource Strain:     Difficulty of Paying Living Expenses:    Food Insecurity:     Worried About Running Out of Food in the Last Year:     Ran Out of Food in the Last Year:    Transportation Needs:     Lack of Transportation (Medical):      Lack of Transportation (Non-Medical):    Physical Activity:     Days of Exercise per Week:     Minutes of Exercise per Session:    Stress:     Feeling of Stress :    Social Connections:     Frequency of Communication with Friends and Family:     Frequency of Social Gatherings with Friends and Family:     Attends Restorationism Services:     Active Member of Clubs or Organizations:     Attends Club or Organization Meetings:     Marital Status: Intimate Partner Violence:     Fear of Current or Ex-Partner:     Emotionally Abused:     Physically Abused:     Sexually Abused:        SCREENINGS    Brenda Coma Scale  Eye Opening: Spontaneous  Best Verbal Response: Oriented  Best Motor Response: Obeys commands  Brenda Coma Scale Score: 15        PHYSICAL EXAM    (up to 7 for level 4, 8 or more for level 5)     ED Triage Vitals [06/05/21 1324]   BP Temp Temp Source Pulse Resp SpO2 Height Weight   (!) 140/90 98.3 °F (36.8 °C) Oral 108 20 96 % 5' 5\" (1.651 m) 137 lb (62.1 kg)       Physical Exam  Vitals and nursing note reviewed. Constitutional:       Appearance: She is well-developed. She is not toxic-appearing. HENT:      Head: Normocephalic and atraumatic. Mouth/Throat:      Mouth: Mucous membranes are moist.      Pharynx: Oropharynx is clear. No pharyngeal swelling, oropharyngeal exudate or posterior oropharyngeal erythema. Eyes:      Extraocular Movements: Extraocular movements intact. Conjunctiva/sclera: Conjunctivae normal.      Pupils: Pupils are equal, round, and reactive to light. Neck:      Vascular: No JVD. Cardiovascular:      Rate and Rhythm: Normal rate and regular rhythm. Pulses: Normal pulses. Radial pulses are 2+ on the right side and 2+ on the left side. Posterior tibial pulses are 2+ on the right side and 2+ on the left side. Heart sounds: Normal heart sounds. Pulmonary:      Effort: Pulmonary effort is normal. No respiratory distress. Breath sounds: Normal breath sounds. No stridor. No wheezing, rhonchi or rales. Abdominal:      General: Bowel sounds are normal. There is no distension. Palpations: Abdomen is soft. Abdomen is not rigid. There is no mass. Tenderness: There is no abdominal tenderness. There is no guarding or rebound. Musculoskeletal:         General: Normal range of motion. Cervical back: Normal range of motion and neck supple.    Skin:     General: Skin is warm and dry. Findings: No rash. Neurological:      Mental Status: She is alert and oriented to person, place, and time. She is confused. GCS: GCS eye subscore is 4. GCS verbal subscore is 5. GCS motor subscore is 6. Cranial Nerves: No cranial nerve deficit. Sensory: Sensation is intact. No sensory deficit. Motor: Motor function is intact. No weakness (5/5 symmetric upper and lower extremities), abnormal muscle tone or pronator drift. Coordination: Coordination is intact. Coordination normal. Finger-Nose-Finger Test normal.      Gait: Gait is intact. Comments: NIHSS 0   Psychiatric:         Mood and Affect: Affect is angry. Behavior: Behavior is agitated.          DIAGNOSTIC RESULTS   LABS:    Labs Reviewed   CBC WITH AUTO DIFFERENTIAL - Abnormal; Notable for the following components:       Result Value    Hemoglobin 11.6 (*)     Hematocrit 35.4 (*)     RDW 20.5 (*)     All other components within normal limits    Narrative:     Performed at:  [place] [address],  [facility]   Phone [phone]   COMPREHENSIVE METABOLIC PANEL W/ REFLEX TO MG FOR LOW K - Abnormal; Notable for the following components:    Glucose 114 (*)     BUN 22 (*)     GFR Non- 48 (*)     GFR  59 (*)     All other components within normal limits    Narrative:     Performed at:  [place] [address],  [place], [facility]   Phone [phone]    Narrative:     Performed at:  [place] [address],  [place], [facility]   Phone [phone]   URINE DRUG SCREEN    Narrative:     Performed at:  [place] [address],  [place], [facility]   Phone [phone]   URINE RT REFLEX TO CULTURE    Narrative:     Performed at:  [facility] <1000ng/mL    Barbiturate Screen, Ur Neg Negative <200 ng/mL    Benzodiazepine Screen, Urine Neg Negative <200 ng/mL    Cannabinoid Scrn, Ur Neg Negative <50 ng/mL    Cocaine Metabolite Screen, Urine Neg Negative <300 ng/mL    Opiate Scrn, Ur Neg Negative <300 ng/mL    PCP Screen, Urine Neg Negative <25 ng/mL    Methadone Screen, Urine Neg Negative <300 ng/mL    Propoxyphene Scrn, Ur Neg Negative <300 ng/mL    Oxycodone Urine Neg Negative <100 ng/ml    pH, UA 7.0     Drug Screen Comment: see below    Urinalysis Reflex to Culture    Specimen: Urine, clean catch   Result Value Ref Range    Color, UA Yellow Straw/Yellow    Clarity, UA Clear Clear    Glucose, Ur Negative Negative mg/dL    Bilirubin Urine Negative Negative    Ketones, Urine Negative Negative mg/dL    Specific Gravity, UA 1.015 1.005 - 1.030    Blood, Urine Negative Negative    pH, UA 7.0 5.0 - 8.0    Protein, UA Negative Negative mg/dL    Urobilinogen, Urine 0.2 <2.0 E.U./dL    Nitrite, Urine Negative Negative    Leukocyte Esterase, Urine Negative Negative    Microscopic Examination Not Indicated     Urine Type NotGiven     Urine Reflex to Culture Not Indicated    Ethanol   Result Value Ref Range    Ethanol Lvl 13 mg/dL   EKG 12 Lead   Result Value Ref Range    Ventricular Rate 87 BPM    Atrial Rate 87 BPM    P-R Interval 192 ms    QRS Duration 78 ms    Q-T Interval 370 ms    QTc Calculation (Bazett) 445 ms    P Axis 78 degrees    R Axis 25 degrees    T Axis 55 degrees    Diagnosis       Sinus rhythm with frequent Premature ventricular complexesSeptal infarct , age undeterminedFirst degree AV blockNo previous ECGs availableConfirmed by EVI DHALIWAL MD ([id number]) on 6/5/2021 5:51:37 PM         All other labs were within normal range or not returned as of this dictation. EKG: All EKG's are interpreted by the Emergency Department Physician in the absence of a cardiologist.  Please see their note for interpretation of EKG.       RADIOLOGY:   Non-plain film reasonably achievable. COMPARISON: 05/21/2021 HISTORY: ORDERING SYSTEM PROVIDED HISTORY: ams, headache TECHNOLOGIST PROVIDED HISTORY: Has a \"code stroke\" or \"stroke alert\" been called? ->No Reason for exam:->ams, headache Decision Support Exception - unselect if not a suspected or confirmed emergency medical condition->Emergency Medical Condition (MA) Reason for Exam: ams, headache FINDINGS: BRAIN/VENTRICLES: The cerebral hemispheres, brainstem, and cerebellum have a normal appearance for the patient's age. The falx is midline. The ventricles and peripheral sulci are mildly dilated. There is decreased attenuation in the periventricular white matter. There is no sign of a space occupying lesion, infarction, or hemorrhage. Orbits: Portion of the orbits demonstrate no acute abnormality. SINUSES: . The  imaged portions of the paranasal sinuses are clear. The mastoids and the middle ear chambers are clear. SOFT TISSUES/SKULL:  No acute abnormality of the visualized skull or soft tissues. Vascular calcifications are seen compatible with atherosclerotic disease. Mild central and cortical cerebral atrophy. Mild chronic deep white matter ischemic changes No acute intracranial abnormalities are noted. XR CHEST PORTABLE    Result Date: 6/2/2021  EXAMINATION: ONE XRAY VIEW OF THE CHEST 6/2/2021 3:45 pm COMPARISON: 01/16/2021 HISTORY: ORDERING SYSTEM PROVIDED HISTORY: ams TECHNOLOGIST PROVIDED HISTORY: Reason for exam:->ams Reason for Exam: pt found down, ams, Acuity: Unknown Mechanism of Injury: found down, AMS, possible chest inj FINDINGS: Stable patchy areas of scarring in the left mid and lower lung zones. The cardiac size is normal. No acute infiltrates or pleural effusions are seen. Pulmonary vascularity appears normal. There is mild ectasia of the thoracic aorta. There are degenerative changes in the spine and bilateral shoulders. No acute bony abnormalities.   Parenchymal and wale calcified granulomas     No acute

## 2021-06-05 NOTE — ED NOTES
1802 - Perfect serve sent to Dr. Paige Sinha  06/05/21 1318 [id number] - Dr. Varsha Santos called back.       Davie Felder  06/05/21 1258
Level of Care Disposition: Discharge     Patient was seen by ED provider and [facility] staff. The case was presented to psychiatric provider on-call . Based on the ED evaluation and information presented to the provider by CLAUDE Silvestre, it is the recommendation of the on call psychiatric provider that the patient be discharged from a psychiatric standpoint with the following referrals: [facility] and senior day programming    RATIONALE FOR NON-ADMISSION:  The patient does not meet criteria for an involuntary psychiatric admission because there does not appear to be an acute issue that meets requirements for admission. It is recommended that instructions on last inpatient discharge be followed up.              CLAUDE Castro  06/05/21 6402
Pt care assumed by Sofie Alonzo RN  06/05/21 7357
Pt in hallway sitting in chair, screaming. Ambulated Pt back to room. Sitting in chair.  [address] notified of sitter need, no sitter avalable @ this time     Mini Wagner RN  06/05/21 7934
Pt sitting in chair, hitting pillow on her lap repeatedly, screaming I'm in alf\", \"let it rain\"      José Miguel Cook, RN  06/05/21 8835
Recent significant loss   []  Chronic pain or medical illness   []  Social isolation   []  History of violence   []  Active psychosis   [x]  Cognitive impairment    []  No outpatient services in place   []  Medication noncompliance   []  No collateral information to support safety      PROTECTIVE FACTORS:  [] Age >25 and <55  [x] Female gender   [x] Denies depression  [x] Denies suicidal ideation  [x] Does not have lethal plan   [x] Does not have access to guns or weapons  [x] Patient is verbally lou for safety  [x] No prior suicide attempts  [x] No active substance abuse  [x] Patient has social or family support  [] No active psychosis or cognitive dysfunction  [] Physically healthy  [] Has outpatient services in place  [] Compliant with recommended medications  [] Collateral information from supports patient safety   [] Patient is future oriented with plans to         Clinical Summary:    Patient presents to [place], [place] voluntarily after being brought in by her . Patient was clinically sober at the time of the evaluation. Patient was evaluated and offered supportive counseling. Collateral information was gathered by [facility] from [place]. Pt was tangential and labile during eval but redirectable. Pt remembered taking her shirt off outside and yelling outside, today. She says that she was frustrated because she wet her pants and was very hot. Pt reports a strong family support system but says that she wishes her friends would call her more. She became very emotional when talking about her friends. Pt was also able to remember the birthdays of her family members and stated they celebrated her son and granddaughters birthday yesterday. When asked if she was experiencing SI she stated, \"Yea, when they bring me here for no reason. \" Pt denied HI/AVH. Pt stated that she would like an in home health aid to help her and to talk to.             CLAUDE Silva  06/05/21 4070

## 2021-06-06 PROBLEM — F03.918 UNSPECIFIED DEMENTIA WITH BEHAVIORAL DISTURBANCE: Status: ACTIVE | Noted: 2021-06-05

## 2021-06-06 PROBLEM — F03.91 UNSPECIFIED DEMENTIA WITH BEHAVIORAL DISTURBANCE: Status: ACTIVE | Noted: 2021-06-05

## 2021-06-06 LAB
CHOLESTEROL, TOTAL: 213 MG/DL (ref 0–199)
EKG ATRIAL RATE: 90 BPM
EKG ATRIAL RATE: 90 BPM
EKG ATRIAL RATE: 91 BPM
EKG DIAGNOSIS: NORMAL
EKG P AXIS: 54 DEGREES
EKG P AXIS: 62 DEGREES
EKG P AXIS: 63 DEGREES
EKG P-R INTERVAL: 188 MS
EKG P-R INTERVAL: 192 MS
EKG P-R INTERVAL: 200 MS
EKG Q-T INTERVAL: 364 MS
EKG Q-T INTERVAL: 364 MS
EKG Q-T INTERVAL: 374 MS
EKG QRS DURATION: 72 MS
EKG QRS DURATION: 76 MS
EKG QRS DURATION: 76 MS
EKG QTC CALCULATION (BAZETT): 445 MS
EKG QTC CALCULATION (BAZETT): 445 MS
EKG QTC CALCULATION (BAZETT): 460 MS
EKG R AXIS: 28 DEGREES
EKG R AXIS: 6 DEGREES
EKG R AXIS: 9 DEGREES
EKG T AXIS: 52 DEGREES
EKG T AXIS: 59 DEGREES
EKG T AXIS: 68 DEGREES
EKG VENTRICULAR RATE: 90 BPM
EKG VENTRICULAR RATE: 90 BPM
EKG VENTRICULAR RATE: 91 BPM
ESTIMATED AVERAGE GLUCOSE: 148.5 MG/DL
HBA1C MFR BLD: 6.8 %
HDLC SERPL-MCNC: 66 MG/DL (ref 40–60)
LDL CHOLESTEROL CALCULATED: 128 MG/DL
TRIGL SERPL-MCNC: 94 MG/DL (ref 0–150)
VLDLC SERPL CALC-MCNC: 19 MG/DL

## 2021-06-06 PROCEDURE — 93005 ELECTROCARDIOGRAM TRACING: CPT | Performed by: PSYCHIATRY & NEUROLOGY

## 2021-06-06 PROCEDURE — 93010 ELECTROCARDIOGRAM REPORT: CPT | Performed by: INTERNAL MEDICINE

## 2021-06-06 PROCEDURE — 6370000000 HC RX 637 (ALT 250 FOR IP): Performed by: PSYCHIATRY & NEUROLOGY

## 2021-06-06 PROCEDURE — 99223 1ST HOSP IP/OBS HIGH 75: CPT | Performed by: PSYCHIATRY & NEUROLOGY

## 2021-06-06 PROCEDURE — 99221 1ST HOSP IP/OBS SF/LOW 40: CPT | Performed by: NURSE PRACTITIONER

## 2021-06-06 PROCEDURE — 1240000000 HC EMOTIONAL WELLNESS R&B

## 2021-06-06 RX ADMIN — ACETAMINOPHEN 1000 MG: 500 TABLET ORAL at 21:19

## 2021-06-06 RX ADMIN — FERROUS SULFATE TAB 325 MG (65 MG ELEMENTAL FE) 325 MG: 325 (65 FE) TAB at 17:30

## 2021-06-06 RX ADMIN — METFORMIN HYDROCHLORIDE 500 MG: 500 TABLET ORAL at 08:38

## 2021-06-06 RX ADMIN — ACETAMINOPHEN 1000 MG: 500 TABLET ORAL at 13:26

## 2021-06-06 RX ADMIN — FERROUS SULFATE TAB 325 MG (65 MG ELEMENTAL FE) 325 MG: 325 (65 FE) TAB at 08:38

## 2021-06-06 RX ADMIN — MELOXICAM 7.5 MG: 15 TABLET ORAL at 08:34

## 2021-06-06 RX ADMIN — DONEPEZIL HYDROCHLORIDE 5 MG: 5 TABLET, FILM COATED ORAL at 08:38

## 2021-06-06 RX ADMIN — ACETAMINOPHEN 1000 MG: 500 TABLET ORAL at 08:36

## 2021-06-06 RX ADMIN — OXYBUTYNIN CHLORIDE 10 MG: 5 TABLET, EXTENDED RELEASE ORAL at 08:35

## 2021-06-06 RX ADMIN — CITALOPRAM HYDROBROMIDE 10 MG: 20 TABLET ORAL at 08:33

## 2021-06-06 RX ADMIN — TRAZODONE HYDROCHLORIDE 50 MG: 50 TABLET ORAL at 21:19

## 2021-06-06 RX ADMIN — LEVOTHYROXINE SODIUM 25 MCG: 25 TABLET ORAL at 05:55

## 2021-06-06 RX ADMIN — FUROSEMIDE 20 MG: 20 TABLET ORAL at 08:35

## 2021-06-06 RX ADMIN — TRAZODONE HYDROCHLORIDE 50 MG: 50 TABLET ORAL at 00:25

## 2021-06-06 ASSESSMENT — SLEEP AND FATIGUE QUESTIONNAIRES
RESTFUL SLEEP: NO
DO YOU HAVE DIFFICULTY SLEEPING: YES
DIFFICULTY ARISING: NO
DO YOU USE A SLEEP AID: YES
DIFFICULTY FALLING ASLEEP: YES
SLEEP PATTERN: DIFFICULTY FALLING ASLEEP;DISTURBED/INTERRUPTED SLEEP
DIFFICULTY STAYING ASLEEP: YES

## 2021-06-06 ASSESSMENT — PAIN SCALES - GENERAL
PAINLEVEL_OUTOF10: 5
PAINLEVEL_OUTOF10: 0
PAINLEVEL_OUTOF10: 0

## 2021-06-06 ASSESSMENT — LIFESTYLE VARIABLES: HISTORY_ALCOHOL_USE: YES

## 2021-06-06 NOTE — H&P
Hospital Medicine History & Physical      PCP: Portia Nye MD    Date of Admission: 2021    Date of Service: Pt seen/examined on 2021     Chief Complaint:    Chief Complaint   Patient presents with    Psychiatric Evaluation     ongoing problems with dementia having trouble caring for pt at home. Pt continues to wander off - danger to self. History Of Present Illness: The patient is a 77 y.o. female with history of CHF with recovered EF, nonobstructive CAD, DM, h/o CVA  who presented to [facility] ER for psychiatric eval.  Patient was seen and evaluated in the ED by the ED medical provider, patient was medically cleared for admission to [facility] at [facility]. This note serves as an admission medical H&P.    PCP: Portia Nye MD  Tobacco use: current  ETOH: occasional  Illicit drug use: Cannabis     Patient denies any medical complaints. Past Medical History:        Diagnosis Date    Acute systolic CHF (congestive heart failure) (HCC)     Asthma     Back ache     Cataract     Cerebral artery occlusion with cerebral infarction ([facility] [address] 2016    Diabetes mellitus ([facility] [address]     type !! - diet controlled    Hyperlipidemia     Hypertension     Insomnia     TIA (transient ischemic attack) 2016       Past Surgical History:        Procedure Laterality Date    ABDOMEN SURGERY      c section    CATARACT REMOVAL WITH IMPLANT Left [id number]    LEFT EYE CATARACT PHACOEMULSIFICATION INTRAOCULAR LENS     SECTION      DENTAL SURGERY      EYE SURGERY  10/29/13     RIGHT EYE CATARACT PHACOEMULSIFICATION INTRAOCULAR LENS       Medications Prior to Admission:    Prior to Admission medications    Medication Sig Start Date End Date Taking?  Authorizing Provider   acetaminophen (TYLENOL) 500 MG tablet Take 2 tablets by mouth 3 times daily 21   Sekou Fregoso MD   citalopram (CELEXA) 10 MG tablet Take 1 tablet by mouth daily 21   Sekou Fregoso MD   donepezil (ARICEPT) 5 MG tablet Take 1 tablet by mouth daily (with breakfast) 5/27/21   Cristóbal Eduardo MD   nicotine (NICODERM CQ) 21 MG/24HR Place 1 patch onto the skin daily 5/26/21   Cristóbal Eduardo MD   meloxicam (MOBIC) 7.5 MG tablet Take 7.5 mg by mouth daily    Historical Provider, MD   oxybutynin (DITROPAN-XL) 10 MG extended release tablet Take 10 mg by mouth daily    Historical Provider, MD   traZODone (DESYREL) 50 MG tablet Take 50 mg by mouth nightly    Historical Provider, MD   ondansetron (ZOFRAN ODT) 4 MG disintegrating tablet Take 1 tablet by mouth every 8 hours as needed for Nausea 3/2/21   LEONA Eubanks CNP   furosemide (LASIX) 20 MG tablet Take 1 tablet by mouth daily 1/20/21   Raffaele Lan MD   ferrous sulfate (IRON 325) 325 (65 Fe) MG tablet Take 1 tablet by mouth 2 times daily (with meals) 1/19/21   Raffaele Lan MD   metFORMIN (GLUCOPHAGE) 500 MG tablet Take 500 mg by mouth 12/19/18   Historical Provider, MD   levothyroxine (SYNTHROID) 25 MCG tablet Take 1 tablet by mouth Daily 10/20/17   Jose J Rojas MD       Allergies:  Mold extract [trichophyton mentagrophytes]    Social History:  The patient currently lives at [place] [facility] with      TOBACCO:   reports that she has been smoking cigarettes. She has a 116.00 pack-year smoking history. She has never used smokeless tobacco.  ETOH:   reports previous alcohol use.       Family History:   Positive as follows:        Problem Relation Age of Onset    High Blood Pressure Mother    [de-identified] / [place] Mother     Cancer Father     Diabetes Father     Heart Disease Father     Kidney Disease Father     Asthma Sister     Cancer Sister         Breast Cancer    Depression Brother     Substance Abuse Paternal Aunt     Stroke Maternal Grandmother     Diabetes Paternal Grandmother        REVIEW OF SYSTEMS:       Constitutional: Negative for fever   Respiratory: Negative  for dyspnea, cough   Cardiovascular: Negative for chest pain Gastrointestinal: Negative for vomiting, diarrhea   Genitourinary: Negative for dysuria  Musculoskeletal: Negative for arthralgias   Skin: Negative for rash   Neurological: Negative for syncope    Hematological: Negative for easy bruising/bleeding   Psychiatric/Behavorial: Per psychiatry team evaluation     PHYSICAL EXAM:    BP (!) 162/93   Pulse 96   Temp 97.7 °F (36.5 °C) (Temporal)   Resp 18   Ht 5' 5\" (1.651 m)   Wt 132 lb 14.4 oz (60.3 kg)   SpO2 93%   BMI 22.12 kg/m²     Gen: No distress. Alert. Eyes: PERRL. No sclera icterus. No conjunctival injection. ENT: No discharge. Pharynx clear. Poor dentition   Neck:  Trachea midline. Resp: No accessory muscle use. No crackles. No wheezes. No rhonchi. CV: Regular rate. Regular rhythm. No murmur. No rub. No edema. GI: Non-tender. Non-distended. Normal bowel sounds. Skin: Warm and dry. No nodule on exposed extremities. No rash on exposed extremities. M/S: No cyanosis. No joint deformity. No clubbing. Neuro: Awake. No focal neurologic deficit on exam.  Cranial nerves II through XII intact. Patient is able to ambulate without difficulty. Psych: Per psychiatry team evaluation     CBC:   Recent Labs     06/05/21  1515   WBC 7.4   HGB 11.6*   HCT 35.4*   MCV 82.9        BMP:   Recent Labs     06/05/21  1515      K 4.5      CO2 27   BUN 22*   CREATININE 1.1     LIVER PROFILE:   Recent Labs     06/05/21  1515   AST 19   ALT 12   BILITOT 0.4   ALKPHOS 73     UA:  Recent Labs     06/05/21  1547   COLORU Yellow   PHUR 7.0  7.0   CLARITYU Clear   SPECGRAV 1.015   LEUKOCYTESUR Negative   UROBILINOGEN 0.2   BILIRUBINUR Negative   BLOODU Negative   GLUCOSEU Negative        Cultures  COVID/Influenza: not detected    EKG:  Normal sinus rhythm, Septal infarct (cited on or before 30-NOV-2020)    RADIOLOGY  No orders to display       ASSESSMENT/PLAN:  #Depression   - per psychiatry team    #Chronic systolic CHF with recovered EF  - stable. Cont lasix 20 mg daily     #DM2  - cont metformin  Results for Lissette Maya (MRN [id number]) as of 6/6/2021 14:21   Ref.  Range 6/5/2021 15:15   Hemoglobin A1C Latest Ref Range: See comment % 6.8       #CAD non obstructive   - no statin or ASA on med list   - no chest pain reported     #Hypothyroid  - cont synthroid     #Iron def  - cont PO iron     #history of alcohol abuse  - ethanol level 13 on admit     Irina BYRDP-C  6/6/2021

## 2021-06-06 NOTE — BH NOTE
SW completed CSSR-S as pt is a re admit within the last 30 days. Pt has no thoughts of hurting self or others.     Nba Marino, MSW, LSW

## 2021-06-06 NOTE — H&P
Psychiatry History and Physical     Admission Date:    6/5/2021    Identification: domiciled, , retired 78yo with dementia who was brought to our ED by her  with ongoing behavioral disturbance. SOI: Patient. ED records. Recent admission     CC: \"I get to be myself again! \"    HPI:   Patient was discharge from our program on 5/26 after stabilization. Her elder  has struggled with her since discharge and has brought her back to the ED on a few occasions. They have not followed up with COA or senior day programming. He hasn't been able to navigated the system. More recently she has been wandering into the street and making other poor decisions. Yesterday, she took her shirt of and went outside. Per PRAMOD Notes regarding collateral from :  Nelson Rodriguez says that last night he was at [facility] with pt and she asked for a martha. Nelson Rodriguez says that pt has a hx of alcohol abuse and that he didn't think that would be a good idea. He states that per himself and her PCP, they convinced her to stop drinking a few weeks ago. He says that since the incident at the restaurant, she has been getting increasingly agitated. He says that today she went outside, took her shirt off, and was yelling. Nelson Hidalgoranjit says that she has dementia and she is declining to the point that he cannot take care of her anymore. He feels that she needs to be watched constantly or there is a chance she will hurt herself. He states that he works three days a week and is worried she will hurt herself when he is not home. He reports recent stressors being moving in to a much smaller house and their dog passing away. Nelson Rodriguez says that pt never sleeps more than a few hours a night and that her doctor won't prescribe her anything for sleep. He believes she is depressed. He denies that she is hallucinating and reports that she has never been aggressive towards anyone.  He reports that she has never had a suicide attempt but has made statements that she wants to take a pill and never wake up. When I met with her today she was elevated and labile. She talked about being \"perfect\" and  \"loving everyting\" and then complained of being depressed. Past Psychiatric History:    Previous Diagnoses: dementia  PreviousHosp: once here last month  OutpatientTx: outpatient neurology   Med Trials: citalopram and Aricept   Suicidality: no attempts  History of violence: none     Past Medical History:  Past Medical History:   Diagnosis Date    Acute systolic CHF (congestive heart failure) (HCC)     Asthma     Back ache     Cataract     Cerebral artery occlusion with cerebral infarction ([facility] [address] 12/2016    Diabetes mellitus ([facility] [address]     type !! - diet controlled    Hyperlipidemia     Hypertension     Insomnia     TIA (transient ischemic attack) 12/23/2016       Home Medications:  Prior to Admission medications    Medication Sig Start Date End Date Taking?  Authorizing Provider   acetaminophen (TYLENOL) 500 MG tablet Take 2 tablets by mouth 3 times daily 5/26/21   Liliya Woods MD   citalopram (CELEXA) 10 MG tablet Take 1 tablet by mouth daily 5/27/21   Liliya Woods MD   donepezil (ARICEPT) 5 MG tablet Take 1 tablet by mouth daily (with breakfast) 5/27/21   Liliya Woods MD   nicotine (NICODERM CQ) 21 MG/24HR Place 1 patch onto the skin daily 5/26/21   Liliya Woods MD   meloxicam (MOBIC) 7.5 MG tablet Take 7.5 mg by mouth daily    Historical Provider, MD   oxybutynin (DITROPAN-XL) 10 MG extended release tablet Take 10 mg by mouth daily    Historical Provider, MD   traZODone (DESYREL) 50 MG tablet Take 50 mg by mouth nightly    Historical Provider, MD   ondansetron (ZOFRAN ODT) 4 MG disintegrating tablet Take 1 tablet by mouth every 8 hours as needed for Nausea 3/2/21   Devin Batista APRN - CNP   furosemide (LASIX) 20 MG tablet Take 1 tablet by mouth daily 1/20/21   Santiago Noe MD   ferrous sulfate (IRON 325) 325 (65 Fe) MG tablet Take 1 tablet by mouth 2 times daily (with meals) 1/19/21   Santiago oNe MD   metFORMIN (GLUCOPHAGE) 500 MG tablet Take 500 mg by mouth 12/19/18   Historical Provider, MD   levothyroxine (SYNTHROID) 25 MCG tablet Take 1 tablet by mouth Daily 10/20/17   Michael Khanna MD       Chemical Dependency History:   H/o alcohol use disorder    Family Mental Health Hx:    None known    Social Hx:   Retired. . Lives with  and sons. Has adult children. ROS:  does not describe or endorse recent headaches, changes in vision, shortness of breath, chest pain, neurological problems, skin problems, muscle aches, GI problems, or bleeding problems, and was moving her extremities without difficulty. PE:    BP (!) 181/108   Pulse 86   Temp 97.1 °F (36.2 °C)   Resp 18   Ht 5' 5\" (1.651 m)   Wt 132 lb 14.4 oz (60.3 kg)   SpO2 93%   BMI 22.12 kg/m²       Motor / Gait: unsteady gait  AIMS: 0    Mental Status Examination:    Appearance:, wearing hospital gown, dishevled grooming and hygiene  Behavior/Attitude toward examiner:  Cooperative, fair eye contact  Speech:  elevated  Mood:  \"great! \"  Affect:  Labile  Thought processes:  tangential with loose associations  Thought Content: no SI, denies HI, no delusions voiced  Perceptions: denies AVH, not RTIS  Attention: poor  Abstraction: concrete  Cognition: Average BRY, Alert and oriented to person, place, month, year, but not situation or date, recall impaired  Insight:impaired  Judgment: judgment       LAB: Reviewed all labs obtained during admission to date. Formulation:  domiciled, , retired 78yo with dementia who was brought to our ED by her  with ongoing behavioral disturbance. Dx:   Amy Ceron with behavioral disturbance  Secondary Psychiatric (DSM V) Diagnoses: None  Chemical Dependency Diagnoses: alcohol use disorder, in remission.  tobacco use disorder, severe    Plan:  1.   Admit to inpatient senior psychiatry for stabilization. 2. Resume outpatient medicines as prescribed. Order q15min checks for safety, programming, and prn medication for anxiety, agitation, or insomnia. 3. Internal medicine consult for admission. 4. Collateral for care coordination. 5. ELOS - 3-4 days. Voluntary by POA. Spent > 70 minutes face to face with patient of which >50% was spent counseling and providing education regarding diagnosis, treatment options, and prognosis.     Karyle Neighbours, MD  Staff Psychiatrist

## 2021-06-06 NOTE — BH NOTE
[facility]  Treatment Team Note  Review Date & Time: 6/6/2021  1245    Patient was not in treatment team      Status EXAM:   Status and Exam  Normal: No  Facial Expression: Exaggerated, Sad  Affect: Unstable  Level of Consciousness: Alert  Mood:Normal: No  Mood: Anxious, Labile, Irritable  Motor Activity:Normal: No  Motor Activity: Decreased  Interview Behavior: Cooperative, Impulsive  Preception: [place] to Person  Attention:Normal: No  Attention: Unable to Concentrate  Thought Processes: Flt.of Ideas, Loose Assoc., Tangential  Thought Content:Normal: No  Thought Content: Obsessions  Hallucinations: None  Delusions: No  Memory:Normal: No  Memory: Poor Recent, Confabulation, Poor Remote  Insight and Judgment: No  Insight and Judgment: Poor Judgment, Poor Insight  Present Suicidal Ideation: No  Present Homicidal Ideation: No      Suicide Risk CSSR-S:  1) Within the past month, have you wished you were dead or wished you could go to sleep and not wake up? : No  2) Have you actually had any thoughts of killing yourself? : No  3) Have you been thinking about how you might kill yourself? : No  5) Have you started to work out or worked out the details of how to kill yourself?  Do you intend to carry out this plan? : No  6) Have you ever done anything, started to do anything, or prepared to do anything to end your life?: No      PLAN/TREATMENT RECOMMENDATIONS UPDATE: Evaluate and treat          Olvin Dodge RN

## 2021-06-06 NOTE — PROGRESS NOTES
`[facility]  Admission Note     Admission Type:   Admission Type: Voluntary    Reason for admission:  Reason for Admission: Mood disorder. Increased aggression. Unable to care for self. Manic. PATIENT STRENGTHS:  Strengths: Communication, Positive Support, Social Skills    Patient Strengths and Limitations:  Limitations: Difficulty problem solving/relies on others to help solve problems    Addictive Behavior:   Addictive Behavior  In the past 3 months, have you felt or has someone told you that you have a problem with:  : None  Do you have a history of Chemical Use?: No  Do you have a history of Alcohol Use?: Yes  Do you have a history of Street Drug Abuse?: No  Histroy of Prescripton Drug Abuse?: No    Medical Problems:   Past Medical History:   Diagnosis Date    Acute systolic CHF (congestive heart failure) (HCC)     Asthma     Back ache     Cataract     Cerebral artery occlusion with cerebral infarction ([facility] [address] 12/2016    Diabetes mellitus ([facility]     type !! - diet controlled    Hyperlipidemia     Hypertension     Insomnia     TIA (transient ischemic attack) 12/23/2016       Status EXAM:  Status and Exam  Normal: No  Facial Expression: Exaggerated, Worried, Sad  Affect: Unstable  Level of Consciousness: Alert  Mood:Normal: No  Mood: Anxious, Labile, Depressed, Sad, Irritable  Motor Activity:Normal: No  Motor Activity: Decreased  Interview Behavior: Cooperative, Impulsive, Irritable  Preception: [place] to Person, [place] to Place, [place] to Time  Attention:Normal: No  Attention: Unable to Concentrate  Thought Processes: Flt.of Ideas, Loose Assoc.   Thought Content:Normal: No  Thought Content: Obsessions  Hallucinations: None  Delusions:  Randal Clamp)  Memory:Normal: No  Memory: Confabulation, Poor Recent, Poor Remote  Insight and Judgment: No  Insight and Judgment: Poor Judgment, Poor Insight  Present Suicidal Ideation: No  Present Homicidal Ideation: No    Tobacco Screening:  Practical Counseling, on admission, bess X, if applicable and completed (first 3 are required if patient doesn't refuse):            ( )  Recognizing danger situations (included triggers and roadblocks)                    ( )  Coping skills (new ways to manage stress, exercise, relaxation techniques, changing routine, distraction)                                                           ( )  Basic information about quitting (benefits of quitting, techniques in how to quit, available resources  ( ) Referral for counseling faxed to [facility]                                           ( ) Patient refused counseling  ( ) Patient has not smoked in the last 30 days    Metabolic Screening:    Lab Results   Component Value Date    LABA1C 6.7 05/24/2021       Lab Results   Component Value Date    CHOL 168 05/24/2021    CHOL 102 02/29/2020    CHOL 245 (H) 12/24/2016     Lab Results   Component Value Date    TRIG 106 05/24/2021    TRIG 78 02/29/2020    TRIG 60 12/24/2016     Lab Results   Component Value Date    HDL 56 05/24/2021    HDL 36 (L) 02/29/2020    HDL 92 (H) 12/24/2016     No components found for: LDLCAL  Lab Results   Component Value Date    LABVLDL 21 05/24/2021    LABVLDL 16 02/29/2020    LABVLDL 12 12/24/2016         Body mass index is 22.12 kg/m². BP Readings from Last 2 Encounters:   06/05/21 (!) 162/93   06/02/21 126/70           Pt admitted with followings belongings:  Dentures: None  Vision - Corrective Lenses: None  Hearing Aid: None  Jewelry: None  Body Piercings Removed: N/A  Clothing: Shirt  Were All Patient Medications Collected?: Not Applicable  Other Valuables: None     No valuables or medications with patient. Patient had a t-shirt which she was given. Patient oriented to surroundings and program expectations and copy of patient rights given. Received admission packet: yes.   Consents reviewed, signed yes Patient verbalize understanding:  yes  Patient education on precautions: yes    Patient was provided with a mask to utilize on unit. Unfortunately, due to severe cognitive impairment, patient has demonstrated an inability to comply with mask use secondary to an inability to comprehend and/or consistently recall the need for consistent use. Multiple efforts to teach patient about necessity have proven ineffective, again due to severe underlying cognitive impairment. At this point in time it has become clinically apparent that efforts to force patient to utilize mask contribute only to increased behavioral disturbance. All staff will continue to utilize masks when interacting with patient. Patient is able to demonstrate the ability to move from a reclining position to an upright position within the recliner. 4 Eyes Skin Assessment     The patient is being assessed for  Admission    I agree that 2 RN's have performed a thorough Head to Toe Skin Assessment on the patient. ALL assessment sites listed below have been assessed. Areas assessed for pressure by both nurses: no pressure areas or open wounds. Scattered bruising-generalized  [x]   Head, Face, and Ears   [x]   Shoulders, Back, and Chest  [x]   Arms, Elbows, and Hands   [x]   Coccyx, Sacrum, and Ischum  [x]   Legs, Feet, and Heels                                Skin Assessed Under all Medical Devices by both nurses:  n/a              All Mepilex Borders were peeled back and area peeked at by both nurses:  No: n/a  Please list where Mepilex Borders are located:  n/a                 Does the Patient have Skin Breakdown related to pressure?   No            Johnson Prevention initiated:  No   Wound Care Orders initiated:  No      [facility] nurse consulted for Pressure Injury (Stage 3,4, Unstageable, DTI, NWPT, Complex wounds)and New or Established Ostomies:  NA        Nurse 1 eSignature: Electronically signed by Osorio Aguila RN on 6/6/21 at 12:18 AM EDT    **SHARE this note so that the co-signing nurse is able to place an eSignature**    Nurse 2 eSignature: Electronically signed by Micheal Martin RN on 6/6/21 at 12:21 AM JESS Mcadams RN

## 2021-06-06 NOTE — FLOWSHEET NOTE
Senior Purposeful Rounding    Position:Sitting    Physical Environment:Room free from clutter, Clear path to bathroom , Adequate lighting, Bed alarm functioning and No safety hazards noted    Pain Rating/ Nonverbal Pain Behaviors:denies; 0    Pain interventions Attempted: n/a    Patient Toileted:Continent

## 2021-06-06 NOTE — BH NOTE
Pt incontinent of a large amount of urine. Linens changed and new pull up on. Pt refusing to go back to bed. Unsteady on her feet. Brought out to day room where she is sitting in a recliner with a chair alarm on.

## 2021-06-06 NOTE — PRE-CERTIFICATION NOTE
Pre-certification complete. Auth#- [id number]. Three days approved 6/5/21-6/7/21. Review day 6/7/21. Review is Rajiv Gomez ([phone] ex. [id number]). Spoke with Black ESCALERA

## 2021-06-06 NOTE — PLAN OF CARE
Problem: Falls - Risk of:  Goal: Will remain free from falls  Description: Will remain free from falls  Outcome: Ongoing     Problem: Altered Mood, Deterioration in Function:  Goal: Able to verbalize reality based thinking  Description: Able to verbalize reality based thinking  Outcome: Ongoing     Problem: Altered Mood, Deterioration in Function:  Goal: Maintenance of adequate nutrition will improve  Description: Maintenance of adequate nutrition will improve  Outcome: Ongoing   Law Padgett is seen in the DR, awake and talking to herself. She is irritable and anxious oriented to self and place only. Took morning meal fairly well, able to feed herself. Denies any SI,HI or AVH.

## 2021-06-06 NOTE — FLOWSHEET NOTE
Senior Purposeful Rounding    Position:Sitting    Physical Environment:Room free from clutter, Clear path to bathroom , Adequate lighting, Bed alarm functioning and No safety hazards noted    Pain Rating/ Nonverbal Pain Behaviors:denies; 0    Pain interventions Attempted: n/a    Patient Toileted:Incontinent

## 2021-06-06 NOTE — FLOWSHEET NOTE
Senior Purposeful Rounding    Position:Sitting in DR    Physical Environment:Room free from clutter, Clear path to bathroom , Adequate lighting, Bed alarm functioning and No safety hazards noted    Pain Rating/ Nonverbal Pain Behaviors:denies; 0    Pain interventions Attempted: n/a    Patient Toileted: Continent BRP w/ assist

## 2021-06-07 PROCEDURE — 6370000000 HC RX 637 (ALT 250 FOR IP): Performed by: PSYCHIATRY & NEUROLOGY

## 2021-06-07 PROCEDURE — 1240000000 HC EMOTIONAL WELLNESS R&B

## 2021-06-07 PROCEDURE — 99233 SBSQ HOSP IP/OBS HIGH 50: CPT | Performed by: PSYCHIATRY & NEUROLOGY

## 2021-06-07 PROCEDURE — 99231 SBSQ HOSP IP/OBS SF/LOW 25: CPT | Performed by: PHYSICIAN ASSISTANT

## 2021-06-07 PROCEDURE — 6370000000 HC RX 637 (ALT 250 FOR IP): Performed by: PHYSICIAN ASSISTANT

## 2021-06-07 RX ORDER — AMLODIPINE BESYLATE 5 MG/1
5 TABLET ORAL DAILY
Status: DISCONTINUED | OUTPATIENT
Start: 2021-06-07 | End: 2021-06-11 | Stop reason: HOSPADM

## 2021-06-07 RX ORDER — DIVALPROEX SODIUM 250 MG/1
250 TABLET, DELAYED RELEASE ORAL EVERY 12 HOURS SCHEDULED
Status: DISCONTINUED | OUTPATIENT
Start: 2021-06-07 | End: 2021-06-11 | Stop reason: HOSPADM

## 2021-06-07 RX ADMIN — MELOXICAM 7.5 MG: 15 TABLET ORAL at 08:11

## 2021-06-07 RX ADMIN — OXYBUTYNIN CHLORIDE 10 MG: 5 TABLET, EXTENDED RELEASE ORAL at 08:11

## 2021-06-07 RX ADMIN — ACETAMINOPHEN 1000 MG: 500 TABLET ORAL at 08:11

## 2021-06-07 RX ADMIN — DONEPEZIL HYDROCHLORIDE 5 MG: 5 TABLET, FILM COATED ORAL at 08:11

## 2021-06-07 RX ADMIN — CITALOPRAM HYDROBROMIDE 10 MG: 20 TABLET ORAL at 08:11

## 2021-06-07 RX ADMIN — METFORMIN HYDROCHLORIDE 500 MG: 500 TABLET ORAL at 08:11

## 2021-06-07 RX ADMIN — FUROSEMIDE 20 MG: 20 TABLET ORAL at 08:11

## 2021-06-07 RX ADMIN — ACETAMINOPHEN 1000 MG: 500 TABLET ORAL at 13:55

## 2021-06-07 RX ADMIN — FERROUS SULFATE TAB 325 MG (65 MG ELEMENTAL FE) 325 MG: 325 (65 FE) TAB at 16:32

## 2021-06-07 RX ADMIN — FERROUS SULFATE TAB 325 MG (65 MG ELEMENTAL FE) 325 MG: 325 (65 FE) TAB at 08:11

## 2021-06-07 RX ADMIN — LEVOTHYROXINE SODIUM 25 MCG: 25 TABLET ORAL at 06:38

## 2021-06-07 RX ADMIN — AMLODIPINE BESYLATE 5 MG: 5 TABLET ORAL at 16:33

## 2021-06-07 RX ADMIN — ACETAMINOPHEN 1000 MG: 500 TABLET ORAL at 21:37

## 2021-06-07 RX ADMIN — TRAZODONE HYDROCHLORIDE 50 MG: 50 TABLET ORAL at 21:37

## 2021-06-07 RX ADMIN — DIVALPROEX SODIUM 250 MG: 250 TABLET, DELAYED RELEASE ORAL at 21:37

## 2021-06-07 ASSESSMENT — PAIN SCALES - GENERAL
PAINLEVEL_OUTOF10: 0

## 2021-06-07 NOTE — PROGRESS NOTES
Department of Psychiatry  Attending Progress Note    Admission Date:    6/5/2021    Chief complaint / Reason for Admission:  Behavioral disturbance    Patient's chart was reviewed, case was discussed with nursing/OT/RT staff, and collaborated with  about the treatment plan. SUBJECTIVE:   Over last 24 hours:  Behavioral outbursts: No   Non-aggressive behavioral disturbance: Yes  Medication compliant: Yes  Need for seclusion/restraints: No  Sleeping adequately:  No  Appetite adequate: Yes  Attending groups: No    Review documentation from admission and over the weekend. Whereas during last admission I interpreted patients mood lability to represent something along the lines of pseudobulbar affect, it appears that since returning home and so far since returning to the unit she has been almost manic. Patient described as intrusive, elevated, sexually preoccupied. She was tangential with significant loosening of associations on examination today. She exhibited the same severe lability that she had manifested during her last admission.  Later I observed her talking to herself in her room and when asked if she was speaking to she pointed to a baby doll on the desk in her room and it seemed that she was speaking to this doll as though it were real.    Progressing overall: Not yet progressing  Suicidal ideation: Denies  Homicidal ideation: Denies  Medication side effects: possible activation from citalopram    ROS: Patient has new complaints: no    Current Medications Ordered:   divalproex  250 mg Oral 2 times per day    amLODIPine  5 mg Oral Daily    acetaminophen  1,000 mg Oral TID    donepezil  5 mg Oral Daily with breakfast    ferrous sulfate  325 mg Oral BID WC    furosemide  20 mg Oral Daily    levothyroxine  25 mcg Oral Daily    meloxicam  7.5 mg Oral Daily    metFORMIN  500 mg Oral Daily with breakfast    nicotine  1 patch Transdermal Daily    oxybutynin  10 mg Oral Daily    traZODone  50 mg Oral Nightly      PRN Meds: ondansetron, magnesium hydroxide, aluminum & magnesium hydroxide-simethicone, haloperidol **OR** haloperidol lactate, benztropine mesylate     Objective:     PE:    BP (!) 144/80   Pulse 104   Temp 98.7 °F (37.1 °C) (Temporal)   Resp 16   Ht 5' 5\" (1.651 m)   Wt 132 lb 14.4 oz (60.3 kg)   SpO2 94%   BMI 22.12 kg/m²       Motor / Gait: no abnormalities noted, no involuntary movements, gait ataxic    Mental Status Examination:    Appearance: WF, appears stated age, wearing hospital gown, disheveled grooming and hygiene   Behavior/Attitude toward examiner:  Instrusive  Speech:  rapid rate, nml volume, excessive amount, + pressured  Mood:  Elevated  Affect:  Labile  Thought processes:  Tangential with loose associations  Thought Content: Denies SI, denies HI, no obvious delusions, +confabulation  Perceptions: Denies AVH, not RTIS, but was noted to be talking to a doll as though real  Attention: poor  Abstraction: [place]  Cognition: Oriented to self, location only, recall Impaired  Insight: Poor insight   Judgment: Impaired judgment      LAB: Reviewed labs from last 24 hours      Dx:   Diagnosis: Unspecified dementia with behavioral disturbance  Secondary Psychiatric (DSM V) Diagnoses: None  Chemical Dependency Diagnoses: tobacco use disorder, severe    All conditions detailed above are being treated while patient is hospitalized. Tx plan: Generally: prevent self injury/aggression towards others, stabilize mood/anxiety/psychotic/behavioral disturbance, establish/maintain aftercare, increase coping mechanisms, improve medication compliance. All conditions present on admission are being treated while pt is hospitalized. Legal Status: Involuntary    Primary Psychiatric Issues:   1. Dementia with behavioral disturbance:  -discontinue citalopram. This may be activating to patient.  -Initiate Depakote 250 mg twice a day.     Chemical Dependency

## 2021-06-07 NOTE — FLOWSHEET NOTE
Senior Purposeful Rounding     Position:Sitting     Physical Environment:No safety hazards noted     Pain Rating/ Nonverbal Pain Behaviors:denies;      Pain interventions Attempted:      Patient Toileted:Continent

## 2021-06-07 NOTE — PLAN OF CARE
Patient has been in room throughout am ad afternoon. Patient slept part of morning. Patient talking to self throughout shift when she is awake. Thought that the baby doll was alive and not breathing but then calmed down when she was told it was a doll. Denies SI/HI. Oriented x2. Appetite fair. Med compliant. Will continue to monitor.

## 2021-06-08 PROCEDURE — 97535 SELF CARE MNGMENT TRAINING: CPT

## 2021-06-08 PROCEDURE — 6370000000 HC RX 637 (ALT 250 FOR IP): Performed by: PSYCHIATRY & NEUROLOGY

## 2021-06-08 PROCEDURE — 97166 OT EVAL MOD COMPLEX 45 MIN: CPT

## 2021-06-08 PROCEDURE — 99233 SBSQ HOSP IP/OBS HIGH 50: CPT | Performed by: PSYCHIATRY & NEUROLOGY

## 2021-06-08 PROCEDURE — 6370000000 HC RX 637 (ALT 250 FOR IP): Performed by: PHYSICIAN ASSISTANT

## 2021-06-08 PROCEDURE — 1240000000 HC EMOTIONAL WELLNESS R&B

## 2021-06-08 PROCEDURE — 97530 THERAPEUTIC ACTIVITIES: CPT

## 2021-06-08 RX ORDER — ARIPIPRAZOLE 5 MG/1
5 TABLET ORAL DAILY
Status: DISCONTINUED | OUTPATIENT
Start: 2021-06-08 | End: 2021-06-11 | Stop reason: HOSPADM

## 2021-06-08 RX ADMIN — ACETAMINOPHEN 1000 MG: 500 TABLET ORAL at 16:44

## 2021-06-08 RX ADMIN — OXYBUTYNIN CHLORIDE 10 MG: 5 TABLET, EXTENDED RELEASE ORAL at 11:14

## 2021-06-08 RX ADMIN — DIVALPROEX SODIUM 250 MG: 250 TABLET, DELAYED RELEASE ORAL at 21:27

## 2021-06-08 RX ADMIN — LEVOTHYROXINE SODIUM 25 MCG: 25 TABLET ORAL at 06:24

## 2021-06-08 RX ADMIN — METFORMIN HYDROCHLORIDE 500 MG: 500 TABLET ORAL at 11:13

## 2021-06-08 RX ADMIN — FERROUS SULFATE TAB 325 MG (65 MG ELEMENTAL FE) 325 MG: 325 (65 FE) TAB at 16:44

## 2021-06-08 RX ADMIN — ARIPIPRAZOLE 5 MG: 5 TABLET ORAL at 11:45

## 2021-06-08 RX ADMIN — MELOXICAM 7.5 MG: 15 TABLET ORAL at 11:15

## 2021-06-08 RX ADMIN — ACETAMINOPHEN 1000 MG: 500 TABLET ORAL at 21:26

## 2021-06-08 RX ADMIN — DONEPEZIL HYDROCHLORIDE 5 MG: 5 TABLET, FILM COATED ORAL at 11:14

## 2021-06-08 RX ADMIN — FUROSEMIDE 20 MG: 20 TABLET ORAL at 11:12

## 2021-06-08 RX ADMIN — DIVALPROEX SODIUM 250 MG: 250 TABLET, DELAYED RELEASE ORAL at 11:12

## 2021-06-08 RX ADMIN — FERROUS SULFATE TAB 325 MG (65 MG ELEMENTAL FE) 325 MG: 325 (65 FE) TAB at 11:13

## 2021-06-08 RX ADMIN — TRAZODONE HYDROCHLORIDE 50 MG: 50 TABLET ORAL at 21:27

## 2021-06-08 RX ADMIN — AMLODIPINE BESYLATE 5 MG: 5 TABLET ORAL at 11:13

## 2021-06-08 RX ADMIN — ACETAMINOPHEN 1000 MG: 500 TABLET ORAL at 11:14

## 2021-06-08 ASSESSMENT — PAIN DESCRIPTION - LOCATION: LOCATION: GENERALIZED

## 2021-06-08 ASSESSMENT — PAIN DESCRIPTION - ONSET: ONSET: ON-GOING

## 2021-06-08 ASSESSMENT — PAIN SCALES - GENERAL
PAINLEVEL_OUTOF10: 3
PAINLEVEL_OUTOF10: 5
PAINLEVEL_OUTOF10: 5

## 2021-06-08 ASSESSMENT — PAIN DESCRIPTION - DESCRIPTORS: DESCRIPTORS: ACHING

## 2021-06-08 ASSESSMENT — PAIN - FUNCTIONAL ASSESSMENT: PAIN_FUNCTIONAL_ASSESSMENT: ACTIVITIES ARE NOT PREVENTED

## 2021-06-08 ASSESSMENT — PAIN DESCRIPTION - FREQUENCY: FREQUENCY: CONTINUOUS

## 2021-06-08 ASSESSMENT — PAIN DESCRIPTION - PROGRESSION: CLINICAL_PROGRESSION: NOT CHANGED

## 2021-06-08 ASSESSMENT — PAIN DESCRIPTION - PAIN TYPE: TYPE: CHRONIC PAIN

## 2021-06-08 NOTE — GROUP NOTE
Group Therapy Note    Date: 6/8/2021    Group Start Time: 3967  Group End Time: 3612  Group Topic: Cognitive Skills    [address] [facility] , MSW        Group Therapy Note    Cooperative Gameplay - TV & Film Theme Carolina Bates    Attendees: 3         Notes:  Present across session, actively engaged with collaborative socialization with peers. Reflective in reminiscence, discussing memories associated with certain tv shows and films. Status After Intervention:  Improved    Participation Level:  Active Listener and Interactive    Participation Quality: Appropriate, Sharing and Supportive      Speech:  normal      Thought Process/Content: Logical      Affective Functioning: Congruent      Mood: euthymic      Level of consciousness:  Alert and Oriented x4      Response to Learning: Capable of insight and Progressing to goal      Endings: None Reported    Modes of Intervention: Socialization, Exploration, Activity and Media      Discipline Responsible: Psychoeducational Specialist      Signature:  Jer Singh MM, MT-BC

## 2021-06-08 NOTE — BH NOTE
SW called the pt's  and provided an update. SW discussed goals for discharge. Pt's  stated he wants the pt to transition to SNF for as long as his insurance will cover it post discharge. SW explained process and made him aware that OT is not recommending  SNF ad that pt will most likely return home.         CELINA Delatorre

## 2021-06-08 NOTE — PLAN OF CARE
Patient was initially sitting in her room, talking to herself. Patient was pleasant & cooperative upon approach. Patient was verbal I 1:1 & told writer that she thought her baby doll was real, because it looked like her & her sister. Patient with loose disorganized thoughts. Patient reported that she was here due being obnoxious, singing & waking everyone up. Patient ate cookies for a snack. Patient kept saying that her relatives were here & that she wanted to talk to them. Patient was incontinent at 22:45, on her way to the bathroom & was assisted with changing her clothes, cleansed with wipes & pull ups applied. Patient was again incontinent of urine at 23:30, with depends changed & was assisted with cleansing with wipes. Patient has appeared asleep in bed, with her eyes closed since 00:45.  Sally Soto R.N.

## 2021-06-08 NOTE — PROGRESS NOTES
Inpatient Occupational Therapy  Evaluation and Treatment    Unit:   [facility]  Date:  6/8/2021  Patient Name:    Roxane Case  Admitting diagnosis:  Mood disorder [facility]) Radha Contreras  Admit Date:  6/5/2021  Precautions/Restrictions/WB Status/ Lines/ Wounds/ Oxygen:  Standard BHI Precautions  Fall Risk  History of Present Illness:  Per Dr. Ebonie Celeste H&P dated 6/6/21, Patient was discharged from our program on 5/26 after stabilization. Her elder  has struggled with her since discharge and has brought her back to the ED on a few occasions. They have not followed up with COA or senior day programming. He hasn't been able to navigated the system. More recently she has been wandering into the street and making other poor decisions. Yesterday, she took her shirt of and went outside.      Per PRAMOD Notes regarding collateral from :  Megan Anderson says that last night he was at [facility] with pt and she asked for a martha. Megan Anderson says that pt has a hx of alcohol abuse and that he didn't think that would be a good idea. He states that per himself and her PCP, they convinced her to stop drinking a few weeks ago. He says that since the incident at the restaurant, she has been getting increasingly agitated. He says that today she went outside, took her shirt off, and was yelling. Megan Anderson says that she has dementia and she is declining to the point that he cannot take care of her anymore. He feels that she needs to be watched constantly or there is a chance she will hurt herself. He states that he works three days a week and is worried she will hurt herself when he is not home. He reports recent stressors being moving in to a much smaller house and their dog passing away. Megan Anderson says that pt never sleeps more than a few hours a night and that her doctor won't prescribe her anything for sleep. He believes she is depressed. He denies that she is hallucinating and reports that she has never been aggressive towards anyone. He reports that she has never had a suicide attempt but has made statements that she wants to take a pill and never wake up.      When I met with her today she was elevated and labile. She talked about being \"perfect\" and  \"loving everyting\" and then complained of being depressed. Treatment Number:  1    Treatment Time: 031-993  Timed Code Treatment Minutes:    26 minutes   Total Treatment Time:   36   minutes    Staff Recommendations:    Stand-by assist of 1 with ambulation using RW and gait belt      Discharge Recommendations:  Home with 24/7 assist    DME needs for discharge:      Needs Met     AM-PAC Score:   20  Home Health S4 Level: NA    MOCA:  To be assessed    Preadmission Environment    Pt. Lives with family ( and son, grandchildren on weekends)  Home environment:  two story home  Steps to enter first floor: .5 steps to enter  Steps to second floor: Full flight of 12-13 with handrail  Bathroom: tub/shower unit, grab bars and standard height commode  Equipment owned: RW, shower chair/bench and SPC    Preadmission Status:  Pt. Able to drive: No  Pt Fully independent with ADLs: Yes-pt only sponge bathes, dresses herself  Pt. Required assistance from family for: Cleaning, Cooking and Laundry (uses laundromat)  Pt. independent for transfers and gait and walked with cane or wall walks, has Walker to take to bathroom, sometimes forgets it. History of falls Yes, lost balance in her bedroom a few weeks ago    Sleep Hygiene:  Finally got some sleep last night, usually gets 1-3 hrs. Income:  SSI,  still works part time  Financial Management:   performs money management  Leisure Interests:  Used to ride and train horses  Medication Management:  manages  Health Management:  Family MD is Dr. Joaquín Naik:  Va Moon (friend) nurse at [facility]:  Her friend Nidia Maldonado her son in [place] in '77?) People who lose their cool.  Loss of her estrada retrievers  Coping Skills:  Drinks bourbon \"Which I enjoy too much\"    Pain   Yes  Rating:mild  Location: L knee and leg  Pain Medicine Status: No request made      Cognition    A&O to Person, Place, Time and Situation  Able to follow:  2 step commands    Upper Extremity ROM:    WFL, pt able to perform all bed mobility, transfers, and gait without ROM limitation. Upper Extremity Strength:    WFL, pt able to perform all bed mobility, transfers, and gait without strength limitation. Upper Extremity Sensation:    No apparent deficits. Upper Extremity Proprioception:    No apparent deficits. Skin Integrity:  No issues noted     Coordination and Tone:  WFLs    Balance  Static Sitting:  Normal  Dynamic Sitting: Good   Static Standing:  Good -   Dynamic Standing: Fair     Bed mobility:    Supine to sit:   Not Tested  Sit to supine:   Not Tested  Scooting to head of bed: Not Tested   Scooting in sitting:  Independent  Rolling:   Not Tested  Bridging:   Not Tested    Transfers:    Sit to stand:   SBA  Stand to sit:   CGA  Bed/Chair to/from toilet: Not Tested    Self Care: Toileting: Not Tested  Grooming: Not Tested  Dressing: Supervision to don/doff L sock    Exercise / Activities Initiated:   Pt. Educated on role of OT. Pt. Participated in: education re: sleep hygiene    Assessment of Deficits:   Pt demonstrated decreased activity tolerance, decreased safety awareness decreased balance, decreased bed mobility, decreased transfer skills, and decreased ADL/IADL status, decreased coping skills, decreased cognition, limited education    Pt. Limited during evaluation by . . . Pressured speech at times    At end of evaluation, pt. In gathering room. Goal(s) : To be met in 3 Visits:  1). Pt. To complete interest check list.   2). Pt will participate in [facility] REHABILITATION assessment. To be met in 5 Visits:  1). Supine to Sit IND  2). Bed to Chair/BSC Modified IND  3). Pt to apoorva UE exs [id number]  4).  Pt. To complete 1 SMART long term goal and 2 SMART short term goals. 5). Pt will verbalize 3 coping skills  6). Pt. To verbalize understanding of sleep hygiene education/handouts. Rehabilitation Potential:  Good for goals listed above. Strengths for achieving goals include:  PLOF and Connection to outpatient provider  Barriers to achieving goals include: Decreased coping skills, Decreased cognition and Limited education    Plan: To be seen 2-5x/week while in acute care setting for therapeutic exercises/act, ADL retraining, NMR and patient/caregiver ed/instruction.        Signature and License Number  [facility] [address], OTR/L  #[id number]           If patient discharges from this facility prior to next visit, this note will serve as the Discharge Summary

## 2021-06-08 NOTE — GROUP NOTE
Group Therapy Note    Date: 6/8/2021    Group Start Time: 1100  Group End Time: 3416  Group Topic: Recreational    [address] [facility] , MSW        Group Therapy Note    Creative Art - Self-Directed    Attendees: 5           Notes:  Present and actively engaged across interventions, social with peers, singing to music. Pt verbalized enjoyment of creative art intervention. Status After Intervention:  Improved    Participation Level:  Active Listener and Interactive    Participation Quality: Appropriate and Attentive      Speech:  normal      Thought Process/Content: Logical      Affective Functioning: Congruent      Mood: elevated      Level of consciousness:  Alert and Attentive      Response to Learning: Capable of insight and Progressing to goal      Endings: None Reported    Modes of Intervention: Socialization, Exploration, Activity and Media      Discipline Responsible: Psychoeducational Specialist      Signature:  Adal Clinton MM, MT-BC

## 2021-06-08 NOTE — GROUP NOTE
Group Therapy Note    Date: 6/8/2021    Group Start Time: 1000  Group End Time: 1050  Group Topic: [facility], MSW        Group Therapy Note    Improvised Music & Reminiscence    Attendees: 7         Notes:  Positive engagement across session, hyperverbal while encouraging increased engagement from peers. Status After Intervention:  Improved    Participation Level:  Active Listener, Interactive and Monopolizing    Participation Quality: Appropriate, Sharing and Supportive      Speech:  Normal and loud      Thought Process/Content: Logical      Affective Functioning: Exaggerated      Mood: elevated      Level of consciousness:  Alert and Oriented x4      Response to Learning: Capable of insight and Progressing to goal      Endings: None Reported    Modes of Intervention: Support, Socialization, Activity, Media and Reality-testing      Discipline Responsible: Psychoeducational Specialist      Signature:  Merry Meza MM, MT-BC

## 2021-06-08 NOTE — PROGRESS NOTES
Senior Purposeful Rounding    Position:Sitting    Physical Environment:Room free from clutter, Clear path to bathroom  and No safety hazards noted    Pain Rating/ Nonverbal Pain Behaviors:denies    Pain interventions Attempted: Attempted    Patient Toileted:Incontinent

## 2021-06-08 NOTE — PROGRESS NOTES
Senior Purposeful Rounding    Position:Sitting    Physical Environment:Room free from clutter, Clear path to bathroom  and No safety hazards noted    Pain Rating/ Nonverbal Pain Behaviors:denies    Pain interventions Attempted: Yes    Patient Toileted:Patient declined

## 2021-06-09 PROCEDURE — 6370000000 HC RX 637 (ALT 250 FOR IP): Performed by: PSYCHIATRY & NEUROLOGY

## 2021-06-09 PROCEDURE — 1240000000 HC EMOTIONAL WELLNESS R&B

## 2021-06-09 PROCEDURE — 99233 SBSQ HOSP IP/OBS HIGH 50: CPT | Performed by: PSYCHIATRY & NEUROLOGY

## 2021-06-09 PROCEDURE — 6370000000 HC RX 637 (ALT 250 FOR IP): Performed by: PHYSICIAN ASSISTANT

## 2021-06-09 RX ADMIN — METFORMIN HYDROCHLORIDE 500 MG: 500 TABLET ORAL at 08:49

## 2021-06-09 RX ADMIN — MELOXICAM 7.5 MG: 15 TABLET ORAL at 08:46

## 2021-06-09 RX ADMIN — FUROSEMIDE 20 MG: 20 TABLET ORAL at 08:50

## 2021-06-09 RX ADMIN — FERROUS SULFATE TAB 325 MG (65 MG ELEMENTAL FE) 325 MG: 325 (65 FE) TAB at 08:46

## 2021-06-09 RX ADMIN — DIVALPROEX SODIUM 250 MG: 250 TABLET, DELAYED RELEASE ORAL at 20:59

## 2021-06-09 RX ADMIN — OXYBUTYNIN CHLORIDE 10 MG: 5 TABLET, EXTENDED RELEASE ORAL at 08:46

## 2021-06-09 RX ADMIN — ACETAMINOPHEN 1000 MG: 500 TABLET ORAL at 20:59

## 2021-06-09 RX ADMIN — ACETAMINOPHEN 1000 MG: 500 TABLET ORAL at 08:46

## 2021-06-09 RX ADMIN — AMLODIPINE BESYLATE 5 MG: 5 TABLET ORAL at 08:50

## 2021-06-09 RX ADMIN — DONEPEZIL HYDROCHLORIDE 5 MG: 5 TABLET, FILM COATED ORAL at 08:46

## 2021-06-09 RX ADMIN — ONDANSETRON 4 MG: 4 TABLET, ORALLY DISINTEGRATING ORAL at 17:38

## 2021-06-09 RX ADMIN — TRAZODONE HYDROCHLORIDE 50 MG: 50 TABLET ORAL at 20:59

## 2021-06-09 RX ADMIN — DIVALPROEX SODIUM 250 MG: 250 TABLET, DELAYED RELEASE ORAL at 08:46

## 2021-06-09 RX ADMIN — ARIPIPRAZOLE 5 MG: 5 TABLET ORAL at 08:46

## 2021-06-09 RX ADMIN — FERROUS SULFATE TAB 325 MG (65 MG ELEMENTAL FE) 325 MG: 325 (65 FE) TAB at 16:43

## 2021-06-09 RX ADMIN — ACETAMINOPHEN 1000 MG: 500 TABLET ORAL at 14:33

## 2021-06-09 RX ADMIN — LEVOTHYROXINE SODIUM 25 MCG: 25 TABLET ORAL at 05:26

## 2021-06-09 ASSESSMENT — PAIN SCALES - GENERAL
PAINLEVEL_OUTOF10: 1
PAINLEVEL_OUTOF10: 0
PAINLEVEL_OUTOF10: 2

## 2021-06-09 NOTE — GROUP NOTE
Group Therapy Note    Date: 6/9/2021    Group Start Time: 1000  Group End Time: PREM Dominguez        Group Therapy Note    Group Music Making and Songwriting    Session began with group singing, deep breathing and BUE movement. Patients collaborated to create a list of emotional goals for today, activities that will improve ability to achieve emotional goals. Engaged in group singing of \"energetic dancing music\". Group concluded with patients engaging in collaborative songwriting, rewriting the song \"[place] by the [employer]", incorporating lyrics about the [address], [place], and [place] love\". Attendees: 6         Notes:  Present and actively engaged across session, reflective while reminiscing about memories from adolescence, dating and getting drivers license. Pt provided many lyrics in collaborative songwriting intervention. Status After Intervention:  Improved    Participation Level:  Active Listener and Interactive    Participation Quality: Appropriate, Sharing and Supportive      Speech:  normal      Thought Process/Content: Logical      Affective Functioning: Congruent      Mood: euthymic      Level of consciousness:  Alert and Oriented x4      Response to Learning: Able to verbalize current knowledge/experience, Capable of insight and Progressing to goal      Endings: None Reported    Modes of Intervention: Support, Socialization, Exploration, Activity, Media and Reality-testing      Discipline Responsible: Psychoeducational Specialist      Signature:  Sapna Garcia MM, MT-BC

## 2021-06-09 NOTE — PLAN OF CARE
Patient relaxed, visible on unit. Patient social with select peers and attended groups. Patient not talking as much today and more social with peers. Med compliant. Incontinent of urine. Cooperative with care and a shower. Visiting with . Will continue to monitor.

## 2021-06-09 NOTE — BH NOTE
Senior Purposeful Rounding     Position:Sitting     Physical Environment:No safety hazards noted     Pain Rating/ Nonverbal Pain Behaviors:denies;      Pain interventions Attempted:      Patient Toileted:Incontinent

## 2021-06-09 NOTE — PROGRESS NOTES
Department of Psychiatry  Attending Progress Note    Admission Date:    2021    Chief complaint / Reason for Admission:  Behavioral disturbance    Patient's chart was reviewed, case was discussed with nursing/OT/RT staff, and collaborated with  about the treatment plan. SUBJECTIVE:   Over last 24 hours:  Behavioral outbursts: No   Non-aggressive behavioral disturbance: Yes  Medication compliant: Yes  Need for seclusion/restraints: No  Sleeping adequately:  No - 3 hours  Appetite adequate: Yes  Attending groups: Yes    Pt continued to exhibit symptoms concerning for some psychosis, talking to baby doll in her room, at one point panicked thinking the baby was alive and had . Slept poorly last night and continued to demonstrate mood lability. Pt was irritable and somewhat argumentative today, but was more coherent. She complained about being in the hospital and demonstrated poor insight into her own behavior leading to admission, but did so coherently. Additionally she was irritable, but less labile than yesterday.     Progressing overall: slight progress  Suicidal ideation: Denies  Homicidal ideation: Denies  Medication side effects: No    ROS: Patient has new complaints: no    Current Medications Ordered:   ARIPiprazole  5 mg Oral Daily    divalproex  250 mg Oral 2 times per day    amLODIPine  5 mg Oral Daily    acetaminophen  1,000 mg Oral TID    donepezil  5 mg Oral Daily with breakfast    ferrous sulfate  325 mg Oral BID WC    furosemide  20 mg Oral Daily    levothyroxine  25 mcg Oral Daily    meloxicam  7.5 mg Oral Daily    metFORMIN  500 mg Oral Daily with breakfast    nicotine  1 patch Transdermal Daily    oxybutynin  10 mg Oral Daily    traZODone  50 mg Oral Nightly      PRN Meds: ondansetron, magnesium hydroxide, aluminum & magnesium hydroxide-simethicone, haloperidol **OR** haloperidol lactate, benztropine mesylate     Objective:     PE:    BP (!) 139/90   Pulse 107   Temp 98.6 °F (37 °C) (Temporal)   Resp 18   Ht 5' 5\" (1.651 m)   Wt 132 lb 14.4 oz (60.3 kg)   SpO2 99%   BMI 22.12 kg/m²       Motor / Gait: no abnormalities noted, no involuntary movements, gait ataxic    Mental Status Examination:    Appearance: WF, appears stated age, wearing hospital gown, disheveled grooming and hygiene   Behavior/Attitude toward examiner:  Argumentative  Speech:  rapid rate, nml volume, excessive amount, + pressured  Mood:  Irritable  Affect:  Congruent  Thought processes:  Tangential with loose associations  Thought Content: Denies SI, denies HI, no obvious delusions, +confabulation  Perceptions: Denies AVH, not RTIS, but was noted to be talking to a doll as though real  Attention: poor  Abstraction: [place]  Cognition: Oriented to self, location only, recall Impaired  Insight: Poor insight   Judgment: Impaired judgment      LAB: Reviewed labs from last 24 hours      Dx:   Diagnosis: Unspecified dementia with behavioral disturbance  Secondary Psychiatric (DSM V) Diagnoses: None  Chemical Dependency Diagnoses: tobacco use disorder, severe    All conditions detailed above are being treated while patient is hospitalized. Tx plan: Generally: prevent self injury/aggression towards others, stabilize mood/anxiety/psychotic/behavioral disturbance, establish/maintain aftercare, increase coping mechanisms, improve medication compliance. All conditions present on admission are being treated while pt is hospitalized. Legal Status: Involuntary    Primary Psychiatric Issues:   1. Dementia with behavioral disturbance:  -discontinued citalopram on admission. This was likely activating to patient.  -Continue Depakote 250 mg twice a day.   -Start abilify 5 mg daily    Chemical Dependency Issues:  NRT    Function:  -Consulted physical therapy - appreciate recs;   -Consulted occupational therapy - appreciate recs;   -Falls precautions    Medical Problems:  Internal medicine has been consulted. Appreciate recs. Code Status: Full    Disposition:    -Housing: Own home with   -Current outpatient follow-up: PCP and neurology  -Discharge planning is incomplete    Criteria for Discharge:  Not suicidal, not homicidal, not grossly psychotic, behavioral disturbance improved, sleeping well, mood/affect stable, eating well, aftercare arranged. Total face to face time with patient was 35 minutes and more than 50 % of that time was spent counseling the patient on their symptoms, treatment and expected goals.     David Pope MD  Staff Psychiatrist

## 2021-06-09 NOTE — GROUP NOTE
Group Therapy Note    Date: 6/9/2021    Group Start Time: 1110  Group End Time: 1150  Group Topic: Recreational    [facility]    Venita Sims, [facility]        Group Therapy Note    Attendees: 6           Patient's Goal:  Pt's goal was to discuss nostalgic postcard which had pictures and information about movies, TV shows, and celebrites from the 1950's and 1960's. Notes:  Pt was engaged in group discussion and activity. Pt met goal.     Status After Intervention:  Improved    Participation Level:  Active Listener and Interactive    Participation Quality: Appropriate, Attentive, Sharing and Supportive      Speech:  normal      Thought Process/Content: Logical  Linear      Affective Functioning: Congruent      Mood: euphoric      Level of consciousness:  Alert and Attentive      Response to Learning: Able to verbalize current knowledge/experience and Progressing to goal      Endings: None Reported    Modes of Intervention: Education, Support, Socialization, Exploration, Clarifying, Problem-solving, Activity, Movement, Confrontation, Limit-setting and Reality-testing      Discipline Responsible: /Counselor      Signature:  Venita Sims, [facility]

## 2021-06-09 NOTE — PLAN OF CARE
Problem: Skin Integrity:  Goal: Will show no infection signs and symptoms  Description: Will show no infection signs and symptoms  Outcome: Ongoing  Goal: Absence of new skin breakdown  Description: Absence of new skin breakdown  Outcome: Ongoing     Problem: Falls - Risk of:  Goal: Will remain free from falls  Description: Will remain free from falls  Outcome: Ongoing  Goal: Absence of physical injury  Description: Absence of physical injury  Outcome: Ongoing     Problem: Altered Mood, Deterioration in Function:  Goal: Ability to perform activities of daily living will improve  Description: Ability to perform activities of daily living will improve  Outcome: Ongoing  Goal: Able to verbalize reality based thinking  Description: Able to verbalize reality based thinking  Outcome: Ongoing  Goal: Skin appearance normal  Description: Skin appearance normal  Outcome: Ongoing  Goal: Maintenance of adequate nutrition will improve  Description: Maintenance of adequate nutrition will improve  Outcome: Ongoing  Goal: Ability to tolerate increased activity will improve  Description: Ability to tolerate increased activity will improve  Outcome: Ongoing  Goal: Participates in care planning  Description: Participates in care planning  Outcome: Ongoing  Goal: Patient specific goal  Description: Patient specific goal  Outcome: Ongoing     Problem: Pain:  Goal: Pain level will decrease  Description: Pain level will decrease  Outcome: Ongoing  Goal: Control of acute pain  Description: Control of acute pain  Outcome: Ongoing  Goal: Control of chronic pain  Description: Control of chronic pain  Outcome: Ongoing   No complaint of skin soreness or any breakdown of skin noted. Patient denied SI/HI,A/V hallucinations this evening. She is asked to come to the staff if thoughts of self harm were to occur. She was medication compliant. Safety checks continue q 15 minutes through out the shift.

## 2021-06-09 NOTE — GROUP NOTE
Group Therapy Note    Date: 6/9/2021    Group Start Time: 9914  Group End Time: 1430  Group Topic: Recreational    R Kamala Barr 23, [facility]    Group Therapy Note    Attendees: 5    Patients engaged in relaxation, socializing, and reminiscing as they watched the movie, Sound of The Music together as a group. Patients watched the first half of the movie during group and requested to finish the movie tomorrow. Notes:  Pt was engaged with peers in group activity and discussion. Pt appeared labile during group as she was crying at the beginning of the movie and then was smiling with peers during group discussion. Pt left group early and did not return to group.     Status After Intervention:  Unchanged    Participation Level: Interactive    Participation Quality: Appropriate      Speech:  normal      Thought Process/Content: KITTY      Affective Functioning: Exaggerated      Mood: anxious and depressed      Level of consciousness:  Alert      Response to Learning: Progressing to goal      Endings: None Reported    Modes of Intervention: Education, Support, Socialization, Exploration, Clarifying, Problem-solving and Activity      Discipline Responsible: /Counselor      Signature:  JERSEY Randhawa, RNICA

## 2021-06-09 NOTE — PROGRESS NOTES
...Senior Purposeful Rounding    Position:Repositions Self    Physical Environment:Room free from clutter, Clear path to bathroom , Adequate lighting and No safety hazards noted    Pain Rating/ Nonverbal Pain Behaviors:denies;     Pain interventions Attempted: no    Patient Toileted:Continent

## 2021-06-09 NOTE — PROGRESS NOTES
...Senior Purposeful Rounding    Position:Repositions Self    Physical Environment:Room free from clutter, Clear path to bathroom , Adequate lighting and No safety hazards noted    Pain Rating/ Nonverbal Pain Behaviors:denies; 0    Pain interventions Attempted: 0    Patient Toileted:Continent

## 2021-06-10 PROCEDURE — 97530 THERAPEUTIC ACTIVITIES: CPT

## 2021-06-10 PROCEDURE — 6370000000 HC RX 637 (ALT 250 FOR IP): Performed by: PSYCHIATRY & NEUROLOGY

## 2021-06-10 PROCEDURE — 97535 SELF CARE MNGMENT TRAINING: CPT

## 2021-06-10 PROCEDURE — 6370000000 HC RX 637 (ALT 250 FOR IP): Performed by: PHYSICIAN ASSISTANT

## 2021-06-10 PROCEDURE — 1240000000 HC EMOTIONAL WELLNESS R&B

## 2021-06-10 PROCEDURE — 99233 SBSQ HOSP IP/OBS HIGH 50: CPT | Performed by: PSYCHIATRY & NEUROLOGY

## 2021-06-10 RX ADMIN — LEVOTHYROXINE SODIUM 25 MCG: 25 TABLET ORAL at 05:59

## 2021-06-10 RX ADMIN — FUROSEMIDE 20 MG: 20 TABLET ORAL at 09:28

## 2021-06-10 RX ADMIN — FERROUS SULFATE TAB 325 MG (65 MG ELEMENTAL FE) 325 MG: 325 (65 FE) TAB at 09:27

## 2021-06-10 RX ADMIN — DIVALPROEX SODIUM 250 MG: 250 TABLET, DELAYED RELEASE ORAL at 21:01

## 2021-06-10 RX ADMIN — ARIPIPRAZOLE 5 MG: 5 TABLET ORAL at 09:28

## 2021-06-10 RX ADMIN — ACETAMINOPHEN 1000 MG: 500 TABLET ORAL at 15:39

## 2021-06-10 RX ADMIN — FERROUS SULFATE TAB 325 MG (65 MG ELEMENTAL FE) 325 MG: 325 (65 FE) TAB at 17:34

## 2021-06-10 RX ADMIN — ACETAMINOPHEN 1000 MG: 500 TABLET ORAL at 09:29

## 2021-06-10 RX ADMIN — ACETAMINOPHEN 1000 MG: 500 TABLET ORAL at 21:01

## 2021-06-10 RX ADMIN — DONEPEZIL HYDROCHLORIDE 5 MG: 5 TABLET, FILM COATED ORAL at 09:28

## 2021-06-10 RX ADMIN — OXYBUTYNIN CHLORIDE 10 MG: 5 TABLET, EXTENDED RELEASE ORAL at 09:28

## 2021-06-10 RX ADMIN — TRAZODONE HYDROCHLORIDE 50 MG: 50 TABLET ORAL at 21:01

## 2021-06-10 RX ADMIN — MELOXICAM 7.5 MG: 15 TABLET ORAL at 09:29

## 2021-06-10 RX ADMIN — AMLODIPINE BESYLATE 5 MG: 5 TABLET ORAL at 09:28

## 2021-06-10 RX ADMIN — DIVALPROEX SODIUM 250 MG: 250 TABLET, DELAYED RELEASE ORAL at 09:28

## 2021-06-10 RX ADMIN — METFORMIN HYDROCHLORIDE 500 MG: 500 TABLET ORAL at 09:30

## 2021-06-10 ASSESSMENT — PAIN SCALES - GENERAL
PAINLEVEL_OUTOF10: 5
PAINLEVEL_OUTOF10: 5
PAINLEVEL_OUTOF10: 4
PAINLEVEL_OUTOF10: 5

## 2021-06-10 NOTE — PROGRESS NOTES
Inpatient Occupational Therapy Treatment Note    Unit:  [facility]  Date:  6/10/2021  Patient Name:    Angie Hodgkins  Admitting diagnosis:  Mood disorder [facility]  Admit Date:  6/5/2021  Precautions/Restrictions/WB Status/ Lines/ Wounds/ Oxygen:  Standard BHI Precautions  Fall Risk  History of Present Illness:  Per Dr. Bryan Tsai H&P dated 6/6/21, Patient was discharged from our program on 5/26 after stabilization. Her elder  has struggled with her since discharge and has brought her back to the ED on a few occasions. They have not followed up with COA or senior day programming. He hasn't been able to navigated the system. More recently she has been wandering into the street and making other poor decisions. Yesterday, she took her shirt of and went outside.      Per PRAMOD Notes regarding collateral from :  Tato Guzmán says that last night he was at [facility] with pt and she asked for a martha. Tato Guzmán says that pt has a hx of alcohol abuse and that he didn't think that would be a good idea. He states that per himself and her PCP, they convinced her to stop drinking a few weeks ago. He says that since the incident at the restaurant, she has been getting increasingly agitated. He says that today she went outside, took her shirt off, and was yelling. Tato Guzmán says that she has dementia and she is declining to the point that he cannot take care of her anymore. He feels that she needs to be watched constantly or there is a chance she will hurt herself. He states that he works three days a week and is worried she will hurt herself when he is not home. He reports recent stressors being moving in to a much smaller house and their dog passing away. Tato Guzmán says that pt never sleeps more than a few hours a night and that her doctor won't prescribe her anything for sleep. He believes she is depressed. He denies that she is hallucinating and reports that she has never been aggressive towards anyone.  He reports stand:   Supervision  Stand to sit:  Supervision  Bed to Chair:  SBA  Standard toilet:   Supervision    Activity Tolerance   Pt completed therapy session with No adverse symptoms noted w/activity    Therapeutic Exercise: Not tested    Patient Education:   Role of OT    Positioning Needs: In room with needs met    Family Present:  No    Assessment: Pt tolerated MOCA assessment well today. Was cooperative throughout. Stated she is very tired and not thinking clearly today. Confirmed with nurse that pt did not sleep last night. GOALS  To be met in 3 Visits:  1). Pt. To complete interest check list.   2). Pt will participate in [facility] REHABILITATION assessment. (Goal met 6/10/21)     To be met in 5 Visits:  1). Supine to Sit IND  2). Bed to Chair/BSC Modified IND  3). Pt to apoorva UE exs [id number]  4). Pt. To complete 1 SMART long term goal and 2 SMART short term goals. 5). Pt will verbalize 3 coping skills  6). Pt. To verbalize understanding of sleep hygiene education/handouts.       Plan: cont with [facility] MS, OTR/L  #[id number]      If patient discharges from this facility prior to next visit, this note will serve as the Discharge Summary

## 2021-06-10 NOTE — FLOWSHEET NOTE
Senior Purposeful Rounding    Position:Sitting    Physical Environment:Room free from clutter, Clear path to bathroom , Adequate lighting and No safety hazards noted    Pain Rating/ Nonverbal Pain Behaviors:denies; 0    Pain interventions Attempted: Scheduled Tylenol    Patient Toileted:Continent

## 2021-06-10 NOTE — GROUP NOTE
Group Therapy Note    Date: 6/10/2021    Group Start Time: 1300  Group End Time: 0790  Group Topic: Recreational    R Kamala Barr 23, [employer]    Group Therapy Note    Attendees: 3    Patients engaged in relaxation, socialization with peers, and reminiscing as they finished watching the movie, Sound of Music together. Notes:  Pt was quiet and appeared to be falling asleep during group activity; pt left group because she felt tired and wanted to lay down.     Status After Intervention:  Unchanged    Participation Level: Minimal    Participation Quality: Appropriate      Speech:  normal      Thought Process/Content: KITTY      Affective Functioning: Constricted/Restricted      Mood: Drowsy      Level of consciousness:  Drowsy      Response to Learning: Progressing to goal      Endings: None Reported    Modes of Intervention: Support, Socialization, Exploration, Clarifying, Problem-solving and Activity      Discipline Responsible: /Counselor      Signature:  MARÍA Caruso-S, BLAIR

## 2021-06-10 NOTE — FLOWSHEET NOTE
Senior Purposeful Rounding    Position:Repositions Self    Physical Environment:Room free from clutter, Clear path to bathroom , Adequate lighting and No safety hazards noted    Pain Rating/ Nonverbal Pain Behaviors:denies; 0    Pain interventions Attempted: n/a    Patient Toileted:Continent

## 2021-06-10 NOTE — FLOWSHEET NOTE
Group Therapy Note    Date: 6/10/2021  Start Time: 1000  End Time:  1100  Number of Participants: 5    Type of Group: Music Group    Notes:  Pt present for Music Group. While present, Pt actively participated by making song selections and singing along to music. Participation Level:  Active Listener and Interactive    Participation Quality: Appropriate and Attentive      Speech:  normal      Affective Functioning: Congruent      Endings: None Reported    Modes of Intervention: Support, Socialization, Activity and Media      Discipline Responsible: Chaplain Marty Conley       06/10/21 1411   Encounter Summary   Services provided to: Patient   Continue Visiting   (6/10 Music Group)   Complexity of Encounter Moderate   Length of Encounter 1 hour

## 2021-06-10 NOTE — PROGRESS NOTES
Department of Psychiatry  Attending Progress Note    Admission Date:    6/5/2021    Chief complaint / Reason for Admission:  Behavioral disturbance    Patient's chart was reviewed, case was discussed with nursing/OT/RT staff, and collaborated with  about the treatment plan. SUBJECTIVE:   Over last 24 hours:  Behavioral outbursts: No   Non-aggressive behavioral disturbance: No  Medication compliant: Yes  Need for seclusion/restraints: No  Sleeping adequately:  Improved, 6 hours  Appetite adequate: Yes  Attending groups: Yes    Pt had a better day yesterday. Calmer and less intrusive. Medication compliant and participated well in groups. Articulated to staff that she knows the baby doll isn't real, but she likes to \"pretend\" with it. On interview pt was calm and cooperative. Less sarcastic. She continues to struggle to acknowledge her symptoms but was not argumentative about this today.     Progressing overall: improving  Suicidal ideation: Denies  Homicidal ideation: Denies  Medication side effects: No    ROS: Patient has new complaints: no    Current Medications Ordered:   ARIPiprazole  5 mg Oral Daily    divalproex  250 mg Oral 2 times per day    amLODIPine  5 mg Oral Daily    acetaminophen  1,000 mg Oral TID    donepezil  5 mg Oral Daily with breakfast    ferrous sulfate  325 mg Oral BID WC    furosemide  20 mg Oral Daily    levothyroxine  25 mcg Oral Daily    meloxicam  7.5 mg Oral Daily    metFORMIN  500 mg Oral Daily with breakfast    nicotine  1 patch Transdermal Daily    oxybutynin  10 mg Oral Daily    traZODone  50 mg Oral Nightly      PRN Meds: ondansetron, magnesium hydroxide, aluminum & magnesium hydroxide-simethicone, haloperidol **OR** haloperidol lactate, benztropine mesylate     Objective:     PE:    BP (!) 144/88   Pulse 108   Temp 98.2 °F (36.8 °C) (Temporal)   Resp 16   Ht 5' 5\" (1.651 m)   Wt 132 lb 14.4 oz (60.3 kg)   SpO2 97%   BMI 22.12 kg/m² Motor / Gait: no abnormalities noted, no involuntary movements, gait aided by walker    Mental Status Examination:    Appearance: WF, appears stated age, wearing hospital gown, improved grooming and hygiene   Behavior/Attitude toward examiner:  Cooperative  Speech:  rapid rate, nml volume, excessive amount, but not pressured today  Mood: \"So-so\"  Affect:  Elevated but less so than previously  Thought processes:  Tangential but no FAY today  Thought Content: Denies SI, denies HI, no obvious delusions, +confabulation  Perceptions: Denies AVH, not RTIS  Attention: poor  Abstraction: [place]  Cognition: Oriented to self, location only, recall Impaired  Insight: Poor insight   Judgment: Impaired judgment      LAB: Reviewed labs from last 24 hours      Dx:   Diagnosis: Unspecified dementia with behavioral disturbance  Secondary Psychiatric (DSM V) Diagnoses: None  Chemical Dependency Diagnoses: tobacco use disorder, severe    All conditions detailed above are being treated while patient is hospitalized. Tx plan: Generally: prevent self injury/aggression towards others, stabilize mood/anxiety/psychotic/behavioral disturbance, establish/maintain aftercare, increase coping mechanisms, improve medication compliance. All conditions present on admission are being treated while pt is hospitalized. Legal Status: voluntary    Primary Psychiatric Issues:   1. Dementia with behavioral disturbance:  -discontinued citalopram on admission. This was likely activating to patient.  -Continue Depakote 250 mg twice a day. -Started abilify 5 mg daily on 6/8. Continue. Chemical Dependency Issues:  NRT    Function:  -Consulted physical therapy - appreciate recs;   -Consulted occupational therapy - appreciate recs;   -Falls precautions    Medical Problems:  Internal medicine has been consulted. Appreciate recs.     Code Status: Full    Disposition:    -Housing: Own home with   -Current outpatient follow-up: PCP and neurology  -Discharge planning is incomplete    Criteria for Discharge:  Not suicidal, not homicidal, not grossly psychotic, behavioral disturbance improved, sleeping well, mood/affect stable, eating well, aftercare arranged. Total face to face time with patient was 35 minutes and more than 50 % of that time was spent counseling the patient on their symptoms, treatment and expected goals.     Junior Cheng MD  Staff Psychiatrist

## 2021-06-10 NOTE — BH NOTE
ONEAL called the pt's  and made him aware the pt will not be discharging today; however will likely discharge tomorrow.          CELINA Delatorre

## 2021-06-10 NOTE — PROGRESS NOTES
Department of Psychiatry  Attending Progress Note    Admission Date:    6/5/2021    Chief complaint / Reason for Admission:  Behavioral disturbance    Patient's chart was reviewed, case was discussed with nursing/OT/RT staff, and collaborated with  about the treatment plan. SUBJECTIVE:   Over last 24 hours:  Behavioral outbursts: No   Non-aggressive behavioral disturbance: No  Medication compliant: Yes  Need for seclusion/restraints: No  Sleeping adequately:  Improved, 6 hours  Appetite adequate: Yes  Attending groups: Yes    Pt demonstrating progress. Much less labile and intrusive. Cooperative with care and engaging appropriately in groups. SW spoke with family yesterday, son and . They are on different pages about where to go with patient. Son wants her to go to LTC,  does not. Regardless, he is also not willing to engage in a Medicaid spend down process. Both have poor insight into the need for patient to have more engagement in the community and feel that medicatins need to make her calmed down enough to tolerate being left at home alone for hours with nothing to do. Discussed with them that this is not realistic, nor fair to patient.     Progressing overall: improving  Suicidal ideation: Denies  Homicidal ideation: Denies  Medication side effects: No    ROS: Patient has new complaints: no    Current Medications Ordered:   ARIPiprazole  5 mg Oral Daily    divalproex  250 mg Oral 2 times per day    amLODIPine  5 mg Oral Daily    acetaminophen  1,000 mg Oral TID    donepezil  5 mg Oral Daily with breakfast    ferrous sulfate  325 mg Oral BID WC    furosemide  20 mg Oral Daily    levothyroxine  25 mcg Oral Daily    meloxicam  7.5 mg Oral Daily    metFORMIN  500 mg Oral Daily with breakfast    nicotine  1 patch Transdermal Daily    oxybutynin  10 mg Oral Daily    traZODone  50 mg Oral Nightly      PRN Meds: ondansetron, magnesium hydroxide, aluminum & magnesium hydroxide-simethicone, haloperidol **OR** haloperidol lactate, benztropine mesylate     Objective:     PE:    BP (!) 157/82   Pulse 74   Temp 98.6 °F (37 °C) (Temporal)   Resp 18   Ht 5' 5\" (1.651 m)   Wt 132 lb 14.4 oz (60.3 kg)   SpO2 95%   BMI 22.12 kg/m²       Motor / Gait: no abnormalities noted, no involuntary movements, gait aided by walker    Mental Status Examination:    Appearance: WF, appears stated age, wearing hospital gown, improved grooming and hygiene   Behavior/Attitude toward examiner:  Cooperative  Speech:  rapid rate, nml volume, excessive amount, but not pressured today  Mood: \"Alright\"  Affect:  Euthymic  Thought processes:  Tangential but no FAY today  Thought Content: Denies SI, denies HI, no obvious delusions, +confabulation  Perceptions: Denies AVH, not RTIS  Attention: poor  Abstraction: [place]  Cognition: Oriented to self, location only, recall Impaired  Insight: Poor insight   Judgment: Impaired judgment      LAB: Reviewed labs from last 24 hours      Dx:   Diagnosis: Unspecified dementia with behavioral disturbance  Secondary Psychiatric (DSM V) Diagnoses: None  Chemical Dependency Diagnoses: tobacco use disorder, severe    All conditions detailed above are being treated while patient is hospitalized. Tx plan: Generally: prevent self injury/aggression towards others, stabilize mood/anxiety/psychotic/behavioral disturbance, establish/maintain aftercare, increase coping mechanisms, improve medication compliance. All conditions present on admission are being treated while pt is hospitalized. Legal Status: voluntary    Primary Psychiatric Issues:   1. Dementia with behavioral disturbance:  -discontinued citalopram on admission. This was likely activating to patient.  -Continue Depakote 250 mg twice a day. Check level tomorrow AM.  -Started abilify 5 mg daily on 6/8. Continue.     Chemical Dependency Issues:  NRT    Function:  -Consulted physical therapy - appreciate recs;   -Consulted occupational therapy - appreciate recs;   -Falls precautions    Medical Problems:  Internal medicine has been consulted. Appreciate recs. Code Status: Full    Disposition:    -Housing: Own home with   -Current outpatient follow-up: PCP and neurology  -Discharge planning is incomplete    Criteria for Discharge:  Not suicidal, not homicidal, not grossly psychotic, behavioral disturbance improved, sleeping well, mood/affect stable, eating well, aftercare arranged. Total face to face time with patient was 35 minutes and more than 50 % of that time was spent counseling the patient on their symptoms, treatment and expected goals.     Fartun Shah MD  Staff Psychiatrist

## 2021-06-10 NOTE — BH NOTE
SW met with the pt's son, Lelia)&  Rubén Hernandez). SW explained to them the difference between [facility] & Medicare and equipments for SNF versus LTC placement. SW encouraged the family to apply for LTC Medicaid and explained that the it is different then healthcare Medicaid. SW encouraged   The pt's son to work on obtaining healthcare HPOA ASAP and explained how it will likely be needed in the near future. SW brainstormed with the family alternative options for the pt during the day, such as adult  options, having various friends and/or family members stay with & engage the pt during the day, and/or hire an in home caregiver. The pt's son was discouraged; however agreed they would work on it.          CELINA Delatorre

## 2021-06-11 VITALS
SYSTOLIC BLOOD PRESSURE: 150 MMHG | RESPIRATION RATE: 16 BRPM | HEIGHT: 65 IN | OXYGEN SATURATION: 96 % | BODY MASS INDEX: 22.14 KG/M2 | HEART RATE: 109 BPM | TEMPERATURE: 97.7 F | DIASTOLIC BLOOD PRESSURE: 78 MMHG | WEIGHT: 132.9 LBS

## 2021-06-11 LAB — VALPROIC ACID LEVEL: 20.2 UG/ML (ref 50–100)

## 2021-06-11 PROCEDURE — 80164 ASSAY DIPROPYLACETIC ACD TOT: CPT

## 2021-06-11 PROCEDURE — 99231 SBSQ HOSP IP/OBS SF/LOW 25: CPT | Performed by: PHYSICIAN ASSISTANT

## 2021-06-11 PROCEDURE — 36415 COLL VENOUS BLD VENIPUNCTURE: CPT

## 2021-06-11 PROCEDURE — 6370000000 HC RX 637 (ALT 250 FOR IP): Performed by: PSYCHIATRY & NEUROLOGY

## 2021-06-11 PROCEDURE — 99239 HOSP IP/OBS DSCHRG MGMT >30: CPT | Performed by: PSYCHIATRY & NEUROLOGY

## 2021-06-11 PROCEDURE — 6370000000 HC RX 637 (ALT 250 FOR IP): Performed by: PHYSICIAN ASSISTANT

## 2021-06-11 PROCEDURE — 5130000000 HC BRIDGE APPOINTMENT

## 2021-06-11 RX ORDER — DIVALPROEX SODIUM 250 MG/1
250 TABLET, DELAYED RELEASE ORAL EVERY 12 HOURS SCHEDULED
Qty: 60 TABLET | Refills: 0 | Status: ON HOLD | OUTPATIENT
Start: 2021-06-11 | End: 2022-10-11 | Stop reason: HOSPADM

## 2021-06-11 RX ORDER — AMLODIPINE BESYLATE 5 MG/1
5 TABLET ORAL DAILY
Qty: 30 TABLET | Refills: 0 | Status: ON HOLD | OUTPATIENT
Start: 2021-06-12 | End: 2022-10-11 | Stop reason: HOSPADM

## 2021-06-11 RX ORDER — ARIPIPRAZOLE 5 MG/1
5 TABLET ORAL DAILY
Qty: 30 TABLET | Refills: 0 | Status: ON HOLD | OUTPATIENT
Start: 2021-06-12 | End: 2022-10-11 | Stop reason: HOSPADM

## 2021-06-11 RX ORDER — CLONIDINE HYDROCHLORIDE 0.1 MG/1
0.1 TABLET ORAL EVERY 4 HOURS PRN
Status: DISCONTINUED | OUTPATIENT
Start: 2021-06-11 | End: 2021-06-11 | Stop reason: HOSPADM

## 2021-06-11 RX ADMIN — MELOXICAM 7.5 MG: 15 TABLET ORAL at 09:10

## 2021-06-11 RX ADMIN — FUROSEMIDE 20 MG: 20 TABLET ORAL at 09:11

## 2021-06-11 RX ADMIN — ONDANSETRON 4 MG: 4 TABLET, ORALLY DISINTEGRATING ORAL at 05:08

## 2021-06-11 RX ADMIN — OXYBUTYNIN CHLORIDE 10 MG: 5 TABLET, EXTENDED RELEASE ORAL at 09:10

## 2021-06-11 RX ADMIN — ACETAMINOPHEN 1000 MG: 500 TABLET ORAL at 09:10

## 2021-06-11 RX ADMIN — AMLODIPINE BESYLATE 5 MG: 5 TABLET ORAL at 09:11

## 2021-06-11 RX ADMIN — DIVALPROEX SODIUM 250 MG: 250 TABLET, DELAYED RELEASE ORAL at 09:10

## 2021-06-11 RX ADMIN — DONEPEZIL HYDROCHLORIDE 5 MG: 5 TABLET, FILM COATED ORAL at 09:11

## 2021-06-11 RX ADMIN — LEVOTHYROXINE SODIUM 25 MCG: 25 TABLET ORAL at 05:08

## 2021-06-11 RX ADMIN — METFORMIN HYDROCHLORIDE 500 MG: 500 TABLET ORAL at 09:11

## 2021-06-11 RX ADMIN — FERROUS SULFATE TAB 325 MG (65 MG ELEMENTAL FE) 325 MG: 325 (65 FE) TAB at 09:11

## 2021-06-11 RX ADMIN — ARIPIPRAZOLE 5 MG: 5 TABLET ORAL at 09:11

## 2021-06-11 ASSESSMENT — PAIN SCALES - GENERAL
PAINLEVEL_OUTOF10: 4
PAINLEVEL_OUTOF10: 0

## 2021-06-11 NOTE — GROUP NOTE
Group Therapy Note    Date: 6/11/2021    Group Start Time: 1540  Group End Time: 1400  Group Topic: Recreational    R Kamala Cortney 23, [facility]    Group Therapy Note    Attendees: 4    Patients were engaged in guided movement and game activities to increase socialization and elevate mood. Patients were encouraged to modify movements based on their own bodies and remained in chairs during movement. Patients also engaged in some reminiscing discussion with music throughout the group activities. Notes:  Pt was engaged in group though appeared to fall asleep at times during group. At the end of group, pt stood up and then sat back down and urinated on the chair in the group room. Therapist informed RN of this and cleaned off the chair. Status After Intervention:  Unchanged    Participation Level:  Active Listener    Participation Quality: Appropriate      Speech:  normal      Thought Process/Content: KITTY      Affective Functioning: Congruent      Mood: euthymic      Level of consciousness:  Drowsy    Response to Learning: Able to verbalize current knowledge/experience and Progressing to goal      Endings: None Reported    Modes of Intervention: Education, Support, Socialization, Exploration, Clarifying, Problem-solving, Activity and Movement      Discipline Responsible: /Counselor      Signature:  MARÍA Mullen-S, R-MARIELT

## 2021-06-11 NOTE — BH NOTE
Mita Duffy is AOx3, pleasant and cooperative. She denies SIHI, AVH and pain at interview. Affect if bright, periodically impulsive behaviors, ambulating with walker appropriately. Compliant with medication administration.

## 2021-06-11 NOTE — BH NOTE
[facility]  Discharge Note    Pt discharged with followings belongings:   Dentures: None  Vision - Corrective Lenses: None  Hearing Aid: None  Jewelry: None  Body Piercings Removed: N/A  Clothing: Shirt  Were All Patient Medications Collected?: Not Applicable  Other Valuables: None   Valuables sent home with pt. Valuables retrieved from safe, Security envelope number: n/a and returned to patient. Patient education on aftercare instructions: spouse/yes  Information faxed to PCP by nursing Patient verbalize understanding of AVS:  yes.     Status EXAM upon discharge:  Status and Exam  Normal: No  Facial Expression: Brightened  Affect: Congruent  Level of Consciousness: Alert  Mood:Normal: No  Mood: Anxious  Motor Activity:Normal: Yes  Motor Activity: Increased  Interview Behavior: Cooperative, Impulsive  Preception: [place] to Person, Kristan Putty to Time, [place] to Place  Attention:Normal: No  Attention: Distractible  Thought Processes: Flt.of Ideas  Thought Content:Normal: Yes  Thought Content: Preoccupations  Hallucinations: None (denies, no RTIS noted)  Delusions: No  Memory:Normal: No  Memory: Poor Recent, Poor Remote  Insight and Judgment: No  Insight and Judgment: Poor Judgment, Poor Insight  Present Suicidal Ideation: No (denies)  Present Homicidal Ideation: No (denies)      Metabolic Screening:    Lab Results   Component Value Date    LABA1C 6.8 06/05/2021       Lab Results   Component Value Date    CHOL 213 (H) 06/05/2021    CHOL 168 05/24/2021    CHOL 102 02/29/2020    CHOL 245 (H) 12/24/2016     Lab Results   Component Value Date    TRIG 94 06/05/2021    TRIG 106 05/24/2021    TRIG 78 02/29/2020    TRIG 60 12/24/2016     Lab Results   Component Value Date    HDL 66 (H) 06/05/2021    HDL 56 05/24/2021    HDL 36 (L) 02/29/2020    HDL 92 (H) 12/24/2016     No components found for: [facility] EVALUATION AND TREATMENT [place]  Lab Results   Component Value Date    LABVLDL 19 06/05/2021    LABVLDL 21 05/24/2021    LABVLDL 16 02/29/2020 LABVLDL 12 12/24/2016     Bridge Appointment completed: Reviewed Discharge Instructions with patient. Patient verbalizes understanding and agreement with the discharge plan using the teachback method.      Referral for Outpatient Tobacco Cessation Counseling, upon discharge (bess X if applicable and completed):    ( )  Hospital staff assisted patient to call Quit Line or faxed referral                                   during hospitalization                  ( )  Recognizing danger situations (included triggers and roadblocks), if not completed on admission                    ( )  Coping skills (new ways to manage stress, exercise, relaxation techniques, changing routine, distraction), if not completed on admission                                                           ( )  Basic information about quitting (benefits of quitting, techniques in how to quit, available resources, if not completed on admission  ( ) Referral for counseling faxed to [facility]   ( ) Patient refused referral  ( ) Patient refused counseling  ( ) Patient refused smoking cessation medication upon discharge    Vaccinations (bess X if applicable and completed):  ( ) Patient states already received influenza vaccine elsewhere  ( ) Patient received influenza vaccine during this hospitalization  (x) Patient refused influenza vaccine at this time    Aviva Tristan RN

## 2021-06-11 NOTE — PROGRESS NOTES
Progress Note    Admit Date:  6/5/2021    Subjective:  Ms. Preston Martinez complains of a headache, not the worse headache of her life. Has not yet taken anything. RN denies any medical concerns at this time. Objective:   BP (!) 150/78   Pulse 109   Temp 97.7 °F (36.5 °C) (Temporal)   Resp 16   Ht 5' 5\" (1.651 m)   Wt 132 lb 14.4 oz (60.3 kg)   SpO2 96%   BMI 22.12 kg/m²       Physical Exam:  Gen: No distress. Alert. Elderly  female seen sitting in the common area  Eyes: No sclera icterus. No conjunctival injection. ENT: Poor dentition   Neck:  Trachea midline. Resp: No accessory muscle use. No crackles. No wheezes. No rhonchi. CV: Regular rate. Regular rhythm. No murmur. No rub. No edema. GI: Non-tender. Non-distended. Normal bowel sounds. Skin: Warm and dry. No rash on exposed extremities. M/S: No cyanosis. No joint deformity. No clubbing. Neuro: Awake. No focal neurologic deficit on exam.  Cranial nerves II through XII intact. Patient is able to ambulate without difficulty.    Psych: Per psychiatry team evaluation     Scheduled Meds:   ARIPiprazole  5 mg Oral Daily    divalproex  250 mg Oral 2 times per day    amLODIPine  5 mg Oral Daily    acetaminophen  1,000 mg Oral TID    donepezil  5 mg Oral Daily with breakfast    ferrous sulfate  325 mg Oral BID WC    furosemide  20 mg Oral Daily    levothyroxine  25 mcg Oral Daily    meloxicam  7.5 mg Oral Daily    metFORMIN  500 mg Oral Daily with breakfast    nicotine  1 patch Transdermal Daily    oxybutynin  10 mg Oral Daily    traZODone  50 mg Oral Nightly     PRN Meds:  ondansetron, magnesium hydroxide, aluminum & magnesium hydroxide-simethicone, haloperidol **OR** haloperidol lactate, benztropine mesylate    CULTURES    SARS-COV-2 - Rapid PCR: Not detected    Rapid Influenza A/B: negative       RADIOLOGY  None    Assessment/Plan:    Depression   - per psychiatry team    Headache  - diffuse, not the worst headache of her life  -

## 2021-06-11 NOTE — GROUP NOTE
Group Therapy Note    Date: 6/11/2021    Group Start Time: 1100  Group End Time: 3623  Group Topic: Recreational    [address], [place]        Group Therapy Note    Mode of Intervention: Creative Art for Relaxation    Attendees: 5         Notes:   Working on art across time allotted with positive socialization throughout    Status After Intervention:  Unchanged    Participation Level: Interactive    Participation Quality: Attentive and Inappropriate      Speech:  Normal      Thought Process/Content: Logical      Affective Functioning: Congruent      Mood: Euthymic      Level of consciousness:  Alert and Attentive      Response to Learning: Capable of insight and Progressing to goal      Endings: None Reported    Modes of Intervention: Activity and Media      Discipline Responsible: Psychoeducational Specialist      Signature:  Herminio Ny MM, MT-BC

## 2021-06-11 NOTE — DISCHARGE SUMMARY
Geriatric Psychiatry Discharge Summary     Admit Date: 6/5/2021     Discharge Date: 6/11/2021       Discharge Diagnoses:  Richie Carolina with behavioral disturbance  Secondary Psychiatric (DSM V) Diagnoses: None  Chemical Dependency Diagnoses: tobacco use disorder, severe    All psychiatric conditions and active medical problems above on were treated while patient was hospitalized.      Disposition -  Home     Discharge Meds:       Medication List      START taking these medications    amLODIPine 5 MG tablet  Commonly known as: NORVASC  Take 1 tablet by mouth daily  Start taking on: June 12, 2021     ARIPiprazole 5 MG tablet  Commonly known as: ABILIFY  Take 1 tablet by mouth daily  Start taking on: June 12, 2021     divalproex 250 MG DR tablet  Commonly known as: DEPAKOTE  Take 1 tablet by mouth every 12 hours        CONTINUE taking these medications    acetaminophen 500 MG tablet  Commonly known as: TYLENOL  Take 2 tablets by mouth 3 times daily     donepezil 5 MG tablet  Commonly known as: ARICEPT  Take 1 tablet by mouth daily (with breakfast)     ferrous sulfate 325 (65 Fe) MG tablet  Commonly known as: IRON 325  Take 1 tablet by mouth 2 times daily (with meals)     furosemide 20 MG tablet  Commonly known as: LASIX  Take 1 tablet by mouth daily     levothyroxine 25 MCG tablet  Commonly known as: SYNTHROID  Take 1 tablet by mouth Daily     meloxicam 7.5 MG tablet  Commonly known as: MOBIC     metFORMIN 500 MG tablet  Commonly known as: GLUCOPHAGE     nicotine 21 MG/24HR  Commonly known as: NICODERM CQ  Place 1 patch onto the skin daily     ondansetron 4 MG disintegrating tablet  Commonly known as: Zofran ODT  Take 1 tablet by mouth every 8 hours as needed for Nausea     oxybutynin 10 MG extended release tablet  Commonly known as: DITROPAN-XL     traZODone 50 MG tablet  Commonly known as: DESYREL        STOP taking these medications    citalopram 10 MG tablet  Commonly known as: CELEXA exhibited pressured speech and a tangential, loose thought process. Both of these improved significantly. The do continue to be outstanding social issues which contribute to the difficulty her family has with her at home. Patient articulated both during her last admission in this admission that she is often isolated at home when her  leaves to go to work and has to spend hours at a time on her own, and when he is not working she claims he often wants to spend the day sleeping. She no longer drives and he is her only source of transportation. This is led to resentment on her part. We emphasize with family during the meeting the importance of the patient having social outlets particularly as she was consistently eager to engage in group therapy on the unit and would benefit from similar activities in the community. She is currently on the waitlist for day programming and we reemphasized the importance of following through with this. PE: (reviewed) and labs (see medical H&PE)  VITALS:  BP (!) 150/78   Pulse 109   Temp 97.7 °F (36.5 °C) (Temporal)   Resp 16   Ht 5' 5\" (1.651 m)   Wt 132 lb 14.4 oz (60.3 kg)   SpO2 96%   BMI 22.12 kg/m²     Motor / Gait: no abnormalities noted, no involuntary movements, gait aided by walker     Mental Status Examination:    Appearance: WF, appears stated age, wearing hospital gown, improved grooming and hygiene   Behavior/Attitude toward examiner:  Cooperative  Speech:  rapid rate, nml volume, excessive amount, but not pressured today  Mood:   \"Alright\"  Affect:  Euthymic  Thought processes:  Tangential but no FAY today  Thought Content: Denies SI, denies HI, no obvious delusions, +confabulation  Perceptions: Denies AVH, not RTIS  Attention: poor  Abstraction: Barry  Cognition: Oriented to self, location only, recall Impaired  Insight: Poor insight   Judgment: Impaired judgment      Condition on DC  Mood and affect are stable and pt is not suicidal, homicidal, or psychotic.     Follow Up:  See Discharge Instructions     Spent over 40 minutes with patient and staff on Flower Nguyen MD  Staff Psychiatrist

## 2021-06-11 NOTE — FLOWSHEET NOTE
Senior Purposeful Rounding    Position:Repositions Self    Physical Environment:Room free from clutter, Clear path to bathroom , Adequate lighting and No safety hazards noted    Pain Rating/ Nonverbal Pain Behaviors:0; asleep    Pain interventions Attempted: n/a    Patient Toileted:No- Void

## 2021-12-22 NOTE — FLOWSHEET NOTE
Patient Education        Earache: Care Instructions  Your Care Instructions     Even though infection is a common cause of ear pain, not all ear pain means an infection. If you have ear pain and don't have an infection, it could be because of a jaw problem, such as temporomandibular joint (TMJ) pain. Or it could be because of a neck problem. When ear discomfort or pain is mild or comes and goes without other symptoms, home treatment may be all you need. Follow-up care is a key part of your treatment and safety. Be sure to make and go to all appointments, and call your doctor if you are having problems. It's also a good idea to know your test results and keep a list of the medicines you take. How can you care for yourself at home? · Apply heat on the ear to ease pain. To apply heat, put a warm water bottle, a heating pad set on low, or a warm cloth on your ear. Do not go to sleep with a heating pad on your skin. · Take an over-the-counter pain medicine, such as acetaminophen (Tylenol), ibuprofen (Advil, Motrin), or naproxen (Aleve). Be safe with medicines. Read and follow all instructions on the label. · Do not take two or more pain medicines at the same time unless the doctor told you to. Many pain medicines have acetaminophen, which is Tylenol. Too much acetaminophen (Tylenol) can be harmful. · Never insert anything, such as a cotton swab or a rangel pin, into the ear. When should you call for help? Call your doctor now or seek immediate medical care if:    · You have new or worse symptoms of infection, such as:  ? Increased pain, swelling, warmth, or redness. ? Red streaks leading from the area. ? Pus draining from the area. ? A fever. Watch closely for changes in your health, and be sure to contact your doctor if:    · You have new or worse discharge coming from the ear.     · You do not get better as expected. Where can you learn more? Go to https://meme.[facility]. org and sign in [employer] Rounding    Position:Repositions Self    Physical Environment:Room free from clutter, Clear path to bathroom , Adequate lighting and No safety hazards noted    Pain Rating/ Nonverbal Pain Behaviors:denies; 0    Pain interventions Attempted: n/a    Patient Toileted:Continent to your [employer] account. Enter G873 in the [facility] box to learn more about \"Earache: Care Instructions. \"     If you do not have an account, please click on the \"Sign Up Now\" link. Current as of: September 8, 2021               Content Version: 13.1  © 9937-8136 [employer]. Care instructions adapted under license by [facility] ([facility]). If you have questions about a medical condition or this instruction, always ask your healthcare professional. [address] any warranty or liability for your use of this information.        Take Augmentin as prescribed for sinus/ear infection  If symptoms worsen or do not improve please follow-up with PCP or return to clinic

## 2022-02-25 ENCOUNTER — APPOINTMENT (OUTPATIENT)
Dept: CT IMAGING | Age: 77
End: 2022-02-25
Payer: MEDICARE

## 2022-02-25 ENCOUNTER — APPOINTMENT (OUTPATIENT)
Dept: GENERAL RADIOLOGY | Age: 77
End: 2022-02-25
Payer: MEDICARE

## 2022-02-25 ENCOUNTER — HOSPITAL ENCOUNTER (EMERGENCY)
Age: 77
Discharge: HOME | End: 2022-02-25
Attending: STUDENT IN AN ORGANIZED HEALTH CARE EDUCATION/TRAINING PROGRAM
Payer: MEDICARE

## 2022-02-25 VITALS
TEMPERATURE: 97.3 F | OXYGEN SATURATION: 98 % | WEIGHT: 135 LBS | HEART RATE: 78 BPM | BODY MASS INDEX: 22.49 KG/M2 | SYSTOLIC BLOOD PRESSURE: 106 MMHG | HEIGHT: 65 IN | DIASTOLIC BLOOD PRESSURE: 78 MMHG | RESPIRATION RATE: 18 BRPM

## 2022-02-25 DIAGNOSIS — R10.9 ABDOMINAL PAIN, UNSPECIFIED ABDOMINAL LOCATION: Primary | ICD-10-CM

## 2022-02-25 LAB
A/G RATIO: 1.5 (ref 1.1–2.2)
ALBUMIN SERPL-MCNC: 4.3 G/DL (ref 3.4–5)
ALP BLD-CCNC: 78 U/L (ref 40–129)
ALT SERPL-CCNC: 7 U/L (ref 10–40)
ANION GAP SERPL CALCULATED.3IONS-SCNC: 8 MMOL/L (ref 3–16)
AST SERPL-CCNC: 11 U/L (ref 15–37)
BASE EXCESS VENOUS: 2.4 MMOL/L (ref -3–3)
BASOPHILS ABSOLUTE: 0.1 K/UL (ref 0–0.2)
BASOPHILS RELATIVE PERCENT: 1.2 %
BILIRUB SERPL-MCNC: 0.5 MG/DL (ref 0–1)
BILIRUBIN URINE: NEGATIVE
BLOOD, URINE: NEGATIVE
BUN BLDV-MCNC: 13 MG/DL (ref 7–20)
CALCIUM SERPL-MCNC: 10.3 MG/DL (ref 8.3–10.6)
CARBOXYHEMOGLOBIN: 6.9 % (ref 0–1.5)
CHLORIDE BLD-SCNC: 102 MMOL/L (ref 99–110)
CLARITY: CLEAR
CO2: 29 MMOL/L (ref 21–32)
COLOR: YELLOW
CREAT SERPL-MCNC: 0.9 MG/DL (ref 0.6–1.2)
EOSINOPHILS ABSOLUTE: 0 K/UL (ref 0–0.6)
EOSINOPHILS RELATIVE PERCENT: 0.5 %
ETHANOL: NORMAL MG/DL (ref 0–0.08)
GFR AFRICAN AMERICAN: >60
GFR NON-AFRICAN AMERICAN: >60
GLUCOSE BLD-MCNC: 109 MG/DL (ref 70–99)
GLUCOSE URINE: NEGATIVE MG/DL
HCO3 VENOUS: 28.5 MMOL/L (ref 23–29)
HCT VFR BLD CALC: 43.4 % (ref 36–48)
HEMOGLOBIN: 15.2 G/DL (ref 12–16)
INFLUENZA A: NOT DETECTED
INFLUENZA B: NOT DETECTED
KETONES, URINE: NEGATIVE MG/DL
LACTIC ACID, SEPSIS: 1.1 MMOL/L (ref 0.4–1.9)
LEUKOCYTE ESTERASE, URINE: NEGATIVE
LIPASE: 17 U/L (ref 13–60)
LYMPHOCYTES ABSOLUTE: 1.9 K/UL (ref 1–5.1)
LYMPHOCYTES RELATIVE PERCENT: 27.3 %
MCH RBC QN AUTO: 33.6 PG (ref 26–34)
MCHC RBC AUTO-ENTMCNC: 35 G/DL (ref 31–36)
MCV RBC AUTO: 96.2 FL (ref 80–100)
METHEMOGLOBIN VENOUS: 0.3 %
MICROSCOPIC EXAMINATION: NORMAL
MONOCYTES ABSOLUTE: 0.5 K/UL (ref 0–1.3)
MONOCYTES RELATIVE PERCENT: 6.7 %
NEUTROPHILS ABSOLUTE: 4.4 K/UL (ref 1.7–7.7)
NEUTROPHILS RELATIVE PERCENT: 64.3 %
NITRITE, URINE: NEGATIVE
O2 SAT, VEN: 57 %
O2 THERAPY: ABNORMAL
PCO2, VEN: 49.1 MMHG (ref 40–50)
PDW BLD-RTO: 13.5 % (ref 12.4–15.4)
PH UA: 5 (ref 5–8)
PH VENOUS: 7.38 (ref 7.35–7.45)
PLATELET # BLD: 339 K/UL (ref 135–450)
PMV BLD AUTO: 6.9 FL (ref 5–10.5)
PO2, VEN: 30.8 MMHG (ref 25–40)
POTASSIUM REFLEX MAGNESIUM: 3.9 MMOL/L (ref 3.5–5.1)
PRO-BNP: 160 PG/ML (ref 0–449)
PROCALCITONIN: 0.02 NG/ML (ref 0–0.15)
PROTEIN UA: NEGATIVE MG/DL
RBC # BLD: 4.51 M/UL (ref 4–5.2)
SARS-COV-2 RNA, RT PCR: NOT DETECTED
SODIUM BLD-SCNC: 139 MMOL/L (ref 136–145)
SPECIFIC GRAVITY UA: 1.02 (ref 1–1.03)
TCO2 CALC VENOUS: 30 MMOL/L
TOTAL PROTEIN: 7.1 G/DL (ref 6.4–8.2)
TROPONIN: <0.01 NG/ML
URINE TYPE: NORMAL
UROBILINOGEN, URINE: 0.2 E.U./DL
WBC # BLD: 6.9 K/UL (ref 4–11)

## 2022-02-25 PROCEDURE — 84484 ASSAY OF TROPONIN QUANT: CPT

## 2022-02-25 PROCEDURE — 70450 CT HEAD/BRAIN W/O DYE: CPT

## 2022-02-25 PROCEDURE — 6370000000 HC RX 637 (ALT 250 FOR IP): Performed by: STUDENT IN AN ORGANIZED HEALTH CARE EDUCATION/TRAINING PROGRAM

## 2022-02-25 PROCEDURE — 80053 COMPREHEN METABOLIC PANEL: CPT

## 2022-02-25 PROCEDURE — 82803 BLOOD GASES ANY COMBINATION: CPT

## 2022-02-25 PROCEDURE — 74177 CT ABD & PELVIS W/CONTRAST: CPT

## 2022-02-25 PROCEDURE — 83690 ASSAY OF LIPASE: CPT

## 2022-02-25 PROCEDURE — 71045 X-RAY EXAM CHEST 1 VIEW: CPT

## 2022-02-25 PROCEDURE — 84145 PROCALCITONIN (PCT): CPT

## 2022-02-25 PROCEDURE — 6360000004 HC RX CONTRAST MEDICATION: Performed by: STUDENT IN AN ORGANIZED HEALTH CARE EDUCATION/TRAINING PROGRAM

## 2022-02-25 PROCEDURE — 36415 COLL VENOUS BLD VENIPUNCTURE: CPT

## 2022-02-25 PROCEDURE — 83880 ASSAY OF NATRIURETIC PEPTIDE: CPT

## 2022-02-25 PROCEDURE — 85025 COMPLETE CBC W/AUTO DIFF WBC: CPT

## 2022-02-25 PROCEDURE — 99284 EMERGENCY DEPT VISIT MOD MDM: CPT

## 2022-02-25 PROCEDURE — 87636 SARSCOV2 & INF A&B AMP PRB: CPT

## 2022-02-25 PROCEDURE — 83605 ASSAY OF LACTIC ACID: CPT

## 2022-02-25 PROCEDURE — 96374 THER/PROPH/DIAG INJ IV PUSH: CPT

## 2022-02-25 PROCEDURE — 87086 URINE CULTURE/COLONY COUNT: CPT

## 2022-02-25 PROCEDURE — 81003 URINALYSIS AUTO W/O SCOPE: CPT

## 2022-02-25 PROCEDURE — 93005 ELECTROCARDIOGRAM TRACING: CPT | Performed by: STUDENT IN AN ORGANIZED HEALTH CARE EDUCATION/TRAINING PROGRAM

## 2022-02-25 PROCEDURE — 82077 ASSAY SPEC XCP UR&BREATH IA: CPT

## 2022-02-25 PROCEDURE — 6360000002 HC RX W HCPCS: Performed by: STUDENT IN AN ORGANIZED HEALTH CARE EDUCATION/TRAINING PROGRAM

## 2022-02-25 RX ORDER — ONDANSETRON 2 MG/ML
4 INJECTION INTRAMUSCULAR; INTRAVENOUS EVERY 6 HOURS PRN
Status: DISCONTINUED | OUTPATIENT
Start: 2022-02-25 | End: 2022-02-25 | Stop reason: HOSPADM

## 2022-02-25 RX ORDER — ACETAMINOPHEN 325 MG/1
650 TABLET ORAL ONCE
Status: COMPLETED | OUTPATIENT
Start: 2022-02-25 | End: 2022-02-25

## 2022-02-25 RX ADMIN — IOPAMIDOL 75 ML: 755 INJECTION, SOLUTION INTRAVENOUS at 19:15

## 2022-02-25 RX ADMIN — ONDANSETRON HYDROCHLORIDE 4 MG: 2 INJECTION, SOLUTION INTRAMUSCULAR; INTRAVENOUS at 18:02

## 2022-02-25 RX ADMIN — ACETAMINOPHEN 650 MG: 325 TABLET ORAL at 18:03

## 2022-02-25 ASSESSMENT — PAIN DESCRIPTION - PAIN TYPE: TYPE: ACUTE PAIN

## 2022-02-25 ASSESSMENT — ENCOUNTER SYMPTOMS
ABDOMINAL PAIN: 1
COUGH: 0
DIARRHEA: 1
PHOTOPHOBIA: 0
RHINORRHEA: 0
BACK PAIN: 0
SORE THROAT: 0
VOMITING: 0
STRIDOR: 0
NAUSEA: 1
SHORTNESS OF BREATH: 0

## 2022-02-25 ASSESSMENT — PAIN SCALES - GENERAL
PAINLEVEL_OUTOF10: 6
PAINLEVEL_OUTOF10: 10

## 2022-02-25 ASSESSMENT — PAIN - FUNCTIONAL ASSESSMENT: PAIN_FUNCTIONAL_ASSESSMENT: 0-10

## 2022-02-25 ASSESSMENT — PAIN DESCRIPTION - LOCATION: LOCATION: HEAD

## 2022-02-25 NOTE — ED PROVIDER NOTES
[address] ED  EMERGENCY DEPARTMENT ENCOUNTER      Pt Name: Jodee Alatorre  MRN: [id number]  [place] 1945  Date of evaluation: 2/25/2022  Provider: Ben Lay, [address] [place] [address]       Chief Complaint   Patient presents with    Abdominal Pain     Patient states that she has been having abd pain, headache, and dizziness off and on for months. Denies chest pain or SOB. States she has nausea and diarrhea.  Headache         HISTORY OF PRESENT ILLNESS   (Location/Symptom, Timing/Onset, Context/Setting, Quality, Duration, Modifying Factors, Severity)  Note limiting factors. Jodee Alatorre is a 77 y.o. female who presents to the emergency department complaining of abdominal pain, headaches. These are acute on chronic complaints per patient she is here with her  and reports this is been ongoing for several months. She does not know any aggravating relieving factors. She is following up with her PCP for this. She is on pain medication at home which she has been taking. Abdominal pain is located across the lower abdomen and hips. Reports that she has had some nausea and diarrhea. Patient is also complaining of headache, occipital intermittent consistent with prior headaches. No acute change was in her Presentation no neck pain or stiffness no fevers no chills. No vision loss no focal neurologic deficits. Headache is typical for her. Nursing Notes were reviewed.     PAST MEDICAL HISTORY     Past Medical History:   Diagnosis Date    Acute systolic CHF (congestive heart failure) (HCC)     Asthma     Back ache     Cataract     Cerebral artery occlusion with cerebral infarction ([address] 12/2016    Diabetes mellitus ([address]     type !! - diet controlled    Hyperlipidemia     Hypertension     Insomnia     TIA (transient ischemic attack) 12/23/2016         SURGICAL HISTORY       Past Surgical History:   Procedure Laterality Date    ABDOMEN SURGERY      c section    CATARACT REMOVAL WITH IMPLANT Left [id number]    LEFT EYE CATARACT PHACOEMULSIFICATION INTRAOCULAR LENS     SECTION      DENTAL SURGERY      EYE SURGERY  10/29/13     RIGHT EYE CATARACT PHACOEMULSIFICATION INTRAOCULAR LENS         CURRENT MEDICATIONS       Discharge Medication List as of 2022  8:57 PM      CONTINUE these medications which have NOT CHANGED    Details   divalproex (DEPAKOTE) 250 MG DR tablet Take 1 tablet by mouth every 12 hours, Disp-60 tablet, R-0Normal      ARIPiprazole (ABILIFY) 5 MG tablet Take 1 tablet by mouth daily, Disp-30 tablet, R-0Normal      amLODIPine (NORVASC) 5 MG tablet Take 1 tablet by mouth daily, Disp-30 tablet, R-0Normal      acetaminophen (TYLENOL) 500 MG tablet Take 2 tablets by mouth 3 times daily, Disp-120 tablet, R-0OTC      donepezil (ARICEPT) 5 MG tablet Take 1 tablet by mouth daily (with breakfast), Disp-30 tablet, R-0Normal      nicotine (NICODERM CQ) 21 MG/24HR Place 1 patch onto the skin daily, Disp-30 patch, R-0Normal      meloxicam (MOBIC) 7.5 MG tablet Take 7.5 mg by mouth dailyHistorical Med      oxybutynin (DITROPAN-XL) 10 MG extended release tablet Take 10 mg by mouth dailyHistorical Med      traZODone (DESYREL) 50 MG tablet Take 50 mg by mouth nightlyHistorical Med      ondansetron (ZOFRAN ODT) 4 MG disintegrating tablet Take 1 tablet by mouth every 8 hours as needed for Nausea, Disp-20 tablet, R-0Normal      furosemide (LASIX) 20 MG tablet Take 1 tablet by mouth daily, Disp-30 tablet, R-3Print      ferrous sulfate (IRON 325) 325 (65 Fe) MG tablet Take 1 tablet by mouth 2 times daily (with meals), Disp-60 tablet, R-3Print      metFORMIN (GLUCOPHAGE) 500 MG tablet Take 500 mg by mouthHistorical Med      levothyroxine (SYNTHROID) 25 MCG tablet Take 1 tablet by mouth Daily, Disp-30 tablet, R-0NO PRINT             ALLERGIES     Mold extract [trichophyton mentagrophytes]    FAMILY HISTORY       Family History   Problem Relation Age of Onset    High Blood Pressure Mother  Miscarriages / [place] Mother     Cancer Father     Diabetes Father     Heart Disease Father     Kidney Disease Father     Asthma Sister     Cancer Sister         Breast Cancer    Depression Brother     Substance Abuse Paternal Aunt     Stroke Maternal Grandmother     Diabetes Paternal Grandmother           SOCIAL HISTORY       Social History     Socioeconomic History    Marital status:      Spouse name: Tra Ocampo Number of children: None    Years of education: None    Highest education level: None   Occupational History    None   Tobacco Use    Smoking status: Current Every Day Smoker     Packs/day: 2.00     Years: 58.00     Pack years: 116.00     Types: Cigarettes    Smokeless tobacco: Never Used    Tobacco comment: not totally ready to quit today   Vaping Use    Vaping Use: Never used   Substance and Sexual Activity    Alcohol use: Not Currently     Alcohol/week: 2.0 - 3.0 standard drinks     Types: 2 - 3 Shots of liquor per week    Drug use: No    Sexual activity: Yes     Partners: Male   Other Topics Concern    None   Social History Narrative    None     Social Determinants of Health     Financial Resource Strain:     Difficulty of Paying Living Expenses: Not on file   Food Insecurity:     Worried About Running Out of Food in the Last Year: Not on file    Mookie of Food in the Last Year: Not on file   Transportation Needs:     Lack of Transportation (Medical): Not on file    Lack of Transportation (Non-Medical):  Not on file   Physical Activity:     Days of Exercise per Week: Not on file    Minutes of Exercise per Session: Not on file   Stress:     Feeling of Stress : Not on file   Social Connections:     Frequency of Communication with Friends and Family: Not on file    Frequency of Social Gatherings with Friends and Family: Not on file    Attends Spiritism Services: Not on file    Active Member of Clubs or Organizations: Not on file    Attends Club or Organization Meetings: Not on file    Marital Status: Not on file   Intimate Partner Violence:     Fear of Current or Ex-Partner: Not on file    Emotionally Abused: Not on file    Physically Abused: Not on file    Sexually Abused: Not on file   Housing Stability:     Unable to Pay for Housing in the Last Year: Not on file    Number of [place] in the Last Year: Not on file    Unstable Housing in the Last Year: Not on file       SCREENINGS        [place] Coma Scale  Eye Opening: Spontaneous  Best Verbal Response: Oriented  Best Motor Response: Obeys commands  [place] Coma Scale Score: 15                   REVIEW OF SYSTEMS    (2-9 systems for level 4, 10 or more for level 5)   Review of Systems   Constitutional: Negative for chills and fever. HENT: Negative for congestion, rhinorrhea and sore throat. Eyes: Negative for photophobia and visual disturbance. Respiratory: Negative for cough, shortness of breath and stridor. Cardiovascular: Negative for chest pain, palpitations and leg swelling. Gastrointestinal: Positive for abdominal pain, diarrhea and nausea. Negative for vomiting. Genitourinary: Negative for decreased urine volume. Musculoskeletal: Negative for back pain, neck pain and neck stiffness. Skin: Negative for rash. Neurological: Positive for headaches. Negative for weakness and numbness. Hematological: Negative for adenopathy. Psychiatric/Behavioral: Negative for confusion. PHYSICAL EXAM    (up to 7 for level 4, 8 or more for level 5)   RECENT VITALS:     Temp: 97.3 °F (36.3 °C),  Pulse: 78, Resp: 18, BP: 106/78, SpO2: 98 %    Physical Exam  Constitutional:       General: She is not in acute distress. Appearance: She is not diaphoretic. HENT:      Head: Normocephalic and atraumatic. Eyes:      Pupils: Pupils are equal, round, and reactive to light. Neck:      Trachea: No tracheal deviation.    Cardiovascular:      Rate and Rhythm: Normal rate and regular rhythm. Pulmonary:      Effort: Pulmonary effort is normal. No respiratory distress. Abdominal:      General: There is no distension. Palpations: Abdomen is soft. Tenderness: There is abdominal tenderness. Comments: There is mild tenderness across the lower abdomen without rigidity or guarding. Musculoskeletal:         General: Normal range of motion. Cervical back: Normal range of motion and neck supple. Skin:     General: Skin is warm. Neurological:      Mental Status: She is oriented to person, place, and time. Comments: Pupils equal and reactive normal strength normal sensation. Neck supple. DIAGNOSTIC RESULTS     EKG: All EKG's are interpreted by the Emergency Department Physician who either signs or Co-signs this chart in the absence of a cardiologist.      The Ekg interpreted by me shows  normal sinus rhythm with a rate of 77  Axis is   Normal  QTc is  normal  Intervals and Durations are unremarkable. ST Segments: no acute change      RADIOLOGY:   Non-plain film images such as CT, Ultrasound and MRI are read by the radiologist. Plain radiographic images are visualized and preliminarily interpreted by the emergency physician. Interpretation per the Radiologist below, if available at the time of this note:    CT ABDOMEN PELVIS W IV CONTRAST Additional Contrast? None   Final Result   1. Mild inhomogeneity of the nephrograms, greater on the left. This may be   related to timing of the bolus. Pyelonephritis is alternatively considered. Also, there is thickening of the urinary bladder with perivesicular edema,   cystitis considered. Correlation with urinalysis recommended. 2. Mild, diffuse mural thickening of the colon. This may be physiological,   related to nondistention. Colitis is alternatively considered. CT HEAD WO CONTRAST   Final Result   No acute hemorrhage or midline shift. Other findings as described.          XR CHEST PORTABLE Final Result   Increased perihilar markings. Atelectasis and infectious or inflammatory   airway process are in the differential. No airspace disease by radiograph.                LABS:  Labs Reviewed   COMPREHENSIVE METABOLIC PANEL W/ REFLEX TO MG FOR LOW K - Abnormal; Notable for the following components:       Result Value    Glucose 109 (*)     ALT 7 (*)     AST 11 (*)     All other components within normal limits    Narrative:     Performed at:  [facility],  [place], [facility]   Phone [phone]   BLOOD GAS, VENOUS - Abnormal; Notable for the following components:    Carboxyhemoglobin 6.9 (*)     All other components within normal limits    Narrative:     Performed at:  [place] [place] [address],  [place], [facility]   Phone [phone]    Narrative:     Performed at:  [place] [address],  [place], [facility]   Phone [phone]   CULTURE, URINE   CBC WITH AUTO DIFFERENTIAL    Narrative:     Performed at:  [place] [address],  [place], [facility]   Phone [phone]   URINALYSIS    Narrative:     Performed at:  [place] [address],  [place], [facility]   Phone [phone]   LACTATE, SEPSIS    Narrative:     Performed at:  [place] [address],  [place], [facility]   Phone [phone]   LIPASE    Narrative:     Performed at:  [place] [address],  [place], [facility]   Phone [phone]   ETHANOL    Narrative:     Performed at:  [facility],  [place], [facility]   Phone [phone]   TROPONIN    Narrative:     Performed at:  [facility]) [place] [address],  [place], [place] [address]   Phone [phone]    Narrative:     Performed at:  [facility],  [place], [facility]   Phone [phone]   PROCALCITONIN    Narrative:     Performed at:  [facility] ([facility] - [facility],  [place], [facility]   Phone [phone]       All other labs were within normal range or not returned as of this dictation. EMERGENCY DEPARTMENT COURSE and DIFFERENTIAL DIAGNOSIS/MDM:   Adelita Abrams is a 77 y.o. female who presents to the emergency department with the complaint of patient arrives complaining of multiple complaints including abdominal pain nausea vomiting diarrhea, headaches. Acute on chronic complaints per patient. These are typical for her. Been ongoing for many months she is followed with her PCP. It is unclear talking with her exactly what caused her to seek evaluation today for the symptoms. There was no acute change or change in her typical presentation symptoms and just recurred. She has no chest pain. She does complain of mild shortness of breath with cough however states is also chronic for her. Does have a history of EtOH use however denies drinking today. Vitals are stable on arrival. She is somewhat disheveled appearing. Does have baseline behavioral disturbance. Not complaining of SI.  is at bedside does not voice any other specific complaint or additions to patient's presentation story. Due to age risk factors and multiple complaints I do feel labs and imaging are warranted for this patient. Will treat symptomatically with Zofran, Tylenol. Patient's laboratory and imaging work-up does not elicit any findings to explain her acute on chronic headaches, abdominal pain. CT imaging raises a concern for possible urinary infection however this is not evident on the patient's UA and likely artifactual and post contrast timing.     Patient was reevaluated prior disposition, she is requesting to be discharged home at this time. Discharged in the accompaniment of family member        CRITICAL CARE TIME   Total Critical Care time was 0 minutes, excluding separately reportable procedures. There was a high probability of clinically significant/life threatening deterioration in the patient's condition which required my urgent intervention. Clinical concern   Intervention     CONSULTS:  None    PROCEDURES:  Unless otherwise noted below, none     Procedures        FINAL IMPRESSION      1. Abdominal pain, unspecified abdominal location          DISPOSITION/PLAN   DISPOSITION  dc      PATIENT REFERRED TO:  [facility] [place] [facility]  [address] [facility]  [phone]    If symptoms worsen    Ambar Bridges MD  [address] [facility] [address]  [phone]    Schedule an appointment as soon as possible for a visit in 2 days        DISCHARGE MEDICATIONS:  Discharge Medication List as of 2/25/2022  8:57 PM        Controlled Substances Monitoring:     No flowsheet data found.     (Please note that portions of this note were completed with a voice recognition program.  Efforts were made to edit the dictations but occasionally words are mis-transcribed.)    El Smallwood DO (electronically signed)  Attending Emergency Physician            El Smallwood DO  02/25/22 8131

## 2022-02-26 LAB
EKG ATRIAL RATE: 77 BPM
EKG DIAGNOSIS: NORMAL
EKG P AXIS: 65 DEGREES
EKG P-R INTERVAL: 186 MS
EKG Q-T INTERVAL: 388 MS
EKG QRS DURATION: 84 MS
EKG QTC CALCULATION (BAZETT): 439 MS
EKG R AXIS: -6 DEGREES
EKG T AXIS: 33 DEGREES
EKG VENTRICULAR RATE: 77 BPM

## 2022-02-26 PROCEDURE — 93010 ELECTROCARDIOGRAM REPORT: CPT | Performed by: INTERNAL MEDICINE

## 2022-02-26 NOTE — ED NOTES
Pt is able to lift her self up off the bed, use her legs to push herself up higher in the bed, pt was able to take off her pants and depend, but still states \"I can't put these pants on, I can't do anything. \" RN is attempting to promote independency, but pt seems reluctant to want to try and take care of herself. RN straight cathed pt and pt's pepper hygiene is not well maintained, RN educated pt on importance of keeping clean and encouraged to clean herself more promoting independence if she is able to remove her clothing and lift herself off the bed, she can also clean herself in that area.       Suellen Feliciano RN  02/25/22 1950

## 2022-02-27 LAB — URINE CULTURE, ROUTINE: NORMAL

## 2022-06-21 ENCOUNTER — HOSPITAL ENCOUNTER (EMERGENCY)
Age: 77
Discharge: HOME | End: 2022-06-22
Attending: STUDENT IN AN ORGANIZED HEALTH CARE EDUCATION/TRAINING PROGRAM
Payer: MEDICARE

## 2022-06-21 ENCOUNTER — APPOINTMENT (OUTPATIENT)
Dept: CT IMAGING | Age: 77
End: 2022-06-21
Payer: MEDICARE

## 2022-06-21 ENCOUNTER — APPOINTMENT (OUTPATIENT)
Dept: GENERAL RADIOLOGY | Age: 77
End: 2022-06-21
Payer: MEDICARE

## 2022-06-21 DIAGNOSIS — E83.42 HYPOMAGNESEMIA: ICD-10-CM

## 2022-06-21 DIAGNOSIS — R11.2 NAUSEA AND VOMITING, INTRACTABILITY OF VOMITING NOT SPECIFIED, UNSPECIFIED VOMITING TYPE: Primary | ICD-10-CM

## 2022-06-21 LAB
A/G RATIO: 1.7 (ref 1.1–2.2)
ALBUMIN SERPL-MCNC: 4 G/DL (ref 3.4–5)
ALP BLD-CCNC: 67 U/L (ref 40–129)
ALT SERPL-CCNC: 7 U/L (ref 10–40)
ANION GAP SERPL CALCULATED.3IONS-SCNC: 16 MMOL/L (ref 3–16)
AST SERPL-CCNC: 12 U/L (ref 15–37)
BASOPHILS ABSOLUTE: 0.1 K/UL (ref 0–0.2)
BASOPHILS RELATIVE PERCENT: 1 %
BILIRUB SERPL-MCNC: 0.7 MG/DL (ref 0–1)
BUN BLDV-MCNC: 10 MG/DL (ref 7–20)
CALCIUM SERPL-MCNC: 9 MG/DL (ref 8.3–10.6)
CHLORIDE BLD-SCNC: 99 MMOL/L (ref 99–110)
CO2: 23 MMOL/L (ref 21–32)
CREAT SERPL-MCNC: 0.7 MG/DL (ref 0.6–1.2)
EOSINOPHILS ABSOLUTE: 0 K/UL (ref 0–0.6)
EOSINOPHILS RELATIVE PERCENT: 0.6 %
ETHANOL: NORMAL MG/DL (ref 0–0.08)
GFR AFRICAN AMERICAN: >60
GFR NON-AFRICAN AMERICAN: >60
GLUCOSE BLD-MCNC: 119 MG/DL (ref 70–99)
HCT VFR BLD CALC: 39.1 % (ref 36–48)
HEMOGLOBIN: 13.9 G/DL (ref 12–16)
LYMPHOCYTES ABSOLUTE: 1.8 K/UL (ref 1–5.1)
LYMPHOCYTES RELATIVE PERCENT: 24.4 %
MAGNESIUM: 0.6 MG/DL (ref 1.8–2.4)
MCH RBC QN AUTO: 35 PG (ref 26–34)
MCHC RBC AUTO-ENTMCNC: 35.6 G/DL (ref 31–36)
MCV RBC AUTO: 98.1 FL (ref 80–100)
MONOCYTES ABSOLUTE: 0.6 K/UL (ref 0–1.3)
MONOCYTES RELATIVE PERCENT: 8.4 %
NEUTROPHILS ABSOLUTE: 4.9 K/UL (ref 1.7–7.7)
NEUTROPHILS RELATIVE PERCENT: 65.6 %
PDW BLD-RTO: 13.5 % (ref 12.4–15.4)
PLATELET # BLD: 325 K/UL (ref 135–450)
PMV BLD AUTO: 7.5 FL (ref 5–10.5)
POTASSIUM REFLEX MAGNESIUM: 3.4 MMOL/L (ref 3.5–5.1)
RBC # BLD: 3.98 M/UL (ref 4–5.2)
SODIUM BLD-SCNC: 138 MMOL/L (ref 136–145)
TOTAL PROTEIN: 6.4 G/DL (ref 6.4–8.2)
TROPONIN: <0.01 NG/ML
URINE TYPE: NORMAL
WBC # BLD: 7.4 K/UL (ref 4–11)

## 2022-06-21 PROCEDURE — 36415 COLL VENOUS BLD VENIPUNCTURE: CPT

## 2022-06-21 PROCEDURE — 85025 COMPLETE CBC W/AUTO DIFF WBC: CPT

## 2022-06-21 PROCEDURE — 84484 ASSAY OF TROPONIN QUANT: CPT

## 2022-06-21 PROCEDURE — 71046 X-RAY EXAM CHEST 2 VIEWS: CPT

## 2022-06-21 PROCEDURE — 6370000000 HC RX 637 (ALT 250 FOR IP): Performed by: NURSE PRACTITIONER

## 2022-06-21 PROCEDURE — 2580000003 HC RX 258: Performed by: NURSE PRACTITIONER

## 2022-06-21 PROCEDURE — 99285 EMERGENCY DEPT VISIT HI MDM: CPT

## 2022-06-21 PROCEDURE — 96366 THER/PROPH/DIAG IV INF ADDON: CPT

## 2022-06-21 PROCEDURE — 83735 ASSAY OF MAGNESIUM: CPT

## 2022-06-21 PROCEDURE — 81003 URINALYSIS AUTO W/O SCOPE: CPT

## 2022-06-21 PROCEDURE — 82077 ASSAY SPEC XCP UR&BREATH IA: CPT

## 2022-06-21 PROCEDURE — 87636 SARSCOV2 & INF A&B AMP PRB: CPT

## 2022-06-21 PROCEDURE — 6360000002 HC RX W HCPCS: Performed by: NURSE PRACTITIONER

## 2022-06-21 PROCEDURE — 74177 CT ABD & PELVIS W/CONTRAST: CPT

## 2022-06-21 PROCEDURE — 96375 TX/PRO/DX INJ NEW DRUG ADDON: CPT

## 2022-06-21 PROCEDURE — 96365 THER/PROPH/DIAG IV INF INIT: CPT

## 2022-06-21 PROCEDURE — 93005 ELECTROCARDIOGRAM TRACING: CPT | Performed by: NURSE PRACTITIONER

## 2022-06-21 PROCEDURE — 6360000004 HC RX CONTRAST MEDICATION: Performed by: NURSE PRACTITIONER

## 2022-06-21 PROCEDURE — 80053 COMPREHEN METABOLIC PANEL: CPT

## 2022-06-21 RX ORDER — MAGNESIUM SULFATE IN WATER 40 MG/ML
2000 INJECTION, SOLUTION INTRAVENOUS ONCE
Status: COMPLETED | OUTPATIENT
Start: 2022-06-21 | End: 2022-06-22

## 2022-06-21 RX ORDER — ONDANSETRON 2 MG/ML
4 INJECTION INTRAMUSCULAR; INTRAVENOUS ONCE
Status: COMPLETED | OUTPATIENT
Start: 2022-06-21 | End: 2022-06-21

## 2022-06-21 RX ORDER — POTASSIUM CHLORIDE 20 MEQ/1
20 TABLET, EXTENDED RELEASE ORAL ONCE
Status: COMPLETED | OUTPATIENT
Start: 2022-06-21 | End: 2022-06-21

## 2022-06-21 RX ORDER — 0.9 % SODIUM CHLORIDE 0.9 %
1000 INTRAVENOUS SOLUTION INTRAVENOUS ONCE
Status: COMPLETED | OUTPATIENT
Start: 2022-06-21 | End: 2022-06-21

## 2022-06-21 RX ADMIN — MAGNESIUM SULFATE HEPTAHYDRATE 2000 MG: 40 INJECTION, SOLUTION INTRAVENOUS at 22:17

## 2022-06-21 RX ADMIN — POTASSIUM CHLORIDE 20 MEQ: 1500 TABLET, EXTENDED RELEASE ORAL at 22:18

## 2022-06-21 RX ADMIN — SODIUM CHLORIDE 1000 ML: 9 INJECTION, SOLUTION INTRAVENOUS at 21:04

## 2022-06-21 RX ADMIN — ONDANSETRON HYDROCHLORIDE 4 MG: 2 INJECTION, SOLUTION INTRAMUSCULAR; INTRAVENOUS at 21:02

## 2022-06-21 RX ADMIN — IOPAMIDOL 75 ML: 755 INJECTION, SOLUTION INTRAVENOUS at 22:01

## 2022-06-21 ASSESSMENT — PAIN - FUNCTIONAL ASSESSMENT: PAIN_FUNCTIONAL_ASSESSMENT: NONE - DENIES PAIN

## 2022-06-21 ASSESSMENT — ENCOUNTER SYMPTOMS
NAUSEA: 1
RHINORRHEA: 0
DIARRHEA: 0
FACIAL SWELLING: 0
COLOR CHANGE: 0
VOMITING: 0
ABDOMINAL PAIN: 0
SHORTNESS OF BREATH: 0
SORE THROAT: 0

## 2022-06-22 VITALS
WEIGHT: 130 LBS | TEMPERATURE: 98.5 F | HEART RATE: 77 BPM | SYSTOLIC BLOOD PRESSURE: 126 MMHG | BODY MASS INDEX: 21.66 KG/M2 | RESPIRATION RATE: 16 BRPM | OXYGEN SATURATION: 96 % | DIASTOLIC BLOOD PRESSURE: 84 MMHG | HEIGHT: 65 IN

## 2022-06-22 LAB
EKG ATRIAL RATE: 75 BPM
EKG DIAGNOSIS: NORMAL
EKG P AXIS: -7 DEGREES
EKG P-R INTERVAL: 182 MS
EKG Q-T INTERVAL: 402 MS
EKG QRS DURATION: 80 MS
EKG QTC CALCULATION (BAZETT): 448 MS
EKG R AXIS: 24 DEGREES
EKG T AXIS: 22 DEGREES
EKG VENTRICULAR RATE: 75 BPM
INFLUENZA A: NOT DETECTED
INFLUENZA B: NOT DETECTED
MAGNESIUM: 2 MG/DL (ref 1.8–2.4)
SARS-COV-2 RNA, RT PCR: NOT DETECTED

## 2022-06-22 PROCEDURE — 83735 ASSAY OF MAGNESIUM: CPT

## 2022-06-22 PROCEDURE — 6360000002 HC RX W HCPCS: Performed by: INTERNAL MEDICINE

## 2022-06-22 PROCEDURE — 99285 EMERGENCY DEPT VISIT HI MDM: CPT

## 2022-06-22 PROCEDURE — 36415 COLL VENOUS BLD VENIPUNCTURE: CPT

## 2022-06-22 PROCEDURE — 93010 ELECTROCARDIOGRAM REPORT: CPT | Performed by: INTERNAL MEDICINE

## 2022-06-22 RX ADMIN — MAGNESIUM SULFATE HEPTAHYDRATE 2000 MG: 40 INJECTION, SOLUTION INTRAVENOUS at 00:23

## 2022-06-22 ASSESSMENT — PAIN - FUNCTIONAL ASSESSMENT: PAIN_FUNCTIONAL_ASSESSMENT: NONE - DENIES PAIN

## 2022-06-22 NOTE — ED NOTES
[address] [facility] sent to Dr. Ann Marroquin for hospitalist consult     Vinayak Zamarripa  06/21/22 [id number]  Hospitalist consult completed by Dr. Isaias Balderas  06/21/22 1755

## 2022-06-22 NOTE — ED PROVIDER NOTES
I independently performed a history and physical on Rosa Sieving. All diagnostic, treatment, and disposition decisions were made by myself in conjunction with the advanced practice provider/resident. Labs Reviewed   CBC WITH AUTO DIFFERENTIAL - Abnormal; Notable for the following components:       Result Value    RBC 3.98 (*)     MCH 35.0 (*)     All other components within normal limits   COMPREHENSIVE METABOLIC PANEL W/ REFLEX TO MG FOR LOW K - Abnormal; Notable for the following components:    Potassium reflex Magnesium 3.4 (*)     Glucose 119 (*)     ALT 7 (*)     AST 12 (*)     All other components within normal limits   MAGNESIUM - Abnormal; Notable for the following components:    Magnesium 0.60 (*)     All other components within normal limits    Narrative:     [place] [facility] tel. [id number],  results sent, waiting for response on hold, never came, 06/21/2022 21:07, by  [facility]   TROPONIN   ETHANOL   URINALYSIS WITH REFLEX TO CULTURE     CT ABDOMEN PELVIS W IV CONTRAST Additional Contrast? None   Final Result   1. No acute abnormality identified. 2.  Diverticulosis. 3.  Moderate stool burden throughout the colon and rectum. XR CHEST (2 VW)   Final Result   No acute cardiopulmonary process           The Ekg interpreted by me shows  normal sinus rhythm with a rate of 75, sinus arrhythmia  Axis is   Normal  QTc is  normal  Intervals and Durations are unremarkable. ST Segments: nonspecific changes  No significant change from prior EKG dated 2/25/2022    For further details of Eden Montiel emergency department encounter, please see the EMELYN/resident's documentation. I personally saw the patient and performed a substantive portion of the visit including all aspects of the medical decision making. Briefly, this is a 60-year-old female complaining of nausea. No abdominal pain. This is an ongoing for the past few months. Unclear why exactly she decided to come in today.   Her abdominal exam is benign. Lab work-up remarkable only for hypomagnesemia. Was given repletion here in the department and repeat magnesium level is found to be 2. Feel at this time the patient is appropriate for discharge home. She is given return precautions for the ED and is advised to follow-up with her PCP. I personally saw the patient and independently provided 0 minutes of non-concurrent critical care out of the total shared critical care time provided. 1. Nausea and vomiting, intractability of vomiting not specified, unspecified vomiting type    2. Hypomagnesemia      Comment: Please note this report has been produced using speech recognition software and may contain errors related to that system including errors in grammar, punctuation, and spelling, as well as words and phrases that may be inappropriate. If there are any questions or concerns please feel free to contact the dictating provider for clarification.        Laura Uribe MD  06/22/22 9325

## 2022-06-22 NOTE — ED PROVIDER NOTES
Evaluated by [address] [facility] Provider          [facility] ED  EMERGENCY DEPARTMENT ENCOUNTER        Pt Name: Willis Dunham  MRN: [id number]  [place] [birth year]  Dateof evaluation: 6/21/2022  Provider: LEONA Carlton - CNP  PCP: Jefferson Watson MD  ED Attending: No att. providers found    [address] [place] [address]       Chief Complaint   Patient presents with    Nausea     pt reports nausea for the last couple months; intermittent abdominal pain only with nausea. HISTORY OF PRESENTILLNESS   (Location/Symptom, Timing/Onset, Context/Setting, Quality, Duration, Modifying Factors, Severity)  Note limiting factors. Willis Dunham is a 69 y.o. female for nausea. Onset was months. Context includes patient reports that she has had mild nausea for the past few months. Patient states that her abdomen is uncomfortable but is not very forthcoming with much information. Patient continues to look at her  to answer every question. Patient denies any chest pain or shortness of breath at this time. Alleviating factors include nothing. Aggravating factors include nothing. Pain is 0/10. Nothing has been used for pain today. Nursing Notes were all reviewed and agreed with or any disagreements were addressed  in the HPI. REVIEW OF SYSTEMS    (2-9 systems for level 4, 10 or more for level 5)     Review of Systems   Constitutional: Negative for activity change, appetite change and fever. HENT: Negative for congestion, facial swelling, rhinorrhea and sore throat. Eyes: Negative for visual disturbance. Respiratory: Negative for shortness of breath. Cardiovascular: Negative for chest pain. Gastrointestinal: Positive for nausea. Negative for abdominal pain, diarrhea and vomiting. Genitourinary: Negative for difficulty urinating. Musculoskeletal: Negative for arthralgias and myalgias. Skin: Negative for color change and rash.    Neurological: Negative for dizziness and light-headedness. Psychiatric/Behavioral: Negative for agitation. All other systems reviewed and are negative. Positives and Pertinent negatives as per HPI. Except as noted above in the ROS, all other systems were reviewed and negative.        PAST MEDICAL HISTORY     Past Medical History:   Diagnosis Date    Acute systolic CHF (congestive heart failure) (HCC)     Asthma     Back ache     Cataract     Cerebral artery occlusion with cerebral infarction ([facility] 2016    Diabetes mellitus ([facility]     type !! - diet controlled    Hyperlipidemia     Hypertension     Insomnia     TIA (transient ischemic attack) 2016         SURGICAL HISTORY       Past Surgical History:   Procedure Laterality Date    ABDOMEN SURGERY      c section    CATARACT REMOVAL WITH IMPLANT Left [id number]    LEFT EYE CATARACT PHACOEMULSIFICATION INTRAOCULAR LENS     SECTION      DENTAL SURGERY      EYE SURGERY  10/29/13     RIGHT EYE CATARACT PHACOEMULSIFICATION INTRAOCULAR LENS         CURRENT MEDICATIONS       Discharge Medication List as of 2022  9:38 AM      CONTINUE these medications which have NOT CHANGED    Details   divalproex (DEPAKOTE) 250 MG DR tablet Take 1 tablet by mouth every 12 hours, Disp-60 tablet, R-0Normal      ARIPiprazole (ABILIFY) 5 MG tablet Take 1 tablet by mouth daily, Disp-30 tablet, R-0Normal      amLODIPine (NORVASC) 5 MG tablet Take 1 tablet by mouth daily, Disp-30 tablet, R-0Normal      acetaminophen (TYLENOL) 500 MG tablet Take 2 tablets by mouth 3 times daily, Disp-120 tablet, R-0OTC      donepezil (ARICEPT) 5 MG tablet Take 1 tablet by mouth daily (with breakfast), Disp-30 tablet, R-0Normal      nicotine (NICODERM CQ) 21 MG/24HR Place 1 patch onto the skin daily, Disp-30 patch, R-0Normal      meloxicam (MOBIC) 7.5 MG tablet Take 7.5 mg by mouth dailyHistorical Med      oxybutynin (DITROPAN-XL) 10 MG extended release tablet Take 10 mg by mouth dailyHistorical Med      traZODone (DESYREL) 50 MG tablet Take 50 mg by mouth nightlyHistorical Med      ondansetron (ZOFRAN ODT) 4 MG disintegrating tablet Take 1 tablet by mouth every 8 hours as needed for Nausea, Disp-20 tablet, R-0Normal      furosemide (LASIX) 20 MG tablet Take 1 tablet by mouth daily, Disp-30 tablet, R-3Print      ferrous sulfate (IRON 325) 325 (65 Fe) MG tablet Take 1 tablet by mouth 2 times daily (with meals), Disp-60 tablet, R-3Print      metFORMIN (GLUCOPHAGE) 500 MG tablet Take 500 mg by mouthHistorical Med      levothyroxine (SYNTHROID) 25 MCG tablet Take 1 tablet by mouth Daily, Disp-30 tablet, R-0NO PRINT               ALLERGIES     Mold extract [trichophyton mentagrophytes]    FAMILY HISTORY       Family History   Problem Relation Age of Onset    High Blood Pressure Mother     Miscarriages / [place] Mother     Cancer Father     Diabetes Father     Heart Disease Father     Kidney Disease Father     Asthma Sister     Cancer Sister         Breast Cancer    Depression Brother     Substance Abuse Paternal Aunt     Stroke Maternal Grandmother     Diabetes Paternal Grandmother           SOCIAL HISTORY       Social History     Socioeconomic History    Marital status:      Spouse name: carolyne    Number of children: None    Years of education: None    Highest education level: None   Occupational History    None   Tobacco Use    Smoking status: Current Every Day Smoker     Packs/day: 2.00     Years: 58.00     Pack years: 116.00     Types: Cigarettes    Smokeless tobacco: Never Used    Tobacco comment: not totally ready to quit today   Vaping Use    Vaping Use: Never used   Substance and Sexual Activity    Alcohol use:  Yes     Alcohol/week: 2.0 - 3.0 standard drinks     Types: 2 - 3 Shots of liquor per week    Drug use: No    Sexual activity: Yes     Partners: Male   Other Topics Concern    None   Social History Narrative    None     Social Determinants of Health     Financial Resource Strain:  Difficulty of Paying Living Expenses: Not on file   Food Insecurity:     Worried About Running Out of Food in the Last Year: Not on file    Ran Out of Food in the Last Year: Not on file   Transportation Needs:     Lack of Transportation (Medical): Not on file    Lack of Transportation (Non-Medical): Not on file   Physical Activity:     Days of Exercise per Week: Not on file    Minutes of Exercise per Session: Not on file   Stress:     Feeling of Stress : Not on file   Social Connections:     Frequency of Communication with Friends and Family: Not on file    Frequency of Social Gatherings with Friends and Family: Not on file    Attends Yazidi Services: Not on file    Active Member of [address] [place] [address] [employer] or Organizations: Not on file    Attends Club or Organization Meetings: Not on file    Marital Status: Not on file   Intimate Partner Violence:     Fear of Current or Ex-Partner: Not on file    Emotionally Abused: Not on file    Physically Abused: Not on file    Sexually Abused: Not on file   Housing Stability:     Unable to Pay for Housing in the Last Year: Not on file    Number of [place] in the Last Year: Not on file    Unstable Housing in the Last Year: Not on file       SCREENINGS    Brenda Coma Scale  Eye Opening: Spontaneous  Best Verbal Response: Oriented  Best Motor Response: Obeys commands  Brenda Coma Scale Score: 15        PHYSICAL EXAM  (up to 7 for level 4, 8 or more for level 5)     ED Triage Vitals [06/21/22 1929]   BP Temp Temp Source Heart Rate Resp SpO2 Height Weight   (!) 131/94 98.5 °F (36.9 °C) Oral 89 14 96 % 5' 5\" (1.651 m) 130 lb (59 kg)       Physical Exam  Constitutional:       Appearance: She is well-developed. HENT:      Head: Normocephalic and atraumatic. Cardiovascular:      Rate and Rhythm: Normal rate. Pulmonary:      Effort: Pulmonary effort is normal. No respiratory distress. Abdominal:      General: There is no distension.       Palpations: Abdomen is soft.      Tenderness: There is no abdominal tenderness. Musculoskeletal:         General: Normal range of motion. Cervical back: Normal range of motion. Skin:     General: Skin is warm and dry. Neurological:      Mental Status: She is alert and oriented to person, place, and time. DIAGNOSTIC RESULTS   LABS:    Labs Reviewed   CBC WITH AUTO DIFFERENTIAL - Abnormal; Notable for the following components:       Result Value    RBC 3.98 (*)     MCH 35.0 (*)     All other components within normal limits   COMPREHENSIVE METABOLIC PANEL W/ REFLEX TO MG FOR LOW K - Abnormal; Notable for the following components:    Potassium reflex Magnesium 3.4 (*)     Glucose 119 (*)     ALT 7 (*)     AST 12 (*)     All other components within normal limits   MAGNESIUM - Abnormal; Notable for the following components:    Magnesium 0.60 (*)     All other components within normal limits    Narrative:     Justyn Emmanuel CASTILLO tel. [phone],  results sent, waiting for response on hold, never came, 06/21/2022 21:07, by  [facility]   COVID-19 & INFLUENZA COMBO   TROPONIN   ETHANOL   URINALYSIS WITH REFLEX TO CULTURE   MAGNESIUM       All other labs werewithin normal range or not returned as of this dictation. EKG: All EKG's are interpreted by the Emergency Department Physician who either signs or Co-signs this chart in the absence of a cardiologist.  Please see their note for interpretation of EKG. RADIOLOGY:     Chest x-ray interpreted by radiologist for  Impression:    No acute cardiopulmonary process         Abdominal CT with IV contrast interpreted by radiologist for  Impression:    1.  No acute abnormality identified. 2.  Diverticulosis. 3.  Moderate stool burden throughout the colon and rectum. Interpretation per the Radiologist below, if available at the time of this note:    CT ABDOMEN PELVIS W IV CONTRAST Additional Contrast? None   Final Result   1. No acute abnormality identified.       2. Diverticulosis. 3.  Moderate stool burden throughout the colon and rectum. XR CHEST (2 VW)   Final Result   No acute cardiopulmonary process           No results found. PROCEDURES   Unless otherwise noted below, none     Procedures     CRITICAL CARE TIME   N/A    CONSULTS:  IP CONSULT TO HOSPITALIST      EMERGENCYDEPARTMENT COURSE and DIFFERENTIAL DIAGNOSIS/MDM:   Vitals:    Vitals:    06/22/22 0202 06/22/22 0517 06/22/22 0936 06/22/22 1002   BP: (!) 167/99 (!) 187/92 126/84    Pulse:  76 77    Resp:  16 16    Temp:       TempSrc:       SpO2:  94% 96% 96%   Weight:       Height:           Patient was given the following medications:  Medications   ondansetron (ZOFRAN) injection 4 mg (4 mg IntraVENous Given 6/21/22 2102)   0.9 % sodium chloride bolus (0 mLs IntraVENous Stopped 6/21/22 2244)   magnesium sulfate 2000 mg in 50 mL IVPB premix (0 mg IntraVENous Stopped 6/22/22 0022)   potassium chloride (KLOR-CON M) extended release tablet 20 mEq (20 mEq Oral Given 6/21/22 2218)   iopamidol (ISOVUE-370) 76 % injection 75 mL (75 mLs IntraVENous Given 6/21/22 2201)   magnesium sulfate 2000 mg in 50 mL IVPB premix (0 mg IntraVENous Stopped 6/22/22 0316)     Patient was seen evaluated by myself and Dr Gabino Knutson. Patient here for complaints of nausea. Patient reports that she has had nausea for the last few months. When asked why she came in today as opposed to another days she states that she just needs to talk to someone and wants to feel better. Patient denies any suicidal or homicidal ideations. She does report drinking alcohol earlier today. She denies any pain at this time. On exam she is awake and alert hemodynamically stable nontoxic in appearance. Labs have been reviewed and interpreted. Patient was given IV fluids and Zofran. Patient's magnesium was found to be 0.6.   Consult was placed to the hospitalist for admission however Dr. Leon Muñoz refused admission stating that she should be provided with magnesium replacements in the ED. Orders were placed for magnesium infusions. Patient will be reevaluated and Dr Gabino Knutson to follow-up on final disposition of the patient. The patient tolerated their visit well. I have evaluated this patient. My supervising physician was available for consultation. The patient and / or the family were informed of the results of any tests, a time was given to answer questions, a plan was proposed and they agreed with plan. FINAL IMPRESSION      1. Nausea and vomiting, intractability of vomiting not specified, unspecified vomiting type    2.  Hypomagnesemia          DISPOSITION/PLAN   DISPOSITION        PATIENT REFERRED TO:  Jalen Hernandes MD  [address] [facility]  [phone]    Schedule an appointment as soon as possible for a visit       [facility]  [address] [facility]  Go to   If symptoms worsen      DISCHARGE MEDICATIONS:  Discharge Medication List as of 6/22/2022  9:38 AM          DISCONTINUED MEDICATIONS:  Discharge Medication List as of 6/22/2022  9:38 AM                 (Please note that portions of this note were completed with a voice recognition program.  Efforts were made to edit the dictations but occasionally words are mis-transcribed.)    LEONA Georges CNP (electronically signed)         LEONA Georges CNP  06/23/22 0019

## 2022-06-22 NOTE — ED NOTES
Referral by Dat Torre RN    I met with Erika Kelley and her  at bedside. Erika Kelley endorses liking to drink and she \"wishes she could drink more\". Erika Kelley discussed feeling lonely. She feels like no one wants to talk to her. She states that her and her busband have been  for 50+ years and he wants to sleep, but she wants to talk. I discussed [facility] options including Adult Day Care. She states that she would be very interested in adult day care. Erika Kelley thinks that would be a great idea to help her meet new people and get to talk to people who want to listen. She plans to call [employer] to plan Adult Day Care and transportation following discharge. Patient has my contact info and knows to follow up if needed.       Brynn Dorman  06/21/22 2056

## 2022-07-04 ENCOUNTER — HOSPITAL ENCOUNTER (EMERGENCY)
Age: 77
Discharge: HOME | End: 2022-07-04
Attending: STUDENT IN AN ORGANIZED HEALTH CARE EDUCATION/TRAINING PROGRAM
Payer: MEDICARE

## 2022-07-04 ENCOUNTER — APPOINTMENT (OUTPATIENT)
Dept: CT IMAGING | Age: 77
End: 2022-07-04
Payer: MEDICARE

## 2022-07-04 VITALS
OXYGEN SATURATION: 96 % | SYSTOLIC BLOOD PRESSURE: 147 MMHG | DIASTOLIC BLOOD PRESSURE: 85 MMHG | HEIGHT: 65 IN | RESPIRATION RATE: 16 BRPM | WEIGHT: 132 LBS | HEART RATE: 90 BPM | TEMPERATURE: 98.2 F | BODY MASS INDEX: 21.99 KG/M2

## 2022-07-04 DIAGNOSIS — E83.42 HYPOMAGNESEMIA: ICD-10-CM

## 2022-07-04 DIAGNOSIS — R51.9 NONINTRACTABLE HEADACHE, UNSPECIFIED CHRONICITY PATTERN, UNSPECIFIED HEADACHE TYPE: ICD-10-CM

## 2022-07-04 DIAGNOSIS — R11.2 NON-INTRACTABLE VOMITING WITH NAUSEA, UNSPECIFIED VOMITING TYPE: Primary | ICD-10-CM

## 2022-07-04 LAB
A/G RATIO: 1.9 (ref 1.1–2.2)
ACETAMINOPHEN LEVEL: <5 UG/ML (ref 10–30)
ALBUMIN SERPL-MCNC: 4 G/DL (ref 3.4–5)
ALP BLD-CCNC: 63 U/L (ref 40–129)
ALT SERPL-CCNC: <5 U/L (ref 10–40)
AMORPHOUS: ABNORMAL /HPF
AMPHETAMINE SCREEN, URINE: NORMAL
ANION GAP SERPL CALCULATED.3IONS-SCNC: 12 MMOL/L (ref 3–16)
AST SERPL-CCNC: 9 U/L (ref 15–37)
BACTERIA: ABNORMAL /HPF
BARBITURATE SCREEN URINE: NORMAL
BASOPHILS ABSOLUTE: 0.1 K/UL (ref 0–0.2)
BASOPHILS RELATIVE PERCENT: 1 %
BENZODIAZEPINE SCREEN, URINE: NORMAL
BILIRUB SERPL-MCNC: 0.4 MG/DL (ref 0–1)
BILIRUBIN URINE: NEGATIVE
BLOOD, URINE: ABNORMAL
BUN BLDV-MCNC: 8 MG/DL (ref 7–20)
CALCIUM SERPL-MCNC: 9.3 MG/DL (ref 8.3–10.6)
CANNABINOID SCREEN URINE: NORMAL
CHLORIDE BLD-SCNC: 101 MMOL/L (ref 99–110)
CHP ED QC CHECK: NORMAL
CLARITY: CLEAR
CO2: 28 MMOL/L (ref 21–32)
COCAINE METABOLITE SCREEN URINE: NORMAL
COLOR: YELLOW
CREAT SERPL-MCNC: 0.8 MG/DL (ref 0.6–1.2)
EOSINOPHILS ABSOLUTE: 0 K/UL (ref 0–0.6)
EOSINOPHILS RELATIVE PERCENT: 0.4 %
EPITHELIAL CELLS, UA: ABNORMAL /HPF (ref 0–5)
ETHANOL: NORMAL MG/DL (ref 0–0.08)
GFR AFRICAN AMERICAN: >60
GFR NON-AFRICAN AMERICAN: >60
GLUCOSE BLD-MCNC: 101 MG/DL (ref 70–99)
GLUCOSE BLD-MCNC: 92 MG/DL
GLUCOSE BLD-MCNC: 92 MG/DL (ref 70–99)
GLUCOSE URINE: NEGATIVE MG/DL
HCT VFR BLD CALC: 53.4 % (ref 36–48)
HEMOGLOBIN: 17.9 G/DL (ref 12–16)
KETONES, URINE: NEGATIVE MG/DL
LACTIC ACID, SEPSIS: 1.6 MMOL/L (ref 0.4–1.9)
LEUKOCYTE ESTERASE, URINE: ABNORMAL
LYMPHOCYTES ABSOLUTE: 1.3 K/UL (ref 1–5.1)
LYMPHOCYTES RELATIVE PERCENT: 20.3 %
Lab: NORMAL
MAGNESIUM: 0.8 MG/DL (ref 1.8–2.4)
MCH RBC QN AUTO: 31.4 PG (ref 26–34)
MCHC RBC AUTO-ENTMCNC: 33.5 G/DL (ref 31–36)
MCV RBC AUTO: 93.9 FL (ref 80–100)
METHADONE SCREEN, URINE: NORMAL
MICROSCOPIC EXAMINATION: YES
MONOCYTES ABSOLUTE: 0.3 K/UL (ref 0–1.3)
MONOCYTES RELATIVE PERCENT: 4.9 %
NEUTROPHILS ABSOLUTE: 4.8 K/UL (ref 1.7–7.7)
NEUTROPHILS RELATIVE PERCENT: 73.4 %
NITRITE, URINE: NEGATIVE
OPIATE SCREEN URINE: NORMAL
OXYCODONE URINE: NORMAL
PDW BLD-RTO: 13.7 % (ref 12.4–15.4)
PERFORMED ON: NORMAL
PH UA: 7
PH UA: 7 (ref 5–8)
PHENCYCLIDINE SCREEN URINE: NORMAL
PLATELET # BLD: 250 K/UL (ref 135–450)
PMV BLD AUTO: 8.1 FL (ref 5–10.5)
POTASSIUM REFLEX MAGNESIUM: 3.4 MMOL/L (ref 3.5–5.1)
PROCALCITONIN: 0.04 NG/ML (ref 0–0.15)
PROPOXYPHENE SCREEN: NORMAL
PROTEIN UA: NEGATIVE MG/DL
RBC # BLD: 5.69 M/UL (ref 4–5.2)
RBC UA: ABNORMAL /HPF (ref 0–4)
SALICYLATE, SERUM: <0.3 MG/DL (ref 15–30)
SODIUM BLD-SCNC: 141 MMOL/L (ref 136–145)
SPECIFIC GRAVITY UA: 1.01 (ref 1–1.03)
TOTAL PROTEIN: 6.1 G/DL (ref 6.4–8.2)
TROPONIN: <0.01 NG/ML
URINE REFLEX TO CULTURE: ABNORMAL
URINE TYPE: ABNORMAL
UROBILINOGEN, URINE: 0.2 E.U./DL
VALPROIC ACID LEVEL: <2.8 UG/ML (ref 50–100)
WBC # BLD: 6.6 K/UL (ref 4–11)
WBC UA: ABNORMAL /HPF (ref 0–5)

## 2022-07-04 PROCEDURE — 80179 DRUG ASSAY SALICYLATE: CPT

## 2022-07-04 PROCEDURE — 36415 COLL VENOUS BLD VENIPUNCTURE: CPT

## 2022-07-04 PROCEDURE — 80307 DRUG TEST PRSMV CHEM ANLYZR: CPT

## 2022-07-04 PROCEDURE — 84145 PROCALCITONIN (PCT): CPT

## 2022-07-04 PROCEDURE — 96375 TX/PRO/DX INJ NEW DRUG ADDON: CPT

## 2022-07-04 PROCEDURE — 84484 ASSAY OF TROPONIN QUANT: CPT

## 2022-07-04 PROCEDURE — 72125 CT NECK SPINE W/O DYE: CPT

## 2022-07-04 PROCEDURE — 99284 EMERGENCY DEPT VISIT MOD MDM: CPT

## 2022-07-04 PROCEDURE — 96365 THER/PROPH/DIAG IV INF INIT: CPT

## 2022-07-04 PROCEDURE — 93005 ELECTROCARDIOGRAM TRACING: CPT | Performed by: NURSE PRACTITIONER

## 2022-07-04 PROCEDURE — 80143 DRUG ASSAY ACETAMINOPHEN: CPT

## 2022-07-04 PROCEDURE — 96361 HYDRATE IV INFUSION ADD-ON: CPT

## 2022-07-04 PROCEDURE — 85025 COMPLETE CBC W/AUTO DIFF WBC: CPT

## 2022-07-04 PROCEDURE — 6360000002 HC RX W HCPCS: Performed by: STUDENT IN AN ORGANIZED HEALTH CARE EDUCATION/TRAINING PROGRAM

## 2022-07-04 PROCEDURE — 83605 ASSAY OF LACTIC ACID: CPT

## 2022-07-04 PROCEDURE — 82077 ASSAY SPEC XCP UR&BREATH IA: CPT

## 2022-07-04 PROCEDURE — 6370000000 HC RX 637 (ALT 250 FOR IP): Performed by: NURSE PRACTITIONER

## 2022-07-04 PROCEDURE — 2580000003 HC RX 258: Performed by: NURSE PRACTITIONER

## 2022-07-04 PROCEDURE — 6360000002 HC RX W HCPCS: Performed by: NURSE PRACTITIONER

## 2022-07-04 PROCEDURE — 80164 ASSAY DIPROPYLACETIC ACD TOT: CPT

## 2022-07-04 PROCEDURE — 83735 ASSAY OF MAGNESIUM: CPT

## 2022-07-04 PROCEDURE — 87040 BLOOD CULTURE FOR BACTERIA: CPT

## 2022-07-04 PROCEDURE — 80053 COMPREHEN METABOLIC PANEL: CPT

## 2022-07-04 PROCEDURE — 81001 URINALYSIS AUTO W/SCOPE: CPT

## 2022-07-04 PROCEDURE — 70450 CT HEAD/BRAIN W/O DYE: CPT

## 2022-07-04 RX ORDER — LANOLIN ALCOHOL/MO/W.PET/CERES
400 CREAM (GRAM) TOPICAL DAILY
Status: DISCONTINUED | OUTPATIENT
Start: 2022-07-04 | End: 2022-07-04 | Stop reason: HOSPADM

## 2022-07-04 RX ORDER — 0.9 % SODIUM CHLORIDE 0.9 %
1000 INTRAVENOUS SOLUTION INTRAVENOUS ONCE
Status: COMPLETED | OUTPATIENT
Start: 2022-07-04 | End: 2022-07-04

## 2022-07-04 RX ORDER — MAGNESIUM SULFATE IN WATER 40 MG/ML
2000 INJECTION, SOLUTION INTRAVENOUS ONCE
Status: DISCONTINUED | OUTPATIENT
Start: 2022-07-04 | End: 2022-07-04

## 2022-07-04 RX ORDER — KETOROLAC TROMETHAMINE 30 MG/ML
15 INJECTION, SOLUTION INTRAMUSCULAR; INTRAVENOUS ONCE
Status: COMPLETED | OUTPATIENT
Start: 2022-07-04 | End: 2022-07-04

## 2022-07-04 RX ORDER — MAGNESIUM SULFATE IN WATER 40 MG/ML
2000 INJECTION, SOLUTION INTRAVENOUS ONCE
Status: COMPLETED | OUTPATIENT
Start: 2022-07-04 | End: 2022-07-04

## 2022-07-04 RX ORDER — METOCLOPRAMIDE 5 MG/1
5 TABLET ORAL 3 TIMES DAILY
Qty: 120 TABLET | Refills: 3 | Status: ON HOLD | OUTPATIENT
Start: 2022-07-04 | End: 2022-10-11 | Stop reason: HOSPADM

## 2022-07-04 RX ORDER — POTASSIUM CHLORIDE 20 MEQ/1
20 TABLET, EXTENDED RELEASE ORAL ONCE
Status: COMPLETED | OUTPATIENT
Start: 2022-07-04 | End: 2022-07-04

## 2022-07-04 RX ORDER — ONDANSETRON 4 MG/1
4 TABLET, FILM COATED ORAL EVERY 8 HOURS PRN
Qty: 20 TABLET | Refills: 0 | Status: ON HOLD | OUTPATIENT
Start: 2022-07-04 | End: 2022-10-11 | Stop reason: HOSPADM

## 2022-07-04 RX ORDER — METOCLOPRAMIDE HYDROCHLORIDE 5 MG/ML
10 INJECTION INTRAMUSCULAR; INTRAVENOUS ONCE
Status: COMPLETED | OUTPATIENT
Start: 2022-07-04 | End: 2022-07-04

## 2022-07-04 RX ORDER — ONDANSETRON 2 MG/ML
4 INJECTION INTRAMUSCULAR; INTRAVENOUS ONCE
Status: COMPLETED | OUTPATIENT
Start: 2022-07-04 | End: 2022-07-04

## 2022-07-04 RX ORDER — DIPHENHYDRAMINE HYDROCHLORIDE 50 MG/ML
12.5 INJECTION INTRAMUSCULAR; INTRAVENOUS ONCE
Status: COMPLETED | OUTPATIENT
Start: 2022-07-04 | End: 2022-07-04

## 2022-07-04 RX ADMIN — ONDANSETRON HYDROCHLORIDE 4 MG: 2 INJECTION, SOLUTION INTRAMUSCULAR; INTRAVENOUS at 14:04

## 2022-07-04 RX ADMIN — METOCLOPRAMIDE 10 MG: 5 INJECTION, SOLUTION INTRAMUSCULAR; INTRAVENOUS at 15:46

## 2022-07-04 RX ADMIN — DIPHENHYDRAMINE HYDROCHLORIDE 12.5 MG: 50 INJECTION, SOLUTION INTRAMUSCULAR; INTRAVENOUS at 15:47

## 2022-07-04 RX ADMIN — MAGNESIUM SULFATE HEPTAHYDRATE 2000 MG: 40 INJECTION, SOLUTION INTRAVENOUS at 17:24

## 2022-07-04 RX ADMIN — KETOROLAC TROMETHAMINE 15 MG: 30 INJECTION, SOLUTION INTRAMUSCULAR; INTRAVENOUS at 15:47

## 2022-07-04 RX ADMIN — SODIUM CHLORIDE 1000 ML: 9 INJECTION, SOLUTION INTRAVENOUS at 13:44

## 2022-07-04 RX ADMIN — POTASSIUM CHLORIDE 20 MEQ: 1500 TABLET, EXTENDED RELEASE ORAL at 16:34

## 2022-07-04 RX ADMIN — Medication 400 MG: at 17:05

## 2022-07-04 ASSESSMENT — PAIN SCALES - GENERAL
PAINLEVEL_OUTOF10: 5

## 2022-07-04 ASSESSMENT — ENCOUNTER SYMPTOMS
SORE THROAT: 0
VOMITING: 1
COLOR CHANGE: 0
RHINORRHEA: 0
ABDOMINAL PAIN: 0
NAUSEA: 1
FACIAL SWELLING: 0
SHORTNESS OF BREATH: 0

## 2022-07-04 ASSESSMENT — PAIN - FUNCTIONAL ASSESSMENT: PAIN_FUNCTIONAL_ASSESSMENT: 0-10

## 2022-07-04 ASSESSMENT — PAIN DESCRIPTION - LOCATION: LOCATION: HEAD

## 2022-07-04 NOTE — ED NOTES
Patient discharged in stable condition in wheelchair with all documented belongings. This nurse reviewed discharge instructions, pt verbalized understanding.        Christine Nicolas RN  07/04/22 6683

## 2022-07-04 NOTE — ED PROVIDER NOTES
I independently examined and evaluated Eusebio Mendieta. I personally saw the patient and performed a substantive portion of the visit including all aspects of the medical decision making. In brief, this 60-year-old female with history of dementia is presenting with posterior headache, nausea. Also during my interview due to diarrhea that she feels has just started. Denies any fevers, chills. Does have some posterior neck pain associated with this. This headache is similar to headaches that she has been complaining of for at least the last 6 months.  reiterates that this is an acute on chronic issue. She denies any numbness/tingling, change in vision, chest pain, shortness of breath. She does not believe she has ever seen neurology for headache. .    Focused exam revealed   General: Alert, no acute distress, patient resting comfortably   Skin: warm, intact, no pallor noted   Head: Normocephalic, atraumatic   Eye: Normal conjunctiva   Cardiac: Normal peripheral perfusion  Respiratory: No acute distress   Musculoskeletal: No deformity, full ROM. Neurological: alert and pleasantly confused, normal sensory and motor observed. Psychiatric: Cooperative    ED course: CT head and C-spine obtained and show no acute process. Lab work obtained and is reassuring. She is treated with IV fluid, Reglan, Benadryl, Toradol. Patient is symptomatically improved after the above. She does have a low magnesium is given 2 g IV mag replacement. Discharged with follow-up to neurology for these chronic headaches. ECG    The Ekg interpreted by me shows sinus rhythm with PACs and a rate of 87 bpm.  Normal axis. No acute injury pattern. , QRS 76, QTc 493. All diagnostic, treatment, and disposition decisions were made by myself in conjunction with the advanced practice provider.     For all further details of the patient's emergency department visit, please see the advanced practice provider's documentation. Comment: Please note this report has been produced using speech recognition software and may contain errors related to that system including errors in grammar, punctuation, and spelling, as well as words and phrases that may be inappropriate. If there are any questions or concerns please feel free to contact the dictating provider for clarification.        Maurilio Mcneil DO  07/05/22 5974

## 2022-07-04 NOTE — ED PROVIDER NOTES
[address] [facility]  EMERGENCY DEPARTMENT ENCOUNTER        Pt Name: Donavon Dakins  MRN: [id number]  Armshumbertogfthomas [birth year]  Dateof evaluation: 7/4/2022  Provider: LEONA Barahona - WING  PCP: Fatou Ying MD  ED Attending: No att. providers found    [facility]       Chief Complaint   Patient presents with    Headache     Patient states she has been having headaches off and on for months.  Nausea     Patient has been seen 3-4 times in the last 3-4 months for nausea. No vomiting or diarrhea. HISTORY OF PRESENTILLNESS   (Location/Symptom, Timing/Onset, Context/Setting, Quality, Duration, Modifying Factors, Severity)  Note limiting factors. Donavon Dakins is a 69 y.o. female for headache and nausea. Onset was months. Context includes patient reports that she has had a headache off and on for the last few months. Patient reports that she has had no fever but she has also had nausea and vomiting with her headaches. Patient does not follow-up with neurology. Patient reports that she has had visits to the ED for the similar symptoms in the past with no resolution. Alleviating factors include nothing. Aggravating factors include nothing. Pain is \"medium\"/10. Nothing has been used for pain today. Nursing Notes were all reviewed and agreed with or any disagreements were addressed  in the HPI. REVIEW OF SYSTEMS    (2-9 systems for level 4, 10 or more for level 5)     Review of Systems   Constitutional: Negative for activity change, appetite change and fever. HENT: Negative for congestion, facial swelling, rhinorrhea and sore throat. Eyes: Negative for visual disturbance. Respiratory: Negative for shortness of breath. Cardiovascular: Negative for chest pain. Gastrointestinal: Positive for nausea and vomiting. Negative for abdominal pain. Genitourinary: Negative for difficulty urinating. Musculoskeletal: Negative for arthralgias and myalgias.    Skin: Negative for color change and rash. Neurological: Positive for headaches. Negative for dizziness and light-headedness. Psychiatric/Behavioral: Negative for agitation. All other systems reviewed and are negative. Positives and Pertinent negatives as per HPI. Except as noted above in the ROS, all other systems were reviewed and negative.        PAST MEDICAL HISTORY     Past Medical History:   Diagnosis Date    Acute systolic CHF (congestive heart failure) (HCC)     Asthma     Back ache     Cataract     Cerebral artery occlusion with cerebral infarction ([facility] 2016    Diabetes mellitus ([facility]     type !! - diet controlled    Hyperlipidemia     Hypertension     Insomnia     TIA (transient ischemic attack) 2016         SURGICAL HISTORY       Past Surgical History:   Procedure Laterality Date    ABDOMEN SURGERY      c section    CATARACT REMOVAL WITH IMPLANT Left [id number]    LEFT EYE CATARACT PHACOEMULSIFICATION INTRAOCULAR LENS     SECTION      DENTAL SURGERY      EYE SURGERY  10/29/13     RIGHT EYE CATARACT PHACOEMULSIFICATION INTRAOCULAR LENS         CURRENT MEDICATIONS       Discharge Medication List as of 2022  4:59 PM      CONTINUE these medications which have NOT CHANGED    Details   divalproex (DEPAKOTE) 250 MG DR tablet Take 1 tablet by mouth every 12 hours, Disp-60 tablet, R-0Normal      ARIPiprazole (ABILIFY) 5 MG tablet Take 1 tablet by mouth daily, Disp-30 tablet, R-0Normal      amLODIPine (NORVASC) 5 MG tablet Take 1 tablet by mouth daily, Disp-30 tablet, R-0Normal      acetaminophen (TYLENOL) 500 MG tablet Take 2 tablets by mouth 3 times daily, Disp-120 tablet, R-0OTC      donepezil (ARICEPT) 5 MG tablet Take 1 tablet by mouth daily (with breakfast), Disp-30 tablet, R-0Normal      nicotine (NICODERM CQ) 21 MG/24HR Place 1 patch onto the skin daily, Disp-30 patch, R-0Normal      meloxicam (MOBIC) 7.5 MG tablet Take 7.5 mg by mouth dailyHistorical Med      oxybutynin (DITROPAN-XL) 10 MG extended release tablet Take 10 mg by mouth dailyHistorical Med      traZODone (DESYREL) 50 MG tablet Take 50 mg by mouth nightlyHistorical Med      ondansetron (ZOFRAN ODT) 4 MG disintegrating tablet Take 1 tablet by mouth every 8 hours as needed for Nausea, Disp-20 tablet, R-0Normal      furosemide (LASIX) 20 MG tablet Take 1 tablet by mouth daily, Disp-30 tablet, R-3Print      ferrous sulfate (IRON 325) 325 (65 Fe) MG tablet Take 1 tablet by mouth 2 times daily (with meals), Disp-60 tablet, R-3Print      metFORMIN (GLUCOPHAGE) 500 MG tablet Take 500 mg by mouthHistorical Med      levothyroxine (SYNTHROID) 25 MCG tablet Take 1 tablet by mouth Daily, Disp-30 tablet, R-0NO PRINT               ALLERGIES     Mold extract [trichophyton mentagrophytes]    FAMILY HISTORY       Family History   Problem Relation Age of Onset    High Blood Pressure Mother     Miscarriages / [place] Mother     Cancer Father     Diabetes Father     Heart Disease Father     Kidney Disease Father     Asthma Sister     Cancer Sister         Breast Cancer    Depression Brother     Substance Abuse Paternal Aunt     Stroke Maternal Grandmother     Diabetes Paternal Grandmother           SOCIAL HISTORY       Social History     Socioeconomic History    Marital status:      Spouse name: carolyne    Number of children: None    Years of education: None    Highest education level: None   Occupational History    None   Tobacco Use    Smoking status: Current Every Day Smoker     Packs/day: 2.00     Years: 58.00     Pack years: 116.00     Types: Cigarettes    Smokeless tobacco: Never Used    Tobacco comment: not totally ready to quit today   Vaping Use    Vaping Use: Never used   Substance and Sexual Activity    Alcohol use:  Yes     Alcohol/week: 2.0 - 3.0 standard drinks     Types: 2 - 3 Shots of liquor per week    Drug use: No    Sexual activity: Yes     Partners: Male   Other Topics Concern    None Social History Narrative    None     Social Determinants of Health     Financial Resource Strain:     Difficulty of Paying Living Expenses: Not on file   Food Insecurity:     Worried About Running Out of Food in the Last Year: Not on file    Mookie of Food in the Last Year: Not on file   Transportation Needs:     Lack of Transportation (Medical): Not on file    Lack of Transportation (Non-Medical): Not on file   Physical Activity:     Days of Exercise per Week: Not on file    Minutes of Exercise per Session: Not on file   Stress:     Feeling of Stress : Not on file   Social Connections:     Frequency of Communication with Friends and Family: Not on file    Frequency of Social Gatherings with Friends and Family: Not on file    Attends Islam Services: Not on file    Active Member of [address] [place] [address] [employer] or Organizations: Not on file    Attends Club or Organization Meetings: Not on file    Marital Status: Not on file   Intimate Partner Violence:     Fear of Current or Ex-Partner: Not on file    Emotionally Abused: Not on file    Physically Abused: Not on file    Sexually Abused: Not on file   Housing Stability:     Unable to Pay for Housing in the Last Year: Not on file    Number of [place] in the Last Year: Not on file    Unstable Housing in the Last Year: Not on file       SCREENINGS   NIH Stroke Scale  Interval: Baseline  Level of Consciousness (1a): Alert  LOC Questions (1b): Answers both correctly  LOC Commands (1c): Performs both tasks correctly  Best Gaze (2): Normal  Visual (3): (!) Partial hemianopia  Facial Palsy (4): Normal symmetrical movement  Motor Arm, Left (5a): No drift  Motor Arm, Right (5b): No drift  Motor Leg, Left (6a): No drift  Motor Leg, Right (6b):  No drift  Limb Ataxia (7): Absent  Sensory (8): Normal  Best Language (9): No aphasia  Dysarthria (10): Normal  Extinction and Inattention (11): No abnormality  Total: 1Glasgow Coma Scale  Eye Opening: Spontaneous  Best Verbal Response: Oriented  Best Motor Response: Obeys commands  Geff Coma Scale Score: 15        PHYSICAL EXAM  (up to 7 for level 4, 8 or more for level 5)     ED Triage Vitals [07/04/22 1229]   BP Temp Temp Source Heart Rate Resp SpO2 Height Weight   122/83 98.2 °F (36.8 °C) Oral (!) 106 18 96 % 5' 5\" (1.651 m) 132 lb (59.9 kg)       Physical Exam  Constitutional:       Appearance: She is well-developed. HENT:      Head: Normocephalic and atraumatic. Eyes:      Pupils: Pupils are equal, round, and reactive to light. Comments: Lateral nystagmus   Cardiovascular:      Rate and Rhythm: Tachycardia present. Pulmonary:      Effort: Pulmonary effort is normal. No respiratory distress. Abdominal:      General: There is no distension. Palpations: Abdomen is soft. Tenderness: There is no abdominal tenderness. Musculoskeletal:         General: Normal range of motion. Cervical back: Normal range of motion. Spinous process tenderness and muscular tenderness present. Skin:     General: Skin is warm and dry. Neurological:      Mental Status: She is alert. Comments: Oriented to person and place however states that its in the 1920s. Patient is able to carry on a conversation however then starts talking about other things that were not pertinent to the conversation.          DIAGNOSTIC RESULTS   LABS:    Labs Reviewed   CBC WITH AUTO DIFFERENTIAL - Abnormal; Notable for the following components:       Result Value    RBC 5.69 (*)     Hemoglobin 17.9 (*)     Hematocrit 53.4 (*)     All other components within normal limits   COMPREHENSIVE METABOLIC PANEL W/ REFLEX TO MG FOR LOW K - Abnormal; Notable for the following components:    Potassium reflex Magnesium 3.4 (*)     Glucose 101 (*)     Total Protein 6.1 (*)     ALT <5 (*)     AST 9 (*)     All other components within normal limits   URINALYSIS WITH REFLEX TO CULTURE - Abnormal; Notable for the following components:    Blood, Urine MODERATE (*) Leukocyte Esterase, Urine SMALL (*)     All other components within normal limits   ACETAMINOPHEN LEVEL - Abnormal; Notable for the following components:    Acetaminophen Level <5 (*)     All other components within normal limits   SALICYLATE LEVEL - Abnormal; Notable for the following components:    Salicylate, Serum <1.8 (*)     All other components within normal limits   MAGNESIUM - Abnormal; Notable for the following components:    Magnesium 0.80 (*)     All other components within normal limits    Narrative:     Mamadou Gillespie  SCEKARINA tel. [phone],  Chemistry results called to and read back by Jessica Bonds RN, 07/04/2022  16:47, by [place] - Abnormal; Notable for the following components:    Bacteria, UA 3+ (*)     All other components within normal limits   VALPROIC ACID LEVEL, TOTAL - Abnormal; Notable for the following components:    Valproic Acid Lvl <2.8 (*)     All other components within normal limits   POCT GLUCOSE - Normal   CULTURE, BLOOD 2   CULTURE, BLOOD 1   PROCALCITONIN   LACTATE, SEPSIS   URINE DRUG SCREEN   ETHANOL   TROPONIN   LACTATE, SEPSIS   POCT GLUCOSE       All other labs werewithin normal range or not returned as of this dictation. EKG: All EKG's are interpreted by the Emergency Department Physician who either signs or Co-signs this chart in the absence of a cardiologist.  Please see their note for interpretation of EKG. RADIOLOGY:     CT cervical spine without contrast interpreted by radiologist for  Impression:    No acute abnormality of the cervical spine. Multilevel degenerative disc disease and facet arthropathy without   significant interval change. CT of the head without contrast interpreted by radiologist for no acute intercranial abnormality          Interpretation per the Radiologist below, if available at the time of this note:    CT CERVICAL SPINE WO CONTRAST   Final Result   No acute abnormality of the cervical spine. Multilevel degenerative disc disease and facet arthropathy without   significant interval change. CT HEAD WO CONTRAST   Final Result   No acute intracranial abnormality. RECOMMENDATIONS:   Unavailable           No results found. PROCEDURES   Unless otherwise noted below, none     Procedures     CRITICAL CARE TIME   N/A    CONSULTS:  None      EMERGENCYDEPARTMENT COURSE and DIFFERENTIAL DIAGNOSIS/MDM:   Vitals:    Vitals:    07/04/22 1633 07/04/22 1707 07/04/22 1809 07/04/22 1831   BP: (!) 159/100  (!) 150/98 (!) 147/85   Pulse: 78 89 87 90   Resp: 16 16 16 16   Temp:       TempSrc:       SpO2: 95% 98% 92% 96%   Weight:       Height:           Patient was given the following medications:  Medications   magnesium oxide (MAG-OX) tablet 400 mg (400 mg Oral Given 7/4/22 1705)   0.9 % sodium chloride bolus (0 mLs IntraVENous Stopped 7/4/22 1633)   ondansetron (ZOFRAN) injection 4 mg (4 mg IntraVENous Given 7/4/22 1404)   diphenhydrAMINE (BENADRYL) injection 12.5 mg (12.5 mg IntraVENous Given 7/4/22 1547)   metoclopramide (REGLAN) injection 10 mg (10 mg IntraVENous Given 7/4/22 1546)   ketorolac (TORADOL) injection 15 mg (15 mg IntraVENous Given 7/4/22 1547)   potassium chloride (KLOR-CON M) extended release tablet 20 mEq (20 mEq Oral Given 7/4/22 1634)   magnesium sulfate 2000 mg in 50 mL IVPB premix (0 mg IntraVENous Stopped 7/4/22 1831)       Patient was seen and evaluated by myself and Dr. Min Nicolas. Patient here for concerns for headache and nausea. Patient reports that she has had headache and nausea for months. Patient states that she has been seen multiple times for this. On exam she is awake and alert she does answer questions appropriately however she does start to ramble about topics that were being discussed. She is oriented to person place and thinks the year is 5. Patient reports that she is at her baseline mentation and he suspects that she may have some early dementia.   After needed for Nausea, Disp-20 tablet, R-0Print      metoclopramide (REGLAN) 5 MG tablet Take 1 tablet by mouth 3 times daily, Disp-120 tablet, R-3Print             DISCONTINUED MEDICATIONS:  Discharge Medication List as of 7/4/2022  4:59 PM                 (Please note that portions of this note were completed with a voice recognition program.  Efforts were made to edit the dictations but occasionally words are mis-transcribed.)    LEONA Jacobs CNP (electronically signed)         LEONA Jacobs CNP  07/04/22 9522

## 2022-07-05 LAB
EKG ATRIAL RATE: 87 BPM
EKG DIAGNOSIS: NORMAL
EKG P AXIS: 68 DEGREES
EKG P-R INTERVAL: 182 MS
EKG Q-T INTERVAL: 410 MS
EKG QRS DURATION: 76 MS
EKG QTC CALCULATION (BAZETT): 493 MS
EKG R AXIS: 39 DEGREES
EKG T AXIS: 64 DEGREES
EKG VENTRICULAR RATE: 87 BPM

## 2022-07-09 LAB
BLOOD CULTURE, ROUTINE: NORMAL
CULTURE, BLOOD 2: NORMAL

## 2022-08-11 ENCOUNTER — HOSPITAL ENCOUNTER (EMERGENCY)
Age: 77
Discharge: HOME | End: 2022-08-11
Payer: MEDICARE

## 2022-08-11 ENCOUNTER — APPOINTMENT (OUTPATIENT)
Dept: CT IMAGING | Age: 77
End: 2022-08-11
Payer: MEDICARE

## 2022-08-11 VITALS
BODY MASS INDEX: 21.66 KG/M2 | DIASTOLIC BLOOD PRESSURE: 109 MMHG | OXYGEN SATURATION: 98 % | HEIGHT: 65 IN | HEART RATE: 89 BPM | RESPIRATION RATE: 18 BRPM | TEMPERATURE: 98.2 F | SYSTOLIC BLOOD PRESSURE: 153 MMHG | WEIGHT: 130 LBS

## 2022-08-11 DIAGNOSIS — K59.00 CONSTIPATION, UNSPECIFIED CONSTIPATION TYPE: ICD-10-CM

## 2022-08-11 DIAGNOSIS — R11.0 NAUSEA: Primary | ICD-10-CM

## 2022-08-11 DIAGNOSIS — R10.30 LOWER ABDOMINAL PAIN: ICD-10-CM

## 2022-08-11 LAB
A/G RATIO: 1.6 (ref 1.1–2.2)
ALBUMIN SERPL-MCNC: 4.3 G/DL (ref 3.4–5)
ALP BLD-CCNC: 69 U/L (ref 40–129)
ALT SERPL-CCNC: 8 U/L (ref 10–40)
ANION GAP SERPL CALCULATED.3IONS-SCNC: 10 MMOL/L (ref 3–16)
AST SERPL-CCNC: 10 U/L (ref 15–37)
BASOPHILS ABSOLUTE: 0.1 K/UL (ref 0–0.2)
BASOPHILS RELATIVE PERCENT: 1.1 %
BILIRUB SERPL-MCNC: 0.5 MG/DL (ref 0–1)
BILIRUBIN URINE: NEGATIVE
BLOOD, URINE: NEGATIVE
BUN BLDV-MCNC: 9 MG/DL (ref 7–20)
CALCIUM SERPL-MCNC: 9.6 MG/DL (ref 8.3–10.6)
CHLORIDE BLD-SCNC: 101 MMOL/L (ref 99–110)
CLARITY: CLEAR
CO2: 25 MMOL/L (ref 21–32)
COLOR: YELLOW
CREAT SERPL-MCNC: 0.8 MG/DL (ref 0.6–1.2)
EOSINOPHILS ABSOLUTE: 0 K/UL (ref 0–0.6)
EOSINOPHILS RELATIVE PERCENT: 0.2 %
GFR AFRICAN AMERICAN: >60
GFR NON-AFRICAN AMERICAN: >60
GLUCOSE BLD-MCNC: 110 MG/DL (ref 70–99)
GLUCOSE URINE: NEGATIVE MG/DL
HCT VFR BLD CALC: 37.3 % (ref 36–48)
HEMOGLOBIN: 12.9 G/DL (ref 12–16)
KETONES, URINE: NEGATIVE MG/DL
LEUKOCYTE ESTERASE, URINE: NEGATIVE
LIPASE: 14 U/L (ref 13–60)
LYMPHOCYTES ABSOLUTE: 1.4 K/UL (ref 1–5.1)
LYMPHOCYTES RELATIVE PERCENT: 21 %
MCH RBC QN AUTO: 35 PG (ref 26–34)
MCHC RBC AUTO-ENTMCNC: 34.6 G/DL (ref 31–36)
MCV RBC AUTO: 101 FL (ref 80–100)
MICROSCOPIC EXAMINATION: NORMAL
MONOCYTES ABSOLUTE: 0.5 K/UL (ref 0–1.3)
MONOCYTES RELATIVE PERCENT: 7.8 %
NEUTROPHILS ABSOLUTE: 4.8 K/UL (ref 1.7–7.7)
NEUTROPHILS RELATIVE PERCENT: 69.9 %
NITRITE, URINE: NEGATIVE
PDW BLD-RTO: 13.7 % (ref 12.4–15.4)
PH UA: 7.5 (ref 5–8)
PLATELET # BLD: 348 K/UL (ref 135–450)
PMV BLD AUTO: 7 FL (ref 5–10.5)
POTASSIUM REFLEX MAGNESIUM: 3.8 MMOL/L (ref 3.5–5.1)
PROTEIN UA: NEGATIVE MG/DL
RBC # BLD: 3.69 M/UL (ref 4–5.2)
SODIUM BLD-SCNC: 136 MMOL/L (ref 136–145)
SPECIFIC GRAVITY UA: 1.01 (ref 1–1.03)
TOTAL PROTEIN: 7 G/DL (ref 6.4–8.2)
URINE TYPE: NORMAL
UROBILINOGEN, URINE: 0.2 E.U./DL
WBC # BLD: 6.9 K/UL (ref 4–11)

## 2022-08-11 PROCEDURE — 85025 COMPLETE CBC W/AUTO DIFF WBC: CPT

## 2022-08-11 PROCEDURE — 81003 URINALYSIS AUTO W/O SCOPE: CPT

## 2022-08-11 PROCEDURE — 6360000004 HC RX CONTRAST MEDICATION: Performed by: EMERGENCY MEDICINE

## 2022-08-11 PROCEDURE — 99285 EMERGENCY DEPT VISIT HI MDM: CPT

## 2022-08-11 PROCEDURE — 80053 COMPREHEN METABOLIC PANEL: CPT

## 2022-08-11 PROCEDURE — 83690 ASSAY OF LIPASE: CPT

## 2022-08-11 PROCEDURE — 74177 CT ABD & PELVIS W/CONTRAST: CPT

## 2022-08-11 PROCEDURE — P9612 CATHETERIZE FOR URINE SPEC: HCPCS

## 2022-08-11 RX ADMIN — IOPAMIDOL 75 ML: 755 INJECTION, SOLUTION INTRAVENOUS at 21:14

## 2022-08-11 ASSESSMENT — PAIN - FUNCTIONAL ASSESSMENT: PAIN_FUNCTIONAL_ASSESSMENT: NONE - DENIES PAIN

## 2022-08-12 NOTE — ED PROVIDER NOTES
Evaluated by Advanced Practice Provider    [facility]  ED    CHIEF COMPLAINT  Nausea (Started yesterday, +mid abd pain)    HISTORY OF PRESENT ILLNESS  Cyrilla Mohs is a 69 y.o. female who presents to the ED complaining of nausea, abdominal pain. Reports lower abdominal pain that she has had for months. Patient's  is saying that he does not think anyone knows what is going on with her, he wants to know what is causing her nausea and abdominal pain.  present, reports she had a colonoscopy and was seen by a neurologist. The GI doctor said something to him about another examination of her stomach. He does not know what the test was.  agrees the pain has been going on for months. Patient then stated its more of a nausea. Both state the medications given by her doctors are not helping. There is a reported history of alcohol use.  states a lot of times she will drink when no one is aware. Patient states she will sneak a beer once in a while. Goes through a fifth of [email or web address] in 2-3 weeks. The patient arrived to the ED via private car.     PAST MEDICAL HISTORY    Past Medical History:   Diagnosis Date    Acute systolic CHF (congestive heart failure) (HCC)     Asthma     Back ache     Cataract     Cerebral artery occlusion with cerebral infarction ([address] 2016    Diabetes mellitus ([address]     type !! - diet controlled    Hyperlipidemia     Hypertension     Insomnia     TIA (transient ischemic attack) 2016       SURGICAL HISTORY    Past Surgical History:   Procedure Laterality Date    ABDOMEN SURGERY      c section    CATARACT REMOVAL WITH IMPLANT Left [id number]    LEFT EYE CATARACT PHACOEMULSIFICATION INTRAOCULAR LENS     SECTION      DENTAL SURGERY      EYE SURGERY  10/29/13     RIGHT EYE CATARACT PHACOEMULSIFICATION INTRAOCULAR LENS       CURRENT MEDICATIONS    Current Outpatient Rx   Medication Sig Dispense Refill    ondansetron (ZOFRAN) 4 MG tablet Take 1 tablet by mouth every 8 hours as needed for Nausea 20 tablet 0    metoclopramide (REGLAN) 5 MG tablet Take 1 tablet by mouth 3 times daily 120 tablet 3    divalproex (DEPAKOTE) 250 MG DR tablet Take 1 tablet by mouth every 12 hours 60 tablet 0    ARIPiprazole (ABILIFY) 5 MG tablet Take 1 tablet by mouth daily 30 tablet 0    amLODIPine (NORVASC) 5 MG tablet Take 1 tablet by mouth daily 30 tablet 0    acetaminophen (TYLENOL) 500 MG tablet Take 2 tablets by mouth 3 times daily 120 tablet 0    donepezil (ARICEPT) 5 MG tablet Take 1 tablet by mouth daily (with breakfast) 30 tablet 0    nicotine (NICODERM CQ) 21 MG/24HR Place 1 patch onto the skin daily 30 patch 0    meloxicam (MOBIC) 7.5 MG tablet Take 7.5 mg by mouth daily      oxybutynin (DITROPAN-XL) 10 MG extended release tablet Take 10 mg by mouth daily      traZODone (DESYREL) 50 MG tablet Take 50 mg by mouth nightly      ondansetron (ZOFRAN ODT) 4 MG disintegrating tablet Take 1 tablet by mouth every 8 hours as needed for Nausea 20 tablet 0    furosemide (LASIX) 20 MG tablet Take 1 tablet by mouth daily 30 tablet 3    ferrous sulfate (IRON 325) 325 (65 Fe) MG tablet Take 1 tablet by mouth 2 times daily (with meals) 60 tablet 3    metFORMIN (GLUCOPHAGE) 500 MG tablet Take 500 mg by mouth      levothyroxine (SYNTHROID) 25 MCG tablet Take 1 tablet by mouth Daily 30 tablet 0       ALLERGIES    Allergies   Allergen Reactions    Mold Extract [Trichophyton Mentagrophytes]        FAMILY HISTORY    Family History   Problem Relation Age of Onset    High Blood Pressure Mother     Miscarriages / Stillbirths Mother     Cancer Father     Diabetes Father     Heart Disease Father     Kidney Disease Father     Asthma Sister     Cancer Sister         Breast Cancer    Depression Brother     Substance Abuse Paternal Aunt     Stroke Maternal Grandmother     Diabetes Paternal Grandmother        SOCIAL HISTORY    Social History     Socioeconomic History    Marital status:  Spouse name: carolyne    Number of children: None    Years of education: None    Highest education level: None   Tobacco Use    Smoking status: Every Day     Packs/day: 2.00     Years: 58.00     Pack years: 116.00     Types: Cigarettes    Smokeless tobacco: Never    Tobacco comments:     not totally ready to quit today   Vaping Use    Vaping Use: Never used   Substance and Sexual Activity    Alcohol use: Yes     Alcohol/week: 2.0 - 3.0 standard drinks     Types: 2 - 3 Shots of liquor per week    Drug use: No    Sexual activity: Yes     Partners: Male       REVIEW OFSYSTEMS    10systems reviewed, pertinent positives per HPI otherwise noted to be negative. PHYSICAL EXAM  Physical Exam  Vitals:    08/11/22 1955   BP: (!) 153/109   Pulse: 89   Resp: 18   Temp: 98.2 °F (36.8 °C)   SpO2: 98%     GENERAL: Patient is elderly, thin. Awake andalert. Cooperative. Resting in bed. No apparent distress. HEENT:  Normocephalic, atraumatic. Conjunctivaappear normal. Sclera is non-icteric. External ears are normal.    NECK: Supple with normal ROM. Tracheamidline  LUNGS: Equal and symmetric chest rise. Breathing is unlabored. Speaking comfortably in fullsentences. Lungs are clear bilaterally to auscultation. Without wheezing, rales, or rhonchi. CADIOVASCULAR:  Regular rate and rhythm. Normal S1-S2 sounds. No murmurs, rubs, or gallops. GI: Soft, non-tender to palpation, nondistended with positive bowel sounds. No rebound tenderness, guarding or rigidity. Negative Broderick's sign. Negative Rovsing's sign. No CVAT to palpation. No masses or hepatosplenomegaly to palpation. MUSCULOSKELETAL:  No gross deformities or trauma noted. Moving all extremities equally and appropriately. Normal ROM. SKIN: Warm/dry. Skin is intact. No rashes or lesions noted. PSYCHIATRIC: Mood and affect appropriate. Speech is clear and articulate. NEUROLOGIC: Alert and oriented. No focal motor or sensory deficits.      LABS   Results for orders placed or performed during the hospital encounter of 08/11/22   CBC with Auto Differential   Result Value Ref Range    WBC 6.9 4.0 - 11.0 K/uL    RBC 3.69 (L) 4.00 - 5.20 M/uL    Hemoglobin 12.9 12.0 - 16.0 g/dL    Hematocrit 37.3 36.0 - 48.0 %    .0 (H) 80.0 - 100.0 fL    MCH 35.0 (H) 26.0 - 34.0 pg    MCHC 34.6 31.0 - 36.0 g/dL    RDW 13.7 12.4 - 15.4 %    Platelets 770 285 - 335 K/uL    MPV 7.0 5.0 - 10.5 fL    Neutrophils % 69.9 %    Lymphocytes % 21.0 %    Monocytes % 7.8 %    Eosinophils % 0.2 %    Basophils % 1.1 %    Neutrophils Absolute 4.8 1.7 - 7.7 K/uL    Lymphocytes Absolute 1.4 1.0 - 5.1 K/uL    Monocytes Absolute 0.5 0.0 - 1.3 K/uL    Eosinophils Absolute 0.0 0.0 - 0.6 K/uL    Basophils Absolute 0.1 0.0 - 0.2 K/uL   Comprehensive Metabolic Panel w/ Reflex to MG   Result Value Ref Range    Sodium 136 136 - 145 mmol/L    Potassium reflex Magnesium 3.8 3.5 - 5.1 mmol/L    Chloride 101 99 - 110 mmol/L    CO2 25 21 - 32 mmol/L    Anion Gap 10 3 - 16    Glucose 110 (H) 70 - 99 mg/dL    BUN 9 7 - 20 mg/dL    Creatinine 0.8 0.6 - 1.2 mg/dL    GFR Non-African American >60 >60    GFR African American >60 >60    Calcium 9.6 8.3 - 10.6 mg/dL    Total Protein 7.0 6.4 - 8.2 g/dL    Albumin 4.3 3.4 - 5.0 g/dL    Albumin/Globulin Ratio 1.6 1.1 - 2.2    Total Bilirubin 0.5 0.0 - 1.0 mg/dL    Alkaline Phosphatase 69 40 - 129 U/L    ALT 8 (L) 10 - 40 U/L    AST 10 (L) 15 - 37 U/L   Urinalysis   Result Value Ref Range    Color, UA Yellow Straw/Yellow    Clarity, UA Clear Clear    Glucose, Ur Negative Negative mg/dL    Bilirubin Urine Negative Negative    Ketones, Urine Negative Negative mg/dL    Specific Gravity, UA 1.010 1.005 - 1.030    Blood, Urine Negative Negative    pH, UA 7.5 5.0 - 8.0    Protein, UA Negative Negative mg/dL    Urobilinogen, Urine 0.2 <2.0 E.U./dL    Nitrite, Urine Negative Negative    Leukocyte Esterase, Urine Negative Negative    Microscopic Examination Not Indicated     Urine Type I have evaluated this patient. My supervising physician was available for consultation. Maren Webster presented to the ED today with above noted complaints. Arrival vital signs: Afebrile and hemodynamically stable except for blood pressure hypertensive 153/109. Well saturated on room air. Physical exam performed at 2212: No adventitious breath sounds on exam.  No reproducible abdominal tenderness to palpation. Blood work: No evidence of systemic infection. No anemia. No electrolyte abnormality. No evidence of acute kidney injury or transaminitis. Lipase is normal.  UA: No evidence of infection. No blood. Imaging: CT abdomen and pelvis was obtained and shows moderate to large amount of stool seen throughout the colon. Asymmetrically thickened bladder wall that can be seen with cystitis versus nondistention. I reviewed the papers that the  had present with him and I also reviewed the patient's chart through care everywhere. Patient has not had a colonoscopy but has been seen by GI at [facility] who is recommending that the patient have an EGD, this is scheduled for September. I explained this to the patient and her , it took multiple explanations and reinforcement of this to get the patient's  to understand the type of test that is being ordered and when it has been ordered to be done. Patient does have an appointment with her primary care provider tomorrow, I strongly encouraged him to keep this appointment as scheduled. I advised the patient and her  that based on her CT scan she has a very large amount of stool in her colon, this could very well be causing her abdominal pain and her nausea. I am discharging them with recommendations on laxative use to help empty the colon. I am not concerned for a UTI based on the CT scan findings because urinalysis is negative. I feel that patient can reasonably be discharged and follow-up as an outpatient.     Medications given in the ED:   Medications   iopamidol (ISOVUE-370) 76 % injection 75 mL (75 mLs IntraVENous Given 8/11/22 2114)      At this point I do not feel the patient requires further work upand it is reasonable to discharge the patient. Please refer to AVS for further details regarding discharge instructions. A discussion was had with the patient regarding diagnosis, diagnostic testing results,treatment/ plan of care, and follow up. All questions were answered. Patient will follow up as directed for further evaluation/treatment. The patient was given strict return precautions as we discussed symptoms that wouldnecessitate return to the ED. Patient will return to ED for new/worsening symptoms. The patient verbalized their understanding and agreement with the above plan. Patient was sent home with a prescription for below medication/s. I did [place] patient on appropriate use of these medication. Discharge Medication List as of 8/11/2022 11:24 PM          I estimatethere is LOW risk for ACUTE APPENDICITIS, BOWEL OBSTRUCTION, CHOLECYSTITIS, DIVERTICULITIS, INCARCERATED HERNIA, PANCREATITIS, or PERFORATED BOWEL or ULCER, thus I consider the discharge disposition reasonable. Also, thereis no evidence or peritonitis, sepsis, or toxicity. Mitesh Donnelly and I have discussed the diagnosis and risks, and we agree with discharging home to follow-up with their primary doctor. We also discussed returning to the EmergencyDepartment immediately if new or worsening symptoms occur. We have discussed the symptoms which are most concerning (e.g., bloody stool, fever, changing or worsening pain, vomiting) that necessitate immediate return. CLINICAL IMPRESSION    1. Nausea    2. Lower abdominal pain    3. Constipation, unspecified constipation type           Discharge Vitals:  Blood pressure (!) 153/109, pulse 89, temperature 98.2 °F (36.8 °C), temperature source Temporal, resp.  rate 18, height 5' 5\" (1.651 m), weight 130 lb (59 kg), SpO2 98 %. FOLLOW UP  Clinton Holstein, MD  [address] [facility] [place] [address]    Go on 8/12/2022  For further evaluation    [facility]  ED  [address] [facility] [address]  Go to   If symptoms worsen    DISPOSITION  Patient was discharged to home in good condition. Comment: [employer] this report has been produced using speech recognition software and may contain errors related to that system including errors in grammar, punctuation, and spelling, as well as words and phrases that may beinappropriate. If there are any questions or concerns please feel free to contact the dictating provider for clarification.         LEONA Talavera - CNP  08/12/22 0126

## 2022-08-12 NOTE — DISCHARGE INSTRUCTIONS
Miralax 1 cup full in 16 oz of clear liquid like juice, water, gatorade. Drink this over an hour. If no BM in 2 hours then repeat. Do this up to 4 cap fulls or BM. Then miralax once to twice a day until having BM daily. Can also add in Senna S once to twice a day until having BM daily.

## 2022-08-26 ENCOUNTER — APPOINTMENT (OUTPATIENT)
Dept: CT IMAGING | Age: 77
End: 2022-08-26
Payer: MEDICARE

## 2022-08-26 ENCOUNTER — HOSPITAL ENCOUNTER (EMERGENCY)
Age: 77
Discharge: HOME | End: 2022-08-27
Payer: MEDICARE

## 2022-08-26 DIAGNOSIS — R10.84 GENERALIZED ABDOMINAL PAIN: Primary | ICD-10-CM

## 2022-08-26 LAB
A/G RATIO: 1.4 (ref 1.1–2.2)
ALBUMIN SERPL-MCNC: 3.8 G/DL (ref 3.4–5)
ALP BLD-CCNC: 68 U/L (ref 40–129)
ALT SERPL-CCNC: 9 U/L (ref 10–40)
ANION GAP SERPL CALCULATED.3IONS-SCNC: 10 MMOL/L (ref 3–16)
AST SERPL-CCNC: 17 U/L (ref 15–37)
BASOPHILS ABSOLUTE: 0.1 K/UL (ref 0–0.2)
BASOPHILS RELATIVE PERCENT: 1 %
BILIRUB SERPL-MCNC: 0.3 MG/DL (ref 0–1)
BUN BLDV-MCNC: 9 MG/DL (ref 7–20)
CALCIUM SERPL-MCNC: 9.8 MG/DL (ref 8.3–10.6)
CHLORIDE BLD-SCNC: 99 MMOL/L (ref 99–110)
CO2: 27 MMOL/L (ref 21–32)
CREAT SERPL-MCNC: 0.7 MG/DL (ref 0.6–1.2)
EOSINOPHILS ABSOLUTE: 0.1 K/UL (ref 0–0.6)
EOSINOPHILS RELATIVE PERCENT: 0.7 %
ETHANOL: NORMAL MG/DL (ref 0–0.08)
GFR AFRICAN AMERICAN: >60
GFR NON-AFRICAN AMERICAN: >60
GLUCOSE BLD-MCNC: 100 MG/DL (ref 70–99)
HCT VFR BLD CALC: 36.7 % (ref 36–48)
HEMOGLOBIN: 13.1 G/DL (ref 12–16)
LACTIC ACID: 1.2 MMOL/L (ref 0.4–2)
LIPASE: 17 U/L (ref 13–60)
LYMPHOCYTES ABSOLUTE: 1.7 K/UL (ref 1–5.1)
LYMPHOCYTES RELATIVE PERCENT: 22.9 %
MCH RBC QN AUTO: 36 PG (ref 26–34)
MCHC RBC AUTO-ENTMCNC: 35.8 G/DL (ref 31–36)
MCV RBC AUTO: 100.7 FL (ref 80–100)
MONOCYTES ABSOLUTE: 0.6 K/UL (ref 0–1.3)
MONOCYTES RELATIVE PERCENT: 8.6 %
NEUTROPHILS ABSOLUTE: 5 K/UL (ref 1.7–7.7)
NEUTROPHILS RELATIVE PERCENT: 66.8 %
PDW BLD-RTO: 13.8 % (ref 12.4–15.4)
PLATELET # BLD: 415 K/UL (ref 135–450)
PMV BLD AUTO: 7 FL (ref 5–10.5)
POTASSIUM REFLEX MAGNESIUM: 4.3 MMOL/L (ref 3.5–5.1)
RBC # BLD: 3.65 M/UL (ref 4–5.2)
SODIUM BLD-SCNC: 136 MMOL/L (ref 136–145)
TOTAL PROTEIN: 6.5 G/DL (ref 6.4–8.2)
VALPROIC ACID LEVEL: <2.8 UG/ML (ref 50–100)
WBC # BLD: 7.5 K/UL (ref 4–11)

## 2022-08-26 PROCEDURE — 80164 ASSAY DIPROPYLACETIC ACD TOT: CPT

## 2022-08-26 PROCEDURE — 83605 ASSAY OF LACTIC ACID: CPT

## 2022-08-26 PROCEDURE — 82077 ASSAY SPEC XCP UR&BREATH IA: CPT

## 2022-08-26 PROCEDURE — 99285 EMERGENCY DEPT VISIT HI MDM: CPT

## 2022-08-26 PROCEDURE — 85025 COMPLETE CBC W/AUTO DIFF WBC: CPT

## 2022-08-26 PROCEDURE — 74177 CT ABD & PELVIS W/CONTRAST: CPT

## 2022-08-26 PROCEDURE — 6360000004 HC RX CONTRAST MEDICATION: Performed by: NURSE PRACTITIONER

## 2022-08-26 PROCEDURE — 83690 ASSAY OF LIPASE: CPT

## 2022-08-26 PROCEDURE — 96374 THER/PROPH/DIAG INJ IV PUSH: CPT

## 2022-08-26 PROCEDURE — 36415 COLL VENOUS BLD VENIPUNCTURE: CPT

## 2022-08-26 PROCEDURE — 80053 COMPREHEN METABOLIC PANEL: CPT

## 2022-08-26 PROCEDURE — 6360000002 HC RX W HCPCS: Performed by: NURSE PRACTITIONER

## 2022-08-26 PROCEDURE — 96375 TX/PRO/DX INJ NEW DRUG ADDON: CPT

## 2022-08-26 RX ORDER — HYDROCODONE BITARTRATE AND ACETAMINOPHEN 5; 325 MG/1; MG/1
1 TABLET ORAL EVERY 8 HOURS PRN
Status: ON HOLD | COMMUNITY
End: 2022-10-11 | Stop reason: HOSPADM

## 2022-08-26 RX ORDER — ATORVASTATIN CALCIUM 20 MG/1
20 TABLET, FILM COATED ORAL DAILY
COMMUNITY

## 2022-08-26 RX ORDER — ALBUTEROL SULFATE 90 UG/1
2 AEROSOL, METERED RESPIRATORY (INHALATION) EVERY 6 HOURS PRN
COMMUNITY

## 2022-08-26 RX ORDER — KETOROLAC TROMETHAMINE 30 MG/ML
15 INJECTION, SOLUTION INTRAMUSCULAR; INTRAVENOUS ONCE
Status: COMPLETED | OUTPATIENT
Start: 2022-08-26 | End: 2022-08-26

## 2022-08-26 RX ORDER — ONDANSETRON 2 MG/ML
4 INJECTION INTRAMUSCULAR; INTRAVENOUS ONCE
Status: COMPLETED | OUTPATIENT
Start: 2022-08-26 | End: 2022-08-26

## 2022-08-26 RX ORDER — GABAPENTIN 100 MG/1
100 CAPSULE ORAL NIGHTLY
Status: ON HOLD | COMMUNITY
End: 2022-10-11 | Stop reason: HOSPADM

## 2022-08-26 RX ORDER — LISINOPRIL 20 MG/1
20 TABLET ORAL DAILY
Status: ON HOLD | COMMUNITY
End: 2022-10-11 | Stop reason: HOSPADM

## 2022-08-26 RX ORDER — BISACODYL 10 MG
10 SUPPOSITORY, RECTAL RECTAL DAILY
Status: ON HOLD | COMMUNITY
End: 2022-10-11 | Stop reason: HOSPADM

## 2022-08-26 RX ADMIN — ONDANSETRON HYDROCHLORIDE 4 MG: 2 INJECTION, SOLUTION INTRAMUSCULAR; INTRAVENOUS at 21:35

## 2022-08-26 RX ADMIN — IOPAMIDOL 75 ML: 755 INJECTION, SOLUTION INTRAVENOUS at 22:50

## 2022-08-26 RX ADMIN — KETOROLAC TROMETHAMINE 15 MG: 30 INJECTION, SOLUTION INTRAMUSCULAR; INTRAVENOUS at 21:35

## 2022-08-26 ASSESSMENT — ENCOUNTER SYMPTOMS
NAUSEA: 1
SHORTNESS OF BREATH: 0
SORE THROAT: 0
VOMITING: 0
EYE PAIN: 0
COUGH: 0
ABDOMINAL PAIN: 1
RHINORRHEA: 0
BLOOD IN STOOL: 0
BACK PAIN: 0
DIARRHEA: 0
CONSTIPATION: 1

## 2022-08-26 ASSESSMENT — PAIN SCALES - GENERAL
PAINLEVEL_OUTOF10: 8
PAINLEVEL_OUTOF10: 8

## 2022-08-26 ASSESSMENT — PAIN DESCRIPTION - LOCATION: LOCATION: ABDOMEN

## 2022-08-26 ASSESSMENT — PAIN - FUNCTIONAL ASSESSMENT: PAIN_FUNCTIONAL_ASSESSMENT: 0-10

## 2022-08-27 VITALS
WEIGHT: 118 LBS | TEMPERATURE: 98.8 F | DIASTOLIC BLOOD PRESSURE: 108 MMHG | BODY MASS INDEX: 19.64 KG/M2 | OXYGEN SATURATION: 98 % | HEART RATE: 93 BPM | RESPIRATION RATE: 16 BRPM | SYSTOLIC BLOOD PRESSURE: 158 MMHG

## 2022-08-27 LAB
BILIRUBIN URINE: NEGATIVE
BLOOD, URINE: NEGATIVE
CLARITY: CLEAR
COLOR: YELLOW
GLUCOSE URINE: NEGATIVE MG/DL
KETONES, URINE: NEGATIVE MG/DL
LEUKOCYTE ESTERASE, URINE: NEGATIVE
MICROSCOPIC EXAMINATION: NORMAL
NITRITE, URINE: NEGATIVE
PH UA: 7 (ref 5–8)
PROTEIN UA: NEGATIVE MG/DL
SPECIFIC GRAVITY UA: <=1.005 (ref 1–1.03)
URINE REFLEX TO CULTURE: NORMAL
URINE TYPE: NORMAL
UROBILINOGEN, URINE: 0.2 E.U./DL

## 2022-08-27 PROCEDURE — 81003 URINALYSIS AUTO W/O SCOPE: CPT

## 2022-08-27 NOTE — ED PROVIDER NOTES
[address] ED  EMERGENCY DEPARTMENT ENCOUNTER        Pt Name: Terrence Maciel  MRN: [id number]  [place] 1945  Date of evaluation: 8/26/2022  Provider: LEONA Wagoner - CNP  PCP: Vickey Guerrero MD  Note Started: 10:00 PM EDT      EMEYLN. I have evaluated this patient. My supervising physician was available for consultation. Triage CHIEF COMPLAINT       Chief Complaint   Patient presents with    Abdominal Pain     C/o nausea with abd pain, pt referred to GI and recent testing for gall bladder issues. HISTORY OF PRESENT ILLNESS   (Location/Symptom, Timing/Onset, Context/Setting, Quality, Duration, Modifying Factors, Severity)  Note limiting factors. Chief Complaint: Abdominal pain    Terrence Maciel is a 69 y.o. female who presents to the emerged part with symptoms of abdominal pain. Patient continued to have abdominal pain for the last 2 to 3 days. Patient states that she symptoms intermittently now for the last several months. States she has had some work-up but was really unable to recall further evaluation. Patient states she has been having symptoms of pain and some intermittent nausea. No vomiting. No diarrhea but did have reports of constipation. Patient reported that she had a ultrasound performed and was really unclear exactly what the ultrasound was reported as. She has no other acute complaints at this time states was feeling well. Nursing Notes were all reviewed and agreed with or any disagreements were addressed in the HPI. REVIEW OF SYSTEMS    (2-9 systems for level 4, 10 or more for level 5)     Review of Systems   Constitutional:  Negative for chills, diaphoresis and fever. HENT:  Negative for congestion, ear pain, rhinorrhea and sore throat. Eyes:  Negative for pain and visual disturbance. Respiratory:  Negative for cough and shortness of breath. Cardiovascular:  Negative for chest pain and leg swelling.    Gastrointestinal:  Positive for abdominal pain, constipation and nausea. Negative for blood in stool, diarrhea and vomiting. Genitourinary:  Negative for difficulty urinating, dysuria, flank pain and frequency. Musculoskeletal:  Negative for back pain and neck pain. Skin:  Negative for rash and wound. Neurological:  Negative for dizziness and light-headedness. PAST MEDICAL HISTORY     Past Medical History:   Diagnosis Date    Acute systolic CHF (congestive heart failure) (HCC)     Asthma     Back ache     Cataract     Cerebral artery occlusion with cerebral infarction ([facility] [address] 2016    Diabetes mellitus ([facility] [address]     type !! - diet controlled    Hyperlipidemia     Hypertension     Insomnia     TIA (transient ischemic attack) 2016       SURGICAL HISTORY     Past Surgical History:   Procedure Laterality Date    ABDOMEN SURGERY      c section    CATARACT REMOVAL WITH IMPLANT Left [id number]    LEFT EYE CATARACT PHACOEMULSIFICATION INTRAOCULAR LENS     SECTION      DENTAL SURGERY      EYE SURGERY  10/29/13     RIGHT EYE CATARACT PHACOEMULSIFICATION INTRAOCULAR LENS       [address]       Discharge Medication List as of 2022 12:55 AM        CONTINUE these medications which have NOT CHANGED    Details   albuterol sulfate HFA (VENTOLIN HFA) 108 (90 Base) MCG/ACT inhaler Inhale 2 puffs into the lungs every 6 hours as needed for WheezingHistorical Med      atorvastatin (LIPITOR) 20 MG tablet Take 20 mg by mouth dailyHistorical Med      bisacodyl (DULCOLAX) 10 MG suppository Place 10 mg rectally dailyHistorical Med      gabapentin (NEURONTIN) 100 MG capsule Take 100 mg by mouth at bedtime. Historical Med      HYDROcodone-acetaminophen (NORCO) 5-325 MG per tablet Take 1 tablet by mouth every 8 hours as needed for Pain. Historical Med      lisinopril (PRINIVIL;ZESTRIL) 20 MG tablet Take 20 mg by mouth dailyHistorical Med      ondansetron (ZOFRAN) 4 MG tablet Take 1 tablet by mouth every 8 hours as needed for Nausea, Disp-20 tablet, R-0Print      metoclopramide (REGLAN) 5 MG tablet Take 1 tablet by mouth 3 times daily, Disp-120 tablet, R-3Print      divalproex (DEPAKOTE) 250 MG DR tablet Take 1 tablet by mouth every 12 hours, Disp-60 tablet, R-0Normal      ARIPiprazole (ABILIFY) 5 MG tablet Take 1 tablet by mouth daily, Disp-30 tablet, R-0Normal      amLODIPine (NORVASC) 5 MG tablet Take 1 tablet by mouth daily, Disp-30 tablet, R-0Normal      acetaminophen (TYLENOL) 500 MG tablet Take 2 tablets by mouth 3 times daily, Disp-120 tablet, R-0OTC      donepezil (ARICEPT) 5 MG tablet Take 1 tablet by mouth daily (with breakfast), Disp-30 tablet, R-0Normal      nicotine (NICODERM CQ) 21 MG/24HR Place 1 patch onto the skin daily, Disp-30 patch, R-0Normal      meloxicam (MOBIC) 7.5 MG tablet Take 7.5 mg by mouth dailyHistorical Med      oxybutynin (DITROPAN-XL) 10 MG extended release tablet Take 10 mg by mouth dailyHistorical Med      traZODone (DESYREL) 50 MG tablet Take 50 mg by mouth nightlyHistorical Med      ondansetron (ZOFRAN ODT) 4 MG disintegrating tablet Take 1 tablet by mouth every 8 hours as needed for Nausea, Disp-20 tablet, R-0Normal      furosemide (LASIX) 20 MG tablet Take 1 tablet by mouth daily, Disp-30 tablet, R-3Print      ferrous sulfate (IRON 325) 325 (65 Fe) MG tablet Take 1 tablet by mouth 2 times daily (with meals), Disp-60 tablet, R-3Print      metFORMIN (GLUCOPHAGE) 500 MG tablet Take 500 mg by mouth 2 times daily (with meals)Historical Med      levothyroxine (SYNTHROID) 25 MCG tablet Take 1 tablet by mouth Daily, Disp-30 tablet, R-0NO PRINT             ALLERGIES     Mold extract [trichophyton mentagrophytes]    FAMILYHISTORY       Family History   Problem Relation Age of Onset    High Blood Pressure Mother     Miscarriages / Stillbirths Mother     Cancer Father     Diabetes Father     Heart Disease Father     Kidney Disease Father     Asthma Sister     Cancer Sister         Breast Cancer    Depression Brother Substance Abuse Paternal Aunt     Stroke Maternal Grandmother     Diabetes Paternal Grandmother         SOCIAL HISTORY       Social History     Socioeconomic History    Marital status:      Spouse name: carolyne    Number of children: None    Years of education: None    Highest education level: None   Tobacco Use    Smoking status: Every Day     Packs/day: 2.00     Years: 58.00     Pack years: 116.00     Types: Cigarettes    Smokeless tobacco: Never    Tobacco comments:     not totally ready to quit today   Vaping Use    Vaping Use: Never used   Substance and Sexual Activity    Alcohol use: Yes     Alcohol/week: 2.0 - 3.0 standard drinks     Types: 2 - 3 Shots of liquor per week    Drug use: No    Sexual activity: Yes     Partners: Male       SCREENINGS    [place] Coma Scale  Eye Opening: Spontaneous  Best Verbal Response: Confused  Best Motor Response: Obeys commands  [place] Coma Scale Score: 14        PHYSICAL EXAM    (up to 7 for level 4, 8 or more for level 5)     ED Triage Vitals   BP Temp Temp Source Heart Rate Resp SpO2 Height Weight   08/26/22 2006 08/26/22 2010 08/26/22 2010 08/26/22 2006 08/26/22 2006 08/26/22 2006 -- 08/26/22 2006   (!) 166/108 98.8 °F (37.1 °C) Oral 66 18 96 %  132 lb (59.9 kg)       Physical Exam  Vitals and nursing note reviewed. Constitutional:       Appearance: Normal appearance. She is not toxic-appearing or diaphoretic. HENT:      Head: Normocephalic and atraumatic. Nose: Nose normal.   Eyes:      General:         Right eye: No discharge. Left eye: No discharge. Cardiovascular:      Rate and Rhythm: Normal rate and regular rhythm. Heart sounds: Normal heart sounds. No murmur heard. Pulmonary:      Effort: Pulmonary effort is normal. No respiratory distress. Breath sounds: No wheezing or rhonchi. Abdominal:      General: Abdomen is flat. Bowel sounds are normal. There is no distension. Palpations: Abdomen is soft. Tenderness:  There is generalized abdominal tenderness. There is no guarding or rebound. Negative signs include Broderick's sign and McBurney's sign. Musculoskeletal:         General: Normal range of motion. Cervical back: Normal range of motion and neck supple. Skin:     General: Skin is warm and dry. Neurological:      General: No focal deficit present. Mental Status: She is alert and oriented to person, place, and time. Psychiatric:         Mood and Affect: Mood normal.         Behavior: Behavior normal.       DIAGNOSTIC RESULTS   LABS:    Labs Reviewed   CBC WITH AUTO DIFFERENTIAL - Abnormal; Notable for the following components:       Result Value    RBC 3.65 (*)     .7 (*)     MCH 36.0 (*)     All other components within normal limits   COMPREHENSIVE METABOLIC PANEL W/ REFLEX TO MG FOR LOW K - Abnormal; Notable for the following components:    Glucose 100 (*)     ALT 9 (*)     All other components within normal limits   VALPROIC ACID LEVEL, TOTAL - Abnormal; Notable for the following components:    Valproic Acid Lvl <2.8 (*)     All other components within normal limits   LIPASE   LACTIC ACID   ETHANOL   URINALYSIS WITH REFLEX TO CULTURE       When ordered, only abnormal lab results are displayed. All other labs were within normal range or not returned as of this dictation. EKG: When ordered, EKG's are interpreted by the Emergency Department Physician in the absence of a cardiologist.  Please see their note for interpretation of EKG. RADIOLOGY:   Non-plain film images such as CT, Ultrasound and MRI are read by the radiologist. Plain radiographic images are visualized andpreliminarily interpreted by the  ED Provider with the below findings:        Interpretation perthe Radiologist below, if available at the time of this note:    CT ABDOMEN PELVIS W IV CONTRAST Additional Contrast? None   Final Result   1. No acute process within the abdomen or pelvis.    2. Stable mild nonspecific intrahepatic and extrahepatic biliary ductal   dilatation, without demonstrable cause. This is of questionable clinical   significance. 3. Mild colonic diverticulosis, without evidence of diverticulitis. 4. Stable 4 mm noncalcified granuloma within the left lower lobe. Given its   over 1 year stability, this requires no further follow-up. See below. RECOMMENDATIONS:   Fleischner Society guidelines for follow-up and management of incidentally   detected pulmonary nodules:      Single Solid Nodule:      Nodule size less than 6 mm   In a low-risk patient, no routine follow-up. In a high-risk patient, optional CT at 12 months. Radiology 2017 http://pubs. rsna.org/doi/full/10.1148/radiol. 7931155651           No results found. PROCEDURES   Unless otherwise noted below, none     Procedures    CRITICAL CARE TIME   N/A    CONSULTS:  None      EMERGENCY DEPARTMENT COURSE and DIFFERENTIAL DIAGNOSIS/MDM:   Vitals:    Vitals:    08/26/22 2006 08/26/22 2010 08/26/22 2011 08/27/22 0109   BP: (!) 166/108   (!) 158/108   Pulse: 66   93   Resp: 18   16   Temp:  98.8 °F (37.1 °C)     TempSrc:  Oral     SpO2: 96%   98%   Weight: 132 lb (59.9 kg)  118 lb (53.5 kg)        Patient was given thefollowing medications:  Medications   ketorolac (TORADOL) injection 15 mg (15 mg IntraVENous Given 8/26/22 2135)   ondansetron (ZOFRAN) injection 4 mg (4 mg IntraVENous Given 8/26/22 2135)   iopamidol (ISOVUE-370) 76 % injection 75 mL (75 mLs IntraVENous Given 8/26/22 2250)         Is this patient to be included in the SEP-1 Core Measure due to severe sepsis or septic shock? No   Exclusion criteria - the patient is NOT to be included for SEP-1 Core Measure due to: Infection is not suspected    Patient is well-appearing here in the emergency department. We will go ahead and discharge patient in good condition.   She is noted to have negative findings and surgical etiology/perforation, appendicitis, or any evidence of diverticulitis on her CT scan.  Patient was advised to follow-up with GI as this is likely need to be evaluated further with possible endoscopy. Patient has had problems with her abdomen and intermittent abdominal pain for the last several months. Said multiple work-ups in the ER with minimal emergent findings. The patient states that she is otherwise feeling well and at this time is in no pain. She has not vomited while she is been here in the emergency department. Tolerating p.o. nutrition. Patient advised to return back to ED for any further acute concerns. Laceration needs to follow-up with GI for further evaluation and testing. Also been advised to follow the family doctor the next few days for repeat evaluation. The both  and the patient order provided strict return precautions for any further acute complaints. I am the Primary Clinician of Record. FINAL IMPRESSION      1.  Generalized abdominal pain          DISPOSITION/PLAN   DISPOSITION Decision To Discharge 08/27/2022 12:29:02 AM      PATIENT REFERREDTO:  Jaspal Carrasco MD  [address] [place] [id number]  [phone]    Schedule an appointment as soon as possible for a visit       DISCHARGE MEDICATIONS:  Discharge Medication List as of 8/27/2022 12:55 AM          DISCONTINUED MEDICATIONS:  Discharge Medication List as of 8/27/2022 12:55 AM                 (Please note that portions ofthis note were completed with a voice recognition program.  Efforts were made to edit the dictations but occasionally words are mis-transcribed.)    LEONA Jama CNP (electronically signed)             LEONA Jama CNP  08/28/22 3159

## 2022-08-27 NOTE — ED TRIAGE NOTES
Upon triaging pt multiple bed bugs seen crawling on pt,  at bs confirms they have a bed bug issue. Pt placed in gown and personal clothing removed and placed into bag.  aware he needs to remain in room.

## 2022-10-07 ENCOUNTER — APPOINTMENT (OUTPATIENT)
Dept: CT IMAGING | Age: 77
DRG: 872 | End: 2022-10-07
Payer: MEDICARE

## 2022-10-07 ENCOUNTER — APPOINTMENT (OUTPATIENT)
Dept: GENERAL RADIOLOGY | Age: 77
DRG: 872 | End: 2022-10-07
Payer: MEDICARE

## 2022-10-07 ENCOUNTER — HOSPITAL ENCOUNTER (INPATIENT)
Age: 77
LOS: 4 days | Discharge: NURSING, REHAB OR LONG-TERM CARE | DRG: 872 | End: 2022-10-11
Attending: STUDENT IN AN ORGANIZED HEALTH CARE EDUCATION/TRAINING PROGRAM | Admitting: HOSPITALIST
Payer: MEDICARE

## 2022-10-07 DIAGNOSIS — A41.9 SEPTICEMIA (HCC): ICD-10-CM

## 2022-10-07 DIAGNOSIS — N39.0 URINARY TRACT INFECTION WITH HEMATURIA, SITE UNSPECIFIED: Primary | ICD-10-CM

## 2022-10-07 DIAGNOSIS — R31.9 URINARY TRACT INFECTION WITH HEMATURIA, SITE UNSPECIFIED: Primary | ICD-10-CM

## 2022-10-07 DIAGNOSIS — K52.89 STERCORAL COLITIS: ICD-10-CM

## 2022-10-07 LAB
A/G RATIO: 1.2 (ref 1.1–2.2)
ALBUMIN SERPL-MCNC: 3.8 G/DL (ref 3.4–5)
ALP BLD-CCNC: 96 U/L (ref 40–129)
ALT SERPL-CCNC: 6 U/L (ref 10–40)
AMORPHOUS: ABNORMAL /HPF
ANION GAP SERPL CALCULATED.3IONS-SCNC: 12 MMOL/L (ref 3–16)
AST SERPL-CCNC: 9 U/L (ref 15–37)
ATYPICAL LYMPHOCYTE RELATIVE PERCENT: 4 % (ref 0–6)
BACTERIA: ABNORMAL /HPF
BANDED NEUTROPHILS RELATIVE PERCENT: 6 % (ref 0–7)
BASOPHILS ABSOLUTE: 0 K/UL (ref 0–0.2)
BASOPHILS RELATIVE PERCENT: 0 %
BILIRUB SERPL-MCNC: 0.7 MG/DL (ref 0–1)
BILIRUBIN URINE: ABNORMAL
BLOOD, URINE: NEGATIVE
BUN BLDV-MCNC: 23 MG/DL (ref 7–20)
CALCIUM SERPL-MCNC: 10.4 MG/DL (ref 8.3–10.6)
CHLORIDE BLD-SCNC: 95 MMOL/L (ref 99–110)
CLARITY: ABNORMAL
CO2: 26 MMOL/L (ref 21–32)
COLOR: YELLOW
CREAT SERPL-MCNC: 1.2 MG/DL (ref 0.6–1.2)
EOSINOPHILS ABSOLUTE: 0.2 K/UL (ref 0–0.6)
EOSINOPHILS RELATIVE PERCENT: 1 %
EPITHELIAL CELLS, UA: ABNORMAL /HPF (ref 0–5)
ETHANOL: NORMAL MG/DL (ref 0–0.08)
GFR AFRICAN AMERICAN: 53
GFR NON-AFRICAN AMERICAN: 44
GLUCOSE BLD-MCNC: 176 MG/DL (ref 70–99)
GLUCOSE URINE: NEGATIVE MG/DL
HCT VFR BLD CALC: 42.2 % (ref 36–48)
HEMOGLOBIN: 14 G/DL (ref 12–16)
HYALINE CASTS: ABNORMAL /LPF (ref 0–2)
INFLUENZA A: NOT DETECTED
INFLUENZA B: NOT DETECTED
KETONES, URINE: ABNORMAL MG/DL
LACTIC ACID: 2.2 MMOL/L (ref 0.4–2)
LEUKOCYTE ESTERASE, URINE: ABNORMAL
LIPASE: 12 U/L (ref 13–60)
LYMPHOCYTES ABSOLUTE: 1.8 K/UL (ref 1–5.1)
LYMPHOCYTES RELATIVE PERCENT: 5 %
MCH RBC QN AUTO: 31.1 PG (ref 26–34)
MCHC RBC AUTO-ENTMCNC: 33.3 G/DL (ref 31–36)
MCV RBC AUTO: 93.4 FL (ref 80–100)
MICROSCOPIC EXAMINATION: YES
MONOCYTES ABSOLUTE: 1.2 K/UL (ref 0–1.3)
MONOCYTES RELATIVE PERCENT: 6 %
NEUTROPHILS ABSOLUTE: 16.9 K/UL (ref 1.7–7.7)
NEUTROPHILS RELATIVE PERCENT: 78 %
NITRITE, URINE: POSITIVE
PDW BLD-RTO: 14.4 % (ref 12.4–15.4)
PH UA: 5.5 (ref 5–8)
PLATELET # BLD: 414 K/UL (ref 135–450)
PLATELET SLIDE REVIEW: ADEQUATE
PMV BLD AUTO: 6.6 FL (ref 5–10.5)
POTASSIUM REFLEX MAGNESIUM: 4.8 MMOL/L (ref 3.5–5.1)
PROTEIN UA: 30 MG/DL
RBC # BLD: 4.51 M/UL (ref 4–5.2)
RBC # BLD: NORMAL 10*6/UL
RBC UA: ABNORMAL /HPF (ref 0–4)
SARS-COV-2 RNA, RT PCR: NOT DETECTED
SLIDE REVIEW: ABNORMAL
SODIUM BLD-SCNC: 133 MMOL/L (ref 136–145)
SPECIFIC GRAVITY UA: >=1.03 (ref 1–1.03)
TOTAL PROTEIN: 6.9 G/DL (ref 6.4–8.2)
TROPONIN: <0.01 NG/ML
TSH SERPL DL<=0.05 MIU/L-ACNC: 0.41 UIU/ML (ref 0.27–4.2)
URINE REFLEX TO CULTURE: YES
URINE TYPE: ABNORMAL
UROBILINOGEN, URINE: 1 E.U./DL
WBC # BLD: 20.1 K/UL (ref 4–11)
WBC UA: ABNORMAL /HPF (ref 0–5)

## 2022-10-07 PROCEDURE — 72125 CT NECK SPINE W/O DYE: CPT

## 2022-10-07 PROCEDURE — 87636 SARSCOV2 & INF A&B AMP PRB: CPT

## 2022-10-07 PROCEDURE — 70450 CT HEAD/BRAIN W/O DYE: CPT

## 2022-10-07 PROCEDURE — 99285 EMERGENCY DEPT VISIT HI MDM: CPT

## 2022-10-07 PROCEDURE — 84443 ASSAY THYROID STIM HORMONE: CPT

## 2022-10-07 PROCEDURE — 87086 URINE CULTURE/COLONY COUNT: CPT

## 2022-10-07 PROCEDURE — 72128 CT CHEST SPINE W/O DYE: CPT

## 2022-10-07 PROCEDURE — 83605 ASSAY OF LACTIC ACID: CPT

## 2022-10-07 PROCEDURE — 96361 HYDRATE IV INFUSION ADD-ON: CPT

## 2022-10-07 PROCEDURE — 85025 COMPLETE CBC W/AUTO DIFF WBC: CPT

## 2022-10-07 PROCEDURE — 80053 COMPREHEN METABOLIC PANEL: CPT

## 2022-10-07 PROCEDURE — 83690 ASSAY OF LIPASE: CPT

## 2022-10-07 PROCEDURE — 6360000002 HC RX W HCPCS: Performed by: STUDENT IN AN ORGANIZED HEALTH CARE EDUCATION/TRAINING PROGRAM

## 2022-10-07 PROCEDURE — 72131 CT LUMBAR SPINE W/O DYE: CPT

## 2022-10-07 PROCEDURE — 93005 ELECTROCARDIOGRAM TRACING: CPT | Performed by: STUDENT IN AN ORGANIZED HEALTH CARE EDUCATION/TRAINING PROGRAM

## 2022-10-07 PROCEDURE — 81001 URINALYSIS AUTO W/SCOPE: CPT

## 2022-10-07 PROCEDURE — 87077 CULTURE AEROBIC IDENTIFY: CPT

## 2022-10-07 PROCEDURE — 84484 ASSAY OF TROPONIN QUANT: CPT

## 2022-10-07 PROCEDURE — 2580000003 HC RX 258: Performed by: STUDENT IN AN ORGANIZED HEALTH CARE EDUCATION/TRAINING PROGRAM

## 2022-10-07 PROCEDURE — 87186 SC STD MICRODIL/AGAR DIL: CPT

## 2022-10-07 PROCEDURE — 1200000000 HC SEMI PRIVATE

## 2022-10-07 PROCEDURE — 96375 TX/PRO/DX INJ NEW DRUG ADDON: CPT

## 2022-10-07 PROCEDURE — 82077 ASSAY SPEC XCP UR&BREATH IA: CPT

## 2022-10-07 PROCEDURE — 71045 X-RAY EXAM CHEST 1 VIEW: CPT

## 2022-10-07 PROCEDURE — 87040 BLOOD CULTURE FOR BACTERIA: CPT

## 2022-10-07 PROCEDURE — 96365 THER/PROPH/DIAG IV INF INIT: CPT

## 2022-10-07 PROCEDURE — 36415 COLL VENOUS BLD VENIPUNCTURE: CPT

## 2022-10-07 PROCEDURE — 74176 CT ABD & PELVIS W/O CONTRAST: CPT

## 2022-10-07 RX ORDER — 0.9 % SODIUM CHLORIDE 0.9 %
1000 INTRAVENOUS SOLUTION INTRAVENOUS ONCE
Status: COMPLETED | OUTPATIENT
Start: 2022-10-07 | End: 2022-10-07

## 2022-10-07 RX ORDER — MAGNESIUM OXIDE 400 MG/1
400 TABLET ORAL 2 TIMES DAILY
COMMUNITY

## 2022-10-07 RX ORDER — THIAMINE MONONITRATE (VIT B1) 100 MG
100 TABLET ORAL DAILY
COMMUNITY

## 2022-10-07 RX ORDER — ONDANSETRON 2 MG/ML
4 INJECTION INTRAMUSCULAR; INTRAVENOUS ONCE
Status: COMPLETED | OUTPATIENT
Start: 2022-10-07 | End: 2022-10-07

## 2022-10-07 RX ORDER — OMEPRAZOLE 20 MG/1
20 CAPSULE, DELAYED RELEASE ORAL 2 TIMES DAILY
COMMUNITY

## 2022-10-07 RX ORDER — MORPHINE SULFATE 2 MG/ML
2 INJECTION, SOLUTION INTRAMUSCULAR; INTRAVENOUS
Status: COMPLETED | OUTPATIENT
Start: 2022-10-07 | End: 2022-10-07

## 2022-10-07 RX ORDER — KETOROLAC TROMETHAMINE 30 MG/ML
15 INJECTION, SOLUTION INTRAMUSCULAR; INTRAVENOUS ONCE
Status: COMPLETED | OUTPATIENT
Start: 2022-10-07 | End: 2022-10-07

## 2022-10-07 RX ORDER — FOLIC ACID 1 MG/1
1 TABLET ORAL DAILY
COMMUNITY

## 2022-10-07 RX ADMIN — SODIUM CHLORIDE 1000 ML: 9 INJECTION, SOLUTION INTRAVENOUS at 20:06

## 2022-10-07 RX ADMIN — ONDANSETRON HYDROCHLORIDE 4 MG: 2 INJECTION, SOLUTION INTRAMUSCULAR; INTRAVENOUS at 20:09

## 2022-10-07 RX ADMIN — KETOROLAC TROMETHAMINE 15 MG: 30 INJECTION, SOLUTION INTRAMUSCULAR at 20:07

## 2022-10-07 RX ADMIN — CEFTRIAXONE SODIUM 1000 MG: 1 INJECTION, POWDER, FOR SOLUTION INTRAMUSCULAR; INTRAVENOUS at 22:19

## 2022-10-07 RX ADMIN — MORPHINE SULFATE 2 MG: 2 INJECTION, SOLUTION INTRAMUSCULAR; INTRAVENOUS at 20:09

## 2022-10-07 ASSESSMENT — PAIN DESCRIPTION - LOCATION
LOCATION: LEG
LOCATION: ABDOMEN;LEG

## 2022-10-07 ASSESSMENT — PAIN - FUNCTIONAL ASSESSMENT
PAIN_FUNCTIONAL_ASSESSMENT: 0-10
PAIN_FUNCTIONAL_ASSESSMENT: PREVENTS OR INTERFERES SOME ACTIVE ACTIVITIES AND ADLS

## 2022-10-07 ASSESSMENT — PAIN SCALES - GENERAL
PAINLEVEL_OUTOF10: 8
PAINLEVEL_OUTOF10: 5

## 2022-10-07 ASSESSMENT — PAIN DESCRIPTION - DESCRIPTORS: DESCRIPTORS: ACHING

## 2022-10-07 ASSESSMENT — PAIN DESCRIPTION - ORIENTATION: ORIENTATION: RIGHT;LEFT

## 2022-10-07 NOTE — ED PROVIDER NOTES
[address] ED      CHIEF COMPLAINT  Other (Patient brought in by , C/O pain all over, stomach and legs.  state he is main caregiver and having problems caring for her at home, trying to get assistance. States he came home today and she was incontinent of bowels. )       HISTORY OF PRESENT ILLNESS  Allison Winters is a 69 y.o. female  who presents to the ED complaining of abdominal pain and leg pain as well as nausea.  states that she has been having nausea and abdominal pain for months. No significant vomiting. He states that over the last week she is also complaining of leg pain and has great difficulty moving around due to weakness and pain. He states over the last 3 days she has been incontinent of bowels with diarrhea. Unaware of any fevers or chills. States that she is more confused the last couple days  than usual.    No other complaints, modifying factors or associated symptoms. I have reviewed the following from the nursing documentation.     Past Medical History:   Diagnosis Date    Acute systolic CHF (congestive heart failure) (HCC)     Asthma     Back ache     Cataract     Cerebral artery occlusion with cerebral infarction ([address] 2016    Diabetes mellitus ([address]     type !! - diet controlled    Hyperlipidemia     Hypertension     Insomnia     TIA (transient ischemic attack) 2016     Past Surgical History:   Procedure Laterality Date    ABDOMEN SURGERY      c section    CATARACT REMOVAL WITH IMPLANT Left [id number]    LEFT EYE CATARACT PHACOEMULSIFICATION INTRAOCULAR LENS     SECTION      DENTAL SURGERY      EYE SURGERY  10/29/13     RIGHT EYE CATARACT PHACOEMULSIFICATION INTRAOCULAR LENS     Family History   Problem Relation Age of Onset    High Blood Pressure Mother     Miscarriages / Conrad Ashley Mother     Cancer Father     Diabetes Father     Heart Disease Father     Kidney Disease Father     Asthma Sister     Cancer Sister         Breast Cancer Depression Brother     Substance Abuse Paternal Aunt     Stroke Maternal Grandmother     Diabetes Paternal Grandmother      Social History     Socioeconomic History    Marital status:      Spouse name: carolyne    Number of children: Not on file    Years of education: Not on file    Highest education level: Not on file   Occupational History    Not on file   Tobacco Use    Smoking status: Every Day     Packs/day: 2.00     Years: 58.00     Pack years: 116.00     Types: Cigarettes    Smokeless tobacco: Never    Tobacco comments:     not totally ready to quit today   Vaping Use    Vaping Use: Never used   Substance and Sexual Activity    Alcohol use:  Yes     Alcohol/week: 2.0 - 3.0 standard drinks     Types: 2 - 3 Shots of liquor per week    Drug use: No    Sexual activity: Yes     Partners: Male   Other Topics Concern    Not on file   Social History Narrative    Not on file     Social Determinants of Health     Financial Resource Strain: Not on file   Food Insecurity: Not on file   Transportation Needs: Not on file   Physical Activity: Not on file   Stress: Not on file   Social Connections: Not on file   Intimate Partner Violence: Not on file   Housing Stability: Not on file     Current Facility-Administered Medications   Medication Dose Route Frequency Provider Last Rate Last Admin    0.9 % sodium chloride bolus  1,000 mL IntraVENous [facility]        ondansetron [facility]) injection 4 mg  4 mg IntraVENous Once Vamshi Matck, DO        ketorolac (TORADOL) injection 15 mg  15 mg IntraVENous Once Vamshi Mattock, DO        morphine (PF) injection 2 mg  2 mg IntraVENous NOW Vamshi Matck, DO         Current Outpatient Medications   Medication Sig Dispense Refill    albuterol sulfate HFA (VENTOLIN HFA) 108 (90 Base) MCG/ACT inhaler Inhale 2 puffs into the lungs every 6 hours as needed for Wheezing      atorvastatin (LIPITOR) 20 MG tablet Take 20 mg by mouth daily      bisacodyl (DULCOLAX) 10 MG suppository Place 10 mg rectally daily      gabapentin (NEURONTIN) 100 MG capsule Take 100 mg by mouth at bedtime. HYDROcodone-acetaminophen (NORCO) 5-325 MG per tablet Take 1 tablet by mouth every 8 hours as needed for Pain.       lisinopril (PRINIVIL;ZESTRIL) 20 MG tablet Take 20 mg by mouth daily      ondansetron (ZOFRAN) 4 MG tablet Take 1 tablet by mouth every 8 hours as needed for Nausea 20 tablet 0    metoclopramide (REGLAN) 5 MG tablet Take 1 tablet by mouth 3 times daily 120 tablet 3    divalproex (DEPAKOTE) 250 MG DR tablet Take 1 tablet by mouth every 12 hours 60 tablet 0    ARIPiprazole (ABILIFY) 5 MG tablet Take 1 tablet by mouth daily 30 tablet 0    amLODIPine (NORVASC) 5 MG tablet Take 1 tablet by mouth daily 30 tablet 0    acetaminophen (TYLENOL) 500 MG tablet Take 2 tablets by mouth 3 times daily 120 tablet 0    donepezil (ARICEPT) 5 MG tablet Take 1 tablet by mouth daily (with breakfast) 30 tablet 0    nicotine (NICODERM CQ) 21 MG/24HR Place 1 patch onto the skin daily 30 patch 0    meloxicam (MOBIC) 7.5 MG tablet Take 7.5 mg by mouth daily      oxybutynin (DITROPAN-XL) 10 MG extended release tablet Take 10 mg by mouth daily (Patient not taking: Reported on 8/26/2022)      traZODone (DESYREL) 50 MG tablet Take 50 mg by mouth nightly      ondansetron (ZOFRAN ODT) 4 MG disintegrating tablet Take 1 tablet by mouth every 8 hours as needed for Nausea 20 tablet 0    furosemide (LASIX) 20 MG tablet Take 1 tablet by mouth daily 30 tablet 3    ferrous sulfate (IRON 325) 325 (65 Fe) MG tablet Take 1 tablet by mouth 2 times daily (with meals) 60 tablet 3    metFORMIN (GLUCOPHAGE) 500 MG tablet Take 500 mg by mouth 2 times daily (with meals)      levothyroxine (SYNTHROID) 25 MCG tablet Take 1 tablet by mouth Daily 30 tablet 0     Allergies   Allergen Reactions    Mold Extract [Trichophyton Mentagrophytes]        REVIEW OF SYSTEMS  10 systems reviewed, pertinent positives per HPI otherwise noted to be negative. PHYSICAL EXAM  /84   Pulse (!) 140   Temp 98.2 °F (36.8 °C) (Tympanic)   Resp 18   Ht 5' 5\" (1.651 m)   Wt 118 lb (53.5 kg)   SpO2 97%   BMI 19.64 kg/m²    General: Appears chronically ill. Alert  HEENT: Head atraumatic, Eyes normal inspection, PERRL. Normal ENT inspection, Pharynx normal. No signs of dehydration  NECK: Normal inspection  RESPIRATORY: Normal breath sounds. No chest wall tenderness. No respiratory distress  CVS: Heart rate and rhythm regular. No Murmurs  ABDOMEN/GI: Soft, mild diffuse tenderness, No distention  BACK: Normal inspection  EXTREMITIES: Non-Tender. Full ROM. Normal appearance. No Pedal edema  NEURO: Alert and confused. Sensation normal. Motor normal  PSYCH: Mood normal. Affect normal.  SKIN: Color normal. No rash. Warm, Dry    LABS  I have reviewed all labs for this visit.    Results for orders placed or performed during the hospital encounter of 10/07/22   COVID-19 & Influenza Combo    Specimen: Nasopharyngeal Swab   Result Value Ref Range    SARS-CoV-2 RNA, RT PCR NOT DETECTED NOT DETECTED    INFLUENZA A NOT DETECTED NOT DETECTED    INFLUENZA B NOT DETECTED NOT DETECTED   CBC with Auto Differential   Result Value Ref Range    WBC 20.1 (H) 4.0 - 11.0 K/uL    RBC 4.51 4.00 - 5.20 M/uL    Hemoglobin 14.0 12.0 - 16.0 g/dL    Hematocrit 42.2 36.0 - 48.0 %    MCV 93.4 80.0 - 100.0 fL    MCH 31.1 26.0 - 34.0 pg    MCHC 33.3 31.0 - 36.0 g/dL    RDW 14.4 12.4 - 15.4 %    Platelets 815 970 - 006 K/uL    MPV 6.6 5.0 - 10.5 fL    PLATELET SLIDE REVIEW Adequate     SLIDE REVIEW see below     Neutrophils % 78.0 %    Lymphocytes % 5.0 %    Monocytes % 6.0 %    Eosinophils % 1.0 %    Basophils % 0.0 %    Neutrophils Absolute 16.9 (H) 1.7 - 7.7 K/uL    Lymphocytes Absolute 1.8 1.0 - 5.1 K/uL    Monocytes Absolute 1.2 0.0 - 1.3 K/uL    Eosinophils Absolute 0.2 0.0 - 0.6 K/uL    Basophils Absolute 0.0 0.0 - 0.2 K/uL    Bands Relative 6 0 - 7 %    Atypical Lymphocytes Relative 4 0 - 6 %    RBC Morphology Normal    CMP w/ Reflex to MG   Result Value Ref Range    Sodium 133 (L) 136 - 145 mmol/L    Potassium reflex Magnesium 4.8 3.5 - 5.1 mmol/L    Chloride 95 (L) 99 - 110 mmol/L    CO2 26 21 - 32 mmol/L    Anion Gap 12 3 - 16    Glucose 176 (H) 70 - 99 mg/dL    BUN 23 (H) 7 - 20 mg/dL    Creatinine 1.2 0.6 - 1.2 mg/dL    GFR Non- 44 (A) >60    GFR  53 (A) >60    Calcium 10.4 8.3 - 10.6 mg/dL    Total Protein 6.9 6.4 - 8.2 g/dL    Albumin 3.8 3.4 - 5.0 g/dL    Albumin/Globulin Ratio 1.2 1.1 - 2.2    Total Bilirubin 0.7 0.0 - 1.0 mg/dL    Alkaline Phosphatase 96 40 - 129 U/L    ALT 6 (L) 10 - 40 U/L    AST 9 (L) 15 - 37 U/L   ETOH   Result Value Ref Range    Ethanol Lvl None Detected mg/dL   Lactic Acid   Result Value Ref Range    Lactic Acid 2.2 (H) 0.4 - 2.0 mmol/L   Lipase   Result Value Ref Range    Lipase 12.0 (L) 13.0 - 60.0 U/L   Troponin   Result Value Ref Range    Troponin <0.01 <0.01 ng/mL   TSH   Result Value Ref Range    TSH 0.41 0.27 - 4.20 uIU/mL   Urinalysis with Reflex to Culture    Specimen: Urine   Result Value Ref Range    Color, UA Yellow Straw/Yellow    Clarity, UA SL CLOUDY (A) Clear    Glucose, Ur Negative Negative mg/dL    Bilirubin Urine SMALL (A) Negative    Ketones, Urine TRACE (A) Negative mg/dL    Specific Gravity, UA >=1.030 1.005 - 1.030    Blood, Urine Negative Negative    pH, UA 5.5 5.0 - 8.0    Protein, UA 30 (A) Negative mg/dL    Urobilinogen, Urine 1.0 <2.0 E.U./dL    Nitrite, Urine POSITIVE (A) Negative    Leukocyte Esterase, Urine MODERATE (A) Negative    Microscopic Examination YES     Urine Type NotGiven     Urine Reflex to Culture Yes    Microscopic Urinalysis   Result Value Ref Range    Hyaline Casts, UA 3-5 (A) 0 - 2 /LPF    WBC, UA 21-50 (A) 0 - 5 /HPF    RBC, UA 3-4 0 - 4 /HPF    Epithelial Cells, UA 6-10 (A) 0 - 5 /HPF    Bacteria, UA 3+ (A) None Seen /HPF    Amorphous, UA 1+ /HPF   EKG 12 Lead   Result Value Ref Range    Ventricular Rate 120 BPM    Atrial Rate 120 BPM    P-R Interval 174 ms    QRS Duration 76 ms    Q-T Interval 310 ms    QTc Calculation (Bazett) 438 ms    P Axis 55 degrees    R Axis 55 degrees    T Axis 76 degrees    Diagnosis       Sinus tachycardiaLow voltage QRSSeptal infarct (cited on or before 30-NOV-2020)Abnormal ECGWhen compared with ECG of 04-JUL-2022 14:09,Aberrant conduction is no longer Present       ECG  The Ekg interpreted by me shows  Sinus tachycardia with a rate of 120 bpm.  Normal axis. No acute injury pattern. , QRS 96, QTc 438. RADIOLOGY  CT THORACIC SPINE WO CONTRAST   Final Result   Moderate degenerative disc changes throughout the thoracic spine which is   most prominent along the upper and midthoracic region with no obvious acute   abnormality seen. CT LUMBAR SPINE WO CONTRAST   Final Result   Severe multilevel degenerative disc disease with extensive endplate   degenerative changes and subchondral cystic and erosive changes from L2   through L5 with no obvious acute fracture. Mild subluxation of L4 on L5 and minimal subluxation of L3 on L4 with slight   retrolisthesis of L2 on L3 which appears degenerative in etiology with no   obvious pars defects      Moderate osteoarthritic changes facets throughout causing diffuse moderate   spinal stenosis      Mild to moderate central disc bulges from L2 through S1. If the patient is   persistently symptomatic, suggest MRI for further evaluation. CT CHEST ABDOMEN PELVIS WO CONTRAST Additional Contrast? None   Preliminary Result   1. No acute cardiopulmonary process. 2. Rectal distention with moderate stool volume, rectal wall thickening and   perirectal inflammatory changes which can be seen with stercoral colitis. 3. Diffuse atherosclerosis including coronary artery involvement. 4. Subsegmental atelectasis, otherwise no acute pulmonary process. 5. Minimal secretions seen in the trachea.          CT CSpine W/O Contrast   Final Result   1. No acute osseous abnormality of the cervical spine. 2. Moderate multilevel degenerative changes. CT Head W/O Contrast   Final Result   1. No acute intracranial abnormality. 2. Stable diffuse parenchymal volume loss and chronic white matter   microvascular ischemic changes. 3. Stable chronic lacunar infarcts in the left basal ganglia and bilateral   thalami. XR CHEST PORTABLE   Final Result   Minimal bibasilar discoid atelectasis or scarring which is less prominent               ED COURSE/MDM  Patient seen and evaluated. Old records reviewed. Labs and imaging reviewed and results discussed with patient. Lab work is obtained. Urine is concerning for UTI. She does have a leukocytosis as well. Treated with a liter of IV fluid and Toradol and morphine. This greatly improves her heart rate. Lactic acid is mildly elevated. CT abdomen pelvis shows possible stercoral colitis. Patient discussed with Dr. Chris cohen, who agreed to admit the patient to his service. Critical care    Critical care time 34 minutes exclusive from separate billable procedures that were performed. The following was considered in the determination of critical care but not limited to the level of medical decision making, intensive cardiac and/or respiratory monitoring, frequent vital sign monitoring, evaluation of laboratory studies, evaluation of radiographic studies, oxygen monitoring, and constant monitoring and speaking to family at bedside. Is this patient to be included in the SEP-1 Core Measure due to severe sepsis or septic shock? Yes   SEP-1 CORE MEASURE DATA      Sepsis Criteria   Severe Sepsis Criteria   Septic Shock Criteria     Must be confirmed or suspected to move forward with diagnosis of sepsis.     Must meet 2:    [] Temperature > 100.9 F (38.3 C)        or < 96.8 F (36 C)  [x] HR > 90  [] RR > 20  [x] WBC > 12 or < 4 or 10% bands      AND:      [x] Infection Confirmed or        Suspected. Must meet 1:    [x] Lactate > 2       or   [] Signs of Organ Dysfunction:    - SBP < 90 or MAP < 65  - Altered mental status  - Creatinine > 2 or increased from      baseline  - Urine Output < 0.5 ml/kg/hr  - Bilirubin > 2  - INR > 1.5 (not anticoagulated)  - Platelets < 679,986  - Acute Respiratory Failure as     evidenced by new need for NIPPV     or mechanical ventilation      [] No criteria met for Severe Sepsis. Must meet 1:    [] Lactate > 4        or   [] SBP < 90 or MAP < 65 for at        least two readings in the first        hour after fluid bolus        administration      [] Vasopressors initiated (if hypotension persists after fluid resuscitation)        [x] No criteria met for Septic Shock. Patient Vitals for the past 6 hrs:   BP Temp Pulse Resp SpO2 Height Weight Weight Method Percent Weight Change   10/07/22 1842 133/84 98.2 °F (36.8 °C) (!) 140 18 97 % 5' 5\" (1.651 m) 118 lb (53.5 kg) Stated 0   10/07/22 2315 (!) 151/81 -- 89 14 91 % -- -- -- --      Recent Labs     10/07/22  0200 10/07/22  1952   WBC  --  20.1*   LACTA 2.2*  --    CREATININE  --  1.2   BILITOT  --  0.7   PLT  --  414         Time Severe Sepsis Identified: 2130    Fluid Resuscitation Rational: less than 30mL/kg because of a history of CHF NYHA III or IV with symptoms with minimal exertion/at rest.  Instead, 1000ml was ordered. More fluid initially would be potentially detrimental to the patient    Repeat lactate level: ordered and pending at this time    Reassessment Exam:   Not applicable. Patient does not have septic shock.     During the patient's ED course, the patient was given:  Medications   0.9 % sodium chloride bolus (has no administration in time range)   ondansetron (ZOFRAN) injection 4 mg (has no administration in time range)   ketorolac (TORADOL) injection 15 mg (has no administration in time range)   morphine (PF) injection 2 mg (has no administration in time range) CLINICAL IMPRESSION  1. Urinary tract infection with hematuria, site unspecified    2. Septicemia (HCC)    3. Stercoral colitis        Blood pressure 133/84, pulse (!) 140, temperature 98.2 °F (36.8 °C), temperature source Tympanic, resp. rate 18, height 5' 5\" (1.651 m), weight 118 lb (53.5 kg), SpO2 97 %. DISPOSITION  Charmayne Carwin was admitted in stable condition. Patient was given scripts for the following medications. I counseled patient how to take these medications. New Prescriptions    No medications on file       Follow-up with:  No follow-up provider specified. DISCLAIMER: This chart was created using Dragon dictation software. Efforts were made by me to ensure accuracy, however some errors may be present due to limitations of this technology and occasionally words are not transcribed correctly.       Lucio Francois, DO  10/08/22 [address] [address], DO  10/10/22 0508

## 2022-10-08 PROBLEM — K52.89 STERCORAL COLITIS: Status: ACTIVE | Noted: 2022-10-08

## 2022-10-08 LAB
ALBUMIN SERPL-MCNC: 3.2 G/DL (ref 3.4–5)
ALP BLD-CCNC: 86 U/L (ref 40–129)
ALT SERPL-CCNC: <5 U/L (ref 10–40)
ANION GAP SERPL CALCULATED.3IONS-SCNC: 10 MMOL/L (ref 3–16)
AST SERPL-CCNC: 8 U/L (ref 15–37)
BASOPHILS ABSOLUTE: 0 K/UL (ref 0–0.2)
BASOPHILS RELATIVE PERCENT: 0.2 %
BILIRUB SERPL-MCNC: 0.4 MG/DL (ref 0–1)
BILIRUBIN DIRECT: <0.2 MG/DL (ref 0–0.3)
BILIRUBIN, INDIRECT: ABNORMAL MG/DL (ref 0–1)
BUN BLDV-MCNC: 27 MG/DL (ref 7–20)
CALCIUM SERPL-MCNC: 9.2 MG/DL (ref 8.3–10.6)
CHLORIDE BLD-SCNC: 101 MMOL/L (ref 99–110)
CO2: 23 MMOL/L (ref 21–32)
CREAT SERPL-MCNC: 1.1 MG/DL (ref 0.6–1.2)
EKG ATRIAL RATE: 120 BPM
EKG DIAGNOSIS: NORMAL
EKG P AXIS: 55 DEGREES
EKG P-R INTERVAL: 174 MS
EKG Q-T INTERVAL: 310 MS
EKG QRS DURATION: 76 MS
EKG QTC CALCULATION (BAZETT): 438 MS
EKG R AXIS: 55 DEGREES
EKG T AXIS: 76 DEGREES
EKG VENTRICULAR RATE: 120 BPM
EOSINOPHILS ABSOLUTE: 0 K/UL (ref 0–0.6)
EOSINOPHILS RELATIVE PERCENT: 0 %
GFR AFRICAN AMERICAN: 58
GFR NON-AFRICAN AMERICAN: 48
GLUCOSE BLD-MCNC: 118 MG/DL (ref 70–99)
HCT VFR BLD CALC: 36.5 % (ref 36–48)
HEMOGLOBIN: 12.3 G/DL (ref 12–16)
LACTIC ACID, SEPSIS: 1.7 MMOL/L (ref 0.4–1.9)
LACTIC ACID: 0.7 MMOL/L (ref 0.4–2)
LACTIC ACID: 0.8 MMOL/L (ref 0.4–2)
LYMPHOCYTES ABSOLUTE: 0.8 K/UL (ref 1–5.1)
LYMPHOCYTES RELATIVE PERCENT: 6.3 %
MCH RBC QN AUTO: 33.1 PG (ref 26–34)
MCHC RBC AUTO-ENTMCNC: 33.7 G/DL (ref 31–36)
MCV RBC AUTO: 98.2 FL (ref 80–100)
MONOCYTES ABSOLUTE: 1.4 K/UL (ref 0–1.3)
MONOCYTES RELATIVE PERCENT: 10.6 %
NEUTROPHILS ABSOLUTE: 10.7 K/UL (ref 1.7–7.7)
NEUTROPHILS RELATIVE PERCENT: 82.9 %
PDW BLD-RTO: 14.6 % (ref 12.4–15.4)
PLATELET # BLD: 336 K/UL (ref 135–450)
PMV BLD AUTO: 7.2 FL (ref 5–10.5)
POTASSIUM REFLEX MAGNESIUM: 4.5 MMOL/L (ref 3.5–5.1)
RBC # BLD: 3.72 M/UL (ref 4–5.2)
SODIUM BLD-SCNC: 134 MMOL/L (ref 136–145)
TOTAL PROTEIN: 5.7 G/DL (ref 6.4–8.2)
WBC # BLD: 12.9 K/UL (ref 4–11)

## 2022-10-08 PROCEDURE — 80076 HEPATIC FUNCTION PANEL: CPT

## 2022-10-08 PROCEDURE — 6370000000 HC RX 637 (ALT 250 FOR IP): Performed by: HOSPITALIST

## 2022-10-08 PROCEDURE — 2500000003 HC RX 250 WO HCPCS: Performed by: INTERNAL MEDICINE

## 2022-10-08 PROCEDURE — 80048 BASIC METABOLIC PNL TOTAL CA: CPT

## 2022-10-08 PROCEDURE — 83605 ASSAY OF LACTIC ACID: CPT

## 2022-10-08 PROCEDURE — 36415 COLL VENOUS BLD VENIPUNCTURE: CPT

## 2022-10-08 PROCEDURE — 99233 SBSQ HOSP IP/OBS HIGH 50: CPT | Performed by: INTERNAL MEDICINE

## 2022-10-08 PROCEDURE — 92526 ORAL FUNCTION THERAPY: CPT

## 2022-10-08 PROCEDURE — 85025 COMPLETE CBC W/AUTO DIFF WBC: CPT

## 2022-10-08 PROCEDURE — 1200000000 HC SEMI PRIVATE

## 2022-10-08 PROCEDURE — 92610 EVALUATE SWALLOWING FUNCTION: CPT

## 2022-10-08 PROCEDURE — 2580000003 HC RX 258: Performed by: HOSPITALIST

## 2022-10-08 PROCEDURE — 6360000002 HC RX W HCPCS: Performed by: HOSPITALIST

## 2022-10-08 RX ORDER — METRONIDAZOLE 500 MG/100ML
500 INJECTION, SOLUTION INTRAVENOUS EVERY 8 HOURS
Status: DISCONTINUED | OUTPATIENT
Start: 2022-10-08 | End: 2022-10-11 | Stop reason: HOSPADM

## 2022-10-08 RX ORDER — SODIUM CHLORIDE 0.9 % (FLUSH) 0.9 %
5-40 SYRINGE (ML) INJECTION PRN
Status: DISCONTINUED | OUTPATIENT
Start: 2022-10-08 | End: 2022-10-11 | Stop reason: HOSPADM

## 2022-10-08 RX ORDER — BISACODYL 10 MG
10 SUPPOSITORY, RECTAL RECTAL DAILY
Status: DISCONTINUED | OUTPATIENT
Start: 2022-10-08 | End: 2022-10-08

## 2022-10-08 RX ORDER — LEVOFLOXACIN 5 MG/ML
250 INJECTION, SOLUTION INTRAVENOUS EVERY 24 HOURS
Status: DISCONTINUED | OUTPATIENT
Start: 2022-10-09 | End: 2022-10-11 | Stop reason: HOSPADM

## 2022-10-08 RX ORDER — ONDANSETRON 4 MG/1
4 TABLET, ORALLY DISINTEGRATING ORAL EVERY 8 HOURS PRN
Status: DISCONTINUED | OUTPATIENT
Start: 2022-10-08 | End: 2022-10-11 | Stop reason: HOSPADM

## 2022-10-08 RX ORDER — POLYETHYLENE GLYCOL 3350 17 G/17G
17 POWDER, FOR SOLUTION ORAL DAILY PRN
Status: DISCONTINUED | OUTPATIENT
Start: 2022-10-08 | End: 2022-10-11 | Stop reason: HOSPADM

## 2022-10-08 RX ORDER — OXYBUTYNIN CHLORIDE 5 MG/1
10 TABLET, EXTENDED RELEASE ORAL DAILY
Status: DISCONTINUED | OUTPATIENT
Start: 2022-10-08 | End: 2022-10-09 | Stop reason: CLARIF

## 2022-10-08 RX ORDER — DOCUSATE SODIUM 100 MG/1
100 CAPSULE, LIQUID FILLED ORAL 2 TIMES DAILY
Status: DISCONTINUED | OUTPATIENT
Start: 2022-10-08 | End: 2022-10-11 | Stop reason: HOSPADM

## 2022-10-08 RX ORDER — ENOXAPARIN SODIUM 100 MG/ML
40 INJECTION SUBCUTANEOUS DAILY
Status: DISCONTINUED | OUTPATIENT
Start: 2022-10-08 | End: 2022-10-11 | Stop reason: HOSPADM

## 2022-10-08 RX ORDER — LACTULOSE 10 G/15ML
20 SOLUTION ORAL 2 TIMES DAILY
Status: DISCONTINUED | OUTPATIENT
Start: 2022-10-08 | End: 2022-10-08

## 2022-10-08 RX ORDER — LEVOFLOXACIN 5 MG/ML
500 INJECTION, SOLUTION INTRAVENOUS ONCE
Status: COMPLETED | OUTPATIENT
Start: 2022-10-08 | End: 2022-10-08

## 2022-10-08 RX ORDER — DONEPEZIL HYDROCHLORIDE 5 MG/1
5 TABLET, FILM COATED ORAL
Status: DISCONTINUED | OUTPATIENT
Start: 2022-10-08 | End: 2022-10-11 | Stop reason: HOSPADM

## 2022-10-08 RX ORDER — ATORVASTATIN CALCIUM 10 MG/1
20 TABLET, FILM COATED ORAL DAILY
Status: DISCONTINUED | OUTPATIENT
Start: 2022-10-08 | End: 2022-10-11 | Stop reason: HOSPADM

## 2022-10-08 RX ORDER — ARIPIPRAZOLE 10 MG/1
5 TABLET ORAL DAILY
Status: DISCONTINUED | OUTPATIENT
Start: 2022-10-08 | End: 2022-10-11 | Stop reason: HOSPADM

## 2022-10-08 RX ORDER — DIVALPROEX SODIUM 250 MG/1
250 TABLET, DELAYED RELEASE ORAL EVERY 12 HOURS SCHEDULED
Status: DISCONTINUED | OUTPATIENT
Start: 2022-10-08 | End: 2022-10-09

## 2022-10-08 RX ORDER — ACETAMINOPHEN 325 MG/1
650 TABLET ORAL EVERY 6 HOURS PRN
Status: DISCONTINUED | OUTPATIENT
Start: 2022-10-08 | End: 2022-10-11 | Stop reason: HOSPADM

## 2022-10-08 RX ORDER — PANTOPRAZOLE SODIUM 40 MG/1
40 TABLET, DELAYED RELEASE ORAL
Status: DISCONTINUED | OUTPATIENT
Start: 2022-10-08 | End: 2022-10-11 | Stop reason: HOSPADM

## 2022-10-08 RX ORDER — CHOLECALCIFEROL (VITAMIN D3) 125 MCG
1000 CAPSULE ORAL DAILY
Status: DISCONTINUED | OUTPATIENT
Start: 2022-10-08 | End: 2022-10-11 | Stop reason: HOSPADM

## 2022-10-08 RX ORDER — LACTULOSE 10 G/15ML
20 SOLUTION ORAL 2 TIMES DAILY
Status: COMPLETED | OUTPATIENT
Start: 2022-10-08 | End: 2022-10-09

## 2022-10-08 RX ORDER — SODIUM CHLORIDE 9 MG/ML
INJECTION, SOLUTION INTRAVENOUS PRN
Status: DISCONTINUED | OUTPATIENT
Start: 2022-10-08 | End: 2022-10-11 | Stop reason: HOSPADM

## 2022-10-08 RX ORDER — SODIUM CHLORIDE 9 MG/ML
INJECTION, SOLUTION INTRAVENOUS CONTINUOUS
Status: DISCONTINUED | OUTPATIENT
Start: 2022-10-08 | End: 2022-10-10

## 2022-10-08 RX ORDER — SODIUM CHLORIDE 0.9 % (FLUSH) 0.9 %
5-40 SYRINGE (ML) INJECTION EVERY 12 HOURS SCHEDULED
Status: DISCONTINUED | OUTPATIENT
Start: 2022-10-08 | End: 2022-10-11 | Stop reason: HOSPADM

## 2022-10-08 RX ORDER — LEVOTHYROXINE SODIUM 0.03 MG/1
25 TABLET ORAL DAILY
Status: DISCONTINUED | OUTPATIENT
Start: 2022-10-08 | End: 2022-10-11 | Stop reason: HOSPADM

## 2022-10-08 RX ORDER — ACETAMINOPHEN 650 MG/1
650 SUPPOSITORY RECTAL EVERY 6 HOURS PRN
Status: DISCONTINUED | OUTPATIENT
Start: 2022-10-08 | End: 2022-10-11 | Stop reason: HOSPADM

## 2022-10-08 RX ORDER — ONDANSETRON 2 MG/ML
4 INJECTION INTRAMUSCULAR; INTRAVENOUS EVERY 6 HOURS PRN
Status: DISCONTINUED | OUTPATIENT
Start: 2022-10-08 | End: 2022-10-11 | Stop reason: HOSPADM

## 2022-10-08 RX ORDER — FOLIC ACID 1 MG/1
1 TABLET ORAL DAILY
Status: DISCONTINUED | OUTPATIENT
Start: 2022-10-08 | End: 2022-10-11 | Stop reason: HOSPADM

## 2022-10-08 RX ADMIN — METRONIDAZOLE 500 MG: 500 INJECTION, SOLUTION INTRAVENOUS at 13:09

## 2022-10-08 RX ADMIN — ENOXAPARIN SODIUM 40 MG: 100 INJECTION SUBCUTANEOUS at 13:25

## 2022-10-08 RX ADMIN — ATORVASTATIN CALCIUM 20 MG: 10 TABLET, FILM COATED ORAL at 13:16

## 2022-10-08 RX ADMIN — LEVOFLOXACIN 500 MG: 500 INJECTION, SOLUTION INTRAVENOUS at 13:12

## 2022-10-08 RX ADMIN — SODIUM CHLORIDE: 9 INJECTION, SOLUTION INTRAVENOUS at 21:03

## 2022-10-08 RX ADMIN — DONEPEZIL HYDROCHLORIDE 5 MG: 5 TABLET, FILM COATED ORAL at 13:20

## 2022-10-08 RX ADMIN — SODIUM CHLORIDE: 9 INJECTION, SOLUTION INTRAVENOUS at 13:05

## 2022-10-08 RX ADMIN — CYANOCOBALAMIN TAB 500 MCG 1000 MCG: 500 TAB at 13:18

## 2022-10-08 RX ADMIN — FOLIC ACID 1 MG: 1 TABLET ORAL at 13:17

## 2022-10-08 RX ADMIN — SODIUM CHLORIDE: 9 INJECTION, SOLUTION INTRAVENOUS at 05:35

## 2022-10-08 RX ADMIN — ARIPIPRAZOLE 5 MG: 10 TABLET ORAL at 13:14

## 2022-10-08 RX ADMIN — OXYBUTYNIN CHLORIDE 10 MG: 5 TABLET, EXTENDED RELEASE ORAL at 13:26

## 2022-10-08 ASSESSMENT — PAIN SCALES - WONG BAKER: WONGBAKER_NUMERICALRESPONSE: 0

## 2022-10-08 ASSESSMENT — LIFESTYLE VARIABLES
HOW OFTEN DO YOU HAVE A DRINK CONTAINING ALCOHOL: NEVER
HOW MANY STANDARD DRINKS CONTAINING ALCOHOL DO YOU HAVE ON A TYPICAL DAY: PATIENT DOES NOT DRINK

## 2022-10-08 ASSESSMENT — PAIN SCALES - GENERAL: PAINLEVEL_OUTOF10: 0

## 2022-10-08 NOTE — H&P
Hospital Medicine History & Physical      PCP: Rebeka Pelletier MD    Date of Admission: 10/7/2022    Date of Service: Pt seen/examined on 10/7/22 and Admitted to Inpatient with expected LOS greater than two midnights due to medical therapy. Chief Complaint:  AMS      History Of Present Illness:     69 y.o. female brought in from home by her  due to AMS, increased weakness, diarrhea and stool incontinence. Patient is currently seen on room air, in no respiratory distress. She is awake, alert however minimally responsive. She denies any abd pain, chest pain,n/v. Patient unable to contribute further to HPI. Past Medical History:          Diagnosis Date    Acute systolic CHF (congestive heart failure) (HCC)     Asthma     Back ache     Cataract     Cerebral artery occlusion with cerebral infarction ([facility] 2016    Diabetes mellitus ([facility] [address]     type !! - diet controlled    Hyperlipidemia     Hypertension     Insomnia     TIA (transient ischemic attack) 2016       Past Surgical History:          Procedure Laterality Date    ABDOMEN SURGERY      c section    CATARACT REMOVAL WITH IMPLANT Left [id number]    LEFT EYE CATARACT PHACOEMULSIFICATION INTRAOCULAR LENS     SECTION      DENTAL SURGERY      EYE SURGERY  10/29/13     RIGHT EYE CATARACT PHACOEMULSIFICATION INTRAOCULAR LENS       Medications Prior to Admission:      Prior to Admission medications    Medication Sig Start Date End Date Taking?  Authorizing Provider   vitamin B-1 (THIAMINE) 100 MG tablet Take 100 mg by mouth daily   Yes Historical Provider, MD   omeprazole (PRILOSEC) 20 MG delayed release capsule Take 20 mg by mouth 2 times daily   Yes Historical Provider, MD   folic acid (FOLVITE) 1 MG tablet Take 1 mg by mouth daily   Yes Historical Provider, MD   cyanocobalamin 1000 MCG tablet Take 1,000 mcg by mouth daily   Yes Historical Provider, MD   vitamin D (CHOLECALCIFEROL) 25 MCG (1000 UT) TABS tablet Take 1,000 Units by mouth daily daily  Patient not taking: Reported on 10/7/2022 5/26/21   Stacy Marcum MD   meloxicam (MOBIC) 7.5 MG tablet Take 7.5 mg by mouth daily    Historical Provider, MD   oxybutynin (DITROPAN-XL) 10 MG extended release tablet Take 10 mg by mouth daily    Historical Provider, MD   traZODone (DESYREL) 50 MG tablet Take 50 mg by mouth nightly    Historical Provider, MD   ondansetron (ZOFRAN ODT) 4 MG disintegrating tablet Take 1 tablet by mouth every 8 hours as needed for Nausea 3/2/21   LEONA Nixon CNP   furosemide (LASIX) 20 MG tablet Take 1 tablet by mouth daily  Patient not taking: Reported on 10/7/2022 1/20/21   Lucian De La Torre MD   ferrous sulfate (IRON 325) 325 (65 Fe) MG tablet Take 1 tablet by mouth 2 times daily (with meals) 1/19/21   Lucian De La Torre MD   metFORMIN (GLUCOPHAGE) 500 MG tablet Take 500 mg by mouth 2 times daily (with meals) 12/19/18   Historical Provider, MD   levothyroxine (SYNTHROID) 25 MCG tablet Take 1 tablet by mouth Daily  Patient taking differently: Take 50 mcg by mouth Daily 10/20/17   Hayes Bowles MD       Allergies:  Mold extract [trichophyton mentagrophytes]    Social History:         TOBACCO:   reports that she has been smoking cigarettes. She has a 116.00 pack-year smoking history. She has never used smokeless tobacco.  ETOH:   reports current alcohol use of about 2.0 - 3.0 standard drinks per week. Family History:             Problem Relation Age of Onset    High Blood Pressure Mother     Miscarriages / [place] Mother     Cancer Father     Diabetes Father     Heart Disease Father     Kidney Disease Father     Asthma Sister     Cancer Sister         Breast Cancer    Depression Brother     Substance Abuse Paternal Aunt     Stroke Maternal Grandmother     Diabetes Paternal Grandmother        REVIEW OF SYSTEMS:   Pertinent positives as noted in the HPI. All other systems reviewed and negative.     PHYSICAL EXAM PERFORMED:    /84   Pulse (!) 140   Temp 98.2 °F (36.8 °C) (Tympanic)   Resp 18   Ht 5' 5\" (1.651 m)   Wt 118 lb (53.5 kg)   SpO2 97%   BMI 19.64 kg/m²     General appearance:  No apparent distress, appears stated age    HEENT:  Normal cephalic, atraumatic without obvious deformity. Pupils equal, round Extra ocular muscles intact. Conjunctivae/corneas clear. Neck: Supple, with full range of motion. No jugular venous distention. Trachea midline. Respiratory:  Normal respiratory effort. Clear to auscultation, bilaterally without Rales/Wheezes/Rhonchi. Cardiovascular:  Regular rate and rhythm    Abdomen: Soft, non-tender, non-distended with normal bowel sounds. Musculoskeletal:  No clubbing, cyanosis or edema bilaterally. No calf tenderness  Skin: Skin color, texture, turgor normal.            Labs:     Recent Labs     10/07/22  1952   WBC 20.1*   HGB 14.0   HCT 42.2        Recent Labs     10/07/22  1952   *   K 4.8   CL 95*   CO2 26   BUN 23*   CREATININE 1.2   CALCIUM 10.4     Recent Labs     10/07/22  1952   AST 9*   ALT 6*   BILITOT 0.7   ALKPHOS 96     No results for input(s): INR in the last 72 hours. Recent Labs     10/07/22  1952   TROPONINI <0.01       Urinalysis:      Lab Results   Component Value Date/Time    NITRU POSITIVE 10/07/2022 07:52 PM    WBCUA 21-50 10/07/2022 07:52 PM    BACTERIA 3+ 10/07/2022 07:52 PM    RBCUA 3-4 10/07/2022 07:52 PM    BLOODU Negative 10/07/2022 07:52 PM    SPECGRAV >=1.030 10/07/2022 07:52 PM    GLUCOSEU Negative 10/07/2022 07:52 PM       Radiology:        CT THORACIC SPINE WO CONTRAST   Final Result   Moderate degenerative disc changes throughout the thoracic spine which is   most prominent along the upper and midthoracic region with no obvious acute   abnormality seen.          CT LUMBAR SPINE WO CONTRAST   Final Result   Severe multilevel degenerative disc disease with extensive endplate   degenerative changes and subchondral cystic and erosive changes from L2   through L5 with no obvious acute fracture. Mild subluxation of L4 on L5 and minimal subluxation of L3 on L4 with slight   retrolisthesis of L2 on L3 which appears degenerative in etiology with no   obvious pars defects      Moderate osteoarthritic changes facets throughout causing diffuse moderate   spinal stenosis      Mild to moderate central disc bulges from L2 through S1. If the patient is   persistently symptomatic, suggest MRI for further evaluation. CT CHEST ABDOMEN PELVIS WO CONTRAST Additional Contrast? None   Preliminary Result   1. No acute cardiopulmonary process. 2. Rectal distention with moderate stool volume, rectal wall thickening and   perirectal inflammatory changes which can be seen with stercoral colitis. 3. Diffuse atherosclerosis including coronary artery involvement. 4. Subsegmental atelectasis, otherwise no acute pulmonary process. 5. Minimal secretions seen in the trachea. CT CSpine W/O Contrast   Final Result   1. No acute osseous abnormality of the cervical spine. 2. Moderate multilevel degenerative changes. CT Head W/O Contrast   Final Result   1. No acute intracranial abnormality. 2. Stable diffuse parenchymal volume loss and chronic white matter   microvascular ischemic changes. 3. Stable chronic lacunar infarcts in the left basal ganglia and bilateral   thalami.          XR CHEST PORTABLE   Final Result   Minimal bibasilar discoid atelectasis or scarring which is less prominent             ASSESSMENT:    Active Hospital Problems    Diagnosis Date Noted    Sepsis ([facility] [A41.9] 10/07/2022     Priority: Medium         PLAN:    Sepsis  - follow up blood and urine cultures  - trend lactic acid  - IVFs    UTI  - IV rocephin    Colitis  - benign abdomen    Hypothyroidism  - continue home meds      DVT Prophylaxis: lovneox  Diet: No diet orders on file  Code Status: Prior       Mary Cardenas MD    Thank you Hernandez Mcneil MD for the opportunity to be involved in this patient's care. If you have any questions or concerns please feel free to contact me at [phone].

## 2022-10-08 NOTE — PROGRESS NOTES
4 Eyes Skin Assessment     The patient is being assess for   Admission    I agree that 2 RN's have performed a thorough Head to Toe Skin Assessment on the patient. ALL assessment sites listed below have been assessed. Areas assessed for pressure by both nurses:   [x]   Head, Face, and Ears   [x]   Shoulders, Back, and Chest, Abdomen  [x]   Arms, Elbows, and Hands   [x]   Coccyx, Sacrum, and Ischium  [x]   Legs, Feet, and Heels        Dry, flaky skin. Scattered small bruises and small scabbed abrasions and scratch marks. Excoriation to pepper-area. Skin Assessed Under all Medical Devices by both nurses:  N/A               All Mepilex Borders were peeled back and area peeked at by both nurses:  No: N/A  Please list where Mepilex Borders are located:  N/A             **SHARE this note so that the co-signing nurse is able to place an eSignature**    Co-signer eSignature: Electronically signed by Lc Swift RN on 10/8/22 at 3:15 AM EDT    Does the Patient have Skin Breakdown related to pressure?   No          Johnson Prevention initiated:  NA   Wound Care Orders initiated:  NA      [facility] nurse consulted for Pressure Injury (Stage 3,4, Unstageable, DTI, NWPT, Complex wounds)and New or Established Ostomies:  NA      Primary Nurse eSignature: Electronically signed by Vincent Rivera RN on 10/8/22 at 3:14 AM EDT

## 2022-10-08 NOTE — PROGRESS NOTES
Patient is not able to demonstrate the ability to move from a reclining position to an upright position within the recliner due to mentation. Bedside Mobility Assessment Tool (BMAT):     Assessment Level 1- Sit and Shake    1. From a semi-reclined position, ask patient to sit up and rotate to a seated position at the side of the bed. Can use the bedrail. 2. Ask patient to reach out and grab your hand and shake making sure patient reaches across his/her midline. Fail- Patient is unable to perform tasks, patient is MOBILITY LEVEL 1. Assessment Level 2- Stretch and Point   1. With patient in seated position at the side of the bed, have patient place both feet on the floor (or stool) with knees no higher than hips. 2. Ask patient to stretch one leg and straighten the knee, then bend the ankle/flex and point the toes. If appropriate, repeat with the other leg. Fail- Patient is unable to complete task. Patient is MOBILITY LEVEL 2. Assessment Level 3- Stand   1. Ask patient to elevate off the bed or chair (seated to standing) using an assistive device (cane, bedrail). 2. Patient should be able to raise buttocks off be and hold for a count of five. May repeat once. Fail- Patient unable to demonstrate standing stability. Patient is MOBILITY LEVEL 3. Assessment Level 4- Walk   1. Ask patient to march in place at bedside. 2. Then ask patient to advance step and return each foot. Some medical conditions may render a patient from stepping backwards, use your best clinical judgement. Fail- Patient not able to complete tasks OR requires use of assistive device. Patient is MOBILITY LEVEL 3.      Fail r/t confusion  Mobility Level- 1

## 2022-10-08 NOTE — PLAN OF CARE
Problem: Discharge Planning  Goal: Discharge to home or other facility with appropriate resources  10/8/2022 1655 by Ernestina Lezama RN  Outcome: Progressing  Flowsheets (Taken 10/8/2022 0355 by Sapna Figueroa RN)  Discharge to home or other facility with appropriate resources: Identify barriers to discharge with patient and caregiver  10/8/2022 0305 by Sapna Figueroa RN  Outcome: Progressing     Problem: Pain  Goal: Verbalizes/displays adequate comfort level or baseline comfort level  10/8/2022 1655 by Ernestina Lezama RN  Outcome: Progressing  10/8/2022 0305 by Sapna Figueroa RN  Outcome: Progressing     Problem: Safety - Adult  Goal: Free from fall injury  10/8/2022 1655 by Ernsetina Lezama RN  Outcome: Progressing  10/8/2022 0305 by Sapna Figueroa RN  Outcome: Progressing     Problem: Skin/Tissue Integrity  Goal: Absence of new skin breakdown  Description: 1. Monitor for areas of redness and/or skin breakdown  2. Assess vascular access sites hourly  3. Every 4-6 hours minimum:  Change oxygen saturation probe site  4. Every 4-6 hours:  If on nasal continuous positive airway pressure, respiratory therapy assess nares and determine need for appliance change or resting period. 10/8/2022 1655 by Ernestina Lezama RN  Outcome: Progressing  10/8/2022 0305 by Sapna Figueroa RN  Outcome: Progressing     Problem: Chronic Conditions and Co-morbidities  Goal: Patient's chronic conditions and co-morbidity symptoms are monitored and maintained or improved  10/8/2022 1655 by Ernestina Lezama RN  Outcome: Progressing  10/8/2022 0305 by Sapna Figueroa RN  Outcome: Progressing     Problem: SLP Adult - Impaired Swallowing  Goal: By Discharge: Advance to least restrictive diet without signs or symptoms of aspiration for planned discharge setting. See evaluation for individualized goals.   10/8/2022 1035 by JUAN ANTONIO Mata  Outcome: Progressing

## 2022-10-08 NOTE — PROGRESS NOTES
Speech Language Pathology  Clinical Bedside Swallow Assessment  Facility/Department: [facility]        Recommendations:  Diet recommendation:IDDSI 5 Minced and moist Solids; IDDSI 0 Thin Liquids and IDDSI 0 Thin Liquids - NO straws; Meds crushed in puree as able  Instrumentation: Will continue to monitor need   Risk management: upright for all intake, stay upright for at least 30 mins after intake, small bites/sips, assist feed, no straws, 1:1 supervision with intake, oral care 2-3x/day to reduce adverse affects in the event of aspiration, alternate bites/sips, slow rate of intake, general GERD precautions, general aspiration precautions, and hold PO and contact SLP if s/s of aspiration or worsening respiratory status develop. Anurag Godwin  : 1945 (78 y.o.)   MRN: [id number]  ROOM: [facility]  ADMISSION DATE: 10/7/2022  PATIENT DIAGNOSIS(ES): Sepsis [facility]) [A41.9]  Chief Complaint   Patient presents with    Other     Patient brought in by , C/O pain all over, stomach and legs.  state he is main caregiver and having problems caring for her at home, trying to get assistance. States he came home today and she was incontinent of bowels.       Patient Active Problem List    Diagnosis Date Noted    Sepsis ([facility] [address] 10/07/2022    Unspecified dementia with behavioral disturbance 2021    Vascular dementia with behavior disturbance 2021    Chronic obstructive pulmonary disease ([facility] [address]     Chronic diastolic congestive heart failure (HCC)     Iron deficiency anemia     Pulmonary embolism without acute cor pulmonale (HCC)     Scalp laceration     Tachycardia 2020    Abnormal EKG     Hypotension     Chronic systolic CHF (congestive heart failure) ([address] 2020    TRINH (acute kidney injury) ([address] 2020    CHF (congestive heart failure), NYHA class III, acute, systolic ([address]     Acute pulmonary edema (HCC)     NSTEMI (non-ST elevated myocardial infarction) ([address]     Nonischemic cardiomyopathy ([address]     Acute hypoxemic respiratory failure ([address]     Hypomagnesemia     Hypokalemia     Shortness of breath 2020    SIRS (systemic inflammatory response syndrome) ([address] 10/16/2017    Thrombocytosis 10/16/2017    Chronic dCHF (grade 1 LVDD) 10/16/2017    Alcohol withdrawal ([address] 10/16/2017    Frequent falls 10/16/2017    Hypertensive urgency 10/16/2017    COPD exacerbation ([address] 10/16/2017    ETOH abuse 2017    Tobacco abuse 2017    Asthma 2017    Hyperlipidemia 2017    Hx of CVA involving R-ICA territory     CAD (coronary artery disease)     DM2 (diabetes mellitus, type 2) ([address]     Hyponatremia 2016    Hypertension 2016     Past Medical History:   Diagnosis Date    Acute systolic CHF (congestive heart failure) (HCC)     Asthma     Back ache     Cataract     Cerebral artery occlusion with cerebral infarction ([address] 2016    Diabetes mellitus ([facility] [address]     type !! - diet controlled    Hyperlipidemia     Hypertension     Insomnia     TIA (transient ischemic attack) 2016     Past Surgical History:   Procedure Laterality Date    ABDOMEN SURGERY      c section    CATARACT REMOVAL WITH IMPLANT Left [id number]    LEFT EYE CATARACT PHACOEMULSIFICATION INTRAOCULAR LENS     SECTION      DENTAL SURGERY      EYE SURGERY  10/29/13     RIGHT EYE CATARACT PHACOEMULSIFICATION INTRAOCULAR LENS     Allergies   Allergen Reactions    Mold Extract [Trichophyton Mentagrophytes]        DATE ADMIT:   10/7/22    Date of Evaluation: 10/8/2022   Evaluating Therapist: JUAN ANTONIO Vick    Chart Reviewed: : [x] Yes [] No    Current Diet: ADULT DIET; Regular    Recent Chest Radiography: [] Chest XR   [x] CT of Chest  Date: 10/7/22  Impressions  Impression   1. No acute cardiopulmonary process. 2. Rectal distention with moderate stool volume, rectal wall thickening and   perirectal inflammatory changes which can be seen with stercoral colitis. 3. Diffuse atherosclerosis including coronary artery involvement. 4. Subsegmental atelectasis, otherwise no acute pulmonary process. 5. Minimal secretions seen in the trachea. Pain: L jaw pain when chewing     Reason for Referral  Vickey Mcburney was referred for a bedside swallow evaluation to assess the efficiency of their swallow function, identify signs and symptoms of aspiration and make recommendations regarding safe dietary consistencies, effective compensatory strategies, and safe eating environment. Assessment    Medical record review/interview: Per MD H&P: \"    Predisposing dysphagia risk factors: Hx of CVA, Other chronic respiratory illness, and GERD, esophagitis   Clinical signs of possible chronic dysphagia: weight loss and hx of malnutrition  Precipitating dysphagia risk factors: reduced physical mobility and AMS    Patient Complaints:  Odynophagia: [] Yes [x] No  Globus Sensation: [] Yes [x] No  SOB with PO intake: [] Yes [x] No  Increased WOB with PO intake: [] Yes [x] No  Reflux Sx's: [] Yes [x] No  Weight loss: [x] Yes [] No  Coughing/Choking with PO intake: [] Yes [x] No  Reduced Appetite: [] Yes [x] No    Vitals:    10/08/22    BP: 132/75   Pulse: (!) 101   Resp: 16   Temp: 99.2 °F (37.3 °C)   SpO2: 91%       CBC:   Recent Labs     10/08/22  0730   WBC 12.9*   HGB 12.3         BMP:  Recent Labs     10/08/22  0730   *   K 4.5      CO2 23   BUN 27*   CREATININE 1.1   GLUCOSE 118*        Cranial nerve exam:   CN V (trigeminal): ophthalmic, maxillary, and mandibular facial sensation- WNL b/l  CN VII (facial): WNL b/l  CN IX/X (glossopharyngeal/vagus): MPT: DNT ; vocal quality: hoarse; cough: Productive  CN XII (hypoglossal): WNL b/l    Laryngeal function exam:   Secretions:  WNL  Vocal quality: See CN exam above  MPT: DNT  S/Z ratio: DNT  Pitch range: DNT  Cough: See CN exam above    Oral Care Status:    [] Oral Care [facility]  [x] Poor oral care status  [] Edentulous  [] Upper Dentures  [] Lower Dentures  [x] Missing/Broken Teeth  [x] Evidence of dental cavities/carries    PO trials:   IDDSI 0 (thin): Intermittent s/ of aspiration via straw ~3x productive coughing with expectoration of mucous x1, increasing risk when distracted/ food in oral cavity. Overall,  tolerated small, single sips of thin liquids via edge of cup. IDDSI 6 (soft and bite sized):   Pancakes & eggs; prolonged mastication with jaw pain. 1x overt s/ of aspiration with egg + addition of liquid wash. Full oral clearance given additional time. IDDSI 7 (regular):   Lynnell Rend; prolonged mastication with jaw pain. No overt s/ of aspiration. Full oral clearance given additional time. 3 oz water: DNT    Impressions:  Oral care provided and pt positioned upright in bed x2. Pt oriented to self only and verbalizing minimally. Significantly delayed processing with flat affect (blank stare). Clinical signs of oral-pharyngeal dysphagia, likely acute related to patient with AMS. Intermittent s/ of aspiration with thin liquids observed suspected to be related to poor attention to bolus/ delayed swallow given AMS. Reduced s/ with small, single sips via edge of cup and assist feed. Swallow prognosis is fair. Will continue to monitor need for instrumental swallow study. No concerns for acute pulmonary disease, WBC down-trending and pt is on RA.  reports no hx of dysphagia. Jaw pain when masticating. Dr. TAVERAS notified of pain in room considering pt.'s poor oral  hygiene status. Pt is a fair candidate for a  IDDSI 5 Minced and moist Solids; IDDSI 0 Thin Liquids and IDDSI 0 Thin Liquids - NO straws ; Meds crushed in puree as able. Will continue to monitor tolerance and upgrade as indicated. Recommendations:  Diet recommendation: IDDSI 5 Minced and moist Solids; IDDSI 0 Thin Liquids and IDDSI 0 Thin Liquids - NO straws ;  Meds crushed in puree as able  Instrumentation: Will continue to monitor need   Risk management: upright for all intake, stay upright for at least 30 mins after intake, small bites/sips, assist feed, no straws, 1:1 supervision with intake, oral care 2-3x/day to reduce adverse affects in the event of aspiration, alternate bites/sips, slow rate of intake, general GERD precautions, general aspiration precautions, and hold PO and contact SLP if s/s of aspiration or worsening respiratory status develop.     Prognosis: Fair    Recommended Intervention:   [] Dysphagia tx  [] Videostroboscopy                      [] NPO   [x] MBS - monitor need       [] Speech/Cog Eval    [x] Therapeutic PO Trials     [] Ice Chips   [] Other:  [] FEES                                                 Dysphagia Therapeutic Intervention:   []  Bolus control Exercises  []  Oral Motor Exercises  []  [employer] Protocol  []  Thermal Stimulation  []  Oral Care    []  Vital Stim/NMES  []  Laryngeal Exercises  [x]  Patient/Family Education  []  Pharyngeal Exercises  [x]  Therapeutic PO trials with SLP  [x]  Diet tolerance monitoring  []  Other:     Referrals:  Dentist     Goals:  Short Term Goals:  Timeframe for Short Term Goals: (5 days 10/13/22)  Goal 1: The patient will tolerate recommended diet with no clinical s/s of aspiration 5/5  Goal 2: The patient/caregiver will demonstrate understanding of compensatory swallow strategies, for improved swallow safety  Goal 3: The patient will tolerate therapeutic diet upgrade trials with no clinical s/s of aspiration 5/5    Long Term Goals:   Timeframe for Long Term Goals: (7 days 10/15/22)  Goal 1: The patient will tolerate least restrictive diet with no clinical s/s of aspiration or worsening respiratory/pulmonary status    Pt Education: SLP educated the patient re: Role of SLP, rationale for completion of assessment, results of assessment, recommendations, and POC  Pt Education Response: would benefit from ongoing education, RN aware, and caregiver aware    Duration/Frequency of Tx: 3x/wk Individuals Consulted:   [x]  Patient     []  NP         [x]  RN   []  RD                   [x]  MD      [x]  Family Member                        []  PA    []  Other:      Safety Devices / Report:  [x]  All fall risk precautions in place [x]  Safety handoff completed with RN  [x]  Bed alarm in place  [x]  Left in bed     []  Chair alarm in place  []  Left in chair   []  Call light in reach   [x]  Other:  at bedside           Total Treatment Time / Charges       Time in Time out Total Time / units   Swallow Eval/Tx Time  0843 0919 36 mins / 2 units      Signature: Thank you.    Angella Liu M.A, CCC-SLP/ CBIS

## 2022-10-08 NOTE — FLOWSHEET NOTE
10/08/22 1245   Vital Signs   Temp 99.7 °F (37.6 °C)   Temp Source Axillary   Heart Rate (!) 110   Heart Rate Source Monitor   Resp 18   BP (!) 159/81   BP Location Right upper arm   BP Method Automatic   MAP (Calculated) 107   Patient Position High fowlers   Level of Consciousness 0   MEWS Score 2   Pain Assessment   Pain Assessment Tejeda-Reynoso FACES   Pain Level 0   Oxygen Therapy   SpO2 95 %   O2 Device None (Room air)   AM assessment completed, see flow sheet. Pt is alert and oriented. Vital signs are elevated, meds have not been given, waiting for speech to clear, will follow up. Respirations are even & easy. No complaints voiced. Pt denies needs at this time. SR up x 2, and bed in low position. Call light is within reach.

## 2022-10-08 NOTE — PROGRESS NOTES
Hospitalist Progress Note      PCP: Stefany Saldaan MD    Date of Admission: 10/7/2022    Chief Complaint: from home weak confused and pain all over. Subjective:    at bedside. Reports pt has been getting progressively worse from confusion stand point. She did recognize him. He reports she still daily drinks alcohol at home.       + BM - loose per nursing report. Given stercoral colitis - will give 2 doses of lactulose today and continue bowel rest and started Abx. Medications:  Reviewed    Infusion Medications    sodium chloride      sodium chloride 75 mL/hr at 10/08/22 0535     Scheduled Medications    ARIPiprazole  5 mg Oral Daily    atorvastatin  20 mg Oral Daily    cyanocobalamin  1,000 mcg Oral Daily    divalproex  250 mg Oral 2 times per day    donepezil  5 mg Oral Daily with breakfast    folic acid  1 mg Oral Daily    levothyroxine  25 mcg Oral Daily    pantoprazole  40 mg Oral QAM AC    oxybutynin  10 mg Oral Daily    docusate sodium  100 mg Oral BID    sodium chloride flush  5-40 mL IntraVENous 2 times per day    enoxaparin  40 mg SubCUTAneous Daily    [START ON 10/9/2022] levofloxacin  250 mg IntraVENous Q24H    metroNIDAZOLE  500 mg IntraVENous Q8H    glycerin (ADULT)  1 suppository Rectal Once    lactulose  20 g Oral BID    levofloxacin  500 mg IntraVENous Once     PRN Meds: sodium chloride flush, sodium chloride, ondansetron **OR** ondansetron, polyethylene glycol, acetaminophen **OR** acetaminophen      Intake/Output Summary (Last 24 hours) at 10/8/2022 1240  Last data filed at 10/7/2022 2253  Gross per 24 hour   Intake 1050 ml   Output --   Net 1050 ml       Physical Exam Performed:    /75   Pulse (!) 101   Temp 99.2 °F (37.3 °C) (Axillary)   Resp 16   Ht 5' 5\" (1.651 m)   Wt 109 lb 14.4 oz (49.9 kg)   SpO2 91%   BMI 18.29 kg/m²     General appearance: No apparent distress, appears stated age and cooperative.   HEENT: Pupils equal, round, and reactive to light. Conjunctivae/corneas clear. Neck: Supple, with full range of motion. No jugular venous distention. Trachea midline. Respiratory:  Normal respiratory effort. Clear to auscultation, bilaterally without Rales/Wheezes/Rhonchi. Cardiovascular: Regular rate and rhythm with normal S1/S2 without murmurs, rubs or gallops. Abdomen: Soft, non-tender, non-distended with normal bowel sounds. Musculoskeletal: No clubbing, cyanosis or edema bilaterally. Full range of motion without deformity. Skin: Skin color, texture, turgor normal.  No rashes or lesions. Neurologic:  Neurovascularly intact without any focal sensory/motor deficits. Cranial nerves: II-XII intact, grossly non-focal.  Psychiatric: Alert and oriented, thought content appropriate, normal insight  Capillary Refill: Brisk, 3 seconds, normal   Peripheral Pulses: +2 palpable, equal bilaterally       Labs:   Recent Labs     10/07/22  1952 10/08/22  0730   WBC 20.1* 12.9*   HGB 14.0 12.3   HCT 42.2 36.5    336     Recent Labs     10/07/22  1952 10/08/22  0730   * 134*   K 4.8 4.5   CL 95* 101   CO2 26 23   BUN 23* 27*   CREATININE 1.2 1.1   CALCIUM 10.4 9.2     Recent Labs     10/07/22  1952 10/08/22  0730   AST 9* 8*   ALT 6* <5*   BILIDIR  --  <0.2   BILITOT 0.7 0.4   ALKPHOS 96 86     No results for input(s): INR in the last 72 hours. Recent Labs     10/07/22  1952   TROPONINI <0.01       Urinalysis:      Lab Results   Component Value Date/Time    NITRU POSITIVE 10/07/2022 07:52 PM    WBCUA 21-50 10/07/2022 07:52 PM    BACTERIA 3+ 10/07/2022 07:52 PM    RBCUA 3-4 10/07/2022 07:52 PM    BLOODU Negative 10/07/2022 07:52 PM    SPECGRAV >=1.030 10/07/2022 07:52 PM    GLUCOSEU Negative 10/07/2022 07:52 PM       Radiology:  CT THORACIC SPINE WO CONTRAST   Final Result   Moderate degenerative disc changes throughout the thoracic spine which is   most prominent along the upper and midthoracic region with no obvious acute   abnormality seen. CT LUMBAR SPINE WO CONTRAST   Final Result   Severe multilevel degenerative disc disease with extensive endplate   degenerative changes and subchondral cystic and erosive changes from L2   through L5 with no obvious acute fracture. Mild subluxation of L4 on L5 and minimal subluxation of L3 on L4 with slight   retrolisthesis of L2 on L3 which appears degenerative in etiology with no   obvious pars defects      Moderate osteoarthritic changes facets throughout causing diffuse moderate   spinal stenosis      Mild to moderate central disc bulges from L2 through S1. If the patient is   persistently symptomatic, suggest MRI for further evaluation. CT CHEST ABDOMEN PELVIS WO CONTRAST Additional Contrast? None   Preliminary Result   1. No acute cardiopulmonary process. 2. Rectal distention with moderate stool volume, rectal wall thickening and   perirectal inflammatory changes which can be seen with stercoral colitis. 3. Diffuse atherosclerosis including coronary artery involvement. 4. Subsegmental atelectasis, otherwise no acute pulmonary process. 5. Minimal secretions seen in the trachea. CT CSpine W/O Contrast   Final Result   1. No acute osseous abnormality of the cervical spine. 2. Moderate multilevel degenerative changes. CT Head W/O Contrast   Final Result   1. No acute intracranial abnormality. 2. Stable diffuse parenchymal volume loss and chronic white matter   microvascular ischemic changes. 3. Stable chronic lacunar infarcts in the left basal ganglia and bilateral   thalami. XR CHEST PORTABLE   Final Result   Minimal bibasilar discoid atelectasis or scarring which is less prominent                 Assessment/Plan:    Active Hospital Problems    Diagnosis     Sepsis ([facility] [address] [A41.9]      Priority: Medium     Sepsis. Due to UTI and stercoral colitis. Abx to levaquin and flagyl pending cultures. Stercoral Colitis   + BM, no blood.    Lactulose x2 and reassess. Bowel rest.       Alcohol Abuse - . Progressing dementia  Supportive care. Pt on abilify and depakote and aricept - continued. DVT Prophylaxis: lovenox  Diet: ADULT DIET;  Dysphagia - Minced and Moist  Code Status: Full Code  PT/OT Eval Status: as tolerated    Dispo - cc        Earnestine Hatchet, MD

## 2022-10-08 NOTE — FLOWSHEET NOTE
Admission assessment complete. See doc flow. Hx of dementia, disoriented x4 at this time. Home med completed by Jesus Garcia RN in the ER. Home med list placed in paper chart. Staff witnessed beg bugs present on the patient in the ER, Environmental precautions in place. Patient bathed with CHG wipes, clean gown and linens applied to patient. Patient incontinent of urine and stool. BM present, pepper-area cleansed and barrier cream wipes used for excoriation to pepper-area. VSS. Patient c/o left hip pain. 10/08/22 0139   Vital Signs   Temp 99.2 °F (37.3 °C)   Temp Source Axillary   Heart Rate (!) 101   Heart Rate Source Monitor   Resp 16   /75   BP Location Right upper arm   BP Method Automatic   MAP (Calculated) 94   Patient Position Semi fowlers   Level of Consciousness 0   MEWS Score 2   Oxygen Therapy   SpO2 91 %   O2 Device None (Room air)   Height and Weight   Height 5' 5\" (1.651 m)   Weight 109 lb 14.4 oz (49.9 kg)   Weight Method Actual;Bed scale   BSA (Calculated - sq m) 1.51 sq meters   BMI (Calculated) 18.3   Bed alarm in place. Call light and bedside table within easy reach.

## 2022-10-09 LAB
ALBUMIN SERPL-MCNC: 2.8 G/DL (ref 3.4–5)
ANION GAP SERPL CALCULATED.3IONS-SCNC: 10 MMOL/L (ref 3–16)
BASOPHILS ABSOLUTE: 0 K/UL (ref 0–0.2)
BASOPHILS RELATIVE PERCENT: 0.4 %
BUN BLDV-MCNC: 12 MG/DL (ref 7–20)
CALCIUM SERPL-MCNC: 9 MG/DL (ref 8.3–10.6)
CHLORIDE BLD-SCNC: 102 MMOL/L (ref 99–110)
CO2: 23 MMOL/L (ref 21–32)
CREAT SERPL-MCNC: 0.6 MG/DL (ref 0.6–1.2)
EOSINOPHILS ABSOLUTE: 0 K/UL (ref 0–0.6)
EOSINOPHILS RELATIVE PERCENT: 0.5 %
GFR AFRICAN AMERICAN: >60
GFR NON-AFRICAN AMERICAN: >60
GLUCOSE BLD-MCNC: 111 MG/DL (ref 70–99)
HCT VFR BLD CALC: 32.6 % (ref 36–48)
HEMOGLOBIN: 11.2 G/DL (ref 12–16)
LYMPHOCYTES ABSOLUTE: 1.2 K/UL (ref 1–5.1)
LYMPHOCYTES RELATIVE PERCENT: 12.2 %
MAGNESIUM: 1.5 MG/DL (ref 1.8–2.4)
MCH RBC QN AUTO: 34 PG (ref 26–34)
MCHC RBC AUTO-ENTMCNC: 34.4 G/DL (ref 31–36)
MCV RBC AUTO: 98.8 FL (ref 80–100)
MONOCYTES ABSOLUTE: 0.9 K/UL (ref 0–1.3)
MONOCYTES RELATIVE PERCENT: 9.8 %
NEUTROPHILS ABSOLUTE: 7.3 K/UL (ref 1.7–7.7)
NEUTROPHILS RELATIVE PERCENT: 77.1 %
ORGANISM: ABNORMAL
PDW BLD-RTO: 14.1 % (ref 12.4–15.4)
PHOSPHORUS: 2.4 MG/DL (ref 2.5–4.9)
PLATELET # BLD: 292 K/UL (ref 135–450)
PMV BLD AUTO: 6.6 FL (ref 5–10.5)
POTASSIUM SERPL-SCNC: 3.9 MMOL/L (ref 3.5–5.1)
RBC # BLD: 3.3 M/UL (ref 4–5.2)
SODIUM BLD-SCNC: 135 MMOL/L (ref 136–145)
URINE CULTURE, ROUTINE: ABNORMAL
WBC # BLD: 9.5 K/UL (ref 4–11)

## 2022-10-09 PROCEDURE — 85025 COMPLETE CBC W/AUTO DIFF WBC: CPT

## 2022-10-09 PROCEDURE — 36415 COLL VENOUS BLD VENIPUNCTURE: CPT

## 2022-10-09 PROCEDURE — 6360000002 HC RX W HCPCS: Performed by: HOSPITALIST

## 2022-10-09 PROCEDURE — 80069 RENAL FUNCTION PANEL: CPT

## 2022-10-09 PROCEDURE — 6360000002 HC RX W HCPCS: Performed by: INTERNAL MEDICINE

## 2022-10-09 PROCEDURE — 97166 OT EVAL MOD COMPLEX 45 MIN: CPT

## 2022-10-09 PROCEDURE — 97162 PT EVAL MOD COMPLEX 30 MIN: CPT

## 2022-10-09 PROCEDURE — 2500000003 HC RX 250 WO HCPCS: Performed by: INTERNAL MEDICINE

## 2022-10-09 PROCEDURE — 6370000000 HC RX 637 (ALT 250 FOR IP): Performed by: HOSPITALIST

## 2022-10-09 PROCEDURE — 2580000003 HC RX 258: Performed by: HOSPITALIST

## 2022-10-09 PROCEDURE — 97530 THERAPEUTIC ACTIVITIES: CPT

## 2022-10-09 PROCEDURE — 6370000000 HC RX 637 (ALT 250 FOR IP): Performed by: INTERNAL MEDICINE

## 2022-10-09 PROCEDURE — 83735 ASSAY OF MAGNESIUM: CPT

## 2022-10-09 PROCEDURE — 97535 SELF CARE MNGMENT TRAINING: CPT

## 2022-10-09 PROCEDURE — 99233 SBSQ HOSP IP/OBS HIGH 50: CPT | Performed by: INTERNAL MEDICINE

## 2022-10-09 PROCEDURE — 1200000000 HC SEMI PRIVATE

## 2022-10-09 RX ORDER — OXYBUTYNIN CHLORIDE 5 MG/1
5 TABLET ORAL 2 TIMES DAILY
Status: DISCONTINUED | OUTPATIENT
Start: 2022-10-09 | End: 2022-10-11 | Stop reason: HOSPADM

## 2022-10-09 RX ORDER — LISINOPRIL 20 MG/1
20 TABLET ORAL DAILY
Status: DISCONTINUED | OUTPATIENT
Start: 2022-10-09 | End: 2022-10-11 | Stop reason: HOSPADM

## 2022-10-09 RX ADMIN — ONDANSETRON HYDROCHLORIDE 4 MG: 2 INJECTION, SOLUTION INTRAMUSCULAR; INTRAVENOUS at 22:36

## 2022-10-09 RX ADMIN — CYANOCOBALAMIN TAB 500 MCG 1000 MCG: 500 TAB at 13:06

## 2022-10-09 RX ADMIN — METRONIDAZOLE 500 MG: 500 INJECTION, SOLUTION INTRAVENOUS at 00:43

## 2022-10-09 RX ADMIN — ENOXAPARIN SODIUM 40 MG: 100 INJECTION SUBCUTANEOUS at 13:14

## 2022-10-09 RX ADMIN — ONDANSETRON HYDROCHLORIDE 4 MG: 2 INJECTION, SOLUTION INTRAMUSCULAR; INTRAVENOUS at 05:08

## 2022-10-09 RX ADMIN — SODIUM CHLORIDE: 9 INJECTION, SOLUTION INTRAVENOUS at 13:02

## 2022-10-09 RX ADMIN — ATORVASTATIN CALCIUM 20 MG: 10 TABLET, FILM COATED ORAL at 13:07

## 2022-10-09 RX ADMIN — FOLIC ACID 1 MG: 1 TABLET ORAL at 13:14

## 2022-10-09 RX ADMIN — DOCUSATE SODIUM 100 MG: 100 CAPSULE, LIQUID FILLED ORAL at 00:40

## 2022-10-09 RX ADMIN — ARIPIPRAZOLE 5 MG: 10 TABLET ORAL at 13:07

## 2022-10-09 RX ADMIN — METRONIDAZOLE 500 MG: 500 INJECTION, SOLUTION INTRAVENOUS at 13:18

## 2022-10-09 RX ADMIN — PANTOPRAZOLE SODIUM 40 MG: 40 TABLET, DELAYED RELEASE ORAL at 05:08

## 2022-10-09 RX ADMIN — LEVOFLOXACIN 250 MG: 5 INJECTION, SOLUTION INTRAVENOUS at 09:03

## 2022-10-09 RX ADMIN — DOCUSATE SODIUM 100 MG: 100 CAPSULE, LIQUID FILLED ORAL at 22:36

## 2022-10-09 RX ADMIN — LEVOTHYROXINE SODIUM 25 MCG: 25 TABLET ORAL at 05:08

## 2022-10-09 RX ADMIN — METRONIDAZOLE 500 MG: 500 INJECTION, SOLUTION INTRAVENOUS at 05:16

## 2022-10-09 RX ADMIN — OXYBUTYNIN CHLORIDE 5 MG: 5 TABLET ORAL at 22:36

## 2022-10-09 RX ADMIN — OXYBUTYNIN CHLORIDE 5 MG: 5 TABLET ORAL at 13:14

## 2022-10-09 RX ADMIN — LACTULOSE 20 G: 10 SOLUTION ORAL at 13:06

## 2022-10-09 RX ADMIN — METRONIDAZOLE 500 MG: 500 INJECTION, SOLUTION INTRAVENOUS at 22:41

## 2022-10-09 RX ADMIN — LACTULOSE 20 G: 10 SOLUTION ORAL at 00:37

## 2022-10-09 RX ADMIN — DONEPEZIL HYDROCHLORIDE 5 MG: 5 TABLET, FILM COATED ORAL at 09:03

## 2022-10-09 ASSESSMENT — PAIN SCALES - WONG BAKER: WONGBAKER_NUMERICALRESPONSE: 0

## 2022-10-09 NOTE — PLAN OF CARE
Problem: Discharge Planning  Goal: Discharge to home or other facility with appropriate resources  10/8/2022 2355 by Thomas Zavala RN  Outcome: Progressing  10/8/2022 1655 by Cooper Whelan RN  Outcome: Progressing  Flowsheets (Taken 10/8/2022 0355 by Thomas Zavala RN)  Discharge to home or other facility with appropriate resources: Identify barriers to discharge with patient and caregiver     Problem: Pain  Goal: Verbalizes/displays adequate comfort level or baseline comfort level  10/8/2022 2355 by Thomas Zavala RN  Outcome: Progressing  10/8/2022 1655 by Cooper Whelan RN  Outcome: Progressing     Problem: Safety - Adult  Goal: Free from fall injury  10/8/2022 2355 by Thomas Zavala RN  Outcome: Progressing  10/8/2022 1655 by Cooper Whelan RN  Outcome: Progressing     Problem: Skin/Tissue Integrity  Goal: Absence of new skin breakdown  Description: 1. Monitor for areas of redness and/or skin breakdown  2. Assess vascular access sites hourly  3. Every 4-6 hours minimum:  Change oxygen saturation probe site  4. Every 4-6 hours:  If on nasal continuous positive airway pressure, respiratory therapy assess nares and determine need for appliance change or resting period. 10/8/2022 2355 by Thomas Zavala RN  Outcome: Progressing  10/8/2022 1655 by Cooper Whelan RN  Outcome: Progressing     Problem: Chronic Conditions and Co-morbidities  Goal: Patient's chronic conditions and co-morbidity symptoms are monitored and maintained or improved  10/8/2022 2355 by Thomas Zavala RN  Outcome: Progressing  10/8/2022 1655 by Cooper Whelan RN  Outcome: Progressing     Problem: SLP Adult - Impaired Swallowing  Goal: By Discharge: Advance to least restrictive diet without signs or symptoms of aspiration for planned discharge setting. See evaluation for individualized goals.   10/8/2022 1035 by JUAN ANTONIO Cordero  Outcome: Progressing

## 2022-10-09 NOTE — FLOWSHEET NOTE
10/09/22 0845   Vital Signs   Temp 98.7 °F (37.1 °C)   Temp Source Oral   Heart Rate 86   Heart Rate Source Monitor   Resp 16   BP (!) 153/93   BP Location Right upper arm   BP Method Automatic   MAP (Calculated) 113   Patient Position High fowlers   Level of Consciousness 0   MEWS Score 1   Pain Assessment   Pain Assessment Tejeda-Baker FACES   Tejeda-Baker Pain Rating 0   Oxygen Therapy   SpO2 94 %   O2 Device None (Room air)   AM assessment completed, see flow sheet. Pt is alert and oriented. Vital signs are noted in flow sheet. Respirations are even & easy. No complaints voiced. Pt denies needs at this time. SR up x 2, and bed in low position. Call light is within reach.

## 2022-10-09 NOTE — PLAN OF CARE
Problem: Discharge Planning  Goal: Discharge to home or other facility with appropriate resources  10/9/2022 1156 by Ashkan Daigle RN  Outcome: Progressing  10/8/2022 2355 by Orlando Boudreaux RN  Outcome: Progressing     Problem: Pain  Goal: Verbalizes/displays adequate comfort level or baseline comfort level  10/9/2022 1156 by Ashkan Daigle RN  Outcome: Progressing  10/8/2022 2355 by Orlando Boudreaux RN  Outcome: Progressing     Problem: Safety - Adult  Goal: Free from fall injury  10/9/2022 1156 by Ashkan Daigle RN  Outcome: Progressing  10/8/2022 2355 by Orlando Boudreaux RN  Outcome: Progressing     Problem: Skin/Tissue Integrity  Goal: Absence of new skin breakdown  Description: 1. Monitor for areas of redness and/or skin breakdown  2. Assess vascular access sites hourly  3. Every 4-6 hours minimum:  Change oxygen saturation probe site  4. Every 4-6 hours:  If on nasal continuous positive airway pressure, respiratory therapy assess nares and determine need for appliance change or resting period.   10/9/2022 1156 by Ashkan Daigle RN  Outcome: Progressing  10/8/2022 2355 by Orlando Boudreaux RN  Outcome: Progressing     Problem: Chronic Conditions and Co-morbidities  Goal: Patient's chronic conditions and co-morbidity symptoms are monitored and maintained or improved  10/9/2022 1156 by Ashkan Daigle RN  Outcome: Progressing  10/8/2022 2355 by Orlando Boudreaux RN  Outcome: Progressing

## 2022-10-09 NOTE — PROGRESS NOTES
Patient is not able to demonstrate the ability to move from a reclining position to an upright position within the recliner due to mentation. Bedside Mobility Assessment Tool (BMAT):     Assessment Level 1- Sit and Shake    1. From a semi-reclined position, ask patient to sit up and rotate to a seated position at the side of the bed. Can use the bedrail. 2. Ask patient to reach out and grab your hand and shake making sure patient reaches across his/her midline. Pass- Patient is able to come to a seated position, maintain core strength. Maintains seated balance while reaching across midline. Move on to Assessment Level 2. Assessment Level 2- Stretch and Point   1. With patient in seated position at the side of the bed, have patient place both feet on the floor (or stool) with knees no higher than hips. 2. Ask patient to stretch one leg and straighten the knee, then bend the ankle/flex and point the toes. If appropriate, repeat with the other leg. Pass- Patient is able to demonstrate appropriate quad strength on intended weight bearing limb(s). Move onto Assessment Level 3. Assessment Level 3- Stand   1. Ask patient to elevate off the bed or chair (seated to standing) using an assistive device (cane, bedrail). 2. Patient should be able to raise buttocks off be and hold for a count of five. May repeat once. Pass- Patient maintains standing stability for at least 5 seconds, proceed to assessment level 4. Assessment Level 4- Walk   1. Ask patient to march in place at bedside. 2. Then ask patient to advance step and return each foot. Some medical conditions may render a patient from stepping backwards, use your best clinical judgement. Pass- Patient demonstrates balance while shifting weight and ability to step, takes independent steps, does not use assistive device patient is MOBILITY LEVEL 4.       Mobility Level- 4

## 2022-10-09 NOTE — PROGRESS NOTES
Inpatient Physical Therapy Evaluation and Treatment    Unit: [facility]  Date:  10/9/2022  Patient Name:    Julian Yoo  Admitting diagnosis:  Sepsis [facility]) [A41.9]  Admit Date:  10/7/2022  Precautions/Restrictions/WB Status/ Lines/ Wounds/ Oxygen: Fall risk, Bed/chair alarm, Lines -IV, Telemetry, Continuous pulse oximetry, and Isolation Precautions: Environmental      RN cleared patient for activity assessment including ambulation and transfers    Treatment Time:  9:50-10:40  Treatment Number:  1   Timed Code Treatment Minutes:  40minutes  Total Treatment Minutes:   50 minutes    Patient Goals for Therapy: \" none stated \"          Discharge Recommendations: SNF, continue to assess progress  DME needs for discharge:  ongoing assessment       Therapy recommendation for EMS Transport: can transport by wheelchair    Therapy recommendations for staff:   Assist of 1 with use of rolling walker (RW) and gait belt for all transfers to/from [facility]  to/from chair  History Of Present Illness:    Dacio:  \"73 y.o. female brought in from home by her  due to AMS, increased weakness, diarrhea and stool incontinence. Patient is currently seen on room air, in no respiratory distress. She is awake, alert however minimally responsive. She denies any abd pain, chest pain,n/v. Patient unable to contribute further to HPI. \"    Imaging:  CT THORACIC SPINE WO CONTRAST   Final Result   Moderate degenerative disc changes throughout the thoracic spine which is   most prominent along the upper and midthoracic region with no obvious acute   abnormality seen. CT LUMBAR SPINE WO CONTRAST   Final Result   Severe multilevel degenerative disc disease with extensive endplate   degenerative changes and subchondral cystic and erosive changes from L2   through L5 with no obvious acute fracture.        Mild subluxation of L4 on L5 and minimal subluxation of L3 on L4 with slight   retrolisthesis of L2 on L3 which appears degenerative in etiology with no   obvious pars defects       Moderate osteoarthritic changes facets throughout causing diffuse moderate   spinal stenosis       Mild to moderate central disc bulges from L2 through S1. If the patient is   persistently symptomatic, suggest MRI for further evaluation. CT CHEST ABDOMEN PELVIS WO CONTRAST Additional Contrast? None   Preliminary Result   1. No acute cardiopulmonary process. 2. Rectal distention with moderate stool volume, rectal wall thickening and   perirectal inflammatory changes which can be seen with stercoral colitis. 3. Diffuse atherosclerosis including coronary artery involvement. 4. Subsegmental atelectasis, otherwise no acute pulmonary process. 5. Minimal secretions seen in the trachea. CT CSpine W/O Contrast   Final Result   1. No acute osseous abnormality of the cervical spine. 2. Moderate multilevel degenerative changes. CT Head W/O Contrast   Final Result   1. No acute intracranial abnormality. 2. Stable diffuse parenchymal volume loss and chronic white matter   microvascular ischemic changes. 3. Stable chronic lacunar infarcts in the left basal ganglia and bilateral   thalami. XR CHEST PORTABLE   Final Result   Minimal bibasilar discoid atelectasis or scarring which is less prominent       Home Health S4 Level Recommendation:  Level 1 Standard  AM-PAC Mobility Score    AM-PAC Inpatient Mobility Raw Score : 17     Preadmission Environment  (information copied from admission 5/21)  Pt. Lives with family (Spouse Aman Corporal) Son lives upstairs  Home environment:    two story home  Steps to enter first floor: No steps and 1/2 steps to enter  Steps to second floor: Full flight of 12-13  Bathroom: tub/shower unit, grab bars and shower seat  Equipment owned: large SW and BSC, platform cane,     Preadmission Status:  Pt. Able to drive: No  Pt Fully independent with ADLs: Yes-usually sponge bathes  Pt.  Required assistance from family for: Cleaning, Cooking and Laundry   Pt. independent for transfers and gait and walked with German Tao RW  History of falls Yes- multiple (usually on driveway or front porch)     Pt reports that she does not use a RW \"all of the time\"    History of falls Unknown since last admission, has fallen per documentation from previous admission    Pain   No      Cognition    A&O Person and Place , Time (October)  Able to follow 1 step commands, impulsive at times    Subjective  Patient lying supine in bed with no family present. Pt agreeable to this PT eval & tx. Upper Extremity ROM/Strength  Please see OT evaluation. Lower Extremity ROM / Strength   AROM WFL: Yes  ROM limitations:     WFL to complete bed mobility and transfers. Lower Extremity Sensation    NT    Lower Extremity Proprioception:   NT    Coordination and Tone  WFL    Balance  Sitting:  Good ; SBA  Comments: no LOB    Standing: Good ; CGA  Comments: using RW and gait belt, felt a little unsteady    Bed Mobility   Supine to Sit:    Min A  for UE  Sit to Supine:   SBA  Rolling:   Not Tested  Scooting in sitting: SBA  Scooting in supine:  Max A  and 2 persons to [facility]    Transfer Training     Sit to stand:   CGA,VC for safety,moves impulsively; from EOB,SWAPNIL  Stand to sit:   CGA,to BSC,EOB  Bed to BSC:   CGA with use of gait belt and rolling walker (RW)    Gait gait deferred due to fatigue; pt ambulated 0 ft. Stair Training deferred, pt unsafe/ not appropriate to complete stairs at this time    Activity Tolerance   Pt completed therapy session with No adverse symptoms noted with activity    BP (mmHg)     HR (bpm) SpO2 (%)   Semifowlers before activity  145/88 84 bpm 94%   Seated EOB              Unable with 2 machines-malfct 83 94   Seated after activity                     Positioning Needs   Pt in bed, alarm set, positioned in proper neutral alignment and pressure relief provided.    Call light provided and all needs within reach      Other  RN aware of level of assist for transfers only, and that magdalene had BM, and is excoriated    Patient/Family Education   Pt educated on role of inpatient PT, POC, importance of continued activity, transfer techniques and calling for assist with mobility. Assessment  Pt seen for Physical Therapy evaluation in acute care setting. Pt demonstrated decreased Activity tolerance, Balance, and Safety as well as decreased independence with Ambulation, Bed Mobility , and Transfers. Recommending home with 24/7 assist and home PT, pending progress and 24/7 availability. If this is not available, then SNF    Goals : To be met in 3 visits:  1). Independent with LE Ex x 10 reps  2.) Bed to chair: Supervision    To be met in 6 visits:  1). Supine to/from sit: Supervision  2). Sit to/from stand: Supervision  3). Bed to chair: Supervision  4). Gait: Ambulate  125 ft.   with  SBA and use of rolling walker (RW)  5). Tolerate B LE exercises 3 sets of 10-15 reps  6). Ascend/descend 5 steps with CGA with use of hand rail unilateral and LRAD (least restrictive assistive device)    Rehabilitation Potential: Good  Strengths for achieving goals include:   PLOF, Family Support, and Pt cooperative   Barriers to achieving goals include:    Complexity of condition, Weakness, and cognition    Plan    To be seen 3-5 x / week  while in acute care setting for therapeutic exercises, bed mobility, transfers, progressive gait training, balance training, and family/patient education. Signature: Kait Valdez, PT #[id number]     If patient discharges from this facility prior to next visit, this note will serve as the Discharge Summary.

## 2022-10-09 NOTE — PROGRESS NOTES
Hospitalist Progress Note      PCP: Curt Sandoval MD    Date of Admission: 10/7/2022    Chief Complaint: from home weak confused and pain all over. Subjective:     Reports pt has been getting progressively worse from confusion stand point.  reported she still daily drinks alcohol at home. Pt had large BM last night - appears impaction is finally improving. Will get additional dose of lactulose this am and then hold and reassess bowel function. Medications:  Reviewed    Infusion Medications    sodium chloride 50 mL/hr at 10/08/22 1305    sodium chloride 75 mL/hr at 10/08/22 2103     Scheduled Medications    oxybutynin  5 mg Oral BID    ARIPiprazole  5 mg Oral Daily    atorvastatin  20 mg Oral Daily    cyanocobalamin  1,000 mcg Oral Daily    divalproex  250 mg Oral 2 times per day    donepezil  5 mg Oral Daily with breakfast    folic acid  1 mg Oral Daily    levothyroxine  25 mcg Oral Daily    pantoprazole  40 mg Oral QAM AC    docusate sodium  100 mg Oral BID    sodium chloride flush  5-40 mL IntraVENous 2 times per day    enoxaparin  40 mg SubCUTAneous Daily    levofloxacin  250 mg IntraVENous Q24H    metroNIDAZOLE  500 mg IntraVENous Q8H    lactulose  20 g Oral BID     PRN Meds: sodium chloride flush, sodium chloride, ondansetron **OR** ondansetron, polyethylene glycol, acetaminophen **OR** acetaminophen      Intake/Output Summary (Last 24 hours) at 10/9/2022 1015  Last data filed at 10/9/2022 0900  Gross per 24 hour   Intake 600 ml   Output --   Net 600 ml         Physical Exam Performed:    BP (!) 153/93   Pulse 86   Temp 98.7 °F (37.1 °C) (Oral)   Resp 16   Ht 5' 5\" (1.651 m)   Wt 109 lb 14.4 oz (49.9 kg)   SpO2 94%   BMI 18.29 kg/m²     General appearance: No apparent distress, appears stated age and cooperative. HEENT: Pupils equal, round, and reactive to light. Conjunctivae/corneas clear. Neck: Supple, with full range of motion. No jugular venous distention. Trachea midline. Respiratory:  Normal respiratory effort. Clear to auscultation, bilaterally without Rales/Wheezes/Rhonchi. Cardiovascular: Regular rate and rhythm with normal S1/S2 without murmurs, rubs or gallops. Abdomen: Soft, non-tender, non-distended with normal bowel sounds. Musculoskeletal: No clubbing, cyanosis or edema bilaterally. Full range of motion without deformity. Skin: Skin color, texture, turgor normal.  No rashes or lesions. Neurologic:  Neurovascularly intact without any focal sensory/motor deficits. Cranial nerves: II-XII intact, grossly non-focal.  Psychiatric: Alert and oriented, thought content appropriate, normal insight  Capillary Refill: Brisk, 3 seconds, normal   Peripheral Pulses: +2 palpable, equal bilaterally       Labs:   Recent Labs     10/07/22  1952 10/08/22  0730 10/09/22  0743   WBC 20.1* 12.9* 9.5   HGB 14.0 12.3 11.2*   HCT 42.2 36.5 32.6*    336 292       Recent Labs     10/07/22  1952 10/08/22  0730 10/09/22  0743   * 134* 135*   K 4.8 4.5 3.9   CL 95* 101 102   CO2 26 23 23   BUN 23* 27* 12   CREATININE 1.2 1.1 0.6   CALCIUM 10.4 9.2 9.0   PHOS  --   --  2.4*       Recent Labs     10/07/22  1952 10/08/22  0730   AST 9* 8*   ALT 6* <5*   BILIDIR  --  <0.2   BILITOT 0.7 0.4   ALKPHOS 96 86       No results for input(s): INR in the last 72 hours. Recent Labs     10/07/22  1952   TROPONINI <0.01         Urinalysis:      Lab Results   Component Value Date/Time    NITRU POSITIVE 10/07/2022 07:52 PM    WBCUA 21-50 10/07/2022 07:52 PM    BACTERIA 3+ 10/07/2022 07:52 PM    RBCUA 3-4 10/07/2022 07:52 PM    BLOODU Negative 10/07/2022 07:52 PM    SPECGRAV >=1.030 10/07/2022 07:52 PM    GLUCOSEU Negative 10/07/2022 07:52 PM       Radiology:  CT THORACIC SPINE WO CONTRAST   Final Result   Moderate degenerative disc changes throughout the thoracic spine which is   most prominent along the upper and midthoracic region with no obvious acute   abnormality seen. CT LUMBAR SPINE WO CONTRAST   Final Result   Severe multilevel degenerative disc disease with extensive endplate   degenerative changes and subchondral cystic and erosive changes from L2   through L5 with no obvious acute fracture. Mild subluxation of L4 on L5 and minimal subluxation of L3 on L4 with slight   retrolisthesis of L2 on L3 which appears degenerative in etiology with no   obvious pars defects      Moderate osteoarthritic changes facets throughout causing diffuse moderate   spinal stenosis      Mild to moderate central disc bulges from L2 through S1. If the patient is   persistently symptomatic, suggest MRI for further evaluation. CT CHEST ABDOMEN PELVIS WO CONTRAST Additional Contrast? None   Final Result   1. No acute cardiopulmonary process. 2. Rectal distention with moderate stool volume, rectal wall thickening and   perirectal inflammatory changes which can be seen with stercoral colitis. 3. Diffuse atherosclerosis including coronary artery involvement. 4. Subsegmental atelectasis, otherwise no acute pulmonary process. 5. Minimal secretions seen in the trachea. CT CSpine W/O Contrast   Final Result   1. No acute osseous abnormality of the cervical spine. 2. Moderate multilevel degenerative changes. CT Head W/O Contrast   Final Result   1. No acute intracranial abnormality. 2. Stable diffuse parenchymal volume loss and chronic white matter   microvascular ischemic changes. 3. Stable chronic lacunar infarcts in the left basal ganglia and bilateral   thalami. XR CHEST PORTABLE   Final Result   Minimal bibasilar discoid atelectasis or scarring which is less prominent                 Assessment/Plan:    Active Hospital Problems    Diagnosis     Stercoral colitis [K52.89]      Priority: Medium    Sepsis ([facility] [address] [A41.9]      Priority: Medium       Sepsis. Due to UTI and stercoral colitis. Abx to levaquin and flagyl pending cultures.          Stercoral Colitis with fecal impaction and post impaction diarrhea. no blood. Finally had a very large BM last night. Will get a dose of lactulose this morning and then hold and reassess bowel function. Alcohol Abuse - . Progressing dementia  Supportive care. Pt on abilify and aricept - continued. She has not been taking depakote in a long time. DVT Prophylaxis: lovenox  Diet: ADULT DIET; Dysphagia - Minced and Moist  Code Status: Full Code  PT/OT Eval Status: as tolerated    Dispo - cc    Progressing dementia in setting of ongoing alcohol abuse. Not sure if  will be able to care for her at home. Cont PTOT efforts. Eval for placement.           Nelly Whyte MD

## 2022-10-09 NOTE — FLOWSHEET NOTE
Patient has been awake the entire shift at this point. Oriented to self only. Pleasant and calm, redirectable when needed. Patient continues to have loose BMs. Bed alarm in place. Call light within reach.     10/09/22 0315   Vital Signs   Temp 98.2 °F (36.8 °C)   Temp Source Oral   Heart Rate 95   Heart Rate Source Monitor   Resp 16   BP (!) 162/90   BP Location Right upper arm   BP Method Automatic   MAP (Calculated) 114   Patient Position Semi fowlers   Level of Consciousness 0   MEWS Score 1   Oxygen Therapy   SpO2 94 %   O2 Device None (Room air)

## 2022-10-09 NOTE — PROGRESS NOTES
Occupational Therapy  Inpatient Occupational Therapy  Evaluation and Treatment    Unit: 2 [place]  Date:  10/9/2022  Patient Name:    Derrick Erickson  Admitting diagnosis:  Sepsis [facility]) [A41.9]  Admit Date:  10/7/2022  Precautions/Restrictions/WB Status/ Lines/ Wounds/ Oxygen: fall risk, IV, confusion, and telemetry,Environmental    Treatment Time:  3785-0819  Treatment Number: 1     Billable Treatment Time: 38 minutes   Total Treatment Time:   48   minutes    Patient Goals for Therapy:        Discharge Recommendations: Home with 24/7 assist and home therapy  DME needs for discharge: Needs Met       Therapy recommendations for staff:   Assist of 1 with use of rolling walker (RW) for all transfers to/from BSC/chair    History of Present Illness: Dacio:  \"73 y.o. female brought in from home by her  due to AMS, increased weakness, diarrhea and stool incontinence. Patient is currently seen on room air, in no respiratory distress. She is awake, alert however minimally responsive. She denies any abd pain, chest pain,n/v. Patient unable to contribute further to HPI. \"     Imaging:  CT THORACIC SPINE WO CONTRAST   Final Result   Moderate degenerative disc changes throughout the thoracic spine which is   most prominent along the upper and midthoracic region with no obvious acute   abnormality seen. CT LUMBAR SPINE WO CONTRAST   Final Result   Severe multilevel degenerative disc disease with extensive endplate   degenerative changes and subchondral cystic and erosive changes from L2   through L5 with no obvious acute fracture. Mild subluxation of L4 on L5 and minimal subluxation of L3 on L4 with slight   retrolisthesis of L2 on L3 which appears degenerative in etiology with no   obvious pars defects       Moderate osteoarthritic changes facets throughout causing diffuse moderate   spinal stenosis       Mild to moderate central disc bulges from L2 through S1.   If the patient is   persistently symptomatic, suggest MRI for further evaluation. CT CHEST ABDOMEN PELVIS WO CONTRAST Additional Contrast? None   Preliminary Result   1. No acute cardiopulmonary process. 2. Rectal distention with moderate stool volume, rectal wall thickening and   perirectal inflammatory changes which can be seen with stercoral colitis. 3. Diffuse atherosclerosis including coronary artery involvement. 4. Subsegmental atelectasis, otherwise no acute pulmonary process. 5. Minimal secretions seen in the trachea. CT CSpine W/O Contrast   Final Result   1. No acute osseous abnormality of the cervical spine. 2. Moderate multilevel degenerative changes. CT Head W/O Contrast   Final Result   1. No acute intracranial abnormality. 2. Stable diffuse parenchymal volume loss and chronic white matter   microvascular ischemic changes. 3. Stable chronic lacunar infarcts in the left basal ganglia and bilateral   thalami. XR CHEST PORTABLE   Final Result   Minimal bibasilar discoid atelectasis or scarring which is less prominent       Home Health S4 Level Recommendation:  Level 1 Standard  AM-PAC Score: AM-PAC Inpatient Daily Activity Raw Score: 17    Preadmission Environment      Information gathered from privious admission on 5/26/21. Attempted to confirm information with pt, however, pt poor historian. Pt. Lives with family (Spouse Rebeka Hernandez) Son lives upstairs  Home environment:    two story home  Steps to enter first floor: No steps and 1/2 steps to enter  Steps to second floor: Full flight of 12-13  Bathroom: tub/shower unit, grab bars and shower seat  Equipment owned: large SW and BSC, platform cane,     Preadmission Status:  Pt. Able to drive: No  Pt Fully independent with ADLs: Yes-usually sponge bathes  Pt.  Required assistance from family for: Cleaning, Cooking and Laundry   Pt. independent for transfers and gait and walked with Chepe SHAH  History of falls Yes- multiple (usually on driveway or front talia)    Pt reports that she does not use a RW \"all of the time\"    Subjective  Patient lying supine in bed with no family present   Pt agreeable to this OT eval & tx. Cognition    A&O Person with cues for pt tell us pts date, Place [facility], and Time   Able to follow 1 step commands  Impulsive with movement. Pain  No  Rating:NA  Location:NA  Pain Medicine Status: No request made        Upper Extremity ROM:    WFL,  pt able to perform all bed mobility, transfers, and gait without ROM limitation. Upper Extremity Strength:    BUE strength WFL, but not formally assessed w/ MMT      Upper Extremity Sensation    WNL    Upper Extremity Proprioception:  WNL    Coordination and Tone  WNL    Balance  Static Sitting:  Good -  CGA to SBA sitting EOB and on BSC  Dynamic Sitting: Good -  Don pullup on [facility]  Static Standing:  Good -  CGA with RW, pt feeling a little unsteady. Dynamic Standing: Not tested    Bed mobility:    Supine to sit:   Min A - UE support  Sit to supine:   SBA  Rolling:    Not Tested  Scooting in sitting:  SBA  Scooting to head of bed:   Max A of 2    Bridging:   Not Tested    Transfers:    Sit to stand:  CGA - impulsive  Stand to sit:  CGA  Bed to chair:   Not Tested  Standard toilet: Not Tested  Bed to BSC:  CGA Stand pivot to [facility] with RW    Dressing:      UE:   Not Tested  LE:    CGA - don pullups in siting, pull over hips in standing     Bathing:    UE:  Not Tested  LE:  Not Tested    Eating:   Not Tested    Toileting:  CGA - on BSC pt able to complete some pepper care. Min A for thoroughness. Grooming/hygiene: SBA - in bed washing hands    Activity Tolerance     Pt completed therapy session with  No adverse symptoms noted w/activity          BP (mmHg)  HR (bpm) SpO2 (%)   Semifowlers before activity  145/88 84 bpm 94%   Seated EOB   83 bpm 94%   Seated after activity                Positioning Needs: In bed, call light and needs in reach.     Alarm Set    Exercise / Activities Initiated:   N/A    Patient/Family Education:   Role of OT    Assessment of Deficits: Pt seen for Occupational therapy evaluation in acute care setting. Pt demonstrated decreased Activity tolerance, ADLs, and Balance . Pt functioning below baseline and will likely benefit from skilled occupational therapy services to maximize safety and independence. Goal(s) : To be met in 3 Visits:  1). Bed to toilet/BSC: SBA    To be met in 5 Visits:  1). Supine to/from Sit:  SBA  2). Upper Body Bathing:   SBA  3). Lower Body Bathing:   Supervision  4). Upper Body Dressing:  Supervision  5). Lower Body Dressing:  Supervision  6). Pt to demonstrate UE exs x 15 reps with minimal cues    Rehabilitation Potential:  Good for goals listed above. Strengths for achieving goals include: Family Support  Barriers to achieving goals include:  No barriers     Plan: To be seen 2-5 x/wk while in acute care setting for therapeutic exercises, bed mobility, transfers, dressing, bathing, family/patient education, ADL/IADL retraining, energy conservation training. Jaskaran Louie OTR/L #[id number]      If patient discharges from this facility prior to next visit, this note will serve as the Discharge Summary

## 2022-10-09 NOTE — FLOWSHEET NOTE
Shift assessment complete. See doc flow. Nightly medications given see MAR. IVF infusing. Hx of dementia, oriented to self only. Patient incontinent of urine and stool. Patient w/ several loose stools this shift. Excoriation to pepper-area, silicone cream applied every time patient has been changed. Patient with no complaints at this time. Bed alarm in place. Call light and bedside table within easy reach.     10/08/22 2134   Vital Signs   Temp 98.9 °F (37.2 °C)   Temp Source Oral   Heart Rate 91   Heart Rate Source Monitor   Resp 16   BP (!) 151/75   BP Location Right upper arm   BP Method Automatic   MAP (Calculated) 100.33   Patient Position Semi fowlers   Level of Consciousness 0   MEWS Score 1   Oxygen Therapy   SpO2 95 %   O2 Device None (Room air)

## 2022-10-10 PROBLEM — A41.9 SEPTICEMIA (HCC): Status: ACTIVE | Noted: 2022-10-10

## 2022-10-10 PROBLEM — R31.9 URINARY TRACT INFECTION WITH HEMATURIA: Status: ACTIVE | Noted: 2022-10-10

## 2022-10-10 PROBLEM — N39.0 URINARY TRACT INFECTION WITH HEMATURIA: Status: ACTIVE | Noted: 2022-10-10

## 2022-10-10 LAB
ALBUMIN SERPL-MCNC: 3 G/DL (ref 3.4–5)
ANION GAP SERPL CALCULATED.3IONS-SCNC: 9 MMOL/L (ref 3–16)
BUN BLDV-MCNC: 4 MG/DL (ref 7–20)
CALCIUM SERPL-MCNC: 8.7 MG/DL (ref 8.3–10.6)
CHLORIDE BLD-SCNC: 99 MMOL/L (ref 99–110)
CO2: 25 MMOL/L (ref 21–32)
CREAT SERPL-MCNC: 0.6 MG/DL (ref 0.6–1.2)
GFR AFRICAN AMERICAN: >60
GFR NON-AFRICAN AMERICAN: >60
GLUCOSE BLD-MCNC: 100 MG/DL (ref 70–99)
GLUCOSE BLD-MCNC: 101 MG/DL (ref 70–99)
GLUCOSE BLD-MCNC: 148 MG/DL (ref 70–99)
GLUCOSE BLD-MCNC: 163 MG/DL (ref 70–99)
GLUCOSE BLD-MCNC: 210 MG/DL (ref 70–99)
PERFORMED ON: ABNORMAL
PHOSPHORUS: 2.3 MG/DL (ref 2.5–4.9)
POTASSIUM SERPL-SCNC: 3.3 MMOL/L (ref 3.5–5.1)
SODIUM BLD-SCNC: 133 MMOL/L (ref 136–145)

## 2022-10-10 PROCEDURE — 36415 COLL VENOUS BLD VENIPUNCTURE: CPT

## 2022-10-10 PROCEDURE — 6360000002 HC RX W HCPCS: Performed by: INTERNAL MEDICINE

## 2022-10-10 PROCEDURE — 6370000000 HC RX 637 (ALT 250 FOR IP): Performed by: INTERNAL MEDICINE

## 2022-10-10 PROCEDURE — 97530 THERAPEUTIC ACTIVITIES: CPT

## 2022-10-10 PROCEDURE — 2500000003 HC RX 250 WO HCPCS: Performed by: INTERNAL MEDICINE

## 2022-10-10 PROCEDURE — 99233 SBSQ HOSP IP/OBS HIGH 50: CPT | Performed by: INTERNAL MEDICINE

## 2022-10-10 PROCEDURE — 83036 HEMOGLOBIN GLYCOSYLATED A1C: CPT

## 2022-10-10 PROCEDURE — 6360000002 HC RX W HCPCS: Performed by: HOSPITALIST

## 2022-10-10 PROCEDURE — 80069 RENAL FUNCTION PANEL: CPT

## 2022-10-10 PROCEDURE — 1200000000 HC SEMI PRIVATE

## 2022-10-10 PROCEDURE — 92526 ORAL FUNCTION THERAPY: CPT

## 2022-10-10 PROCEDURE — 97116 GAIT TRAINING THERAPY: CPT

## 2022-10-10 PROCEDURE — 97535 SELF CARE MNGMENT TRAINING: CPT

## 2022-10-10 PROCEDURE — 2580000003 HC RX 258: Performed by: HOSPITALIST

## 2022-10-10 PROCEDURE — 6370000000 HC RX 637 (ALT 250 FOR IP): Performed by: HOSPITALIST

## 2022-10-10 RX ORDER — INSULIN LISPRO 100 [IU]/ML
0-4 INJECTION, SOLUTION INTRAVENOUS; SUBCUTANEOUS
Status: DISCONTINUED | OUTPATIENT
Start: 2022-10-10 | End: 2022-10-11 | Stop reason: HOSPADM

## 2022-10-10 RX ORDER — DEXTROSE MONOHYDRATE 100 MG/ML
INJECTION, SOLUTION INTRAVENOUS CONTINUOUS PRN
Status: DISCONTINUED | OUTPATIENT
Start: 2022-10-10 | End: 2022-10-11 | Stop reason: HOSPADM

## 2022-10-10 RX ORDER — INSULIN LISPRO 100 [IU]/ML
0-4 INJECTION, SOLUTION INTRAVENOUS; SUBCUTANEOUS NIGHTLY
Status: DISCONTINUED | OUTPATIENT
Start: 2022-10-10 | End: 2022-10-11 | Stop reason: HOSPADM

## 2022-10-10 RX ORDER — CLONIDINE HYDROCHLORIDE 0.1 MG/1
0.1 TABLET ORAL EVERY 4 HOURS PRN
Status: DISCONTINUED | OUTPATIENT
Start: 2022-10-10 | End: 2022-10-11 | Stop reason: HOSPADM

## 2022-10-10 RX ORDER — AMLODIPINE BESYLATE 5 MG/1
5 TABLET ORAL DAILY
Status: DISCONTINUED | OUTPATIENT
Start: 2022-10-10 | End: 2022-10-11 | Stop reason: HOSPADM

## 2022-10-10 RX ORDER — LISINOPRIL 10 MG/1
10 TABLET ORAL ONCE
Status: COMPLETED | OUTPATIENT
Start: 2022-10-10 | End: 2022-10-10

## 2022-10-10 RX ADMIN — CYANOCOBALAMIN TAB 500 MCG 1000 MCG: 500 TAB at 10:07

## 2022-10-10 RX ADMIN — LISINOPRIL 10 MG: 10 TABLET ORAL at 21:50

## 2022-10-10 RX ADMIN — OXYBUTYNIN CHLORIDE 5 MG: 5 TABLET ORAL at 21:22

## 2022-10-10 RX ADMIN — METRONIDAZOLE 500 MG: 500 INJECTION, SOLUTION INTRAVENOUS at 05:32

## 2022-10-10 RX ADMIN — DONEPEZIL HYDROCHLORIDE 5 MG: 5 TABLET, FILM COATED ORAL at 10:07

## 2022-10-10 RX ADMIN — OXYBUTYNIN CHLORIDE 5 MG: 5 TABLET ORAL at 10:07

## 2022-10-10 RX ADMIN — PANTOPRAZOLE SODIUM 40 MG: 40 TABLET, DELAYED RELEASE ORAL at 05:32

## 2022-10-10 RX ADMIN — LEVOFLOXACIN 250 MG: 5 INJECTION, SOLUTION INTRAVENOUS at 10:09

## 2022-10-10 RX ADMIN — LEVOTHYROXINE SODIUM 25 MCG: 25 TABLET ORAL at 05:32

## 2022-10-10 RX ADMIN — LISINOPRIL 20 MG: 20 TABLET ORAL at 10:07

## 2022-10-10 RX ADMIN — DIBASIC SODIUM PHOSPHATE, MONOBASIC POTASSIUM PHOSPHATE AND MONOBASIC SODIUM PHOSPHATE 1 TABLET: 852; 155; 130 TABLET ORAL at 21:22

## 2022-10-10 RX ADMIN — LISINOPRIL 20 MG: 20 TABLET ORAL at 01:10

## 2022-10-10 RX ADMIN — DIBASIC SODIUM PHOSPHATE, MONOBASIC POTASSIUM PHOSPHATE AND MONOBASIC SODIUM PHOSPHATE 1 TABLET: 852; 155; 130 TABLET ORAL at 12:15

## 2022-10-10 RX ADMIN — AMLODIPINE BESYLATE 5 MG: 5 TABLET ORAL at 21:50

## 2022-10-10 RX ADMIN — SODIUM CHLORIDE 25 ML: 9 INJECTION, SOLUTION INTRAVENOUS at 12:10

## 2022-10-10 RX ADMIN — DOCUSATE SODIUM 100 MG: 100 CAPSULE, LIQUID FILLED ORAL at 10:07

## 2022-10-10 RX ADMIN — ACETAMINOPHEN 650 MG: 325 TABLET ORAL at 02:47

## 2022-10-10 RX ADMIN — DOCUSATE SODIUM 100 MG: 100 CAPSULE, LIQUID FILLED ORAL at 21:22

## 2022-10-10 RX ADMIN — ENOXAPARIN SODIUM 40 MG: 100 INJECTION SUBCUTANEOUS at 10:07

## 2022-10-10 RX ADMIN — ARIPIPRAZOLE 5 MG: 10 TABLET ORAL at 10:07

## 2022-10-10 RX ADMIN — METRONIDAZOLE 500 MG: 500 INJECTION, SOLUTION INTRAVENOUS at 12:13

## 2022-10-10 RX ADMIN — SODIUM CHLORIDE: 9 INJECTION, SOLUTION INTRAVENOUS at 02:50

## 2022-10-10 RX ADMIN — ATORVASTATIN CALCIUM 20 MG: 10 TABLET, FILM COATED ORAL at 10:07

## 2022-10-10 RX ADMIN — METRONIDAZOLE 500 MG: 500 INJECTION, SOLUTION INTRAVENOUS at 21:52

## 2022-10-10 RX ADMIN — DIBASIC SODIUM PHOSPHATE, MONOBASIC POTASSIUM PHOSPHATE AND MONOBASIC SODIUM PHOSPHATE 1 TABLET: 852; 155; 130 TABLET ORAL at 18:17

## 2022-10-10 RX ADMIN — INSULIN LISPRO 1 UNITS: 100 INJECTION, SOLUTION INTRAVENOUS; SUBCUTANEOUS at 12:15

## 2022-10-10 RX ADMIN — FOLIC ACID 1 MG: 1 TABLET ORAL at 10:07

## 2022-10-10 ASSESSMENT — PAIN DESCRIPTION - DESCRIPTORS: DESCRIPTORS: ACHING;DISCOMFORT

## 2022-10-10 ASSESSMENT — PAIN DESCRIPTION - LOCATION: LOCATION: HEAD

## 2022-10-10 ASSESSMENT — PAIN SCALES - GENERAL
PAINLEVEL_OUTOF10: 5
PAINLEVEL_OUTOF10: 0

## 2022-10-10 NOTE — FLOWSHEET NOTE
BP rechecked after given PO Lisinopril 20 mg. No PRN BP meds available. MD has already been Perfect Served and no PRNs were ordered. Bed alarm in place. Call light within reach.     10/10/22 0230   Vital Signs   Temp 98 °F (36.7 °C)   Temp Source Oral   Heart Rate 77   Heart Rate Source Monitor   Resp 16   BP (!) 166/73   BP Location Left upper arm   BP Method Automatic   MAP (Calculated) 104   Patient Position Semi fowlers   Level of Consciousness 0   MEWS Score 1   Oxygen Therapy   SpO2 95 %   O2 Device None (Room air)

## 2022-10-10 NOTE — PLAN OF CARE
Problem: Discharge Planning  Goal: Discharge to home or other facility with appropriate resources  10/9/2022 2212 by Rafa Walker RN  Outcome: Progressing  10/9/2022 1156 by Mike Parrish RN  Outcome: Progressing     Problem: Pain  Goal: Verbalizes/displays adequate comfort level or baseline comfort level  10/9/2022 2212 by Rafa Walker RN  Outcome: Progressing  10/9/2022 1156 by Mike Parrish RN  Outcome: Progressing     Problem: Safety - Adult  Goal: Free from fall injury  10/9/2022 2212 by Rafa Walker RN  Outcome: Progressing  10/9/2022 1156 by Mike Parrish RN  Outcome: Progressing     Problem: Skin/Tissue Integrity  Goal: Absence of new skin breakdown  Description: 1. Monitor for areas of redness and/or skin breakdown  2. Assess vascular access sites hourly  3. Every 4-6 hours minimum:  Change oxygen saturation probe site  4. Every 4-6 hours:  If on nasal continuous positive airway pressure, respiratory therapy assess nares and determine need for appliance change or resting period.   10/9/2022 2212 by Rafa Walker RN  Outcome: Progressing  10/9/2022 1156 by Mike Parrish RN  Outcome: Progressing     Problem: Chronic Conditions and Co-morbidities  Goal: Patient's chronic conditions and co-morbidity symptoms are monitored and maintained or improved  10/9/2022 2212 by Rafa Walker RN  Outcome: Progressing  10/9/2022 1156 by Mike Parrish RN  Outcome: Progressing

## 2022-10-10 NOTE — PROGRESS NOTES
Occupational Therapy Daily Treatment Note    Unit: 2 [place]  Date:  10/10/2022  Patient Name:    Alcon Ghotra  Admitting diagnosis:  Sepsis [facility]) [A41.9]  Admit Date:  10/7/2022  Precautions/Restrictions:  fall risk, IV, confusion, telemetry, and environmental precautions        Discharge Recommendations: SNF  DME needs for discharge: Needs Met    If refuses SNF will need 24 hour assist and HHOT       Therapy recommendations for staff:   Assist of 1 (minimal assist) with use of rolling walker (RW) and gait belt for all transfers to/from BSC/chair    AM-PAC Score: AM-PAC Inpatient Daily Activity Raw Score: 2303 VideoSurf S4 Level: Level 3- Safety if going home with 24 hour assist despite SNF recommendation       Treatment Time:  4970-0133  Treatment number:  2   Total Treatment Time:   40 minutes    History of Present Illness: H & P per Staci Cotter MD's note dated 10/7/2022  \"77 y.o. female brought in from home by her  due to AMS, increased weakness, diarrhea and stool incontinence. Patient is currently seen on room air, in no respiratory distress. She is awake, alert however minimally responsive. She denies any abd pain, chest pain,n/v. Patient unable to contribute further to HPI. \"    Subjective:  Patient upright in chair, reports being bored and \"want to get up and dance. \"    Pain   Yes  Rating: moderate  Location:[place], reports need for dental care  Pain Medicine Status: RN notified      Bed Mobility:   Supine to Sit:  Not Tested  Sit to Supine:  Not Tested  Rolling:           Not Tested  Scooting:        SBA    Transfer Training:   Sit to stand:   SBA  Stand to sit:  SBA  Bed to Chair:  CGA, with use of RW, and VC for hand placement  Bed to [facility]:   Not Tested  Standard toilet:   Not Tested    Activity Tolerance   Pt completed therapy session with No adverse symptoms noted w/activity    Balance  Sitting Balance :     SBA  Standing Balance   CGA and with use of RW    ADL Training:   Upper body dressing: Not Tested  Upper body bathing:  Not Tested  Lower body dressing: Mod A don pullup/doff Depends; Depends noted to be soiled and patient unaware  Lower body bathing:  Not Tested  Toileting:   Min A for thoroughness of pericare, MOD A clothing management  Grooming/Hygiene:  CGA for stance to wash hands at sink  Feeding :   Not Tested    Therapeutic Exercise:   N/A    Patient Education:   Role of OT  Recommendations for DC  Safe RW use/hand placement  Reorientation-patient repeatedly referred to need to get in line at [employer] and reoriented to current hospitalization status    Positioning Needs:   Reclined in chair with call light and needs in reach. Alarm Set    Family Present:  No    Assessment: Limited by cognition at times, continue. Recommending SNF for safest discharge plan. GOALS  To be met in 3 Visits:  1). Bed to toilet/BSC: SBA     To be met in 5 Visits:  1). Supine to/from Sit:             SBA  2). Upper Body Bathing:         SBA  3). Lower Body Bathing:         Supervision  4). Upper Body Dressing:       Supervision  5). Lower Body Dressing:       Supervision  6).  Pt to demonstrate UE exs x 15 reps with minimal cues    Plan: cont with [address] [place], OTR/L 270        If patient discharges from this facility prior to next visit, this note will serve as the Discharge Summary

## 2022-10-10 NOTE — PROGRESS NOTES
Inpatient Physical Therapy Daily Treatment Note    Unit: [address]  Date:  10/10/2022  Patient Name:    Elzbeita Brown  Admitting diagnosis:  Sepsis [facility]) [A41.9]  Admit Date:  10/7/2022  Precautions/Restrictions:  Fall risk, Bed/chair alarm, Lines -IV and Purewick catheter, Confusion, Telemetry, Continuous pulse oximetry, and Isolation Precautions: Environmental, telesitter      Discharge Recommendations: SNF (if refused will need home with 24hr assist with home PT)  DME needs for discharge: RW       Therapy recommendation for EMS Transport: can transport by wheelchair    Therapy recommendations for staff:   Assist of 1 with use of rolling walker (RW) for all transfers and ambulation to/from [facility]  to/from chair    History of Present Illness: H & P as per Kevin Capone MD's note dated 10/7/2022  69 y.o. female brought in from home by her  due to AMS, increased weakness, diarrhea and stool incontinence. Patient is currently seen on room air, in no respiratory distress. She is awake, alert however minimally responsive. She denies any abd pain, chest pain,n/v. Patient unable to contribute further to HPI. Home Health S4 Level Recommendation: Level 3 Safety  AM-PAC Mobility Score   AM-PAC Inpatient Mobility Raw Score : 15       Treatment Time: 1100 - 1135  Treatment number: 2  Timed Code Treatment Minutes: 35 minutes  Total Treatment Minutes:  35  minutes    Cognition    A&O Person and Place   Able to follow 1 step commands    Subjective  Patient lying supine in bed with no family present   Pt agreeable to this PT tx.      Pain   No  Location: N/A  Rating:    NA/10  Pain Medicine Status: Denies need     Bed Mobility   Supine to Sit:    CGA  Sit to Supine:   Not Tested  Rolling:   Not Tested  Scooting:   CGA in sitting    Transfer Training     Sit to stand:   CGA  Stand to sit:   CGA  Bed to Chair:   Min A  with use of gait belt and rolling walker (RW)    Gait Training gait completed as indicated below  Distance: 4 ft  Deviations (firm surface/linoleum):  decreased sree, staggers, decreased step length bilaterally, and decreased stance time bilaterally  Assistive Device Used:    gait belt and rolling walker (RW)  Level of Assist:    Min A   Comment: Patient was limited in walking distance due to fatigue. Stair Training deferred, pt unsafe/not appropriate to complete stairs at this time  # of Steps:   N/A  Level of Assist:  Not Tested  UE Support:  NA  Assistive Device:  N/A  Pattern:   N/A  Comments:     Therapeutic Exercise all completed bilaterally unless indicated  Ankle Pumps x 10 reps  LAQ x 10 reps    Balance  Sitting:  Fair -; CGA  Comments: ~15 min at the EOB. Standing: Fair -; Min A   Comments: ~2 min with RW. Patient Education      Role of PT, POC, Discharge recommendations, DC recommendations, safety awareness, transfer techniques, and calling for assist with mobility. Positioning Needs       Pt up in chair, alarm set, positioned in proper neutral alignment and pressure relief provided. Call light provided and all needs within reach    ROM Measurements N/A  Knee Flexion:  Knee Extension:     Activity Tolerance   Pt completed therapy session with No adverse symptoms noted w/activity. BP: 141/78  HR: 94 bpm  SpO2: 98% on RA    Other  N/A    Assessment :  Patient presented with increased confusion and decreased activity tolerance. Patient needed consistent redirection at the given task. Patient showed decreased standing tolerance during ambulation with RW. Recommending SNF upon discharge as patient functioning well below baseline, demonstrates good rehab potential and unable to return home due to limited or no family support, inability to negotiate stairs to enter home/bedroom/bathroom, burden of care beyond caregiver ability, home environment not conducive to patient recovery, and limited safety awareness.     Goals (all goals ongoing unless otherwise indicated)  To be met in 3 visits:  1). Independent with LE Ex x 10 reps  2.) Bed to chair: Supervision     To be met in 6 visits:  1). Supine to/from sit: Supervision  2). Sit to/from stand: Supervision  3). Bed to chair: Supervision  4). Gait: Ambulate  125 ft.   with  SBA and use of rolling walker (RW)  5). Tolerate B LE exercises 3 sets of 10-15 reps  6). Ascend/descend 5 steps with CGA with use of hand rail unilateral and LRAD (least restrictive assistive device). mug    Plan   Continue with plan of care. Signature: Delon Peoples, MS PT, # [id number]    If patient discharges from this facility prior to next visit, this note will serve as the Discharge Summary.

## 2022-10-10 NOTE — CARE COORDINATION
Case Management Assessment  Initial Evaluation      Patient Name: Nellie Doss  YOB: 1945  Diagnosis: Sepsis [facility]) [A41.9]  Date / Time: 10/7/2022  6:53 PM    Admission status/Date:10/7/22 inpt  Chart Reviewed: Yes      Patient Interviewed: Yes   Family Interviewed:  Yes - spouse      Hospitalization in the last 30 days:  No      Health Care Decision Maker :   Primary Decision Maker: Pattie Reynolds - Spouse - [phone]    (CM - must 1st enter selection under Navigator - emergency contact- [place] Relationship and pick relationship)   Who do you trust or have selected to make healthcare decisions for you      Met with: pt and spoke with spouse via phone  Interview conducted  (bedside/phone):    Current PCP: Simone Keller  Commercial  Precert required for SNF : Y          3 night stay required -  [employer]  Support Systems/Care Needs: Spouse/Significant Other  Transportation: family    Meal Preparation: spouse    Housing  Living Arrangements: lives in 2 story home  Steps: ?   Intent for return to present living arrangements: No  Identified Issues: N/A    Home Care Information  Active with [address] : NO    Agency:(Services)  Type of Home Care Services:  (KITTY)  Passport/Waiver : No  :                      Phone Number:    Passport/Waiver Services: N/A          Durable Medical Equiptment   DME Provider:   Equipment:   Walker_x__Cane_x__RTS___ BSC___Shower Chair___Hospital Bed___W/C__x__Other________  02 at ____Liter(s)---wears(frequency)_______ HHN ___ CPAP___ BiPap___   N/A____      Home O2 Use :  No    If No for home O2---if presently on O2 during hospitalization:  No  if yes CM to follow for potential DC O2 need  Informed of need for care provider to bring portable home O2 tank on day of discharge for nursing to connect prior to leaving:   Not Indicated  Verbalized agreement/Understanding:   Not Indicated    Community Service Affiliation  Dialysis:  No Agency:  Location:  Dialysis Schedule:  Phone:   Fax: Other Community Services: (ex:PT/OT,Mental Health,Wound Clinic, Cardio/Pul [address]) None    DISCHARGE PLAN: Explained Case Management role/services. Reviewed chart and attempted to meet pt who is confused and ask writer to call spouse. Spoke with pt spouse who assisted with assessment. Per spouse pt lives in 2 story home but pt is only on 1st floor. Per spouse he assist pt as needed. [facility] is working with [facility] to get more in home help. [facility] is agreeable for rehab as therapy re's this. Chooses [facility]. Spoke with [facility] who can accept and has started pre cert. Discussed rehab with pt who is also thinking about going to rehab. Will follow.

## 2022-10-10 NOTE — FLOWSHEET NOTE
Patient with continued hypertension. MD Trinh ordered Lisinopril 20mg PO daily, which was given now. No PRN orders given for HTN. Patient up in chair to be moved to the bed. Chair/ bed alarm in place. 10/10/22 0107   Vital Signs   Temp 98.2 °F (36.8 °C)   Temp Source Oral   Heart Rate 91   Heart Rate Source Monitor   Resp 16   BP (!) 194/4   BP Location Left upper arm   BP Method Automatic   MAP (Calculated) 67.33   Patient Position Up in chair   Level of Consciousness 0   MEWS Score 1   Oxygen Therapy   SpO2 96 %   O2 Device None (Room air)   Call light within reach.

## 2022-10-10 NOTE — ACP (ADVANCE CARE PLANNING)
Advance Care Planning     General Advance Care Planning (ACP) Conversation    Date of Conversation: 10/7/2022  Conducted with: Patient with Decision Making Capacity    Healthcare Decision Maker:    Primary Decision Maker: Teodoro Garcia - Spouse - [phone]  Click here to complete Healthcare Decision Makers including selection of the Healthcare Decision Maker Relationship (ie \"Primary\"). Today we documented Decision Maker(s) consistent with Legal Next of Kin hierarchy. Content/Action Overview:   Has ACP document(s) NOT on file - requested patient to provide  Reviewed DNR/DNI and patient elects Full Code (Attempt Resuscitation)        Length of Voluntary ACP Conversation in minutes:  <16 minutes (Non-Billable)    Ericak John RN

## 2022-10-10 NOTE — PROGRESS NOTES
Hospitalist Progress Note      PCP: Vivek Santoro MD    Date of Admission: 10/7/2022    Chief Complaint:  Presented from home with weakness confusion and pain all over .  reports that patient does drink alcohol at home. He also reports that she has been having chronic nausea and recently had an endoscopic work-up. Subjective:   Patient is stable today, awake and alert and oriented to person. Still weak. No distress, no nausea. On dysphagia diet and tolerating it well. Oral intake is poor but improving. Blood pressures were elevated yesterday improving now. Urine cultures growing Klebsiella. Pt had large BM - appears impaction is finally improving. Currently lactulose on hold ,reassess bowel function.        Medications:  Reviewed    Infusion Medications    dextrose      sodium chloride 20 mL/hr at 10/10/22 1429    sodium chloride 75 mL/hr at 10/10/22 1429     Scheduled Medications    phosphorus  250 mg Oral 4x Daily WC    insulin lispro  0-4 Units SubCUTAneous TID WC    insulin lispro  0-4 Units SubCUTAneous Nightly    oxybutynin  5 mg Oral BID    lisinopril  20 mg Oral Daily    ARIPiprazole  5 mg Oral Daily    atorvastatin  20 mg Oral Daily    cyanocobalamin  1,000 mcg Oral Daily    donepezil  5 mg Oral Daily with breakfast    folic acid  1 mg Oral Daily    levothyroxine  25 mcg Oral Daily    pantoprazole  40 mg Oral QAM AC    docusate sodium  100 mg Oral BID    sodium chloride flush  5-40 mL IntraVENous 2 times per day    enoxaparin  40 mg SubCUTAneous Daily    levofloxacin  250 mg IntraVENous Q24H    metroNIDAZOLE  500 mg IntraVENous Q8H     PRN Meds: glucose, dextrose bolus **OR** dextrose bolus, glucagon (rDNA), dextrose, sodium chloride flush, sodium chloride, ondansetron **OR** ondansetron, polyethylene glycol, acetaminophen **OR** acetaminophen      Intake/Output Summary (Last 24 hours) at 10/10/2022 1730  Last data filed at 10/10/2022 1429  Gross per 24 hour   Intake 5208.99 ml   Output --   Net 5208.99 ml         Physical Exam Performed:    BP (!) 159/90   Pulse 74   Temp 97.7 °F (36.5 °C) (Oral)   Resp 16   Ht 5' 5\" (1.651 m)   Wt 116 lb (52.6 kg)   SpO2 98%   BMI 19.30 kg/m²     General appearance: No apparent distress, appears stated age and cooperative. Patient is awake and alert. Oriented to place and person  HEENT: Pupils equal, round, and reactive to light. Conjunctivae/corneas clear. Neck: Supple, with full range of motion. No jugular venous distention. Trachea midline. Respiratory:  Normal respiratory effort. Clear to auscultation, bilaterally without Rales/Wheezes/Rhonchi. Cardiovascular: Regular rate and rhythm with normal S1/S2 without murmurs, rubs or gallops. Abdomen: Soft, non-tender, non-distended with normal bowel sounds. Musculoskeletal: No clubbing, cyanosis or edema bilaterally. Full range of motion without deformity. Skin: Skin color, texture, turgor normal.  No rashes or lesions. Neurologic:  Neurovascularly intact without any focal sensory/motor deficits. Cranial nerves: II-XII intact, grossly non-focal.  Psychiatric: Alert and oriented, thought content appropriate, normal insight  Capillary Refill: Brisk, 3 seconds, normal   Peripheral Pulses: +2 palpable, equal bilaterally       Labs:   Recent Labs     10/07/22  1952 10/08/22  0730 10/09/22  0743   WBC 20.1* 12.9* 9.5   HGB 14.0 12.3 11.2*   HCT 42.2 36.5 32.6*    336 292       Recent Labs     10/08/22  0730 10/09/22  0743 10/10/22  0605   * 135* 133*   K 4.5 3.9 3.3*    102 99   CO2 23 23 25   BUN 27* 12 4*   CREATININE 1.1 0.6 0.6   CALCIUM 9.2 9.0 8.7   PHOS  --  2.4* 2.3*       Recent Labs     10/07/22  1952 10/08/22  0730   AST 9* 8*   ALT 6* <5*   BILIDIR  --  <0.2   BILITOT 0.7 0.4   ALKPHOS 96 86       No results for input(s): INR in the last 72 hours.   Recent Labs     10/07/22  1952   TROPONINI <0.01         Urinalysis:      Lab Results   Component Value Date/Time [address] POSITIVE 10/07/2022 07:52 PM    WBCUA 21-50 10/07/2022 07:52 PM    BACTERIA 3+ 10/07/2022 07:52 PM    RBCUA 3-4 10/07/2022 07:52 PM    BLOODU Negative 10/07/2022 07:52 PM    SPECGRAV >=1.030 10/07/2022 07:52 PM    GLUCOSEU Negative 10/07/2022 07:52 PM         Cultures :  COVID not detected  Urine cultures Klebsiella  Blood cultures no growth to date     Component 10/7/22 1952    Organism Klebsiella pneumoniae Abnormal     Urine Culture, Routine >100,000 CFU/ml    Resulting Agency [address] [facility]        Susceptibility    Klebsiella pneumoniae (1)    Antibiotic Interpretation Microscan  Method Status    amoxicillin-clavulanate Sensitive <=8/4 mcg/mL BACTERIAL SUSCEPTIBILITY PANEL BY GABRIELLA     ampicillin Resistant >16 mcg/mL BACTERIAL SUSCEPTIBILITY PANEL BY GABRIELLA     ampicillin-sulbactam Sensitive <=8/4 mcg/mL BACTERIAL SUSCEPTIBILITY PANEL BY GABRIELLA     ceFAZolin Sensitive <=2 mcg/mL BACTERIAL SUSCEPTIBILITY PANEL BY GABRIELLA      NOTE: Cefazolin should only be used for uncomplicated UTI         for E.coli or Klebsiella pneumoniae.         cefepime Sensitive <=2 mcg/mL BACTERIAL SUSCEPTIBILITY PANEL BY GABRIELLA     cefTRIAXone Sensitive <=1 mcg/mL BACTERIAL SUSCEPTIBILITY PANEL BY GABRIELLA     cefuroxime Sensitive <=4 mcg/mL BACTERIAL SUSCEPTIBILITY PANEL BY GABRIELLA     ciprofloxacin Sensitive <=1 mcg/mL BACTERIAL SUSCEPTIBILITY PANEL BY GABRIELLA     ertapenem Sensitive <=0.5 mcg/mL BACTERIAL SUSCEPTIBILITY PANEL BY GABRIELLA     gentamicin Sensitive <=4 mcg/mL BACTERIAL SUSCEPTIBILITY PANEL BY GABRIELLA     meropenem Sensitive <=1 mcg/mL BACTERIAL SUSCEPTIBILITY PANEL BY GABRIELLA     nitrofurantoin Intermediate 64 mcg/mL BACTERIAL SUSCEPTIBILITY PANEL BY GABRIELLA     piperacillin-tazobactam Sensitive <=16 mcg/mL BACTERIAL SUSCEPTIBILITY PANEL BY GABRIELLA     trimethoprim-sulfamethoxazole Sensitive <=2/38 mcg/mL BACTERIAL SUSCEPTIBILITY PANEL BY GABRIELLA                Radiology:  CT THORACIC SPINE WO CONTRAST   Final Result   Moderate degenerative disc changes throughout the thoracic spine which is   most prominent along the upper and midthoracic region with no obvious acute   abnormality seen. CT LUMBAR SPINE WO CONTRAST   Final Result   Severe multilevel degenerative disc disease with extensive endplate   degenerative changes and subchondral cystic and erosive changes from L2   through L5 with no obvious acute fracture. Mild subluxation of L4 on L5 and minimal subluxation of L3 on L4 with slight   retrolisthesis of L2 on L3 which appears degenerative in etiology with no   obvious pars defects      Moderate osteoarthritic changes facets throughout causing diffuse moderate   spinal stenosis      Mild to moderate central disc bulges from L2 through S1. If the patient is   persistently symptomatic, suggest MRI for further evaluation. CT CHEST ABDOMEN PELVIS WO CONTRAST Additional Contrast? None   Final Result   1. No acute cardiopulmonary process. 2. Rectal distention with moderate stool volume, rectal wall thickening and   perirectal inflammatory changes which can be seen with stercoral colitis. 3. Diffuse atherosclerosis including coronary artery involvement. 4. Subsegmental atelectasis, otherwise no acute pulmonary process. 5. Minimal secretions seen in the trachea. CT CSpine W/O Contrast   Final Result   1. No acute osseous abnormality of the cervical spine. 2. Moderate multilevel degenerative changes. CT Head W/O Contrast   Final Result   1. No acute intracranial abnormality. 2. Stable diffuse parenchymal volume loss and chronic white matter   microvascular ischemic changes. 3. Stable chronic lacunar infarcts in the left basal ganglia and bilateral   thalami. XR CHEST PORTABLE   Final Result   Minimal bibasilar discoid atelectasis or scarring which is less prominent           Reviewed old records on Care Everywhere. Patient had endoscopy at [facility] last week  EGD  Postoperative Diagnosis:   1.  Antral gastritis, biopsies done   2. Esophagitis, esophageal stricture, possible Singleton's esophagus   Procedure Performed:   EGD, biopsy, esophageal dilatation     Pathology  Final Pathologic Diagnosis   A. Stomach, antrum, biopsy:   -   Antral-type gastric mucosa with no significant pathologic change. -   No morphologic evidence of Helicobacter pylori organisms.   -   Negative for intestinal metaplasia and dysplasia. B.  Esophagus, biopsy:   -   Squamocolumnar mucosa with focal intestinal metaplasia (consistent with   Singleton's esophagus) and mild chronic inflammation.   -   Negative for dysplasia and malignancy. Assessment/Plan:    Active Hospital Problems    Diagnosis     Stercoral colitis [K52.89]      Priority: Medium    Sepsis ([facility] [address] [A41.9]      Priority: Medium       Sepsis- POA   Due to UTI and stercoral colitis. -Continue Abx to levaquin and flagyl     Gram-negative UTI  -Urine cultures growing Klebsiella  -Continue Levaquin    Stercoral Colitis with fecal impaction and post impaction diarrhea.   -no blood. - Finally had a very large BM ; continue to monitor for daily BM. Received lactulose,: Currently lactulose on hold monitor closely    Alcohol Abuse   -Monitor for withdrawal symptoms. Progressing dementia  -Supportive care.   -Pt on abilify and aricept - continued.   -She has not been taking depakote in a long time. Hypokalemia and hypophosphatemia  -Replete    Hyponatremia   -monitor sodium levels  -Should improve with correction of blood glucose levels. Diabetes mellitus with hyperglycemia  -Add sliding scale insulin    Hypertension  - Blood pressure was elevated yesterday;  improved now. -Continue Norvasc    Gastritis and esophagitis  Recent endoscopic results reviewed, as above .  -patient's  updated. -Biopsy showed gastritis. Negative for malignancy; negative for H. pylori. Continue PPI      DVT Prophylaxis: lovenox  Diet: ADULT DIET;  Dysphagia - Minced and Moist  Code Status: Full Code  PT/OT Eval Status: as tolerated    Dispo - cc  Continue PT OT      Plan of care discussed in detail with patient's  at bedside. Discussed with patient's nurse  Discussed with case management. Pre-CERT has been started. Likely discharge to SNF in a.m. Monitor blood pressures and blood sugar    Total time today 40 minutes.  > 50 % time dominated by coordination of care and D/W family      Letitia Anderson MD   10/10/2022

## 2022-10-10 NOTE — FLOWSHEET NOTE
Shift assessment complete. See doc flow. Nightly medications given see MAR. IVF infusing. Hx of dementia, oriented to self and place at this time, orientation and speech have improved since being admitted. Patient c/o nausea PRN Zofran given. Patient incontinent of urine and stool, significant excoriation to pepper-area, Barrier cream wipes used on pepper-area. Pt hypertensive at this time, sitting up in the chair, will Perfect Serve MD [facility]-ER for PRN orders. Chair alarm in place. Call light and bedside table within easy reach.     10/09/22 2203 10/09/22 2211   Vital Signs   Temp 97.1 °F (36.2 °C)  --    Temp Source Oral  --    Heart Rate 82 83   Heart Rate Source Monitor  --    Resp 16 16   BP (!) 187/93 (!) 203/97   BP Location Right upper arm  --    BP Method Automatic  --    MAP (Calculated) 124.33 132.33   Patient Position Sitting  --    Level of Consciousness 0  --    MEWS Score 1  --    Oxygen Therapy   SpO2 96 %  --    O2 Device None (Room air)  --

## 2022-10-10 NOTE — PROGRESS NOTES
Speech Language Pathology  Dysphagia Treatment/Follow-Up Note  Facility/Department: [address] MEDICAL-SURGICAL    Recommendations: SLP recommends to continue with IDDSI 5 Minced and moist Solids; IDDSI 0 Thin Liquids - small sips, NO straws ; Meds crushed in puree as able    Risk Management: upright for all intake, stay upright for at least 30 mins after intake, small bites/sips, distant supervision with intake, oral care 2-3x/day to reduce adverse affects in the event of aspiration, increase physical mobility as able, alternate bites/sips, slow rate of intake, general GERD precautions, general aspiration precautions, and hold PO and contact SLP if s/s of aspiration or worsening respiratory status develop. Jersey Espinosa  : 1945 (78 y.o.)   MRN: [id number]  ROOM: [address] [place] [address]  ADMISSION DATE: 10/7/2022  PATIENT DIAGNOSIS(ES): Sepsis ([facility] [address] [A41.9]  Allergies   Allergen Reactions    Mold Extract [Trichophyton Mentagrophytes]        DATE ONSET: 10/7/2022    Pain: The patient does not complain of pain       Current Diet: ADULT DIET; Dysphagia - Minced and Moist    Diet Tolerance:  Patient tolerating current diet level without signs/symptoms of aspiration. Dysphagia Treatment and Impressions:  Subjective: Pt seen in room at bedside with RN permission. Pt alert and cooperative, although pleasantly confused. Pt oriented to year and place; stated it was September. RN Report/Chart Review: RN reports no concerns re: swallowing. Patient tolerance: pt is grossly tolerating recommended diet. Respiratory Status: Pt with SPO2% of 98 on room air with RR of 16    Liquid PO Trials:   IDDSI 0 Thin:  Assessed via cup: No anterior bolus loss. Suspect mildly delayed trigger of pharyngeal swallow. No s/s of aspiration. Pt did take small, single sips. Solid PO Trials  IDDSI 4 Puree:   No anterior bolus loss. Adequate A-P transit and oral clearance. No overt s/s of aspiration.    IDDSI 5 Minced and Moist:   2x trials. With first trial, pt masticating and then appeared to have jaw pain and / or bit cheek or lip. Pt motioned towards right cheek, but did not verbalize what was wrong. SLP attempting to ask pt what happened, pt not attempting to answer yes/no questions or open ended questions. Pt then began to cough with PO still in oral cavity. Pt did swallow PO, but unable to describe what happened. Thus, SLP attempted another trial. Pt with adequate mastication, A-P transit, and oral clearance. No overt s/s of aspiration. IDDSI 6 Soft and Bite Sized:  Slow and prolonged mastication. Pt indicating mastication was causing pain to oral cavity with this PO trial. Pt did initiate swallows, but with mild oral residue. Given increased time and liquid wash, pt cleared solid. No overt s/s of aspiration. Education: SLP edu pt re: Role of SLP, Rationale for dysphagia tx, Recommended compensatory strategies (small bites/sips, slow rate, alternate liquids and solids, fully upright), Aspiration precautions, BSE results, POC, Risks of not following recommendations, diet recs, and Importance of oral care to reduce adverse affects in the event of aspiration. Pt verbalized understanding, would benefit from ongoing education, and no evidence of learning  Assessment: Pt tolerating current diet with no clinical s/s of aspiration. Pt with evidence / complaints of jaw pain with soft solid. Suspect some jaw pain with first trial of minced and moist solid, but pt unable to state whether or not she was in pain. With second minced and moist solid trial, no evidence / complaints of jaw pain and no overt s/s of aspiration. Some carryover of recommended compensatory strategies. Pt will benefit from supervision with meals to improve implementation of safe swallow strategies. Recommendations: SLP recommends to continue with IDDSI 5 Minced and moist Solids; IDDSI 0 Thin Liquids - small sips, NO straws ;  Meds crushed in puree as able  Risk Management: upright for all intake, stay upright for at least 30 mins after intake, small bites/sips, distant supervision with intake, oral care 2-3x/day to reduce adverse affects in the event of aspiration, increase physical mobility as able, alternate bites/sips, slow rate of intake, general GERD precautions, general aspiration precautions, and hold PO and contact SLP if s/s of aspiration or worsening respiratory status develop. Ariana Ruffing Dysphagia Goals:  Short Term Goals:  Timeframe for Short Term Goals: (5 days 10/13/22)  Goal 1: The patient will tolerate recommended diet with no clinical s/s of aspiration 5/5  10/10/2022 : Goal addressed, see above. Ongoing, progressing. Goal 2: The patient/caregiver will demonstrate understanding of compensatory swallow strategies, for improved swallow safety  10/10/2022 : Goal addressed, see above. Ongoing, progressing. Goal 3: The patient will tolerate therapeutic diet upgrade trials with no clinical s/s of aspiration 5/5  10/10/2022 : Goal addressed, see above. Ongoing, not progressing. Long Term Goals:   Timeframe for Long Term Goals: (7 days 10/15/22)  Goal 1: The patient will tolerate least restrictive diet with no clinical s/s of aspiration or worsening respiratory/pulmonary status  10/10/2022 : Goal addressed, see above. Ongoing, progressing. Speech/Language/Cog Goals: N/A      Recommendations:  Solid Consistency: IDDSI 5 Minced and moist Solids  Liquid Consistency: IDDSI 0 Thin Liquids - NO straws , small sips   Medication: Meds crushed in puree as able    Plan:    Continued Dysphagia treatment with goals per plan of care. Discharge Recommendations: Recommend SLP tx at discharge; defer to PT/OT    If pt discharges from hospital prior to Speech/Swallowing discharge, this note serves as tx and discharge summary.      Total Treatment Time / Charges     Time in Time out Total Time / units   Cognitive Tx         Speech Tx      Dysphagia Tx [id number] 21 min / 1 unit      Signature:  Kristin Francis MA [address] [facility]  Speech Language Pathologist

## 2022-10-11 VITALS
SYSTOLIC BLOOD PRESSURE: 112 MMHG | TEMPERATURE: 97.7 F | HEIGHT: 65 IN | OXYGEN SATURATION: 94 % | RESPIRATION RATE: 16 BRPM | HEART RATE: 87 BPM | DIASTOLIC BLOOD PRESSURE: 76 MMHG | WEIGHT: 116.13 LBS | BODY MASS INDEX: 19.35 KG/M2

## 2022-10-11 LAB
ANION GAP SERPL CALCULATED.3IONS-SCNC: 11 MMOL/L (ref 3–16)
BLOOD CULTURE, ROUTINE: NORMAL
BUN BLDV-MCNC: <2 MG/DL (ref 7–20)
CALCIUM SERPL-MCNC: 9.2 MG/DL (ref 8.3–10.6)
CHLORIDE BLD-SCNC: 104 MMOL/L (ref 99–110)
CO2: 27 MMOL/L (ref 21–32)
CREAT SERPL-MCNC: <0.5 MG/DL (ref 0.6–1.2)
CULTURE, BLOOD 2: NORMAL
EKG ATRIAL RATE: 89 BPM
EKG DIAGNOSIS: NORMAL
EKG P-R INTERVAL: 176 MS
EKG Q-T INTERVAL: 370 MS
EKG QRS DURATION: 74 MS
EKG QTC CALCULATION (BAZETT): 450 MS
EKG R AXIS: -76 DEGREES
EKG T AXIS: 66 DEGREES
EKG VENTRICULAR RATE: 89 BPM
ESTIMATED AVERAGE GLUCOSE: 116.9 MG/DL
GFR AFRICAN AMERICAN: >60
GFR NON-AFRICAN AMERICAN: >60
GLUCOSE BLD-MCNC: 103 MG/DL (ref 70–99)
GLUCOSE BLD-MCNC: 104 MG/DL (ref 70–99)
GLUCOSE BLD-MCNC: 118 MG/DL (ref 70–99)
HBA1C MFR BLD: 5.7 %
MAGNESIUM: 1.3 MG/DL (ref 1.8–2.4)
PERFORMED ON: ABNORMAL
PERFORMED ON: ABNORMAL
PHOSPHORUS: 3.2 MG/DL (ref 2.5–4.9)
POTASSIUM REFLEX MAGNESIUM: 3.2 MMOL/L (ref 3.5–5.1)
SODIUM BLD-SCNC: 142 MMOL/L (ref 136–145)

## 2022-10-11 PROCEDURE — 93005 ELECTROCARDIOGRAM TRACING: CPT | Performed by: INTERNAL MEDICINE

## 2022-10-11 PROCEDURE — 2500000003 HC RX 250 WO HCPCS: Performed by: INTERNAL MEDICINE

## 2022-10-11 PROCEDURE — 6370000000 HC RX 637 (ALT 250 FOR IP): Performed by: INTERNAL MEDICINE

## 2022-10-11 PROCEDURE — 2580000003 HC RX 258: Performed by: HOSPITALIST

## 2022-10-11 PROCEDURE — 6360000002 HC RX W HCPCS: Performed by: HOSPITALIST

## 2022-10-11 PROCEDURE — 6360000002 HC RX W HCPCS: Performed by: INTERNAL MEDICINE

## 2022-10-11 PROCEDURE — 97535 SELF CARE MNGMENT TRAINING: CPT

## 2022-10-11 PROCEDURE — 80048 BASIC METABOLIC PNL TOTAL CA: CPT

## 2022-10-11 PROCEDURE — 97530 THERAPEUTIC ACTIVITIES: CPT

## 2022-10-11 PROCEDURE — 93010 ELECTROCARDIOGRAM REPORT: CPT | Performed by: INTERNAL MEDICINE

## 2022-10-11 PROCEDURE — 36415 COLL VENOUS BLD VENIPUNCTURE: CPT

## 2022-10-11 PROCEDURE — 84100 ASSAY OF PHOSPHORUS: CPT

## 2022-10-11 PROCEDURE — 99239 HOSP IP/OBS DSCHRG MGMT >30: CPT | Performed by: INTERNAL MEDICINE

## 2022-10-11 PROCEDURE — 6370000000 HC RX 637 (ALT 250 FOR IP): Performed by: HOSPITALIST

## 2022-10-11 PROCEDURE — 83735 ASSAY OF MAGNESIUM: CPT

## 2022-10-11 RX ORDER — LEVOTHYROXINE SODIUM 0.03 MG/1
50 TABLET ORAL DAILY
DISCHARGE
Start: 2022-10-11

## 2022-10-11 RX ORDER — POLYETHYLENE GLYCOL 3350 17 G/17G
17 POWDER, FOR SOLUTION ORAL DAILY PRN
DISCHARGE
Start: 2022-10-11

## 2022-10-11 RX ORDER — LISINOPRIL 20 MG/1
20 TABLET ORAL DAILY
DISCHARGE
Start: 2022-10-11

## 2022-10-11 RX ORDER — POTASSIUM CHLORIDE 750 MG/1
40 TABLET, EXTENDED RELEASE ORAL DAILY
Status: DISCONTINUED | OUTPATIENT
Start: 2022-10-11 | End: 2022-10-11 | Stop reason: HOSPADM

## 2022-10-11 RX ORDER — POTASSIUM CHLORIDE 20 MEQ/1
20 TABLET, EXTENDED RELEASE ORAL DAILY
DISCHARGE
Start: 2022-10-12

## 2022-10-11 RX ORDER — AMLODIPINE BESYLATE 5 MG/1
5 TABLET ORAL DAILY
DISCHARGE
Start: 2022-10-11

## 2022-10-11 RX ORDER — CARBOXYMETHYLCELLULOSE SODIUM 10 MG/ML
1 GEL OPHTHALMIC 4 TIMES DAILY
Status: DISCONTINUED | OUTPATIENT
Start: 2022-10-11 | End: 2022-10-11 | Stop reason: HOSPADM

## 2022-10-11 RX ORDER — MAGNESIUM SULFATE IN WATER 40 MG/ML
2000 INJECTION, SOLUTION INTRAVENOUS ONCE
Status: COMPLETED | OUTPATIENT
Start: 2022-10-11 | End: 2022-10-11

## 2022-10-11 RX ORDER — METRONIDAZOLE 500 MG/1
500 TABLET ORAL 3 TIMES DAILY
Qty: 21 TABLET | Refills: 0 | DISCHARGE
Start: 2022-10-11 | End: 2022-10-18

## 2022-10-11 RX ORDER — ARIPIPRAZOLE 5 MG/1
5 TABLET ORAL DAILY
DISCHARGE
Start: 2022-10-11

## 2022-10-11 RX ORDER — PSEUDOEPHEDRINE HCL 30 MG
100 TABLET ORAL 2 TIMES DAILY
DISCHARGE
Start: 2022-10-11

## 2022-10-11 RX ORDER — CIPROFLOXACIN 250 MG/1
250 TABLET, FILM COATED ORAL 2 TIMES DAILY
Qty: 14 TABLET | Refills: 0 | DISCHARGE
Start: 2022-10-11 | End: 2022-10-18

## 2022-10-11 RX ADMIN — OXYBUTYNIN CHLORIDE 5 MG: 5 TABLET ORAL at 08:55

## 2022-10-11 RX ADMIN — FOLIC ACID 1 MG: 1 TABLET ORAL at 08:54

## 2022-10-11 RX ADMIN — PANTOPRAZOLE SODIUM 40 MG: 40 TABLET, DELAYED RELEASE ORAL at 05:58

## 2022-10-11 RX ADMIN — LEVOFLOXACIN 250 MG: 5 INJECTION, SOLUTION INTRAVENOUS at 08:52

## 2022-10-11 RX ADMIN — ATORVASTATIN CALCIUM 20 MG: 10 TABLET, FILM COATED ORAL at 08:55

## 2022-10-11 RX ADMIN — CARBOXYMETHYLCELLULOSE SODIUM 1 DROP: 10 GEL OPHTHALMIC at 15:37

## 2022-10-11 RX ADMIN — ENOXAPARIN SODIUM 40 MG: 100 INJECTION SUBCUTANEOUS at 09:03

## 2022-10-11 RX ADMIN — ARIPIPRAZOLE 5 MG: 10 TABLET ORAL at 08:54

## 2022-10-11 RX ADMIN — METRONIDAZOLE 500 MG: 500 INJECTION, SOLUTION INTRAVENOUS at 14:58

## 2022-10-11 RX ADMIN — DONEPEZIL HYDROCHLORIDE 5 MG: 5 TABLET, FILM COATED ORAL at 08:54

## 2022-10-11 RX ADMIN — LISINOPRIL 20 MG: 20 TABLET ORAL at 08:55

## 2022-10-11 RX ADMIN — SODIUM CHLORIDE 25 ML: 9 INJECTION, SOLUTION INTRAVENOUS at 12:42

## 2022-10-11 RX ADMIN — MAGNESIUM SULFATE HEPTAHYDRATE 2000 MG: 40 INJECTION, SOLUTION INTRAVENOUS at 12:43

## 2022-10-11 RX ADMIN — DOCUSATE SODIUM 100 MG: 100 CAPSULE, LIQUID FILLED ORAL at 08:55

## 2022-10-11 RX ADMIN — METRONIDAZOLE 500 MG: 500 INJECTION, SOLUTION INTRAVENOUS at 06:05

## 2022-10-11 RX ADMIN — LEVOTHYROXINE SODIUM 25 MCG: 25 TABLET ORAL at 05:58

## 2022-10-11 RX ADMIN — CYANOCOBALAMIN TAB 500 MCG 1000 MCG: 500 TAB at 08:55

## 2022-10-11 RX ADMIN — SODIUM CHLORIDE 25 ML: 9 INJECTION, SOLUTION INTRAVENOUS at 08:50

## 2022-10-11 RX ADMIN — POTASSIUM CHLORIDE 40 MEQ: 750 TABLET, EXTENDED RELEASE ORAL at 12:46

## 2022-10-11 RX ADMIN — AMLODIPINE BESYLATE 5 MG: 5 TABLET ORAL at 08:55

## 2022-10-11 ASSESSMENT — PAIN SCALES - GENERAL: PAINLEVEL_OUTOF10: 0

## 2022-10-11 NOTE — PROGRESS NOTES
4 Eyes Skin Assessment     The patient is being assess for   Discharge to S    I agree that 2 RN's have performed a thorough Head to Toe Skin Assessment on the patient. ALL assessment sites listed below have been assessed. Areas assessed for pressure by both nurses:   [x]   Head, Face, and Ears   [x]   Shoulders, Back, and Chest, Abdomen  [x]   Arms, Elbows, and Hands   [x]   Coccyx, Sacrum, and Ischium  [x]   Legs, Feet, and Heels        Scattered bruising to BUE. Scattered abrasions to BLE & BUE. Excoriation to perineal. Skin dry & flaky. Skin Assessed Under all Medical Devices by both nurses:  N/A                       **SHARE this note so that the co-signing nurse is able to place an eSignature**    Co-signer eSignature: Electronically signed by Cilve Villasenor RN on 10/11/22 at 4:36 PM EDT    Does the Patient have Skin Breakdown related to pressure?   No            Johnson Prevention initiated:  Yes   Wound Care Orders initiated:  No      [facility] nurse consulted for Pressure Injury (Stage 3,4, Unstageable, DTI, NWPT, Complex wounds)and New or Established Ostomies:  No      Primary Nurse eSignature: Electronically signed by Inocencio Ty RN on 10/11/22 at 3:48 PM EDT

## 2022-10-11 NOTE — DISCHARGE SUMMARY
Name:  Ingris Butler  Room:  0211/0211-02  MRN:    [id number]    Discharge Summary      This discharge summary is in conjunction with a complete physical exam done on the day of discharge. Discharging Physician: Dr. Deja Martinez: 10/7/2022  Discharge:  10/11/2022    HPI taken from admission H&P:    69 y.o. female brought in from home by her  due to AMS, increased weakness, diarrhea and stool incontinence. Patient is currently seen on room air, in no respiratory distress. She is awake, alert however minimally responsive. She denies any abd pain, chest pain,n/v. Patient unable to contribute further to HPI. Diagnoses this Admission and Hospital Course:    Sepsis- POA   Due to UTI and stercoral colitis. - treated with  levaquin and flagyl   -Sepsis resolved     Gram-negative UTI  -Urine cultures growing Klebsiella  -  given Levaquin  DC on PO cipro     Stercoral Colitis with fecal impaction and post impaction diarrhea.   -no blood. - Finally had a very large BM ; continue to monitor for daily BM. Received lactulose.  -Continue Colace  -DC on oral Flagyl     Alcohol Abuse   -Monitored for withdrawal symptoms. Patient does not have any withdrawal symptoms     Progressing dementia  -Supportive care.   -Pt on abilify and aricept - continued.   -She has not been taking depakote in a long time. Hypokalemia and hypophosphatemia and hypomagnesemia  -Repleted     Hyponatremia   -monitored sodium levels  - improved  with correction of blood glucose levels. Diabetes mellitus with hyperglycemia  -Added sliding scale insulin   -Resume metformin    Hypertension  - Blood pressure was elevated ;  improved now. -Continue Norvasc     Gastritis and esophagitis  Recent endoscopic results reviewed-patient had outpatient endoscopy done last week  -patient's  updated. -Biopsy showed gastritis. Negative for malignancy; negative for H. pylori.    Continue PPI     Dry eyes   -Artificial tears as needed    Hypothyroid   - on  Levothyroxine          DVT Prophylaxis: lovenox  Diet: ADULT DIET; Dysphagia - Minced and Moist  Code Status: Full Code  PT/OT Eval Status: as tolerated     Dispo - cc  Continue PT OT  Discussed with case management. Discussed with patient's nurse. Patient is up and ambulating well with therapy today. She is awake alert very pleasant and in no distress. .  Oriented x2 to place and person. Vital signs stable ; O2 sat stable on room air. Telemetry reviewed ; EKG reviewed. she has sinus rhythm with PACs    DC to SNF today        Procedures (Please Review Full Report for Details)  N/A    Consults    N/A      Physical Exam at Discharge:    /76   Pulse 87   Temp 97.7 °F (36.5 °C) (Oral)   Resp 16   Ht 5' 5\" (1.651 m)   Wt 116 lb 2 oz (52.7 kg)   SpO2 94%   BMI 19.32 kg/m²   General appearance: No apparent distress, appears stated age and cooperative. Patient is awake and alert. Oriented to place and person  HEENT: Pupils equal, round, and reactive to light. Conjunctivae/corneas clear. She has erythema around her eyes;  face skin and eyes look dry  Neck: Supple, with full range of motion. No jugular venous distention. Trachea midline. Respiratory:  Normal respiratory effort. Clear to auscultation, bilaterally without Rales/Wheezes/Rhonchi. Cardiovascular: Regular rate and rhythm with normal S1/S2 without murmurs, rubs or gallops. Abdomen: Soft, non-tender, non-distended with normal bowel sounds. Musculoskeletal: No clubbing, cyanosis or edema bilaterally. Full range of motion without deformity. Skin: Skin color, texture, turgor normal.  No rashes or lesions. Neurologic:  Neurovascularly intact without any focal sensory/motor deficits.  Cranial nerves: II-XII intact, grossly non-focal.  Psychiatric: Alert and oriented, thought content appropriate, normal insight  Capillary Refill: Brisk, 3 seconds, normal   Peripheral Pulses: +2 palpable, equal bilaterally Lab Results   Component Value Date    WBC 9.5 10/09/2022    HGB 11.2 (L) 10/09/2022    HCT 32.6 (L) 10/09/2022    MCV 98.8 10/09/2022     10/09/2022     Lab Results   Component Value Date     10/11/2022    K 3.2 (L) 10/11/2022     10/11/2022    CO2 27 10/11/2022    BUN <2 (L) 10/11/2022    CREATININE <0.5 (L) 10/11/2022    GLUCOSE 104 (H) 10/11/2022    CALCIUM 9.2 10/11/2022    PROT 5.7 (L) 10/08/2022    LABALBU 3.0 (L) 10/10/2022    BILITOT 0.4 10/08/2022    ALKPHOS 86 10/08/2022    AST 8 (L) 10/08/2022    ALT <5 (L) 10/08/2022    LABGLOM >60 10/11/2022    GFRAA >60 10/11/2022    AGRATIO 1.2 10/07/2022    GLOB 3.2 06/05/2021             CULTURES  COVID not detected  Urine cultures Klebsiella  Blood cultures no growth to date     Component 10/7/22 1952    Organism Klebsiella pneumoniae Abnormal     Urine Culture, Routine >100,000 CFU/ml    Resulting Agency [address] [facility]         Susceptibility     Klebsiella pneumoniae (1)     Antibiotic Interpretation Microscan   Method Status     amoxicillin-clavulanate Sensitive <=8/4 mcg/mL BACTERIAL SUSCEPTIBILITY PANEL BY GABRIELLA       ampicillin Resistant >16 mcg/mL BACTERIAL SUSCEPTIBILITY PANEL BY GABRIELLA       ampicillin-sulbactam Sensitive <=8/4 mcg/mL BACTERIAL SUSCEPTIBILITY PANEL BY GABRIELLA       ceFAZolin Sensitive <=2 mcg/mL BACTERIAL SUSCEPTIBILITY PANEL BY GABRIELLA         NOTE: Cefazolin should only be used for uncomplicated UTI         for E.coli or Klebsiella pneumoniae.             cefepime Sensitive <=2 mcg/mL BACTERIAL SUSCEPTIBILITY PANEL BY GABRIELLA       cefTRIAXone Sensitive <=1 mcg/mL BACTERIAL SUSCEPTIBILITY PANEL BY GABRIELLA       cefuroxime Sensitive <=4 mcg/mL BACTERIAL SUSCEPTIBILITY PANEL BY GABRIELLA       ciprofloxacin Sensitive <=1 mcg/mL BACTERIAL SUSCEPTIBILITY PANEL BY GABRIELLA       ertapenem Sensitive <=0.5 mcg/mL BACTERIAL SUSCEPTIBILITY PANEL BY GABRIELLA       gentamicin Sensitive <=4 mcg/mL BACTERIAL SUSCEPTIBILITY PANEL BY GABRIELLA       meropenem Sensitive <=1 mcg/mL BACTERIAL SUSCEPTIBILITY PANEL BY GABRIELLA       nitrofurantoin Intermediate 64 mcg/mL BACTERIAL SUSCEPTIBILITY PANEL BY GABRIELLA       piperacillin-tazobactam Sensitive <=16 mcg/mL BACTERIAL SUSCEPTIBILITY PANEL BY GABRIELLA       trimethoprim-sulfamethoxazole Sensitive <=2/38 mcg/mL BACTERIAL SUSCEPTIBILITY PANEL BY GABRIELLA                       RADIOLOGY  CT THORACIC SPINE WO CONTRAST   Final Result   Moderate degenerative disc changes throughout the thoracic spine which is   most prominent along the upper and midthoracic region with no obvious acute   abnormality seen. CT LUMBAR SPINE WO CONTRAST   Final Result   Severe multilevel degenerative disc disease with extensive endplate   degenerative changes and subchondral cystic and erosive changes from L2   through L5 with no obvious acute fracture. Mild subluxation of L4 on L5 and minimal subluxation of L3 on L4 with slight   retrolisthesis of L2 on L3 which appears degenerative in etiology with no   obvious pars defects      Moderate osteoarthritic changes facets throughout causing diffuse moderate   spinal stenosis      Mild to moderate central disc bulges from L2 through S1. If the patient is   persistently symptomatic, suggest MRI for further evaluation. CT CHEST ABDOMEN PELVIS WO CONTRAST Additional Contrast? None   Final Result   1. No acute cardiopulmonary process. 2. Rectal distention with moderate stool volume, rectal wall thickening and   perirectal inflammatory changes which can be seen with stercoral colitis. 3. Diffuse atherosclerosis including coronary artery involvement. 4. Subsegmental atelectasis, otherwise no acute pulmonary process. 5. Minimal secretions seen in the trachea. CT CSpine W/O Contrast   Final Result   1. No acute osseous abnormality of the cervical spine. 2. Moderate multilevel degenerative changes. CT Head W/O Contrast   Final Result   1. No acute intracranial abnormality.    2. Stable diffuse parenchymal volume loss and chronic white matter   microvascular ischemic changes. 3. Stable chronic lacunar infarcts in the left basal ganglia and bilateral   thalami. XR CHEST PORTABLE   Final Result   Minimal bibasilar discoid atelectasis or scarring which is less prominent             Reviewed old records on Care Everywhere. Patient had endoscopy at [facility] last week  EGD  Postoperative Diagnosis:   1. Antral gastritis, biopsies done   2. Esophagitis, esophageal stricture, possible Singleton's esophagus   Procedure Performed:   EGD, biopsy, esophageal dilatation      Pathology  Final Pathologic Diagnosis   A. Stomach, antrum, biopsy:   -   Antral-type gastric mucosa with no significant pathologic change. -   No morphologic evidence of Helicobacter pylori organisms.   -   Negative for intestinal metaplasia and dysplasia. B.  Esophagus, biopsy:   -   Squamocolumnar mucosa with focal intestinal metaplasia (consistent with   Singleton's esophagus) and mild chronic inflammation.   -   Negative for dysplasia and malignancy.       Discharge Medications     Medication List        START taking these medications      ciprofloxacin 250 MG tablet  Commonly known as: CIPRO  Take 1 tablet by mouth 2 times daily for 7 days     docusate 100 MG Caps  Commonly known as: COLACE, DULCOLAX  Take 100 mg by mouth 2 times daily     hydroxypropyl methylcellulose 2.5 % ophthalmic solution  Commonly known as: GONIOSOL  Place 1 drop into both eyes 4 times daily     metroNIDAZOLE 500 MG tablet  Commonly known as: FLAGYL  Take 1 tablet by mouth 3 times daily for 7 days     polyethylene glycol 17 g packet  Commonly known as: GLYCOLAX  Take 17 g by mouth daily as needed for Constipation     potassium chloride 20 MEQ extended release tablet  Commonly known as: KLOR-CON M  Take 1 tablet by mouth daily  Start taking on: October 12, 2022            CONTINUE taking these medications      amLODIPine 5 MG tablet  Commonly known as: NORVASC  Take 1 tablet by mouth daily     ARIPiprazole 5 MG tablet  Commonly known as: Abilify  Take 1 tablet by mouth daily     atorvastatin 20 MG tablet  Commonly known as: LIPITOR     cyanocobalamin 1000 MCG tablet     donepezil 5 MG tablet  Commonly known as: ARICEPT  Take 1 tablet by mouth daily (with breakfast)     ferrous sulfate 325 (65 Fe) MG tablet  Commonly known as: IRON 325  Take 1 tablet by mouth 2 times daily (with meals)     folic acid 1 MG tablet  Commonly known as: FOLVITE     levothyroxine 25 MCG tablet  Commonly known as: SYNTHROID  Take 2 tablets by mouth Daily     lisinopril 20 MG tablet  Commonly known as: PRINIVIL;ZESTRIL  Take 1 tablet by mouth daily     magnesium oxide 400 MG tablet  Commonly known as: MAG-OX     metFORMIN 500 MG tablet  Commonly known as: GLUCOPHAGE     omeprazole 20 MG delayed release capsule  Commonly known as: PRILOSEC     ondansetron 4 MG disintegrating tablet  Commonly known as: Zofran ODT  Take 1 tablet by mouth every 8 hours as needed for Nausea     oxybutynin 10 MG extended release tablet  Commonly known as: DITROPAN-XL     Ventolin  (90 Base) MCG/ACT inhaler  Generic drug: albuterol sulfate HFA     vitamin B-1 100 MG tablet  Commonly known as: THIAMINE     vitamin D 25 MCG (1000 UT) Tabs tablet  Commonly known as: CHOLECALCIFEROL            STOP taking these medications      acetaminophen 500 MG tablet  Commonly known as: TYLENOL     bisacodyl 10 MG suppository  Commonly known as: DULCOLAX     divalproex 250 MG DR tablet  Commonly known as: DEPAKOTE     furosemide 20 MG tablet  Commonly known as: LASIX     gabapentin 100 MG capsule  Commonly known as: NEURONTIN     HYDROcodone-acetaminophen 5-325 MG per tablet  Commonly known as: NORCO     meloxicam 7.5 MG tablet  Commonly known as: MOBIC     metoclopramide 5 MG tablet  Commonly known as: Reglan     nicotine 21 MG/24HR  Commonly known as: NICODERM CQ     ondansetron 4 MG tablet  Commonly known as: Zofran     traZODone 50 MG tablet  Commonly known as: DESYREL               Where to Get Your Medications        Information about where to get these medications is not yet available    Ask your nurse or doctor about these medications  amLODIPine 5 MG tablet  ARIPiprazole 5 MG tablet  ciprofloxacin 250 MG tablet  docusate 100 MG Caps  hydroxypropyl methylcellulose 2.5 % ophthalmic solution  levothyroxine 25 MCG tablet  lisinopril 20 MG tablet  metroNIDAZOLE 500 MG tablet  polyethylene glycol 17 g packet  potassium chloride 20 MEQ extended release tablet           Discharged in stable condition to SNF     Total time 40 minutes. > 50%  dominated by coordination of care. Follow Up:   Follow up with physician at [facility]

## 2022-10-11 NOTE — DISCHARGE INSTRUCTIONS
Your information:  Name: Derrick Erickson  : 1945    Your instructions:    See below. What to do after you leave the hospital:    Recommended diet:  dysphagia, minced and moist    Recommended activity: activity as tolerated        The following personal items were collected during your admission and were returned to you:    Belongings  Dental Appliances:  (KITTY)  Vision - Corrective Lenses:  (KITTY)  Hearing Aid:  (KITTY)  Clothing: Pants, Shirt, Socks, Undergarments, At bedside  Jewelry: None  Body Piercings Removed: No  Electronic Devices: None  Weapons (Notify Protective Services/Security): None  Other Valuables: At home  Home Medications: None  Valuables Given To: Patient  Patient approves for provider to speak to responsible person post operatively:  (KITTY)    Information obtained by:  By signing below, I understand that if any problems occur once I leave the hospital I am to contact MD.  I understand and acknowledge receipt of the instructions indicated above.

## 2022-10-11 NOTE — PROGRESS NOTES
Occupational Therapy Daily Treatment Note    Unit: [address] [place]  Date:  10/11/2022  Patient Name:    Evelyne Loco  Admitting diagnosis:  Sepsis [facility]) [A41.9]  Admit Date:  10/7/2022  Precautions/Restrictions:  fall risk, IV, confusion, telemetry, and environmental precautions        Discharge Recommendations: SNF  DME needs for discharge: Needs Met    If refuses SNF will need 24 hour assist and HHOT       Therapy recommendations for staff:   Assist of 1 (minimal assist) with use of rolling walker (RW) and gait belt for all transfers to/from BSC/chair    AM-PAC Score: AM-PAC Inpatient Daily Activity Raw Score: [address] S4 Level: Level 3- Safety if going home with 24 hour assist despite SNF recommendation       Treatment Time:  1730-9477  Treatment number:  3  Total Treatment Time:   40 minutes    History of Present Illness: H & P per Parul Vaz MD's note dated 10/7/2022  \"77 y.o. female brought in from home by her  due to AMS, increased weakness, diarrhea and stool incontinence. Patient is currently seen on room air, in no respiratory distress. She is awake, alert however minimally responsive. She denies any abd pain, chest pain,n/v. Patient unable to contribute further to HPI. \"    Subjective:  Patient reclined in chair and agreeable to therapy. Pain   No  Rating: NA  Location:  Pain Medicine Status: No request made      Bed Mobility:   Supine to Sit:  Not Tested  Sit to Supine:  Not Tested  Rolling:           Not Tested  Scooting:        SBA    Transfer Training:   Sit to stand:   SBA  Stand to sit:  SBA  Chair to [place] to Chair:  CGA, with use of RW, and VC for hand placement  Bed to [facility]:   Not Tested  Standard toilet:   Not Tested    Completed functional mobility in room with CGA using RW, intermittent walker safety cues.     Activity Tolerance   Pt completed therapy session with No adverse symptoms noted w/activity    Balance  Sitting Balance :     SBA  Standing Balance :  CGA and with use of RW    ADL Training:   Upper body dressing:  Not Tested  Upper body bathing:  Not Tested  Lower body dressing:  Not Tested   Lower body bathing:  Not Tested  Toileting:   Not Tested   Grooming/Hygiene:  CGA for stance to wash hands at sink, MAX A to comb hair (patient laughed and did not attempt, appears confused)  Feeding :   Not Tested    Therapeutic Exercise:   N/A    Patient Education:   Role of OT  Recommendations for DC  Safe RW use/hand placement    Positioning Needs:   Up in chair, call light and needs in reach. Alarm Set    Family Present:  No, initially; spouse arrived near end of session and reports patient below baseline and he is hoping she will respond well to SNF stay    Assessment: Limited by cognition at times, continue. Recommending SNF for safest discharge plan. GOALS  To be met in 3 Visits:  1). Bed to toilet/BSC: SBA     To be met in 5 Visits:  1). Supine to/from Sit:             SBA  2). Upper Body Bathing:         SBA  3). Lower Body Bathing:         Supervision  4). Upper Body Dressing:       Supervision  5). Lower Body Dressing:       Supervision  6).  Pt to demonstrate UE exs x 15 reps with minimal cues    Plan: cont with [address], OTR/L 3711        If patient discharges from this facility prior to next visit, this note will serve as the Discharge Summary

## 2022-10-11 NOTE — PLAN OF CARE
Problem: Discharge Planning  Goal: Discharge to home or other facility with appropriate resources  Recent Flowsheet Documentation  Taken 10/10/2022 2113 by Raleigh Roberts RN  Discharge to home or other facility with appropriate resources: Identify barriers to discharge with patient and caregiver  10/10/2022 2054 by Chris Valenzuela RN  Outcome: Progressing  Flowsheets (Taken 10/10/2022 1000)  Discharge to home or other facility with appropriate resources: Identify barriers to discharge with patient and caregiver     Problem: Pain  Goal: Verbalizes/displays adequate comfort level or baseline comfort level  10/11/2022 0526 by Raleigh Roberts RN  Outcome: Progressing  Flowsheets (Taken 10/11/2022 0300)  Verbalizes/displays adequate comfort level or baseline comfort level:   Encourage patient to monitor pain and request assistance   Assess pain using appropriate pain scale   Administer analgesics based on type and severity of pain and evaluate response   Implement non-pharmacological measures as appropriate and evaluate response   Consider cultural and social influences on pain and pain management  10/10/2022 2054 by Chris Valenzuela RN  Outcome: Progressing     Problem: Safety - Adult  Goal: Free from fall injury  10/11/2022 0526 by Raleigh Roberts RN  Outcome: Progressing  10/10/2022 2054 by Chris Valenzuela RN  Outcome: Progressing     Problem: Skin/Tissue Integrity  Goal: Absence of new skin breakdown  Description: 1. Monitor for areas of redness and/or skin breakdown  2. Assess vascular access sites hourly  3. Every 4-6 hours minimum:  Change oxygen saturation probe site  4. Every 4-6 hours:  If on nasal continuous positive airway pressure, respiratory therapy assess nares and determine need for appliance change or resting period.   10/11/2022 0526 by Raleigh Roberts RN  Outcome: Progressing  10/10/2022 2054 by Chris Valenzuela RN  Outcome: Progressing     Problem: Chronic Conditions and Co-morbidities  Goal:

## 2022-10-11 NOTE — CARE COORDINATION
DISCHARGE ORDER  Date/Time 10/11/2022 3:36 PM  Completed by: Peter Whitney RN, Case Management    Patient Name: Helga Morrell    : 1945      Admit order Date and Status:10/7/22 inpt  Noted discharge order. (verify MD's last order for status of admission/Traditional Medicare 3 [place] Inpatient qualifying stay required for SNF)    Confirmed discharge plan with:              Patient:  Yes              When pt confirms DC plan does any support person need to be contacted by CM Yes if yes who spouse                      Discharge to Facility: [facility] phone number for staff giving report: [phone]   Pre-certification completed: Yes   Hospital Exemption Notification (HENS) completed: Yes   Discharge orders and Continuity of Care faxed to facility:  facility will pull from [facility]. Transportation:               Medical Transport explained with choice list offered to pt/family. Choice:(no preference)  Agency used: [facility]   time:   16:30      Pt/family/Nursing/Facility aware of  time:   Yes Names: Shell Almanzar, Jacky Edwards nurse, pt, pt spouse and Sherrill at [facility] form completed:  Yes:      Date Last IMM Given: 10/11/22    Comments:Order for dc noted. Poke with pt and spouse and plan remains for [facility] for rehab. Spoke with Sherrill at [facility] who states pre cert is completed and can accept today for rehab. Chart reviewed and no other dc needs identified. Pt is being d/c'd to [facility] (SNF)  today. Pt's O2 sats are 95% on RA. Discharge timeout done with nsg, CM and pt and spouse. All discharge needs and concerns addressed. Discharging nurse to complete KATHERINE, reconcile AVS, and place final copy with patient's discharge packet.  Discharging RN to ensure that written prescriptions for  Level II medications are sent with patient to the facility as per protocol.

## 2022-10-11 NOTE — PROGRESS NOTES
Pt alert, confused, hx of dementia, tele sitter in room for safety,vitals and shift assessment completed, scheduled medications given, assisted pt to [facility], changed gown, pt's IV flushed, leaking, will start new IV, PT's /109, notified DR. Nye, waiting for orders, bed locked lowest position, bed alarm on, call light within reach.  Author Chapito RN

## 2022-10-11 NOTE — PLAN OF CARE
Problem: Discharge Planning  Goal: Discharge to home or other facility with appropriate resources  Outcome: Progressing  Flowsheets (Taken 10/10/2022 1000)  Discharge to home or other facility with appropriate resources: Identify barriers to discharge with patient and caregiver     Problem: Pain  Goal: Verbalizes/displays adequate comfort level or baseline comfort level  Outcome: Progressing     Problem: Safety - Adult  Goal: Free from fall injury  Outcome: Progressing     Problem: Skin/Tissue Integrity  Goal: Absence of new skin breakdown  Description: 1. Monitor for areas of redness and/or skin breakdown  2. Assess vascular access sites hourly  3. Every 4-6 hours minimum:  Change oxygen saturation probe site  4. Every 4-6 hours:  If on nasal continuous positive airway pressure, respiratory therapy assess nares and determine need for appliance change or resting period.   Outcome: Progressing     Problem: Chronic Conditions and Co-morbidities  Goal: Patient's chronic conditions and co-morbidity symptoms are monitored and maintained or improved  Outcome: Progressing  Flowsheets (Taken 10/10/2022 1000)  Care Plan - Patient's Chronic Conditions and Co-Morbidity Symptoms are Monitored and Maintained or Improved: Monitor and assess patient's chronic conditions and comorbid symptoms for stability, deterioration, or improvement

## 2022-10-11 NOTE — DISCHARGE INSTR - COC
Continuity of Care Form    Patient Name: Nellie Doss   :  [birth year]  MRN:  [id number]    Admit date:  10/7/2022  Discharge date:  10/11/22    Code Status Order: Full Code   Advance Directives:     Admitting Physician:  John Martin MD  PCP: Isaiah Walker MD    Discharging Nurse: Feroz Moss RN  [address] Unit/Room#: 0211/0211-02  Discharging Unit Phone Number: [phone]    Emergency Contact:   Extended Emergency Contact Information  Primary Emergency Contact: Pattie Reynolds  Address: [address] [id number] [address] Phone: [phone]  Mobile Phone: [phone]  Relation: Spouse  Secondary Emergency Contact: Ramy [facility] [address]  Phone: [phone]  Relation: Unknown    Past Surgical History:  Past Surgical History:   Procedure Laterality Date    ABDOMEN SURGERY      c section    CATARACT REMOVAL WITH IMPLANT Left [id number]    LEFT EYE CATARACT PHACOEMULSIFICATION INTRAOCULAR LENS     SECTION      DENTAL SURGERY      EYE SURGERY  10/29/13     RIGHT EYE CATARACT PHACOEMULSIFICATION INTRAOCULAR LENS       Immunization History:   Immunization History   Administered Date(s) Administered    DTaP 2012    Pneumococcal Conjugate 13-valent ([id number]) 10/17/2017    Pneumococcal Conjugate 7-valent (Prevnar7) 2012    Pneumococcal Polysaccharide (Cencldnss23) 2012    Td, unspecified formulation 2012       Active Problems:  Patient Active Problem List   Diagnosis Code    Hyponatremia E87.1    Hypertension I10    Hx of CVA involving R-ICA territory I63.231    CAD (coronary artery disease) I25.10    DM2 (diabetes mellitus, type 2) ([facility] E11.9    ETOH abuse F10.10    Tobacco abuse Z72.0    Asthma J45.909    Hyperlipidemia E78.5    SIRS (systemic inflammatory response syndrome) (HCC) R65.10    Thrombocytosis D75.839    Chronic dCHF (grade 1 LVDD) I50.32    Alcohol withdrawal ([facility] F10.939    Frequent falls R29.6 Hypertensive urgency I16.0    COPD exacerbation ([facility]) J44.1    Shortness of breath R06.02    NSTEMI (non-ST elevated myocardial infarction) ([facility]) I21.4    Nonischemic cardiomyopathy ([facility]) I42.8    Acute hypoxemic respiratory failure ([facility]) J96.01    Hypomagnesemia E83.42    Hypokalemia E87.6    Acute pulmonary edema ([facility]) J81.0    CHF (congestive heart failure), NYHA class III, acute, systolic ([facility]) X78.14    Chronic systolic CHF (congestive heart failure) ([facility]) I50.22    TRINH (acute kidney injury) ([facility] N17.9    Abnormal EKG R94.31    Hypotension I95.9    Tachycardia R00.0    Scalp laceration S01. 01XA    Chronic obstructive pulmonary disease ([facility] J44.9    Chronic diastolic congestive heart failure ([facility]) I50.32    Iron deficiency anemia D50.9    Pulmonary embolism without acute cor pulmonale ([facility]) I26.99    Vascular dementia with behavior disturbance F01.518    Unspecified dementia with behavioral disturbance F03.91    Sepsis ([facility] A41.9    Stercoral colitis K52.89    Urinary tract infection with hematuria N39.0, R31.9    Septicemia ([facility]) A41.9       Isolation/Infection:   Isolation            No Isolation          Patient Infection Status       Infection Onset Added Last Indicated Last Indicated By Review Planned Expiration Resolved Resolved By    None active    Resolved    COVID-19 (Rule Out) 10/07/22 10/07/22 10/07/22 COVID-19 & Influenza Combo (Ordered)   10/07/22 Rule-Out Test Resulted    COVID-19 (Rule Out) 06/21/22 06/21/22 06/21/22 COVID-19 & Influenza Combo (Ordered)   06/22/22 Rule-Out Test Resulted    COVID-19 (Rule Out) 02/25/22 02/25/22 02/25/22 COVID-19 & Influenza Combo (Ordered)   02/25/22 Rule-Out Test Resulted    COVID-19 (Rule Out) 06/05/21 06/05/21 06/05/21 COVID-19 & Influenza Combo (Ordered)   06/05/21 Rule-Out Test Resulted    COVID-19 (Rule Out) 05/22/21 05/22/21 05/22/21 COVID-19 & Influenza Combo (Ordered)   05/22/21 Rule-Out Test Resulted    COVID-19 (Rule Out) 01/16/21 01/16/21 01/16/21 COVID-19 (Ordered)   21 Rule-Out Test Resulted    COVID-19 (Rule Out) 21 COVID-19 (Ordered)   21 Rule-Out Test Resulted    COVID-19 (Rule Out) 20 COVID-19 (Ordered)   10/09/20             Nurse Assessment:  Last Vital Signs: BP (!) 154/91   Pulse 82   Temp 98.5 °F (36.9 °C) (Oral)   Resp 16   Ht 5' 5\" (1.651 m)   Wt 116 lb 2 oz (52.7 kg)   SpO2 95%   BMI 19.32 kg/m²     Last documented pain score (0-10 scale): Pain Level: 0  Last Weight:   Wt Readings from Last 1 Encounters:   10/11/22 116 lb 2 oz (52.7 kg)     Mental Status:   Alert & orientated x2, disorientated to time & situation    IV Access:  - None    Nursing Mobility/ADLs:  Walking   Assisted  Transfer  Assisted  Bathing  Assisted  Dressing  Assisted  Toileting  Assisted  Feeding  Assisted  Med Admin  Assisted  Med Delivery   crushed in puree at [facility]    Wound Care Documentation and Therapy:        Elimination:  Continence: Bowel: No  Bladder: No  Urinary Catheter: None   Colostomy/Ileostomy/Ileal Conduit: No       Date of Last BM: 10/11/22    Intake/Output Summary (Last 24 hours) at 10/11/2022 1508  Last data filed at 10/11/2022 1206  Gross per 24 hour   Intake 761.94 ml   Output --   Net 761.94 ml     I/O last 3 completed shifts: In: 4028 [P.O.:1080; I.V.:3698.8; IV Piggyback:790.2]  Out: -     Safety Concerns:     Sundowners Sundrome, At Risk for Falls, and Aspiration Risk    Impairments/Disabilities:      None    Nutrition Therapy:  Current Nutrition Therapy:   - Oral Diet:  dysphagia, minced and moist; no straws    Routes of Feeding: Oral  Liquids: Thin Liquids  Daily Fluid Restriction: no  Last Modified Barium Swallow with Video (Video Swallowing Test): not done    Treatments at the Time of Hospital Discharge:   Respiratory Treatments: n/a  Oxygen Therapy:  is not on home oxygen therapy.   Ventilator:    - No ventilator support    Rehab Therapies: Physical Therapy, Occupational Therapy, and Speech/Language Therapy  Weight Bearing Status/Restrictions: No weight bearing restrictions  Other Medical Equipment (for information only, NOT a DME order):  walker and gait belt  Other Treatments:     Patient's personal belongings (please select all that are sent with patient):  None    RN SIGNATURE:  Electronically signed by Cameron Moran RN on 10/11/22 at 4:17 PM EDT    CASE MANAGEMENT/SOCIAL WORK SECTION    Inpatient Status Date: 10/7/22    Readmission Risk Assessment Score:  Readmission Risk              Risk of Unplanned Readmission:  32           Discharging to Facility/ Agency   Name: [facility]  Phone: [phone]          / signature: Electronically signed by Marisela Salamanca RN on 10/11/22 at 3:17 PM EDT    PHYSICIAN SECTION    Prognosis: Good    Condition at Discharge: Stable    Rehab Potential (if transferring to Rehab): Good    Recommended Labs or Other Treatments After Discharge:     Physician Certification: I certify the above information and transfer of Julian Yoo  is necessary for the continuing treatment of the diagnosis listed and that she requires Located within [facility] for less 30 days.      Update Admission H&P: No change in H&P    PHYSICIAN SIGNATURE:  Electronically signed by Jasen Hayes MD on 10/11/22 at 3:08 PM EDT

## 2023-03-01 ENCOUNTER — APPOINTMENT (OUTPATIENT)
Dept: GENERAL RADIOLOGY | Age: 78
End: 2023-03-01
Payer: MEDICAID

## 2023-03-01 ENCOUNTER — APPOINTMENT (OUTPATIENT)
Dept: CT IMAGING | Age: 78
End: 2023-03-01
Payer: MEDICAID

## 2023-03-01 ENCOUNTER — HOSPITAL ENCOUNTER (EMERGENCY)
Age: 78
Discharge: HOME | End: 2023-03-01
Attending: STUDENT IN AN ORGANIZED HEALTH CARE EDUCATION/TRAINING PROGRAM
Payer: MEDICAID

## 2023-03-01 VITALS
HEIGHT: 65 IN | SYSTOLIC BLOOD PRESSURE: 163 MMHG | BODY MASS INDEX: 17.33 KG/M2 | HEART RATE: 80 BPM | OXYGEN SATURATION: 95 % | DIASTOLIC BLOOD PRESSURE: 89 MMHG | WEIGHT: 104 LBS | RESPIRATION RATE: 18 BRPM | TEMPERATURE: 97.6 F

## 2023-03-01 DIAGNOSIS — Z00.8 ENCOUNTER FOR MEDICAL ASSESSMENT: Primary | ICD-10-CM

## 2023-03-01 LAB
A/G RATIO: 1.6 (ref 1.1–2.2)
ALBUMIN SERPL-MCNC: 3.9 G/DL (ref 3.4–5)
ALP BLD-CCNC: 82 U/L (ref 40–129)
ALT SERPL-CCNC: 6 U/L (ref 10–40)
ANION GAP SERPL CALCULATED.3IONS-SCNC: 8 MMOL/L (ref 3–16)
AST SERPL-CCNC: 9 U/L (ref 15–37)
BASOPHILS ABSOLUTE: 0 K/UL (ref 0–0.2)
BASOPHILS RELATIVE PERCENT: 0.6 %
BILIRUB SERPL-MCNC: 0.3 MG/DL (ref 0–1)
BUN BLDV-MCNC: 13 MG/DL (ref 7–20)
CALCIUM SERPL-MCNC: 9.8 MG/DL (ref 8.3–10.6)
CHLORIDE BLD-SCNC: 101 MMOL/L (ref 99–110)
CO2: 28 MMOL/L (ref 21–32)
CREAT SERPL-MCNC: 0.8 MG/DL (ref 0.6–1.2)
EOSINOPHILS ABSOLUTE: 0 K/UL (ref 0–0.6)
EOSINOPHILS RELATIVE PERCENT: 0.4 %
GFR SERPL CREATININE-BSD FRML MDRD: >60 ML/MIN/{1.73_M2}
GLUCOSE BLD-MCNC: 111 MG/DL (ref 70–99)
HCT VFR BLD CALC: 38.6 % (ref 36–48)
HEMOGLOBIN: 13.2 G/DL (ref 12–16)
INFLUENZA A: NOT DETECTED
INFLUENZA B: NOT DETECTED
LIPASE: 14 U/L (ref 13–60)
LYMPHOCYTES ABSOLUTE: 1.2 K/UL (ref 1–5.1)
LYMPHOCYTES RELATIVE PERCENT: 14.5 %
MCH RBC QN AUTO: 31 PG (ref 26–34)
MCHC RBC AUTO-ENTMCNC: 34.2 G/DL (ref 31–36)
MCV RBC AUTO: 90.6 FL (ref 80–100)
MONOCYTES ABSOLUTE: 0.4 K/UL (ref 0–1.3)
MONOCYTES RELATIVE PERCENT: 4.4 %
NEUTROPHILS ABSOLUTE: 6.7 K/UL (ref 1.7–7.7)
NEUTROPHILS RELATIVE PERCENT: 80.1 %
PDW BLD-RTO: 13.9 % (ref 12.4–15.4)
PLATELET # BLD: 374 K/UL (ref 135–450)
PMV BLD AUTO: 6.7 FL (ref 5–10.5)
POTASSIUM REFLEX MAGNESIUM: 4.3 MMOL/L (ref 3.5–5.1)
RBC # BLD: 4.26 M/UL (ref 4–5.2)
SARS-COV-2 RNA, RT PCR: NOT DETECTED
SODIUM BLD-SCNC: 137 MMOL/L (ref 136–145)
TOTAL PROTEIN: 6.3 G/DL (ref 6.4–8.2)
TROPONIN: <0.01 NG/ML
WBC # BLD: 8.4 K/UL (ref 4–11)

## 2023-03-01 PROCEDURE — 93005 ELECTROCARDIOGRAM TRACING: CPT | Performed by: STUDENT IN AN ORGANIZED HEALTH CARE EDUCATION/TRAINING PROGRAM

## 2023-03-01 PROCEDURE — 85025 COMPLETE CBC W/AUTO DIFF WBC: CPT

## 2023-03-01 PROCEDURE — 87636 SARSCOV2 & INF A&B AMP PRB: CPT

## 2023-03-01 PROCEDURE — 70450 CT HEAD/BRAIN W/O DYE: CPT

## 2023-03-01 PROCEDURE — 83690 ASSAY OF LIPASE: CPT

## 2023-03-01 PROCEDURE — 84484 ASSAY OF TROPONIN QUANT: CPT

## 2023-03-01 PROCEDURE — 71045 X-RAY EXAM CHEST 1 VIEW: CPT

## 2023-03-01 PROCEDURE — 80053 COMPREHEN METABOLIC PANEL: CPT

## 2023-03-01 PROCEDURE — 99285 EMERGENCY DEPT VISIT HI MDM: CPT

## 2023-03-01 ASSESSMENT — PAIN - FUNCTIONAL ASSESSMENT
PAIN_FUNCTIONAL_ASSESSMENT: NONE - DENIES PAIN
PAIN_FUNCTIONAL_ASSESSMENT: NONE - DENIES PAIN

## 2023-03-01 ASSESSMENT — LIFESTYLE VARIABLES
HOW MANY STANDARD DRINKS CONTAINING ALCOHOL DO YOU HAVE ON A TYPICAL DAY: PATIENT DOES NOT DRINK
HOW OFTEN DO YOU HAVE A DRINK CONTAINING ALCOHOL: NEVER

## 2023-03-01 NOTE — ED PROVIDER NOTES
Emergency Department Encounter    Patient: Fina Mcnamara  MRN: [id number]  : 1945  Date of Evaluation: 3/1/2023  ED Provider:  Chacorta Veliz MD    Triage Chief Complaint:   Altered Mental Status (Per ems they were called out for possible stroke but upon arriving the family said they overreacted and that the patient was sleeping on the porch, drooling,slumped over, but when woke her up she was back to her normal self. Did have home health nurse out today and said she was normal self as well.  Patient has no complaints in triage. )    [place]:  Fina Mcnamara is a 69 y.o. female that presents ***    ROS - see HPI, below listed is current ROS at time of my eval:  General:  No fevers, no chills, no weakness  Eyes:  No recent vison changes, no discharge  ENT:  No sore throat, no nasal congestion, no hearing changes  Cardiovascular:  No chest pain, no palpitations  Respiratory:  No shortness of breath, no cough, no wheezing  Gastrointestinal:  No pain, no nausea, no vomiting, no diarrhea  Musculoskeletal:  No muscle pain, no joint pain  Skin:  No rash, no pruritis, no easy bruising  Neurologic:  No speech problems, no headache, no extremity numbness, no extremity tingling, no extremity weakness  Psychiatric:  No anxiety  Genitourinary:  No dysuria, no hematuria  Endocrine:  No unexpected weight gain, no unexpected weight loss  Extremities:  no edema, no pain    Past Medical History:   Diagnosis Date    Acute systolic CHF (congestive heart failure) (HCC)     Asthma     Back ache     Cataract     Cerebral artery occlusion with cerebral infarction ([address] 2016    Diabetes mellitus ([address]     type !! - diet controlled    Hyperlipidemia     Hypertension     Insomnia     TIA (transient ischemic attack) 2016     Past Surgical History:   Procedure Laterality Date    ABDOMEN SURGERY      c section    CATARACT REMOVAL WITH IMPLANT Left 100689    LEFT EYE CATARACT PHACOEMULSIFICATION INTRAOCULAR LENS     SECTION      DENTAL SURGERY      EYE SURGERY  10/29/13     RIGHT EYE CATARACT PHACOEMULSIFICATION INTRAOCULAR LENS     Family History   Problem Relation Age of Onset    High Blood Pressure Mother     Miscarriages / [place]oni Mother     Cancer Father     Diabetes Father     Heart Disease Father     Kidney Disease Father     Asthma Sister     Cancer Sister         Breast Cancer    Depression Brother     Substance Abuse Paternal Aunt     Stroke Maternal Grandmother     Diabetes Paternal Grandmother      Social History     Socioeconomic History    Marital status:      Spouse name: carolyne    Number of children: Not on file    Years of education: Not on file    Highest education level: Not on file   Occupational History    Not on file   Tobacco Use    Smoking status: Former     Packs/day: 2.00     Years: 58.00     Pack years: 116.00     Types: Cigarettes    Smokeless tobacco: Never    Tobacco comments:     not totally ready to quit today   Vaping Use    Vaping Use: Never used   Substance and Sexual Activity    Alcohol use: Not Currently     Alcohol/week: 2.0 - 3.0 standard drinks     Types: 2 - 3 Shots of liquor per week    Drug use: No    Sexual activity: Yes     Partners: Male   Other Topics Concern    Not on file   Social History Narrative    Not on file     Social Determinants of Health     Financial Resource Strain: Not on file   Food Insecurity: Not on file   Transportation Needs: Not on file   Physical Activity: Not on file   Stress: Not on file   Social Connections: Not on file   Intimate Partner Violence: Not on file   Housing Stability: Not on file     No current facility-administered medications for this encounter.      Current Outpatient Medications   Medication Sig Dispense Refill    lisinopril (PRINIVIL;ZESTRIL) 20 MG tablet Take 1 tablet by mouth daily      ARIPiprazole (ABILIFY) 5 MG tablet Take 1 tablet by mouth daily      amLODIPine (NORVASC) 5 MG tablet Take 1 tablet by mouth daily docusate sodium (COLACE, DULCOLAX) 100 MG CAPS Take 100 mg by mouth 2 times daily      polyethylene glycol (GLYCOLAX) 17 g packet Take 17 g by mouth daily as needed for Constipation      potassium chloride (KLOR-CON M) 20 MEQ extended release tablet Take 1 tablet by mouth daily      hydroxypropyl methylcellulose (GONIOSOL) 2.5 % ophthalmic solution Place 1 drop into both eyes 4 times daily      levothyroxine (SYNTHROID) 25 MCG tablet Take 2 tablets by mouth Daily      vitamin B-1 (THIAMINE) 100 MG tablet Take 100 mg by mouth daily      omeprazole (PRILOSEC) 20 MG delayed release capsule Take 20 mg by mouth 2 times daily      folic acid (FOLVITE) 1 MG tablet Take 1 mg by mouth daily      cyanocobalamin 1000 MCG tablet Take 1,000 mcg by mouth daily      vitamin D (CHOLECALCIFEROL) 25 MCG (1000 UT) TABS tablet Take 1,000 Units by mouth daily      magnesium oxide (MAG-OX) 400 MG tablet Take 400 mg by mouth 2 times daily      albuterol sulfate HFA (VENTOLIN HFA) 108 (90 Base) MCG/ACT inhaler Inhale 2 puffs into the lungs every 6 hours as needed for Wheezing      atorvastatin (LIPITOR) 20 MG tablet Take 20 mg by mouth daily      donepezil (ARICEPT) 5 MG tablet Take 1 tablet by mouth daily (with breakfast) 30 tablet 0    oxybutynin (DITROPAN-XL) 10 MG extended release tablet Take 10 mg by mouth daily      ondansetron (ZOFRAN ODT) 4 MG disintegrating tablet Take 1 tablet by mouth every 8 hours as needed for Nausea 20 tablet 0    ferrous sulfate (IRON 325) 325 (65 Fe) MG tablet Take 1 tablet by mouth 2 times daily (with meals) 60 tablet 3    metFORMIN (GLUCOPHAGE) 500 MG tablet Take 500 mg by mouth 2 times daily (with meals)       Allergies   Allergen Reactions    Mold Extract [Trichophyton Mentagrophytes]        Nursing Notes Reviewed    Physical Exam:  Triage VS:    ED Triage Vitals [03/01/23 9777]   Enc Vitals Group      /73      Heart Rate 77      Resp 14      Temp 96.9 °F (36.1 °C)      Temp Source Oral      SpO2 96 %      Weight 104 lb (47.2 kg)      Height 5' 5\" (1.651 m)      Head Circumference       Peak Flow       Pain Score       Pain Loc       Pain Edu? Excl. in [address]? My pulse ox interpretation is - normal    General appearance:  No acute distress. Skin:  Warm. Dry. Eye:  Extraocular movements intact. Ears, nose, mouth and throat:  Oral mucosa moist   Neck:  Trachea midline. Extremity:  No swelling. Normal ROM     Heart:  Regular rate and rhythm, normal S1 & S2, no extra heart sounds. Perfusion:  intact  Respiratory:  Lungs clear to auscultation bilaterally. Respirations nonlabored. Abdominal:  Normal bowel sounds. Soft. Nontender. Non distended. Back:  No CVA tenderness to palpation     Neurological:  Alert and oriented times 3. No focal neuro deficits. Psychiatric:  Appropriate    I have reviewed and interpreted all of the currently available lab results from this visit (if applicable):  No results found for this visit on 03/01/23. Radiographs (if obtained):  Radiologist's Report Reviewed:  No results found. EKG (if obtained): (All EKG's are interpreted by myself in the absence of a cardiologist)      MDM:  ***    Clinical Impression:  No diagnosis found. Disposition referral (if applicable):  No follow-up provider specified. Disposition medications (if applicable):  New Prescriptions    No medications on file     ED Provider Disposition Time  DISPOSITION        Comment: Please note this report has been produced using speech recognition software and may contain errors related to that system including errors in grammar, punctuation, and spelling, as well as words and phrases that may be inappropriate. Efforts were made to edit the dictations. results found. EKG (if obtained): (All EKG's are interpreted by myself in the absence of a cardiologist)  Normal sinus rhythm, ventricular rate 82, OK interval 186, QRS duration 70, QTc 469, compared to prior exam there is no longer ST depression in the inferior leads and less prominent ST depression in the lateral leads    MDM:    78-year-old female presenting with concern for altered mental status. Discussion with patient she feels at baseline discussion with family patient is at baseline mental status and neuro status on presentation. Vitals on presentation are reassuring and patient afebrile satting well on room air. Physical exam can be seen above. CBC reveals no leukocytosis. Hemoglobin is within normal limits. CMP is overall reassuring. EKG can be seen above and Trope is nonelevated. Lipase is not elevated. COVID and flu test are negative. CT head reveals no acute abnormality seen. Reveals mild bibasilar discoid atelectasis or scarring with no infiltrate or effusion. Family states that patient was recently started on antibiotics for concern for urinary tract infection that they started yesterday. I do long discussion with family about admission for monitoring and further evaluation and treatment but discussion with family with patient being back to baseline and concerned that patient was sleeping and patient only confused immediately upon awakening and then returned back to baseline with no focal neurodeficits and reassuring laboratory evaluation and imaging they would like to take patient home with close follow-up with primary care physician and strict return precautions. Son states that he will keep a close eye on patient return for any new changing or worsening symptoms. I do believe this is reasonable. Patient has not urinated in the ED. Patient is also ready on antibiotics and do believe the risk of straight cath likely outweighs the benefits.   I discussed strict return precautions as well as close follow-up and patient as well as family are agreeable to plan and patient discharged home. Final impression:  1. Encounter for medical assessment    Comment: Please note this report has been produced using speech recognition software and may contain errors related to that system including errors in grammar, punctuation, and spelling, as well as words and phrases that may be inappropriate. Efforts were made to edit the dictations.         Shelli Montoya MD  03/08/23 7517

## 2023-03-02 LAB
EKG ATRIAL RATE: 82 BPM
EKG DIAGNOSIS: NORMAL
EKG P AXIS: 98 DEGREES
EKG P-R INTERVAL: 186 MS
EKG Q-T INTERVAL: 402 MS
EKG QRS DURATION: 70 MS
EKG QTC CALCULATION (BAZETT): 469 MS
EKG R AXIS: 42 DEGREES
EKG T AXIS: 56 DEGREES
EKG VENTRICULAR RATE: 82 BPM

## 2023-03-02 PROCEDURE — 93010 ELECTROCARDIOGRAM REPORT: CPT | Performed by: INTERNAL MEDICINE

## 2023-03-02 NOTE — ED NOTES
1940- ECG was completed with no complaints or cardiac hx.  Results were handed to Dr. Mary Beth Coronado @1637     Pipe Peraza  03/01/23 1941

## 2023-03-02 NOTE — DISCHARGE INSTRUCTIONS
Follow-up with your primary doctor within the next 1 to 2 days for reevaluation. Continue antibiotics. Return to emergency department for any confusion, altered mental status, slurred speech, visual changes, motor or sensory changes, chest pain or shortness of breath, abdominal pain or any new change or worsening symptoms were always here for reevaluation never hesitate to return.

## 2023-03-14 ENCOUNTER — APPOINTMENT (OUTPATIENT)
Dept: CT IMAGING | Age: 78
DRG: 884 | End: 2023-03-14
Payer: MEDICARE

## 2023-03-14 ENCOUNTER — APPOINTMENT (OUTPATIENT)
Dept: GENERAL RADIOLOGY | Age: 78
DRG: 884 | End: 2023-03-14
Payer: MEDICARE

## 2023-03-14 ENCOUNTER — HOSPITAL ENCOUNTER (EMERGENCY)
Age: 78
Discharge: HOME | DRG: 884 | End: 2023-03-14
Attending: STUDENT IN AN ORGANIZED HEALTH CARE EDUCATION/TRAINING PROGRAM
Payer: MEDICARE

## 2023-03-14 VITALS
HEART RATE: 73 BPM | WEIGHT: 114.4 LBS | DIASTOLIC BLOOD PRESSURE: 88 MMHG | TEMPERATURE: 97.2 F | RESPIRATION RATE: 23 BRPM | HEIGHT: 65 IN | BODY MASS INDEX: 19.06 KG/M2 | SYSTOLIC BLOOD PRESSURE: 168 MMHG | OXYGEN SATURATION: 98 %

## 2023-03-14 DIAGNOSIS — R93.89 ABNORMAL FINDING ON CT SCAN: ICD-10-CM
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
## 2023-03-14 DIAGNOSIS — R11.0 NAUSEA: ICD-10-CM

## 2023-03-14 LAB
ALBUMIN SERPL-MCNC: 3.7 G/DL (ref 3.4–5)
ALBUMIN/GLOB SERPL: 1.2 {RATIO} (ref 1.1–2.2)
ALP SERPL-CCNC: 70 U/L (ref 40–129)
ALT SERPL-CCNC: 7 U/L (ref 10–40)
ANION GAP SERPL CALCULATED.3IONS-SCNC: 10 MMOL/L (ref 3–16)
AST SERPL-CCNC: 11 U/L (ref 15–37)
BASOPHILS # BLD: 0.1 K/UL (ref 0–0.2)
BASOPHILS NFR BLD: 1.3 %
BILIRUB SERPL-MCNC: 0.4 MG/DL (ref 0–1)
BILIRUB UR QL STRIP.AUTO: NEGATIVE
BUN SERPL-MCNC: 9 MG/DL (ref 7–20)
CALCIUM SERPL-MCNC: 9.5 MG/DL (ref 8.3–10.6)
CHLORIDE SERPL-SCNC: 102 MMOL/L (ref 99–110)
CLARITY UR: CLEAR
CO2 SERPL-SCNC: 27 MMOL/L (ref 21–32)
COLOR UR: YELLOW
CREAT SERPL-MCNC: 0.8 MG/DL (ref 0.6–1.2)
DEPRECATED RDW RBC AUTO: 14.3 % (ref 12.4–15.4)
EKG ATRIAL RATE: 85 BPM
EKG DIAGNOSIS: NORMAL
EKG P AXIS: 61 DEGREES
EKG P-R INTERVAL: 172 MS
EKG Q-T INTERVAL: 394 MS
EKG QRS DURATION: 80 MS
EKG QTC CALCULATION (BAZETT): 468 MS
EKG R AXIS: -5 DEGREES
EKG T AXIS: 44 DEGREES
EKG VENTRICULAR RATE: 85 BPM
EOSINOPHIL # BLD: 0.2 K/UL (ref 0–0.6)
EOSINOPHIL NFR BLD: 3.1 %
FLUAV RNA RESP QL NAA+PROBE: NOT DETECTED
FLUBV RNA RESP QL NAA+PROBE: NOT DETECTED
GFR SERPLBLD CREATININE-BSD FMLA CKD-EPI: >60 ML/MIN/{1.73_M2}
GLUCOSE SERPL-MCNC: 104 MG/DL (ref 70–99)
GLUCOSE UR STRIP.AUTO-MCNC: NEGATIVE MG/DL
HCT VFR BLD AUTO: 38.1 % (ref 36–48)
HGB BLD-MCNC: 12.9 G/DL (ref 12–16)
HGB UR QL STRIP.AUTO: NEGATIVE
KETONES UR STRIP.AUTO-MCNC: NEGATIVE MG/DL
LEUKOCYTE ESTERASE UR QL STRIP.AUTO: NEGATIVE
LIPASE SERPL-CCNC: 28 U/L (ref 13–60)
LYMPHOCYTES # BLD: 1.8 K/UL (ref 1–5.1)
LYMPHOCYTES NFR BLD: 23 %
MAGNESIUM SERPL-MCNC: 1.5 MG/DL (ref 1.8–2.4)
MCH RBC QN AUTO: 30.9 PG (ref 26–34)
MCHC RBC AUTO-ENTMCNC: 33.9 G/DL (ref 31–36)
MCV RBC AUTO: 91.3 FL (ref 80–100)
MONOCYTES # BLD: 0.5 K/UL (ref 0–1.3)
MONOCYTES NFR BLD: 6.6 %
NEUTROPHILS # BLD: 5.2 K/UL (ref 1.7–7.7)
NEUTROPHILS NFR BLD: 66 %
NITRITE UR QL STRIP.AUTO: NEGATIVE
NT-PROBNP SERPL-MCNC: 448 PG/ML (ref 0–449)
PH UR STRIP.AUTO: 8 [PH] (ref 5–8)
PLATELET # BLD AUTO: 362 K/UL (ref 135–450)
PMV BLD AUTO: 8 FL (ref 5–10.5)
POTASSIUM SERPL-SCNC: 3.4 MMOL/L (ref 3.5–5.1)
PROT SERPL-MCNC: 6.8 G/DL (ref 6.4–8.2)
PROT UR STRIP.AUTO-MCNC: NEGATIVE MG/DL
RBC # BLD AUTO: 4.17 M/UL (ref 4–5.2)
SARS-COV-2 RNA RESP QL NAA+PROBE: NOT DETECTED
SODIUM SERPL-SCNC: 139 MMOL/L (ref 136–145)
SP GR UR STRIP.AUTO: 1.01 (ref 1–1.03)
TROPONIN T SERPL-MCNC: <0.01 NG/ML
UA COMPLETE W REFLEX CULTURE PNL UR: NORMAL
UA DIPSTICK W REFLEX MICRO PNL UR: NORMAL
URN SPEC COLLECT METH UR: NORMAL
UROBILINOGEN UR STRIP-ACNC: 0.2 E.U./DL
WBC # BLD AUTO: 7.9 K/UL (ref 4–11)

## 2023-03-14 PROCEDURE — 81003 URINALYSIS AUTO W/O SCOPE: CPT

## 2023-03-14 PROCEDURE — 6370000000 HC RX 637 (ALT 250 FOR IP): Performed by: STUDENT IN AN ORGANIZED HEALTH CARE EDUCATION/TRAINING PROGRAM

## 2023-03-14 PROCEDURE — 71045 X-RAY EXAM CHEST 1 VIEW: CPT

## 2023-03-14 PROCEDURE — 80053 COMPREHEN METABOLIC PANEL: CPT

## 2023-03-14 PROCEDURE — 93010 ELECTROCARDIOGRAM REPORT: CPT | Performed by: INTERNAL MEDICINE

## 2023-03-14 PROCEDURE — 96375 TX/PRO/DX INJ NEW DRUG ADDON: CPT

## 2023-03-14 PROCEDURE — 84484 ASSAY OF TROPONIN QUANT: CPT

## 2023-03-14 PROCEDURE — 83880 ASSAY OF NATRIURETIC PEPTIDE: CPT

## 2023-03-14 PROCEDURE — 87636 SARSCOV2 & INF A&B AMP PRB: CPT

## 2023-03-14 PROCEDURE — 73060 X-RAY EXAM OF HUMERUS: CPT

## 2023-03-14 PROCEDURE — 99285 EMERGENCY DEPT VISIT HI MDM: CPT

## 2023-03-14 PROCEDURE — 96365 THER/PROPH/DIAG IV INF INIT: CPT

## 2023-03-14 PROCEDURE — 72128 CT CHEST SPINE W/O DYE: CPT

## 2023-03-14 PROCEDURE — 6360000004 HC RX CONTRAST MEDICATION: Performed by: STUDENT IN AN ORGANIZED HEALTH CARE EDUCATION/TRAINING PROGRAM

## 2023-03-14 PROCEDURE — 96366 THER/PROPH/DIAG IV INF ADDON: CPT

## 2023-03-14 PROCEDURE — 72125 CT NECK SPINE W/O DYE: CPT

## 2023-03-14 PROCEDURE — 85025 COMPLETE CBC W/AUTO DIFF WBC: CPT

## 2023-03-14 PROCEDURE — 36415 COLL VENOUS BLD VENIPUNCTURE: CPT

## 2023-03-14 PROCEDURE — 83690 ASSAY OF LIPASE: CPT

## 2023-03-14 PROCEDURE — 93005 ELECTROCARDIOGRAM TRACING: CPT | Performed by: STUDENT IN AN ORGANIZED HEALTH CARE EDUCATION/TRAINING PROGRAM

## 2023-03-14 PROCEDURE — 70450 CT HEAD/BRAIN W/O DYE: CPT

## 2023-03-14 PROCEDURE — 6360000002 HC RX W HCPCS: Performed by: STUDENT IN AN ORGANIZED HEALTH CARE EDUCATION/TRAINING PROGRAM

## 2023-03-14 PROCEDURE — 83735 ASSAY OF MAGNESIUM: CPT

## 2023-03-14 PROCEDURE — 74177 CT ABD & PELVIS W/CONTRAST: CPT

## 2023-03-14 RX ORDER — MAGNESIUM SULFATE IN WATER 40 MG/ML
2000 INJECTION, SOLUTION INTRAVENOUS ONCE
Status: COMPLETED | OUTPATIENT
Start: 2023-03-14 | End: 2023-03-14

## 2023-03-14 RX ORDER — POTASSIUM CHLORIDE 20 MEQ/1
40 TABLET, EXTENDED RELEASE ORAL ONCE
Status: COMPLETED | OUTPATIENT
Start: 2023-03-14 | End: 2023-03-14

## 2023-03-14 RX ORDER — ONDANSETRON 2 MG/ML
4 INJECTION INTRAMUSCULAR; INTRAVENOUS EVERY 6 HOURS PRN
Status: DISCONTINUED | OUTPATIENT
Start: 2023-03-14 | End: 2023-03-14 | Stop reason: HOSPADM

## 2023-03-14 RX ORDER — ONDANSETRON 4 MG/1
4 TABLET, ORALLY DISINTEGRATING ORAL 3 TIMES DAILY PRN
Qty: 6 TABLET | Refills: 0 | Status: ON HOLD | OUTPATIENT
Start: 2023-03-14 | End: 2023-03-20 | Stop reason: HOSPADM

## 2023-03-14 RX ADMIN — ONDANSETRON 4 MG: 2 INJECTION INTRAMUSCULAR; INTRAVENOUS at 17:00

## 2023-03-14 RX ADMIN — IOPAMIDOL 75 ML: 755 INJECTION, SOLUTION INTRAVENOUS at 16:43

## 2023-03-14 RX ADMIN — MAGNESIUM SULFATE HEPTAHYDRATE 2000 MG: 40 INJECTION, SOLUTION INTRAVENOUS at 17:09

## 2023-03-14 RX ADMIN — POTASSIUM CHLORIDE 40 MEQ: 1500 TABLET, EXTENDED RELEASE ORAL at 17:11

## 2023-03-14 ASSESSMENT — PAIN - FUNCTIONAL ASSESSMENT: PAIN_FUNCTIONAL_ASSESSMENT: WONG-BAKER FACES

## 2023-03-14 ASSESSMENT — PAIN SCALES - WONG BAKER: WONGBAKER_NUMERICALRESPONSE: 4

## 2023-03-14 NOTE — ED PROVIDER NOTES
I assumed patient care at shift change. In summary, patient is a 27-year-old female presents to the emergency department for evaluation after fall. Patient reports the power was out at her house due to the snow and it was dark and she was trying to walk around in the dark and stumbled forwards hitting her head  She reports having chronic upper back pain but none currently. Patient care handed off to me pending CT scan and urine results. Urinalysis negative. CT showed dilated common bile duct measuring up to 0.9 cm. On exam, she had no abdominal tenderness. Liver enzymes within normal limits. Total bili within normal limits. No acute intracranial bleed. There was spondylolisthesis and possible lytic lesion involving the right anterior arch of C1. She denies having any neck pain. However, given the concern for a lytic lesion, I consulted neurosurgery. Per chart review, 10/07/22- ct cervical spine  unchanged 4 mm degenerative   anterolisthesis of C7 on T1. No traumatic malalignment. Craniocervical and   C1-2 relationships are normal.     I spoke with Dr. Arnulfo Nieto who recommends f/u as outpatient and soft collar. No need for emergent MRI. She was ambulated in the ER. I spoke with the  and the son who are both at bedside. They report she walks with a walker at home. Says she is acting her baseline currently. Denies having any questions or concerns. She was given a soft collar and instructed to follow-up with neurosurgery in 1 to 2 days for further evaluation and treatment. She was discharged home with strict return precautions. I provided at least 30 minutes of critical care excluding separately billable procedures. ICD-10-CM    1. Fall, initial encounter  [address] XXXA       2. Nausea  R11.0       3.  Abnormal finding on CT scan  R93.89              Deborah Renae MD  03/17/23 5840

## 2023-03-14 NOTE — ED NOTES
1930 - Call placed to [address] [place] [address] [address] Neurosurgery for consult     Danelle Wu  03/14/23 1932 1934 - Dr. Tiffany Laughlin returned the page and spoke directly to Dr. Elizabeth Wolfe  03/14/23 1936       Danelle Wu  03/14/23 1937

## 2023-03-14 NOTE — DISCHARGE INSTRUCTIONS
Please keep the soft collar on. Follow up with Dr. Thai Rod within the next 1-2 days for further evaluation and treatment. CT shows incidental findings of multilevel spondylosis and degenerative disc disease. Spinal stenosis at C3-4 and C5-6. There was possible lytic lesion involving the right anterior arch of c1. Please follow up with your PCP and Dr. Thai Rod for further work up which may include MRI. If persistent or worsening symptoms, or if you have any concerns, return to ED immediately.

## 2023-03-15 NOTE — ED NOTES
Cervical collar soft foam neck brace placed per MD order. Dr lamb at bedside to assess pt, pt able to walk with assistance. Family instructed to follow up with DR Zara Pires tomorrow and to leave neck collar on until follow up. Pt denies neck pain at this time. Family denies any further questions.      Silvio Doe RN  03/14/23 2016       Mayra Doe RN  03/14/23 2023

## 2023-03-16 ENCOUNTER — HOSPITAL ENCOUNTER (INPATIENT)
Age: 78
LOS: 3 days | Discharge: NURSING, REHAB OR LONG-TERM CARE | DRG: 884 | End: 2023-03-20
Attending: STUDENT IN AN ORGANIZED HEALTH CARE EDUCATION/TRAINING PROGRAM | Admitting: INTERNAL MEDICINE
Payer: MEDICARE

## 2023-03-16 ENCOUNTER — APPOINTMENT (OUTPATIENT)
Dept: CT IMAGING | Age: 78
DRG: 884 | End: 2023-03-16
Payer: MEDICARE

## 2023-03-16 ENCOUNTER — APPOINTMENT (OUTPATIENT)
Dept: GENERAL RADIOLOGY | Age: 78
DRG: 884 | End: 2023-03-16
Payer: MEDICARE

## 2023-03-16 DIAGNOSIS — J18.9 PNEUMONIA DUE TO INFECTIOUS ORGANISM, UNSPECIFIED LATERALITY, UNSPECIFIED PART OF LUNG: ICD-10-CM

## 2023-03-16 DIAGNOSIS — R41.82 ALTERED MENTAL STATUS, UNSPECIFIED ALTERED MENTAL STATUS TYPE: Primary | ICD-10-CM

## 2023-03-16 DIAGNOSIS — N30.00 ACUTE CYSTITIS WITHOUT HEMATURIA: ICD-10-CM

## 2023-03-16 LAB
ALBUMIN SERPL-MCNC: 3.6 G/DL (ref 3.4–5)
ALBUMIN/GLOB SERPL: 1.1 {RATIO} (ref 1.1–2.2)
ALP SERPL-CCNC: 78 U/L (ref 40–129)
ALT SERPL-CCNC: <5 U/L (ref 10–40)
ANION GAP SERPL CALCULATED.3IONS-SCNC: 9 MMOL/L (ref 3–16)
AST SERPL-CCNC: 11 U/L (ref 15–37)
BACTERIA URNS QL MICRO: ABNORMAL /HPF
BASE EXCESS BLDV CALC-SCNC: 0.1 MMOL/L (ref -3–3)
BASOPHILS # BLD: 0 K/UL (ref 0–0.2)
BASOPHILS NFR BLD: 0.3 %
BILIRUB SERPL-MCNC: 0.4 MG/DL (ref 0–1)
BILIRUB UR QL STRIP.AUTO: NEGATIVE
BUN SERPL-MCNC: 13 MG/DL (ref 7–20)
CALCIUM SERPL-MCNC: 9.5 MG/DL (ref 8.3–10.6)
CHLORIDE SERPL-SCNC: 99 MMOL/L (ref 99–110)
CLARITY UR: ABNORMAL
CO2 BLDV-SCNC: 28 MMOL/L
CO2 SERPL-SCNC: 28 MMOL/L (ref 21–32)
COHGB MFR BLDV: 3.9 % (ref 0–1.5)
COLOR UR: YELLOW
CREAT SERPL-MCNC: 1 MG/DL (ref 0.6–1.2)
DEPRECATED RDW RBC AUTO: 14.4 % (ref 12.4–15.4)
EOSINOPHIL # BLD: 0 K/UL (ref 0–0.6)
EOSINOPHIL NFR BLD: 0 %
EPI CELLS #/AREA URNS HPF: ABNORMAL /HPF (ref 0–5)
FLUAV RNA RESP QL NAA+PROBE: NOT DETECTED
FLUBV RNA RESP QL NAA+PROBE: NOT DETECTED
GFR SERPLBLD CREATININE-BSD FMLA CKD-EPI: 58 ML/MIN/{1.73_M2}
GLUCOSE BLD-MCNC: 218 MG/DL (ref 70–99)
GLUCOSE SERPL-MCNC: 195 MG/DL (ref 70–99)
GLUCOSE UR STRIP.AUTO-MCNC: 100 MG/DL
HCO3 BLDV-SCNC: 26 MMOL/L (ref 23–29)
HCT VFR BLD AUTO: 40.2 % (ref 36–48)
HGB BLD-MCNC: 13 G/DL (ref 12–16)
HGB UR QL STRIP.AUTO: ABNORMAL
KETONES UR STRIP.AUTO-MCNC: NEGATIVE MG/DL
LACTATE BLDV-SCNC: 1.1 MMOL/L (ref 0.4–1.9)
LEUKOCYTE ESTERASE UR QL STRIP.AUTO: ABNORMAL
LYMPHOCYTES # BLD: 1.1 K/UL (ref 1–5.1)
LYMPHOCYTES NFR BLD: 7.2 %
MCH RBC QN AUTO: 30.1 PG (ref 26–34)
MCHC RBC AUTO-ENTMCNC: 32.5 G/DL (ref 31–36)
MCV RBC AUTO: 92.7 FL (ref 80–100)
METHGB MFR BLDV: 0 %
MONOCYTES # BLD: 1 K/UL (ref 0–1.3)
MONOCYTES NFR BLD: 6.2 %
NEUTROPHILS # BLD: 13.3 K/UL (ref 1.7–7.7)
NEUTROPHILS NFR BLD: 86.3 %
NITRITE UR QL STRIP.AUTO: NEGATIVE
O2 CT VFR BLDV CALC: 13 VOL %
O2 THERAPY: ABNORMAL
PCO2 BLDV: 47.1 MMHG (ref 40–50)
PERFORMED ON: ABNORMAL
PH BLDV: 7.36 [PH] (ref 7.35–7.45)
PH UR STRIP.AUTO: 6 [PH] (ref 5–8)
PLATELET # BLD AUTO: 376 K/UL (ref 135–450)
PMV BLD AUTO: 7.2 FL (ref 5–10.5)
PO2 BLDV: 34.4 MMHG (ref 25–40)
POTASSIUM SERPL-SCNC: 4 MMOL/L (ref 3.5–5.1)
PROT SERPL-MCNC: 7 G/DL (ref 6.4–8.2)
PROT UR STRIP.AUTO-MCNC: ABNORMAL MG/DL
RBC # BLD AUTO: 4.34 M/UL (ref 4–5.2)
RBC #/AREA URNS HPF: ABNORMAL /HPF (ref 0–4)
SAO2 % BLDV: 63 %
SARS-COV-2 RNA RESP QL NAA+PROBE: NOT DETECTED
SODIUM SERPL-SCNC: 136 MMOL/L (ref 136–145)
SP GR UR STRIP.AUTO: 1.02 (ref 1–1.03)
TROPONIN T SERPL-MCNC: <0.01 NG/ML
UA DIPSTICK W REFLEX MICRO PNL UR: YES
URN SPEC COLLECT METH UR: ABNORMAL
UROBILINOGEN UR STRIP-ACNC: 0.2 E.U./DL
WBC # BLD AUTO: 15.4 K/UL (ref 4–11)
WBC #/AREA URNS HPF: ABNORMAL /HPF (ref 0–5)

## 2023-03-16 PROCEDURE — 87636 SARSCOV2 & INF A&B AMP PRB: CPT

## 2023-03-16 PROCEDURE — 87086 URINE CULTURE/COLONY COUNT: CPT

## 2023-03-16 PROCEDURE — 83540 ASSAY OF IRON: CPT

## 2023-03-16 PROCEDURE — 80053 COMPREHEN METABOLIC PANEL: CPT

## 2023-03-16 PROCEDURE — 6360000002 HC RX W HCPCS: Performed by: STUDENT IN AN ORGANIZED HEALTH CARE EDUCATION/TRAINING PROGRAM

## 2023-03-16 PROCEDURE — 82803 BLOOD GASES ANY COMBINATION: CPT

## 2023-03-16 PROCEDURE — 85025 COMPLETE CBC W/AUTO DIFF WBC: CPT

## 2023-03-16 PROCEDURE — 83735 ASSAY OF MAGNESIUM: CPT

## 2023-03-16 PROCEDURE — 84484 ASSAY OF TROPONIN QUANT: CPT

## 2023-03-16 PROCEDURE — 99285 EMERGENCY DEPT VISIT HI MDM: CPT

## 2023-03-16 PROCEDURE — 2580000003 HC RX 258: Performed by: STUDENT IN AN ORGANIZED HEALTH CARE EDUCATION/TRAINING PROGRAM

## 2023-03-16 PROCEDURE — 36415 COLL VENOUS BLD VENIPUNCTURE: CPT

## 2023-03-16 PROCEDURE — 84145 PROCALCITONIN (PCT): CPT

## 2023-03-16 PROCEDURE — 84443 ASSAY THYROID STIM HORMONE: CPT

## 2023-03-16 PROCEDURE — 71045 X-RAY EXAM CHEST 1 VIEW: CPT

## 2023-03-16 PROCEDURE — 96365 THER/PROPH/DIAG IV INF INIT: CPT

## 2023-03-16 PROCEDURE — 96375 TX/PRO/DX INJ NEW DRUG ADDON: CPT

## 2023-03-16 PROCEDURE — 70450 CT HEAD/BRAIN W/O DYE: CPT

## 2023-03-16 PROCEDURE — 72125 CT NECK SPINE W/O DYE: CPT

## 2023-03-16 PROCEDURE — 83550 IRON BINDING TEST: CPT

## 2023-03-16 PROCEDURE — 85730 THROMBOPLASTIN TIME PARTIAL: CPT

## 2023-03-16 PROCEDURE — 82270 OCCULT BLOOD FECES: CPT

## 2023-03-16 PROCEDURE — 93005 ELECTROCARDIOGRAM TRACING: CPT | Performed by: STUDENT IN AN ORGANIZED HEALTH CARE EDUCATION/TRAINING PROGRAM

## 2023-03-16 PROCEDURE — 87506 IADNA-DNA/RNA PROBE TQ 6-11: CPT

## 2023-03-16 PROCEDURE — 81001 URINALYSIS AUTO W/SCOPE: CPT

## 2023-03-16 PROCEDURE — 83605 ASSAY OF LACTIC ACID: CPT

## 2023-03-16 PROCEDURE — 85610 PROTHROMBIN TIME: CPT

## 2023-03-16 RX ADMIN — CEFTRIAXONE SODIUM 1000 MG: 1 INJECTION, POWDER, FOR SOLUTION INTRAMUSCULAR; INTRAVENOUS at 23:13

## 2023-03-16 NOTE — ED PROVIDER NOTES
reformatted images are provided for review. Automated exposure control, iterative reconstruction, and/or weight based adjustment of the mA/kV was utilized to reduce the radiation dose to as low as reasonably achievable. COMPARISON: 10/07/2022 and prior HISTORY: ORDERING SYSTEM PROVIDED HISTORY: intermittent pain for 5d TECHNOLOGIST PROVIDED HISTORY: Reason for exam:->intermittent pain for 5d Additional Contrast?->None Decision Support Exception - unselect if not a suspected or confirmed emergency medical condition->Emergency Medical Condition (MA) Reason for Exam: intermittent pain for 5d FINDINGS: Lower Chest: Mild dependent subsegmental atelectasis. A few scattered calcified granulomas. Organs: Normal liver. Normal gallbladder. Mild intrahepatic biliary ductal dilatation. Dilated common bile duct measuring up to 0.9 cm. Spleen is normal in size. Normal pancreas. No evidence of ductal dilatation. Thickening of the adrenal glands bilaterally without discrete nodularity. Normal kidneys. No renal calculi or hydronephrosis. Pelvis: Normal bladder. Normal uterus. GI/Bowel: Small hiatal hernia, otherwise normal stomach. Normal small bowel. Appendix is normal.Moderate stool burden throughout the colon. Peritoneum/Retroperitoneum:No free fluid, free air, organized fluid collection or lymphadenopathy. Heavy calcific atherosclerosis. Soft tissues: Normal. Bones: Unchanged advanced multilevel degenerative disc disease. No acute abnormality of the abdomen or pelvis. Dilated common bile duct measuring up to 0.9 cm. If biliary labs are abnormal, consideration for MRCP is recommended. Moderate stool burden throughout the colon. XR CHEST PORTABLE    Result Date: 3/14/2023  EXAMINATION: TWO XRAY VIEWS OF THE RIGHT HUMERUS; ONE XRAY VIEW OF THE CHEST 3/14/2023 3:43 pm COMPARISON: None.  HISTORY: ORDERING SYSTEM PROVIDED HISTORY: pain after fall TECHNOLOGIST PROVIDED HISTORY: Reason for exam:->pain after fall Reason for are mis-transcribed.)       Gracy Muhammad MD (electronically signed)             Gracy Muhammad MD  03/16/23 9059

## 2023-03-17 PROBLEM — G93.40 ACUTE ENCEPHALOPATHY: Status: ACTIVE | Noted: 2023-03-17

## 2023-03-17 LAB
ALBUMIN SERPL-MCNC: 3.8 G/DL (ref 3.4–5)
ALBUMIN/GLOB SERPL: 1.8 {RATIO} (ref 1.1–2.2)
ALP SERPL-CCNC: 68 U/L (ref 40–129)
ALT SERPL-CCNC: 6 U/L (ref 10–40)
AMMONIA PLAS-SCNC: 36 UMOL/L (ref 11–51)
ANION GAP SERPL CALCULATED.3IONS-SCNC: 10 MMOL/L (ref 3–16)
APTT BLD: 29.4 SEC (ref 23–34.3)
AST SERPL-CCNC: 8 U/L (ref 15–37)
BASE EXCESS BLDV CALC-SCNC: 3.1 MMOL/L (ref -3–3)
BILIRUB SERPL-MCNC: 0.6 MG/DL (ref 0–1)
BUN SERPL-MCNC: 13 MG/DL (ref 7–20)
CALCIUM SERPL-MCNC: 9.7 MG/DL (ref 8.3–10.6)
CHLORIDE SERPL-SCNC: 99 MMOL/L (ref 99–110)
CO2 BLDV-SCNC: 28 MMOL/L
CO2 SERPL-SCNC: 27 MMOL/L (ref 21–32)
COHGB MFR BLDV: 8.7 % (ref 0–1.5)
CREAT SERPL-MCNC: 0.8 MG/DL (ref 0.6–1.2)
EKG ATRIAL RATE: 90 BPM
EKG DIAGNOSIS: NORMAL
EKG P AXIS: 44 DEGREES
EKG P-R INTERVAL: 170 MS
EKG Q-T INTERVAL: 358 MS
EKG QRS DURATION: 70 MS
EKG QTC CALCULATION (BAZETT): 437 MS
EKG R AXIS: -12 DEGREES
EKG T AXIS: 74 DEGREES
EKG VENTRICULAR RATE: 90 BPM
GFR SERPLBLD CREATININE-BSD FMLA CKD-EPI: >60 ML/MIN/{1.73_M2}
GI PATHOGENS PNL STL NAA+PROBE: NORMAL
GLUCOSE BLD-MCNC: 100 MG/DL (ref 70–99)
GLUCOSE BLD-MCNC: 100 MG/DL (ref 70–99)
GLUCOSE BLD-MCNC: 109 MG/DL (ref 70–99)
GLUCOSE BLD-MCNC: 84 MG/DL (ref 70–99)
GLUCOSE BLD-MCNC: 87 MG/DL (ref 70–99)
GLUCOSE BLD-MCNC: 92 MG/DL (ref 70–99)
GLUCOSE SERPL-MCNC: 102 MG/DL (ref 70–99)
HCO3 BLDV-SCNC: 26.5 MMOL/L (ref 23–29)
HCT VFR BLD AUTO: 33.1 % (ref 36–48)
HCT VFR BLD AUTO: 33.8 % (ref 36–48)
HEMOCCULT STL QL: ABNORMAL
HGB BLD-MCNC: 11 G/DL (ref 12–16)
HGB BLD-MCNC: 11.3 G/DL (ref 12–16)
INR PPP: 0.87 (ref 0.87–1.14)
IRON SATN MFR SERPL: 14 % (ref 15–50)
IRON SERPL-MCNC: 30 UG/DL (ref 37–145)
MAGNESIUM SERPL-MCNC: 1.8 MG/DL (ref 1.8–2.4)
METHGB MFR BLDV: 0.3 %
O2 CT VFR BLDV CALC: 16 VOL %
O2 THERAPY: ABNORMAL
PCO2 BLDV: 36.5 MMHG (ref 40–50)
PERFORMED ON: ABNORMAL
PERFORMED ON: NORMAL
PH BLDV: 7.48 [PH] (ref 7.35–7.45)
PO2 BLDV: 44 MMHG (ref 25–40)
POTASSIUM SERPL-SCNC: 4.3 MMOL/L (ref 3.5–5.1)
PROCALCITONIN SERPL IA-MCNC: 0.05 NG/ML (ref 0–0.15)
PROT SERPL-MCNC: 5.9 G/DL (ref 6.4–8.2)
PROTHROMBIN TIME: 11.7 SEC (ref 11.7–14.5)
SAO2 % BLDV: 83 %
SODIUM SERPL-SCNC: 136 MMOL/L (ref 136–145)
TIBC SERPL-MCNC: 215 UG/DL (ref 260–445)
TSH SERPL DL<=0.005 MIU/L-ACNC: 0.92 UIU/ML (ref 0.27–4.2)

## 2023-03-17 PROCEDURE — 80053 COMPREHEN METABOLIC PANEL: CPT

## 2023-03-17 PROCEDURE — 82803 BLOOD GASES ANY COMBINATION: CPT

## 2023-03-17 PROCEDURE — 82607 VITAMIN B-12: CPT

## 2023-03-17 PROCEDURE — 6360000002 HC RX W HCPCS: Performed by: INTERNAL MEDICINE

## 2023-03-17 PROCEDURE — 36415 COLL VENOUS BLD VENIPUNCTURE: CPT

## 2023-03-17 PROCEDURE — 82746 ASSAY OF FOLIC ACID SERUM: CPT

## 2023-03-17 PROCEDURE — 6370000000 HC RX 637 (ALT 250 FOR IP)

## 2023-03-17 PROCEDURE — 82140 ASSAY OF AMMONIA: CPT

## 2023-03-17 PROCEDURE — 97165 OT EVAL LOW COMPLEX 30 MIN: CPT

## 2023-03-17 PROCEDURE — 85018 HEMOGLOBIN: CPT

## 2023-03-17 PROCEDURE — 87040 BLOOD CULTURE FOR BACTERIA: CPT

## 2023-03-17 PROCEDURE — 97162 PT EVAL MOD COMPLEX 30 MIN: CPT

## 2023-03-17 PROCEDURE — 97530 THERAPEUTIC ACTIVITIES: CPT

## 2023-03-17 PROCEDURE — 2580000003 HC RX 258: Performed by: STUDENT IN AN ORGANIZED HEALTH CARE EDUCATION/TRAINING PROGRAM

## 2023-03-17 PROCEDURE — C9113 INJ PANTOPRAZOLE SODIUM, VIA: HCPCS | Performed by: INTERNAL MEDICINE

## 2023-03-17 PROCEDURE — 2060000000 HC ICU INTERMEDIATE R&B

## 2023-03-17 PROCEDURE — 6360000002 HC RX W HCPCS: Performed by: STUDENT IN AN ORGANIZED HEALTH CARE EDUCATION/TRAINING PROGRAM

## 2023-03-17 PROCEDURE — 85014 HEMATOCRIT: CPT

## 2023-03-17 PROCEDURE — 2580000003 HC RX 258: Performed by: INTERNAL MEDICINE

## 2023-03-17 PROCEDURE — 93010 ELECTROCARDIOGRAM REPORT: CPT | Performed by: INTERNAL MEDICINE

## 2023-03-17 RX ORDER — SODIUM CHLORIDE 0.9 % (FLUSH) 0.9 %
5-40 SYRINGE (ML) INJECTION PRN
Status: DISCONTINUED | OUTPATIENT
Start: 2023-03-17 | End: 2023-03-20 | Stop reason: HOSPADM

## 2023-03-17 RX ORDER — SODIUM CHLORIDE 9 MG/ML
INJECTION, SOLUTION INTRAVENOUS PRN
Status: DISCONTINUED | OUTPATIENT
Start: 2023-03-17 | End: 2023-03-17 | Stop reason: SDUPTHER

## 2023-03-17 RX ORDER — DOCUSATE SODIUM 100 MG/1
100 CAPSULE, LIQUID FILLED ORAL 2 TIMES DAILY
Status: DISCONTINUED | OUTPATIENT
Start: 2023-03-17 | End: 2023-03-20 | Stop reason: HOSPADM

## 2023-03-17 RX ORDER — MAGNESIUM SULFATE 1 G/100ML
1000 INJECTION INTRAVENOUS PRN
Status: DISCONTINUED | OUTPATIENT
Start: 2023-03-17 | End: 2023-03-20 | Stop reason: HOSPADM

## 2023-03-17 RX ORDER — ARIPIPRAZOLE 10 MG/1
5 TABLET ORAL DAILY
Status: DISCONTINUED | OUTPATIENT
Start: 2023-03-17 | End: 2023-03-20 | Stop reason: HOSPADM

## 2023-03-17 RX ORDER — LANOLIN ALCOHOL/MO/W.PET/CERES
100 CREAM (GRAM) TOPICAL DAILY
Status: DISCONTINUED | OUTPATIENT
Start: 2023-03-17 | End: 2023-03-20 | Stop reason: HOSPADM

## 2023-03-17 RX ORDER — LABETALOL HYDROCHLORIDE 5 MG/ML
10 INJECTION, SOLUTION INTRAVENOUS EVERY 6 HOURS PRN
Status: DISCONTINUED | OUTPATIENT
Start: 2023-03-17 | End: 2023-03-20 | Stop reason: HOSPADM

## 2023-03-17 RX ORDER — SODIUM CHLORIDE 9 MG/ML
INJECTION, SOLUTION INTRAVENOUS CONTINUOUS
Status: DISCONTINUED | OUTPATIENT
Start: 2023-03-17 | End: 2023-03-18

## 2023-03-17 RX ORDER — LEVOTHYROXINE SODIUM 0.05 MG/1
50 TABLET ORAL DAILY
Status: DISCONTINUED | OUTPATIENT
Start: 2023-03-17 | End: 2023-03-20 | Stop reason: HOSPADM

## 2023-03-17 RX ORDER — VITAMIN B COMPLEX
1000 TABLET ORAL DAILY
Status: DISCONTINUED | OUTPATIENT
Start: 2023-03-17 | End: 2023-03-20 | Stop reason: HOSPADM

## 2023-03-17 RX ORDER — ALBUTEROL SULFATE 90 UG/1
2 AEROSOL, METERED RESPIRATORY (INHALATION) EVERY 6 HOURS PRN
Status: DISCONTINUED | OUTPATIENT
Start: 2023-03-17 | End: 2023-03-20 | Stop reason: HOSPADM

## 2023-03-17 RX ORDER — CHOLECALCIFEROL (VITAMIN D3) 125 MCG
1000 CAPSULE ORAL DAILY
Status: DISCONTINUED | OUTPATIENT
Start: 2023-03-17 | End: 2023-03-20 | Stop reason: HOSPADM

## 2023-03-17 RX ORDER — AMLODIPINE BESYLATE 5 MG/1
5 TABLET ORAL DAILY
Status: DISCONTINUED | OUTPATIENT
Start: 2023-03-17 | End: 2023-03-20 | Stop reason: HOSPADM

## 2023-03-17 RX ORDER — SODIUM CHLORIDE 0.9 % (FLUSH) 0.9 %
5-40 SYRINGE (ML) INJECTION EVERY 12 HOURS SCHEDULED
Status: DISCONTINUED | OUTPATIENT
Start: 2023-03-17 | End: 2023-03-17 | Stop reason: SDUPTHER

## 2023-03-17 RX ORDER — POLYETHYLENE GLYCOL 3350 17 G/17G
17 POWDER, FOR SOLUTION ORAL 2 TIMES DAILY
Status: DISCONTINUED | OUTPATIENT
Start: 2023-03-17 | End: 2023-03-20 | Stop reason: HOSPADM

## 2023-03-17 RX ORDER — PANTOPRAZOLE SODIUM 40 MG/10ML
40 INJECTION, POWDER, LYOPHILIZED, FOR SOLUTION INTRAVENOUS 2 TIMES DAILY
Status: DISCONTINUED | OUTPATIENT
Start: 2023-03-17 | End: 2023-03-20 | Stop reason: HOSPADM

## 2023-03-17 RX ORDER — CARBOXYMETHYLCELLULOSE SODIUM 10 MG/ML
1 GEL OPHTHALMIC 3 TIMES DAILY
Status: DISCONTINUED | OUTPATIENT
Start: 2023-03-17 | End: 2023-03-17

## 2023-03-17 RX ORDER — SODIUM CHLORIDE 0.9 % (FLUSH) 0.9 %
5-40 SYRINGE (ML) INJECTION EVERY 12 HOURS SCHEDULED
Status: DISCONTINUED | OUTPATIENT
Start: 2023-03-17 | End: 2023-03-20 | Stop reason: HOSPADM

## 2023-03-17 RX ORDER — ACETAMINOPHEN 650 MG/1
650 SUPPOSITORY RECTAL EVERY 6 HOURS PRN
Status: DISCONTINUED | OUTPATIENT
Start: 2023-03-17 | End: 2023-03-20 | Stop reason: HOSPADM

## 2023-03-17 RX ORDER — ONDANSETRON 4 MG/1
4 TABLET, ORALLY DISINTEGRATING ORAL EVERY 8 HOURS PRN
Status: DISCONTINUED | OUTPATIENT
Start: 2023-03-17 | End: 2023-03-20 | Stop reason: HOSPADM

## 2023-03-17 RX ORDER — DEXTROSE MONOHYDRATE 100 MG/ML
INJECTION, SOLUTION INTRAVENOUS CONTINUOUS PRN
Status: DISCONTINUED | OUTPATIENT
Start: 2023-03-17 | End: 2023-03-20 | Stop reason: HOSPADM

## 2023-03-17 RX ORDER — ATORVASTATIN CALCIUM 10 MG/1
20 TABLET, FILM COATED ORAL DAILY
Status: DISCONTINUED | OUTPATIENT
Start: 2023-03-17 | End: 2023-03-20 | Stop reason: HOSPADM

## 2023-03-17 RX ORDER — LISINOPRIL 20 MG/1
20 TABLET ORAL DAILY
Status: DISCONTINUED | OUTPATIENT
Start: 2023-03-17 | End: 2023-03-20 | Stop reason: HOSPADM

## 2023-03-17 RX ORDER — CARBOXYMETHYLCELLULOSE SODIUM 10 MG/ML
1 GEL OPHTHALMIC 4 TIMES DAILY
Status: DISCONTINUED | OUTPATIENT
Start: 2023-03-17 | End: 2023-03-20 | Stop reason: HOSPADM

## 2023-03-17 RX ORDER — POLYETHYLENE GLYCOL 3350 17 G/17G
17 POWDER, FOR SOLUTION ORAL DAILY PRN
Status: DISCONTINUED | OUTPATIENT
Start: 2023-03-17 | End: 2023-03-17

## 2023-03-17 RX ORDER — SODIUM CHLORIDE 9 MG/ML
INJECTION, SOLUTION INTRAVENOUS PRN
Status: DISCONTINUED | OUTPATIENT
Start: 2023-03-17 | End: 2023-03-20 | Stop reason: HOSPADM

## 2023-03-17 RX ORDER — LANOLIN ALCOHOL/MO/W.PET/CERES
400 CREAM (GRAM) TOPICAL 2 TIMES DAILY
Status: DISCONTINUED | OUTPATIENT
Start: 2023-03-17 | End: 2023-03-20 | Stop reason: HOSPADM

## 2023-03-17 RX ORDER — DONEPEZIL HYDROCHLORIDE 5 MG/1
5 TABLET, FILM COATED ORAL
Status: DISCONTINUED | OUTPATIENT
Start: 2023-03-17 | End: 2023-03-20 | Stop reason: HOSPADM

## 2023-03-17 RX ORDER — POTASSIUM CHLORIDE 7.45 MG/ML
10 INJECTION INTRAVENOUS PRN
Status: DISCONTINUED | OUTPATIENT
Start: 2023-03-17 | End: 2023-03-20 | Stop reason: HOSPADM

## 2023-03-17 RX ORDER — OXYBUTYNIN CHLORIDE 5 MG/1
10 TABLET, EXTENDED RELEASE ORAL DAILY
Status: DISCONTINUED | OUTPATIENT
Start: 2023-03-17 | End: 2023-03-20 | Stop reason: HOSPADM

## 2023-03-17 RX ORDER — POTASSIUM CHLORIDE 750 MG/1
20 TABLET, EXTENDED RELEASE ORAL DAILY
Status: DISCONTINUED | OUTPATIENT
Start: 2023-03-17 | End: 2023-03-20 | Stop reason: HOSPADM

## 2023-03-17 RX ORDER — ACETAMINOPHEN 325 MG/1
650 TABLET ORAL EVERY 6 HOURS PRN
Status: DISCONTINUED | OUTPATIENT
Start: 2023-03-17 | End: 2023-03-20 | Stop reason: HOSPADM

## 2023-03-17 RX ORDER — ONDANSETRON 2 MG/ML
4 INJECTION INTRAMUSCULAR; INTRAVENOUS EVERY 6 HOURS PRN
Status: DISCONTINUED | OUTPATIENT
Start: 2023-03-17 | End: 2023-03-20 | Stop reason: HOSPADM

## 2023-03-17 RX ORDER — FERROUS SULFATE 325(65) MG
325 TABLET ORAL 2 TIMES DAILY WITH MEALS
Status: DISCONTINUED | OUTPATIENT
Start: 2023-03-17 | End: 2023-03-20 | Stop reason: HOSPADM

## 2023-03-17 RX ORDER — SODIUM CHLORIDE 0.9 % (FLUSH) 0.9 %
5-40 SYRINGE (ML) INJECTION PRN
Status: DISCONTINUED | OUTPATIENT
Start: 2023-03-17 | End: 2023-03-17 | Stop reason: SDUPTHER

## 2023-03-17 RX ORDER — FOLIC ACID 1 MG/1
1 TABLET ORAL DAILY
Status: DISCONTINUED | OUTPATIENT
Start: 2023-03-17 | End: 2023-03-20 | Stop reason: HOSPADM

## 2023-03-17 RX ADMIN — AZITHROMYCIN MONOHYDRATE 500 MG: 500 INJECTION, POWDER, LYOPHILIZED, FOR SOLUTION INTRAVENOUS at 00:26

## 2023-03-17 RX ADMIN — SODIUM CHLORIDE: 9 INJECTION, SOLUTION INTRAVENOUS at 09:23

## 2023-03-17 RX ADMIN — PANTOPRAZOLE SODIUM 40 MG: 40 INJECTION, POWDER, FOR SOLUTION INTRAVENOUS at 09:24

## 2023-03-17 RX ADMIN — SODIUM CHLORIDE: 9 INJECTION, SOLUTION INTRAVENOUS at 04:39

## 2023-03-17 RX ADMIN — CEFTRIAXONE SODIUM 1000 MG: 1 INJECTION, POWDER, FOR SOLUTION INTRAMUSCULAR; INTRAVENOUS at 23:29

## 2023-03-17 RX ADMIN — SODIUM CHLORIDE: 9 INJECTION, SOLUTION INTRAVENOUS at 17:06

## 2023-03-17 RX ADMIN — CARBOXYMETHYLCELLULOSE SODIUM 1 DROP: 10 GEL OPHTHALMIC at 15:51

## 2023-03-17 NOTE — PROGRESS NOTES
This RN to [facility] with meds , swallow evaluation one sip of water pt started to cough and clear her throat after swallowing , Speech called and meds held at this time

## 2023-03-17 NOTE — PROGRESS NOTES
Inpatient Occupational Therapy Evaluation and Treatment    Unit: PCU  Date:  3/17/2023  Patient Name:    Tal Helm  Admitting diagnosis:  Acute cystitis without hematuria [N30.00]  Acute encephalopathy [G93.40]  Altered mental status, unspecified altered mental status type [R41.82]  Pneumonia due to infectious organism, unspecified laterality, unspecified part of lung [J18.9]  Admit Date:  3/16/2023  Precautions/Restrictions/WB Status/ Lines/ Wounds/ Oxygen: Fall risk, Bed/chair alarm, Lines (IV and Luke catheter), and Confusion    Treatment Time: 13:05 - 13:25  Treatment Number:  1  Timed Code Treatment Minutes: 10 minutes  Total Treatment Minutes:  20  minutes    Patient Goals for Therapy: did not state          Discharge Recommendations: SNF  DME needs for discharge: Defer to facility         Therapy recommendations for staff:   Assist of 2 for transfers with use of hand held assist to/from chair    History of Present Illness: per Dr Luisa Merlos H&P 3/17/23:  \"The patient is a 69 y.o. female with PMH below, presents with MS change. Pt is essentially non-verbal at this time. No family at bedside to provide Hx. Only word I could get her to say was \"hi\" when I greeted her. She does not follow commands very well. Only command I could get her to follow was to hold her arms up in the air (after I held them up). She only kept them up for a few seconds but this was symmetrical.  Per ED, they were summoned for MS change. She was found to have a BGT < 15 by EMS. She has remained stable since in that respect since their intervention. She was in the ER on 3/14 for a fall. Her baseline is unclear. Per ED RN who has had pt before, she has appeared similar to her current state on a previous ED visit earlier this month. She arrived dirty, covered in stool/urine/bed bugs. She had an \"old\" appearing diaper per RN. No additional Hx available at this time. \"    Home Health S4 Level Recommendation:  NA    AM-PAC Score: AM-PAC Inpatient Daily Activity Raw Score: 14     Subjective:  Patient lying supine in bed with no family present. Pt agreeable to this OT session. Cognition:    A&O x0   Able to follow 1 step commands, 50% of the time. Patient with flat affect and minimal verbal interaction with therapist.     Pain:   No  Location:   Rating: NA /10  Pain Medicine Status: Denies need    Preadmission Environment  Patient is a poor historian. Information gathered from privious admission. Pt. Lives with family (Spouse Juan Carlos Pham) Son lives upstairs  Home environment:    two story home  Steps to enter first floor: No steps and 1/2 steps to enter  Steps to second floor: Full flight of 12-13  Bathroom: tub/shower unit, grab bars and shower seat  Equipment owned: large SW and BSC, platform cane,     Preadmission Status:  Pt. Able to drive: No  Pt Fully independent with ADLs: Yes-usually sponge bathes  Pt. Required assistance from family for: Cleaning, Cooking and Laundry   Pt. independent for transfers and gait and walked with Chelsea Else RW  History of falls Yes- multiple (usually on driveway or front porch)     Pt reports that she does not use a RW \"all of the time\"    Objective:  Does this pt have an acute or acute on chronic diagnosis of CHF? No    Upper Extremity ROM:    WFL,  pt able to perform all bed mobility, transfers, and gait without ROM limitation. Upper Extremity Strength:    BUE strength impaired but not formally assessed w/ MMT    Upper Extremity Sensation:    NT    Upper Extremity Proprioception:  WFL    Coordination and Tone  Impaired    Balance:  Sitting:    CGA  Standing:    Min A  and 2 person      Bed Mobility:   Information gathered from privious admission on 5/26/21. Attempted to confirm information with pt, however, pt poor historian.      Pt. Lives with family (Spouse Juan Carlos Pham) Son lives upstairs  Home environment:    two story home  Steps to enter first floor: No steps and 1/2 steps to enter  Steps to second

## 2023-03-17 NOTE — CARE COORDINATION
[place] at [facility] stated they can accept pt pending precert. RN aware and will order PT/OT. Pt will need precert. CELINA Mendez  Case Management Department  Phone: [phone] Fax: [phone]    Addendum at 3:42pm: received call from [facility] at [facility] stating pt is active with them for RN/HHA/PT/OT and [facility] request an update on discharge. Addendum at 4:44pm: PT/OT recommended SNF. Met with pt, pt's  and son with 's permission. Pt did not participate in conversation.  and son in agreement with [facility] at this time aware precert will be started. They are aware they can change their minds and take pt home which they stated understanding. Completed precert on mynexus for [place] and received automatic approval; awaiting fax for details.  [place] at [facility] aware and stated can accept this weekend if ready

## 2023-03-17 NOTE — DISCHARGE INSTRUCTIONS
Heart Failure Resources:    Heart Failure Interactive Workbook:   Go to [email or web address].[email or web address]-heartfailure for a Free Heart Failure Interactive Workbook provided by [employer]. This interactive workbook will provide information on Healthier Living with Heart Failure. Please copy and paste link into search bar. Use your mouse to scroll through the pages. HF [place] chi:   Heart Failure Free smart phone cih available for iPhone and Android download. Use your phone to track sodium intake, fluid intake, symptoms, and weight. Low Sodium Diet:  Go to www. [employer]. [employer] website for [employer] which is Low Sodium! [employer] is a dialysis company, but this website offers free seasonal cookbooks. Each quarter, they will release 25-30 new recipes with a breakdown of calories, sodium, and glucose. Recipes:   Go to www.[employer]apru/recipes website for free recipes. Home Exercise Program:     Identification of Green/yellow/Red zones: You should be able to identify when you feel good (green zone), if you have 1-2 symptoms of HF (yellow zone), or if you are in need of medical attention (red zone). In your CHF education folder you were provided a stop light tool to outline this information. We want to you to rate your exertion levels: Our therapy team has discussed means of identification with you such as the \"Cindy scale. \"  The Cindy rating scale ranges from 6 to 20, where 6 means \"no exertion at all\" and 20 means \"maximal exertion. \" The goal is to use this to gauge how much effort it is taking for you to do your normal daily tasks. You should be able to recognize when too much exertion is being expended. Elements of Energy Conservation:   Prioritize/Plan: Decide what needs to be done today, and what can wait for a later date, write to do lists, Plan ahead to avoid extra trips, Gather supplies and equipment needed before starting an activity.    Position: Avoid tiring and awkward posture that may impair breathing  Pace: Slow and steady pace, never rushing! Pursed lip breathing.   Pursed Lip Breathing: \"Smell the roses, blow out the candles\"

## 2023-03-17 NOTE — ACP (ADVANCE CARE PLANNING)
follow-up conversation to continue planning  [] Referred individual to Provider for additional questions/concerns   [] Advised patient/agent/surrogate to review completed ACP document and update if needed with changes in condition, patient preferences or care setting    [x] This note routed to one or more involved healthcare providers      Electronically signed by PREM Villanueva on 3/17/2023 at 10:08 AM

## 2023-03-17 NOTE — FLOWSHEET NOTE
03/17/23 0415   Vital Signs   Temp 97.3 °F (36.3 °C)   Temp Source Axillary   Heart Rate (!) 107   Heart Rate Source Monitor   Resp 18   BP (!) 149/92   MAP (Calculated) 111   BP Location Left upper arm   BP Method Automatic   Patient Position Semi fowlers   Level of Consciousness 1   MEWS Score 3   Oxygen Therapy   SpO2 96 %   O2 Device None (Room air)   Pt arrived to PCU from ED. Orientation and admission process has began.

## 2023-03-17 NOTE — PROGRESS NOTES
3/17/2023;  1. Acute Urinary Retention : TRINH/? Evaluated EVERY Shift To Determine the Continued NEED &  Document it. PLEASE SELECT AN INDICATOR BELOW   & PLACE A  [x] NEXT TO RATIONALE THAT SUPPORTS THAT INDICATOR    1. Acute Urinary Retention or Bladder Outlet Obstruction or Irrigation    Urinary Retention :  []Bladder Scan volume: 250mls or more  []Medications used for Intubation:  -Anesthesia (Versed), Paralytics (Rocuronium)  []Neurogenic Bladder: Nerve Damage    Acute Bladder Outlet Obstruction:  []Prostate Enlargement  []Blood Clots, Hematuria  []Urethral Compression    Continuous Bladder Irrigation  [] TURP  []Other: Please Specify:     2. Need for Accurate Measurements of Urinary Output in Critically Ill  Patients  Anticipated to Require Large Volume Infusions or Diuretics or Vasopressors / Inotropes et al:    []Large Volume of boluses of fluid for resuscitation  []Hemodynamic Instability: receiving IV: Vasopressors & Inotropes EX: Dopamine, Epinephrine, Norepinephrine, Phenylephrine, Vasopressin, Digoxin  []High-dose Diuretics  []Hourly Urine studies to measure life threatening laboratory abnormalities  []Other: Please Specify     3. Need for Treatment of Wounds:  Open Sacral and/or Perineal Wounds  Promote Healing for Incontinent Patient with the following wounds: (Please select a Stage and/or use 'Other'):    []Stage II: Loss of dermis, shallow open ulcer with a red/pink wound bed  []Stage III: Subcutaneous fat may be visible   []Stage IV: Exposed bone, tendon, or muscle  []Deep Tissue Injury: Non-blanchable, purple or maroon  []Unstageable: Base of wound is covered by dead tissue  []Lower Extremity Burns of an incontinent patient   []Enterostomal therapy   []Other: Please Specify     4.   Strict Prolonged Immobilization  Patient requires prolonged immobilization (e.g., potentially unstable thoracic or lumbar spine, multiple traumatic injuries:    []Trauma: to the hip bones, sacrum, or coccyx  []Pelvic: Fractures  []Spinal: Cord Injuries   []Other: Please Specify    5. Comfort for end-of-life care  To improve comfort for end-of-life care if needed:    []Hospice et al  []Other: Please Specify     6.   Selected Surgical Procedures  Selected Madeline-Operative Needs:    []Urologic surgery   []Contiguous (adjacent) structures of the genitourinary tract, surgery  []Anticipated prolonged duration of surgery (Note: catheters placed for this reason should be removed in PACU)  []Large volume infusions or diuretics anticipated during surgery  []Need for intraoperative monitoring of urinary output  []Other: Please Specify

## 2023-03-17 NOTE — PROGRESS NOTES
Hand off report given to Treva Contreras RN. Patient is stable showing no signs of distress and has no current needs at this time. Call light is in reach and bed is in lowest position. Care is transferred at this time.

## 2023-03-17 NOTE — CARE COORDINATION
Case Management Assessment  Initial Evaluation    Date/Time of Evaluation: 3/17/2023 10:14 AM  Assessment Completed by: PREM Dickey    If patient is discharged prior to next notation, then this note serves as note for discharge by case management. Case Management Notes: spoke with spouse on the phone at length. Spouse reports pt has been getting progressively worse and loosing weight for the past 6 months. At baseline she is able to get up and walk with a walker pretty independently. Spouse assist with changing pull up, dressing if needed, meds and meals. He reports they eat fast food often because he isn't really a cook. , PCA from [facility] helps with shower. Spouse is very concerned she has been having headaches and nausea for while now with no answers. Son Jossie Rodriguez live in home but works till 330 daily. Home care and SNF discussed and spouse chose [facility] as pt has been there in the past and [facility] for home care if able to return home since pt is active with them at home. Called Payal from [facility] and LM. Made referral to [facility]. Patient Name: Pura Monzon                   YOB: 1945  Diagnosis: Acute cystitis without hematuria [N30.00]  Acute encephalopathy [G93.40]  Altered mental status, unspecified altered mental status type [R41.82]  Pneumonia due to infectious organism, unspecified laterality, unspecified part of lung [J18.9]                   Date / Time: 3/16/2023  6:33 PM    Patient Admission Status: Inpatient   Readmission Risk (Low < 19, Mod (19-27), High > 27): Readmission Risk Score: 17.7    Current PCP: Tia Romo MD  PCP verified by CM?  Yes    Chart Reviewed: Yes      History Provided by: Significant Other  Patient Orientation: Unable to Assess (spoke with spouse on the phone)    Patient Cognition: Other (see comment) (not responding much now but this is not her baseline)    Hospitalization in the last 30

## 2023-03-17 NOTE — CONSULTS
RIGHT EYE CATARACT PHACOEMULSIFICATION INTRAOCULAR LENS        Historical Medications:  Prior to Visit Medications    Medication Sig Taking?  Authorizing Provider   ondansetron (ZOFRAN-ODT) 4 MG disintegrating tablet Take 1 tablet by mouth 3 times daily as needed for Nausea or Vomiting  Tia MD Hank   lisinopril (PRINIVIL;ZESTRIL) 20 MG tablet Take 1 tablet by mouth daily  Seamus Tijerina MD   ARIPiprazole (ABILIFY) 5 MG tablet Take 1 tablet by mouth daily  Seamus Tijerina MD   amLODIPine (NORVASC) 5 MG tablet Take 1 tablet by mouth daily  Seamus Tijerina MD   docusate sodium (COLACE, DULCOLAX) 100 MG CAPS Take 100 mg by mouth 2 times daily  Seamus Tijerina MD   polyethylene glycol (GLYCOLAX) 17 g packet Take 17 g by mouth daily as needed for Constipation  Seamus Tijerina MD   potassium chloride (KLOR-CON M) 20 MEQ extended release tablet Take 1 tablet by mouth daily  Seamus Tijerina MD   hydroxypropyl methylcellulose (GONIOSOL) 2.5 % ophthalmic solution Place 1 drop into both eyes 4 times daily  Seamus Tijerina MD   levothyroxine (SYNTHROID) 25 MCG tablet Take 2 tablets by mouth Daily  Seamus Tijerina MD   vitamin B-1 (THIAMINE) 100 MG tablet Take 100 mg by mouth daily  Historical Provider, MD   omeprazole (PRILOSEC) 20 MG delayed release capsule Take 20 mg by mouth 2 times daily  Historical Provider, MD   folic acid (FOLVITE) 1 MG tablet Take 1 mg by mouth daily  Historical Provider, MD   cyanocobalamin 1000 MCG tablet Take 1,000 mcg by mouth daily  Historical Provider, MD   vitamin D (CHOLECALCIFEROL) 25 MCG (1000 UT) TABS tablet Take 1,000 Units by mouth daily  Historical Provider, MD   magnesium oxide (MAG-OX) 400 MG tablet Take 400 mg by mouth 2 times daily  Historical Provider, MD   albuterol sulfate HFA (VENTOLIN HFA) 108 (90 Base) MCG/ACT inhaler Inhale 2 puffs into the lungs every 6 hours as needed for Wheezing  Historical Provider, MD   atorvastatin (LIPITOR) 20 MG tablet Take 20 mg by mouth daily ischemic changes. XR CHEST PORTABLE  Narrative: EXAMINATION:  ONE XRAY VIEW OF THE CHEST    3/16/2023 8:44 pm    COMPARISON:  03/14/2023    HISTORY:  ORDERING SYSTEM PROVIDED HISTORY: ams  TECHNOLOGIST PROVIDED HISTORY:  Reason for exam:->ams  Reason for Exam: AMS    FINDINGS:  The cardiac silhouette is not enlarged. No pleural effusion or pneumothorax. No focal consolidation. Interstitial prominence with pulmonary  hyperinflation favoring chronic emphysema. Impression: No acute findings. Chronic emphysema. CT CERVICAL SPINE WO CONTRAST  Narrative: EXAMINATION:  CT OF THE CERVICAL SPINE WITHOUT CONTRAST 3/16/2023 8:36 pm    TECHNIQUE:  CT of the cervical spine was performed without the administration of  intravenous contrast. Multiplanar reformatted images are provided for review. Automated exposure control, iterative reconstruction, and/or weight based  adjustment of the mA/kV was utilized to reduce the radiation dose to as low  as reasonably achievable. COMPARISON:  03/14/2023 and 10/07/2022    HISTORY:  ORDERING SYSTEM PROVIDED HISTORY: altered mental status  TECHNOLOGIST PROVIDED HISTORY:  Reason for exam:->altered mental status  Decision Support Exception - unselect if not a suspected or confirmed  emergency medical condition->Emergency Medical Condition (MA)  Reason for Exam: Altered Mental Status (Family called EMS for AMS. Patient  fell 2 days ago and came here. BS <15 upon EMS arrival and they gave D50 and  came up to 330's for them. Family did not provide any additional history.)    FINDINGS:  BONES/ALIGNMENT: There is no acute fracture or traumatic malalignment. Unchanged lucent lesion in right C1 lateral mass, likely degenerative cyst.    DEGENERATIVE CHANGES: Similar appearance of advanced multilevel spondylotic  changes as previously described. Unchanged grade 1 anterolisthesis of C7 on  T1 and retrolisthesis of C3 on C4. SOFT TISSUES: No prevertebral soft tissue swelling.   Small patchy

## 2023-03-17 NOTE — PROGRESS NOTES
decontaminated in ER  -lives with  and son  - at bedside is a poor historian  -CM for assistance     #HLD  -continue statin    #HTN  -continue norvasc and lisinopril     #Asthma  -stable; continue home albuterol     #Hypothyroidism  -continue synthroid     #JOSESITO  -continue abilify        DVT Prophylaxis: SCDs  Diet: Diet NPO Exceptions are: Ice Chips   Code Status: Full Code     Chata Gupta PA-C   3/17/2023  12:36 PM

## 2023-03-17 NOTE — H&P
Hospital Medicine History & Physical      PCP: Harsh Justice MD    Date of Service: Pt seen/examined on 3/17/23 and admitted on 3/17/23 to Inpatient    Chief Complaint   Patient presents with    Altered Mental Status     Family called EMS for AMS. Patient fell 2 days ago and came here. BS <15 upon EMS arrival and they gave D50 and came up to 330's for them. Family did not provide any additional history. History Of Present Illness: The patient is a 69 y.o. female with PMH below, presents with MS change. Pt is essentially non-verbal at this time. No family at bedside to provide Hx. Only word I could get her to say was \"hi\" when I greeted her. She does not follow commands very well. Only command I could get her to follow was to hold her arms up in the air (after I held them up). She only kept them up for a few seconds but this was symmetrical.  Per ED, they were summoned for MS change. She was found to have a BGT < 15 by EMS. She has remained stable since in that respect since their intervention. She was in the ER on 3/14 for a fall. Her baseline is unclear. Per ED RN who has had pt before, she has appeared similar to her current state on a previous ED visit earlier this month. She arrived dirty, covered in stool/urine/bed bugs. She had an \"old\" appearing diaper per RN. No additional Hx available at this time.       Past Medical History:        Diagnosis Date    Acute systolic CHF (congestive heart failure) (HCC)     Asthma     Back ache     Cataract     Cerebral artery occlusion with cerebral infarction ([address] 2016    Diabetes mellitus ([address]     type !! - diet controlled    Hyperlipidemia     Hypertension     Insomnia     TIA (transient ischemic attack) 2016       Past Surgical History:        Procedure Laterality Date    ABDOMEN SURGERY      c section    CATARACT REMOVAL WITH IMPLANT Left [id number]    LEFT EYE CATARACT PHACOEMULSIFICATION INTRAOCULAR LENS     SECTION was utilized to reduce the radiation dose to as low as reasonably achievable.; CT of the head was performed without the administration of intravenous contrast. Automated exposure control, iterative reconstruction, and/or weight based adjustment of the mA/kV was utilized to reduce the radiation dose to as low as reasonably achievable. COMPARISON: 10/07/2022 and 11/30/2020 HISTORY: ORDERING SYSTEM PROVIDED HISTORY: fall TECHNOLOGIST PROVIDED HISTORY: Reason for exam:->fall Decision Support Exception - unselect if not a suspected or confirmed emergency medical condition->Emergency Medical Condition (MA) Reason for Exam: fall with head injury; ORDERING SYSTEM PROVIDED HISTORY: fall TECHNOLOGIST PROVIDED HISTORY: Reason for exam:->fall Reason for Exam: fall with head injury; ORDERING SYSTEM PROVIDED HISTORY: fall with head injury TECHNOLOGIST PROVIDED HISTORY: Reason for exam:->fall with head injury Has a \"code stroke\" or \"stroke alert\" been called? ->No Decision Support Exception - unselect if not a suspected or confirmed emergency medical condition->Emergency Medical Condition (MA) Reason for Exam: fall with head injury CT BRAIN FINDINGS: BRAIN/VENTRICLES: Old bilateral lacunar infarcts in the thalami and basal ganglia. The brainstem, and cerebellum have a normal appearance for the patient's age. The falx is midline. The ventricles and peripheral sulci are mildly dilated. There is decreased attenuation in the periventricular white matter. There is no sign of a space occupying lesion, acute infarction, or hemorrhage. Orbits: Portion of the orbits demonstrate no acute abnormality. SINUSES: . The imaged portions of the paranasal sinuses are clear. The mastoids and the middle ear chambers are clear. SOFT TISSUES/SKULL:  No acute abnormality of the visualized skull or soft tissues. Vascular calcifications are seen compatible with atherosclerotic disease.  CT CERVICAL AND THORACIC SPINE FINDINGS: The cervical spine

## 2023-03-17 NOTE — CARE COORDINATION
03/17/23 0943   Service Assessment   Patient Orientation Unable to Assess  (spoke with spouse on the phone)   Cognition Other (see comment)  (not responding much now but this is not her baseline)   History Provided By Significant Other   Primary Caregiver Spouse   Accompanied By/Relationship no one, spoke with spouse on phone   [address] is: Legal Next of Kareem Ferris   PCP Verified by CM Yes   Last Visit to PCP Within last 3 months   Prior Functional Level Assistance with the following:;Bathing;Housework; Shopping;Cooking; Toileting  (spouse changes pull ups)   Current Functional Level Assistance with the following:;Bathing;Dressing; Toileting;Feeding;Cooking;Housework; Shopping;Mobility  (not alert to go much now)   Can patient return to prior living arrangement Unknown at present   Ability to make needs known: Poor   Family able to assist with home care needs: Other (comment)  (able to help but pt needs to be somewhat mobile and active with [facility])   Would you like for me to discuss the discharge plan with any other family members/significant others, and if so, who? Yes  (spouse)   Financial Resources None   Community Resources ECF/Home Care   Discharge Planning   Type of Residence House   Living Arrangements Spouse/Significant Other;Children  (son Abigail Waller gets home by 330pm)   Current Services Prior To Admission Home Care;[facility]/[employer]  (has been speaking with someone from [facility] but doesnt believe services are active yet)   [address]   DME Ordered? No   Potential Assistance Purchasing Medications No   Type of Home Care Services PT;OT;Skilled Therapy;Nursing Services   Patient expects to be discharged to: Skilled nursing facility   One/Two Story Residence One story   History of falls? 1   Services At/After Discharge   Transition of Care Consult (CM Consult) SNF; [facility] Dis[facility] Nursing Facility (SNF); Home Health;PT;OT   [employer] Provided? No   Mode of Transport at Discharge Other (see comment)  (spouse or son Isaías Graham if able)   Confirm Follow Up Transport Family   Condition of Participation: Discharge Planning   The Plan for Transition of Care is related to the following treatment goals: snf vs home care for therapy   The Patient and/or Patient Representative was provided with a Choice of Provider? Patient Representative   Name of the Patient Representative who was provided with the Choice of Provider and agrees with the Discharge Plan? Serafin Juan Carlos   The Patient and/Or Patient Representative agree with the Discharge Plan? Yes   Freedom of Choice list was provided with basic dialogue that supports the patient's individualized plan of care/goals, treatment preferences, and shares the quality data associated with the providers?   Yes   Electronically signed by PREM Hooks on 3/17/2023 at 10:14 AM

## 2023-03-17 NOTE — FLOWSHEET NOTE
03/17/23 0810   Vital Signs   Temp 98.1 °F (36.7 °C)   Temp Source Oral   Heart Rate (!) 103   Heart Rate Source Monitor   Resp 17   BP (!) 154/96   MAP (Calculated) 115   MAP (mmHg) 103   BP Location Right upper arm   BP Method Automatic   Pain Assessment   Pain Assessment None - Denies Pain   Oxygen Therapy   SpO2 96 %   O2 Device None (Room air)   See flowsheet for full assessment . Pt is alert follows commands , no concerns at this time .  Call light within reach , bed in low position and telesitter at [facility] for pt safety

## 2023-03-17 NOTE — PROGRESS NOTES
Patient admitted to room 328 from ED. Patient oriented to room, call light, bed rails, phone, lights and bathroom. Patient instructed about the schedule of the day including: vital sign frequency, lab draws, possible tests, frequency of MD and staff rounds, daily weights, I &O's and prescribed diet. Telemetry box in place, patient aware of placement and reason. Bed locked, in lowest position, side rails up 2/4, call light within reach. Recliner Assessment  Patient is not able to demonstrate the ability to move from a reclining position to an upright position within the recliner due to limited mobility. 4 Eyes Skin Assessment     The patient is being assess for   Admission    I agree that 2 RN's have performed a thorough Head to Toe Skin Assessment on the patient. ALL assessment sites listed below have been assessed. Areas assessed for pressure by both nurses:   [x]   Head, Face, and Ears   [x]   Shoulders, Back, and Chest, Abdomen  [x]   Arms, Elbows, and Hands   [x]   Coccyx, Sacrum, and Ischium  [x]   Legs, Feet, and Heels        Skin Assessed Under all Medical Devices by both nurses:  blackmon              All Mepilex Borders were peeled back and area peeked at by both nurses:  Yes  Please list where Mepilex Borders are located:  NA             **SHARE this note so that the co-signing nurse is able to place an eSignature**    Co-signer eSignature: Electronically signed by Tosin Tena RN on 3/17/23 at 5:06 AM EDT    Does the Patient have Skin Breakdown related to pressure?   No            Johnson Prevention initiated:  Yes   Wound Care Orders initiated:  No      [facility] nurse consulted for Pressure Injury (Stage 3,4, Unstageable, DTI, NWPT, Complex wounds)and New or Established Ostomies:  No      Primary Nurse eSignature: Electronically signed by Georgina Scales RN on 3/17/23 at 4:41 AM EDT

## 2023-03-17 NOTE — PROGRESS NOTES
SLP Attempt Note        Name: Tarik Avila  : 1945  Medical Diagnosis: Acute cystitis without hematuria [N30.00]  Acute encephalopathy [G93.40]  Altered mental status, unspecified altered mental status type [R41.82]  Pneumonia due to infectious organism, unspecified laterality, unspecified part of lung [J18.9]    SLP attempting to see the patient for swallow eval. Per RN, pt is also NPO for GI consult 2/2 hemoccult stool. Will hold BSE pending GI approval for PO advancement. Instructed RN to call SLP if Ok'ed for PO advancement. No charges filed.  Thank you,    Dagoberto Collins M.A., [address] [facility]  Speech-Language Pathologist  Phone: [phone]

## 2023-03-17 NOTE — PLAN OF CARE
Problem: Safety - Adult  Goal: Free from fall injury  Outcome: Progressing     Problem: Discharge Planning  Goal: Discharge to home or other facility with appropriate resources  Outcome: Progressing     Problem: Skin/Tissue Integrity  Goal: Absence of new skin breakdown  Description: 1. Monitor for areas of redness and/or skin breakdown  2. Assess vascular access sites hourly  3. Every 4-6 hours minimum:  Change oxygen saturation probe site  4. Every 4-6 hours:  If on nasal continuous positive airway pressure, respiratory therapy assess nares and determine need for appliance change or resting period.   Outcome: Progressing     Problem: Chronic Conditions and Co-morbidities  Goal: Patient's chronic conditions and co-morbidity symptoms are monitored and maintained or improved  Outcome: Progressing

## 2023-03-17 NOTE — PROGRESS NOTES
time.\"    Home Health S4 Level Recommendation:  NA    AM-PAC Mobility Score    AM-PAC Inpatient Mobility Raw Score : 16       Subjective  Patient lying reclined bed with no family present. Pt agreeable to this PT session. Cognition    A&O x0   Able to follow < 25% of the time    Pain   No  Location:   Rating: NA /10  Pain Medicine Status: No request made    Preadmission Environment       Information gathered from privious admission on 5/26/21. Attempted to confirm information with pt, however, pt poor historian. Pt. Lives with family (Spouse Shade Fisherelaina) Son lives upstairs  Home environment:    two story home  Steps to enter first floor: No steps and 1/2 steps to enter  Steps to second floor: Full flight of 12-13  Bathroom: tub/shower unit, grab bars and shower seat  Equipment owned: large SW and BSC, platform cane,     Preadmission Status:  Pt. Able to drive: No  Pt Fully independent with ADLs: Yes-usually sponge bathes  Pt. Required assistance from family for: Cleaning, Cooking and Laundry   Pt. independent for transfers and gait and walked with Lynnda Leventhal RW  History of falls Yes- multiple (usually on driveway or front porch)     Pt reports that she does not use a RW \"all of the time\"    Objective  Does this pt have an acute or acute on chronic diagnosis of CHF? No    Upper Extremity ROM/Strength  Please see OT evaluation. Lower Extremity ROM / Strength   AROM WFL: Yes  ROM limitations:     BLE strength impaired, but not formally assessed with MMT. Lower Extremity Sensation    NT    Coordination and Tone  WFL    Balance  Static Sitting:  Fair +; CGA  Dynamic Sitting:  Fair +; CGA   Comments:     Static Standing: Fair ; Min A   Dynamic Standing: Fair ; Min A   Comments: hand held assist    Posture  Seated: Forward head and neck  Standing:  Forward head and neck    Bed Mobility   Supine to Sit:    Min A   Sit to Supine:   SBA  Rolling:   Not Tested   Scooting in sitting: CGA   Scooting in supine:  Not Tested   Bridging:  Not Tested    Transfer Training     Sit to stand:   Min A    Stand to sit:   CGA   Bed to Chair:   Not Tested with use of N/A    Gait gait completed as indicated below  Distance:      3 ft  Deviations (firm surface/linoleum):  Sidestepping toward [facility]  Assistive Device Used:    gait belt and HH assist  Level of Assist:    Min A    Comment: distance limited by fatigue and confusion    Stair Training deferred, pt unsafe/ not appropriate to complete stairs at this time  # of Steps:   N/A  Level of Assist:  Not Tested   UE Support:  NA  Assistive Device:  N/A  Pattern:   N/A  Comments:      Therapeutic Exercises Initiated  deferred secondary to treatment focus on functional mobility  Supine:  N/A    Seated:  N/A    Standing:  N/A    Activity Tolerance   During therapy session noted pt with no adverse symptoms to activity    Pt Position BP (mmHg) HR (bpm) SpO2 (%) on RA  Comments   Supine at rest       Seated at EOB       Standing       End of session         Positioning Needs   Pt in bed, alarm set, positioned in proper neutral alignment and pressure relief provided. Call light provided and all needs within reach  Telesitter present in room    Other Activities  None. Patient/Family Education   Pt educated on role of inpatient PT, POC, importance of continued activity, safety awareness, transfer techniques, and calling for assist with mobility. Assessment  Pt seen today for physical therapy Evaluation. Pt demonstrated decreased Activity tolerance, Balance, Safety, and Strength as well as decreased independence with Ambulation, Bed Mobility , and Transfers. Recommending SNF upon discharge as patient functioning well below baseline, demonstrates good rehab potential and unable to return home due to home environment not conducive to patient recovery and limited safety awareness. Goals : To be met in 3 visits:  1). Independent with LE Ex x 10 reps  2). Sit to/from stand: Supervision  3).

## 2023-03-18 PROBLEM — Z86.73 HISTORY OF CVA (CEREBROVASCULAR ACCIDENT): Status: ACTIVE | Noted: 2023-03-18

## 2023-03-18 PROBLEM — E16.2 HYPOGLYCEMIA: Status: ACTIVE | Noted: 2023-03-18

## 2023-03-18 LAB
ANION GAP SERPL CALCULATED.3IONS-SCNC: 11 MMOL/L (ref 3–16)
BACTERIA UR CULT: NORMAL
BASOPHILS # BLD: 0.1 K/UL (ref 0–0.2)
BASOPHILS NFR BLD: 1.3 %
BUN SERPL-MCNC: 9 MG/DL (ref 7–20)
CALCIUM SERPL-MCNC: 9.1 MG/DL (ref 8.3–10.6)
CHLORIDE SERPL-SCNC: 105 MMOL/L (ref 99–110)
CO2 SERPL-SCNC: 24 MMOL/L (ref 21–32)
CREAT SERPL-MCNC: 0.6 MG/DL (ref 0.6–1.2)
DEPRECATED RDW RBC AUTO: 13.8 % (ref 12.4–15.4)
EOSINOPHIL # BLD: 0.2 K/UL (ref 0–0.6)
EOSINOPHIL NFR BLD: 2.3 %
GFR SERPLBLD CREATININE-BSD FMLA CKD-EPI: >60 ML/MIN/{1.73_M2}
GLUCOSE BLD-MCNC: 118 MG/DL (ref 70–99)
GLUCOSE BLD-MCNC: 135 MG/DL (ref 70–99)
GLUCOSE BLD-MCNC: 80 MG/DL (ref 70–99)
GLUCOSE BLD-MCNC: 86 MG/DL (ref 70–99)
GLUCOSE BLD-MCNC: 86 MG/DL (ref 70–99)
GLUCOSE SERPL-MCNC: 73 MG/DL (ref 70–99)
HCT VFR BLD AUTO: 31.4 % (ref 36–48)
HCT VFR BLD AUTO: 32.1 % (ref 36–48)
HCT VFR BLD AUTO: 32.1 % (ref 36–48)
HCT VFR BLD AUTO: 33.6 % (ref 36–48)
HCT VFR BLD AUTO: 34.9 % (ref 36–48)
HGB BLD-MCNC: 10.8 G/DL (ref 12–16)
HGB BLD-MCNC: 11.1 G/DL (ref 12–16)
HGB BLD-MCNC: 11.2 G/DL (ref 12–16)
HGB BLD-MCNC: 11.4 G/DL (ref 12–16)
HGB BLD-MCNC: 11.7 G/DL (ref 12–16)
LYMPHOCYTES # BLD: 1.4 K/UL (ref 1–5.1)
LYMPHOCYTES NFR BLD: 20.3 %
MCH RBC QN AUTO: 33.2 PG (ref 26–34)
MCHC RBC AUTO-ENTMCNC: 34.5 G/DL (ref 31–36)
MCV RBC AUTO: 96.2 FL (ref 80–100)
MONOCYTES # BLD: 0.6 K/UL (ref 0–1.3)
MONOCYTES NFR BLD: 7.8 %
NEUTROPHILS # BLD: 4.9 K/UL (ref 1.7–7.7)
NEUTROPHILS NFR BLD: 68.3 %
PERFORMED ON: ABNORMAL
PERFORMED ON: ABNORMAL
PERFORMED ON: NORMAL
PLATELET # BLD AUTO: 320 K/UL (ref 135–450)
PMV BLD AUTO: 7.8 FL (ref 5–10.5)
POTASSIUM SERPL-SCNC: 4.1 MMOL/L (ref 3.5–5.1)
RBC # BLD AUTO: 3.26 M/UL (ref 4–5.2)
SODIUM SERPL-SCNC: 140 MMOL/L (ref 136–145)
WBC # BLD AUTO: 7.1 K/UL (ref 4–11)

## 2023-03-18 PROCEDURE — 85018 HEMOGLOBIN: CPT

## 2023-03-18 PROCEDURE — 6370000000 HC RX 637 (ALT 250 FOR IP): Performed by: INTERNAL MEDICINE

## 2023-03-18 PROCEDURE — 36415 COLL VENOUS BLD VENIPUNCTURE: CPT

## 2023-03-18 PROCEDURE — 92526 ORAL FUNCTION THERAPY: CPT

## 2023-03-18 PROCEDURE — 2580000003 HC RX 258: Performed by: INTERNAL MEDICINE

## 2023-03-18 PROCEDURE — 99233 SBSQ HOSP IP/OBS HIGH 50: CPT | Performed by: INTERNAL MEDICINE

## 2023-03-18 PROCEDURE — 6360000002 HC RX W HCPCS: Performed by: INTERNAL MEDICINE

## 2023-03-18 PROCEDURE — 6370000000 HC RX 637 (ALT 250 FOR IP): Performed by: PHYSICIAN ASSISTANT

## 2023-03-18 PROCEDURE — C9113 INJ PANTOPRAZOLE SODIUM, VIA: HCPCS | Performed by: INTERNAL MEDICINE

## 2023-03-18 PROCEDURE — 80048 BASIC METABOLIC PNL TOTAL CA: CPT

## 2023-03-18 PROCEDURE — 85014 HEMATOCRIT: CPT

## 2023-03-18 PROCEDURE — 2060000000 HC ICU INTERMEDIATE R&B

## 2023-03-18 PROCEDURE — 6370000000 HC RX 637 (ALT 250 FOR IP)

## 2023-03-18 PROCEDURE — 85025 COMPLETE CBC W/AUTO DIFF WBC: CPT

## 2023-03-18 PROCEDURE — 92610 EVALUATE SWALLOWING FUNCTION: CPT

## 2023-03-18 RX ADMIN — CEFTRIAXONE SODIUM 1000 MG: 1 INJECTION, POWDER, FOR SOLUTION INTRAMUSCULAR; INTRAVENOUS at 22:30

## 2023-03-18 RX ADMIN — CARBOXYMETHYLCELLULOSE SODIUM 1 DROP: 10 GEL OPHTHALMIC at 20:03

## 2023-03-18 RX ADMIN — ARIPIPRAZOLE 5 MG: 10 TABLET ORAL at 07:55

## 2023-03-18 RX ADMIN — FOLIC ACID 1 MG: 1 TABLET ORAL at 12:20

## 2023-03-18 RX ADMIN — CARBOXYMETHYLCELLULOSE SODIUM 1 DROP: 10 GEL OPHTHALMIC at 12:22

## 2023-03-18 RX ADMIN — ACETAMINOPHEN 650 MG: 325 TABLET ORAL at 20:02

## 2023-03-18 RX ADMIN — AMLODIPINE BESYLATE 5 MG: 5 TABLET ORAL at 07:55

## 2023-03-18 RX ADMIN — ATORVASTATIN CALCIUM 20 MG: 10 TABLET, FILM COATED ORAL at 07:55

## 2023-03-18 RX ADMIN — PANTOPRAZOLE SODIUM 40 MG: 40 INJECTION, POWDER, FOR SOLUTION INTRAVENOUS at 20:02

## 2023-03-18 RX ADMIN — DONEPEZIL HYDROCHLORIDE 5 MG: 5 TABLET, FILM COATED ORAL at 07:56

## 2023-03-18 RX ADMIN — Medication 400 MG: at 20:06

## 2023-03-18 RX ADMIN — CARBOXYMETHYLCELLULOSE SODIUM 1 DROP: 10 GEL OPHTHALMIC at 16:38

## 2023-03-18 RX ADMIN — OXYBUTYNIN CHLORIDE 10 MG: 5 TABLET, EXTENDED RELEASE ORAL at 07:57

## 2023-03-18 RX ADMIN — AZITHROMYCIN DIHYDRATE 500 MG: 500 INJECTION, POWDER, LYOPHILIZED, FOR SOLUTION INTRAVENOUS at 00:10

## 2023-03-18 RX ADMIN — LISINOPRIL 20 MG: 20 TABLET ORAL at 12:20

## 2023-03-18 RX ADMIN — DOCUSATE SODIUM 100 MG: 100 CAPSULE, LIQUID FILLED ORAL at 20:02

## 2023-03-18 RX ADMIN — Medication 10 ML: at 20:03

## 2023-03-18 RX ADMIN — POLYETHYLENE GLYCOL (3350) 17 G: 17 POWDER, FOR SOLUTION ORAL at 20:03

## 2023-03-18 RX ADMIN — AZITHROMYCIN DIHYDRATE 500 MG: 500 INJECTION, POWDER, LYOPHILIZED, FOR SOLUTION INTRAVENOUS at 23:08

## 2023-03-18 RX ADMIN — POLYETHYLENE GLYCOL (3350) 17 G: 17 POWDER, FOR SOLUTION ORAL at 12:20

## 2023-03-18 RX ADMIN — FERROUS SULFATE TAB 325 MG (65 MG ELEMENTAL FE) 325 MG: 325 (65 FE) TAB at 16:38

## 2023-03-18 RX ADMIN — PANTOPRAZOLE SODIUM 40 MG: 40 INJECTION, POWDER, FOR SOLUTION INTRAVENOUS at 07:58

## 2023-03-18 RX ADMIN — FERROUS SULFATE TAB 325 MG (65 MG ELEMENTAL FE) 325 MG: 325 (65 FE) TAB at 07:56

## 2023-03-18 RX ADMIN — DOCUSATE SODIUM 100 MG: 100 CAPSULE, LIQUID FILLED ORAL at 07:56

## 2023-03-18 ASSESSMENT — PAIN SCALES - WONG BAKER: WONGBAKER_NUMERICALRESPONSE: 4

## 2023-03-18 NOTE — PROGRESS NOTES
Progress Note    Patient Aracelis Sanches  MRN: [id number]  YOB: 1945 Age: 69 y.o. Sex: female  Room: [place] [address]       Admitting Physician: Serene Mata MD   Date of Admission: 3/16/2023  6:33 PM   Primary Care Physician: Katarzyna Vela MD     Subjective:  Aracelis Sanches was seen and examined. We are following for GIB. -- still confused. Mild conversive. Per nurse no BM. No overt bleeding noted    ROS:  Not able to obtain due to mental status    Objective:  Vital Signs:   Vitals:    03/18/23 0803   BP: (!) 185/94   Pulse: 74   Resp: 16   Temp: 97.9 °F (36.6 °C)   SpO2: 100%         Physical Exam:  Constitutional: awake and Alert  Respiratory: Respirations nonlabored, no crepitus  GI: Abdomen nondistended, soft, and nontender. Neurological: No focal deficits noted.      Intake/Output:    Intake/Output Summary (Last 24 hours) at 3/18/2023 1020  Last data filed at 3/18/2023 0656  Gross per 24 hour   Intake 0 ml   Output 2851 ml   Net -2851 ml        Current Medications:  Current Facility-Administered Medications   Medication Dose Route Frequency Provider Last Rate Last Admin    magnesium sulfate 1000 mg in dextrose 5% 100 mL IVPB  1,000 mg IntraVENous PRN Serene Mata MD        potassium chloride 10 mEq/100 mL IVPB (Peripheral Line)  10 mEq IntraVENous PRN Serene Mata MD        glucose chewable tablet 16 g  4 tablet Oral PRN Serene Mata MD        dextrose bolus 10% 125 mL  125 mL IntraVENous PRN Serene Mata MD        Or    dextrose bolus 10% 250 mL  250 mL IntraVENous PRN Serene Mata MD        glucagon (rDNA) injection 1 mg  1 mg SubCUTAneous PRN Serene Mata MD        dextrose 10 % infusion   IntraVENous Continuous PRN Serene Mata MD        labetalol (NORMODYNE;TRANDATE) injection 10 mg  10 mg IntraVENous Q6H PRN Serene Mata MD        pantoprazole (PROTONIX) injection 40 mg  40 mg IntraVENous BID Serene Mata MD   40 mg at 03/18/23 7129 Patient  fell 2 days ago and came here. BS <15 upon EMS arrival and they gave D50 and  came up to 330's for them. Family did not provide any additional history.)    FINDINGS:  BONES/ALIGNMENT: There is no acute fracture or traumatic malalignment. Unchanged lucent lesion in right C1 lateral mass, likely degenerative cyst.    DEGENERATIVE CHANGES: Similar appearance of advanced multilevel spondylotic  changes as previously described. Unchanged grade 1 anterolisthesis of C7 on  T1 and retrolisthesis of C3 on C4. SOFT TISSUES: No prevertebral soft tissue swelling. Small patchy airspace  opacities in the right apex. Impression: No acute fracture or traumatic malalignment of the cervical spine. Small patchy airspace opacities in the right apex which may be infectious in  nature. Labs:   Recent Labs     03/16/23  2004 03/17/23  0428 03/17/23  0931 03/17/23  1530 03/18/23  0345 03/18/23  0918   HGB 13.0  --  11.3* 11.0* 11.2* 11.1*   WBC 15.4*  --   --   --   --   --    PROT 7.0 5.9*  --   --   --   --    LABALBU 3.6 3.8  --   --   --   --    ALKPHOS 78 68  --   --   --   --    ALT <5* 6*  --   --   --   --    AST 11* 8*  --   --   --   --    BILITOT 0.4 0.6  --   --   --   --           Assessment:    69year old female CHF, DM II, HTN, HLD. Presents with altered mental status. GI consulted for concerns for melena. CT A/P: unremarkable for acute pathology, CBD measuring 0.9 cm, moderate stool burden throughout colon. LFTs unremarkable  HGB stable at 11    Plan:  No emergent plans for endoscopy at this time  Given degree of constipation on imaging and history of stercoral colitis recommend miralax BID  Trend H/H and monitor for active GIB  Protonix BID  No active GI plans presently-- call back if having hemodynamically significant GIB and or if mental status changes and would be appropriate for EGD      MARCUS Alexis Band    [phone].  Also available via [employer]

## 2023-03-18 NOTE — PROGRESS NOTES
Speech Language Pathology  SLP Evaluation Completed.  All note are found in EMR    Neo VELEZ, CF-SLP

## 2023-03-18 NOTE — PROGRESS NOTES
-UA+  -urine culture pending -->50,000 mixed pathogens  -abx as above     #GI bleed  #Anemia  -with loose black stools  -hemoccult positive today  -hemoccult noted also to be positive at PCP visit on 2/23  -referred to GI  -H&H stable, trend  -stool studies pending   -IV PPI  -GI c/s  -no plans for emergent EGD, started on miralax BID given hx of stercoral colitis    #T2DM  #Hypoglycemia  -<15 when EMS arrived  -EMS provided dextrose  -no further episodes since arrival  -hypoglycemia protocol  -POCT glucose q4h  -hold home metformin  - encourage PO     #Inability to care for self   -reportedly found covered in stool, urine  -RN reported that depends appeared to have been on her for \"days\"  -bed bug infestation, decontaminated in ER  -lives with  and son  - PT/OT consulted and recommend SNF  - for assistance     #HLD  -continue statin    #HTN  -continue norvasc and lisinopril     #Asthma  -stable; continue home albuterol     #Hypothyroidism  -continue synthroid   - check TSH- 0.92    #JOSESITO  -continue abilify      DVT Prophylaxis: SCDs  Diet: Diet NPO Exceptions are: Ice Chips   Code Status: Full Code   LEONA Mosley - CNP   3/18/2023  12:29 PM       Jihan Hunter MD 3/18/2023 12:55 PM

## 2023-03-18 NOTE — FLOWSHEET NOTE
03/18/23 0803   Vitals   Temp 97.9 °F (36.6 °C)   Temp Source Oral   Heart Rate 74   Heart Rate Source Monitor   Resp 16   BP (!) 185/94   MAP (Calculated) 124   BP Method Automatic   Patient Position Semi fowlers   Level of Consciousness 0   MEWS Score 1   Cardiac Rhythm Sinus rhythm   Oxygen Therapy   SpO2 100 %   O2 Device None (Room air)   Shift assessment completed-see flow sheet. Patient in bed awake and alert. Oriented to self and place, disoriented to time/situation. Vitals obtained, 100% on RA, lung sounds diminished. Patient denies any needs at this time,call light within reach and bed alarm on.

## 2023-03-18 NOTE — FLOWSHEET NOTE
03/18/23 1945   Vital Signs   Temp 97 °F (36.1 °C)   Temp Source Axillary   Heart Rate 83   Heart Rate Source Monitor   Resp 16   BP (!) 148/98   MAP (Calculated) 115   BP Method Automatic   Patient Position Semi fowlers   Level of Consciousness 0   MEWS Score 1   Oxygen Therapy   SpO2 99 %   O2 Device None (Room air)   Pt resting in bed with family at bedside. Call light and bedside table within reach. Shift assessment added. Tele monitor in use.  Bed alarm is set

## 2023-03-18 NOTE — PLAN OF CARE
Problem: Discharge Planning  Goal: Discharge to home or other facility with appropriate resources  Outcome: Progressing  Flowsheets (Taken 3/18/2023 1601)  Discharge to home or other facility with appropriate resources:   Identify barriers to discharge with patient and caregiver   Identify discharge learning needs (meds, wound care, etc)     Problem: Skin/Tissue Integrity  Goal: Absence of new skin breakdown  Description: 1. Monitor for areas of redness and/or skin breakdown  2. Assess vascular access sites hourly  3. Every 4-6 hours minimum:  Change oxygen saturation probe site  4. Every 4-6 hours:  If on nasal continuous positive airway pressure, respiratory therapy assess nares and determine need for appliance change or resting period.   Outcome: Progressing     Problem: Chronic Conditions and Co-morbidities  Goal: Patient's chronic conditions and co-morbidity symptoms are monitored and maintained or improved  Outcome: Progressing

## 2023-03-18 NOTE — PROGRESS NOTES
Hand off report given to Leandra Carrasquillo RN. Patient is stable showing no signs of distress and has no current needs at this time. Call light is in reach and bed is in lowest position. Care is transferred at this time.

## 2023-03-18 NOTE — PROGRESS NOTES
03/17/23 2000   RT Protocol   History Pulmonary Disease 2   Respiratory pattern 0   Breath sounds 0   Cough 0   Indications for Bronchodilator Therapy None   Bronchodilator Assessment Score 2   RT Inhaler-Nebulizer Bronchodilator Protocol Note    There is a bronchodilator order in the chart from a provider indicating to follow the RT Bronchodilator Protocol and there is an Initiate RT Inhaler-Nebulizer Bronchodilator Protocol order as well (see protocol at bottom of note). CXR Findings:  XR CHEST PORTABLE    Result Date: 3/16/2023  No acute findings. Chronic emphysema. The findings from the last RT Protocol Assessment were as follows:   History Pulmonary Disease: Chronic pulmonary disease  Respiratory Pattern: Regular pattern and RR 12-20 bpm  Breath Sounds: Clear breath sounds  Cough: Strong, spontaneous, non-productive  Indication for Bronchodilator Therapy: None  Bronchodilator Assessment Score: 2    Aerosolized bronchodilator medication orders have been revised according to the RT Inhaler-Nebulizer Bronchodilator Protocol below. Respiratory Therapist to perform RT Therapy Protocol Assessment initially then follow the protocol. Repeat RT Therapy Protocol Assessment PRN for score 0-3 or on second treatment, BID, and PRN for scores above 3. No Indications - adjust the frequency to every 6 hours PRN wheezing or bronchospasm, if no treatments needed after 48 hours then discontinue using Per Protocol order mode. If indication present, adjust the RT bronchodilator orders based on the Bronchodilator Assessment Score as indicated below. Use Inhaler orders unless patient has one or more of the following: on home nebulizer, not able to hold breath for 10 seconds, is not alert and oriented, cannot activate and use MDI correctly, or respiratory rate 25 breaths per minute or more, then use the equivalent nebulizer order(s) with same Frequency and PRN reasons based on the score.   If a patient is on

## 2023-03-18 NOTE — PROGRESS NOTES
failure) ([facility] [address] 2020    TRINH (acute kidney injury) ([facility] [address] 2020    CHF (congestive heart failure), NYHA class III, acute, systolic ([address]     Acute pulmonary edema (HCC)     NSTEMI (non-ST elevated myocardial infarction) ([address]     Nonischemic cardiomyopathy (HCC)     Acute hypoxemic respiratory failure (HCC)     Hypomagnesemia     Hypokalemia     Shortness of breath 2020    SIRS (systemic inflammatory response syndrome) ([address] 10/16/2017    Thrombocytosis 10/16/2017    Chronic dCHF (grade 1 LVDD) 10/16/2017    Alcohol withdrawal ([address] 10/16/2017    Frequent falls 10/16/2017    Hypertensive urgency 10/16/2017    COPD exacerbation ([address] 10/16/2017    ETOH abuse 2017    Tobacco abuse 2017    Asthma 2017    Hyperlipidemia 2017    Hx of CVA involving R-ICA territory     CAD (coronary artery disease)     DM2 (diabetes mellitus, type 2) ([facility] [address]     Hyponatremia 2016    Hypertension 2016     Past Medical History:   Diagnosis Date    Acute systolic CHF (congestive heart failure) (HCC)     Asthma     Back ache     Cataract     Cerebral artery occlusion with cerebral infarction ([address] 2016    Diabetes mellitus ([facility] [address]     type !! - diet controlled    Hyperlipidemia     Hypertension     Insomnia     TIA (transient ischemic attack) 2016     Past Surgical History:   Procedure Laterality Date    ABDOMEN SURGERY      c section    CATARACT REMOVAL WITH IMPLANT Left [id number]    LEFT EYE CATARACT PHACOEMULSIFICATION INTRAOCULAR LENS     SECTION      DENTAL SURGERY      EYE SURGERY  10/29/13     RIGHT EYE CATARACT PHACOEMULSIFICATION INTRAOCULAR LENS     Allergies   Allergen Reactions    Mold Extract [Trichophyton Mentagrophytes]        DATE ONSET: 23    Date of Evaluation: 3/18/2023   Evaluating Therapist: Reva Elliott SLP    Chart Reviewed: : [x] Yes [] No    Current Diet: Diet NPO Exceptions are: Ice Chips    Recent Chest Radiography: [x] Chest XR   [] CT No    Baseline Method of Oral Meds:  [x] Whole with liquid    [] Cut with liquid    [] Whole with puree    [] Cut in puree    [] Crushed in puree    [] Crushed in liquid    [] Via TF    Additional Reported Symptoms/Complaints: Per chart, \"This RN to BS with meds , swallow evaluation one sip of water pt started to cough and clear her throat after swallowing , Speech called and meds held at this time \"    Predisposing dysphagia risk factors: Hx of CVA, COPD, and Other chronic respiratory illness  Clinical signs of possible chronic dysphagia: N/A  Precipitating dysphagia risk factors: AMS    Vitals/labs:     Vitals:    03/17/23 2315 03/18/23 0230 03/18/23 0232 03/18/23 0803   BP: (!) 152/76 (!) 148/75  (!) 185/94   Pulse: 77 68  74   Resp: 16 16  16   Temp: 98.3 °F (36.8 °C) 97.1 °F (36.2 °C)  97.9 °F (36.6 °C)   TempSrc: Oral   Oral   SpO2: 95% 96%  100%   Weight:   111 lb 4.8 oz (50.5 kg)    Height:            CBC:   Recent Labs     03/16/23 2004 03/17/23  0931 03/18/23  0918   WBC 15.4*  --   --    HGB 13.0   < > 11.1*     --   --     < > = values in this interval not displayed.       BMP:  Recent Labs     03/17/23  0428      K 4.3   CL 99   CO2 27   BUN 13   CREATININE 0.8   GLUCOSE 102*          Cranial nerve exam: pt intermittently able to follow commands for OME  CN V (trigeminal): ophthalmic, maxillary, and mandibular facial sensation- WFL  CN VII (facial): WFL  CN IX/X (glossopharyngeal/vagus): MPT: KITTY; pitch range: KITTY; vocal quality: weak; cough: Weak- perceptually  CN XII (hypoglossal): WFL      Laryngeal function exam:   Secretions: WFL  Vocal quality: See CN exam above  MPT: See CN exam above  S/Z ratio: DNT  Pitch range: See CN exam above  Cough: See CN exam above    Oral Care Status:    [] Oral Care [facility]  [x] Poor oral care status  [] Edentulous  [] Upper Dentures  [] Lower Dentures  [x] Missing/Broken Teeth  [] Evidence of dental cavities/carries    PO trials: pt with watery eyes upon ST

## 2023-03-18 NOTE — PROGRESS NOTES
Occupational Therapy Attempt    Unit: PCU   Date:  3/18/2023  Patient Name:    Alan Delgado  Admitting diagnosis:  Acute cystitis without hematuria [N30.00]  Acute encephalopathy [G93.40]  Altered mental status, unspecified altered mental status type [R41.82]  Pneumonia due to infectious organism, unspecified laterality, unspecified part of lung [J18.9]  Admit Date:  3/16/2023    Attempt @ : Decline. Upon entry pt initially nodded in agreement to work with therapy. When therapist went to get started, pt closed her eyes and refused to move or talk at that time.      Evelyn Kim, HARLAN, OTR/L   [id number]      No Charge

## 2023-03-18 NOTE — FLOWSHEET NOTE
03/17/23 2315   Vital Signs   Temp 98.3 °F (36.8 °C)   Temp Source Oral   Heart Rate 77   Heart Rate Source Monitor   Resp 16   BP (!) 152/76   MAP (Calculated) 101   BP Location Right Arm   BP Method Automatic   Level of Consciousness 1   MEWS Score 2   Oxygen Therapy   SpO2 95 %   O2 Device None (Room air)   Pt resting in bed without distress. Call light and bedside table within reach. Shift assessment added. Tele monitor in use.  Bed alarm is set

## 2023-03-19 LAB
ANION GAP SERPL CALCULATED.3IONS-SCNC: 10 MMOL/L (ref 3–16)
BASOPHILS # BLD: 0.1 K/UL (ref 0–0.2)
BASOPHILS NFR BLD: 1 %
BUN SERPL-MCNC: 8 MG/DL (ref 7–20)
CALCIUM SERPL-MCNC: 9.2 MG/DL (ref 8.3–10.6)
CHLORIDE SERPL-SCNC: 104 MMOL/L (ref 99–110)
CO2 SERPL-SCNC: 26 MMOL/L (ref 21–32)
CREAT SERPL-MCNC: 0.7 MG/DL (ref 0.6–1.2)
DEPRECATED RDW RBC AUTO: 13.7 % (ref 12.4–15.4)
EOSINOPHIL # BLD: 0.2 K/UL (ref 0–0.6)
EOSINOPHIL NFR BLD: 2.9 %
FOLATE SERPL-MCNC: 17.71 NG/ML (ref 4.78–24.2)
GFR SERPLBLD CREATININE-BSD FMLA CKD-EPI: >60 ML/MIN/{1.73_M2}
GLUCOSE BLD-MCNC: 102 MG/DL (ref 70–99)
GLUCOSE BLD-MCNC: 103 MG/DL (ref 70–99)
GLUCOSE BLD-MCNC: 107 MG/DL (ref 70–99)
GLUCOSE BLD-MCNC: 115 MG/DL (ref 70–99)
GLUCOSE BLD-MCNC: 119 MG/DL (ref 70–99)
GLUCOSE BLD-MCNC: 93 MG/DL (ref 70–99)
GLUCOSE SERPL-MCNC: 115 MG/DL (ref 70–99)
HCT VFR BLD AUTO: 33.5 % (ref 36–48)
HCT VFR BLD AUTO: 33.9 % (ref 36–48)
HCT VFR BLD AUTO: 33.9 % (ref 36–48)
HGB BLD-MCNC: 11.3 G/DL (ref 12–16)
HGB BLD-MCNC: 11.4 G/DL (ref 12–16)
HGB BLD-MCNC: 11.6 G/DL (ref 12–16)
LYMPHOCYTES # BLD: 2 K/UL (ref 1–5.1)
LYMPHOCYTES NFR BLD: 25 %
MCH RBC QN AUTO: 30.9 PG (ref 26–34)
MCHC RBC AUTO-ENTMCNC: 34.2 G/DL (ref 31–36)
MCV RBC AUTO: 90.6 FL (ref 80–100)
MONOCYTES # BLD: 0.8 K/UL (ref 0–1.3)
MONOCYTES NFR BLD: 9.4 %
NEUTROPHILS # BLD: 5 K/UL (ref 1.7–7.7)
NEUTROPHILS NFR BLD: 61.7 %
PERFORMED ON: ABNORMAL
PERFORMED ON: NORMAL
PLATELET # BLD AUTO: 342 K/UL (ref 135–450)
PMV BLD AUTO: 6.9 FL (ref 5–10.5)
POTASSIUM SERPL-SCNC: 3.6 MMOL/L (ref 3.5–5.1)
RBC # BLD AUTO: 3.74 M/UL (ref 4–5.2)
SODIUM SERPL-SCNC: 140 MMOL/L (ref 136–145)
VIT B12 SERPL-MCNC: >2000 PG/ML (ref 211–911)
WBC # BLD AUTO: 8 K/UL (ref 4–11)

## 2023-03-19 PROCEDURE — 85014 HEMATOCRIT: CPT

## 2023-03-19 PROCEDURE — 85018 HEMOGLOBIN: CPT

## 2023-03-19 PROCEDURE — 99232 SBSQ HOSP IP/OBS MODERATE 35: CPT | Performed by: INTERNAL MEDICINE

## 2023-03-19 PROCEDURE — 80048 BASIC METABOLIC PNL TOTAL CA: CPT

## 2023-03-19 PROCEDURE — 6360000002 HC RX W HCPCS: Performed by: INTERNAL MEDICINE

## 2023-03-19 PROCEDURE — 6370000000 HC RX 637 (ALT 250 FOR IP): Performed by: PHYSICIAN ASSISTANT

## 2023-03-19 PROCEDURE — 36415 COLL VENOUS BLD VENIPUNCTURE: CPT

## 2023-03-19 PROCEDURE — 6370000000 HC RX 637 (ALT 250 FOR IP)

## 2023-03-19 PROCEDURE — 85025 COMPLETE CBC W/AUTO DIFF WBC: CPT

## 2023-03-19 PROCEDURE — C9113 INJ PANTOPRAZOLE SODIUM, VIA: HCPCS | Performed by: INTERNAL MEDICINE

## 2023-03-19 PROCEDURE — 2580000003 HC RX 258: Performed by: INTERNAL MEDICINE

## 2023-03-19 PROCEDURE — 2060000000 HC ICU INTERMEDIATE R&B

## 2023-03-19 RX ADMIN — Medication 1000 UNITS: at 10:27

## 2023-03-19 RX ADMIN — AMLODIPINE BESYLATE 5 MG: 5 TABLET ORAL at 10:26

## 2023-03-19 RX ADMIN — FERROUS SULFATE TAB 325 MG (65 MG ELEMENTAL FE) 325 MG: 325 (65 FE) TAB at 16:37

## 2023-03-19 RX ADMIN — ARIPIPRAZOLE 5 MG: 10 TABLET ORAL at 10:26

## 2023-03-19 RX ADMIN — OXYBUTYNIN CHLORIDE 10 MG: 5 TABLET, EXTENDED RELEASE ORAL at 10:26

## 2023-03-19 RX ADMIN — PANTOPRAZOLE SODIUM 40 MG: 40 INJECTION, POWDER, FOR SOLUTION INTRAVENOUS at 10:28

## 2023-03-19 RX ADMIN — FERROUS SULFATE TAB 325 MG (65 MG ELEMENTAL FE) 325 MG: 325 (65 FE) TAB at 10:26

## 2023-03-19 RX ADMIN — Medication 100 MG: at 10:27

## 2023-03-19 RX ADMIN — Medication 400 MG: at 10:28

## 2023-03-19 RX ADMIN — CYANOCOBALAMIN TAB 500 MCG 1000 MCG: 500 TAB at 10:27

## 2023-03-19 RX ADMIN — DOCUSATE SODIUM 100 MG: 100 CAPSULE, LIQUID FILLED ORAL at 21:37

## 2023-03-19 RX ADMIN — CARBOXYMETHYLCELLULOSE SODIUM 1 DROP: 10 GEL OPHTHALMIC at 10:28

## 2023-03-19 RX ADMIN — CARBOXYMETHYLCELLULOSE SODIUM 1 DROP: 10 GEL OPHTHALMIC at 14:52

## 2023-03-19 RX ADMIN — ATORVASTATIN CALCIUM 20 MG: 10 TABLET, FILM COATED ORAL at 10:27

## 2023-03-19 RX ADMIN — DOCUSATE SODIUM 100 MG: 100 CAPSULE, LIQUID FILLED ORAL at 10:27

## 2023-03-19 RX ADMIN — Medication 10 ML: at 10:39

## 2023-03-19 RX ADMIN — PANTOPRAZOLE SODIUM 40 MG: 40 INJECTION, POWDER, FOR SOLUTION INTRAVENOUS at 21:37

## 2023-03-19 RX ADMIN — Medication 400 MG: at 21:38

## 2023-03-19 RX ADMIN — CEFTRIAXONE SODIUM 1000 MG: 1 INJECTION, POWDER, FOR SOLUTION INTRAMUSCULAR; INTRAVENOUS at 21:49

## 2023-03-19 RX ADMIN — Medication 10 ML: at 21:37

## 2023-03-19 RX ADMIN — CARBOXYMETHYLCELLULOSE SODIUM 1 DROP: 10 GEL OPHTHALMIC at 16:37

## 2023-03-19 RX ADMIN — CARBOXYMETHYLCELLULOSE SODIUM 1 DROP: 10 GEL OPHTHALMIC at 21:38

## 2023-03-19 RX ADMIN — DONEPEZIL HYDROCHLORIDE 5 MG: 5 TABLET, FILM COATED ORAL at 10:27

## 2023-03-19 RX ADMIN — POLYETHYLENE GLYCOL (3350) 17 G: 17 POWDER, FOR SOLUTION ORAL at 10:39

## 2023-03-19 RX ADMIN — FOLIC ACID 1 MG: 1 TABLET ORAL at 10:27

## 2023-03-19 RX ADMIN — POTASSIUM CHLORIDE 20 MEQ: 750 TABLET, EXTENDED RELEASE ORAL at 10:39

## 2023-03-19 RX ADMIN — POLYETHYLENE GLYCOL (3350) 17 G: 17 POWDER, FOR SOLUTION ORAL at 21:38

## 2023-03-19 RX ADMIN — LISINOPRIL 20 MG: 20 TABLET ORAL at 10:27

## 2023-03-19 RX ADMIN — AZITHROMYCIN DIHYDRATE 500 MG: 500 INJECTION, POWDER, LYOPHILIZED, FOR SOLUTION INTRAVENOUS at 23:06

## 2023-03-19 NOTE — FLOWSHEET NOTE
03/19/23 0739   Vitals   Temp 97.5 °F (36.4 °C)   Temp Source Oral   Heart Rate 71   Heart Rate Source Monitor   BP (!) 161/83   MAP (Calculated) 109   MAP (mmHg) 107   BP Location Right upper arm   BP Method Automatic   Patient Position Semi fowlers   Level of Consciousness 0   Cardiac Rhythm Sinus tachy;Sinus rhythm   Oxygen Therapy   SpO2 100 %   O2 Device None (Room air)   Shift assessment completed-see flow sheet. Patient in bed awake and alert. Slow responses,oriented to self and place. Vitals obtained in AM, morning medications given per order. Patient denies any further needs at this time,call light within reach.

## 2023-03-19 NOTE — PLAN OF CARE
Problem: Discharge Planning  Goal: Discharge to home or other facility with appropriate resources  Outcome: Progressing  Flowsheets (Taken 3/19/2023 1149)  Discharge to home or other facility with appropriate resources: Identify barriers to discharge with patient and caregiver     Problem: Skin/Tissue Integrity  Goal: Absence of new skin breakdown  Description: 1. Monitor for areas of redness and/or skin breakdown  2. Assess vascular access sites hourly  3. Every 4-6 hours minimum:  Change oxygen saturation probe site  4. Every 4-6 hours:  If on nasal continuous positive airway pressure, respiratory therapy assess nares and determine need for appliance change or resting period.   Outcome: Progressing     Problem: Chronic Conditions and Co-morbidities  Goal: Patient's chronic conditions and co-morbidity symptoms are monitored and maintained or improved  Outcome: Progressing

## 2023-03-19 NOTE — PROGRESS NOTES
Progress Note    Date: 03/19/23    Presented via EMS for AMS. Admitted for further evaluation and management of altered mentation, UTI, pneumonia and possible GI bleed. Subjective: Denies any new symptoms. Awaiting pre-CERT. Objective:  Vitals:    03/18/23 0803 03/18/23 1945 03/19/23 0017 03/19/23 0739   BP: (!) 185/94 (!) 148/98 (!) 146/90 (!) 161/83   Pulse: 74 83 79 71   Resp: 16 16     Temp: 97.9 °F (36.6 °C) 97 °F (36.1 °C) 98.4 °F (36.9 °C) 97.5 °F (36.4 °C)   TempSrc: Oral Axillary Oral Oral   SpO2: 100% 99% 99% 100%   Weight:   109 lb 6 oz (49.6 kg)    Height:           Physical Exam   Gen: No distress. Alert. Chronically ill appearing  Eyes: PERRL. No sclera icterus. No conjunctival injection. ENT: No discharge. Pharynx clear. Neck: Trachea midline. Normal thyroid. Resp: No accessory muscle use. No crackles. No wheezes. No rhonchi. No dullness on percussion. CV: Regular rate. Regular rhythm. No murmur or rub. No edema. GI: Non-tender. Non-distended. No masses. No organomegaly. Normal bowel sounds. No hernia. Labs:  CBC:   Recent Labs     03/16/23  2004 03/17/23  0931 03/18/23  0921 03/18/23  1514 03/18/23  2105 03/19/23  0328 03/19/23  1003   WBC 15.4*  --  7.1  --   --  8.0  --    HGB 13.0   < > 10.8*   < > 11.7* 11.6* 11.4*   HCT 40.2   < > 31.4*   < > 34.9* 33.9* 33.9*   MCV 92.7  --  96.2  --   --  90.6  --      --  320  --   --  342  --     < > = values in this interval not displayed.        BMP:   Recent Labs     03/17/23  0428 03/18/23  0345 03/19/23  0328    140 140   K 4.3 4.1 3.6   CL 99 105 104   CO2 27 24 26   BUN 13 9 8   CREATININE 0.8 0.6 0.7       LIVER PROFILE:   Recent Labs     03/16/23 2004 03/17/23  0428   AST 11* 8*   ALT <5* 6*   BILITOT 0.4 0.6   ALKPHOS 78 68       PT/INR:   Recent Labs     03/16/23 2004   PROTIME 11.7   INR 0.87       APTT:   Recent Labs     03/16/23 2004   APTT 29.4       UA:  Recent Labs     03/16/23  2202   COLORU Yellow   Ilona Quirk 6. 0   WBCUA 6-9*   RBCUA 5-10*   BACTERIA 4+*   CLARITYU CLOUDY*   SPECGRAV 1.020   LEUKOCYTESUR MODERATE*   UROBILINOGEN 0.2   BILIRUBINUR Negative   BLOODU LARGE*   GLUCOSEU 100*      Cultures:  Urine Culture, Routine --     >50,000 CFU/ml Mixed pathogens   Multiple organisms isolated, no predominance. Culture   indicates probable contamination. Please review colony count   and clinical indications to determine if a repeat culture is   necessary. No further workup to be done. Narrative:     Blood cx x2: NGTD     GI Bacterial Pathogens By PCR --     No Shigella spp/EIEC DNA detected   No Shiga toxin-producing gene(s) detected   No Campylobacter spp. (jejuni and coli)DNA detected   No Salmonella spp. DNA detected   No Vibrio vulnificus/parahaemolyticus/cholerae DNA detected   No Plesiomonas shigelloides DNA detected   No Enterotoxigenic E. coli (ETEC) DNA detected   No Yersinia enterocolitica DNA detected   Normal Range:  None detected    Narrative:         COVID/Influenza: not detected      XR CHEST PORTABLE   Final Result   No acute findings. Chronic emphysema. CT CERVICAL SPINE WO CONTRAST   Final Result   No acute fracture or traumatic malalignment of the cervical spine. Small patchy airspace opacities in the right apex which may be infectious in   nature. CT HEAD WO CONTRAST   Final Result   No acute intracranial abnormality      Cerebral atrophy      Hypoattenuating white matter disease, nonspecific, however most often related   to chronic microvascular ischemic changes. Assessment & Plan:    #Sepsis ruled out.  -she may have bronchitis  -not requiring pressors  -f/u cultures   -rocephin and zithromax. Keep for another day.   -IVF--discontinue     #AMS due dementia precipitated by hypoglycemia  -baseline dementia on Aricept   -currently oriented to self only, answering few questions and not follow commands  -was seen in ED 2 days ago for fall  -CT head neg for acute

## 2023-03-19 NOTE — PROGRESS NOTES
Physical Therapy  Attempt. Patient responsive upon entry to room. Introduced self and stated purpose of visit. When asked about doing some moving around,patient closed eyes and did not interact. Attempted several times to engage patient, with the same outcome. Continue to follow.     Daily Casanova, PT #[id number]

## 2023-03-19 NOTE — FLOWSHEET NOTE
03/19/23 1624   Vitals   Temp 98.4 °F (36.9 °C)   Temp Source Oral   Heart Rate 81   Heart Rate Source Monitor   Resp 18   /66   MAP (Calculated) 82   BP Location Right upper arm   BP Upper/Lower Upper   BP Method Automatic   Patient Position Semi fowlers   Level of Consciousness 0   MEWS Score 1   Cardiac Rhythm Sinus tachy;Sinus rhythm   Oxygen Therapy   SpO2 93 %   O2 Device None (Room air)   Patient resting in bed with eyes closed. Vitals stable. No issues noted at this time,call light within reach.

## 2023-03-19 NOTE — PROGRESS NOTES
Yes   ADULT DIET; Dysphagia - Soft and Bite Sized    Goal of Care Reviewed: Yes   Patient and/or Family's stated Goal of Care this Admission: reduce shortness of breath, increase activity tolerance, better understand heart failure and disease management, be more comfortable, reduce lower extremity edema, and unable to assess, will attempt again prior to discharge.

## 2023-03-19 NOTE — PROGRESS NOTES
Hand off report given to Aldair Son RN. Patient is stable showing no signs of distress and has no current needs at this time. Call light is in reach and bed is in lowest position. Care is transferred at this time.

## 2023-03-20 VITALS
TEMPERATURE: 98 F | WEIGHT: 116.1 LBS | HEART RATE: 75 BPM | SYSTOLIC BLOOD PRESSURE: 140 MMHG | BODY MASS INDEX: 19.34 KG/M2 | OXYGEN SATURATION: 96 % | RESPIRATION RATE: 16 BRPM | DIASTOLIC BLOOD PRESSURE: 65 MMHG | HEIGHT: 65 IN

## 2023-03-20 PROBLEM — J18.9 PNEUMONIA DUE TO INFECTIOUS ORGANISM: Status: ACTIVE | Noted: 2023-03-20

## 2023-03-20 PROBLEM — R41.82 ALTERED MENTAL STATUS: Status: ACTIVE | Noted: 2023-03-20

## 2023-03-20 LAB
ANION GAP SERPL CALCULATED.3IONS-SCNC: 10 MMOL/L (ref 3–16)
BASOPHILS # BLD: 0.1 K/UL (ref 0–0.2)
BASOPHILS NFR BLD: 1.1 %
BUN SERPL-MCNC: 7 MG/DL (ref 7–20)
CALCIUM SERPL-MCNC: 9.3 MG/DL (ref 8.3–10.6)
CHLORIDE SERPL-SCNC: 104 MMOL/L (ref 99–110)
CO2 SERPL-SCNC: 24 MMOL/L (ref 21–32)
CREAT SERPL-MCNC: 0.6 MG/DL (ref 0.6–1.2)
DEPRECATED RDW RBC AUTO: 13.9 % (ref 12.4–15.4)
EOSINOPHIL # BLD: 0.2 K/UL (ref 0–0.6)
EOSINOPHIL NFR BLD: 3.6 %
GFR SERPLBLD CREATININE-BSD FMLA CKD-EPI: >60 ML/MIN/{1.73_M2}
GLUCOSE BLD-MCNC: 102 MG/DL (ref 70–99)
GLUCOSE BLD-MCNC: 126 MG/DL (ref 70–99)
GLUCOSE BLD-MCNC: 99 MG/DL (ref 70–99)
GLUCOSE SERPL-MCNC: 103 MG/DL (ref 70–99)
HCT VFR BLD AUTO: 32.4 % (ref 36–48)
HGB BLD-MCNC: 11.1 G/DL (ref 12–16)
LYMPHOCYTES # BLD: 2.5 K/UL (ref 1–5.1)
LYMPHOCYTES NFR BLD: 38.3 %
MCH RBC QN AUTO: 31 PG (ref 26–34)
MCHC RBC AUTO-ENTMCNC: 34.1 G/DL (ref 31–36)
MCV RBC AUTO: 90.7 FL (ref 80–100)
MONOCYTES # BLD: 0.6 K/UL (ref 0–1.3)
MONOCYTES NFR BLD: 9.5 %
NEUTROPHILS # BLD: 3.2 K/UL (ref 1.7–7.7)
NEUTROPHILS NFR BLD: 47.5 %
PERFORMED ON: ABNORMAL
PERFORMED ON: ABNORMAL
PERFORMED ON: NORMAL
PLATELET # BLD AUTO: 343 K/UL (ref 135–450)
PMV BLD AUTO: 7.1 FL (ref 5–10.5)
POTASSIUM SERPL-SCNC: 3.7 MMOL/L (ref 3.5–5.1)
RBC # BLD AUTO: 3.57 M/UL (ref 4–5.2)
SODIUM SERPL-SCNC: 138 MMOL/L (ref 136–145)
WBC # BLD AUTO: 6.6 K/UL (ref 4–11)

## 2023-03-20 PROCEDURE — 6360000002 HC RX W HCPCS: Performed by: INTERNAL MEDICINE

## 2023-03-20 PROCEDURE — 85025 COMPLETE CBC W/AUTO DIFF WBC: CPT

## 2023-03-20 PROCEDURE — 36415 COLL VENOUS BLD VENIPUNCTURE: CPT

## 2023-03-20 PROCEDURE — 6370000000 HC RX 637 (ALT 250 FOR IP): Performed by: PHYSICIAN ASSISTANT

## 2023-03-20 PROCEDURE — 80048 BASIC METABOLIC PNL TOTAL CA: CPT

## 2023-03-20 PROCEDURE — 6370000000 HC RX 637 (ALT 250 FOR IP)

## 2023-03-20 PROCEDURE — C9113 INJ PANTOPRAZOLE SODIUM, VIA: HCPCS | Performed by: INTERNAL MEDICINE

## 2023-03-20 PROCEDURE — 99239 HOSP IP/OBS DSCHRG MGMT >30: CPT | Performed by: INTERNAL MEDICINE

## 2023-03-20 PROCEDURE — 97530 THERAPEUTIC ACTIVITIES: CPT

## 2023-03-20 PROCEDURE — 6370000000 HC RX 637 (ALT 250 FOR IP): Performed by: INTERNAL MEDICINE

## 2023-03-20 PROCEDURE — 2580000003 HC RX 258: Performed by: INTERNAL MEDICINE

## 2023-03-20 PROCEDURE — 97535 SELF CARE MNGMENT TRAINING: CPT

## 2023-03-20 RX ORDER — CEFDINIR 300 MG/1
300 CAPSULE ORAL 2 TIMES DAILY
Qty: 4 CAPSULE | Refills: 0
Start: 2023-03-20 | End: 2023-03-22

## 2023-03-20 RX ADMIN — CARBOXYMETHYLCELLULOSE SODIUM 1 DROP: 10 GEL OPHTHALMIC at 12:33

## 2023-03-20 RX ADMIN — LEVOTHYROXINE SODIUM 50 MCG: 0.05 TABLET ORAL at 06:30

## 2023-03-20 RX ADMIN — ONDANSETRON 4 MG: 4 TABLET, ORALLY DISINTEGRATING ORAL at 11:21

## 2023-03-20 RX ADMIN — Medication 10 ML: at 08:47

## 2023-03-20 RX ADMIN — CYANOCOBALAMIN TAB 500 MCG 1000 MCG: 500 TAB at 08:46

## 2023-03-20 RX ADMIN — FERROUS SULFATE TAB 325 MG (65 MG ELEMENTAL FE) 325 MG: 325 (65 FE) TAB at 08:46

## 2023-03-20 RX ADMIN — DOCUSATE SODIUM 100 MG: 100 CAPSULE, LIQUID FILLED ORAL at 08:46

## 2023-03-20 RX ADMIN — Medication 400 MG: at 08:48

## 2023-03-20 RX ADMIN — ATORVASTATIN CALCIUM 20 MG: 10 TABLET, FILM COATED ORAL at 08:46

## 2023-03-20 RX ADMIN — DONEPEZIL HYDROCHLORIDE 5 MG: 5 TABLET, FILM COATED ORAL at 08:45

## 2023-03-20 RX ADMIN — Medication 1000 UNITS: at 08:45

## 2023-03-20 RX ADMIN — POLYETHYLENE GLYCOL (3350) 17 G: 17 POWDER, FOR SOLUTION ORAL at 08:47

## 2023-03-20 RX ADMIN — LISINOPRIL 20 MG: 20 TABLET ORAL at 08:45

## 2023-03-20 RX ADMIN — OXYBUTYNIN CHLORIDE 10 MG: 5 TABLET, EXTENDED RELEASE ORAL at 08:46

## 2023-03-20 RX ADMIN — ARIPIPRAZOLE 5 MG: 10 TABLET ORAL at 08:45

## 2023-03-20 RX ADMIN — AMLODIPINE BESYLATE 5 MG: 5 TABLET ORAL at 08:46

## 2023-03-20 RX ADMIN — POTASSIUM CHLORIDE 20 MEQ: 750 TABLET, EXTENDED RELEASE ORAL at 08:46

## 2023-03-20 RX ADMIN — PANTOPRAZOLE SODIUM 40 MG: 40 INJECTION, POWDER, FOR SOLUTION INTRAVENOUS at 08:47

## 2023-03-20 RX ADMIN — CARBOXYMETHYLCELLULOSE SODIUM 1 DROP: 10 GEL OPHTHALMIC at 08:46

## 2023-03-20 RX ADMIN — Medication 100 MG: at 08:46

## 2023-03-20 RX ADMIN — FOLIC ACID 1 MG: 1 TABLET ORAL at 08:46

## 2023-03-20 NOTE — PROGRESS NOTES
Inpatient Occupational Therapy Treatment    Unit: PCU  Date:  3/20/2023  Patient Name:    Jose A Morales  Admitting diagnosis:  Acute cystitis without hematuria [N30.00]  Acute encephalopathy [G93.40]  Altered mental status, unspecified altered mental status type [R41.82]  Pneumonia due to infectious organism, unspecified laterality, unspecified part of lung [J18.9]  Admit Date:  3/16/2023  Precautions/Restrictions/WB Status/ Lines/ Wounds/ Oxygen: Fall risk, Bed/chair alarm, Lines (IV and Luke catheter), and Confusion    Treatment Time: 9:00-9:38  Treatment Number:  2  Timed Code Treatment Minutes: 38 minutes  Total Treatment Minutes: 38 minutes    Patient Goals for Therapy: did not state          Discharge Recommendations: SNF  DME needs for discharge: Defer to facility         Therapy recommendations for staff:   Assist of 1 for transfers with use of hand held assist and gait belt to/from chair    History of Present Illness: per Dr Bere Lino H&P 3/17/23:  \"The patient is a 69 y.o. female with PMH below, presents with MS change. Pt is essentially non-verbal at this time. No family at bedside to provide Hx. Only word I could get her to say was \"hi\" when I greeted her. She does not follow commands very well. Only command I could get her to follow was to hold her arms up in the air (after I held them up). She only kept them up for a few seconds but this was symmetrical.  Per ED, they were summoned for MS change. She was found to have a BGT < 15 by EMS. She has remained stable since in that respect since their intervention. She was in the ER on 3/14 for a fall. Her baseline is unclear. Per ED RN who has had pt before, she has appeared similar to her current state on a previous ED visit earlier this month. She arrived dirty, covered in stool/urine/bed bugs. She had an \"old\" appearing diaper per RN. No additional Hx available at this time. \"    Home Health S4 Level Recommendation:  NA    AM-PAC Score: AM-PAC HR (bpm) SpO2 (%) on  Comments   Supine at rest       Seated at EOB       Standing       End of session         Positioning Needs:   Pt up in chair, alarm set, call light provided and all needs within reach . Ther Ex / Activities Initiated:   N/A    Patient/Family Education:   Pt educated on role of inpatient OT, plan of care, importance of continued activity, DC recommendations, safety awareness, and calling for assist with mobility. Assessment:    Pt seen for Occupational therapy treatment in acute care setting. Pt demonstrated decreased Activity tolerance, ADLs, IADLs, Balance , Bathing, Bed mobility, Dressing, ROM, Safety Awareness, Strength, Transfers, Cognition, and Coping Skills. Pt functioning below baseline and will likely benefit from skilled occupational therapy services to maximize safety and independence. Recommending SNF upon discharge as patient functioning well below baseline, demonstrates good rehab potential and unable to return home due to limited or no family support, burden of care beyond caregiver ability, home environment not conducive to patient recovery, and limited safety awareness. Goal(s) : To be met in 3 Visits:  Bed to toilet/BSC:       CGA  Pt will complete 3/3 CHF goals     N/A    To be met in 5 Visits:  Supine to/from Sit in preparation for ADL task:   SBA  Toileting        SBA  Grooming       SBA  Upper Body Dressing:      Supervision  Lower Body Dressing:      SBA  Pt to demonstrate UE therapeutic exs x 15 reps with minimal cues    Rehabilitation Potential: Fair  Strengths for achieving goals include: Pt cooperative   Barriers to achieving goals include:  Complexity of condition    Plan: To be seen 3-5 x/wk while in acute care setting for therapeutic exercises, bed mobility, transfers, family/patient education, ADL/IADL retraining, and energy conservation training.     Electronically signed by Khushi Campos OT on 3/20/2023 at 9:38 AM      If patient

## 2023-03-20 NOTE — DISCHARGE INSTR - COC
Continuity of Care Form    Patient Name: Malika Andujar   :  [id number]  MRN:  [id number]    Admit date:  3/16/2023  Discharge date:  2023    Code Status Order: Full Code   Advance Directives:     Admitting Physician:  Stan Bruce MD  PCP: Park Seals MD    Discharging Nurse: [facility] Unit/Room#: /9747-46  Discharging Unit Phone Number: ***    Emergency Contact:   Extended Emergency Contact Information  Primary Emergency Contact: Jake Henry  Address: [address] [place] [address], [address] [place] [address] [address] [facility] Phone: [phone]  Mobile Phone: [phone]  Relation: Spouse  Secondary Emergency Contact: Stacey Ruth  [address] [facility] Phone: [phone]  Relation: Unknown    Past Surgical History:  Past Surgical History:   Procedure Laterality Date    ABDOMEN SURGERY      c section    CATARACT REMOVAL WITH IMPLANT Left [id number]    LEFT EYE CATARACT PHACOEMULSIFICATION INTRAOCULAR [address] [place]  10/29/13     RIGHT EYE CATARACT PHACOEMULSIFICATION INTRAOCULAR LENS       Immunization History:   Immunization History   Administered Date(s) Administered    DTaP 2012    Pneumococcal Conjugate 13-valent (Khpmhzo04) 10/17/2017    Pneumococcal Conjugate 7-valent (Prevnar7) 2012    Pneumococcal Polysaccharide ([id number]) 2012    Td, unspecified formulation 2012       Active Problems:  Patient Active Problem List   Diagnosis Code    Hyponatremia E87.1    Hypertension I10    Hx of CVA involving R-ICA territory I63.231    CAD (coronary artery disease) I25.10    DM2 (diabetes mellitus, type 2) ([facility] E11.9    ETOH abuse F10.10    Tobacco abuse Z72.0    Asthma J45.909    Hyperlipidemia E78.5    SIRS (systemic inflammatory response syndrome) (HCC) R65.10    Thrombocytosis D75.839    Chronic dCHF (grade 1 LVDD) I50.32    Alcohol withdrawal ([facility] F10.939    Frequent falls R29.6 None    Nutrition Therapy:  Current Nutrition Therapy:   - Oral Diet:  General    Routes of Feeding: Oral  Liquids: No Restrictions  Daily Fluid Restriction: no  Last Modified Barium Swallow with Video (Video Swallowing Test): not done    Treatments at the Time of Hospital Discharge:   Respiratory Treatments:   Oxygen Therapy:  is not on home oxygen therapy. Ventilator:    - No ventilator support    Rehab Therapies:   Weight Bearing Status/Restrictions: No weight bearing restrictions  Other Medical Equipment (for information only, NOT a DME order):  walker  Other Treatments:     Patient's personal belongings (please select all that are sent with patient):  None    RN SIGNATURE:  Electronically signed by Melvi Templeton RN on 3/20/23 at 2:00 PM EDT    CASE MANAGEMENT/SOCIAL WORK SECTION    Inpatient Status Date: Inpatient 03/17/2023    Readmission Risk Assessment Score:  Readmission Risk              Risk of Unplanned Readmission:  31           Discharging to Facility/ Agency   Name: [facility]  Address:  JDTZI:[phone]  Fax:    Dialysis Facility (if applicable)   Name:  Address:  Dialysis Schedule:  Phone:  Fax:    / signature: Electronically signed by Anuradha Dominguez RN on 3/20/23 at 2:09 PM EDT    PHYSICIAN SECTION    Prognosis: Fair    Condition at Discharge: Stable    Rehab Potential (if transferring to Rehab): Good    Recommended Labs or Other Treatments After Discharge: none    Physician Certification: I certify the above information and transfer of Mitesh Donnelly  is necessary for the continuing treatment of the diagnosis listed and that she requires [facility] for less 30 days.      Update Admission H&P: No change in H&P    PHYSICIAN SIGNATURE:  Electronically signed by Amanda Boss MD on 3/20/23 at 1:53 PM EDT

## 2023-03-20 NOTE — PROGRESS NOTES
Patient in stable condition. [facility]  Sam Caraballo RN

## 2023-03-20 NOTE — PLAN OF CARE
ADULT DIET; Dysphagia - Soft and Bite Sized    Goal of Care Reviewed: Yes   Patient and/or Family's stated Goal of Care this Admission: reduce shortness of breath, increase activity tolerance, better understand heart failure and disease management, be more comfortable, reduce lower extremity edema, and unable to assess, will attempt again prior to discharge.

## 2023-03-20 NOTE — PROGRESS NOTES
EMS at [facility] to transport pt to [facility] pt transferred without diff .     Tele removed and to Jordan castañeda at [facility]

## 2023-03-20 NOTE — DISCHARGE SUMMARY
Discharge Medications     Medication List        START taking these medications      cefdinir 300 MG capsule  Commonly known as: OMNICEF  Take 1 capsule by mouth 2 times daily for 2 days            CONTINUE taking these medications      albuterol sulfate  (90 Base) MCG/ACT inhaler  Commonly known as: PROVENTIL;VENTOLIN;PROAIR     amLODIPine 5 MG tablet  Commonly known as: NORVASC  Take 1 tablet by mouth daily     ARIPiprazole 5 MG tablet  Commonly known as: Abilify  Take 1 tablet by mouth daily     atorvastatin 20 MG tablet  Commonly known as: LIPITOR     cyanocobalamin 1000 MCG tablet     docusate 100 MG Caps  Commonly known as: COLACE, DULCOLAX  Take 100 mg by mouth 2 times daily     donepezil 5 MG tablet  Commonly known as: ARICEPT  Take 1 tablet by mouth daily (with breakfast)     ferrous sulfate 325 (65 Fe) MG tablet  Commonly known as: IRON 325  Take 1 tablet by mouth 2 times daily (with meals)     folic acid 1 MG tablet  Commonly known as: FOLVITE     hydroxypropyl methylcellulose 2.5 % ophthalmic solution  Commonly known as: GONIOSOL  Place 1 drop into both eyes 4 times daily     levothyroxine 25 MCG tablet  Commonly known as: SYNTHROID  Take 2 tablets by mouth Daily     lisinopril 20 MG tablet  Commonly known as: PRINIVIL;ZESTRIL  Take 1 tablet by mouth daily     magnesium oxide 400 MG tablet  Commonly known as: MAG-OX     metFORMIN 500 MG tablet  Commonly known as: GLUCOPHAGE     omeprazole 20 MG delayed release capsule  Commonly known as: PRILOSEC     oxybutynin 10 MG extended release tablet  Commonly known as: DITROPAN-XL     polyethylene glycol 17 g packet  Commonly known as: GLYCOLAX  Take 17 g by mouth daily as needed for Constipation     potassium chloride 20 MEQ extended release tablet  Commonly known as: KLOR-CON M  Take 1 tablet by mouth daily     vitamin B-1 100 MG tablet  Commonly known as: THIAMINE     vitamin D 25 MCG (1000 UT) Tabs tablet  Commonly known as: CHOLECALCIFEROL

## 2023-03-20 NOTE — PLAN OF CARE
Problem: Safety - Adult  Goal: Free from fall injury  Outcome: Progressing     Problem: Discharge Planning  Goal: Discharge to home or other facility with appropriate resources  3/20/2023 0117 by Jorge Luis Monsivais RN  Outcome: Progressing  3/19/2023 1149 by Jozef Guzman RN  Outcome: Progressing  Flowsheets (Taken 3/19/2023 1149)  Discharge to home or other facility with appropriate resources: Identify barriers to discharge with patient and caregiver     Problem: Skin/Tissue Integrity  Goal: Absence of new skin breakdown  Description: 1. Monitor for areas of redness and/or skin breakdown  2. Assess vascular access sites hourly  3. Every 4-6 hours minimum:  Change oxygen saturation probe site  4. Every 4-6 hours:  If on nasal continuous positive airway pressure, respiratory therapy assess nares and determine need for appliance change or resting period.   3/20/2023 0117 by Jorge Luis Monsivais RN  Outcome: Progressing  3/19/2023 1149 by Jozef Guzman RN  Outcome: Progressing     Problem: Chronic Conditions and Co-morbidities  Goal: Patient's chronic conditions and co-morbidity symptoms are monitored and maintained or improved  3/20/2023 0117 by Jorge Luis Monsivais RN  Outcome: Progressing  3/19/2023 1149 by Jozef Guzman RN  Outcome: Progressing

## 2023-03-20 NOTE — PROGRESS NOTES
Shift assessment complete. VSS. Patient resting with family at bedside. Telesitter in place. Call light within reach. Bed in lowest position. No additional needs at this time.  Jorge Luis Monsivais RN

## 2023-03-20 NOTE — CARE COORDINATION
CM delivered 2nd IMM and provided verbal explanation at bedside. Pt voiced understanding of discharge MCR rights and is agreeable to discharge within 4 hours of delivery of the IMM notice.      Lucita Montalvo RN

## 2023-03-20 NOTE — PLAN OF CARE
Problem: Safety - Adult  Goal: Free from fall injury  3/20/2023 1355 by Edgar John RN  Outcome: Completed  3/20/2023 1355 by Edgar John RN  Outcome: Completed  3/20/2023 0822 by Edgar John RN  Outcome: Progressing  3/20/2023 0117 by Suresh Gillespie RN  Outcome: Progressing     Problem: Discharge Planning  Goal: Discharge to home or other facility with appropriate resources  3/20/2023 1355 by Edgar John RN  Outcome: Completed  Flowsheets (Taken 3/20/2023 3788)  Discharge to home or other facility with appropriate resources: Identify barriers to discharge with patient and caregiver  3/20/2023 1355 by Edgar John RN  Outcome: Completed  Flowsheets (Taken 3/20/2023 4663)  Discharge to home or other facility with appropriate resources: Identify barriers to discharge with patient and caregiver  3/20/2023 4119 by Edgar John RN  Outcome: Progressing  3/20/2023 0117 by Suresh Gillespie RN  Outcome: Progressing     Problem: Skin/Tissue Integrity  Goal: Absence of new skin breakdown  Description: 1. Monitor for areas of redness and/or skin breakdown  2. Assess vascular access sites hourly  3. Every 4-6 hours minimum:  Change oxygen saturation probe site  4. Every 4-6 hours:  If on nasal continuous positive airway pressure, respiratory therapy assess nares and determine need for appliance change or resting period.   3/20/2023 1355 by Edgar John RN  Outcome: Completed  3/20/2023 1355 by Edgar John RN  Outcome: Completed  3/20/2023 9841 by Edgar John RN  Outcome: Progressing  3/20/2023 0117 by Suresh Gillespie RN  Outcome: Progressing     Problem: Chronic Conditions and Co-morbidities  Goal: Patient's chronic conditions and co-morbidity symptoms are monitored and maintained or improved  3/20/2023 1355 by Edgar John RN  Outcome: Completed  Flowsheets (Taken 3/20/2023 8742)  Care Plan - Patient's Chronic Conditions and Co-Morbidity Symptoms are Monitored and Maintained or Improved: Monitor and assess patient's chronic conditions and comorbid symptoms for stability, deterioration, or improvement  3/20/2023 1355 by Dre Reynoso, RN  Outcome: Completed  Flowsheets (Taken 3/20/2023 0411)  Care Plan - Patient's Chronic Conditions and Co-Morbidity Symptoms are Monitored and Maintained or Improved: Monitor and assess patient's chronic conditions and comorbid symptoms for stability, deterioration, or improvement  3/20/2023 7788 by Dre Reynoso RN  Outcome: Progressing  3/20/2023 0117 by Mode Alcantar RN  Outcome: Progressing

## 2023-03-20 NOTE — PROGRESS NOTES
precautions  - reviewed PCP notes and pt with hx of alcohol abuse, stopped in 2021 and ongoing memory issues   - check Vitamin B12 and folate   -supportive measures     #Acute bronchitis  -CXR clear, CT c-spine shows R apex pneumonia  -respiratory culture ordered   -abx as above   -SLP eval and treat - ok to advance diet today   -monitor pulse ox     #UTI--ruled out   -UA+  -urine culture pending -->50,000 mixed pathogens  -abx as above     #GI bleed  #Anemia  -with loose black stools  -hemoccult positive today  -hemoccult noted also to be positive at PCP visit on 2/23  -referred to GI  -H&H stable, trend  -stool studies pending   -IV PPI  -GI c/s  -no plans for emergent EGD, started on miralax BID given hx of stercoral colitis    #T2DM  #Hypoglycemia  -<15 when EMS arrived  -EMS provided dextrose  -no further episodes since arrival  -hypoglycemia protocol  -POCT glucose q4h  -hold home metformin  - encourage PO     #Inability to care for self   -reportedly found covered in stool, urine  -RN reported that depends appeared to have been on her for \"days\"  -bed bug infestation, decontaminated in ER  -lives with  and son  - PT/OT consulted and recommend SNF  - for assistance     #HLD  -continue statin    #HTN  -continue norvasc and lisinopril     #Asthma  -stable; continue home albuterol     #Hypothyroidism  -continue synthroid   - check TSH- 0.92    #JOSESITO  -continue abilify      DVT Prophylaxis: SCDs  Diet: ADULT DIET;  Dysphagia - Soft and Bite Sized   Code Status: Full Code     Await precert     Latoya Leal MD 3/20/2023 10:49 AM

## 2023-03-20 NOTE — FLOWSHEET NOTE
03/20/23 0823   Vital Signs   Temp 98 °F (36.7 °C)   Temp Source Oral   Heart Rate 75   Heart Rate Source Monitor   Resp 16   /70   MAP (Calculated) 92   BP Location Right Arm   BP Method Automatic   Patient Position Semi fowlers   Level of Consciousness 0   MEWS Score 1   Pain Assessment   Pain Assessment None - Denies Pain   Oxygen Therapy   SpO2 96 %   O2 Device None (Room air)   Pt resting quielty with eyes closed no noted distress , call light within reach and telesitter at [facility] for pt safety .  See flow sheet for full assessment

## 2023-03-20 NOTE — PLAN OF CARE
Problem: Safety - Adult  Goal: Free from fall injury  3/20/2023 0822 by Kehinde Romano RN  Outcome: Progressing  3/20/2023 0117 by Lizzy Yin RN  Outcome: Progressing     Problem: Discharge Planning  Goal: Discharge to home or other facility with appropriate resources  3/20/2023 9945 by Kehinde Romano RN  Outcome: Progressing  3/20/2023 0117 by Lizzy Yin RN  Outcome: Progressing     Problem: Skin/Tissue Integrity  Goal: Absence of new skin breakdown  Description: 1. Monitor for areas of redness and/or skin breakdown  2. Assess vascular access sites hourly  3. Every 4-6 hours minimum:  Change oxygen saturation probe site  4. Every 4-6 hours:  If on nasal continuous positive airway pressure, respiratory therapy assess nares and determine need for appliance change or resting period.   3/20/2023 6206 by Kehinde Romano RN  Outcome: Progressing  3/20/2023 0117 by Lizzy Yin RN  Outcome: Progressing     Problem: Chronic Conditions and Co-morbidities  Goal: Patient's chronic conditions and co-morbidity symptoms are monitored and maintained or improved  3/20/2023 0822 by Kehinde Romano RN  Outcome: Progressing  3/20/2023 0117 by Lizzy Yin RN  Outcome: Progressing

## 2023-03-20 NOTE — CARE COORDINATION
DISCHARGE ORDER  Date/Time 3/20/2023 2:20 PM  Completed by: Benito Renee RN, Case Management    Patient Name: Kenia Diaz    : 1945      Admit order Date and Status: Inpatient 2023  Noted discharge order. (verify MD's last order for status of admission/Traditional Medicare 3 [place] Inpatient qualifying stay required for SNF)    Confirmed discharge plan with:              Patient:  Yes              When pt confirms DC plan does any support person need to be contacted by CM Yes if yes who__spouse____                      Discharge to Facility: [facility] phone number for staff giving report: [phone]   Pre-certification completed: yes   Hospital Exemption Notification (HENS) completed: yes   Discharge orders and Continuity of Care faxed to facility:  Facility will pull from Epic; copy sent with patient       Transportation:               Medical Transport explained with choice list offered to pt/family. Choice:Yes(no preference)  Agency used: [facility]   time:   [phone]      Pt/family/Nursing/Facility aware of  time: Yes Names: Yoanna Dodd; patient; spouse, Jammie Wilcox; Ewelina Urias at [facility]  Ambulance form completed:  yes:      Date Last IMM Given: 2023    Comments: Discharge order noted. Reviewed chart and met with patient and spouse at bedside. Plans for [facility] for rehab; Auth obtained. Spoke to Ewelina Urias at [facility] and provided update. Pt is being d/c'd to [facility] today. Pt's O2 sats are 96% on RA. Discharge timeout done with Greg Nava RN. All discharge needs and concerns addressed. Discharging nurse to complete KATHERINE, reconcile AVS, and place final copy with patient's discharge packet. Discharging RN to ensure that written prescriptions for  Level II medications are sent with patient to the facility as per protocol.

## 2023-03-20 NOTE — PROGRESS NOTES
This RN called report to Feliberto petersen at [facility] review of systems and plan of care no further questions asked

## 2023-03-21 LAB
BACTERIA BLD CULT ORG #2: NORMAL
BACTERIA BLD CULT: NORMAL

## 2023-03-21 NOTE — ED PROVIDER NOTES
[address] ED  EMERGENCY DEPARTMENT ENCOUNTER        Patient Name: Conrad Ferreira  MRN: [id number]  Berthagfthomas [birth year]  Date of evaluation: 3/14/2023  Provider: Alex Baker MD  PCP: Mei Matos MD  Note Started: 10:31 PM EDT 3/20/23      CHIEF COMPLAINT  fAll         HISTORY & PHYSICAL     HISTORY OF PRESENT ILLNESS  History from : Patient and family    Limitations to history : None    Conrad Ferreira is a 69 y.o. female  has a past medical history of Acute systolic CHF (congestive heart failure) ([address] Asthma, Back ache, Cataract, Cerebral artery occlusion with cerebral infarction ([address] (12/2016), Diabetes mellitus ([address] Hyperlipidemia, Hypertension, Insomnia, and TIA (transient ischemic attack) (12/23/2016). , who presents to the ED complaining of  trauma. Mechanical fall  Hit head  No LOC, or vomiting since the fall. Patient is not on blood thinners    Has had some intemittent vomiting and headaches over the past 5d. Headaches over past few days have been normal headaches, intermittent, no trauma. No fever, mentation at baseline. Patient denies any abdominal pain, diarrhea/constipation, dysuria, vaginal bleeding or discharge. Old records reviewed:  Patient seen earlier this month with concern for altered mentation. However, there was no altered mentation on exam and work-up is reassuring patient was discharged    No other complaints, modifying factors or associated symptoms. Nursing Notes were all reviewed and agreed with or any disagreements were addressed in the HPI. I have reviewed the following from the nursing documentation.     Past Medical History:   Diagnosis Date    Acute systolic CHF (congestive heart failure) (HCC)     Asthma     Back ache     Cataract     Cerebral artery occlusion with cerebral infarction ([address] 12/2016    Diabetes mellitus ([address]     type !! - diet controlled    Hyperlipidemia     Hypertension     Insomnia     TIA (transient ischemic attack) 2016     Past Surgical History:   Procedure Laterality Date    ABDOMEN SURGERY      c section    CATARACT REMOVAL WITH IMPLANT Left [id number]    LEFT EYE CATARACT PHACOEMULSIFICATION INTRAOCULAR LENS     SECTION      DENTAL SURGERY      EYE SURGERY  10/29/13     RIGHT EYE CATARACT PHACOEMULSIFICATION INTRAOCULAR LENS     Family History   Problem Relation Age of Onset    High Blood Pressure Mother     Miscarriages / Arvilla Mustard Mother     Cancer Father     Diabetes Father     Heart Disease Father     Kidney Disease Father     Asthma Sister     Cancer Sister         Breast Cancer    Depression Brother     Substance Abuse Paternal Aunt     Stroke Maternal Grandmother     Diabetes Paternal Grandmother      Social History     Socioeconomic History    Marital status:      Spouse name: carolyne    Number of children: Not on file    Years of education: Not on file    Highest education level: Not on file   Occupational History    Not on file   Tobacco Use    Smoking status: Former     Packs/day: 2.00     Years: 58.00     Pack years: 116.00     Types: Cigarettes    Smokeless tobacco: Never    Tobacco comments:     not totally ready to quit today   Vaping Use    Vaping Use: Never used   Substance and Sexual Activity    Alcohol use: Not Currently     Alcohol/week: 2.0 - 3.0 standard drinks     Types: 2 - 3 Shots of liquor per week    Drug use: No    Sexual activity: Yes     Partners: Male   Other Topics Concern    Not on file   Social History Narrative    Not on file     Social Determinants of Health     Financial Resource Strain: Not on file   Food Insecurity: Not on file   Transportation Needs: Not on file   Physical Activity: Not on file   Stress: Not on file   Social Connections: Not on file   Intimate Partner Violence: Not on file   Housing Stability: Not on file     No current facility-administered medications for this encounter.      Current Outpatient Medications   Medication Sig Dispense Refill

## 2023-03-24 NOTE — PROGRESS NOTES
Physician Progress Note      PATIENTDorothe Levels  [facility] #:                  [id number]  :                       1945  ADMIT DATE:       3/16/2023 6:33 PM  [address] [place] DATE:        3/20/2023 3:30 PM  RESPONDING  PROVIDER #:        Geo Bhat MD          QUERY TEXT:    Patient admitted with AMS due to dementia precipitated by hypoglycemia. If   possible, please document in progress notes and discharge summary the reason   for inpatient admission. The medical record reflects the following:  Risk Factors: advanced age, dementia, hypoglycemia, bronchitis  Clinical Indicators: AMS, <15 when EMS arrived, -EMS provided dextrose, -no   further episodes since arrival  Treatment: hypoglycemia protocol, -POCT glucose q4h, -hold home metformin, -   encourage PO, CT head, fall precautions, supportive measure, vitamin B12    Thank you,  Alexus Tiwari RN,BSN,CCDS,CRCR  Options provided:  -- Inpatient admission for AMS due to dementia  -- Inpatient admission for DM with hypoglycemia  -- Inpatient admission for, please specify reason for inpatient admission. -- Other - I will add my own diagnosis  -- Disagree - Not applicable / Not valid  -- Disagree - Clinically unable to determine / Unknown  -- Refer to Clinical Documentation Reviewer    PROVIDER RESPONSE TEXT:    This patient was admitted inpatient for AMS due to dementia.     Query created by: Lexis Martini on 3/24/2023 2:33 PM      Electronically signed by:  Geo Bhat MD 3/24/2023 2:38 PM

## 2023-03-24 NOTE — PROGRESS NOTES
Physician Progress Note      Yesenia RALPH #:                  [id number]  :                       1945  ADMIT DATE:       3/16/2023 6:33 PM  [address] [place] DATE:        3/20/2023 3:30 PM  RESPONDING  PROVIDER #:        Shiloh Feldman MD          QUERY TEXT:    Patient admitted with AMS due dementia precipitated by hypoglycemia. Documentation reflects acute encephalopathy in the h/p and per GI consult   note. If possible, please further specify if treated the following: The medical record reflects the following:  Risk Factors: advanced age, dementia,  Per ED, they were summoned for MS   change. She was found to have a BGT < 15 by EMS  Clinical Indicators: #AMS due dementia precipitated by hypoglycemia  Treatment: CT head, Aricept, fall precautions, supportive measures, POCT,   hypoglycemia protocol, hold metformin, encourage po    Thank you,  Brooks Chacon RN,BSN,CCDS,CRCR  Options provided:  -- Acute metabolic encephalopathy due to DM hypoglycemia on chronic dementia   as evidenced by worsening mental status that resolved. -- Encephalopathy ruled out after study  -- Other - I will add my own diagnosis  -- Disagree - Not applicable / Not valid  -- Disagree - Clinically unable to determine / Unknown  -- Refer to Clinical Documentation Reviewer    PROVIDER RESPONSE TEXT:    Encephalopathy ruled out after study.     Query created by: Shaw Russell on 3/24/2023 9:05 AM      Electronically signed by:  Shiloh Feldman MD 3/24/2023 11:51 AM

## 2023-05-31 NOTE — CARE COORDINATION
LM:  Called patient to follow up with care. Left message and call back number.   Second Attempt Encounter Resolved
[Other: ____] : [unfilled]

## 2023-09-30 ENCOUNTER — HOSPITAL ENCOUNTER (EMERGENCY)
Age: 78
Discharge: HOME | End: 2023-09-30
Attending: EMERGENCY MEDICINE
Payer: MEDICARE

## 2023-09-30 ENCOUNTER — APPOINTMENT (OUTPATIENT)
Dept: CT IMAGING | Age: 78
End: 2023-09-30
Payer: MEDICARE

## 2023-09-30 VITALS
TEMPERATURE: 98 F | OXYGEN SATURATION: 96 % | SYSTOLIC BLOOD PRESSURE: 135 MMHG | HEART RATE: 85 BPM | RESPIRATION RATE: 20 BRPM | WEIGHT: 120 LBS | BODY MASS INDEX: 19.97 KG/M2 | DIASTOLIC BLOOD PRESSURE: 82 MMHG

## 2023-09-30 DIAGNOSIS — R55 SYNCOPE AND COLLAPSE: Primary | ICD-10-CM

## 2023-09-30 LAB
ALBUMIN SERPL-MCNC: 3.6 G/DL (ref 3.4–5)
ALBUMIN/GLOB SERPL: 1 {RATIO} (ref 1.1–2.2)
ALP SERPL-CCNC: 80 U/L (ref 40–129)
ALT SERPL-CCNC: 17 U/L (ref 10–40)
ANION GAP SERPL CALCULATED.3IONS-SCNC: 10 MMOL/L (ref 3–16)
AST SERPL-CCNC: 20 U/L (ref 15–37)
BASOPHILS # BLD: 0.1 K/UL (ref 0–0.2)
BASOPHILS NFR BLD: 1.2 %
BILIRUB SERPL-MCNC: 0.3 MG/DL (ref 0–1)
BUN SERPL-MCNC: 22 MG/DL (ref 7–20)
CALCIUM SERPL-MCNC: 9.6 MG/DL (ref 8.3–10.6)
CHLORIDE SERPL-SCNC: 103 MMOL/L (ref 99–110)
CO2 SERPL-SCNC: 26 MMOL/L (ref 21–32)
CREAT SERPL-MCNC: 1 MG/DL (ref 0.6–1.2)
DEPRECATED RDW RBC AUTO: 14.9 % (ref 12.4–15.4)
EOSINOPHIL # BLD: 0.1 K/UL (ref 0–0.6)
EOSINOPHIL NFR BLD: 1.4 %
GFR SERPLBLD CREATININE-BSD FMLA CKD-EPI: 58 ML/MIN/{1.73_M2}
GLUCOSE SERPL-MCNC: 109 MG/DL (ref 70–99)
HCT VFR BLD AUTO: 35.1 % (ref 36–48)
HGB BLD-MCNC: 11.8 G/DL (ref 12–16)
LYMPHOCYTES # BLD: 1.9 K/UL (ref 1–5.1)
LYMPHOCYTES NFR BLD: 23.6 %
MCH RBC QN AUTO: 31.1 PG (ref 26–34)
MCHC RBC AUTO-ENTMCNC: 33.6 G/DL (ref 31–36)
MCV RBC AUTO: 92.5 FL (ref 80–100)
MONOCYTES # BLD: 0.7 K/UL (ref 0–1.3)
MONOCYTES NFR BLD: 9.4 %
NEUTROPHILS # BLD: 5.1 K/UL (ref 1.7–7.7)
NEUTROPHILS NFR BLD: 64.4 %
NT-PROBNP SERPL-MCNC: 330 PG/ML (ref 0–449)
PLATELET # BLD AUTO: 383 K/UL (ref 135–450)
PMV BLD AUTO: 7.1 FL (ref 5–10.5)
POTASSIUM SERPL-SCNC: 4.9 MMOL/L (ref 3.5–5.1)
PROT SERPL-MCNC: 7.1 G/DL (ref 6.4–8.2)
RBC # BLD AUTO: 3.79 M/UL (ref 4–5.2)
SODIUM SERPL-SCNC: 139 MMOL/L (ref 136–145)
TROPONIN, HIGH SENSITIVITY: 14 NG/L (ref 0–14)
TROPONIN, HIGH SENSITIVITY: 16 NG/L (ref 0–14)
WBC # BLD AUTO: 7.9 K/UL (ref 4–11)

## 2023-09-30 PROCEDURE — 85025 COMPLETE CBC W/AUTO DIFF WBC: CPT

## 2023-09-30 PROCEDURE — 70450 CT HEAD/BRAIN W/O DYE: CPT

## 2023-09-30 PROCEDURE — 80053 COMPREHEN METABOLIC PANEL: CPT

## 2023-09-30 PROCEDURE — 93005 ELECTROCARDIOGRAM TRACING: CPT | Performed by: EMERGENCY MEDICINE

## 2023-09-30 PROCEDURE — 99284 EMERGENCY DEPT VISIT MOD MDM: CPT

## 2023-09-30 PROCEDURE — 83880 ASSAY OF NATRIURETIC PEPTIDE: CPT

## 2023-09-30 PROCEDURE — 84484 ASSAY OF TROPONIN QUANT: CPT

## 2023-09-30 ASSESSMENT — ENCOUNTER SYMPTOMS
DIARRHEA: 0
BACK PAIN: 0
CHEST TIGHTNESS: 0
SHORTNESS OF BREATH: 0
COUGH: 0
VOMITING: 0
RHINORRHEA: 0
ABDOMINAL PAIN: 0

## 2023-09-30 ASSESSMENT — PAIN - FUNCTIONAL ASSESSMENT: PAIN_FUNCTIONAL_ASSESSMENT: NONE - DENIES PAIN

## 2023-09-30 NOTE — ED PROVIDER NOTES
Emergency Department Attending Physician Note  Location: [address] [place] [address]  ED  9/30/2023       Pt Name: Maren Jacobson  MRN: [id number]  [facility] 1945    Date of evaluation: 9/30/2023  Provider: Karthik Fry DO  PCP: Daniela Walker MD    Note Started: 5:10 PM EDT 9/30/23    CHIEF COMPLAINT  Chief Complaint   Patient presents with    Loss of Consciousness     Pt was having a BM and had a syncope episode pt did not fall on floor was able to be transferred to [facility] by staff       HISTORY OF PRESENT ILLNESS:  History obtained by patient. Limitations to history : None. Maren Jacobson is a 69 y.o. female with a significant PMHx of CHF, asthma, previous CVA, diabetes, and other comorbidities as listed below, presenting emergency department today with an episode of syncope after she was having a bowel movement. Here, she has multiple very small hard stool balls in her brief. When I ask her about constipation, she says Ainsley Thomas I can hardly ever go. \"  She not fall to the ground, was witnessed by staff, and they states she woke up very quickly. Patient is here in the emergency department, at her baseline, very pleasantly confused, but is alert, conversational in no acute distress. She says she has a slight headache, for which she gets Tylenol at her nursing home for. Otherwise, denies any abdominal pain, back pain. No chest pain or shortness of breath. Patient does not really have the memory capacity to remember what happened earlier today. Thus, history is limited. She is asking to go home \"even though I don't really like it there all the time. \" Says she is safe there. Nursing Notes were all reviewed and agreed with or any disagreements were addressed in the HPI.       MEDICAL HISTORY  Past Medical History:   Diagnosis Date    Acute systolic CHF (congestive heart failure) (HCC)     Asthma     Back ache     Cataract     Cerebral artery occlusion with cerebral infarction ([address]) 12/2016 y.o. female with a significant PMHx of Rishabh Simon, and multiple other comorbidities as above, presenting emergency department today with what sounds like a syncopal episode while patient was sitting on the toilet. Looks like she has some hard balls of stool here in the emergency department, suspect she could have been straining? When she syncopized, but otherwise did not fall, and says she feels just fine here. Asking to go home. Vital signs completely stable. Laboratory evaluation today with unremarkable CBC, CMP at baseline, troponin 16 and 14 respectively. BNP negative. CT head without any significant findings. Here in the emergency department, will discharge home in stable condition. She is at the baseline of her mentation per family who is eventually bedside. Risk and benefits discussed of syncope, sounds like there was a trigger for the syncope, she has no other significant findings. Strict return precautions provided at length. Follow-up with PCP. Discharged home in stable condition. Patient is very happy with this plan. Is this patient to be included in the SEP-1 Core Measure due to severe sepsis or septic shock? No   Exclusion criteria - the patient is NOT to be included for SEP-1 Core Measure due to: Infection is not suspected      CLINICAL IMPRESSION:     ICD-10-CM    1. Syncope and collapse  R55             DISPOSITION:  I discussed the results, including any incidental findings, with patient and through shared decision making;   Disposition today from the ED will be: Discharge to home in fair condition. Questions answered. They are agreeable to plan and express understanding of plan. Social Determinants : None      CRITICAL CARE:   Total critical care time is 00 minutes, which excludes separately billable procedures and updating family.  Time spent is specifically for management of the presenting complaint and symptoms initially, direct bedside care, reevaluation, review of

## 2023-09-30 NOTE — ED NOTES
Pt was having a BM and had a syncope episode pt did not fall on floor was able to be transferred to [facility] by staff. Pt is awake alert orient to self and place. Pt with monitors in place and family at bedside. Upon arrival pt with stool in pepper area and rectum area. Pt cleaned up and pure wic in place. [facility] family is bringing pt back.      Calos Dan RN  09/30/23 1920

## 2023-09-30 NOTE — ED NOTES
Patient identified as a positive fall risk on the ED triage fall screening. Patient placed in fall precautions which includes:  yellow fall risk bracelet on wrist and yellow socks on feet. Patient instructed on importance of not getting out of bed or ambulating without assistance for safety. Pt verbalized understanding.       Jocelyne Dalton RN  09/30/23 [id number]

## 2023-10-01 LAB
EKG ATRIAL RATE: 77 BPM
EKG DIAGNOSIS: NORMAL
EKG P AXIS: 88 DEGREES
EKG P-R INTERVAL: 178 MS
EKG Q-T INTERVAL: 392 MS
EKG QRS DURATION: 78 MS
EKG QTC CALCULATION (BAZETT): 443 MS
EKG R AXIS: 31 DEGREES
EKG T AXIS: 71 DEGREES
EKG VENTRICULAR RATE: 77 BPM

## 2023-10-01 PROCEDURE — 93010 ELECTROCARDIOGRAM REPORT: CPT | Performed by: INTERNAL MEDICINE

## 2023-10-03 PROBLEM — F03.918 DEMENTIA WITH BEHAVIORAL DISTURBANCE (HCC): Status: ACTIVE | Noted: 2021-06-05

## 2023-12-30 ENCOUNTER — APPOINTMENT (OUTPATIENT)
Dept: CT IMAGING | Age: 78
DRG: 543 | End: 2023-12-30
Payer: MEDICARE

## 2023-12-30 ENCOUNTER — HOSPITAL ENCOUNTER (INPATIENT)
Age: 78
LOS: 3 days | Discharge: HOME | DRG: 543 | End: 2024-01-03
Attending: STUDENT IN AN ORGANIZED HEALTH CARE EDUCATION/TRAINING PROGRAM | Admitting: INTERNAL MEDICINE
Payer: MEDICARE

## 2023-12-30 DIAGNOSIS — N39.0 COMPLICATED UTI (URINARY TRACT INFECTION): ICD-10-CM

## 2023-12-30 DIAGNOSIS — S72.001A CLOSED RIGHT HIP FRACTURE, INITIAL ENCOUNTER (HCC): Primary | ICD-10-CM

## 2023-12-30 DIAGNOSIS — N17.9 AKI (ACUTE KIDNEY INJURY) (HCC): ICD-10-CM

## 2023-12-30 LAB
ALBUMIN SERPL-MCNC: 3.7 G/DL (ref 3.4–5)
ALBUMIN/GLOB SERPL: 1.1 {RATIO} (ref 1.1–2.2)
ALP SERPL-CCNC: 89 U/L (ref 40–129)
ALT SERPL-CCNC: 14 U/L (ref 10–40)
ANION GAP SERPL CALCULATED.3IONS-SCNC: 12 MMOL/L (ref 3–16)
AST SERPL-CCNC: 20 U/L (ref 15–37)
BASOPHILS # BLD: 0.1 K/UL (ref 0–0.2)
BASOPHILS NFR BLD: 0.9 %
BILIRUB SERPL-MCNC: 0.4 MG/DL (ref 0–1)
BUN SERPL-MCNC: 25 MG/DL (ref 7–20)
CALCIUM SERPL-MCNC: 9.1 MG/DL (ref 8.3–10.6)
CHLORIDE SERPL-SCNC: 104 MMOL/L (ref 99–110)
CO2 SERPL-SCNC: 25 MMOL/L (ref 21–32)
CREAT SERPL-MCNC: 1.6 MG/DL (ref 0.6–1.2)
DEPRECATED RDW RBC AUTO: 13.2 % (ref 12.4–15.4)
EOSINOPHIL # BLD: 0.1 K/UL (ref 0–0.6)
EOSINOPHIL NFR BLD: 0.8 %
GFR SERPLBLD CREATININE-BSD FMLA CKD-EPI: 33 ML/MIN/{1.73_M2}
GLUCOSE BLD-MCNC: 128 MG/DL (ref 70–99)
GLUCOSE SERPL-MCNC: 153 MG/DL (ref 70–99)
HCT VFR BLD AUTO: 37 % (ref 36–48)
HGB BLD-MCNC: 12.4 G/DL (ref 12–16)
LIPASE SERPL-CCNC: 34 U/L (ref 13–60)
LYMPHOCYTES # BLD: 2.2 K/UL (ref 1–5.1)
LYMPHOCYTES NFR BLD: 17.8 %
MCH RBC QN AUTO: 30.9 PG (ref 26–34)
MCHC RBC AUTO-ENTMCNC: 33.5 G/DL (ref 31–36)
MCV RBC AUTO: 92.3 FL (ref 80–100)
MONOCYTES # BLD: 1 K/UL (ref 0–1.3)
MONOCYTES NFR BLD: 7.8 %
NEUTROPHILS # BLD: 9 K/UL (ref 1.7–7.7)
NEUTROPHILS NFR BLD: 72.7 %
PERFORMED ON: ABNORMAL
PLATELET # BLD AUTO: 454 K/UL (ref 135–450)
PMV BLD AUTO: 7.1 FL (ref 5–10.5)
POTASSIUM SERPL-SCNC: 4.7 MMOL/L (ref 3.5–5.1)
PROT SERPL-MCNC: 7 G/DL (ref 6.4–8.2)
RBC # BLD AUTO: 4.01 M/UL (ref 4–5.2)
SODIUM SERPL-SCNC: 141 MMOL/L (ref 136–145)
WBC # BLD AUTO: 12.3 K/UL (ref 4–11)

## 2023-12-30 PROCEDURE — 99285 EMERGENCY DEPT VISIT HI MDM: CPT

## 2023-12-30 PROCEDURE — 84443 ASSAY THYROID STIM HORMONE: CPT

## 2023-12-30 PROCEDURE — 85025 COMPLETE CBC W/AUTO DIFF WBC: CPT

## 2023-12-30 PROCEDURE — 83690 ASSAY OF LIPASE: CPT

## 2023-12-30 PROCEDURE — 2580000003 HC RX 258: Performed by: STUDENT IN AN ORGANIZED HEALTH CARE EDUCATION/TRAINING PROGRAM

## 2023-12-30 PROCEDURE — 93005 ELECTROCARDIOGRAM TRACING: CPT | Performed by: STUDENT IN AN ORGANIZED HEALTH CARE EDUCATION/TRAINING PROGRAM

## 2023-12-30 PROCEDURE — 74176 CT ABD & PELVIS W/O CONTRAST: CPT

## 2023-12-30 PROCEDURE — 87086 URINE CULTURE/COLONY COUNT: CPT

## 2023-12-30 PROCEDURE — 70450 CT HEAD/BRAIN W/O DYE: CPT

## 2023-12-30 PROCEDURE — 81001 URINALYSIS AUTO W/SCOPE: CPT

## 2023-12-30 PROCEDURE — 51701 INSERT BLADDER CATHETER: CPT

## 2023-12-30 PROCEDURE — 80053 COMPREHEN METABOLIC PANEL: CPT

## 2023-12-30 PROCEDURE — 72125 CT NECK SPINE W/O DYE: CPT

## 2023-12-30 PROCEDURE — 83880 ASSAY OF NATRIURETIC PEPTIDE: CPT

## 2023-12-30 RX ORDER — SODIUM CHLORIDE, SODIUM LACTATE, POTASSIUM CHLORIDE, AND CALCIUM CHLORIDE .6; .31; .03; .02 G/100ML; G/100ML; G/100ML; G/100ML
1000 INJECTION, SOLUTION INTRAVENOUS ONCE
Status: COMPLETED | OUTPATIENT
Start: 2023-12-30 | End: 2023-12-31

## 2023-12-30 RX ADMIN — SODIUM CHLORIDE, POTASSIUM CHLORIDE, SODIUM LACTATE AND CALCIUM CHLORIDE 1000 ML: 600; 310; 30; 20 INJECTION, SOLUTION INTRAVENOUS at 23:03

## 2023-12-30 ASSESSMENT — PAIN - FUNCTIONAL ASSESSMENT: PAIN_FUNCTIONAL_ASSESSMENT: WONG-BAKER FACES

## 2023-12-30 ASSESSMENT — PAIN SCALES - WONG BAKER: WONGBAKER_NUMERICALRESPONSE: 4

## 2023-12-31 ENCOUNTER — APPOINTMENT (OUTPATIENT)
Dept: GENERAL RADIOLOGY | Age: 78
DRG: 543 | End: 2023-12-31
Payer: MEDICARE

## 2023-12-31 ENCOUNTER — APPOINTMENT (OUTPATIENT)
Dept: CT IMAGING | Age: 78
DRG: 543 | End: 2023-12-31
Payer: MEDICARE

## 2023-12-31 PROBLEM — S72.001A CLOSED RIGHT HIP FRACTURE, INITIAL ENCOUNTER (HCC): Status: ACTIVE | Noted: 2023-12-31

## 2023-12-31 LAB
ANION GAP SERPL CALCULATED.3IONS-SCNC: 9 MMOL/L (ref 3–16)
APTT BLD: 29.3 SEC (ref 22.7–35.9)
BACTERIA URNS QL MICRO: ABNORMAL /HPF
BILIRUB UR QL STRIP.AUTO: NEGATIVE
BUN SERPL-MCNC: 25 MG/DL (ref 7–20)
CALCIUM SERPL-MCNC: 8.8 MG/DL (ref 8.3–10.6)
CHLORIDE SERPL-SCNC: 106 MMOL/L (ref 99–110)
CHP ED QC CHECK: YES
CHP ED QC CHECK: YES
CLARITY UR: ABNORMAL
CO2 SERPL-SCNC: 24 MMOL/L (ref 21–32)
COLOR UR: YELLOW
CREAT SERPL-MCNC: 1.2 MG/DL (ref 0.6–1.2)
EKG ATRIAL RATE: 77 BPM
EKG DIAGNOSIS: NORMAL
EKG P AXIS: 25 DEGREES
EKG P-R INTERVAL: 176 MS
EKG Q-T INTERVAL: 392 MS
EKG QRS DURATION: 72 MS
EKG QTC CALCULATION (BAZETT): 443 MS
EKG R AXIS: 56 DEGREES
EKG T AXIS: 80 DEGREES
EKG VENTRICULAR RATE: 77 BPM
EPI CELLS #/AREA URNS HPF: ABNORMAL /HPF (ref 0–5)
GFR SERPLBLD CREATININE-BSD FMLA CKD-EPI: 46 ML/MIN/{1.73_M2}
GLUCOSE BLD-MCNC: 101 MG/DL
GLUCOSE BLD-MCNC: 101 MG/DL (ref 70–99)
GLUCOSE BLD-MCNC: 188 MG/DL (ref 70–99)
GLUCOSE BLD-MCNC: 91 MG/DL (ref 70–99)
GLUCOSE BLD-MCNC: 92 MG/DL
GLUCOSE BLD-MCNC: 92 MG/DL (ref 70–99)
GLUCOSE SERPL-MCNC: 131 MG/DL (ref 70–99)
GLUCOSE UR STRIP.AUTO-MCNC: NEGATIVE MG/DL
HGB UR QL STRIP.AUTO: NEGATIVE
INR PPP: 0.94 (ref 0.84–1.16)
KETONES UR STRIP.AUTO-MCNC: ABNORMAL MG/DL
LEUKOCYTE ESTERASE UR QL STRIP.AUTO: NEGATIVE
MAGNESIUM SERPL-MCNC: 1.9 MG/DL (ref 1.8–2.4)
NITRITE UR QL STRIP.AUTO: NEGATIVE
NT-PROBNP SERPL-MCNC: 554 PG/ML (ref 0–449)
PERFORMED ON: ABNORMAL
PERFORMED ON: ABNORMAL
PERFORMED ON: NORMAL
PERFORMED ON: NORMAL
PH UR STRIP.AUTO: 5.5 [PH] (ref 5–8)
POTASSIUM SERPL-SCNC: 4.4 MMOL/L (ref 3.5–5.1)
PROT UR STRIP.AUTO-MCNC: ABNORMAL MG/DL
PROTHROMBIN TIME: 12.6 SEC (ref 11.5–14.8)
RBC #/AREA URNS HPF: ABNORMAL /HPF (ref 0–4)
SODIUM SERPL-SCNC: 139 MMOL/L (ref 136–145)
SP GR UR STRIP.AUTO: 1.02 (ref 1–1.03)
TSH SERPL DL<=0.005 MIU/L-ACNC: 1.93 UIU/ML (ref 0.27–4.2)
UA COMPLETE W REFLEX CULTURE PNL UR: ABNORMAL
UA DIPSTICK W REFLEX MICRO PNL UR: YES
URN SPEC COLLECT METH UR: ABNORMAL
UROBILINOGEN UR STRIP-ACNC: 0.2 E.U./DL
WBC #/AREA URNS HPF: ABNORMAL /HPF (ref 0–5)

## 2023-12-31 PROCEDURE — 80048 BASIC METABOLIC PNL TOTAL CA: CPT

## 2023-12-31 PROCEDURE — 2580000003 HC RX 258: Performed by: STUDENT IN AN ORGANIZED HEALTH CARE EDUCATION/TRAINING PROGRAM

## 2023-12-31 PROCEDURE — 73700 CT LOWER EXTREMITY W/O DYE: CPT

## 2023-12-31 PROCEDURE — 85730 THROMBOPLASTIN TIME PARTIAL: CPT

## 2023-12-31 PROCEDURE — 99223 1ST HOSP IP/OBS HIGH 75: CPT | Performed by: INTERNAL MEDICINE

## 2023-12-31 PROCEDURE — 6370000000 HC RX 637 (ALT 250 FOR IP): Performed by: NURSE PRACTITIONER

## 2023-12-31 PROCEDURE — 6360000002 HC RX W HCPCS: Performed by: STUDENT IN AN ORGANIZED HEALTH CARE EDUCATION/TRAINING PROGRAM

## 2023-12-31 PROCEDURE — 2580000003 HC RX 258: Performed by: NURSE PRACTITIONER

## 2023-12-31 PROCEDURE — 2500000003 HC RX 250 WO HCPCS: Performed by: INTERNAL MEDICINE

## 2023-12-31 PROCEDURE — 85610 PROTHROMBIN TIME: CPT

## 2023-12-31 PROCEDURE — 83036 HEMOGLOBIN GLYCOSYLATED A1C: CPT

## 2023-12-31 PROCEDURE — 83735 ASSAY OF MAGNESIUM: CPT

## 2023-12-31 PROCEDURE — 73502 X-RAY EXAM HIP UNI 2-3 VIEWS: CPT

## 2023-12-31 PROCEDURE — 1200000000 HC SEMI PRIVATE

## 2023-12-31 PROCEDURE — 99223 1ST HOSP IP/OBS HIGH 75: CPT | Performed by: PHYSICIAN ASSISTANT

## 2023-12-31 PROCEDURE — 71045 X-RAY EXAM CHEST 1 VIEW: CPT

## 2023-12-31 PROCEDURE — 93010 ELECTROCARDIOGRAM REPORT: CPT | Performed by: INTERNAL MEDICINE

## 2023-12-31 RX ORDER — POTASSIUM CHLORIDE 20 MEQ/1
20 TABLET, EXTENDED RELEASE ORAL DAILY
Status: DISCONTINUED | OUTPATIENT
Start: 2023-12-31 | End: 2023-12-31

## 2023-12-31 RX ORDER — DEXTROSE MONOHYDRATE 100 MG/ML
INJECTION, SOLUTION INTRAVENOUS CONTINUOUS PRN
Status: DISCONTINUED | OUTPATIENT
Start: 2023-12-31 | End: 2024-01-03 | Stop reason: HOSPADM

## 2023-12-31 RX ORDER — ACETAMINOPHEN 325 MG/1
650 TABLET ORAL EVERY 6 HOURS PRN
COMMUNITY

## 2023-12-31 RX ORDER — ONDANSETRON 2 MG/ML
4 INJECTION INTRAMUSCULAR; INTRAVENOUS EVERY 6 HOURS PRN
Status: DISCONTINUED | OUTPATIENT
Start: 2023-12-31 | End: 2024-01-03 | Stop reason: HOSPADM

## 2023-12-31 RX ORDER — LEVOTHYROXINE SODIUM 0.05 MG/1
50 TABLET ORAL DAILY
Status: DISCONTINUED | OUTPATIENT
Start: 2023-12-31 | End: 2024-01-03 | Stop reason: HOSPADM

## 2023-12-31 RX ORDER — FOLIC ACID 1 MG/1
1 TABLET ORAL DAILY
Status: DISCONTINUED | OUTPATIENT
Start: 2023-12-31 | End: 2024-01-03 | Stop reason: HOSPADM

## 2023-12-31 RX ORDER — LANOLIN ALCOHOL/MO/W.PET/CERES
400 CREAM (GRAM) TOPICAL 2 TIMES DAILY
Status: DISCONTINUED | OUTPATIENT
Start: 2023-12-31 | End: 2024-01-03 | Stop reason: HOSPADM

## 2023-12-31 RX ORDER — OXYCODONE HYDROCHLORIDE 5 MG/1
10 TABLET ORAL EVERY 4 HOURS PRN
Status: DISCONTINUED | OUTPATIENT
Start: 2023-12-31 | End: 2024-01-03 | Stop reason: HOSPADM

## 2023-12-31 RX ORDER — POLYETHYLENE GLYCOL 3350 17 G/17G
17 POWDER, FOR SOLUTION ORAL DAILY PRN
Status: DISCONTINUED | OUTPATIENT
Start: 2023-12-31 | End: 2024-01-03 | Stop reason: HOSPADM

## 2023-12-31 RX ORDER — AMLODIPINE BESYLATE 5 MG/1
5 TABLET ORAL DAILY
Status: DISCONTINUED | OUTPATIENT
Start: 2023-12-31 | End: 2024-01-03 | Stop reason: HOSPADM

## 2023-12-31 RX ORDER — OXYCODONE HYDROCHLORIDE 5 MG/1
5 TABLET ORAL EVERY 4 HOURS PRN
Status: DISCONTINUED | OUTPATIENT
Start: 2023-12-31 | End: 2024-01-03 | Stop reason: HOSPADM

## 2023-12-31 RX ORDER — VITAMIN B COMPLEX
1000 TABLET ORAL DAILY
Status: DISCONTINUED | OUTPATIENT
Start: 2023-12-31 | End: 2024-01-03 | Stop reason: HOSPADM

## 2023-12-31 RX ORDER — INSULIN LISPRO 100 [IU]/ML
0-4 INJECTION, SOLUTION INTRAVENOUS; SUBCUTANEOUS EVERY 4 HOURS
Status: DISCONTINUED | OUTPATIENT
Start: 2023-12-31 | End: 2024-01-03

## 2023-12-31 RX ORDER — GLUCAGON 1 MG/ML
1 KIT INJECTION PRN
Status: DISCONTINUED | OUTPATIENT
Start: 2023-12-31 | End: 2024-01-03 | Stop reason: HOSPADM

## 2023-12-31 RX ORDER — ONDANSETRON 4 MG/1
4 TABLET, ORALLY DISINTEGRATING ORAL EVERY 8 HOURS PRN
Status: DISCONTINUED | OUTPATIENT
Start: 2023-12-31 | End: 2024-01-03 | Stop reason: HOSPADM

## 2023-12-31 RX ORDER — DONEPEZIL HYDROCHLORIDE 5 MG/1
5 TABLET, FILM COATED ORAL
Status: DISCONTINUED | OUTPATIENT
Start: 2023-12-31 | End: 2024-01-03 | Stop reason: HOSPADM

## 2023-12-31 RX ORDER — OXYBUTYNIN CHLORIDE 10 MG/1
10 TABLET, EXTENDED RELEASE ORAL DAILY
Status: DISCONTINUED | OUTPATIENT
Start: 2023-12-31 | End: 2023-12-31

## 2023-12-31 RX ORDER — ARIPIPRAZOLE 10 MG/1
5 TABLET ORAL DAILY
Status: DISCONTINUED | OUTPATIENT
Start: 2023-12-31 | End: 2024-01-03 | Stop reason: HOSPADM

## 2023-12-31 RX ORDER — THIAMINE MONONITRATE (VIT B1) 100 MG
100 TABLET ORAL DAILY
Status: DISCONTINUED | OUTPATIENT
Start: 2023-12-31 | End: 2023-12-31

## 2023-12-31 RX ORDER — PANTOPRAZOLE SODIUM 40 MG/1
40 TABLET, DELAYED RELEASE ORAL
Status: DISCONTINUED | OUTPATIENT
Start: 2023-12-31 | End: 2024-01-03 | Stop reason: HOSPADM

## 2023-12-31 RX ORDER — ALBUTEROL SULFATE 90 UG/1
2 AEROSOL, METERED RESPIRATORY (INHALATION) EVERY 6 HOURS PRN
Status: DISCONTINUED | OUTPATIENT
Start: 2023-12-31 | End: 2024-01-03 | Stop reason: HOSPADM

## 2023-12-31 RX ORDER — SODIUM CHLORIDE 9 MG/ML
INJECTION, SOLUTION INTRAVENOUS PRN
Status: DISCONTINUED | OUTPATIENT
Start: 2023-12-31 | End: 2024-01-03 | Stop reason: HOSPADM

## 2023-12-31 RX ORDER — SODIUM CHLORIDE 0.9 % (FLUSH) 0.9 %
5-40 SYRINGE (ML) INJECTION EVERY 12 HOURS SCHEDULED
Status: DISCONTINUED | OUTPATIENT
Start: 2023-12-31 | End: 2024-01-03 | Stop reason: HOSPADM

## 2023-12-31 RX ORDER — ASPIRIN 81 MG/1
81 TABLET, CHEWABLE ORAL DAILY
COMMUNITY

## 2023-12-31 RX ORDER — OXYCODONE HYDROCHLORIDE AND ACETAMINOPHEN 5; 325 MG/1; MG/1
1 TABLET ORAL ONCE
Status: DISCONTINUED | OUTPATIENT
Start: 2023-12-31 | End: 2024-01-03 | Stop reason: HOSPADM

## 2023-12-31 RX ORDER — ATORVASTATIN CALCIUM 10 MG/1
20 TABLET, FILM COATED ORAL DAILY
Status: DISCONTINUED | OUTPATIENT
Start: 2023-12-31 | End: 2024-01-03 | Stop reason: HOSPADM

## 2023-12-31 RX ORDER — SODIUM CHLORIDE 0.9 % (FLUSH) 0.9 %
5-40 SYRINGE (ML) INJECTION PRN
Status: DISCONTINUED | OUTPATIENT
Start: 2023-12-31 | End: 2024-01-03 | Stop reason: HOSPADM

## 2023-12-31 RX ORDER — DOCUSATE SODIUM 100 MG/1
100 CAPSULE, LIQUID FILLED ORAL 2 TIMES DAILY
Status: DISCONTINUED | OUTPATIENT
Start: 2023-12-31 | End: 2024-01-03 | Stop reason: HOSPADM

## 2023-12-31 RX ORDER — FERROUS SULFATE 325(65) MG
325 TABLET ORAL 2 TIMES DAILY WITH MEALS
Status: DISCONTINUED | OUTPATIENT
Start: 2023-12-31 | End: 2024-01-03 | Stop reason: HOSPADM

## 2023-12-31 RX ADMIN — Medication 400 MG: at 20:10

## 2023-12-31 RX ADMIN — DOCUSATE SODIUM 100 MG: 100 CAPSULE, LIQUID FILLED ORAL at 20:10

## 2023-12-31 RX ADMIN — OXYCODONE HYDROCHLORIDE 10 MG: 5 TABLET ORAL at 18:55

## 2023-12-31 RX ADMIN — FERROUS SULFATE TAB 325 MG (65 MG ELEMENTAL FE) 325 MG: 325 (65 FE) TAB at 18:08

## 2023-12-31 RX ADMIN — CEFTRIAXONE SODIUM 1000 MG: 1 INJECTION, POWDER, FOR SOLUTION INTRAMUSCULAR; INTRAVENOUS at 02:06

## 2023-12-31 RX ADMIN — Medication 10 ML: at 19:28

## 2023-12-31 RX ADMIN — Medication 10 ML: at 10:42

## 2023-12-31 RX ADMIN — MICONAZOLE NITRATE: 2 POWDER TOPICAL at 20:10

## 2023-12-31 ASSESSMENT — PAIN DESCRIPTION - LOCATION: LOCATION: HEAD

## 2023-12-31 ASSESSMENT — PAIN SCALES - GENERAL
PAINLEVEL_OUTOF10: 10
PAINLEVEL_OUTOF10: 8
PAINLEVEL_OUTOF10: 7

## 2023-12-31 ASSESSMENT — PAIN DESCRIPTION - DESCRIPTORS: DESCRIPTORS: ACHING

## 2023-12-31 ASSESSMENT — PAIN SCALES - WONG BAKER
WONGBAKER_NUMERICALRESPONSE: 4

## 2023-12-31 ASSESSMENT — PAIN DESCRIPTION - ORIENTATION: ORIENTATION: ANTERIOR

## 2023-12-31 NOTE — CONSULTS
Consult Placed -  Added Jumana Marques to treatment team for heart failure nurse/coordinator  Who: Jumana Marques  Date: 12/31/2023  Time: 15:14     Electronically signed by Jessica Etienne on 12/31/2023 at 3:14 PM

## 2023-12-31 NOTE — CONSULTS
Nutrition Note    RD received consult for CHF nutrition education. Hx of dementia. Resides at [facility]. Not appropriate for diet education.     Will continue to monitor per nutrition standards of care.     Nichole Lamas, MS, RD, LD on 12/31/2023 at 2:05 PM  Office: [phone]

## 2023-12-31 NOTE — PROGRESS NOTES
4 Eyes Skin Assessment     NAME:  Noni Henry  YOB: 1945  MEDICAL RECORD NUMBER:  [id number]    The patient is being assessed for  Admission    I agree that at least one RN has performed a thorough Head to Toe Skin Assessment on the patient. ALL assessment sites listed below have been assessed.      Areas assessed by both nurses:    Head, Face, Ears, Shoulders, Back, Chest, Arms, Elbows, Hands, Sacrum. Buttock, Coccyx, Ischium, Legs. Feet and Heels, and Under Medical Devices         Does the Patient have a Wound? Yes wound(s) were present on assessment. LDA wound assessment was Initiated and completed by RN         Moisture breakdown under L breast, wicking material placed.     Blachable redness to sacrum, meplex placed.     Johnson Prevention initiated by RN: No  Wound Care Orders initiated by RN: No    Pressure Injury (Stage 3,4, Unstageable, DTI, NWPT, and Complex wounds) if present, place Wound referral order by RN under : No    New Ostomies, if present place, Ostomy referral order under : No     Nurse 1 eSignature: Electronically signed by Mayra Doty RN on 12/31/23 at 5:57 PM EST    **SHARE this note so that the co-signing nurse can place an eSignature**    Nurse 2 eSignature: {Esignature:[id number]}

## 2023-12-31 NOTE — ED PROVIDER NOTES
[facility]  ED  EMERGENCY DEPARTMENT ENCOUNTER        Pt Name: Noni Henry  MRN: [id number]  Birthdate 1945  Date of evaluation: 2023  Provider: Papo Navarrete MD  PCP: Jaskaran Nichols MD  Note Started: 6:31 AM EST 23    CHIEF COMPLAINT       Chief Complaint   Patient presents with    Fall     From [facility] w/ hx dementia. Fell, NKI but pt c/o generalized pain. BP fluxuating. Poor historian.       HISTORY OF PRESENT ILLNESS: 1 or more Elements     History from : Patient    Limitations to history : dementia    Noni Henry is a 78 y.o. female who presents with R hip pain after unwitnessed fall at nursing home.    Nursing Notes were all reviewed and agreed with or any disagreements were addressed in the HPI.    REVIEW OF SYSTEMS :      Positives and Pertinent negatives as per HPI.  ROS otherwise unremarkable.    SURGICAL HISTORY     Past Surgical History:   Procedure Laterality Date    ABDOMEN SURGERY      c section    CATARACT REMOVAL WITH IMPLANT Left [id number]    LEFT EYE CATARACT PHACOEMULSIFICATION INTRAOCULAR LENS     SECTION      DENTAL SURGERY      EYE SURGERY  10/29/13     RIGHT EYE CATARACT PHACOEMULSIFICATION INTRAOCULAR LENS       CURRENTMEDICATIONS       Previous Medications    ACETAMINOPHEN (TYLENOL) 325 MG TABLET    Take 2 tablets by mouth every 6 hours as needed for Pain Not to exceed 3 grams per day    ALBUTEROL SULFATE HFA (PROVENTIL;VENTOLIN;PROAIR) 108 (90 BASE) MCG/ACT INHALER    Inhale 2 puffs into the lungs every 6 hours as needed for Wheezing    AMLODIPINE (NORVASC) 5 MG TABLET    Take 1 tablet by mouth daily    ARIPIPRAZOLE (ABILIFY) 5 MG TABLET    Take 1 tablet by mouth daily    ASPIRIN 81 MG CHEWABLE TABLET    Take 1 tablet by mouth daily    ATORVASTATIN (LIPITOR) 20 MG TABLET    Take 1 tablet by mouth at bedtime    CYANOCOBALAMIN 1000 MCG TABLET    Take 1,000 mcg by mouth daily    DOCUSATE SODIUM (COLACE, DULCOLAX) 100 MG CAPS    Take 100 mg by

## 2023-12-31 NOTE — CONSULTS
Electrophysiology Consultation   Date: 12/31/2023  Admit Date:  12/30/2023  Reason for Consultation: Pre-operative clearance  Consult Requesting Physician: Shabbir, Ehsan, MD     Chief Complaint   Patient presents with    Fall     From [facility] w/ hx dementia. Fell, NKI but pt c/o generalized pain. BP fluxuating. Poor historian.     HPI:  Mrs. Henry is a pleasant 78 year old demented female with a medical history significant for non-ischemic cardiomyopathy secondary to stress induced cardiomyopathy and EtOH use with moderately depressed LVEF (recovered), COPD, non-obstructive coronary artery disease, hypertension, diabetes mellitus type II, hyperlipidemia, and cerebral vascular disease who presents from her living facility with hip fracture.  Patient presents from [facility] after a fall with her family.  As well as family knows patient is injured self health until last night when she was found down at her facility.  She began to complain of hip pain and so she was brought to [facility] for further evaluation and care.    Emergency Room/Hospital Course:  Patient evaluated emergency room on 12/31/2023.  Her CMP was notable for mildly elevated creatinine at 1.2.  Her BNP was mildly elevated at 500.  Troponin enzymes were not drawn.  Her CBC is notable for mild leukocytosis without significant left shift.  Her chest radiograph was reassuring.  Her CT of her hip showed impacted's right femoral neck fracture.  Orthopedic surgery was consulted and recommended conservative management.  Cardiology was consulted in anticipation of failure of conservative management for preoperative clearance.    Past Medical History:   Diagnosis Date    Acute systolic CHF (congestive heart failure) ([facility])     Alcohol abuse     Anemia     Asthma     Back ache     CAD (coronary artery disease)     Cataract     Cerebral artery occlusion with cerebral infarction ([facility]) 12/2016    CHF (congestive heart failure) ([facility])     Closed displaced  echocardiogram from 2020.  Clinically patient has no signs of heart failure.  No further workup or intervention required at this point.  Patient is at low to intermediate MACE risk for an intermediate risk surgery.  Cardiology will sign off case however made available to assist in any way.  - I will cancel echocardiogram, not indicated.  - Continue lisinopril.  - Start low dose metoprolol.  - Patient at low to intermediate risk for MACE during an intermediate risk procedure (right hip fracture repair).    - Cardiology will sign off case however we remain available to assist in any way.    2. Right hip fracture.  - Per primary team.    Thank you for allowing me to participate in the care of Noni Henry . If you have any questions/comments, please do not hesitate to contact us.    French Ochoa MD  Cardiac Electrophysiology  [facility]  [phone] [place] Office

## 2023-12-31 NOTE — ED NOTES
Family at bedside. Previous RN states patient takes medications crushed in applesauce. Inquired with son and  if patient is on a special diet at home (thickened liquids or pureed food, etc). Family states patient eats a regular diet; no special accommodations. Will perform swallow screen

## 2023-12-31 NOTE — ED NOTES
Ms. Henry is a 78 y.o. female who had concerns including Fall (From [facility]CS w/ hx dementia. Fell, NKI but pt c/o generalized pain. BP fluxuating. Poor historian.).    Chief Complaint   Patient presents with    Fall     From [facility]CS w/ hx dementia. Fell, NKI but pt c/o generalized pain. BP fluxuating. Poor historian.       She is being admitted for:    Closed right hip fracture, initial encounter ([facility])    Her ED problem list included:    1. Closed right hip fracture, initial encounter ([facility])    2. TRINH (acute kidney injury) ([facility])    3. Complicated UTI (urinary tract infection)        Past Medical History:   Diagnosis Date    Acute systolic CHF (congestive heart failure) ([facility])     Alcohol abuse     Anemia     Asthma     Back ache     CAD (coronary artery disease)     Cataract     Cerebral artery occlusion with cerebral infarction ([facility]) 2016    CHF (congestive heart failure) ([facility])     Closed displaced fracture of right femoral neck ([facility])     Colitis     COPD (chronic obstructive pulmonary disease) ([facility])     Dementia ([facility])     Depression     Diabetes mellitus ([facility])     type !! - diet controlled    Dysphagia     Frequent falls     Hyperlipidemia     Hypertension     Insomnia     Thyroid disease     TIA (transient ischemic attack) 2016       Past Surgical History:   Procedure Laterality Date    ABDOMEN SURGERY      c section    CATARACT REMOVAL WITH IMPLANT Left [id number]    LEFT EYE CATARACT PHACOEMULSIFICATION INTRAOCULAR LENS     SECTION      DENTAL SURGERY      EYE SURGERY  10/29/13     RIGHT EYE CATARACT PHACOEMULSIFICATION INTRAOCULAR LENS       Her recent abnormal labs were:    Labs Reviewed   CBC WITH AUTO DIFFERENTIAL - Abnormal; Notable for the following components:       Result Value    WBC 12.3 (*)     Platelets 454 (*)     Neutrophils Absolute 9.0 (*)     All other components within normal limits   COMPREHENSIVE METABOLIC PANEL W/ REFLEX TO MG FOR LOW K - Abnormal; Notable for the following components:  obvious acute intracranial abnormality seen. Recommend a follow-up CT scan when the patient is able to remain still for the scan. Moderate atrophy and mild chronic microischemic disease in the deep white matter which is unchanged.     CT ABDOMEN PELVIS WO CONTRAST Additional Contrast? None    Result Date: 12/31/2023  EXAMINATION: CT OF THE ABDOMEN AND PELVIS WITHOUT CONTRAST 12/30/2023 11:50 pm TECHNIQUE: CT of the abdomen and pelvis was performed without the administration of intravenous contrast. Multiplanar reformatted images are provided for review. Automated exposure control, iterative reconstruction, and/or weight based adjustment of the mA/kV was utilized to reduce the radiation dose to as low as reasonably achievable. COMPARISON: 03/14/2023 HISTORY: ORDERING SYSTEM PROVIDED HISTORY: R flank pain, R pelvic/hip pain s/p fall TECHNOLOGIST PROVIDED HISTORY: Reason for exam:->R flank pain, R pelvic/hip pain s/p fall Additional Contrast?->None Decision Support Exception - unselect if not a suspected or confirmed emergency medical condition->Emergency Medical Condition (MA) FINDINGS: LOWER CHEST: No acute findings. ORGANS: Lack of intravenous contrast limits evaluation of the solid organs and bowel. The liver, pancreas, spleen, kidneys and adrenals reveal no acute abnormality. No inflammatory change identified in the gallbladder fossa. GI/BOWEL: No bowel dilatation or wall thickening. Moderate rectal stool burden. PELVIS: No acute findings. PERITONEUM/RETROPERITONEUM: No lymphadenopathy is noted.  No free air or free fluid.  The aorta is normal in caliber. BONES/SOFT TISSUES: Motion degraded evaluation.  Impacted and angulated appearance of the subcapital right femoral neck.  Pelvic alignment is maintained.  Advanced degenerative disc disease and degenerative sclerosis throughout the lumbar spine.  Degenerative grade 1 anterolisthesis of L4.  No soft tissue hematoma appreciated. *Unless otherwise specified,

## 2023-12-31 NOTE — CONSULTS
Nephrology Consult Note          Patient ID: Noni Henry  Referring/ Physician: Shabbir, Ehsan, MD      HPI/Summary:   Noni Henry is being seen by nephrology for Acute kidney injury (TRINH). She is a 78 y.o. female with a PMH significant for hypertension, hyperlipidemia, t2DM, dementia, COPD, prior CVA, CAD and systolic CHF who presented to the ED 12/30/2023 with complaints of right hip pain after she fell and was noted to have a right femoral neck fracture. On presentation she was noted to have a Cr of 1.6    Seen at bedside  /68  Cr improved to 1.2 today  BP is still soft      Plan:   Hold amlodipine for now  Renal function is improving  Goal SBP ~ 130  Encourage PO intake.    Assessment:  TRINH:  - baseline Cr 1.0  - Cr on admission 1.6  - BP soft  - TRINH likely pre-renal. Improved with IVF    Hypertension:  - home reigmen: amlodipine 5 mg daily only  - will hold since BP is soft.    Right hip fx:  - per ortho        [facility] would like to thank you for the opportunity to serve this patient. Please call with any questions or concerns.    Santosh Munoz MD  [facility]  [address] [place] [address]  Fax: [phone]  Office: [phone]         CC/Reason for consult:   Right hip fx/TRINH        Medications:   oxyCODONE-acetaminophen, 1 tablet, Once  amLODIPine, 5 mg, Daily  ARIPiprazole, 5 mg, Daily  atorvastatin, 20 mg, Daily  docusate sodium, 100 mg, BID  donepezil, 5 mg, Daily with breakfast  ferrous sulfate, 325 mg, BID WC  folic acid, 1 mg, Daily  levothyroxine, 50 mcg, Daily  magnesium oxide, 400 mg, BID  pantoprazole, 40 mg, QAM AC  Vitamin D, 1,000 Units, Daily  sodium chloride flush, 5-40 mL, 2 times per day  insulin lispro, 0-4 Units, Q4H  miconazole, , BID       Mold extract [trichophyton mentagrophytes]    Allergies:   Allergies   Allergen Reactions    Mold Extract [Trichophyton Mentagrophytes]          Physical Exam/Objective:   Vitals:    12/31/23 1508   BP:

## 2023-12-31 NOTE — ED NOTES
[facility] sent to Dr. Shabbir to ask for a diet order since Ortho Surgery note states isn't doing surgery. Awaiting reply.

## 2023-12-31 NOTE — CONSULTS
Chief Complaint    Fall (From [facility] w/ hx dementia. Fell, NKI but pt c/o generalized pain. BP fluxuating. Poor historian.)      History of Present Illness:  Noni Henry is a 78 y.o. female patient presents to the emergency room this morning.  Patient arrived from [facility].  Patient has advanced dementia and is impossible to communicate with her get a history from.  I did attempt to call the spouse, in-law, and son.  All phone calls went to voicemail.  I did speak with Aruna at [facility].  Patient is nonambulatory.  She is wheelchair-bound.  She was found this morning cold and clammy with hypotension.  She was transferred to the emergency room.  Patient did have 2 reported falls 1 on 12/21/2023 and 1 on 12/30/2023.  Both falls according to the nurse reported no injuries.  She is resting in bed and nonverbal at this time.    I was finally able to speak with the son.  Mom does not communicate well and has been wheelchair-bound for an extended amount of time.  He is not aware of any hip fracture but states she does fall often and possibly could have had a previous fracture that went unnoticed.           Medical History:  Patient's medications, allergies, past medical, surgical, social and family histories were reviewed and updated as appropriate.        Vital Signs:  /73   Pulse 82   Temp 98 °F (36.7 °C) (Oral)   Resp 15   Ht 1.651 m (5' 5\")   Wt 54.9 kg (121 lb)   SpO2 96%   BMI 20.14 kg/m²         Right Hip Examination:    Inspection: Appears shorter than opposite side    Palpation: Nontender to palpation    Range of Motion: 90 degrees of forward flexion, 30 degrees of external rotation, internal rotation 10 degrees all nonpainful.    Strength: 5/5 strength with knee flexion and    Special Tests: Negative logroll test.    Skin: There are no rashes, ulcerations or lesions.    Gait: In bed    Additional Comments:       X-rays were examined that were taken on 12/31/2023 of the right hip.   Answer:   Inpatient Rehab    Consult to Orthopedic Surgery     Standing Status:   Standing     Number of Occurrences:   1     Order Specific Question:   Reason for Consult?     Answer:   fall at [facility]CF, right hip Fx    Consult to Cardiology     Standing Status:   Standing     Number of Occurrences:   1     Order Specific Question:   Reason for Consult?     Answer:   requesting pre-op evaluation for right hip Fx, h/o CHF, CAD    Inpatient consult to Nephrology     Standing Status:   Standing     Number of Occurrences:   1     Order Specific Question:   Reason for Consult?     Answer:   requesting eval/recs, TRINH    Initiate Oxygen Therapy Protocol     Initiate oxygen therapy if the patient has 1) SpO2 is less than 90%, 2) Cyanosis, Chest Pain, Dyspnea, or Altered level of consciousness AND SpO2 checked and it is less than 90%, or 3) patient on Home oxygen.    To initiate oxygen therapy: nurse or RT enters Nasal cannula oxygen order using Per Protocol without Cosign order mode (if indication Home Oxygen then change L/min to same amount at home and change Wean to Room Air to No).    Notify provider if initiate oxygen therapy unless already on Home Oxygen.     Standing Status:   Standing     Number of Occurrences:   28371    POCT Glucose     Standing Status:   Standing     Number of Occurrences:   1    POCT glucose     If blood sugar is below 110 at bedtime, then check blood glucose at 0200.     Standing Status:   Standing     Number of Occurrences:   24    POCT Glucose     Repeat blood glucose 15 minutes following intervention.  If blood glucose is LESS THAN 70 mg/dL, repeat treatment and recheck blood glucose in 15 minutes x 2.  If blood glucose remains LESS THAN 70 mg/dL, call ordering provider for further instruction.   If patient experienced a hypoglycemic event in last 24 hours, obtain blood glucose at 0200.     Standing Status:   Standing     Number of Occurrences:   83065    POCT Glucose     Standing Status:

## 2023-12-31 NOTE — PROGRESS NOTES
Ehsan Shabbir, MD    Hospitalist Progress Note      Name:  Noni Henry /Age/Sex: 1945  (78 y.o. female)   MRN & CSN:  [id number] & [id number] Admission Date/Time: 2023 10:36 PM   Location:  15/15 PCP: Jaskaran iNchols MD       I saw and examined the patient on 2023 at 12:58 PM.    Hospital Day: 2  Barriers to discharge:   Assessment and Plan:     78 y.o. female with a significant past medical history of hypertension, hyperlipidemia, type 2 diabetes, dementia, COPD, prior CVA, CAD, and systolic heart failure who presents to [facility]'s emergency department via EMS from [facility] with complaint of right hip pain after being found on the ground next to her bed.  ECF staff also noted that she was more lethargic than normal when they found her so they were concerned for possible head injury     Likely mechanical fall.  Will check orthostatics, imaging negative other than below    Right hip pain likely due to chronic right femoral neck/subcapital fracture.  Seen by orthopedic, likely chronic.  Patient without any overt symptoms of pain or limited range of motion.  Noted orthopedic recommendations''rest, icing, avoidance of painful activities, NSAIDs or pain meds as tolerated, and physical therapy ''    TRINH likely due to dehydration.  Nephrology following.    Hypotension due to above.  Supportive care    Pyuria without signs of infection.  Supportive care, received 1 dose of antibiotics in ER      Comorbidities include.  Systolic heart failure, compensated, coronary artery disease, type 2 diabetes mellitus, hypothyroidism, dementia      DVT prophylaxis Lovenox  Is full code     Subjective:     Patient seen and examined at bedside for more awake and alert not in acute distress.    Objective:     Intake/Output Summary (Last 24 hours) at 2023 1258  Last data filed at 2023 1042  Gross per 24 hour   Intake 10 ml   Output 0 ml   Net 10 ml      Vitals:   Vitals:    23

## 2023-12-31 NOTE — ED NOTES
544@1753   Chemotherapy Administration    Pre-assessment Data: Antineoplastic Agents  Other:   See toxicity flow sheet for assessment [x]     Physician Notification of Concerns Related to Chemotherapy Administration:   Physician Notified Rao Mistry / Time of Notification Dr Frederick Deal seen today.      Interventions:   Lab work assessed  [x]   Height / Weight verified for dose [x]   Current MAR reviewed [x]   Emergency drugs available as appropriate [x]   Anaphylaxis assessment completed [x]   Pre-medications administered as ordered [x]   Blood return noted upon initiation of chemotherapy [x]   Blood return noted each 1-2ml of a vesicant medication if given IV push [x]   Blood return noted each 2-3ml of a non-vesicant medication if given IV push []   Monitor for signs / symptoms of hypersensitivity reaction [x]   Chemotherapy orders (drug/dose/rate) verified by 2 Chemo certified RNs [x]   Monitor IV site and blood return throughout the infusion of the medication [x]   Document IV site checks on the IV assessment form [x]   Document chemotherapy teaching on the Patient Education tab [x]   Document patient verbalizes understanding of medications being administered [x]   If IV infiltration, see ONS Guidelines []   Other: adriamycin and cytoxan  [x]

## 2023-12-31 NOTE — ED NOTES
Pt resting on cot in position of comfort.  Respirations regular.  Airway intact.  Eyes closed.  0 s/s of distress.  Awaiting further orders.  Will continue to monitor.

## 2023-12-31 NOTE — PROGRESS NOTES
12/31/23 1516   RT Protocol   History Pulmonary Disease 0   Respiratory pattern 0   Breath sounds 0   Cough 0   Indications for Bronchodilator Therapy None   Bronchodilator Assessment Score 0

## 2023-12-31 NOTE — RT PROTOCOL NOTE
RT Inhaler-Nebulizer Bronchodilator Protocol Note    There is a bronchodilator order in the chart from a provider indicating to follow the RT Bronchodilator Protocol and there is an “Initiate RT Inhaler-Nebulizer Bronchodilator Protocol” order as well (see protocol at bottom of note).    CXR Findings:  XR CHEST PORTABLE    Result Date: 12/31/2023  No acute airspace disease identified.       The findings from the last RT Protocol Assessment were as follows:   History Pulmonary Disease: None or smoker <15 pack years  Respiratory Pattern: Regular pattern and RR 12-20 bpm  Breath Sounds: Clear breath sounds  Cough: Strong, spontaneous, non-productive  Indication for Bronchodilator Therapy: None  Bronchodilator Assessment Score: 0    Aerosolized bronchodilator medication orders have been revised according to the RT Inhaler-Nebulizer Bronchodilator Protocol below.    Respiratory Therapist to perform RT Therapy Protocol Assessment initially then follow the protocol.  Repeat RT Therapy Protocol Assessment PRN for score 0-3 or on second treatment, BID, and PRN for scores above 3.    No Indications - adjust the frequency to every 6 hours PRN wheezing or bronchospasm, if no treatments needed after 48 hours then discontinue using Per Protocol order mode.     If indication present, adjust the RT bronchodilator orders based on the Bronchodilator Assessment Score as indicated below.  Use Inhaler orders unless patient has one or more of the following: on home nebulizer, not able to hold breath for 10 seconds, is not alert and oriented, cannot activate and use MDI correctly, or respiratory rate 25 breaths per minute or more, then use the equivalent nebulizer order(s) with same Frequency and PRN reasons based on the score.  If a patient is on this medication at home then do not decrease Frequency below that used at home.    0-3 - enter or revise RT bronchodilator order(s) to equivalent RT Bronchodilator order with Frequency of every  4 hours PRN for wheezing or increased work of breathing using Per Protocol order mode.        4-6 - enter or revise RT Bronchodilator order(s) to two equivalent RT bronchodilator orders with one order with BID Frequency and one order with Frequency of every 4 hours PRN wheezing or increased work of breathing using Per Protocol order mode.        7-10 - enter or revise RT Bronchodilator order(s) to two equivalent RT bronchodilator orders with one order with TID Frequency and one order with Frequency of every 4 hours PRN wheezing or increased work of breathing using Per Protocol order mode.       11-13 - enter or revise RT Bronchodilator order(s) to one equivalent RT bronchodilator order with QID Frequency and an Albuterol order with Frequency of every 4 hours PRN wheezing or increased work of breathing using Per Protocol order mode.      Greater than 13 - enter or revise RT Bronchodilator order(s) to one equivalent RT bronchodilator order with every 4 hours Frequency and an Albuterol order with Frequency of every 2 hours PRN wheezing or increased work of breathing using Per Protocol order mode.     RT to enter RT Home Evaluation for COPD & MDI Assessment order using Per Protocol order mode.    Electronically signed by Abigail Cain RCP on 12/31/2023 at 3:16 PM

## 2023-12-31 NOTE — CONSULTS
INPATIENT ORTHOPEDIC SURGERY CONSULT  RE-fall at [facility], right hip Fx  [id number]-paged  through Perfect serve  4166-Conrad Woods PA-C@ Bedside

## 2023-12-31 NOTE — H&P
Exam: right hip pain, f/u from recent CT FINDINGS: The right femoral neck is shortened appearance and there is mild bony irregularity and sclerosis along the base of the femoral neck which was seen on the recent CT scan and is unchanged.  The right femoral head remains within the acetabulum.  The bones are severely osteopenic.  There is mild joint space narrowing in the left hip.  The pelvis is intact.     Severe osteopenia limiting the exam. Probable mildly impacted transverse fracture along the base of the right femoral neck which appears similar to the recent CT scan with no obvious displaced bony fragment.  Suggest a CT scan of the right hip for further characterization if indicated Mild osteoarthritic changes in both hips.     CT Head W/O Contrast    Result Date: 12/31/2023  EXAMINATION: CT OF THE HEAD WITHOUT CONTRAST  12/30/2023 11:50 pm TECHNIQUE: CT of the head was performed without the administration of intravenous contrast. Automated exposure control, iterative reconstruction, and/or weight based adjustment of the mA/kV was utilized to reduce the radiation dose to as low as reasonably achievable. COMPARISON: 09/30/2023 HISTORY: ORDERING SYSTEM PROVIDED HISTORY: fall, headache TECHNOLOGIST PROVIDED HISTORY: Reason for exam:->fall, headache Has a \"code stroke\" or \"stroke alert\" been called?->No Decision Support Exception - unselect if not a suspected or confirmed emergency medical condition->Emergency Medical Condition (MA) Reason for Exam: fall with HA FINDINGS: BRAIN/VENTRICLES: There is motion artifact throughout the exam.  The ventricles are mildly enlarged and there is diffuse mild prominence of the cortical sulci.  There is moderate periventricular low density bilaterally. No obvious intracranial hemorrhage or edema is seen.  There is no extra-axial fluid collection or mass. ORBITS: The visualized portion of the orbits demonstrate no acute abnormality. SINUSES: The visualized paranasal sinuses and  times daily    Dipesh Friedman MD   folic acid (FOLVITE) 1 MG tablet Take 1 mg by mouth daily    Dipesh Friedman MD   cyanocobalamin 1000 MCG tablet Take 1,000 mcg by mouth daily    Dipesh Friedman MD   vitamin D (CHOLECALCIFEROL) 25 MCG (1000 UT) TABS tablet Take 1,000 Units by mouth daily    Dipesh Friedman MD   magnesium oxide (MAG-OX) 400 MG tablet Take 400 mg by mouth 2 times daily    Dipesh Friedman MD   albuterol sulfate HFA (PROVENTIL;VENTOLIN;PROAIR) 108 (90 Base) MCG/ACT inhaler Inhale 2 puffs into the lungs every 6 hours as needed for Wheezing    Dipesh Friedman MD   atorvastatin (LIPITOR) 20 MG tablet Take 20 mg by mouth daily    Dipesh Friedman MD   donepezil (ARICEPT) 5 MG tablet Take 1 tablet by mouth daily (with breakfast) 5/27/21   Kali Hartman MD   oxybutynin (DITROPAN-XL) 10 MG extended release tablet Take 10 mg by mouth daily    Dipesh Friedman MD   ferrous sulfate (IRON 325) 325 (65 Fe) MG tablet Take 1 tablet by mouth 2 times daily (with meals) 1/19/21   Shaji Johnson MD       Labs: Personally reviewed and interpreted for clinical significance.   Recent Labs     12/30/23  2307   WBC 12.3*   HGB 12.4   HCT 37.0   *     Recent Labs     12/30/23  2307      K 4.7      CO2 25   BUN 25*   CREATININE 1.6*   CALCIUM 9.1     Recent Labs     12/30/23  2307   AST 20   ALT 14   BILITOT 0.4   ALKPHOS 89     Anticipated co-signer, Dr. Shabbir Chuck A Venable, APRN - CNP

## 2023-12-31 NOTE — ED NOTES
Perfect Serve to Dr. Shabbir: Will pt be having surgery today? She can only take her meds crushed in applesauce and she is listed as NPO with sips of water. Can she have her meds in applesauce or should we hold off? Please review. Thanks.

## 2023-12-31 NOTE — ED NOTES
Pt is an inpatient hold in the ED at this time. All inpatient orders released. Changed pt to Q4 VS per inpatient VS policy. Pt denies needs.

## 2024-01-01 LAB
ANION GAP SERPL CALCULATED.3IONS-SCNC: 8 MMOL/L (ref 3–16)
BACTERIA UR CULT: NORMAL
BASOPHILS # BLD: 0.1 K/UL (ref 0–0.2)
BASOPHILS NFR BLD: 1.3 %
BUN SERPL-MCNC: 24 MG/DL (ref 7–20)
CALCIUM SERPL-MCNC: 8.9 MG/DL (ref 8.3–10.6)
CHLORIDE SERPL-SCNC: 105 MMOL/L (ref 99–110)
CO2 SERPL-SCNC: 24 MMOL/L (ref 21–32)
CREAT SERPL-MCNC: 0.9 MG/DL (ref 0.6–1.2)
DEPRECATED RDW RBC AUTO: 13.4 % (ref 12.4–15.4)
EOSINOPHIL # BLD: 0.2 K/UL (ref 0–0.6)
EOSINOPHIL NFR BLD: 2.3 %
EST. AVERAGE GLUCOSE BLD GHB EST-MCNC: 131.2 MG/DL
GFR SERPLBLD CREATININE-BSD FMLA CKD-EPI: >60 ML/MIN/{1.73_M2}
GLUCOSE BLD-MCNC: 100 MG/DL (ref 70–99)
GLUCOSE BLD-MCNC: 105 MG/DL (ref 70–99)
GLUCOSE BLD-MCNC: 121 MG/DL (ref 70–99)
GLUCOSE BLD-MCNC: 148 MG/DL (ref 70–99)
GLUCOSE BLD-MCNC: 173 MG/DL (ref 70–99)
GLUCOSE BLD-MCNC: 87 MG/DL (ref 70–99)
GLUCOSE SERPL-MCNC: 102 MG/DL (ref 70–99)
HBA1C MFR BLD: 6.2 %
HCT VFR BLD AUTO: 32.6 % (ref 36–48)
HGB BLD-MCNC: 11.1 G/DL (ref 12–16)
LYMPHOCYTES # BLD: 3 K/UL (ref 1–5.1)
LYMPHOCYTES NFR BLD: 38.8 %
MAGNESIUM SERPL-MCNC: 2.1 MG/DL (ref 1.8–2.4)
MCH RBC QN AUTO: 32.4 PG (ref 26–34)
MCHC RBC AUTO-ENTMCNC: 34 G/DL (ref 31–36)
MCV RBC AUTO: 95.3 FL (ref 80–100)
MONOCYTES # BLD: 0.6 K/UL (ref 0–1.3)
MONOCYTES NFR BLD: 8.2 %
NEUTROPHILS # BLD: 3.8 K/UL (ref 1.7–7.7)
NEUTROPHILS NFR BLD: 49.4 %
PERFORMED ON: ABNORMAL
PERFORMED ON: NORMAL
PLATELET # BLD AUTO: 413 K/UL (ref 135–450)
PMV BLD AUTO: 7 FL (ref 5–10.5)
POTASSIUM SERPL-SCNC: 4.3 MMOL/L (ref 3.5–5.1)
RBC # BLD AUTO: 3.42 M/UL (ref 4–5.2)
SODIUM SERPL-SCNC: 137 MMOL/L (ref 136–145)
WBC # BLD AUTO: 7.7 K/UL (ref 4–11)

## 2024-01-01 PROCEDURE — 80048 BASIC METABOLIC PNL TOTAL CA: CPT

## 2024-01-01 PROCEDURE — 51798 US URINE CAPACITY MEASURE: CPT

## 2024-01-01 PROCEDURE — 6370000000 HC RX 637 (ALT 250 FOR IP): Performed by: NURSE PRACTITIONER

## 2024-01-01 PROCEDURE — 83735 ASSAY OF MAGNESIUM: CPT

## 2024-01-01 PROCEDURE — 85025 COMPLETE CBC W/AUTO DIFF WBC: CPT

## 2024-01-01 PROCEDURE — 36415 COLL VENOUS BLD VENIPUNCTURE: CPT

## 2024-01-01 PROCEDURE — 1200000000 HC SEMI PRIVATE

## 2024-01-01 PROCEDURE — 2580000003 HC RX 258: Performed by: NURSE PRACTITIONER

## 2024-01-01 RX ADMIN — Medication 1000 UNITS: at 10:44

## 2024-01-01 RX ADMIN — PANTOPRAZOLE SODIUM 40 MG: 40 TABLET, DELAYED RELEASE ORAL at 06:14

## 2024-01-01 RX ADMIN — DOCUSATE SODIUM 100 MG: 100 CAPSULE, LIQUID FILLED ORAL at 10:44

## 2024-01-01 RX ADMIN — ATORVASTATIN CALCIUM 20 MG: 10 TABLET, FILM COATED ORAL at 10:47

## 2024-01-01 RX ADMIN — Medication 400 MG: at 10:44

## 2024-01-01 RX ADMIN — FERROUS SULFATE TAB 325 MG (65 MG ELEMENTAL FE) 325 MG: 325 (65 FE) TAB at 18:11

## 2024-01-01 RX ADMIN — FOLIC ACID 1 MG: 1 TABLET ORAL at 10:47

## 2024-01-01 RX ADMIN — DONEPEZIL HYDROCHLORIDE 5 MG: 5 TABLET, FILM COATED ORAL at 10:46

## 2024-01-01 RX ADMIN — ARIPIPRAZOLE 5 MG: 10 TABLET ORAL at 10:44

## 2024-01-01 RX ADMIN — DOCUSATE SODIUM 100 MG: 100 CAPSULE, LIQUID FILLED ORAL at 19:56

## 2024-01-01 RX ADMIN — OXYCODONE HYDROCHLORIDE 5 MG: 5 TABLET ORAL at 18:11

## 2024-01-01 RX ADMIN — LEVOTHYROXINE SODIUM 50 MCG: 50 TABLET ORAL at 06:14

## 2024-01-01 RX ADMIN — MICONAZOLE NITRATE: 2 POWDER TOPICAL at 10:47

## 2024-01-01 RX ADMIN — Medication 400 MG: at 19:56

## 2024-01-01 RX ADMIN — MICONAZOLE NITRATE: 2 POWDER TOPICAL at 19:56

## 2024-01-01 RX ADMIN — FERROUS SULFATE TAB 325 MG (65 MG ELEMENTAL FE) 325 MG: 325 (65 FE) TAB at 10:46

## 2024-01-01 RX ADMIN — Medication 10 ML: at 10:47

## 2024-01-01 RX ADMIN — Medication 10 ML: at 19:55

## 2024-01-01 ASSESSMENT — PAIN SCALES - GENERAL: PAINLEVEL_OUTOF10: 0

## 2024-01-01 NOTE — PROGRESS NOTES
Ehsan Shabbir, MD    Hospitalist Progress Note      Name:  Noni Henry /Age/Sex: 1945  (78 y.o. female)   MRN & CSN:  [id number] & [id number] Admission Date/Time: 2023 10:36 PM   Location:  0544/0544-01 PCP: Jaskaran Nichols MD       I saw and examined the patient on 2024 at 9:18 AM.    Hospital Day: 3  Barriers to discharge: PT OT, pain control, placement  Assessment and Plan:     78 y.o. female with a significant past medical history of hypertension, hyperlipidemia, type 2 diabetes, dementia, COPD, prior CVA, CAD, and systolic heart failure who presents to [facility]'s emergency department via EMS from [facility] with complaint of right hip pain after being found on the ground next to her bed.  ECF staff also noted that she was more lethargic than normal when they found her so they were concerned for possible head injury      Mechanical fall, orthostatic vital signs imaging negative other than below     Right hip pain likely due to chronic right femoral neck/subcapital fracture.  Seen by orthopedic, likely chronic.  Patient without any overt symptoms of pain or limited range of motion.  Noted orthopedic recommendations''rest, icing, avoidance of painful activities, NSAIDs or pain meds as tolerated, and physical therapy ''     TRINH likely due to dehydration.  Nephrology following.  Resolved     Hypotension due to above.  Supportive care     Pyuria without signs of infection.  Supportive care, received 1 dose of antibiotics in ER      Comorbidities include.  Systolic heart failure, compensated, coronary artery disease, type 2 diabetes mellitus, hypothyroidism, dementia    Physical deconditioning.  Will likely need placement, PT OT ordered        DVT prophylaxis Lovenox  Is full code       Subjective:   Patient seen and examined at bedside.  Reports feeling fatigued, mild pain at right hip      Objective:     Intake/Output Summary (Last 24 hours) at 2024 3213  Last data filed

## 2024-01-01 NOTE — PLAN OF CARE
Problem: Safety - Adult  Goal: Free from fall injury  Outcome: Progressing     Problem: Discharge Planning  Goal: Discharge to home or other facility with appropriate resources  Outcome: Progressing

## 2024-01-02 LAB
ANION GAP SERPL CALCULATED.3IONS-SCNC: 7 MMOL/L (ref 3–16)
BASOPHILS # BLD: 0.1 K/UL (ref 0–0.2)
BASOPHILS NFR BLD: 1.2 %
BUN SERPL-MCNC: 18 MG/DL (ref 7–20)
CALCIUM SERPL-MCNC: 9.3 MG/DL (ref 8.3–10.6)
CHLORIDE SERPL-SCNC: 101 MMOL/L (ref 99–110)
CO2 SERPL-SCNC: 28 MMOL/L (ref 21–32)
CREAT SERPL-MCNC: 1 MG/DL (ref 0.6–1.2)
DEPRECATED RDW RBC AUTO: 13 % (ref 12.4–15.4)
EOSINOPHIL # BLD: 0.2 K/UL (ref 0–0.6)
EOSINOPHIL NFR BLD: 2.2 %
GFR SERPLBLD CREATININE-BSD FMLA CKD-EPI: 58 ML/MIN/{1.73_M2}
GLUCOSE BLD-MCNC: 107 MG/DL (ref 70–99)
GLUCOSE BLD-MCNC: 107 MG/DL (ref 70–99)
GLUCOSE BLD-MCNC: 110 MG/DL (ref 70–99)
GLUCOSE BLD-MCNC: 112 MG/DL (ref 70–99)
GLUCOSE BLD-MCNC: 121 MG/DL (ref 70–99)
GLUCOSE BLD-MCNC: 134 MG/DL (ref 70–99)
GLUCOSE BLD-MCNC: 137 MG/DL (ref 70–99)
GLUCOSE SERPL-MCNC: 106 MG/DL (ref 70–99)
HCT VFR BLD AUTO: 34.5 % (ref 36–48)
HGB BLD-MCNC: 11.7 G/DL (ref 12–16)
LYMPHOCYTES # BLD: 2.6 K/UL (ref 1–5.1)
LYMPHOCYTES NFR BLD: 30.5 %
MAGNESIUM SERPL-MCNC: 2.1 MG/DL (ref 1.8–2.4)
MCH RBC QN AUTO: 31 PG (ref 26–34)
MCHC RBC AUTO-ENTMCNC: 33.9 G/DL (ref 31–36)
MCV RBC AUTO: 91.7 FL (ref 80–100)
MONOCYTES # BLD: 0.7 K/UL (ref 0–1.3)
MONOCYTES NFR BLD: 8 %
NEUTROPHILS # BLD: 4.9 K/UL (ref 1.7–7.7)
NEUTROPHILS NFR BLD: 58.1 %
PERFORMED ON: ABNORMAL
PLATELET # BLD AUTO: 424 K/UL (ref 135–450)
PMV BLD AUTO: 6.9 FL (ref 5–10.5)
POTASSIUM SERPL-SCNC: 4.4 MMOL/L (ref 3.5–5.1)
RBC # BLD AUTO: 3.76 M/UL (ref 4–5.2)
SODIUM SERPL-SCNC: 136 MMOL/L (ref 136–145)
WBC # BLD AUTO: 8.5 K/UL (ref 4–11)

## 2024-01-02 PROCEDURE — 2580000003 HC RX 258: Performed by: NURSE PRACTITIONER

## 2024-01-02 PROCEDURE — 36415 COLL VENOUS BLD VENIPUNCTURE: CPT

## 2024-01-02 PROCEDURE — 1200000000 HC SEMI PRIVATE

## 2024-01-02 PROCEDURE — 80048 BASIC METABOLIC PNL TOTAL CA: CPT

## 2024-01-02 PROCEDURE — 97530 THERAPEUTIC ACTIVITIES: CPT

## 2024-01-02 PROCEDURE — 97162 PT EVAL MOD COMPLEX 30 MIN: CPT

## 2024-01-02 PROCEDURE — 85025 COMPLETE CBC W/AUTO DIFF WBC: CPT

## 2024-01-02 PROCEDURE — 83735 ASSAY OF MAGNESIUM: CPT

## 2024-01-02 PROCEDURE — 6370000000 HC RX 637 (ALT 250 FOR IP): Performed by: NURSE PRACTITIONER

## 2024-01-02 PROCEDURE — 97166 OT EVAL MOD COMPLEX 45 MIN: CPT

## 2024-01-02 PROCEDURE — 97535 SELF CARE MNGMENT TRAINING: CPT

## 2024-01-02 RX ADMIN — FERROUS SULFATE TAB 325 MG (65 MG ELEMENTAL FE) 325 MG: 325 (65 FE) TAB at 08:54

## 2024-01-02 RX ADMIN — FOLIC ACID 1 MG: 1 TABLET ORAL at 09:21

## 2024-01-02 RX ADMIN — ATORVASTATIN CALCIUM 20 MG: 10 TABLET, FILM COATED ORAL at 09:21

## 2024-01-02 RX ADMIN — FERROUS SULFATE TAB 325 MG (65 MG ELEMENTAL FE) 325 MG: 325 (65 FE) TAB at 17:14

## 2024-01-02 RX ADMIN — MICONAZOLE NITRATE: 2 POWDER TOPICAL at 09:22

## 2024-01-02 RX ADMIN — Medication 10 ML: at 09:22

## 2024-01-02 RX ADMIN — Medication 400 MG: at 09:21

## 2024-01-02 RX ADMIN — ARIPIPRAZOLE 5 MG: 10 TABLET ORAL at 09:21

## 2024-01-02 RX ADMIN — Medication 10 ML: at 20:28

## 2024-01-02 RX ADMIN — OXYCODONE HYDROCHLORIDE 5 MG: 5 TABLET ORAL at 09:19

## 2024-01-02 RX ADMIN — Medication 400 MG: at 20:33

## 2024-01-02 RX ADMIN — Medication 1000 UNITS: at 09:22

## 2024-01-02 RX ADMIN — ONDANSETRON 4 MG: 4 TABLET, ORALLY DISINTEGRATING ORAL at 09:19

## 2024-01-02 RX ADMIN — MICONAZOLE NITRATE: 2 POWDER TOPICAL at 20:33

## 2024-01-02 RX ADMIN — DOCUSATE SODIUM 100 MG: 100 CAPSULE, LIQUID FILLED ORAL at 09:22

## 2024-01-02 RX ADMIN — DONEPEZIL HYDROCHLORIDE 5 MG: 5 TABLET, FILM COATED ORAL at 08:55

## 2024-01-02 RX ADMIN — LEVOTHYROXINE SODIUM 50 MCG: 50 TABLET ORAL at 05:58

## 2024-01-02 RX ADMIN — PANTOPRAZOLE SODIUM 40 MG: 40 TABLET, DELAYED RELEASE ORAL at 05:58

## 2024-01-02 ASSESSMENT — PAIN DESCRIPTION - DESCRIPTORS
DESCRIPTORS: ACHING

## 2024-01-02 ASSESSMENT — PAIN SCALES - GENERAL
PAINLEVEL_OUTOF10: 3
PAINLEVEL_OUTOF10: 5
PAINLEVEL_OUTOF10: 0
PAINLEVEL_OUTOF10: 3
PAINLEVEL_OUTOF10: 8

## 2024-01-02 ASSESSMENT — PAIN DESCRIPTION - LOCATION
LOCATION: HIP
LOCATION: HEAD

## 2024-01-02 ASSESSMENT — PAIN DESCRIPTION - ORIENTATION
ORIENTATION: RIGHT
ORIENTATION: RIGHT

## 2024-01-02 ASSESSMENT — PAIN DESCRIPTION - PAIN TYPE: TYPE: ACUTE PAIN

## 2024-01-02 NOTE — PROGRESS NOTES
Occupational Therapy  Facility/Department: [place] [address] - MED SURG/ORTHO  Occupational Therapy Initial Assessment    Name: Noni Henry  : 1945  MRN: [id number]  Date of Service: 2024    Discharge Recommendations:  Subacute/Skilled Nursing Facility  OT Equipment Recommendations  Equipment Needed: No (defer)       Patient Diagnosis(es): The primary encounter diagnosis was Closed right hip fracture, initial encounter (HCC). Diagnoses of TRINH (acute kidney injury) ([facility]) and Complicated UTI (urinary tract infection) were also pertinent to this visit.  Past Medical History:  has a past medical history of Acute systolic CHF (congestive heart failure) ([facility]), Alcohol abuse, Anemia, Asthma, Back ache, CAD (coronary artery disease), Cataract, Cerebral artery occlusion with cerebral infarction (HCC), CHF (congestive heart failure) ([facility]), Closed displaced fracture of right femoral neck ([facility]), Colitis, COPD (chronic obstructive pulmonary disease) ([facility]), Dementia ([facility]), Depression, Diabetes mellitus ([facility]), Dysphagia, Frequent falls, Hyperlipidemia, Hypertension, Insomnia, Thyroid disease, and TIA (transient ischemic attack).  Past Surgical History:  has a past surgical history that includes  section; Dental surgery; Abdomen surgery; Cataract removal with implant (Left, [id number]); and eye surgery (10/29/13).    Assessment   Performance deficits / Impairments: Decreased functional mobility ;Decreased safe awareness;Decreased balance;Decreased coordination;Decreased ADL status;Decreased high-level IADLs;Decreased strength  Assessment: Pt presents to [facility] with close right hip fracture, fall. Pt poor historian at this time, upon chart review, pt from [facility]. Pt this date required increased assistance including Mod A x 1 for supine<>sit at EOB. Tolerated sitting EOB ~ 4 minutes but requesting to lay back down due to fatigue. Pt would continue to benefit from acute OT at this time to improve functional mobility and

## 2024-01-02 NOTE — DISCHARGE INSTR - COC
Continuity of Care Form    Patient Name: Noni Henry   :  1945  MRN:  [id number]    Admit date:  2023  Discharge date:  ***    Code Status Order: Full Code   Advance Directives:     Admitting Physician:  No admitting provider for patient encounter.  PCP: Jaskaran Nichols MD    Discharging Nurse: Mellissa Marc  Discharging Hospital Unit/Room#: 0544/0544-01  Discharging Unit Phone Number: [phone]    Emergency Contact:   Extended Emergency Contact Information  Primary Emergency Contact: Jake Henry  Address: [address] [place] [address]  Home Phone: [phone]  Mobile Phone: [phone]  Relation: Spouse  Secondary Emergency Contact: Conrad Henry   [facility]  Home Phone: [phone]  Relation: Unknown    Past Surgical History:  Past Surgical History:   Procedure Laterality Date    ABDOMEN SURGERY      c section    CATARACT REMOVAL WITH IMPLANT Left [id number]    LEFT EYE CATARACT PHACOEMULSIFICATION INTRAOCULAR LENS     SECTION      DENTAL SURGERY      EYE SURGERY  10/29/13     RIGHT EYE CATARACT PHACOEMULSIFICATION INTRAOCULAR LENS       Immunization History:   Immunization History   Administered Date(s) Administered    DTaP 2012    Pneumococcal Conjugate 7-valent (Prevnar7) 2012    Pneumococcal, PCV-13, PREVNAR 13, (age 6w+), IM, 0.5mL 10/17/2017    Pneumococcal, PPSV23, PNEUMOVAX 23, (age 2y+), SC/IM, 0.5mL 2012    Td, unspecified formulation 2012       Active Problems:  Patient Active Problem List   Diagnosis Code    Hyponatremia E87.1    Hypertension I10    Hx of CVA involving R-ICA territory I63.231    CAD (coronary artery disease) I25.10    DM2 (diabetes mellitus, type 2) ([facility]) E11.9    ETOH abuse F10.10    Tobacco abuse Z72.0    Asthma J45.909    Hyperlipidemia E78.5    SIRS (systemic inflammatory response syndrome) ([facility]) R65.10    Thrombocytosis D75.839    Chronic dCHF (grade 1 LVDD) I50.32     Assisted  Transfer  Assisted  Bathing  Dependent  Dressing  Assisted  Toileting  Assisted  Feeding  Independent  Med Admin  Assisted  Med Delivery   whole    Wound Care Documentation and Therapy:        Elimination:  Continence:   Bowel: Yes  Bladder: Yes  Urinary Catheter: None   Colostomy/Ileostomy/Ileal Conduit: No       Date of Last BM: 01/03/2023    Intake/Output Summary (Last 24 hours) at 1/2/2024 1053  Last data filed at 1/2/2024 0403  Gross per 24 hour   Intake 480 ml   Output 550 ml   Net -70 ml     I/O last 3 completed shifts:  In: 870 [P.O.:870]  Out: 550 [Urine:550]    Safety Concerns:     History of Falls (last 30 days) and At Risk for Falls    Impairments/Disabilities:      None    Nutrition Therapy:  Current Nutrition Therapy:   - Oral Diet:  Dysphagia - Soft and Bite Sized    Routes of Feeding: Oral  Liquids: Thin Liquids  Daily Fluid Restriction: no  Last Modified Barium Swallow with Video (Video Swallowing Test): not done    Treatments at the Time of Hospital Discharge:   Respiratory Treatments: ***  Oxygen Therapy:  is not on home oxygen therapy.  Ventilator:    - No ventilator support    Rehab Therapies: {THERAPEUTIC INTERVENTION:[id number]}  Weight Bearing Status/Restrictions: No weight bearing restrictions  Other Medical Equipment (for information only, NOT a DME order):  wheelchair  Other Treatments: ***    Patient's personal belongings (please select all that are sent with patient):  None    RN SIGNATURE:  Electronically signed by Mellissa Marc RN on 1/3/24 at 2:47 PM EST    CASE MANAGEMENT/SOCIAL WORK SECTION    Inpatient Status Date: 12/31/23    Readmission Risk Assessment Score:  Readmission Risk              Risk of Unplanned Readmission:  24           Discharging to Facility/ Agency   [facility]  Skilled Nursing  [facility] [place] [address]  [phone]     / signature: Electronically signed by

## 2024-01-02 NOTE — PROGRESS NOTES
Ehsan Shabbir, MD    Hospitalist Progress Note      Name:  Noni Henry /Age/Sex: 1945  (78 y.o. female)   MRN & CSN:  [id number] & [id number] Admission Date/Time: 2023 10:36 PM   Location:  0544/0544-01 PCP: Jaskaran Nichols MD       I saw and examined the patient on 2024 at 7:14 AM.    Hospital Day: 4  Barriers to discharge: PT OT evaluation, placement  Assessment and Plan:     78 y.o. female with a significant past medical history of hypertension, hyperlipidemia, type 2 diabetes, dementia, COPD, prior CVA, CAD, and systolic heart failure who presents to [facility]'s emergency department via EMS from [facility] with complaint of right hip pain after being found on the ground next to her bed.  ECF staff also noted that she was more lethargic than normal when they found her so they were concerned for possible head injury      Mechanical fall, orthostatic vital signs imaging negative other than below     Right hip pain likely due to chronic right femoral neck/subcapital fracture.  Seen by orthopedic, likely chronic.  Patient without any overt symptoms of pain or limited range of motion.  Noted orthopedic recommendations''rest, icing, avoidance of painful activities, NSAIDs or pain meds as tolerated, and physical therapy ''     TRINH likely due to dehydration.  Nephrology following.  Resolved     Hypertension.  Blood pressure in acceptable range, hypotension now resolved     Pyuria without signs of infection.  Supportive care, received 1 dose of antibiotics in ER      Comorbidities include.  Systolic heart failure, compensated, coronary artery disease, type 2 diabetes mellitus, hypothyroidism, dementia     Physical deconditioning.  Will likely need placement, PT OT ordered        DVT prophylaxis Lovenox  Is full code       Subjective:     Patient seen and examined at bedside.  More awake alert not in acute distress.  Pain is well-controlled    Objective:     Intake/Output Summary

## 2024-01-02 NOTE — DISCHARGE INSTRUCTIONS
Right hip pain  Fractures appear to be old  Continue with activity as tolerated and weightbearing as tolerated to the right hip for transfers  Avoid painful activities  Pain medication as prescribed, as needed  Follow up with Dr Roman in the outpatient setting for hip pain as needed. Call [facility] at [phone] to schedule an appointment or with any questions

## 2024-01-02 NOTE — PROGRESS NOTES
Progress note    CC: right hip pain  Summary:   Noni Henry is being seen by nephrology for Acute kidney injury (TRINH). She is a 78 y.o. female with a PMH significant for hypertension, hyperlipidemia, t2DM, dementia, COPD, prior CVA, CAD and systolic CHF who presented to the ED 12/30/2023 with complaints of right hip pain after she fell and was noted to have a right femoral neck fracture. On presentation she was noted to have a Cr of 1.6     Interval History  Seen at bedside  /89, trending up today  550 mL urine + 3 unmeasured occurrences yesterday  Cr 1.0 today  Electrolytes stable.    Plan:   - amlodipine 5 mg daily on hold. BP ~ 140/90 acceptable given her fall risl  - renal function stable.  - if SBP rises above 150, would resume amlodipine at 2.5 mg oriana Munoz MD  [facility]  Office: [phone]    Assessment:   TRINH:  - baseline Cr 1.0  - Cr on admission 1.6  - BP soft  - TRINH likely pre-renal. Improved with IVF     Hypertension:  - home reigmen: amlodipine 5 mg daily only  - will hold since BP is soft.     Right hip fx:  - per ortho        PE:   Vitals:    01/02/24 0845   BP: (!) 146/83   Pulse: 73   Resp: 18   Temp: 97.8 °F (36.6 °C)   SpO2: 94%       General appearance: in NAD  Respiratory: Respiratory effort appears normal, bilateral equal chest rise, no wheeze, no crackles  Cardiovascular: Ausculation shows RRR n0 edema  Abdomen: No visible mass or tenderness, non distended.

## 2024-01-02 NOTE — CARE COORDINATION
Case Management Assessment  Initial Evaluation    Date/Time of Evaluation: 1/2/2024 12:10 PM  Assessment Completed by: Terri Hodgson RN    If patient is discharged prior to next notation, then this note serves as note for discharge by case management.    Patient Name: Noni Henry                   YOB: 1945  Diagnosis: TRINH (acute kidney injury) ([facility]) [N17.9]  Complicated UTI (urinary tract infection) [N39.0]  Closed right hip fracture, initial encounter ([facility]) [S72.001A]                   Date / Time: 12/30/2023 10:36 PM    Patient Admission Status: Inpatient   Readmission Risk (Low < 19, Mod (19-27), High > 27): Readmission Risk Score: 13    Current PCP: Jaskaran Nichols MD  PCP verified by CM? Yes    Chart Reviewed: Yes      History Provided by: Medical Record, Other (see comment) ([facility])  Patient Orientation: Unable to Assess    Patient Cognition: Dementia / Early Alzheimer's    Hospitalization in the last 30 days (Readmission):  No    If yes, Readmission Assessment in  Navigator will be completed.    Advance Directives:      Code Status: Full Code   Patient's Primary Decision Maker is: Named in Scanned ACP Document    Primary Decision Maker: Jake Henry - Spouse - [phone]    Secondary Decision Maker: Cale Henry - Child - [phone]    Supplemental (Other) Decision Maker: Conrad Henry - Unknown - [phone]    Discharge Planning:    Patient lives with: Other (Comment) Type of Home: Long-Term Care  Primary Care Giver: Other (Comment) (LTC @ [facility])  Patient Support Systems include: Children, Spouse/Significant Other   Current Financial resources: Medicare, Medicaid  Current community resources: None  Current services prior to admission: None            Current DME:              Type of Home Care services:  None    ADLS  Prior functional level: Assistance with the following:, Bathing, Dressing, Feeding, Toileting, Cooking, Housework, Shopping, Mobility  Current functional level:

## 2024-01-02 NOTE — PROGRESS NOTES
Physical Therapy  Facility/Department: [place] [address] - MED SURG/ORTHO  Physical Therapy Initial Assessment    Name: Noni Henry  : 1945  MRN: [id number]  Date of Service: 2024    Discharge Recommendations:  Long Term Care with PT   PT Equipment Recommendations  Equipment Needed: No      Patient Diagnosis(es): The primary encounter diagnosis was Closed right hip fracture, initial encounter (HCC). Diagnoses of TRINH (acute kidney injury) ([facility]) and Complicated UTI (urinary tract infection) were also pertinent to this visit.  Past Medical History:  has a past medical history of Acute systolic CHF (congestive heart failure) ([facility]), Alcohol abuse, Anemia, Asthma, Back ache, CAD (coronary artery disease), Cataract, Cerebral artery occlusion with cerebral infarction (HCC), CHF (congestive heart failure) ([facility]), Closed displaced fracture of right femoral neck ([facility]), Colitis, COPD (chronic obstructive pulmonary disease) ([facility]), Dementia ([facility]), Depression, Diabetes mellitus ([facility]), Dysphagia, Frequent falls, Hyperlipidemia, Hypertension, Insomnia, Thyroid disease, and TIA (transient ischemic attack).  Past Surgical History:  has a past surgical history that includes  section; Dental surgery; Abdomen surgery; Cataract removal with implant (Left, 405222); and eye surgery (10/29/13).    Assessment   Body Structures, Functions, Activity Limitations Requiring Skilled Therapeutic Intervention: Decreased functional mobility ;Decreased cognition;Decreased endurance;Increased pain;Decreased balance;Decreased strength;Decreased safe awareness  Assessment: Pt presents to [facility] due to R hip pain, likely due to chronic R femoral neck/subcapital fracture, seen by ortho with orders for WBAT, conservative treatment. PTA, pt lives at LT and receives assist for transfers t/from w/c. Pt has had multiple falls recently, pt does not recall. Pt has hx of dementia and prior CVA limiting cognition. Currently, pt functioning below baseline  Inpatient CMS 0-100% Score: 86.62  Mobility Inpatient CMS G-Code Modifier : CM    Goals  Short Term Goals  Time Frame for Short Term Goals: 7 days (1/9/24) unless otherwise noted  Short Term Goal 1: Pt will perform bed mobility with min(A)  Short Term Goal 2: Pt will perform transfer bed <> chair with min(A)  Short Term Goal 3: Pt will perform 12-15 reps of BLE exercises by 1/5/24  Patient Goals   Patient Goals : none stated by pt       Education  Patient Education  Education Given To: Patient  Education Provided: Role of Therapy;Plan of Care  Education Method: Verbal  Barriers to Learning: Cognition  Education Outcome: Continued education needed;Unable to demonstrate understanding      Therapy Time   Individual Concurrent Group Co-treatment   Time In 1008         Time Out 1028         Minutes 20         Timed Code Treatment Minutes: 10 Minutes (10 min eval)     If pt is unable to be seen after this session, please let this note serve as discharge summary.  Please see case management note for discharge disposition.  Thank you.    Demi Villavicencio, PT

## 2024-01-03 VITALS
WEIGHT: 121.47 LBS | BODY MASS INDEX: 20.24 KG/M2 | HEIGHT: 65 IN | OXYGEN SATURATION: 96 % | SYSTOLIC BLOOD PRESSURE: 149 MMHG | RESPIRATION RATE: 16 BRPM | HEART RATE: 71 BPM | TEMPERATURE: 97.8 F | DIASTOLIC BLOOD PRESSURE: 70 MMHG

## 2024-01-03 LAB
ANION GAP SERPL CALCULATED.3IONS-SCNC: 7 MMOL/L (ref 3–16)
BASOPHILS # BLD: 0.1 K/UL (ref 0–0.2)
BASOPHILS NFR BLD: 1 %
BUN SERPL-MCNC: 15 MG/DL (ref 7–20)
CALCIUM SERPL-MCNC: 9.4 MG/DL (ref 8.3–10.6)
CHLORIDE SERPL-SCNC: 101 MMOL/L (ref 99–110)
CO2 SERPL-SCNC: 28 MMOL/L (ref 21–32)
CREAT SERPL-MCNC: 0.9 MG/DL (ref 0.6–1.2)
DEPRECATED RDW RBC AUTO: 12.9 % (ref 12.4–15.4)
EOSINOPHIL # BLD: 0.2 K/UL (ref 0–0.6)
EOSINOPHIL NFR BLD: 2.2 %
GFR SERPLBLD CREATININE-BSD FMLA CKD-EPI: >60 ML/MIN/{1.73_M2}
GLUCOSE BLD-MCNC: 107 MG/DL (ref 70–99)
GLUCOSE BLD-MCNC: 179 MG/DL (ref 70–99)
GLUCOSE SERPL-MCNC: 108 MG/DL (ref 70–99)
HCT VFR BLD AUTO: 33.8 % (ref 36–48)
HGB BLD-MCNC: 11.6 G/DL (ref 12–16)
LYMPHOCYTES # BLD: 2.1 K/UL (ref 1–5.1)
LYMPHOCYTES NFR BLD: 30.2 %
MAGNESIUM SERPL-MCNC: 2.1 MG/DL (ref 1.8–2.4)
MCH RBC QN AUTO: 32.8 PG (ref 26–34)
MCHC RBC AUTO-ENTMCNC: 34.4 G/DL (ref 31–36)
MCV RBC AUTO: 95.3 FL (ref 80–100)
MONOCYTES # BLD: 0.5 K/UL (ref 0–1.3)
MONOCYTES NFR BLD: 7 %
NEUTROPHILS # BLD: 4.2 K/UL (ref 1.7–7.7)
NEUTROPHILS NFR BLD: 59.6 %
NT-PROBNP SERPL-MCNC: 132 PG/ML (ref 0–449)
PERFORMED ON: ABNORMAL
PERFORMED ON: ABNORMAL
PLATELET # BLD AUTO: 411 K/UL (ref 135–450)
PMV BLD AUTO: 6.9 FL (ref 5–10.5)
POTASSIUM SERPL-SCNC: 4.4 MMOL/L (ref 3.5–5.1)
RBC # BLD AUTO: 3.55 M/UL (ref 4–5.2)
SODIUM SERPL-SCNC: 136 MMOL/L (ref 136–145)
WBC # BLD AUTO: 7 K/UL (ref 4–11)

## 2024-01-03 PROCEDURE — 6370000000 HC RX 637 (ALT 250 FOR IP): Performed by: NURSE PRACTITIONER

## 2024-01-03 PROCEDURE — 80048 BASIC METABOLIC PNL TOTAL CA: CPT

## 2024-01-03 PROCEDURE — 83880 ASSAY OF NATRIURETIC PEPTIDE: CPT

## 2024-01-03 PROCEDURE — 36415 COLL VENOUS BLD VENIPUNCTURE: CPT

## 2024-01-03 PROCEDURE — 85025 COMPLETE CBC W/AUTO DIFF WBC: CPT

## 2024-01-03 PROCEDURE — 83735 ASSAY OF MAGNESIUM: CPT

## 2024-01-03 RX ORDER — AMLODIPINE BESYLATE 5 MG/1
2.5 TABLET ORAL DAILY
Qty: 30 TABLET | Refills: 3 | Status: SHIPPED | OUTPATIENT
Start: 2024-01-03

## 2024-01-03 RX ORDER — INSULIN LISPRO 100 [IU]/ML
0-4 INJECTION, SOLUTION INTRAVENOUS; SUBCUTANEOUS
Status: DISCONTINUED | OUTPATIENT
Start: 2024-01-03 | End: 2024-01-03 | Stop reason: HOSPADM

## 2024-01-03 RX ORDER — OXYCODONE HYDROCHLORIDE 5 MG/1
5 TABLET ORAL EVERY 8 HOURS PRN
Qty: 9 TABLET | Refills: 0 | Status: SHIPPED | OUTPATIENT
Start: 2024-01-03 | End: 2024-01-06

## 2024-01-03 RX ADMIN — LEVOTHYROXINE SODIUM 50 MCG: 50 TABLET ORAL at 06:48

## 2024-01-03 RX ADMIN — Medication 1000 UNITS: at 08:28

## 2024-01-03 RX ADMIN — ATORVASTATIN CALCIUM 20 MG: 10 TABLET, FILM COATED ORAL at 08:28

## 2024-01-03 RX ADMIN — DOCUSATE SODIUM 100 MG: 100 CAPSULE, LIQUID FILLED ORAL at 08:28

## 2024-01-03 RX ADMIN — MICONAZOLE NITRATE: 2 POWDER TOPICAL at 08:29

## 2024-01-03 RX ADMIN — OXYCODONE HYDROCHLORIDE 5 MG: 5 TABLET ORAL at 06:48

## 2024-01-03 RX ADMIN — PANTOPRAZOLE SODIUM 40 MG: 40 TABLET, DELAYED RELEASE ORAL at 06:48

## 2024-01-03 RX ADMIN — DONEPEZIL HYDROCHLORIDE 5 MG: 5 TABLET, FILM COATED ORAL at 08:28

## 2024-01-03 RX ADMIN — ARIPIPRAZOLE 5 MG: 10 TABLET ORAL at 08:28

## 2024-01-03 RX ADMIN — FOLIC ACID 1 MG: 1 TABLET ORAL at 09:19

## 2024-01-03 RX ADMIN — FERROUS SULFATE TAB 325 MG (65 MG ELEMENTAL FE) 325 MG: 325 (65 FE) TAB at 08:28

## 2024-01-03 RX ADMIN — Medication 400 MG: at 08:28

## 2024-01-03 ASSESSMENT — PAIN SCALES - GENERAL: PAINLEVEL_OUTOF10: 0

## 2024-01-03 ASSESSMENT — PAIN DESCRIPTION - LOCATION: LOCATION: HEAD;NECK

## 2024-01-03 NOTE — PROGRESS NOTES
Report called to [facility], spoke with Ebonie. IV removed at this time without incident, AVS sent with transportation. No questions or concerns noted.

## 2024-01-03 NOTE — DISCHARGE SUMMARY
Discharge Summary  Ehsan Shabbir, MD     Name:  Noni Henry /Age/Sex: 1945  (78 y.o. female)   MRN & CSN:  [id number] & [id number] Admission Date/Time: 2023 10:36 PM   Attending:  Shabbir, Ehsan, MD Discharging Physician: Ehsan Shabbir, MD     Hospital Course:   78 y.o. female with a significant past medical history of hypertension, hyperlipidemia, type 2 diabetes, dementia, COPD, prior CVA, CAD, and systolic heart failure who presents to [facility]'s emergency department via EMS from [facility] with complaint of right hip pain after being found on the ground next to her bed.  ECF staff also noted that she was more lethargic than normal when they found her so they were concerned for possible head injury      Mechanical fall, orthostatic vital signs imaging negative other than below     Right hip pain likely due to chronic right femoral neck/subcapital fracture.  Seen by orthopedic, likely chronic.  Patient without any overt symptoms of pain or limited range of motion.  Noted orthopedic recommendations''rest, icing, avoidance of painful activities, NSAIDs or pain meds as tolerated, and physical therapy '' pain improved, PT OT recommendations noted     TRINH likely due to dehydration.  Nephrology following.  Resolved     Hypertension.  Blood pressure in acceptable range, hypotension now resolved      Comorbidities include.  Systolic heart failure, compensated, coronary artery disease, type 2 diabetes mellitus, hypothyroidism, dementia     Physical deconditioning.  Will likely need placement, PT OT ordered          The patient expressed appropriate understanding of and agreement with the discharge recommendations, medications, and plan.     Consults this admission:  IP CONSULT TO NEPHROLOGY  IP CONSULT TO HEART FAILURE NURSE/COORDINATOR  IP CONSULT TO DIETITIAN  IP CONSULT TO CASE MANAGEMENT  IP CONSULT TO ORTHOPEDIC SURGERY  IP CONSULT TO CARDIOLOGY    Discharge Instruction:     Follow  chloride 20 MEQ extended release tablet  Commonly known as: KLOR-CON M     vitamin B-1 100 MG tablet  Commonly known as: THIAMINE     vitamin D 25 MCG (1000 UT) Tabs tablet  Commonly known as: CHOLECALCIFEROL            ASK your doctor about these medications      albuterol sulfate  (90 Base) MCG/ACT inhaler  Commonly known as: PROVENTIL;VENTOLIN;PROAIR     ARIPiprazole 5 MG tablet  Commonly known as: Abilify  Take 1 tablet by mouth daily     omeprazole 20 MG delayed release capsule  Commonly known as: PRILOSEC     polyethylene glycol 17 g packet  Commonly known as: GLYCOLAX  Take 17 g by mouth daily as needed for Constipation               Where to Get Your Medications        These medications were sent to [id number] - [place], [place]  [address] [place] [address] -  [phone] - [phone] [address] [facility] [address]      Phone: [phone]   amLODIPine 5 MG tablet       You can get these medications from any pharmacy    Bring a paper prescription for each of these medications  oxyCODONE 5 MG immediate release tablet          Objective Findings at Discharge:     BP (!) 149/70   Pulse 71   Temp 97.8 °F (36.6 °C) (Oral)   Resp 16   Ht 1.651 m (5' 5\")   Wt 55.1 kg (121 lb 7.6 oz)   SpO2 96%   BMI 20.21 kg/m²            PHYSICAL EXAM     GEN:  Awake female, sitting upright in bed in no apparent distress.  RESP:  CTAB, no  wheezes, rales or rhonchi.  CVS:   RRR. No  peripheral edema.  GI/:  S/NT/ND BS+   NEURO: No focal defecits.  PSYCH:  Awake, alert .  Affect appropriate.    LABS: Reviewd    IMAGING: Reviwed    45 Minutes spent in preparation of discharging this patient including face to face encounter, discussions with the patient/family, medication reconciliation, and transition of care preparation.     Electronically signed by Ehsan Shabbir, MD on 1/3/2024 at 2:03 PM

## 2024-01-03 NOTE — CARE COORDINATION
CASE MANAGEMENT DISCHARGE SUMMARY      Discharge to: [facility]    Precertification completed: N/A  Hospital Exemption Notification (HENS) completed: N/A retuning to the facility    IMM given: (date) 12/31/23/ verbal 1/3/24 son Cale Donovan Durable Medical Equipment ordered/agency: Per the facility    Transportation:       Medical Transport explained to pt/family. Pt/family voice no agency preference.    Agency used: [facility]   time: 1500   Ambulance form completed: Yes    Confirmed discharge plan with: Patient returning to [facility]     Patient: yes     Family:  yes   Name:  Cale Contact number: [phone]     Facility/Agency, name:  They will pull KATHERINE and AVS no need to be  faxed   Phone number for report to facility: [phone]     RN, name: Mellissa    Note: Discharging nurse to complete KATHERINE, reconcile AVS, and place final copy with patient's discharge packet. RN to ensure that written prescriptions for  Level II medications are sent with patient to the facility as per protocol.    .Carolyn Hager RN

## 2024-01-03 NOTE — PLAN OF CARE
Encourage patient to ask for pain medication before pain is above a 5 on the 1/10 pain scale.  Medicate before PT or increased activity. Use alternatives such as ice (if ordered) or distraction as often as possible to minimize  need for medication. Encourage patient to ask for pain medication before pain is above a 5 on the 1/10 pain scale.  Medicate before PT or increased activity. Use alternatives such as ice (if ordered) or distraction as often as possible to minimize  need for medication.   Strict I and o likes iced tea, encourage fluids.  Needs soft food has missing teeth and no dentures.  Keep heels off bed and monitor skin for red areas, turn every 2 hours.

## 2024-01-10 NOTE — PROGRESS NOTES
Physician Progress Note      PATIENT:               TORSTEN JOHNSON  [facility] #:                  [id number]  :                       1945  ADMIT DATE:       2023 10:36 PM  DISCH DATE:        1/3/2024 3:39 PM  RESPONDING  PROVIDER #:        Ehsan Shabbir MD          QUERY TEXT:    Pt admitted with chronic right femoral neck/subcapital fracture. Pt noted to   have osteopenia. If possible, please document in progress notes and discharge   summary if you are evaluating and/or treating any of the following:      The medical record reflects the following:  Risk Factors: osteopenia, age, sex  Clinical Indicators:  H&P -  \" CT A/P showed possible subcapital right femoral neck fracture on the right   side prompting right hip x-rays.  Right hip x-ray showed severe osteopenia,   probable mildly impacted transverse fracture along the base of the right   femoral neck.  Severe osteopenia limiting the exam. Probable mildly impacted transverse   fracture along the base of the right femoral neck which appears similar to the   recent CT scan with no obvious displaced bony fragment.\"  XR RIGHT HIP PELVIS -  \"The right femoral neck is shortened appearance and there is mild bony  irregularity and sclerosis along the base of the femoral neck which was seen  on the recent CT scan and is unchanged.The right femoral head remains  within the acetabulum.The bones are severely osteopenic.There is mild  joint space narrowing in the left hip.The pelvis is intact.\"  Treatment: Ortho surg consult, pain medication, PT/OT, serial labs, and   supportive care    Thank you,  Jumana Bone RN, BSN, CRCR  Options provided:  -- Pathological right femoral fracture due to osteopenia  -- Other - I will add my own diagnosis  -- Disagree - Not applicable / Not valid  -- Disagree - Clinically unable to determine / Unknown  -- Refer to Clinical Documentation Reviewer    PROVIDER RESPONSE TEXT:    This patient has a pathological right femoral

## 2025-04-21 NOTE — PLAN OF CARE
Appt tye   Problem: Discharge Planning  Goal: Discharge to home or other facility with appropriate resources  Outcome: Completed  Flowsheets (Taken 10/11/2022 0905)  Discharge to home or other facility with appropriate resources: Identify barriers to discharge with patient and caregiver     Problem: Pain  Goal: Verbalizes/displays adequate comfort level or baseline comfort level  10/11/2022 1559 by Chris Valenzuela RN  Outcome: Completed  Flowsheets (Taken 10/11/2022 0846)  Verbalizes/displays adequate comfort level or baseline comfort level: Assess pain using appropriate pain scale  10/11/2022 0526 by Raleigh Roberts RN  Outcome: Progressing  Flowsheets (Taken 10/11/2022 0300)  Verbalizes/displays adequate comfort level or baseline comfort level:   Encourage patient to monitor pain and request assistance   Assess pain using appropriate pain scale   Administer analgesics based on type and severity of pain and evaluate response   Implement non-pharmacological measures as appropriate and evaluate response   Consider cultural and social influences on pain and pain management     Problem: Safety - Adult  Goal: Free from fall injury  10/11/2022 1559 by Chris Valenzuela RN  Outcome: Completed  10/11/2022 0526 by Raleigh Roberts RN  Outcome: Progressing  Flowsheets (Taken 10/11/2022 0526)  Free From Fall Injury: Instruct family/caregiver on patient safety     Problem: Skin/Tissue Integrity  Goal: Absence of new skin breakdown  Description: 1. Monitor for areas of redness and/or skin breakdown  2. Assess vascular access sites hourly  3. Every 4-6 hours minimum:  Change oxygen saturation probe site  4. Every 4-6 hours:  If on nasal continuous positive airway pressure, respiratory therapy assess nares and determine need for appliance change or resting period.   10/11/2022 1559 by Chris Valenzuela RN  Outcome: Completed  10/11/2022 0526 by Raleigh Roberts RN  Outcome: Progressing     Problem: Chronic Conditions and Co-morbidities  Goal: Patient's chronic conditions and co-morbidity symptoms are monitored and maintained or improved  10/11/2022 1559 by Nelly Skiff, RN  Outcome: Completed  Flowsheets (Taken 10/11/2022 0905)  Care Plan - Patient's Chronic Conditions and Co-Morbidity Symptoms are Monitored and Maintained or Improved: Monitor and assess patient's chronic conditions and comorbid symptoms for stability, deterioration, or improvement  10/11/2022 0526 by Allyson Palacios RN  Outcome: Progressing  Flowsheets (Taken 10/10/2022 2113)  Care Plan - Patient's Chronic Conditions and Co-Morbidity Symptoms are Monitored and Maintained or Improved:   Monitor and assess patient's chronic conditions and comorbid symptoms for stability, deterioration, or improvement   Collaborate with multidisciplinary team to address chronic and comorbid conditions and prevent exacerbation or deterioration   Update acute care plan with appropriate goals if chronic or comorbid symptoms are exacerbated and prevent overall improvement and discharge

## 2025-06-03 LAB
TSH ULTRASENSITIVE: 0.91 MCIU/ML (ref 0.4–4.5)
VALPROIC ACID LEVEL: 17 MCG/ML (ref 50–100)

## 2025-08-01 ENCOUNTER — APPOINTMENT (OUTPATIENT)
Dept: GENERAL RADIOLOGY | Age: 80
DRG: 871 | End: 2025-08-01
Payer: MEDICARE

## 2025-08-01 ENCOUNTER — HOSPITAL ENCOUNTER (INPATIENT)
Age: 80
LOS: 3 days | DRG: 871 | End: 2025-08-04
Attending: EMERGENCY MEDICINE | Admitting: INTERNAL MEDICINE
Payer: MEDICARE

## 2025-08-01 ENCOUNTER — APPOINTMENT (OUTPATIENT)
Dept: CT IMAGING | Age: 80
DRG: 871 | End: 2025-08-01
Payer: MEDICARE

## 2025-08-01 DIAGNOSIS — R41.82 ALTERED MENTAL STATUS, UNSPECIFIED ALTERED MENTAL STATUS TYPE: ICD-10-CM

## 2025-08-01 DIAGNOSIS — A41.9 SEPTIC SHOCK (HCC): Primary | ICD-10-CM

## 2025-08-01 DIAGNOSIS — R65.21 SEPTIC SHOCK (HCC): Primary | ICD-10-CM

## 2025-08-01 DIAGNOSIS — I21.4 NSTEMI (NON-ST ELEVATED MYOCARDIAL INFARCTION) (HCC): ICD-10-CM

## 2025-08-01 DIAGNOSIS — R07.9 CHEST PAIN, UNSPECIFIED TYPE: ICD-10-CM

## 2025-08-01 DIAGNOSIS — N17.9 AKI (ACUTE KIDNEY INJURY): ICD-10-CM

## 2025-08-01 DIAGNOSIS — E87.20 METABOLIC ACIDOSIS: ICD-10-CM

## 2025-08-01 DIAGNOSIS — N17.9 ACUTE RENAL FAILURE, UNSPECIFIED ACUTE RENAL FAILURE TYPE: ICD-10-CM

## 2025-08-01 LAB
ALBUMIN SERPL-MCNC: 3.9 G/DL (ref 3.4–5)
ALBUMIN/GLOB SERPL: 1.2 {RATIO} (ref 1.1–2.2)
ALP SERPL-CCNC: 125 U/L (ref 40–129)
ALT SERPL-CCNC: 27 U/L (ref 10–40)
ANION GAP SERPL CALCULATED.3IONS-SCNC: 23 MMOL/L (ref 3–16)
ANTI-XA UNFRAC HEPARIN: <0.1 IU/ML (ref 0.3–0.7)
APTT BLD: 32.2 SEC (ref 22.8–35.8)
AST SERPL-CCNC: 44 U/L (ref 15–37)
BASE EXCESS BLDV CALC-SCNC: -15.2 MMOL/L (ref -3–3)
BASOPHILS # BLD: 0.1 K/UL (ref 0–0.2)
BASOPHILS NFR BLD: 0.3 %
BILIRUB SERPL-MCNC: <0.2 MG/DL (ref 0–1)
BUN SERPL-MCNC: 56 MG/DL (ref 7–20)
CALCIUM SERPL-MCNC: 10.5 MG/DL (ref 8.3–10.6)
CHLORIDE SERPL-SCNC: 100 MMOL/L (ref 99–110)
CO2 BLDV-SCNC: 12 MMOL/L
CO2 SERPL-SCNC: 18 MMOL/L (ref 21–32)
COHGB MFR BLDV: 2.5 % (ref 0–1.5)
CREAT SERPL-MCNC: 3.3 MG/DL (ref 0.6–1.2)
D-DIMER QUANTITATIVE: 11.24 UG/ML FEU (ref 0–0.6)
DEPRECATED RDW RBC AUTO: 14.6 % (ref 12.4–15.4)
EKG ATRIAL RATE: 104 BPM
EKG DIAGNOSIS: NORMAL
EKG P AXIS: 88 DEGREES
EKG P-R INTERVAL: 182 MS
EKG Q-T INTERVAL: 370 MS
EKG QRS DURATION: 72 MS
EKG QTC CALCULATION (BAZETT): 486 MS
EKG R AXIS: -29 DEGREES
EKG T AXIS: 74 DEGREES
EKG VENTRICULAR RATE: 104 BPM
EOSINOPHIL # BLD: 0 K/UL (ref 0–0.6)
EOSINOPHIL NFR BLD: 0 %
GFR SERPLBLD CREATININE-BSD FMLA CKD-EPI: 14 ML/MIN/{1.73_M2}
GLUCOSE BLD-MCNC: 297 MG/DL (ref 70–99)
GLUCOSE SERPL-MCNC: 240 MG/DL (ref 70–99)
HCO3 BLDV-SCNC: 10.8 MMOL/L (ref 23–29)
HCT VFR BLD AUTO: 45.9 % (ref 36–48)
HGB BLD-MCNC: 15.2 G/DL (ref 12–16)
INR PPP: 0.99 (ref 0.86–1.14)
LACTATE BLDV-SCNC: 4.2 MMOL/L (ref 0.4–1.9)
LACTATE BLDV-SCNC: 7.5 MMOL/L (ref 0.4–1.9)
LYMPHOCYTES # BLD: 1.8 K/UL (ref 1–5.1)
LYMPHOCYTES NFR BLD: 9.7 %
MCH RBC QN AUTO: 30.2 PG (ref 26–34)
MCHC RBC AUTO-ENTMCNC: 33.1 G/DL (ref 31–36)
MCV RBC AUTO: 91.4 FL (ref 80–100)
METHGB MFR BLDV: 0.2 %
MONOCYTES # BLD: 1.4 K/UL (ref 0–1.3)
MONOCYTES NFR BLD: 7.4 %
NEUTROPHILS # BLD: 15.3 K/UL (ref 1.7–7.7)
NEUTROPHILS NFR BLD: 82.6 %
O2 THERAPY: ABNORMAL
PCO2 BLDV: 26.3 MMHG (ref 40–50)
PERFORMED ON: ABNORMAL
PH BLDV: 7.23 [PH] (ref 7.35–7.45)
PLATELET # BLD AUTO: 463 K/UL (ref 135–450)
PMV BLD AUTO: 7.5 FL (ref 5–10.5)
PO2 BLDV: 39 MMHG (ref 25–40)
POTASSIUM SERPL-SCNC: 4.9 MMOL/L (ref 3.5–5.1)
PROCALCITONIN SERPL IA-MCNC: 0.69 NG/ML (ref 0–0.15)
PROT SERPL-MCNC: 7.2 G/DL (ref 6.4–8.2)
PROTHROMBIN TIME: 13.4 SEC (ref 12.1–14.9)
RBC # BLD AUTO: 5.03 M/UL (ref 4–5.2)
SAO2 % BLDV: 63 %
SODIUM SERPL-SCNC: 141 MMOL/L (ref 136–145)
TROPONIN, HIGH SENSITIVITY: 109 NG/L (ref 0–14)
TROPONIN, HIGH SENSITIVITY: 94 NG/L (ref 0–14)
VALPROATE SERPL-MCNC: 13 UG/ML (ref 50–100)
WBC # BLD AUTO: 18.6 K/UL (ref 4–11)

## 2025-08-01 PROCEDURE — 74176 CT ABD & PELVIS W/O CONTRAST: CPT

## 2025-08-01 PROCEDURE — 99285 EMERGENCY DEPT VISIT HI MDM: CPT

## 2025-08-01 PROCEDURE — 85610 PROTHROMBIN TIME: CPT

## 2025-08-01 PROCEDURE — 6360000002 HC RX W HCPCS

## 2025-08-01 PROCEDURE — 85730 THROMBOPLASTIN TIME PARTIAL: CPT

## 2025-08-01 PROCEDURE — 71045 X-RAY EXAM CHEST 1 VIEW: CPT

## 2025-08-01 PROCEDURE — 2580000003 HC RX 258

## 2025-08-01 PROCEDURE — 70450 CT HEAD/BRAIN W/O DYE: CPT

## 2025-08-01 PROCEDURE — 85520 HEPARIN ASSAY: CPT

## 2025-08-01 PROCEDURE — 36415 COLL VENOUS BLD VENIPUNCTURE: CPT

## 2025-08-01 PROCEDURE — 84484 ASSAY OF TROPONIN QUANT: CPT

## 2025-08-01 PROCEDURE — 85379 FIBRIN DEGRADATION QUANT: CPT

## 2025-08-01 PROCEDURE — 85025 COMPLETE CBC W/AUTO DIFF WBC: CPT

## 2025-08-01 PROCEDURE — 2500000003 HC RX 250 WO HCPCS

## 2025-08-01 PROCEDURE — 83605 ASSAY OF LACTIC ACID: CPT

## 2025-08-01 PROCEDURE — 6370000000 HC RX 637 (ALT 250 FOR IP): Performed by: INTERNAL MEDICINE

## 2025-08-01 PROCEDURE — 80053 COMPREHEN METABOLIC PANEL: CPT

## 2025-08-01 PROCEDURE — 93010 ELECTROCARDIOGRAM REPORT: CPT | Performed by: INTERNAL MEDICINE

## 2025-08-01 PROCEDURE — 96375 TX/PRO/DX INJ NEW DRUG ADDON: CPT

## 2025-08-01 PROCEDURE — 93005 ELECTROCARDIOGRAM TRACING: CPT

## 2025-08-01 PROCEDURE — 1200000000 HC SEMI PRIVATE

## 2025-08-01 PROCEDURE — 82803 BLOOD GASES ANY COMBINATION: CPT

## 2025-08-01 PROCEDURE — 84145 PROCALCITONIN (PCT): CPT

## 2025-08-01 PROCEDURE — 2580000003 HC RX 258: Performed by: INTERNAL MEDICINE

## 2025-08-01 PROCEDURE — 96365 THER/PROPH/DIAG IV INF INIT: CPT

## 2025-08-01 PROCEDURE — 80164 ASSAY DIPROPYLACETIC ACD TOT: CPT

## 2025-08-01 RX ORDER — 0.9 % SODIUM CHLORIDE 0.9 %
30 INTRAVENOUS SOLUTION INTRAVENOUS ONCE
Status: COMPLETED | OUTPATIENT
Start: 2025-08-01 | End: 2025-08-01

## 2025-08-01 RX ORDER — ATORVASTATIN CALCIUM 10 MG/1
20 TABLET, FILM COATED ORAL NIGHTLY
Status: DISCONTINUED | OUTPATIENT
Start: 2025-08-02 | End: 2025-08-04 | Stop reason: HOSPADM

## 2025-08-01 RX ORDER — HEPARIN SODIUM 1000 [USP'U]/ML
60 INJECTION, SOLUTION INTRAVENOUS; SUBCUTANEOUS ONCE
Status: COMPLETED | OUTPATIENT
Start: 2025-08-01 | End: 2025-08-01

## 2025-08-01 RX ORDER — HEPARIN SODIUM 1000 [USP'U]/ML
30 INJECTION, SOLUTION INTRAVENOUS; SUBCUTANEOUS PRN
Status: DISCONTINUED | OUTPATIENT
Start: 2025-08-01 | End: 2025-08-03

## 2025-08-01 RX ORDER — SODIUM CHLORIDE 0.9 % (FLUSH) 0.9 %
5-40 SYRINGE (ML) INJECTION EVERY 12 HOURS SCHEDULED
Status: DISCONTINUED | OUTPATIENT
Start: 2025-08-01 | End: 2025-08-04 | Stop reason: HOSPADM

## 2025-08-01 RX ORDER — SODIUM CHLORIDE 0.9 % (FLUSH) 0.9 %
5-40 SYRINGE (ML) INJECTION PRN
Status: DISCONTINUED | OUTPATIENT
Start: 2025-08-01 | End: 2025-08-04 | Stop reason: HOSPADM

## 2025-08-01 RX ORDER — MORPHINE SULFATE 2 MG/ML
2 INJECTION, SOLUTION INTRAMUSCULAR; INTRAVENOUS ONCE
Refills: 0 | Status: COMPLETED | OUTPATIENT
Start: 2025-08-01 | End: 2025-08-01

## 2025-08-01 RX ORDER — ACETAMINOPHEN 325 MG/1
650 TABLET ORAL EVERY 6 HOURS PRN
Status: DISCONTINUED | OUTPATIENT
Start: 2025-08-01 | End: 2025-08-01 | Stop reason: SDUPTHER

## 2025-08-01 RX ORDER — INDOMETHACIN 25 MG/1
50 CAPSULE ORAL ONCE
Status: COMPLETED | OUTPATIENT
Start: 2025-08-01 | End: 2025-08-01

## 2025-08-01 RX ORDER — ACETAMINOPHEN 325 MG/1
650 TABLET ORAL EVERY 6 HOURS PRN
Status: DISCONTINUED | OUTPATIENT
Start: 2025-08-01 | End: 2025-08-04 | Stop reason: HOSPADM

## 2025-08-01 RX ORDER — ARIPIPRAZOLE 10 MG/1
5 TABLET ORAL DAILY
Status: DISCONTINUED | OUTPATIENT
Start: 2025-08-02 | End: 2025-08-02

## 2025-08-01 RX ORDER — HEPARIN SODIUM 10000 [USP'U]/100ML
5-30 INJECTION, SOLUTION INTRAVENOUS CONTINUOUS
Status: DISCONTINUED | OUTPATIENT
Start: 2025-08-01 | End: 2025-08-03 | Stop reason: ALTCHOICE

## 2025-08-01 RX ORDER — ACETAMINOPHEN 650 MG/1
650 SUPPOSITORY RECTAL EVERY 6 HOURS PRN
Status: DISCONTINUED | OUTPATIENT
Start: 2025-08-01 | End: 2025-08-04 | Stop reason: HOSPADM

## 2025-08-01 RX ORDER — SODIUM CHLORIDE 9 MG/ML
INJECTION, SOLUTION INTRAVENOUS PRN
Status: DISCONTINUED | OUTPATIENT
Start: 2025-08-01 | End: 2025-08-04 | Stop reason: HOSPADM

## 2025-08-01 RX ORDER — PANTOPRAZOLE SODIUM 40 MG/1
40 TABLET, DELAYED RELEASE ORAL
Status: DISCONTINUED | OUTPATIENT
Start: 2025-08-02 | End: 2025-08-02

## 2025-08-01 RX ORDER — ONDANSETRON 4 MG/1
4 TABLET, ORALLY DISINTEGRATING ORAL EVERY 8 HOURS PRN
Status: DISCONTINUED | OUTPATIENT
Start: 2025-08-01 | End: 2025-08-04 | Stop reason: HOSPADM

## 2025-08-01 RX ORDER — ALBUTEROL SULFATE 90 UG/1
2 INHALANT RESPIRATORY (INHALATION) EVERY 6 HOURS PRN
Status: DISCONTINUED | OUTPATIENT
Start: 2025-08-01 | End: 2025-08-04 | Stop reason: HOSPADM

## 2025-08-01 RX ORDER — ASPIRIN 81 MG/1
81 TABLET, CHEWABLE ORAL DAILY
Status: DISCONTINUED | OUTPATIENT
Start: 2025-08-02 | End: 2025-08-04 | Stop reason: HOSPADM

## 2025-08-01 RX ORDER — POLYETHYLENE GLYCOL 3350 17 G/17G
17 POWDER, FOR SOLUTION ORAL DAILY PRN
Status: DISCONTINUED | OUTPATIENT
Start: 2025-08-01 | End: 2025-08-04 | Stop reason: HOSPADM

## 2025-08-01 RX ORDER — HEPARIN SODIUM 1000 [USP'U]/ML
60 INJECTION, SOLUTION INTRAVENOUS; SUBCUTANEOUS PRN
Status: DISCONTINUED | OUTPATIENT
Start: 2025-08-01 | End: 2025-08-03

## 2025-08-01 RX ORDER — SODIUM CHLORIDE 9 MG/ML
INJECTION, SOLUTION INTRAVENOUS CONTINUOUS
Status: DISCONTINUED | OUTPATIENT
Start: 2025-08-01 | End: 2025-08-02

## 2025-08-01 RX ORDER — LEVOTHYROXINE SODIUM 50 UG/1
50 TABLET ORAL DAILY
Status: DISCONTINUED | OUTPATIENT
Start: 2025-08-02 | End: 2025-08-04 | Stop reason: HOSPADM

## 2025-08-01 RX ORDER — DONEPEZIL HYDROCHLORIDE 5 MG/1
5 TABLET, FILM COATED ORAL
Status: DISCONTINUED | OUTPATIENT
Start: 2025-08-02 | End: 2025-08-02

## 2025-08-01 RX ORDER — ONDANSETRON 2 MG/ML
4 INJECTION INTRAMUSCULAR; INTRAVENOUS EVERY 6 HOURS PRN
Status: DISCONTINUED | OUTPATIENT
Start: 2025-08-01 | End: 2025-08-04 | Stop reason: HOSPADM

## 2025-08-01 RX ORDER — ONDANSETRON 2 MG/ML
4 INJECTION INTRAMUSCULAR; INTRAVENOUS ONCE
Status: COMPLETED | OUTPATIENT
Start: 2025-08-01 | End: 2025-08-01

## 2025-08-01 RX ADMIN — SODIUM CHLORIDE 1605 ML: 0.9 INJECTION, SOLUTION INTRAVENOUS at 15:43

## 2025-08-01 RX ADMIN — HEPARIN SODIUM 3200 UNITS: 1000 INJECTION INTRAVENOUS; SUBCUTANEOUS at 23:58

## 2025-08-01 RX ADMIN — MORPHINE SULFATE 2 MG: 2 INJECTION, SOLUTION INTRAMUSCULAR; INTRAVENOUS at 16:28

## 2025-08-01 RX ADMIN — PIPERACILLIN AND TAZOBACTAM 4500 MG: 4; .5 INJECTION, POWDER, LYOPHILIZED, FOR SOLUTION INTRAVENOUS at 18:18

## 2025-08-01 RX ADMIN — HEPARIN SODIUM 3200 UNITS: 1000 INJECTION INTRAVENOUS; SUBCUTANEOUS at 18:18

## 2025-08-01 RX ADMIN — VANCOMYCIN HYDROCHLORIDE 1250 MG: 10 INJECTION, POWDER, LYOPHILIZED, FOR SOLUTION INTRAVENOUS at 15:49

## 2025-08-01 RX ADMIN — ONDANSETRON 4 MG: 2 INJECTION, SOLUTION INTRAMUSCULAR; INTRAVENOUS at 15:46

## 2025-08-01 RX ADMIN — SODIUM BICARBONATE 50 MEQ: 84 INJECTION INTRAVENOUS at 15:44

## 2025-08-01 RX ADMIN — SODIUM CHLORIDE: 0.9 INJECTION, SOLUTION INTRAVENOUS at 20:26

## 2025-08-01 RX ADMIN — HEPARIN SODIUM AND DEXTROSE 12 UNITS/KG/HR: 10000; 5 INJECTION INTRAVENOUS at 18:22

## 2025-08-01 ASSESSMENT — PAIN SCALES - PAIN ASSESSMENT IN ADVANCED DEMENTIA (PAINAD)
CONSOLABILITY: NO NEED TO CONSOLE
FACIALEXPRESSION: SAD, FRIGHTENED, FROWN
BODYLANGUAGE: TENSE, DISTRESSED PACING, FIDGETING
CONSOLABILITY: NO NEED TO CONSOLE
FACIALEXPRESSION: SAD, FRIGHTENED, FROWN
BODYLANGUAGE: TENSE, DISTRESSED PACING, FIDGETING
NEGVOCALIZATION: OCCASIONAL MOAN/GROAN, LOW SPEECH, NEGATIVE/DISAPPROVING QUALITY
TOTALSCORE: 3
BREATHING: NORMAL
NEGVOCALIZATION: OCCASIONAL MOAN/GROAN, LOW SPEECH, NEGATIVE/DISAPPROVING QUALITY
BREATHING: NORMAL
TOTALSCORE: 3

## 2025-08-01 ASSESSMENT — PAIN SCALES - WONG BAKER: WONGBAKER_NUMERICALRESPONSE: HURTS A LITTLE BIT

## 2025-08-01 ASSESSMENT — PAIN - FUNCTIONAL ASSESSMENT: PAIN_FUNCTIONAL_ASSESSMENT: WONG-BAKER FACES

## 2025-08-02 PROBLEM — R65.21 SEPTIC SHOCK (HCC): Status: ACTIVE | Noted: 2022-10-10

## 2025-08-02 PROBLEM — K52.9 ENTERITIS: Status: ACTIVE | Noted: 2025-08-02

## 2025-08-02 LAB
ALBUMIN SERPL-MCNC: 3.4 G/DL (ref 3.4–5)
ANION GAP SERPL CALCULATED.3IONS-SCNC: 15 MMOL/L (ref 3–16)
ANTI-XA UNFRAC HEPARIN: 0.55 IU/ML (ref 0.3–0.7)
ANTI-XA UNFRAC HEPARIN: 0.99 IU/ML (ref 0.3–0.7)
ANTI-XA UNFRAC HEPARIN: >1.1 IU/ML (ref 0.3–0.7)
BACTERIA URNS QL MICRO: ABNORMAL /HPF
BILIRUB UR QL STRIP.AUTO: NEGATIVE
BUN SERPL-MCNC: 57 MG/DL (ref 7–20)
CALCIUM SERPL-MCNC: 8.9 MG/DL (ref 8.3–10.6)
CHLORIDE SERPL-SCNC: 107 MMOL/L (ref 99–110)
CLARITY UR: CLEAR
CO2 SERPL-SCNC: 20 MMOL/L (ref 21–32)
COLOR UR: YELLOW
CREAT SERPL-MCNC: 3 MG/DL (ref 0.6–1.2)
DEPRECATED RDW RBC AUTO: 15.2 % (ref 12.4–15.4)
EPI CELLS #/AREA URNS HPF: ABNORMAL /HPF (ref 0–5)
GFR SERPLBLD CREATININE-BSD FMLA CKD-EPI: 15 ML/MIN/{1.73_M2}
GLUCOSE SERPL-MCNC: 143 MG/DL (ref 70–99)
GLUCOSE UR STRIP.AUTO-MCNC: NEGATIVE MG/DL
HCT VFR BLD AUTO: 38.1 % (ref 36–48)
HGB BLD-MCNC: 12.8 G/DL (ref 12–16)
HGB UR QL STRIP.AUTO: ABNORMAL
KETONES UR STRIP.AUTO-MCNC: 15 MG/DL
LEUKOCYTE ESTERASE UR QL STRIP.AUTO: NEGATIVE
MAGNESIUM SERPL-MCNC: 2.11 MG/DL (ref 1.8–2.4)
MCH RBC QN AUTO: 31.6 PG (ref 26–34)
MCHC RBC AUTO-ENTMCNC: 33.5 G/DL (ref 31–36)
MCV RBC AUTO: 94.3 FL (ref 80–100)
NITRITE UR QL STRIP.AUTO: NEGATIVE
PH UR STRIP.AUTO: 5 [PH] (ref 5–8)
PHOSPHATE SERPL-MCNC: 5.1 MG/DL (ref 2.5–4.9)
PLATELET # BLD AUTO: 357 K/UL (ref 135–450)
PMV BLD AUTO: 7.7 FL (ref 5–10.5)
POTASSIUM SERPL-SCNC: 5.3 MMOL/L (ref 3.5–5.1)
PROT UR STRIP.AUTO-MCNC: 100 MG/DL
RBC # BLD AUTO: 4.04 M/UL (ref 4–5.2)
RBC #/AREA URNS HPF: ABNORMAL /HPF (ref 0–4)
SODIUM SERPL-SCNC: 142 MMOL/L (ref 136–145)
SP GR UR STRIP.AUTO: >=1.03 (ref 1–1.03)
TROPONIN, HIGH SENSITIVITY: 89 NG/L (ref 0–14)
UA COMPLETE W REFLEX CULTURE PNL UR: YES
UA DIPSTICK W REFLEX MICRO PNL UR: YES
URN SPEC COLLECT METH UR: ABNORMAL
UROBILINOGEN UR STRIP-ACNC: 0.2 E.U./DL
WBC # BLD AUTO: 21.2 K/UL (ref 4–11)
WBC #/AREA URNS HPF: ABNORMAL /HPF (ref 0–5)

## 2025-08-02 PROCEDURE — 87086 URINE CULTURE/COLONY COUNT: CPT

## 2025-08-02 PROCEDURE — 80069 RENAL FUNCTION PANEL: CPT

## 2025-08-02 PROCEDURE — 1200000000 HC SEMI PRIVATE

## 2025-08-02 PROCEDURE — 85520 HEPARIN ASSAY: CPT

## 2025-08-02 PROCEDURE — 2580000003 HC RX 258: Performed by: INTERNAL MEDICINE

## 2025-08-02 PROCEDURE — 85027 COMPLETE CBC AUTOMATED: CPT

## 2025-08-02 PROCEDURE — 51798 US URINE CAPACITY MEASURE: CPT

## 2025-08-02 PROCEDURE — 6360000002 HC RX W HCPCS

## 2025-08-02 PROCEDURE — 6370000000 HC RX 637 (ALT 250 FOR IP): Performed by: INTERNAL MEDICINE

## 2025-08-02 PROCEDURE — 6360000002 HC RX W HCPCS: Performed by: INTERNAL MEDICINE

## 2025-08-02 PROCEDURE — 2500000003 HC RX 250 WO HCPCS: Performed by: INTERNAL MEDICINE

## 2025-08-02 PROCEDURE — 99222 1ST HOSP IP/OBS MODERATE 55: CPT | Performed by: SURGERY

## 2025-08-02 PROCEDURE — 6370000000 HC RX 637 (ALT 250 FOR IP): Performed by: SURGERY

## 2025-08-02 PROCEDURE — 81001 URINALYSIS AUTO W/SCOPE: CPT

## 2025-08-02 PROCEDURE — 6360000002 HC RX W HCPCS: Performed by: SURGERY

## 2025-08-02 PROCEDURE — 83735 ASSAY OF MAGNESIUM: CPT

## 2025-08-02 PROCEDURE — 84484 ASSAY OF TROPONIN QUANT: CPT

## 2025-08-02 PROCEDURE — 99222 1ST HOSP IP/OBS MODERATE 55: CPT | Performed by: INTERNAL MEDICINE

## 2025-08-02 PROCEDURE — 36415 COLL VENOUS BLD VENIPUNCTURE: CPT

## 2025-08-02 RX ORDER — MICONAZOLE NITRATE 2 G/100G
1 CREAM TOPICAL 2 TIMES DAILY
COMMUNITY
Start: 2025-07-23

## 2025-08-02 RX ORDER — SENNOSIDES 8.6 MG/1
2 TABLET ORAL NIGHTLY
COMMUNITY

## 2025-08-02 RX ORDER — METOPROLOL TARTRATE 25 MG/1
25 TABLET, FILM COATED ORAL EVERY 8 HOURS
Status: DISCONTINUED | OUTPATIENT
Start: 2025-08-02 | End: 2025-08-04 | Stop reason: HOSPADM

## 2025-08-02 RX ORDER — HYDROXYZINE HYDROCHLORIDE 50 MG/1
50 TABLET, FILM COATED ORAL EVERY 8 HOURS PRN
COMMUNITY
Start: 2025-07-21

## 2025-08-02 RX ORDER — DIVALPROEX SODIUM 125 MG
125 TABLET, DELAYED RELEASE (ENTERIC COATED) ORAL 2 TIMES DAILY
COMMUNITY
Start: 2025-04-05

## 2025-08-02 RX ORDER — SODIUM CHLORIDE 9 MG/ML
INJECTION, SOLUTION INTRAVENOUS CONTINUOUS
Status: DISCONTINUED | OUTPATIENT
Start: 2025-08-02 | End: 2025-08-02

## 2025-08-02 RX ORDER — TRAMADOL HYDROCHLORIDE 50 MG/1
50 TABLET ORAL EVERY 6 HOURS PRN
COMMUNITY
Start: 2024-09-25

## 2025-08-02 RX ORDER — LISINOPRIL 10 MG/1
10 TABLET ORAL DAILY
COMMUNITY
Start: 2025-07-01

## 2025-08-02 RX ORDER — OXYCODONE HYDROCHLORIDE 5 MG/1
5 TABLET ORAL EVERY 4 HOURS PRN
Refills: 0 | Status: DISCONTINUED | OUTPATIENT
Start: 2025-08-02 | End: 2025-08-04 | Stop reason: HOSPADM

## 2025-08-02 RX ORDER — HYDROXYZINE HYDROCHLORIDE 25 MG/1
50 TABLET, FILM COATED ORAL 2 TIMES DAILY
COMMUNITY
Start: 2025-05-05

## 2025-08-02 RX ADMIN — MINERAL OIL 330 ML: 999 LIQUID ORAL at 11:04

## 2025-08-02 RX ADMIN — PIPERACILLIN AND TAZOBACTAM 3375 MG: 3; .375 INJECTION, POWDER, FOR SOLUTION INTRAVENOUS at 18:55

## 2025-08-02 RX ADMIN — METOPROLOL TARTRATE 25 MG: 25 TABLET, FILM COATED ORAL at 12:49

## 2025-08-02 RX ADMIN — HEPARIN SODIUM AND DEXTROSE 11 UNITS/KG/HR: 10000; 5 INJECTION INTRAVENOUS at 17:42

## 2025-08-02 RX ADMIN — HYDROMORPHONE HYDROCHLORIDE 0.5 MG: 1 INJECTION, SOLUTION INTRAMUSCULAR; INTRAVENOUS; SUBCUTANEOUS at 19:01

## 2025-08-02 RX ADMIN — SODIUM CHLORIDE: 0.9 INJECTION, SOLUTION INTRAVENOUS at 16:02

## 2025-08-02 RX ADMIN — HYDROMORPHONE HYDROCHLORIDE 0.5 MG: 1 INJECTION, SOLUTION INTRAMUSCULAR; INTRAVENOUS; SUBCUTANEOUS at 09:33

## 2025-08-02 RX ADMIN — HYDROMORPHONE HYDROCHLORIDE 0.5 MG: 1 INJECTION, SOLUTION INTRAMUSCULAR; INTRAVENOUS; SUBCUTANEOUS at 11:45

## 2025-08-02 RX ADMIN — HYDROMORPHONE HYDROCHLORIDE 0.5 MG: 1 INJECTION, SOLUTION INTRAMUSCULAR; INTRAVENOUS; SUBCUTANEOUS at 22:35

## 2025-08-02 RX ADMIN — HEPARIN SODIUM AND DEXTROSE 13 UNITS/KG/HR: 10000; 5 INJECTION INTRAVENOUS at 09:27

## 2025-08-02 RX ADMIN — ASPIRIN 81 MG: 81 TABLET, CHEWABLE ORAL at 09:33

## 2025-08-02 RX ADMIN — SODIUM CHLORIDE: 0.9 INJECTION, SOLUTION INTRAVENOUS at 06:33

## 2025-08-02 RX ADMIN — HYDROMORPHONE HYDROCHLORIDE 0.5 MG: 1 INJECTION, SOLUTION INTRAMUSCULAR; INTRAVENOUS; SUBCUTANEOUS at 16:07

## 2025-08-02 RX ADMIN — PIPERACILLIN AND TAZOBACTAM 3375 MG: 3; .375 INJECTION, POWDER, FOR SOLUTION INTRAVENOUS at 06:30

## 2025-08-02 RX ADMIN — SODIUM BICARBONATE: 84 INJECTION, SOLUTION INTRAVENOUS at 18:56

## 2025-08-02 ASSESSMENT — PAIN - FUNCTIONAL ASSESSMENT
PAIN_FUNCTIONAL_ASSESSMENT: INTOLERABLE, UNABLE TO DO ANY ACTIVE OR PASSIVE ACTIVITIES
PAIN_FUNCTIONAL_ASSESSMENT: PREVENTS OR INTERFERES WITH MANY ACTIVE NOT PASSIVE ACTIVITIES
PAIN_FUNCTIONAL_ASSESSMENT: PREVENTS OR INTERFERES SOME ACTIVE ACTIVITIES AND ADLS
PAIN_FUNCTIONAL_ASSESSMENT: PREVENTS OR INTERFERES SOME ACTIVE ACTIVITIES AND ADLS

## 2025-08-02 ASSESSMENT — PAIN DESCRIPTION - ORIENTATION
ORIENTATION: MID

## 2025-08-02 ASSESSMENT — PAIN SCALES - GENERAL
PAINLEVEL_OUTOF10: 0
PAINLEVEL_OUTOF10: 10

## 2025-08-02 ASSESSMENT — PAIN DESCRIPTION - ONSET
ONSET: ON-GOING
ONSET: ON-GOING

## 2025-08-02 ASSESSMENT — PAIN SCALES - PAIN ASSESSMENT IN ADVANCED DEMENTIA (PAINAD)
BODYLANGUAGE: TENSE, DISTRESSED PACING, FIDGETING
BODYLANGUAGE: RIGID, FISTS CLENCHED, KNEES UP, PUSHING/PULLING AWAY, STRIKES OUT
BREATHING: OCCASIONAL LABORED BREATHING, SHORT PERIOD OF HYPERVENTILATION
NEGVOCALIZATION: REPEATED TROUBLED CALLING OUT, LOUD MOANING/GROANING, CRYING
FACIALEXPRESSION: FACIAL GRIMACING
FACIALEXPRESSION: FACIAL GRIMACING
CONSOLABILITY: UNABLE TO CONSOLE, DISTRACT OR REASSURE
BREATHING: NOISY LABORED BREATHING, LONG PERIODS HYPERVENTILATION, CHEYNE-STOKES RESPIRATIONS
TOTALSCORE: 10
TOTALSCORE: 8
NEGVOCALIZATION: REPEATED TROUBLED CALLING OUT, LOUD MOANING/GROANING, CRYING
CONSOLABILITY: UNABLE TO CONSOLE, DISTRACT OR REASSURE

## 2025-08-02 ASSESSMENT — PAIN DESCRIPTION - LOCATION
LOCATION: ABDOMEN;PELVIS
LOCATION: PELVIS
LOCATION: ABDOMEN
LOCATION: ABDOMEN;PELVIS

## 2025-08-02 ASSESSMENT — PAIN DESCRIPTION - FREQUENCY
FREQUENCY: INTERMITTENT
FREQUENCY: INTERMITTENT

## 2025-08-02 ASSESSMENT — PAIN DESCRIPTION - PAIN TYPE
TYPE: ACUTE PAIN
TYPE: ACUTE PAIN

## 2025-08-02 ASSESSMENT — PAIN DESCRIPTION - DIRECTION
RADIATING_TOWARDS: UNABLE TO DESCRIBE
RADIATING_TOWARDS: UNABLE TO DESCRIBE

## 2025-08-02 ASSESSMENT — PAIN SCALES - WONG BAKER: WONGBAKER_NUMERICALRESPONSE: HURTS EVEN MORE

## 2025-08-02 ASSESSMENT — PAIN DESCRIPTION - DESCRIPTORS
DESCRIPTORS: SHARP;STABBING
DESCRIPTORS: STABBING;SHARP
DESCRIPTORS: SHARP;STABBING
DESCRIPTORS: SHARP;STABBING

## 2025-08-03 LAB
ALBUMIN SERPL-MCNC: 3.2 G/DL (ref 3.4–5)
ALBUMIN/GLOB SERPL: 1.3 {RATIO} (ref 1.1–2.2)
ALP SERPL-CCNC: 74 U/L (ref 40–129)
ALT SERPL-CCNC: 32 U/L (ref 10–40)
ANION GAP SERPL CALCULATED.3IONS-SCNC: 11 MMOL/L (ref 3–16)
AST SERPL-CCNC: 44 U/L (ref 15–37)
BACTERIA UR CULT: NORMAL
BASOPHILS # BLD: 0 K/UL (ref 0–0.2)
BASOPHILS NFR BLD: 0.2 %
BILIRUB SERPL-MCNC: 0.3 MG/DL (ref 0–1)
BUN SERPL-MCNC: 46 MG/DL (ref 7–20)
CALCIUM SERPL-MCNC: 8.3 MG/DL (ref 8.3–10.6)
CHLORIDE SERPL-SCNC: 110 MMOL/L (ref 99–110)
CO2 SERPL-SCNC: 23 MMOL/L (ref 21–32)
CREAT SERPL-MCNC: 2.2 MG/DL (ref 0.6–1.2)
DEPRECATED RDW RBC AUTO: 15.2 % (ref 12.4–15.4)
EOSINOPHIL # BLD: 0 K/UL (ref 0–0.6)
EOSINOPHIL NFR BLD: 0 %
GFR SERPLBLD CREATININE-BSD FMLA CKD-EPI: 22 ML/MIN/{1.73_M2}
GLUCOSE SERPL-MCNC: 146 MG/DL (ref 70–99)
HCT VFR BLD AUTO: 34.3 % (ref 36–48)
HGB BLD-MCNC: 11.2 G/DL (ref 12–16)
LYMPHOCYTES # BLD: 1.1 K/UL (ref 1–5.1)
LYMPHOCYTES NFR BLD: 5.3 %
MCH RBC QN AUTO: 29.5 PG (ref 26–34)
MCHC RBC AUTO-ENTMCNC: 32.6 G/DL (ref 31–36)
MCV RBC AUTO: 90.6 FL (ref 80–100)
MONOCYTES # BLD: 1.7 K/UL (ref 0–1.3)
MONOCYTES NFR BLD: 8.2 %
NEUTROPHILS # BLD: 17.9 K/UL (ref 1.7–7.7)
NEUTROPHILS NFR BLD: 86.3 %
PLATELET # BLD AUTO: 300 K/UL (ref 135–450)
PMV BLD AUTO: 7.3 FL (ref 5–10.5)
POTASSIUM SERPL-SCNC: 4.7 MMOL/L (ref 3.5–5.1)
PROT SERPL-MCNC: 5.7 G/DL (ref 6.4–8.2)
RBC # BLD AUTO: 3.78 M/UL (ref 4–5.2)
SODIUM SERPL-SCNC: 144 MMOL/L (ref 136–145)
WBC # BLD AUTO: 20.7 K/UL (ref 4–11)

## 2025-08-03 PROCEDURE — 2500000003 HC RX 250 WO HCPCS: Performed by: NURSE PRACTITIONER

## 2025-08-03 PROCEDURE — 6360000002 HC RX W HCPCS: Performed by: SURGERY

## 2025-08-03 PROCEDURE — 99232 SBSQ HOSP IP/OBS MODERATE 35: CPT | Performed by: SURGERY

## 2025-08-03 PROCEDURE — 2500000003 HC RX 250 WO HCPCS: Performed by: INTERNAL MEDICINE

## 2025-08-03 PROCEDURE — 85025 COMPLETE CBC W/AUTO DIFF WBC: CPT

## 2025-08-03 PROCEDURE — 6370000000 HC RX 637 (ALT 250 FOR IP): Performed by: INTERNAL MEDICINE

## 2025-08-03 PROCEDURE — 2580000003 HC RX 258: Performed by: INTERNAL MEDICINE

## 2025-08-03 PROCEDURE — 80053 COMPREHEN METABOLIC PANEL: CPT

## 2025-08-03 PROCEDURE — 6360000002 HC RX W HCPCS: Performed by: INTERNAL MEDICINE

## 2025-08-03 PROCEDURE — 6360000002 HC RX W HCPCS

## 2025-08-03 PROCEDURE — 6360000002 HC RX W HCPCS: Performed by: NURSE PRACTITIONER

## 2025-08-03 PROCEDURE — 1200000000 HC SEMI PRIVATE

## 2025-08-03 RX ORDER — HEPARIN SODIUM 10000 [USP'U]/100ML
5-30 INJECTION, SOLUTION INTRAVENOUS CONTINUOUS
Status: ACTIVE | OUTPATIENT
Start: 2025-08-03 | End: 2025-08-03

## 2025-08-03 RX ORDER — ENOXAPARIN SODIUM 100 MG/ML
30 INJECTION SUBCUTANEOUS DAILY
Status: DISCONTINUED | OUTPATIENT
Start: 2025-08-04 | End: 2025-08-04 | Stop reason: HOSPADM

## 2025-08-03 RX ORDER — SODIUM CHLORIDE, SODIUM LACTATE, POTASSIUM CHLORIDE, CALCIUM CHLORIDE 600; 310; 30; 20 MG/100ML; MG/100ML; MG/100ML; MG/100ML
INJECTION, SOLUTION INTRAVENOUS CONTINUOUS
Status: DISCONTINUED | OUTPATIENT
Start: 2025-08-03 | End: 2025-08-04 | Stop reason: HOSPADM

## 2025-08-03 RX ADMIN — HYDROMORPHONE HYDROCHLORIDE 0.5 MG: 1 INJECTION, SOLUTION INTRAMUSCULAR; INTRAVENOUS; SUBCUTANEOUS at 08:40

## 2025-08-03 RX ADMIN — HYDROMORPHONE HYDROCHLORIDE 0.5 MG: 1 INJECTION, SOLUTION INTRAMUSCULAR; INTRAVENOUS; SUBCUTANEOUS at 03:23

## 2025-08-03 RX ADMIN — SODIUM BICARBONATE: 84 INJECTION, SOLUTION INTRAVENOUS at 16:24

## 2025-08-03 RX ADMIN — HYDROMORPHONE HYDROCHLORIDE 0.5 MG: 1 INJECTION, SOLUTION INTRAMUSCULAR; INTRAVENOUS; SUBCUTANEOUS at 16:25

## 2025-08-03 RX ADMIN — WATER 5 MG: 1 INJECTION INTRAMUSCULAR; INTRAVENOUS; SUBCUTANEOUS at 04:33

## 2025-08-03 RX ADMIN — HYDROMORPHONE HYDROCHLORIDE 0.5 MG: 1 INJECTION, SOLUTION INTRAMUSCULAR; INTRAVENOUS; SUBCUTANEOUS at 20:42

## 2025-08-03 RX ADMIN — SODIUM BICARBONATE: 84 INJECTION, SOLUTION INTRAVENOUS at 05:37

## 2025-08-03 RX ADMIN — PIPERACILLIN AND TAZOBACTAM 3375 MG: 3; .375 INJECTION, POWDER, FOR SOLUTION INTRAVENOUS at 05:38

## 2025-08-03 RX ADMIN — ATORVASTATIN CALCIUM 20 MG: 10 TABLET, FILM COATED ORAL at 20:40

## 2025-08-03 RX ADMIN — SODIUM CHLORIDE, SODIUM LACTATE, POTASSIUM CHLORIDE, AND CALCIUM CHLORIDE: .6; .31; .03; .02 INJECTION, SOLUTION INTRAVENOUS at 17:38

## 2025-08-03 RX ADMIN — PIPERACILLIN AND TAZOBACTAM 3375 MG: 3; .375 INJECTION, POWDER, FOR SOLUTION INTRAVENOUS at 17:36

## 2025-08-03 RX ADMIN — HEPARIN SODIUM AND DEXTROSE 11 UNITS/KG/HR: 10000; 5 INJECTION INTRAVENOUS at 03:27

## 2025-08-03 RX ADMIN — METOPROLOL TARTRATE 25 MG: 25 TABLET, FILM COATED ORAL at 20:40

## 2025-08-03 RX ADMIN — HYDROMORPHONE HYDROCHLORIDE 0.5 MG: 1 INJECTION, SOLUTION INTRAMUSCULAR; INTRAVENOUS; SUBCUTANEOUS at 11:59

## 2025-08-03 ASSESSMENT — PAIN SCALES - GENERAL
PAINLEVEL_OUTOF10: 0

## 2025-08-03 ASSESSMENT — PAIN - FUNCTIONAL ASSESSMENT
PAIN_FUNCTIONAL_ASSESSMENT: ACTIVITIES ARE NOT PREVENTED

## 2025-08-03 ASSESSMENT — PAIN SCALES - PAIN ASSESSMENT IN ADVANCED DEMENTIA (PAINAD)
CONSOLABILITY: UNABLE TO CONSOLE, DISTRACT OR REASSURE
NEGVOCALIZATION: REPEATED TROUBLED CALLING OUT, LOUD MOANING/GROANING, CRYING
CONSOLABILITY: DISTRACTED OR REASSURED BY VOICE/TOUCH
FACIALEXPRESSION: SMILING OR INEXPRESSIVE
TOTALSCORE: 0
BREATHING: NORMAL
CONSOLABILITY: NO NEED TO CONSOLE
NEGVOCALIZATION: OCCASIONAL MOAN/GROAN, LOW SPEECH, NEGATIVE/DISAPPROVING QUALITY
BODYLANGUAGE: TENSE, DISTRESSED PACING, FIDGETING
TOTALSCORE: 10
FACIALEXPRESSION: SMILING OR INEXPRESSIVE
BREATHING: NOISY LABORED BREATHING, LONG PERIODS HYPERVENTILATION, CHEYNE-STOKES RESPIRATIONS
BODYLANGUAGE: RIGID, FISTS CLENCHED, KNEES UP, PUSHING/PULLING AWAY, STRIKES OUT
TOTALSCORE: 3
FACIALEXPRESSION: FACIAL GRIMACING
CONSOLABILITY: DISTRACTED OR REASSURED BY VOICE/TOUCH
BREATHING: OCCASIONAL LABORED BREATHING, SHORT PERIOD OF HYPERVENTILATION
BODYLANGUAGE: RELAXED
NEGVOCALIZATION: REPEATED TROUBLED CALLING OUT, LOUD MOANING/GROANING, CRYING
BODYLANGUAGE: RELAXED

## 2025-08-03 ASSESSMENT — PAIN DESCRIPTION - FREQUENCY: FREQUENCY: INTERMITTENT

## 2025-08-03 ASSESSMENT — PAIN SCALES - WONG BAKER
WONGBAKER_NUMERICALRESPONSE: NO HURT
WONGBAKER_NUMERICALRESPONSE: NO HURT
WONGBAKER_NUMERICALRESPONSE: HURTS EVEN MORE
WONGBAKER_NUMERICALRESPONSE: NO HURT

## 2025-08-03 ASSESSMENT — PAIN DESCRIPTION - LOCATION
LOCATION: ABDOMEN

## 2025-08-03 ASSESSMENT — PAIN DESCRIPTION - DESCRIPTORS
DESCRIPTORS: DISCOMFORT

## 2025-08-03 ASSESSMENT — PAIN DESCRIPTION - ORIENTATION
ORIENTATION: MID

## 2025-08-03 ASSESSMENT — PAIN DESCRIPTION - PAIN TYPE: TYPE: ACUTE PAIN

## 2025-08-03 ASSESSMENT — PAIN DESCRIPTION - ONSET: ONSET: AWAKENED FROM SLEEP

## 2025-08-04 ENCOUNTER — APPOINTMENT (OUTPATIENT)
Age: 80
DRG: 871 | End: 2025-08-04
Payer: MEDICARE

## 2025-08-04 ENCOUNTER — APPOINTMENT (OUTPATIENT)
Dept: GENERAL RADIOLOGY | Age: 80
DRG: 871 | End: 2025-08-04
Payer: MEDICARE

## 2025-08-04 VITALS
HEART RATE: 129 BPM | OXYGEN SATURATION: 92 % | RESPIRATION RATE: 33 BRPM | BODY MASS INDEX: 19.66 KG/M2 | WEIGHT: 118 LBS | DIASTOLIC BLOOD PRESSURE: 92 MMHG | HEIGHT: 65 IN | SYSTOLIC BLOOD PRESSURE: 119 MMHG | TEMPERATURE: 97.5 F

## 2025-08-04 LAB
ALBUMIN SERPL-MCNC: 3.1 G/DL (ref 3.4–5)
ALBUMIN/GLOB SERPL: 1.2 {RATIO} (ref 1.1–2.2)
ALP SERPL-CCNC: 71 U/L (ref 40–129)
ALT SERPL-CCNC: 32 U/L (ref 10–40)
ANION GAP SERPL CALCULATED.3IONS-SCNC: 11 MMOL/L (ref 3–16)
AST SERPL-CCNC: 29 U/L (ref 15–37)
BASE EXCESS BLDA CALC-SCNC: 7.2 MMOL/L (ref -3–3)
BASOPHILS # BLD: 0 K/UL (ref 0–0.2)
BASOPHILS NFR BLD: 0.2 %
BILIRUB SERPL-MCNC: 0.3 MG/DL (ref 0–1)
BUN SERPL-MCNC: 37 MG/DL (ref 7–20)
CALCIUM SERPL-MCNC: 8.7 MG/DL (ref 8.3–10.6)
CHLORIDE SERPL-SCNC: 109 MMOL/L (ref 99–110)
CO2 BLDA-SCNC: 32.8 MMOL/L
CO2 SERPL-SCNC: 26 MMOL/L (ref 21–32)
COHGB MFR BLDA: 1 % (ref 0–1.5)
CREAT SERPL-MCNC: 1.7 MG/DL (ref 0.6–1.2)
DEPRECATED RDW RBC AUTO: 15.3 % (ref 12.4–15.4)
ECHO AO ASC DIAM: 2.9 CM
ECHO AO ASCENDING AORTA INDEX: 1.84 CM/M2
ECHO AV AREA PEAK VELOCITY: 1.5 CM2
ECHO AV AREA VTI: 2.1 CM2
ECHO AV AREA/BSA PEAK VELOCITY: 0.9 CM2/M2
ECHO AV AREA/BSA VTI: 1.3 CM2/M2
ECHO AV MEAN GRADIENT: 4 MMHG
ECHO AV MEAN VELOCITY: 0.9 M/S
ECHO AV PEAK GRADIENT: 8 MMHG
ECHO AV PEAK VELOCITY: 1.5 M/S
ECHO AV VELOCITY RATIO: 0.6
ECHO AV VTI: 12.9 CM
ECHO BSA: 1.57 M2
ECHO EST RA PRESSURE: 8 MMHG
ECHO LA AREA 2C: 9.6 CM2
ECHO LA AREA 4C: 10.1 CM2
ECHO LA MAJOR AXIS: 4.7 CM
ECHO LA MINOR AXIS: 4.3 CM
ECHO LA VOL BP: 19 ML (ref 22–52)
ECHO LA VOL MOD A2C: 18 ML (ref 22–52)
ECHO LA VOL MOD A4C: 18 ML (ref 22–52)
ECHO LA VOL/BSA BIPLANE: 12 ML/M2 (ref 16–34)
ECHO LA VOLUME INDEX MOD A2C: 11 ML/M2 (ref 16–34)
ECHO LA VOLUME INDEX MOD A4C: 11 ML/M2 (ref 16–34)
ECHO LV E' LATERAL VELOCITY: 7.46 CM/S
ECHO LV E' SEPTAL VELOCITY: 8.77 CM/S
ECHO LVOT AREA: 2.3 CM2
ECHO LVOT AV VTI INDEX: 0.93
ECHO LVOT DIAM: 1.7 CM
ECHO LVOT MEAN GRADIENT: 2 MMHG
ECHO LVOT PEAK GRADIENT: 4 MMHG
ECHO LVOT PEAK VELOCITY: 0.9 M/S
ECHO LVOT STROKE VOLUME INDEX: 17.2 ML/M2
ECHO LVOT SV: 27.2 ML
ECHO LVOT VTI: 12 CM
ECHO MV A VELOCITY: 0.9 M/S
ECHO MV E DECELERATION TIME (DT): 143 MS
ECHO MV E VELOCITY: 0.35 M/S
ECHO MV E/A RATIO: 0.39
ECHO MV E/E' LATERAL: 4.69
ECHO MV E/E' RATIO (AVERAGED): 4.34
ECHO MV E/E' SEPTAL: 3.99
EOSINOPHIL # BLD: 0 K/UL (ref 0–0.6)
EOSINOPHIL NFR BLD: 0.1 %
GFR SERPLBLD CREATININE-BSD FMLA CKD-EPI: 30 ML/MIN/{1.73_M2}
GLUCOSE SERPL-MCNC: 133 MG/DL (ref 70–99)
HCO3 BLDA-SCNC: 31.5 MMOL/L (ref 21–29)
HCT VFR BLD AUTO: 33.9 % (ref 36–48)
HGB BLD-MCNC: 11.3 G/DL (ref 12–16)
HGB BLDA-MCNC: 13 G/DL (ref 12–16)
LACTATE BLDV-SCNC: 1.1 MMOL/L (ref 0.4–1.9)
LYMPHOCYTES # BLD: 1.2 K/UL (ref 1–5.1)
LYMPHOCYTES NFR BLD: 8.2 %
MCH RBC QN AUTO: 29.6 PG (ref 26–34)
MCHC RBC AUTO-ENTMCNC: 33.5 G/DL (ref 31–36)
MCV RBC AUTO: 88.4 FL (ref 80–100)
METHGB MFR BLDA: 0 %
MONOCYTES # BLD: 0.9 K/UL (ref 0–1.3)
MONOCYTES NFR BLD: 6.3 %
NEUTROPHILS # BLD: 12.8 K/UL (ref 1.7–7.7)
NEUTROPHILS NFR BLD: 85.2 %
O2 THERAPY: ABNORMAL
PCO2 BLDA: 43.2 MMHG (ref 35–45)
PH BLDA: 7.48 [PH] (ref 7.35–7.45)
PLATELET # BLD AUTO: 339 K/UL (ref 135–450)
PMV BLD AUTO: 7.5 FL (ref 5–10.5)
PO2 BLDA: 54.7 MMHG (ref 75–108)
POTASSIUM SERPL-SCNC: 4.2 MMOL/L (ref 3.5–5.1)
PROT SERPL-MCNC: 5.7 G/DL (ref 6.4–8.2)
RBC # BLD AUTO: 3.83 M/UL (ref 4–5.2)
SAO2 % BLDA: 89.6 %
SODIUM SERPL-SCNC: 146 MMOL/L (ref 136–145)
WBC # BLD AUTO: 15 K/UL (ref 4–11)

## 2025-08-04 PROCEDURE — 6370000000 HC RX 637 (ALT 250 FOR IP): Performed by: INTERNAL MEDICINE

## 2025-08-04 PROCEDURE — 82803 BLOOD GASES ANY COMBINATION: CPT

## 2025-08-04 PROCEDURE — 93306 TTE W/DOPPLER COMPLETE: CPT

## 2025-08-04 PROCEDURE — 6360000002 HC RX W HCPCS: Performed by: INTERNAL MEDICINE

## 2025-08-04 PROCEDURE — 99222 1ST HOSP IP/OBS MODERATE 55: CPT | Performed by: STUDENT IN AN ORGANIZED HEALTH CARE EDUCATION/TRAINING PROGRAM

## 2025-08-04 PROCEDURE — 71045 X-RAY EXAM CHEST 1 VIEW: CPT

## 2025-08-04 PROCEDURE — 6370000000 HC RX 637 (ALT 250 FOR IP)

## 2025-08-04 PROCEDURE — 2580000003 HC RX 258: Performed by: INTERNAL MEDICINE

## 2025-08-04 PROCEDURE — 83605 ASSAY OF LACTIC ACID: CPT

## 2025-08-04 PROCEDURE — 93306 TTE W/DOPPLER COMPLETE: CPT | Performed by: INTERNAL MEDICINE

## 2025-08-04 PROCEDURE — 37799 UNLISTED PX VASCULAR SURGERY: CPT

## 2025-08-04 PROCEDURE — 94761 N-INVAS EAR/PLS OXIMETRY MLT: CPT

## 2025-08-04 PROCEDURE — 99232 SBSQ HOSP IP/OBS MODERATE 35: CPT | Performed by: SURGERY

## 2025-08-04 PROCEDURE — 85025 COMPLETE CBC W/AUTO DIFF WBC: CPT

## 2025-08-04 PROCEDURE — 80053 COMPREHEN METABOLIC PANEL: CPT

## 2025-08-04 PROCEDURE — 36415 COLL VENOUS BLD VENIPUNCTURE: CPT

## 2025-08-04 PROCEDURE — 94660 CPAP INITIATION&MGMT: CPT

## 2025-08-04 PROCEDURE — 2500000003 HC RX 250 WO HCPCS: Performed by: INTERNAL MEDICINE

## 2025-08-04 PROCEDURE — 6360000002 HC RX W HCPCS

## 2025-08-04 PROCEDURE — 6360000002 HC RX W HCPCS: Performed by: SURGERY

## 2025-08-04 PROCEDURE — 94640 AIRWAY INHALATION TREATMENT: CPT

## 2025-08-04 PROCEDURE — 99232 SBSQ HOSP IP/OBS MODERATE 35: CPT | Performed by: INTERNAL MEDICINE

## 2025-08-04 PROCEDURE — 2700000000 HC OXYGEN THERAPY PER DAY

## 2025-08-04 RX ORDER — METOPROLOL TARTRATE 1 MG/ML
5 INJECTION, SOLUTION INTRAVENOUS EVERY 8 HOURS
Status: DISCONTINUED | OUTPATIENT
Start: 2025-08-04 | End: 2025-08-04 | Stop reason: HOSPADM

## 2025-08-04 RX ORDER — IPRATROPIUM BROMIDE AND ALBUTEROL SULFATE 2.5; .5 MG/3ML; MG/3ML
1 SOLUTION RESPIRATORY (INHALATION)
Status: DISCONTINUED | OUTPATIENT
Start: 2025-08-04 | End: 2025-08-04 | Stop reason: HOSPADM

## 2025-08-04 RX ORDER — IPRATROPIUM BROMIDE AND ALBUTEROL SULFATE 2.5; .5 MG/3ML; MG/3ML
1 SOLUTION RESPIRATORY (INHALATION) ONCE
Status: COMPLETED | OUTPATIENT
Start: 2025-08-04 | End: 2025-08-04

## 2025-08-04 RX ORDER — PROCHLORPERAZINE EDISYLATE 5 MG/ML
10 INJECTION INTRAMUSCULAR; INTRAVENOUS EVERY 6 HOURS PRN
Status: DISCONTINUED | OUTPATIENT
Start: 2025-08-04 | End: 2025-08-04 | Stop reason: HOSPADM

## 2025-08-04 RX ORDER — METOPROLOL TARTRATE 1 MG/ML
5 INJECTION, SOLUTION INTRAVENOUS EVERY 8 HOURS
Status: DISCONTINUED | OUTPATIENT
Start: 2025-08-04 | End: 2025-08-04

## 2025-08-04 RX ADMIN — OXYCODONE HYDROCHLORIDE 5 MG: 5 TABLET ORAL at 03:50

## 2025-08-04 RX ADMIN — HYDROMORPHONE HYDROCHLORIDE 0.5 MG: 1 INJECTION, SOLUTION INTRAMUSCULAR; INTRAVENOUS; SUBCUTANEOUS at 11:41

## 2025-08-04 RX ADMIN — ENOXAPARIN SODIUM 30 MG: 100 INJECTION SUBCUTANEOUS at 08:56

## 2025-08-04 RX ADMIN — LEVOTHYROXINE SODIUM 50 MCG: 0.05 TABLET ORAL at 08:57

## 2025-08-04 RX ADMIN — OXYCODONE HYDROCHLORIDE 5 MG: 5 TABLET ORAL at 08:57

## 2025-08-04 RX ADMIN — METOPROLOL TARTRATE 25 MG: 25 TABLET, FILM COATED ORAL at 03:50

## 2025-08-04 RX ADMIN — PIPERACILLIN AND TAZOBACTAM 3375 MG: 3; .375 INJECTION, POWDER, FOR SOLUTION INTRAVENOUS at 06:09

## 2025-08-04 RX ADMIN — HYDROMORPHONE HYDROCHLORIDE 0.5 MG: 1 INJECTION, SOLUTION INTRAMUSCULAR; INTRAVENOUS; SUBCUTANEOUS at 00:55

## 2025-08-04 RX ADMIN — PROCHLORPERAZINE EDISYLATE 10 MG: 5 INJECTION INTRAMUSCULAR; INTRAVENOUS at 14:53

## 2025-08-04 RX ADMIN — METOPROLOL TARTRATE 25 MG: 25 TABLET, FILM COATED ORAL at 12:53

## 2025-08-04 RX ADMIN — HYDROMORPHONE HYDROCHLORIDE 0.5 MG: 1 INJECTION, SOLUTION INTRAMUSCULAR; INTRAVENOUS; SUBCUTANEOUS at 13:35

## 2025-08-04 RX ADMIN — ONDANSETRON 4 MG: 2 INJECTION, SOLUTION INTRAMUSCULAR; INTRAVENOUS at 11:38

## 2025-08-04 RX ADMIN — SODIUM CHLORIDE, PRESERVATIVE FREE 10 ML: 5 INJECTION INTRAVENOUS at 08:57

## 2025-08-04 RX ADMIN — IPRATROPIUM BROMIDE AND ALBUTEROL SULFATE 1 DOSE: .5; 2.5 SOLUTION RESPIRATORY (INHALATION) at 17:23

## 2025-08-04 RX ADMIN — ASPIRIN 81 MG: 81 TABLET, CHEWABLE ORAL at 08:57

## 2025-08-04 ASSESSMENT — PAIN SCALES - PAIN ASSESSMENT IN ADVANCED DEMENTIA (PAINAD)
TOTALSCORE: 2
CONSOLABILITY: NO NEED TO CONSOLE
BODYLANGUAGE: TENSE, DISTRESSED PACING, FIDGETING
BODYLANGUAGE: TENSE, DISTRESSED PACING, FIDGETING
BREATHING: OCCASIONAL LABORED BREATHING, SHORT PERIOD OF HYPERVENTILATION
BODYLANGUAGE: TENSE, DISTRESSED PACING, FIDGETING
NEGVOCALIZATION: REPEATED TROUBLED CALLING OUT, LOUD MOANING/GROANING, CRYING
FACIALEXPRESSION: SAD, FRIGHTENED, FROWN
NEGVOCALIZATION: OCCASIONAL MOAN/GROAN, LOW SPEECH, NEGATIVE/DISAPPROVING QUALITY
CONSOLABILITY: NO NEED TO CONSOLE
CONSOLABILITY: DISTRACTED OR REASSURED BY VOICE/TOUCH
FACIALEXPRESSION: SAD, FRIGHTENED, FROWN
TOTALSCORE: 6
NEGVOCALIZATION: REPEATED TROUBLED CALLING OUT, LOUD MOANING/GROANING, CRYING
FACIALEXPRESSION: SMILING OR INEXPRESSIVE
BODYLANGUAGE: RELAXED
NEGVOCALIZATION: OCCASIONAL MOAN/GROAN, LOW SPEECH, NEGATIVE/DISAPPROVING QUALITY
BREATHING: OCCASIONAL LABORED BREATHING, SHORT PERIOD OF HYPERVENTILATION
TOTALSCORE: 3

## 2025-08-04 ASSESSMENT — PAIN SCALES - GENERAL
PAINLEVEL_OUTOF10: 4
PAINLEVEL_OUTOF10: 0
PAINLEVEL_OUTOF10: 4

## 2025-08-04 ASSESSMENT — PAIN DESCRIPTION - LOCATION
LOCATION: ABDOMEN
LOCATION: ABDOMEN